# Patient Record
Sex: MALE | Race: WHITE | Employment: OTHER | URBAN - METROPOLITAN AREA
[De-identification: names, ages, dates, MRNs, and addresses within clinical notes are randomized per-mention and may not be internally consistent; named-entity substitution may affect disease eponyms.]

---

## 2020-11-10 ENCOUNTER — HOSPITAL ENCOUNTER (EMERGENCY)
Age: 50
Discharge: HOME | End: 2020-11-10
Attending: EMERGENCY MEDICINE
Payer: MEDICARE

## 2020-11-10 VITALS
HEART RATE: 85 BPM | SYSTOLIC BLOOD PRESSURE: 135 MMHG | RESPIRATION RATE: 16 BRPM | TEMPERATURE: 97.7 F | BODY MASS INDEX: 22.73 KG/M2 | OXYGEN SATURATION: 92 % | WEIGHT: 150 LBS | HEIGHT: 68 IN | DIASTOLIC BLOOD PRESSURE: 78 MMHG

## 2020-11-10 LAB
ABSOLUTE EOS #: 0.27 K/UL (ref 0–0.44)
ABSOLUTE IMMATURE GRANULOCYTE: 0.04 K/UL (ref 0–0.3)
ABSOLUTE LYMPH #: 1.67 K/UL (ref 1.1–3.7)
ABSOLUTE MONO #: 0.43 K/UL (ref 0.1–1.2)
ACETAMINOPHEN LEVEL: <10 UG/ML (ref 10–30)
ALBUMIN SERPL-MCNC: 3.8 G/DL (ref 3.5–5.2)
ALBUMIN/GLOBULIN RATIO: NORMAL (ref 1–2.5)
ALP BLD-CCNC: 58 U/L (ref 40–129)
ALT SERPL-CCNC: 9 U/L (ref 5–41)
ANION GAP SERPL CALCULATED.3IONS-SCNC: 14 MMOL/L (ref 9–17)
AST SERPL-CCNC: 16 U/L
BASOPHILS # BLD: 1 % (ref 0–2)
BASOPHILS ABSOLUTE: 0.04 K/UL (ref 0–0.2)
BILIRUB SERPL-MCNC: 1.02 MG/DL (ref 0.3–1.2)
BILIRUBIN DIRECT: 0.21 MG/DL
BILIRUBIN, INDIRECT: 0.81 MG/DL (ref 0–1)
BUN BLDV-MCNC: 22 MG/DL (ref 6–20)
BUN/CREAT BLD: 17 (ref 9–20)
CALCIUM SERPL-MCNC: 9 MG/DL (ref 8.6–10.4)
CHLORIDE BLD-SCNC: 100 MMOL/L (ref 98–107)
CO2: 24 MMOL/L (ref 20–31)
CREAT SERPL-MCNC: 1.31 MG/DL (ref 0.7–1.2)
DIFFERENTIAL TYPE: ABNORMAL
EKG ATRIAL RATE: 76 BPM
EKG P AXIS: 75 DEGREES
EKG P-R INTERVAL: 156 MS
EKG Q-T INTERVAL: 404 MS
EKG QRS DURATION: 92 MS
EKG QTC CALCULATION (BAZETT): 454 MS
EKG R AXIS: 77 DEGREES
EKG T AXIS: 61 DEGREES
EKG VENTRICULAR RATE: 76 BPM
EOSINOPHILS RELATIVE PERCENT: 3 % (ref 1–4)
ETHANOL PERCENT: <0.01 %
ETHANOL: <10 MG/DL
GFR AFRICAN AMERICAN: >60 ML/MIN
GFR NON-AFRICAN AMERICAN: 58 ML/MIN
GFR SERPL CREATININE-BSD FRML MDRD: ABNORMAL ML/MIN/{1.73_M2}
GFR SERPL CREATININE-BSD FRML MDRD: ABNORMAL ML/MIN/{1.73_M2}
GLOBULIN: NORMAL G/DL (ref 1.5–3.8)
GLUCOSE BLD-MCNC: 210 MG/DL (ref 70–99)
HCT VFR BLD CALC: 37.6 % (ref 40.7–50.3)
HEMOGLOBIN: 11.9 G/DL (ref 13–17)
IMMATURE GRANULOCYTES: 1 %
LYMPHOCYTES # BLD: 19 % (ref 24–43)
MCH RBC QN AUTO: 27.7 PG (ref 25.2–33.5)
MCHC RBC AUTO-ENTMCNC: 31.6 G/DL (ref 28.4–34.8)
MCV RBC AUTO: 87.4 FL (ref 82.6–102.9)
MONOCYTES # BLD: 5 % (ref 3–12)
MYOGLOBIN: 43 NG/ML (ref 28–72)
NRBC AUTOMATED: 0 PER 100 WBC
PDW BLD-RTO: 13.6 % (ref 11.8–14.4)
PLATELET # BLD: 208 K/UL (ref 138–453)
PLATELET ESTIMATE: ABNORMAL
PMV BLD AUTO: 11.5 FL (ref 8.1–13.5)
POTASSIUM SERPL-SCNC: 3.3 MMOL/L (ref 3.7–5.3)
RBC # BLD: 4.3 M/UL (ref 4.21–5.77)
RBC # BLD: ABNORMAL 10*6/UL
SALICYLATE LEVEL: <1 MG/DL (ref 3–10)
SEG NEUTROPHILS: 71 % (ref 36–65)
SEGMENTED NEUTROPHILS ABSOLUTE COUNT: 6.34 K/UL (ref 1.5–8.1)
SODIUM BLD-SCNC: 138 MMOL/L (ref 135–144)
TOTAL PROTEIN: 6.6 G/DL (ref 6.4–8.3)
TOXIC TRICYCLIC SC,BLOOD: NEGATIVE
TROPONIN INTERP: NORMAL
TROPONIN T: NORMAL NG/ML
TROPONIN, HIGH SENSITIVITY: 7 NG/L (ref 0–22)
WBC # BLD: 8.8 K/UL (ref 3.5–11.3)
WBC # BLD: ABNORMAL 10*3/UL

## 2020-11-10 PROCEDURE — 84484 ASSAY OF TROPONIN QUANT: CPT

## 2020-11-10 PROCEDURE — 80307 DRUG TEST PRSMV CHEM ANLYZR: CPT

## 2020-11-10 PROCEDURE — 80048 BASIC METABOLIC PNL TOTAL CA: CPT

## 2020-11-10 PROCEDURE — 85025 COMPLETE CBC W/AUTO DIFF WBC: CPT

## 2020-11-10 PROCEDURE — 99282 EMERGENCY DEPT VISIT SF MDM: CPT

## 2020-11-10 PROCEDURE — 80076 HEPATIC FUNCTION PANEL: CPT

## 2020-11-10 PROCEDURE — 93005 ELECTROCARDIOGRAM TRACING: CPT | Performed by: NURSE PRACTITIONER

## 2020-11-10 PROCEDURE — G0480 DRUG TEST DEF 1-7 CLASSES: HCPCS

## 2020-11-10 PROCEDURE — 83874 ASSAY OF MYOGLOBIN: CPT

## 2020-11-10 ASSESSMENT — ENCOUNTER SYMPTOMS
ABDOMINAL PAIN: 0
PHOTOPHOBIA: 0
COLOR CHANGE: 0
DIARRHEA: 0
VOMITING: 1
BACK PAIN: 0
EYE PAIN: 0
COUGH: 0
SHORTNESS OF BREATH: 0
NAUSEA: 1

## 2020-11-10 NOTE — ED NOTES
Pt repeatedly sts \" I dont remember doing anything\" asking where kids are am I going to lose them. pt advised kids are with police.      Shagufta Lopez RN  11/10/20 9095

## 2020-11-10 NOTE — ED NOTES
Brother here in emergency dept and advised pt taken by police . The brother  currently has kids with him.      Tammy Granaod RN  11/10/20 7377 Inflammation Suggestive Of Cancer Camouflage Histology Text: There was a dense lymphocytic infiltrate which prevented adequate histologic evaluation of adjacent structures.

## 2020-11-10 NOTE — ED PROVIDER NOTES
Team [address] ED  eMERGENCY dEPARTMENT eNCOUnter      Pt Name: Kaylee Mcadams  MRN: [id number]  [place] 1970  Date of evaluation: 11/10/2020  Provider: LUIS Mcdonnell CNP    CHIEF COMPLAINT       Chief Complaint   Patient presents with    Drug Overdose         HISTORY OF PRESENT ILLNESS  (Location/Symptom, Timing/Onset, Context/Setting, Quality, Duration, Modifying Factors, Severity.)   Kaylee Mcadams is a 53 y.o. male who presents to the emergency department via EMS for a possible overdose. He was found in his bathroom by his children unresponsive. He does not recall using any drugs. EMS reported they found drug paraphernalia in the bathroom. He was given 3 mg of narcan IV and became responsive. He had some emesis after. His children told EMS that he went to use the restroom and they hear a loud thud so they went to check on him. His children are with [employer] at this time. Denies headache, dizziness, chest pain, SOB. Denies pain at this time. Nursing Notes were reviewed. ALLERGIES     Azithromycin; Codeine; Ibuprofen; Morphine; and Tramadol    CURRENT MEDICATIONS       There are no discharge medications for this patient. PAST MEDICAL HISTORY         Diagnosis Date    Diabetes mellitus ([facility]     Seizures ([facility] [address]        SURGICAL HISTORY     History reviewed. No pertinent surgical history. FAMILY HISTORY     History reviewed. No pertinent family history. No family status information on file. SOCIAL HISTORY      reports that he has been smoking cigarettes. He does not have any smokeless tobacco history on file. He reports previous alcohol use. He reports current drug use. Drug: Marijuana. REVIEW OF SYSTEMS    (2-9 systems for level 4, 10 or more for level 5)     Review of Systems   Constitutional: Negative for chills, diaphoresis, fatigue and fever. Eyes: Negative for photophobia, pain and visual disturbance.    Respiratory: Negative for cough and shortness of breath. Cardiovascular: Negative for chest pain. Gastrointestinal: Positive for nausea and vomiting. Negative for abdominal pain and diarrhea. Musculoskeletal: Negative for arthralgias, back pain, myalgias and neck pain. Skin: Negative for color change, rash and wound. Neurological: Positive for weakness. Negative for dizziness, facial asymmetry, light-headedness, numbness and headaches. Psychiatric/Behavioral: Positive for confusion. Except as noted above the remainder of the review of systems was reviewed and negative. PHYSICAL EXAM    (up to 7 for level 4, 8 or more for level 5)     ED Triage Vitals   BP Temp Temp Source Pulse Resp SpO2 Height Weight   11/10/20 1103 -- 11/10/20 1103 11/10/20 1103 11/10/20 1103 11/10/20 1103 11/10/20 1102 11/10/20 1102   135/78  Oral 85 16 92 % 5' 8\" (1.727 m) 150 lb (68 kg)     Physical Exam  Vitals signs reviewed. Constitutional:       General: He is not in acute distress. Appearance: He is well-developed. He is not diaphoretic. HENT:      Head: No raccoon eyes or Pearl's sign. Nose:      Right Nostril: No epistaxis. Left Nostril: No epistaxis. Eyes:      General: No scleral icterus. Extraocular Movements: Extraocular movements intact. Conjunctiva/sclera: Conjunctivae normal.      Pupils: Pupils are equal, round, and reactive to light. Cardiovascular:      Rate and Rhythm: Normal rate and regular rhythm. Heart sounds: Normal heart sounds. Pulmonary:      Effort: Pulmonary effort is normal. No respiratory distress. Breath sounds: Normal breath sounds. No wheezing or rales. Abdominal:      Palpations: Abdomen is soft. Tenderness: There is no abdominal tenderness. There is no guarding. Musculoskeletal:      Cervical back: He exhibits no pain. Thoracic back: He exhibits no pain. Lumbar back: He exhibits no pain. Skin:     General: Skin is warm and dry. Findings: No rash.    Neurological: Mental Status: He is alert and oriented to person, place, and time. GCS: GCS eye subscore is 4. GCS verbal subscore is 5. GCS motor subscore is 6. Cranial Nerves: Cranial nerves are intact. Motor: Motor function is intact. Coordination: Coordination is intact. Psychiatric:         Behavior: Behavior normal.         DIAGNOSTIC RESULTS     EKG: All EKG's are interpreted by the Emergency Department Physician who either signs or Co-signs this chart in the absence of a cardiologist.  EKG was interpreted by Dr. Jose G Astorga after completion. LABS:  Labs Reviewed   BASIC METABOLIC PANEL - Abnormal; Notable for the following components:       Result Value    Glucose 210 (*)     BUN 22 (*)     CREATININE 1.31 (*)     Potassium 3.3 (*)     GFR Non- 58 (*)     All other components within normal limits   CBC WITH AUTO DIFFERENTIAL - Abnormal; Notable for the following components:    Hemoglobin 11.9 (*)     Hematocrit 37.6 (*)     Seg Neutrophils 71 (*)     Lymphocytes 19 (*)     Immature Granulocytes 1 (*)     All other components within normal limits   TOX SCR, BLD, ED - Abnormal; Notable for the following components:    Acetaminophen Level <18 (*)     Salicylate Lvl <1 (*)     All other components within normal limits   HEPATIC FUNCTION PANEL   TROP/MYOGLOBIN       All other labs were within normal range or not returned as of this dictation. EMERGENCY DEPARTMENT COURSE and DIFFERENTIAL DIAGNOSIS/MDM:   Vitals:    Vitals:    11/10/20 1102 11/10/20 1103 11/10/20 1107   BP:  135/78    Pulse:  85    Resp:  16    Temp:   97.7 °F (36.5 °C)   TempSrc:  Oral Oral   SpO2:  92%    Weight: 150 lb (68 kg)     Height: 5' 8\" (1.727 m)         CLINICAL DECISION MAKING:  The patient presented alert with a nontoxic appearance and was seen in conjunction with Dr. Jose G Astorga. The patient was alert and oriented here without issue. He was discharged to [facility]. FINAL IMPRESSION      1.  Accidental drug overdose, initial encounter                DISPOSITION/PLAN   DISPOSITION Decision To Discharge 11/10/2020 12:17:14 PM      PATIENT REFERRED TO:   No follow-up provider specified. DISCHARGE MEDICATIONS:     There are no discharge medications for this patient.           (Please note that portions of this note were completed with a voice recognition program.  Efforts were made to edit the dictations but occasionally words are mis-transcribed.)    LUIS Cedeño - LUIS Graff CNP  11/10/20 1161

## 2020-11-11 NOTE — ED PROVIDER NOTES
eMERGENCY dEPARTMENT eNCOUnter   Independent Attestation     Pt Name: Vanda Rodríguez  MRN: [id number]  [place] 1970  Date of evaluation: 11/11/20     Vanda Rodríguez is a 53 y.o. male with CC: Drug Overdose      Based on the medical record the care appears appropriate. I was personally available for consultation in the Emergency Department.     Damion Paris MD  Attending Emergency Physician                    Damion Paris MD  11/11/20 9627

## 2021-12-03 ENCOUNTER — HOSPITAL ENCOUNTER (EMERGENCY)
Age: 51
Discharge: LEFT BEFORE DISCHARGE | End: 2021-12-04
Attending: EMERGENCY MEDICINE
Payer: MEDICARE

## 2021-12-03 ENCOUNTER — APPOINTMENT (OUTPATIENT)
Dept: GENERAL RADIOLOGY | Age: 51
End: 2021-12-03
Payer: MEDICARE

## 2021-12-03 VITALS
RESPIRATION RATE: 17 BRPM | SYSTOLIC BLOOD PRESSURE: 118 MMHG | HEART RATE: 74 BPM | TEMPERATURE: 97.3 F | WEIGHT: 150 LBS | BODY MASS INDEX: 22.81 KG/M2 | OXYGEN SATURATION: 98 % | DIASTOLIC BLOOD PRESSURE: 70 MMHG

## 2021-12-03 DIAGNOSIS — E86.0 DEHYDRATION: Primary | ICD-10-CM

## 2021-12-03 DIAGNOSIS — R73.9 HYPERGLYCEMIA: ICD-10-CM

## 2021-12-03 LAB
ABSOLUTE EOS #: 0.1 K/UL (ref 0–0.4)
ABSOLUTE IMMATURE GRANULOCYTE: NORMAL K/UL (ref 0–0.3)
ABSOLUTE LYMPH #: 1.9 K/UL (ref 1–4.8)
ABSOLUTE MONO #: 0.4 K/UL (ref 0.1–1.3)
ALBUMIN SERPL-MCNC: 3.9 G/DL (ref 3.5–5.2)
ALBUMIN/GLOBULIN RATIO: ABNORMAL (ref 1–2.5)
ALP BLD-CCNC: 101 U/L (ref 40–129)
ALT SERPL-CCNC: 15 U/L (ref 5–41)
ANION GAP SERPL CALCULATED.3IONS-SCNC: 10 MMOL/L (ref 9–17)
AST SERPL-CCNC: 20 U/L
BASOPHILS # BLD: 1 % (ref 0–2)
BASOPHILS ABSOLUTE: 0 K/UL (ref 0–0.2)
BETA-HYDROXYBUTYRATE: 0.19 MMOL/L (ref 0.02–0.27)
BILIRUB SERPL-MCNC: 0.97 MG/DL (ref 0.3–1.2)
BUN BLDV-MCNC: 54 MG/DL (ref 6–20)
BUN/CREAT BLD: ABNORMAL (ref 9–20)
CALCIUM SERPL-MCNC: 9.3 MG/DL (ref 8.6–10.4)
CHLORIDE BLD-SCNC: 87 MMOL/L (ref 98–107)
CO2: 32 MMOL/L (ref 20–31)
CREAT SERPL-MCNC: 2.21 MG/DL (ref 0.7–1.2)
DIFFERENTIAL TYPE: NORMAL
EOSINOPHILS RELATIVE PERCENT: 2 % (ref 0–4)
GFR AFRICAN AMERICAN: 38 ML/MIN
GFR NON-AFRICAN AMERICAN: 32 ML/MIN
GFR SERPL CREATININE-BSD FRML MDRD: ABNORMAL ML/MIN/{1.73_M2}
GFR SERPL CREATININE-BSD FRML MDRD: ABNORMAL ML/MIN/{1.73_M2}
GLUCOSE BLD-MCNC: 397 MG/DL (ref 70–99)
HCT VFR BLD CALC: 41.6 % (ref 41–53)
HEMOGLOBIN: 14.1 G/DL (ref 13.5–17.5)
IMMATURE GRANULOCYTES: NORMAL %
LIPASE: 33 U/L (ref 13–60)
LYMPHOCYTES # BLD: 37 % (ref 24–44)
MAGNESIUM: 2.5 MG/DL (ref 1.6–2.6)
MCH RBC QN AUTO: 28.9 PG (ref 26–34)
MCHC RBC AUTO-ENTMCNC: 33.9 G/DL (ref 31–37)
MCV RBC AUTO: 85.3 FL (ref 80–100)
MONOCYTES # BLD: 7 % (ref 1–7)
NRBC AUTOMATED: NORMAL PER 100 WBC
PDW BLD-RTO: 13.9 % (ref 11.5–14.9)
PLATELET # BLD: 212 K/UL (ref 150–450)
PLATELET ESTIMATE: NORMAL
PMV BLD AUTO: 9.8 FL (ref 6–12)
POTASSIUM SERPL-SCNC: 3.5 MMOL/L (ref 3.7–5.3)
RBC # BLD: 4.88 M/UL (ref 4.5–5.9)
RBC # BLD: NORMAL 10*6/UL
SARS-COV-2, RAPID: NOT DETECTED
SEG NEUTROPHILS: 53 % (ref 36–66)
SEGMENTED NEUTROPHILS ABSOLUTE COUNT: 2.9 K/UL (ref 1.3–9.1)
SODIUM BLD-SCNC: 129 MMOL/L (ref 135–144)
SPECIMEN DESCRIPTION: NORMAL
TOTAL PROTEIN: 6.8 G/DL (ref 6.4–8.3)
TROPONIN INTERP: NORMAL
TROPONIN T: NORMAL NG/ML
TROPONIN, HIGH SENSITIVITY: 14 NG/L (ref 0–22)
TSH SERPL DL<=0.05 MIU/L-ACNC: 2.15 MIU/L (ref 0.3–5)
WBC # BLD: 5.3 K/UL (ref 3.5–11)
WBC # BLD: NORMAL 10*3/UL

## 2021-12-03 PROCEDURE — 83735 ASSAY OF MAGNESIUM: CPT

## 2021-12-03 PROCEDURE — 87635 SARS-COV-2 COVID-19 AMP PRB: CPT

## 2021-12-03 PROCEDURE — 84443 ASSAY THYROID STIM HORMONE: CPT

## 2021-12-03 PROCEDURE — 82805 BLOOD GASES W/O2 SATURATION: CPT

## 2021-12-03 PROCEDURE — 85025 COMPLETE CBC W/AUTO DIFF WBC: CPT

## 2021-12-03 PROCEDURE — 99284 EMERGENCY DEPT VISIT MOD MDM: CPT

## 2021-12-03 PROCEDURE — 82010 KETONE BODYS QUAN: CPT

## 2021-12-03 PROCEDURE — 80053 COMPREHEN METABOLIC PANEL: CPT

## 2021-12-03 PROCEDURE — 6360000002 HC RX W HCPCS: Performed by: NURSE PRACTITIONER

## 2021-12-03 PROCEDURE — 96375 TX/PRO/DX INJ NEW DRUG ADDON: CPT

## 2021-12-03 PROCEDURE — C9113 INJ PANTOPRAZOLE SODIUM, VIA: HCPCS | Performed by: NURSE PRACTITIONER

## 2021-12-03 PROCEDURE — 6360000002 HC RX W HCPCS: Performed by: EMERGENCY MEDICINE

## 2021-12-03 PROCEDURE — 93005 ELECTROCARDIOGRAM TRACING: CPT | Performed by: EMERGENCY MEDICINE

## 2021-12-03 PROCEDURE — 84484 ASSAY OF TROPONIN QUANT: CPT

## 2021-12-03 PROCEDURE — 71045 X-RAY EXAM CHEST 1 VIEW: CPT

## 2021-12-03 PROCEDURE — 82800 BLOOD PH: CPT

## 2021-12-03 PROCEDURE — 83690 ASSAY OF LIPASE: CPT

## 2021-12-03 PROCEDURE — 2580000003 HC RX 258: Performed by: EMERGENCY MEDICINE

## 2021-12-03 PROCEDURE — 6370000000 HC RX 637 (ALT 250 FOR IP): Performed by: EMERGENCY MEDICINE

## 2021-12-03 PROCEDURE — 96374 THER/PROPH/DIAG INJ IV PUSH: CPT

## 2021-12-03 PROCEDURE — 96361 HYDRATE IV INFUSION ADD-ON: CPT

## 2021-12-03 PROCEDURE — 36415 COLL VENOUS BLD VENIPUNCTURE: CPT

## 2021-12-03 RX ORDER — SODIUM CHLORIDE 9 MG/ML
INJECTION, SOLUTION INTRAVENOUS CONTINUOUS
Status: DISCONTINUED | OUTPATIENT
Start: 2021-12-03 | End: 2021-12-04 | Stop reason: HOSPADM

## 2021-12-03 RX ORDER — INSULIN GLARGINE 100 [IU]/ML
25 INJECTION, SOLUTION SUBCUTANEOUS 2 TIMES DAILY
Status: ON HOLD | COMMUNITY
End: 2022-02-15 | Stop reason: HOSPADM

## 2021-12-03 RX ORDER — 0.9 % SODIUM CHLORIDE 0.9 %
1000 INTRAVENOUS SOLUTION INTRAVENOUS ONCE
Status: COMPLETED | OUTPATIENT
Start: 2021-12-03 | End: 2021-12-03

## 2021-12-03 RX ORDER — LORAZEPAM 2 MG/ML
1 INJECTION INTRAMUSCULAR EVERY 6 HOURS PRN
Status: CANCELLED | OUTPATIENT
Start: 2021-12-03

## 2021-12-03 RX ORDER — CLONAZEPAM 2 MG/1
4 TABLET ORAL DAILY PRN
Status: ON HOLD | COMMUNITY

## 2021-12-03 RX ORDER — SODIUM CHLORIDE 0.9 % (FLUSH) 0.9 %
10 SYRINGE (ML) INJECTION DAILY
Status: DISCONTINUED | OUTPATIENT
Start: 2021-12-03 | End: 2021-12-04 | Stop reason: HOSPADM

## 2021-12-03 RX ORDER — INSULIN GLARGINE 100 [IU]/ML
25 INJECTION, SOLUTION SUBCUTANEOUS 2 TIMES DAILY
Status: CANCELLED | OUTPATIENT
Start: 2021-12-03

## 2021-12-03 RX ORDER — ONDANSETRON 2 MG/ML
4 INJECTION INTRAMUSCULAR; INTRAVENOUS EVERY 6 HOURS PRN
Status: CANCELLED | OUTPATIENT
Start: 2021-12-03

## 2021-12-03 RX ORDER — ONDANSETRON 4 MG/1
4 TABLET, FILM COATED ORAL 3 TIMES DAILY PRN
Qty: 15 TABLET | Refills: 0 | Status: SHIPPED | OUTPATIENT
Start: 2021-12-03 | End: 2022-02-14

## 2021-12-03 RX ORDER — ONDANSETRON 4 MG/1
4 TABLET, ORALLY DISINTEGRATING ORAL EVERY 8 HOURS PRN
Status: CANCELLED | OUTPATIENT
Start: 2021-12-03

## 2021-12-03 RX ORDER — SODIUM CHLORIDE 9 MG/ML
INJECTION, SOLUTION INTRAVENOUS CONTINUOUS
Status: CANCELLED | OUTPATIENT
Start: 2021-12-03

## 2021-12-03 RX ORDER — BLOOD-GLUCOSE METER
1 KIT MISCELLANEOUS DAILY
Qty: 1 KIT | Refills: 0 | Status: SHIPPED | OUTPATIENT
Start: 2021-12-03 | End: 2022-03-18 | Stop reason: ALTCHOICE

## 2021-12-03 RX ORDER — ONDANSETRON 2 MG/ML
8 INJECTION INTRAMUSCULAR; INTRAVENOUS ONCE
Status: COMPLETED | OUTPATIENT
Start: 2021-12-03 | End: 2021-12-03

## 2021-12-03 RX ORDER — ALBUTEROL SULFATE 90 UG/1
2 AEROSOL, METERED RESPIRATORY (INHALATION) EVERY 4 HOURS PRN
Status: ON HOLD | COMMUNITY

## 2021-12-03 RX ORDER — SODIUM CHLORIDE 0.9 % (FLUSH) 0.9 %
5-40 SYRINGE (ML) INJECTION PRN
Status: CANCELLED | OUTPATIENT
Start: 2021-12-03

## 2021-12-03 RX ORDER — SODIUM CHLORIDE 9 MG/ML
25 INJECTION, SOLUTION INTRAVENOUS PRN
Status: CANCELLED | OUTPATIENT
Start: 2021-12-03

## 2021-12-03 RX ORDER — TRAZODONE HYDROCHLORIDE 150 MG/1
300 TABLET ORAL NIGHTLY
Status: ON HOLD | COMMUNITY

## 2021-12-03 RX ORDER — POTASSIUM CHLORIDE 7.45 MG/ML
10 INJECTION INTRAVENOUS
Status: COMPLETED | OUTPATIENT
Start: 2021-12-03 | End: 2021-12-04

## 2021-12-03 RX ORDER — LORAZEPAM 2 MG/ML
1 INJECTION INTRAMUSCULAR ONCE
Status: DISCONTINUED | OUTPATIENT
Start: 2021-12-03 | End: 2021-12-03

## 2021-12-03 RX ORDER — INSULIN ASPART 100 [IU]/ML
5 INJECTION, SOLUTION INTRAVENOUS; SUBCUTANEOUS
COMMUNITY
End: 2022-03-18 | Stop reason: SDUPTHER

## 2021-12-03 RX ORDER — ACETAMINOPHEN 325 MG/1
650 TABLET ORAL EVERY 6 HOURS PRN
Status: CANCELLED | OUTPATIENT
Start: 2021-12-03

## 2021-12-03 RX ORDER — SODIUM CHLORIDE 0.9 % (FLUSH) 0.9 %
5-40 SYRINGE (ML) INJECTION EVERY 12 HOURS SCHEDULED
Status: CANCELLED | OUTPATIENT
Start: 2021-12-03

## 2021-12-03 RX ORDER — ALBUTEROL SULFATE 90 UG/1
2 AEROSOL, METERED RESPIRATORY (INHALATION) EVERY 4 HOURS PRN
Status: CANCELLED | OUTPATIENT
Start: 2021-12-03

## 2021-12-03 RX ORDER — ACETAMINOPHEN 650 MG/1
650 SUPPOSITORY RECTAL EVERY 6 HOURS PRN
Status: CANCELLED | OUTPATIENT
Start: 2021-12-03

## 2021-12-03 RX ORDER — PANTOPRAZOLE SODIUM 40 MG/10ML
40 INJECTION, POWDER, LYOPHILIZED, FOR SOLUTION INTRAVENOUS DAILY
Status: DISCONTINUED | OUTPATIENT
Start: 2021-12-03 | End: 2021-12-04 | Stop reason: HOSPADM

## 2021-12-03 RX ADMIN — PANTOPRAZOLE SODIUM 40 MG: 40 INJECTION, POWDER, FOR SOLUTION INTRAVENOUS at 23:47

## 2021-12-03 RX ADMIN — SODIUM CHLORIDE 1000 ML: 9 INJECTION, SOLUTION INTRAVENOUS at 20:37

## 2021-12-03 RX ADMIN — SODIUM CHLORIDE: 9 INJECTION, SOLUTION INTRAVENOUS at 21:28

## 2021-12-03 RX ADMIN — POTASSIUM CHLORIDE 10 MEQ: 7.46 INJECTION, SOLUTION INTRAVENOUS at 23:40

## 2021-12-03 RX ADMIN — POTASSIUM CHLORIDE 10 MEQ: 7.46 INJECTION, SOLUTION INTRAVENOUS at 21:17

## 2021-12-03 RX ADMIN — SODIUM CHLORIDE 1000 ML: 9 INJECTION, SOLUTION INTRAVENOUS at 21:17

## 2021-12-03 RX ADMIN — ONDANSETRON 8 MG: 2 INJECTION INTRAMUSCULAR; INTRAVENOUS at 20:37

## 2021-12-03 RX ADMIN — Medication 7 UNITS: at 21:17

## 2021-12-03 ASSESSMENT — PAIN DESCRIPTION - PAIN TYPE: TYPE: ACUTE PAIN

## 2021-12-03 ASSESSMENT — ENCOUNTER SYMPTOMS
COUGH: 0
CHEST TIGHTNESS: 0
SHORTNESS OF BREATH: 0
NAUSEA: 1
SORE THROAT: 0
CONSTIPATION: 0
BACK PAIN: 0
EYE PAIN: 0
VOMITING: 1
ABDOMINAL PAIN: 0
DIARRHEA: 0

## 2021-12-03 ASSESSMENT — PAIN DESCRIPTION - FREQUENCY: FREQUENCY: CONTINUOUS

## 2021-12-03 ASSESSMENT — PAIN DESCRIPTION - LOCATION: LOCATION: GENERALIZED

## 2021-12-03 ASSESSMENT — PAIN DESCRIPTION - DESCRIPTORS: DESCRIPTORS: DISCOMFORT

## 2021-12-03 ASSESSMENT — PAIN SCALES - GENERAL: PAINLEVEL_OUTOF10: 10

## 2021-12-03 NOTE — ED TRIAGE NOTES
Pt dropped to the ground walking into triage. Pt assisted to wheelchair by staff. Pt reports extreme weakness x 10 days.  BLOOD SUGAR 481 IN TRIAGE

## 2021-12-04 LAB
ALLEN TEST: ABNORMAL
CARBOXYHEMOGLOBIN: 6.6 % (ref 0–5)
EKG ATRIAL RATE: 77 BPM
EKG P AXIS: -60 DEGREES
EKG P-R INTERVAL: 150 MS
EKG Q-T INTERVAL: 420 MS
EKG QRS DURATION: 94 MS
EKG QTC CALCULATION (BAZETT): 475 MS
EKG R AXIS: -165 DEGREES
EKG T AXIS: 74 DEGREES
EKG VENTRICULAR RATE: 77 BPM
FIO2: ABNORMAL
HCO3 VENOUS: 26.1 MMOL/L (ref 24–30)
METHEMOGLOBIN: 0.7 % (ref 0–1.9)
MODE: ABNORMAL
NEGATIVE BASE EXCESS, VEN: ABNORMAL MMOL/L (ref 0–2)
NOTIFICATION TIME: ABNORMAL
NOTIFICATION: ABNORMAL
O2 DEVICE/FLOW/%: ABNORMAL
O2 SAT, VEN: 90.2 % (ref 60–85)
OXYHEMOGLOBIN: ABNORMAL % (ref 95–98)
PATIENT TEMP: 37
PCO2, VEN, TEMP ADJ: ABNORMAL MMHG (ref 39–55)
PCO2, VEN: 40.5 (ref 39–55)
PEEP/CPAP: ABNORMAL
PH VENOUS: 7.42 (ref 7.32–7.42)
PH, VEN, TEMP ADJ: ABNORMAL (ref 7.32–7.42)
PO2, VEN, TEMP ADJ: ABNORMAL MMHG (ref 30–50)
PO2, VEN: 81.4 (ref 30–50)
POSITIVE BASE EXCESS, VEN: 1.5 MMOL/L (ref 0–2)
PSV: ABNORMAL
PT. POSITION: ABNORMAL
RESPIRATORY RATE: ABNORMAL
SAMPLE SITE: ABNORMAL
SET RATE: ABNORMAL
TEXT FOR RESPIRATORY: ABNORMAL
TOTAL HB: ABNORMAL G/DL (ref 12–16)
TOTAL RATE: ABNORMAL
VT: ABNORMAL

## 2021-12-04 PROCEDURE — 93010 ELECTROCARDIOGRAM REPORT: CPT | Performed by: INTERNAL MEDICINE

## 2021-12-04 ASSESSMENT — PAIN SCALES - GENERAL: PAINLEVEL_OUTOF10: 4

## 2021-12-04 ASSESSMENT — PAIN DESCRIPTION - DESCRIPTORS: DESCRIPTORS: BURNING

## 2021-12-04 ASSESSMENT — PAIN DESCRIPTION - LOCATION: LOCATION: THROAT

## 2021-12-04 NOTE — ED NOTES
Pt states he is leaving ama. Berta Np aware. Pt agrees to stay until potassium is done. Pt given pretzels and apple juice.  Tolerating well     Vivian Restrepo RN  12/03/21 5886

## 2021-12-04 NOTE — ED PROVIDER NOTES
alcohol use. He reports current drug use. Drug: Marijuana Candiss Littler). PHYSICAL EXAM     INITIAL VITALS: /80   Pulse 89   Temp 97.3 °F (36.3 °C) (Oral)   Resp 17   SpO2 98%    Physical Exam  Vitals and nursing note reviewed. Constitutional:       Appearance: He is well-developed. Comments: He appears dehydrated and cachectic, chronically ill, older than stated age. HENT:      Head: Normocephalic and atraumatic. Right Ear: External ear normal.      Left Ear: External ear normal.   Eyes:      Conjunctiva/sclera: Conjunctivae normal.      Pupils: Pupils are equal, round, and reactive to light. Neck:      Vascular: No JVD. Trachea: No tracheal deviation. Cardiovascular:      Rate and Rhythm: Normal rate and regular rhythm. Heart sounds: Normal heart sounds. Pulmonary:      Effort: Pulmonary effort is normal. No respiratory distress. Breath sounds: Normal breath sounds. No stridor. Abdominal:      Comments: Abdomen is soft with no peritoneal signs. Musculoskeletal:         General: No tenderness or deformity. Normal range of motion. Cervical back: Normal range of motion and neck supple. Skin:     General: Skin is warm and dry. Neurological:      Mental Status: He is oriented to person, place, and time. Cranial Nerves: No cranial nerve deficit. Coordination: Coordination normal.         MEDICAL DECISION MAKING:   Assessment:  Jama Gowers is a 49 y.o. male who presents with generalized weakness. ED Course/MDM:   Patient arrived hemodynamically stable and in no acute distress. He collapsed to the floor in the waiting room and was noted to have  BG > 400. Labs reviewed. Patient significantly hyperglycemic and dehydrated, K slightly low. Not acidotic on VBG and ketones not elevated, does not appear consistent with DKA, not starting insulin gtt. CXR with no acute findings and covid negative.  Given 2L NS IVF bolus, IV insulin and potassium supplementation. 11:36 PM  Patient refusing admission. He has two minor children staying home alone and does not have any family members that can be home with them. The patient is alert and oriented x4 and I believe he has medical decision making capacity. I discussed his elevated creatinine and glucose which requires IVF and insulin. I specifically warned him that untreated this condition could progress to kidney failure or sudden cardiac arrthymia leading to death and he was able to repeat these risks of leaving. He is adamant that he wishes to be discharged. Given script for glucometer and referral to [facility] clinic to establish with primary care. Advised to return to the ED if he changes his mind about admission. Procedures    DIAGNOSTIC RESULTS   EKG: All EKG's are interpreted by the Emergency Department Physician who either signs or Co-signs this chart inthe absence of a cardiologist.      RADIOLOGY:All plain film, CT, MRI, and formal ultrasound images (except ED bedside ultrasound) are read by the radiologist, see reports below, unless otherwise noted in MDM or here. XR CHEST PORTABLE   Final Result   No acute process. LABS: All lab results were reviewed by myself, and all abnormals are listed below.   Labs Reviewed   COMPREHENSIVE METABOLIC PANEL - Abnormal; Notable for the following components:       Result Value    Glucose 397 (*)     BUN 54 (*)     CREATININE 2.21 (*)     Sodium 129 (*)     Potassium 3.5 (*)     Chloride 87 (*)     CO2 32 (*)     GFR Non- 32 (*)     GFR  38 (*)     All other components within normal limits   COVID-19, RAPID   CBC WITH AUTO DIFFERENTIAL   TSH WITH REFLEX   TROPONIN   LIPASE   MAGNESIUM   BETA-HYDROXYBUTYRATE   URINALYSIS   BLOOD GAS, VENOUS     EMERGENCY DEPARTMENT COURSE:   Vitals:    Vitals:    12/03/21 1842 12/03/21 2129   BP: 119/82 112/80   Pulse: 89 89   Resp: 18 17   Temp: 97.3 °F (36.3 °C)    TempSrc: Oral SpO2: 100% 98%       The patient was given the following medications while in the emergency department:  Orders Placed This Encounter   Medications    0.9 % sodium chloride bolus    ondansetron (ZOFRAN) injection 8 mg    0.9 % sodium chloride bolus    potassium chloride 10 mEq/100 mL IVPB (Peripheral Line)    insulin regular (HUMULIN R;NOVOLIN R) injection 7 Units    0.9 % sodium chloride infusion    insulin regular (HUMULIN R;NOVOLIN R) 100 UNIT/ML injection     Radha Farmer: cabinet override     CONSULTS:  IP CONSULT TO INTERNAL MEDICINE    FINAL IMPRESSION      1. Dehydration    2. Hyperglycemia          DISPOSITION/PLAN   DISPOSITION Decision To Admit 12/03/2021 09:39:53 PM      PATIENT REFERRED TO:  No follow-up provider specified.   DISCHARGE MEDICATIONS:  New Prescriptions    No medications on file     Madhav Longo MD  AttendingEmergency Physician                       Bria Catalan MD  12/03/21 8986       Bria Catalan MD  12/03/21 3856

## 2022-02-14 ENCOUNTER — APPOINTMENT (OUTPATIENT)
Dept: GENERAL RADIOLOGY | Age: 52
DRG: 420 | End: 2022-02-14
Payer: MEDICARE

## 2022-02-14 ENCOUNTER — HOSPITAL ENCOUNTER (INPATIENT)
Age: 52
LOS: 1 days | Discharge: LEFT BEFORE DISCHARGE | DRG: 420 | End: 2022-02-15
Attending: EMERGENCY MEDICINE | Admitting: INTERNAL MEDICINE
Payer: MEDICARE

## 2022-02-14 ENCOUNTER — APPOINTMENT (OUTPATIENT)
Dept: CT IMAGING | Age: 52
DRG: 420 | End: 2022-02-14
Payer: MEDICARE

## 2022-02-14 DIAGNOSIS — A09 INFECTIOUS COLITIS: Primary | ICD-10-CM

## 2022-02-14 PROBLEM — E10.65 HYPERGLYCEMIA DUE TO TYPE 1 DIABETES MELLITUS (HCC): Status: ACTIVE | Noted: 2022-02-14

## 2022-02-14 PROBLEM — K52.9 COLITIS: Status: ACTIVE | Noted: 2022-02-14

## 2022-02-14 LAB
-: NORMAL
ABSOLUTE EOS #: 0.03 K/UL (ref 0–0.44)
ABSOLUTE IMMATURE GRANULOCYTE: 0.1 K/UL (ref 0–0.3)
ABSOLUTE LYMPH #: 1 K/UL (ref 1.1–3.7)
ABSOLUTE MONO #: 1.08 K/UL (ref 0.1–1.2)
ALBUMIN SERPL-MCNC: 4 G/DL (ref 3.5–5.2)
ALBUMIN/GLOBULIN RATIO: 1.4 (ref 1–2.5)
ALLEN TEST: ABNORMAL
ALLEN TEST: ABNORMAL
ALP BLD-CCNC: 110 U/L (ref 40–129)
ALT SERPL-CCNC: 11 U/L (ref 5–41)
AMORPHOUS: NORMAL
ANION GAP SERPL CALCULATED.3IONS-SCNC: 17 MMOL/L (ref 9–17)
ANION GAP SERPL CALCULATED.3IONS-SCNC: 24 MMOL/L (ref 9–17)
ANION GAP: 11 MMOL/L (ref 7–16)
AST SERPL-CCNC: 17 U/L
BACTERIA: NORMAL
BASOPHILS # BLD: 0 % (ref 0–2)
BASOPHILS ABSOLUTE: 0.06 K/UL (ref 0–0.2)
BETA-HYDROXYBUTYRATE: 5.21 MMOL/L (ref 0.02–0.27)
BILIRUB SERPL-MCNC: 0.78 MG/DL (ref 0.3–1.2)
BILIRUBIN URINE: NEGATIVE
BUN BLDV-MCNC: 28 MG/DL (ref 6–20)
BUN BLDV-MCNC: 33 MG/DL (ref 6–20)
BUN/CREAT BLD: ABNORMAL (ref 9–20)
BUN/CREAT BLD: ABNORMAL (ref 9–20)
CALCIUM SERPL-MCNC: 8.8 MG/DL (ref 8.6–10.4)
CALCIUM SERPL-MCNC: 9.4 MG/DL (ref 8.6–10.4)
CARBOXYHEMOGLOBIN: 3 % (ref 0–5)
CASTS UA: NORMAL /LPF (ref 0–8)
CHLORIDE BLD-SCNC: 91 MMOL/L (ref 98–107)
CHLORIDE BLD-SCNC: 91 MMOL/L (ref 98–107)
CHP ED QC CHECK: NORMAL
CO2: 20 MMOL/L (ref 20–31)
CO2: 23 MMOL/L (ref 20–31)
COLOR: YELLOW
COMMENT UA: ABNORMAL
CREAT SERPL-MCNC: 1.58 MG/DL (ref 0.7–1.2)
CREAT SERPL-MCNC: 1.76 MG/DL (ref 0.7–1.2)
CRYSTALS, UA: NORMAL /HPF
D-DIMER QUANTITATIVE: 1.32 MG/L FEU
DIFFERENTIAL TYPE: ABNORMAL
EOSINOPHILS RELATIVE PERCENT: 0 % (ref 1–4)
EPITHELIAL CELLS UA: NORMAL /HPF (ref 0–5)
FIO2: ABNORMAL
FIO2: ABNORMAL
GFR AFRICAN AMERICAN: 50 ML/MIN
GFR AFRICAN AMERICAN: 57 ML/MIN
GFR NON-AFRICAN AMERICAN: 41 ML/MIN
GFR NON-AFRICAN AMERICAN: 47 ML/MIN
GFR NON-AFRICAN AMERICAN: NORMAL ML/MIN
GFR SERPL CREATININE-BSD FRML MDRD: ABNORMAL ML/MIN/{1.73_M2}
GFR SERPL CREATININE-BSD FRML MDRD: NORMAL ML/MIN
GFR SERPL CREATININE-BSD FRML MDRD: NORMAL ML/MIN/{1.73_M2}
GLUCOSE BLD-MCNC: 330 MG/DL (ref 75–110)
GLUCOSE BLD-MCNC: 392 MG/DL (ref 70–99)
GLUCOSE BLD-MCNC: 437 MG/DL (ref 75–110)
GLUCOSE BLD-MCNC: 444 MG/DL
GLUCOSE BLD-MCNC: 444 MG/DL (ref 75–110)
GLUCOSE BLD-MCNC: 487 MG/DL (ref 75–110)
GLUCOSE BLD-MCNC: 538 MG/DL (ref 70–99)
GLUCOSE BLD-MCNC: 549 MG/DL (ref 74–100)
GLUCOSE URINE: ABNORMAL
HCO3 VENOUS: 20.5 MMOL/L (ref 22–29)
HCO3 VENOUS: 21.7 MMOL/L (ref 24–30)
HCT VFR BLD CALC: 40.8 % (ref 40.7–50.3)
HEMOGLOBIN: 13.2 G/DL (ref 13–17)
IMMATURE GRANULOCYTES: 1 %
KETONES, URINE: ABNORMAL
LACTIC ACID, SEPSIS WHOLE BLOOD: 1 MMOL/L (ref 0.5–1.9)
LACTIC ACID, SEPSIS: NORMAL MMOL/L (ref 0.5–1.9)
LEUKOCYTE ESTERASE, URINE: NEGATIVE
LIPASE: 70 U/L (ref 13–60)
LYMPHOCYTES # BLD: 5 % (ref 24–43)
MAGNESIUM: 1.9 MG/DL (ref 1.6–2.6)
MCH RBC QN AUTO: 28 PG (ref 25.2–33.5)
MCHC RBC AUTO-ENTMCNC: 32.4 G/DL (ref 28.4–34.8)
MCV RBC AUTO: 86.4 FL (ref 82.6–102.9)
METHEMOGLOBIN: ABNORMAL % (ref 0–1.5)
MODE: ABNORMAL
MODE: ABNORMAL
MONOCYTES # BLD: 6 % (ref 3–12)
MUCUS: NORMAL
NEGATIVE BASE EXCESS, VEN: 1 (ref 0–2)
NEGATIVE BASE EXCESS, VEN: 3.6 MMOL/L (ref 0–2)
NITRITE, URINE: NEGATIVE
NOTIFICATION TIME: ABNORMAL
NOTIFICATION: ABNORMAL
NRBC AUTOMATED: 0 PER 100 WBC
O2 DEVICE/FLOW/%: ABNORMAL
O2 DEVICE/FLOW/%: ABNORMAL
O2 SAT, VEN: 93 % (ref 60–85)
O2 SAT, VEN: 98.5 % (ref 60–85)
OTHER OBSERVATIONS UA: NORMAL
OXYHEMOGLOBIN: ABNORMAL % (ref 95–98)
PATIENT TEMP: 37
PATIENT TEMP: ABNORMAL
PCO2, VEN, TEMP ADJ: ABNORMAL MMHG (ref 39–55)
PCO2, VEN: 24.7 MM HG (ref 41–51)
PCO2, VEN: 42.8 (ref 39–55)
PDW BLD-RTO: 12.7 % (ref 11.8–14.4)
PEEP/CPAP: ABNORMAL
PH UA: 5 (ref 5–8)
PH VENOUS: 7.33 (ref 7.32–7.42)
PH VENOUS: 7.53 (ref 7.32–7.43)
PH, VEN, TEMP ADJ: ABNORMAL (ref 7.32–7.42)
PHOSPHORUS: 3.4 MG/DL (ref 2.5–4.5)
PLATELET # BLD: ABNORMAL K/UL (ref 138–453)
PLATELET ESTIMATE: ABNORMAL
PLATELET, FLUORESCENCE: NORMAL K/UL (ref 138–453)
PLATELET, IMMATURE FRACTION: NORMAL % (ref 1.1–10.3)
PMV BLD AUTO: ABNORMAL FL (ref 8.1–13.5)
PO2, VEN, TEMP ADJ: ABNORMAL MMHG (ref 30–50)
PO2, VEN: 115 (ref 30–50)
PO2, VEN: 58.7 MM HG (ref 30–50)
POC BUN: 32 MG/DL (ref 8–26)
POC CHLORIDE: 101 MMOL/L (ref 98–107)
POC CREATININE: NORMAL MG/DL (ref 0.51–1.19)
POC HEMATOCRIT: 42 % (ref 41–53)
POC HEMOGLOBIN: 14.2 G/DL (ref 13.5–17.5)
POC IONIZED CALCIUM: 0.85 MMOL/L (ref 1.15–1.33)
POC LACTIC ACID: 2.32 MMOL/L (ref 0.56–1.39)
POC PCO2 TEMP: ABNORMAL MM HG
POC PH TEMP: ABNORMAL
POC PO2 TEMP: ABNORMAL MM HG
POC POTASSIUM: 4.1 MMOL/L (ref 3.5–4.5)
POC SODIUM: 132 MMOL/L (ref 138–146)
POC TCO2: 21 MMOL/L (ref 22–30)
POSITIVE BASE EXCESS, VEN: ABNORMAL (ref 0–3)
POSITIVE BASE EXCESS, VEN: ABNORMAL MMOL/L (ref 0–2)
POTASSIUM SERPL-SCNC: 3.8 MMOL/L (ref 3.7–5.3)
POTASSIUM SERPL-SCNC: 4.4 MMOL/L (ref 3.7–5.3)
PROTEIN UA: ABNORMAL
PSV: ABNORMAL
PT. POSITION: ABNORMAL
RBC # BLD: 4.72 M/UL (ref 4.21–5.77)
RBC # BLD: ABNORMAL 10*6/UL
RBC UA: NORMAL /HPF (ref 0–4)
REASON FOR REJECTION: NORMAL
REASON FOR REJECTION: NORMAL
RENAL EPITHELIAL, UA: NORMAL /HPF
RESPIRATORY RATE: ABNORMAL
SAMPLE SITE: ABNORMAL
SAMPLE SITE: ABNORMAL
SARS-COV-2, RAPID: NOT DETECTED
SEG NEUTROPHILS: 88 % (ref 36–65)
SEGMENTED NEUTROPHILS ABSOLUTE COUNT: 17.32 K/UL (ref 1.5–8.1)
SET RATE: ABNORMAL
SODIUM BLD-SCNC: 131 MMOL/L (ref 135–144)
SODIUM BLD-SCNC: 135 MMOL/L (ref 135–144)
SPECIFIC GRAVITY UA: 1.02 (ref 1–1.03)
SPECIMEN DESCRIPTION: NORMAL
TEXT FOR RESPIRATORY: ABNORMAL
TOTAL CO2, VENOUS: ABNORMAL MMOL/L (ref 23–30)
TOTAL HB: ABNORMAL G/DL (ref 12–16)
TOTAL PROTEIN: 6.9 G/DL (ref 6.4–8.3)
TOTAL RATE: ABNORMAL
TRICHOMONAS: NORMAL
TROPONIN INTERP: NORMAL
TROPONIN INTERP: NORMAL
TROPONIN T: NORMAL NG/ML
TROPONIN T: NORMAL NG/ML
TROPONIN, HIGH SENSITIVITY: 13 NG/L (ref 0–22)
TROPONIN, HIGH SENSITIVITY: 13 NG/L (ref 0–22)
TURBIDITY: CLEAR
URINE HGB: NEGATIVE
UROBILINOGEN, URINE: NORMAL
VT: ABNORMAL
WBC # BLD: 19.6 K/UL (ref 3.5–11.3)
WBC # BLD: ABNORMAL 10*3/UL
WBC UA: NORMAL /HPF (ref 0–5)
YEAST: NORMAL
ZZ NTE CLEAN UP: ORDERED TEST: NORMAL
ZZ NTE CLEAN UP: ORDERED TEST: NORMAL
ZZ NTE WITH NAME CLEAN UP: SPECIMEN SOURCE: NORMAL
ZZ NTE WITH NAME CLEAN UP: SPECIMEN SOURCE: NORMAL

## 2022-02-14 PROCEDURE — 87040 BLOOD CULTURE FOR BACTERIA: CPT

## 2022-02-14 PROCEDURE — 82805 BLOOD GASES W/O2 SATURATION: CPT

## 2022-02-14 PROCEDURE — 82330 ASSAY OF CALCIUM: CPT

## 2022-02-14 PROCEDURE — 6360000004 HC RX CONTRAST MEDICATION: Performed by: STUDENT IN AN ORGANIZED HEALTH CARE EDUCATION/TRAINING PROGRAM

## 2022-02-14 PROCEDURE — 85025 COMPLETE CBC W/AUTO DIFF WBC: CPT

## 2022-02-14 PROCEDURE — 2580000003 HC RX 258

## 2022-02-14 PROCEDURE — 71260 CT THORAX DX C+: CPT

## 2022-02-14 PROCEDURE — 6360000002 HC RX W HCPCS: Performed by: STUDENT IN AN ORGANIZED HEALTH CARE EDUCATION/TRAINING PROGRAM

## 2022-02-14 PROCEDURE — A4216 STERILE WATER/SALINE, 10 ML: HCPCS | Performed by: STUDENT IN AN ORGANIZED HEALTH CARE EDUCATION/TRAINING PROGRAM

## 2022-02-14 PROCEDURE — 83036 HEMOGLOBIN GLYCOSYLATED A1C: CPT

## 2022-02-14 PROCEDURE — 83735 ASSAY OF MAGNESIUM: CPT

## 2022-02-14 PROCEDURE — 81001 URINALYSIS AUTO W/SCOPE: CPT

## 2022-02-14 PROCEDURE — 36415 COLL VENOUS BLD VENIPUNCTURE: CPT

## 2022-02-14 PROCEDURE — 2580000003 HC RX 258: Performed by: STUDENT IN AN ORGANIZED HEALTH CARE EDUCATION/TRAINING PROGRAM

## 2022-02-14 PROCEDURE — 82010 KETONE BODYS QUAN: CPT

## 2022-02-14 PROCEDURE — 82565 ASSAY OF CREATININE: CPT

## 2022-02-14 PROCEDURE — 93005 ELECTROCARDIOGRAM TRACING: CPT

## 2022-02-14 PROCEDURE — 80053 COMPREHEN METABOLIC PANEL: CPT

## 2022-02-14 PROCEDURE — 80048 BASIC METABOLIC PNL TOTAL CA: CPT

## 2022-02-14 PROCEDURE — 82803 BLOOD GASES ANY COMBINATION: CPT

## 2022-02-14 PROCEDURE — 85014 HEMATOCRIT: CPT

## 2022-02-14 PROCEDURE — 84100 ASSAY OF PHOSPHORUS: CPT

## 2022-02-14 PROCEDURE — 96375 TX/PRO/DX INJ NEW DRUG ADDON: CPT

## 2022-02-14 PROCEDURE — 80051 ELECTROLYTE PANEL: CPT

## 2022-02-14 PROCEDURE — 83690 ASSAY OF LIPASE: CPT

## 2022-02-14 PROCEDURE — 6370000000 HC RX 637 (ALT 250 FOR IP): Performed by: STUDENT IN AN ORGANIZED HEALTH CARE EDUCATION/TRAINING PROGRAM

## 2022-02-14 PROCEDURE — 82306 VITAMIN D 25 HYDROXY: CPT

## 2022-02-14 PROCEDURE — 99223 1ST HOSP IP/OBS HIGH 75: CPT | Performed by: INTERNAL MEDICINE

## 2022-02-14 PROCEDURE — 6370000000 HC RX 637 (ALT 250 FOR IP)

## 2022-02-14 PROCEDURE — 83970 ASSAY OF PARATHORMONE: CPT

## 2022-02-14 PROCEDURE — 85055 RETICULATED PLATELET ASSAY: CPT

## 2022-02-14 PROCEDURE — 99285 EMERGENCY DEPT VISIT HI MDM: CPT

## 2022-02-14 PROCEDURE — 96365 THER/PROPH/DIAG IV INF INIT: CPT

## 2022-02-14 PROCEDURE — 82947 ASSAY GLUCOSE BLOOD QUANT: CPT

## 2022-02-14 PROCEDURE — 85379 FIBRIN DEGRADATION QUANT: CPT

## 2022-02-14 PROCEDURE — 83605 ASSAY OF LACTIC ACID: CPT

## 2022-02-14 PROCEDURE — 87635 SARS-COV-2 COVID-19 AMP PRB: CPT

## 2022-02-14 PROCEDURE — 74177 CT ABD & PELVIS W/CONTRAST: CPT

## 2022-02-14 PROCEDURE — C9113 INJ PANTOPRAZOLE SODIUM, VIA: HCPCS | Performed by: STUDENT IN AN ORGANIZED HEALTH CARE EDUCATION/TRAINING PROGRAM

## 2022-02-14 PROCEDURE — 84520 ASSAY OF UREA NITROGEN: CPT

## 2022-02-14 PROCEDURE — 71045 X-RAY EXAM CHEST 1 VIEW: CPT

## 2022-02-14 PROCEDURE — 2060000000 HC ICU INTERMEDIATE R&B

## 2022-02-14 PROCEDURE — 84484 ASSAY OF TROPONIN QUANT: CPT

## 2022-02-14 RX ORDER — DEXTROSE MONOHYDRATE 50 MG/ML
100 INJECTION, SOLUTION INTRAVENOUS PRN
Status: DISCONTINUED | OUTPATIENT
Start: 2022-02-14 | End: 2022-02-15 | Stop reason: HOSPADM

## 2022-02-14 RX ORDER — ACETAMINOPHEN 325 MG/1
650 TABLET ORAL EVERY 4 HOURS PRN
Status: DISCONTINUED | OUTPATIENT
Start: 2022-02-14 | End: 2022-02-15 | Stop reason: HOSPADM

## 2022-02-14 RX ORDER — 0.9 % SODIUM CHLORIDE 0.9 %
1000 INTRAVENOUS SOLUTION INTRAVENOUS ONCE
Status: COMPLETED | OUTPATIENT
Start: 2022-02-14 | End: 2022-02-14

## 2022-02-14 RX ORDER — ONDANSETRON 4 MG/1
4 TABLET, ORALLY DISINTEGRATING ORAL EVERY 8 HOURS PRN
Status: DISCONTINUED | OUTPATIENT
Start: 2022-02-14 | End: 2022-02-15 | Stop reason: HOSPADM

## 2022-02-14 RX ORDER — MAGNESIUM SULFATE 1 G/100ML
1000 INJECTION INTRAVENOUS PRN
Status: DISCONTINUED | OUTPATIENT
Start: 2022-02-14 | End: 2022-02-15 | Stop reason: HOSPADM

## 2022-02-14 RX ORDER — POTASSIUM CHLORIDE 7.45 MG/ML
10 INJECTION INTRAVENOUS PRN
Status: DISCONTINUED | OUTPATIENT
Start: 2022-02-14 | End: 2022-02-15 | Stop reason: HOSPADM

## 2022-02-14 RX ORDER — DEXTROSE, SODIUM CHLORIDE, AND POTASSIUM CHLORIDE 5; .45; .15 G/100ML; G/100ML; G/100ML
INJECTION INTRAVENOUS CONTINUOUS PRN
Status: DISCONTINUED | OUTPATIENT
Start: 2022-02-14 | End: 2022-02-15 | Stop reason: HOSPADM

## 2022-02-14 RX ORDER — SODIUM CHLORIDE 9 MG/ML
INJECTION, SOLUTION INTRAVENOUS CONTINUOUS
Status: DISCONTINUED | OUTPATIENT
Start: 2022-02-14 | End: 2022-02-15

## 2022-02-14 RX ORDER — DEXTROSE MONOHYDRATE 25 G/50ML
12.5 INJECTION, SOLUTION INTRAVENOUS PRN
Status: DISCONTINUED | OUTPATIENT
Start: 2022-02-14 | End: 2022-02-14 | Stop reason: SDUPTHER

## 2022-02-14 RX ORDER — SODIUM CHLORIDE 450 MG/100ML
INJECTION, SOLUTION INTRAVENOUS CONTINUOUS
Status: DISCONTINUED | OUTPATIENT
Start: 2022-02-14 | End: 2022-02-15

## 2022-02-14 RX ORDER — SODIUM CHLORIDE 450 MG/100ML
INJECTION, SOLUTION INTRAVENOUS
Status: COMPLETED
Start: 2022-02-14 | End: 2022-02-14

## 2022-02-14 RX ORDER — ONDANSETRON 2 MG/ML
4 INJECTION INTRAMUSCULAR; INTRAVENOUS EVERY 6 HOURS PRN
Status: DISCONTINUED | OUTPATIENT
Start: 2022-02-14 | End: 2022-02-15 | Stop reason: HOSPADM

## 2022-02-14 RX ORDER — NICOTINE POLACRILEX 4 MG
15 LOZENGE BUCCAL PRN
Status: DISCONTINUED | OUTPATIENT
Start: 2022-02-14 | End: 2022-02-15 | Stop reason: HOSPADM

## 2022-02-14 RX ORDER — PANTOPRAZOLE SODIUM 40 MG/10ML
40 INJECTION, POWDER, LYOPHILIZED, FOR SOLUTION INTRAVENOUS 2 TIMES DAILY
Status: DISCONTINUED | OUTPATIENT
Start: 2022-02-14 | End: 2022-02-15 | Stop reason: HOSPADM

## 2022-02-14 RX ORDER — SODIUM CHLORIDE 9 MG/ML
10 INJECTION INTRAVENOUS 2 TIMES DAILY
Status: DISCONTINUED | OUTPATIENT
Start: 2022-02-14 | End: 2022-02-15 | Stop reason: HOSPADM

## 2022-02-14 RX ORDER — ALBUTEROL SULFATE 90 UG/1
2 AEROSOL, METERED RESPIRATORY (INHALATION) EVERY 4 HOURS PRN
Status: DISCONTINUED | OUTPATIENT
Start: 2022-02-14 | End: 2022-02-15

## 2022-02-14 RX ORDER — SODIUM CHLORIDE 9 MG/ML
INJECTION, SOLUTION INTRAVENOUS CONTINUOUS
Status: CANCELLED | OUTPATIENT
Start: 2022-02-14

## 2022-02-14 RX ORDER — DEXTROSE MONOHYDRATE 25 G/50ML
12.5 INJECTION, SOLUTION INTRAVENOUS PRN
Status: DISCONTINUED | OUTPATIENT
Start: 2022-02-14 | End: 2022-02-15 | Stop reason: HOSPADM

## 2022-02-14 RX ORDER — ONDANSETRON 2 MG/ML
4 INJECTION INTRAMUSCULAR; INTRAVENOUS ONCE
Status: COMPLETED | OUTPATIENT
Start: 2022-02-14 | End: 2022-02-14

## 2022-02-14 RX ADMIN — INSULIN LISPRO 12 UNITS: 100 INJECTION, SOLUTION INTRAVENOUS; SUBCUTANEOUS at 18:55

## 2022-02-14 RX ADMIN — IOPAMIDOL 75 ML: 755 INJECTION, SOLUTION INTRAVENOUS at 16:53

## 2022-02-14 RX ADMIN — PANTOPRAZOLE SODIUM 40 MG: 40 INJECTION, POWDER, LYOPHILIZED, FOR SOLUTION INTRAVENOUS at 22:45

## 2022-02-14 RX ADMIN — SODIUM CHLORIDE 10 ML: 9 INJECTION, SOLUTION INTRAMUSCULAR; INTRAVENOUS; SUBCUTANEOUS at 22:53

## 2022-02-14 RX ADMIN — SODIUM CHLORIDE 1000 ML: 9 INJECTION, SOLUTION INTRAVENOUS at 14:25

## 2022-02-14 RX ADMIN — ONDANSETRON 4 MG: 2 INJECTION INTRAMUSCULAR; INTRAVENOUS at 14:27

## 2022-02-14 RX ADMIN — SODIUM CHLORIDE: 4.5 INJECTION, SOLUTION INTRAVENOUS at 21:54

## 2022-02-14 RX ADMIN — PIPERACILLIN AND TAZOBACTAM 3375 MG: 3; .375 INJECTION, POWDER, FOR SOLUTION INTRAVENOUS at 18:25

## 2022-02-14 RX ADMIN — SODIUM CHLORIDE 6.1 UNITS/HR: 9 INJECTION, SOLUTION INTRAVENOUS at 22:50

## 2022-02-14 RX ADMIN — INSULIN HUMAN 6 UNITS: 100 INJECTION, SOLUTION PARENTERAL at 22:52

## 2022-02-14 RX ADMIN — SODIUM CHLORIDE 1000 ML: 9 INJECTION, SOLUTION INTRAVENOUS at 16:20

## 2022-02-14 ASSESSMENT — ENCOUNTER SYMPTOMS
COUGH: 0
SHORTNESS OF BREATH: 1
NAUSEA: 1
VOMITING: 1
WHEEZING: 0
ABDOMINAL PAIN: 1
BACK PAIN: 0

## 2022-02-14 ASSESSMENT — PAIN SCALES - GENERAL: PAINLEVEL_OUTOF10: 9

## 2022-02-14 NOTE — ED NOTES
Bed: 28  Expected date:   Expected time:   Means of arrival:   Comments:  MARIETTA 9404 Linda Solitario RN  02/14/22 7737

## 2022-02-14 NOTE — ED TRIAGE NOTES
Pt presents to ED from home via [facility] for hyperglycemia, nausea, fatigue ongoing for several days. Pt has been out of insulin for 3 days. Pt rates pain as 9/10, generalized throughout body. Pt has hx of DKA, per LS 11 pt blood sugar read \"HIGH\" on their glucometer. Pt is A&Ox4, placed on full monitor, EKG done, labs drawn from IV established PTA. Pt given warm blankets. Pt 487 blood sugar. Pt is nauseous, vomiting at bedside. Pt 85% on RA, placed on 3 L O2 via NC, improves to 93%.

## 2022-02-14 NOTE — ED NOTES
Pt requesting something to drink, pt updated on poc and need to get CT scans first.      Elida Huerta RN  02/14/22 1614

## 2022-02-14 NOTE — ED PROVIDER NOTES
[facility]     Emergency Department     Faculty Attestation    I performed a history and physical examination of the patient and discussed management with the resident. I reviewed the resident´s note and agree with the documented findings and plan of care. Any areas of disagreement are noted on the chart. I was personally present for the key portions of any procedures. I have documented in the chart those procedures where I was not present during the key portions. I have reviewed the emergency nurses triage note. I agree with the chief complaint, past medical history, past surgical history, allergies, medications, social and family history as documented unless otherwise noted below. For Physician Assistant/ Nurse Practitioner cases/documentation I have personally evaluated this patient and have completed at least one if not all key elements of the E/M (history, physical exam, and MDM). Additional findings are as noted. Clinically dehydrated, hypoxic on room air, equal breath sounds, heart regular rate and rhythm, diffuse epigastric pain, no lower extremity pain or swelling on examination. Probable DKA. EKG Interpretation    Interpreted by emergency department physician    Rhythm: normal sinus   Rate: normal/97  Axis: Left -60 degrees  Ectopy: none  Conduction: Left anterior hemiblock  ST Segments: no acute change  T Waves: no acute change  Q Waves: none  Pulmonary disease pattern    Clinical Impression: Abnormal EKG    Johann Tavares MD    CRITICAL CARE: There was a high probability of clinically significant/life threatening deterioration in this patient's condition which required my urgent intervention. Total critical care time was 30 minutes. This excludes any time for separately reportable procedures.      02/14/22 [address] [place] [address] Brandee Cortes MD  02/14/22 5986

## 2022-02-14 NOTE — ED NOTES
The following labs labeled with pt sticker and tubed to lab:     [] Blue     [] Lavender   [] on ice  [] Green/yellow  [x] Green/black [] on ice  [] Yellow  [] Red  [] Pink      [] COVID-19 swab    [] Rapid  [] PCR  [] Flu swab  [] Peds Viral Panel     [] Urine Sample  [] Pelvic Cultures  [x] Blood Cultures  x2           Xiomy Ross RN  02/14/22 7219

## 2022-02-14 NOTE — ED NOTES
SW called out to the ED Lobby as patient's sons, ages 15 and 13, are here wishing to visit their dad. The boys say their mom is waiting in the car in the parking lot for them and they just want to visit for a few minutes. ORTIZ explained the limited visitation policy currently due to Covid. Also, minors are not allowed visitation in the ER at this time. The boys voice understanding and requested SW take their dad his computer and some bottled water, which ORTIZ did. Since patient is being admitted, ORTIZ suggested they try calling tomorrow to see what the visiting policy will be on the unit. Burton Fontana.  Thuy MejiaSaluda, [place]  02/14/22 3030

## 2022-02-15 ENCOUNTER — ANESTHESIA (OUTPATIENT)
Dept: ENDOSCOPY | Age: 52
DRG: 420 | End: 2022-02-15
Payer: MEDICARE

## 2022-02-15 ENCOUNTER — ANESTHESIA EVENT (OUTPATIENT)
Dept: ENDOSCOPY | Age: 52
DRG: 420 | End: 2022-02-15
Payer: MEDICARE

## 2022-02-15 VITALS — SYSTOLIC BLOOD PRESSURE: 139 MMHG | DIASTOLIC BLOOD PRESSURE: 87 MMHG | OXYGEN SATURATION: 100 %

## 2022-02-15 VITALS
WEIGHT: 135 LBS | TEMPERATURE: 98.8 F | RESPIRATION RATE: 26 BRPM | BODY MASS INDEX: 21.19 KG/M2 | SYSTOLIC BLOOD PRESSURE: 146 MMHG | DIASTOLIC BLOOD PRESSURE: 88 MMHG | HEIGHT: 67 IN | HEART RATE: 64 BPM | OXYGEN SATURATION: 98 %

## 2022-02-15 PROBLEM — E55.9 VITAMIN D DEFICIENCY: Status: ACTIVE | Noted: 2022-02-15

## 2022-02-15 PROBLEM — R74.8 SERUM LIPASE ELEVATION: Status: ACTIVE | Noted: 2022-02-15

## 2022-02-15 PROBLEM — M48.00 SPINAL STENOSIS: Status: ACTIVE | Noted: 2022-02-15

## 2022-02-15 PROBLEM — K92.1 MELENA: Status: ACTIVE | Noted: 2022-02-15

## 2022-02-15 PROBLEM — M79.7 FIBROMYALGIA: Status: ACTIVE | Noted: 2022-02-15

## 2022-02-15 PROBLEM — R94.39 ABNORMAL STRESS TEST: Status: ACTIVE | Noted: 2022-02-15

## 2022-02-15 LAB
-: NORMAL
ABSOLUTE EOS #: 0.06 K/UL (ref 0–0.44)
ABSOLUTE IMMATURE GRANULOCYTE: 0.09 K/UL (ref 0–0.3)
ABSOLUTE LYMPH #: 2.69 K/UL (ref 1.1–3.7)
ABSOLUTE MONO #: 1.22 K/UL (ref 0.1–1.2)
AMORPHOUS: NORMAL
ANION GAP SERPL CALCULATED.3IONS-SCNC: 10 MMOL/L (ref 9–17)
ANION GAP SERPL CALCULATED.3IONS-SCNC: 9 MMOL/L (ref 9–17)
ANION GAP SERPL CALCULATED.3IONS-SCNC: 9 MMOL/L (ref 9–17)
BACTERIA: NORMAL
BASOPHILS # BLD: 0 % (ref 0–2)
BASOPHILS ABSOLUTE: 0.05 K/UL (ref 0–0.2)
BILIRUBIN URINE: NEGATIVE
BUN BLDV-MCNC: 15 MG/DL (ref 6–20)
BUN BLDV-MCNC: 23 MG/DL (ref 6–20)
BUN BLDV-MCNC: 24 MG/DL (ref 6–20)
BUN/CREAT BLD: ABNORMAL (ref 9–20)
CALCIUM SERPL-MCNC: 8.2 MG/DL (ref 8.6–10.4)
CALCIUM SERPL-MCNC: 8.4 MG/DL (ref 8.6–10.4)
CALCIUM SERPL-MCNC: 8.9 MG/DL (ref 8.6–10.4)
CASTS UA: NORMAL /LPF (ref 0–8)
CHLORIDE BLD-SCNC: 101 MMOL/L (ref 98–107)
CHLORIDE BLD-SCNC: 102 MMOL/L (ref 98–107)
CHLORIDE BLD-SCNC: 96 MMOL/L (ref 98–107)
CHP ED QC CHECK: NORMAL
CHP ED QC CHECK: NORMAL
CO2: 25 MMOL/L (ref 20–31)
CO2: 27 MMOL/L (ref 20–31)
CO2: 28 MMOL/L (ref 20–31)
COLOR: YELLOW
COMMENT UA: ABNORMAL
CREAT SERPL-MCNC: 1.33 MG/DL (ref 0.7–1.2)
CREAT SERPL-MCNC: 1.54 MG/DL (ref 0.7–1.2)
CREAT SERPL-MCNC: 1.57 MG/DL (ref 0.7–1.2)
CRYSTALS, UA: NORMAL /HPF
DIFFERENTIAL TYPE: ABNORMAL
EOSINOPHILS RELATIVE PERCENT: 0 % (ref 1–4)
EPITHELIAL CELLS UA: NORMAL /HPF (ref 0–5)
ESTIMATED AVERAGE GLUCOSE: 258 MG/DL
ETHANOL PERCENT: <0.01 %
ETHANOL: <10 MG/DL
FOLATE: 13.5 NG/ML
GFR AFRICAN AMERICAN: 57 ML/MIN
GFR AFRICAN AMERICAN: 58 ML/MIN
GFR AFRICAN AMERICAN: >60 ML/MIN
GFR NON-AFRICAN AMERICAN: 47 ML/MIN
GFR NON-AFRICAN AMERICAN: 48 ML/MIN
GFR NON-AFRICAN AMERICAN: 57 ML/MIN
GFR SERPL CREATININE-BSD FRML MDRD: ABNORMAL ML/MIN/{1.73_M2}
GLUCOSE BLD-MCNC: 130 MG/DL (ref 70–99)
GLUCOSE BLD-MCNC: 135 MG/DL (ref 75–110)
GLUCOSE BLD-MCNC: 137 MG/DL (ref 75–110)
GLUCOSE BLD-MCNC: 144 MG/DL (ref 75–110)
GLUCOSE BLD-MCNC: 149 MG/DL (ref 75–110)
GLUCOSE BLD-MCNC: 151 MG/DL (ref 70–99)
GLUCOSE BLD-MCNC: 191 MG/DL (ref 75–110)
GLUCOSE BLD-MCNC: 198 MG/DL (ref 75–110)
GLUCOSE BLD-MCNC: 201 MG/DL
GLUCOSE BLD-MCNC: 201 MG/DL (ref 70–99)
GLUCOSE BLD-MCNC: 201 MG/DL (ref 75–110)
GLUCOSE BLD-MCNC: 201 MG/DL (ref 75–110)
GLUCOSE BLD-MCNC: 206 MG/DL
GLUCOSE BLD-MCNC: 206 MG/DL (ref 75–110)
GLUCOSE BLD-MCNC: 213 MG/DL (ref 75–110)
GLUCOSE BLD-MCNC: 248 MG/DL (ref 75–110)
GLUCOSE BLD-MCNC: 45 MG/DL (ref 75–110)
GLUCOSE BLD-MCNC: 68 MG/DL (ref 75–110)
GLUCOSE URINE: ABNORMAL
HBA1C MFR BLD: 10.6 % (ref 4–6)
HCT VFR BLD CALC: 31.5 % (ref 40.7–50.3)
HEMOGLOBIN: 10.4 G/DL (ref 13–17)
IMMATURE GRANULOCYTES: 1 %
IRON SATURATION: 30 % (ref 20–55)
IRON: 67 UG/DL (ref 59–158)
KETONES, URINE: ABNORMAL
LEUKOCYTE ESTERASE, URINE: NEGATIVE
LYMPHOCYTES # BLD: 15 % (ref 24–43)
MAGNESIUM: 1.7 MG/DL (ref 1.6–2.6)
MAGNESIUM: 1.8 MG/DL (ref 1.6–2.6)
MAGNESIUM: 1.8 MG/DL (ref 1.6–2.6)
MCH RBC QN AUTO: 28.3 PG (ref 25.2–33.5)
MCHC RBC AUTO-ENTMCNC: 33 G/DL (ref 28.4–34.8)
MCV RBC AUTO: 85.8 FL (ref 82.6–102.9)
MONOCYTES # BLD: 7 % (ref 3–12)
MUCUS: NORMAL
NITRITE, URINE: NEGATIVE
NRBC AUTOMATED: 0 PER 100 WBC
OTHER OBSERVATIONS UA: NORMAL
PDW BLD-RTO: 12.9 % (ref 11.8–14.4)
PH UA: 5 (ref 5–8)
PHOSPHORUS: 2.4 MG/DL (ref 2.5–4.5)
PHOSPHORUS: 2.7 MG/DL (ref 2.5–4.5)
PHOSPHORUS: 3.2 MG/DL (ref 2.5–4.5)
PLATELET # BLD: 219 K/UL (ref 138–453)
PLATELET ESTIMATE: ABNORMAL
PMV BLD AUTO: 12 FL (ref 8.1–13.5)
POTASSIUM SERPL-SCNC: 3.8 MMOL/L (ref 3.7–5.3)
POTASSIUM SERPL-SCNC: 3.9 MMOL/L (ref 3.7–5.3)
POTASSIUM SERPL-SCNC: 4.1 MMOL/L (ref 3.7–5.3)
PROTEIN UA: ABNORMAL
PTH INTACT: 18.86 PG/ML (ref 15–65)
RBC # BLD: 3.67 M/UL (ref 4.21–5.77)
RBC # BLD: ABNORMAL 10*6/UL
RBC UA: NORMAL /HPF (ref 0–4)
RENAL EPITHELIAL, UA: NORMAL /HPF
SEG NEUTROPHILS: 77 % (ref 36–65)
SEGMENTED NEUTROPHILS ABSOLUTE COUNT: 13.96 K/UL (ref 1.5–8.1)
SODIUM BLD-SCNC: 133 MMOL/L (ref 135–144)
SODIUM BLD-SCNC: 135 MMOL/L (ref 135–144)
SODIUM BLD-SCNC: 139 MMOL/L (ref 135–144)
SPECIFIC GRAVITY UA: 1.04 (ref 1–1.03)
TOTAL IRON BINDING CAPACITY: 227 UG/DL (ref 250–450)
TRICHOMONAS: NORMAL
TURBIDITY: CLEAR
UNSATURATED IRON BINDING CAPACITY: 160 UG/DL (ref 112–347)
URINE HGB: NEGATIVE
UROBILINOGEN, URINE: NORMAL
VITAMIN B-12: 949 PG/ML (ref 232–1245)
VITAMIN D 25-HYDROXY: 8.2 NG/ML (ref 30–100)
WBC # BLD: 18.1 K/UL (ref 3.5–11.3)
WBC # BLD: ABNORMAL 10*3/UL
WBC UA: NORMAL /HPF (ref 0–5)
YEAST: NORMAL

## 2022-02-15 PROCEDURE — 2580000003 HC RX 258: Performed by: STUDENT IN AN ORGANIZED HEALTH CARE EDUCATION/TRAINING PROGRAM

## 2022-02-15 PROCEDURE — 82746 ASSAY OF FOLIC ACID SERUM: CPT

## 2022-02-15 PROCEDURE — 80048 BASIC METABOLIC PNL TOTAL CA: CPT

## 2022-02-15 PROCEDURE — 6360000002 HC RX W HCPCS: Performed by: NURSE ANESTHETIST, CERTIFIED REGISTERED

## 2022-02-15 PROCEDURE — 82607 VITAMIN B-12: CPT

## 2022-02-15 PROCEDURE — 83540 ASSAY OF IRON: CPT

## 2022-02-15 PROCEDURE — 2580000003 HC RX 258: Performed by: INTERNAL MEDICINE

## 2022-02-15 PROCEDURE — 85025 COMPLETE CBC W/AUTO DIFF WBC: CPT

## 2022-02-15 PROCEDURE — 2500000003 HC RX 250 WO HCPCS

## 2022-02-15 PROCEDURE — 3700000001 HC ADD 15 MINUTES (ANESTHESIA): Performed by: INTERNAL MEDICINE

## 2022-02-15 PROCEDURE — 97162 PT EVAL MOD COMPLEX 30 MIN: CPT

## 2022-02-15 PROCEDURE — 97530 THERAPEUTIC ACTIVITIES: CPT

## 2022-02-15 PROCEDURE — 3609012400 HC EGD TRANSORAL BIOPSY SINGLE/MULTIPLE: Performed by: INTERNAL MEDICINE

## 2022-02-15 PROCEDURE — 87206 SMEAR FLUORESCENT/ACID STAI: CPT

## 2022-02-15 PROCEDURE — 88305 TISSUE EXAM BY PATHOLOGIST: CPT

## 2022-02-15 PROCEDURE — 87176 TISSUE HOMOGENIZATION CULTR: CPT

## 2022-02-15 PROCEDURE — 82947 ASSAY GLUCOSE BLOOD QUANT: CPT

## 2022-02-15 PROCEDURE — 99222 1ST HOSP IP/OBS MODERATE 55: CPT | Performed by: INTERNAL MEDICINE

## 2022-02-15 PROCEDURE — 87102 FUNGUS ISOLATION CULTURE: CPT

## 2022-02-15 PROCEDURE — 3700000000 HC ANESTHESIA ATTENDED CARE: Performed by: INTERNAL MEDICINE

## 2022-02-15 PROCEDURE — 6370000000 HC RX 637 (ALT 250 FOR IP): Performed by: INTERNAL MEDICINE

## 2022-02-15 PROCEDURE — 6370000000 HC RX 637 (ALT 250 FOR IP): Performed by: STUDENT IN AN ORGANIZED HEALTH CARE EDUCATION/TRAINING PROGRAM

## 2022-02-15 PROCEDURE — 83735 ASSAY OF MAGNESIUM: CPT

## 2022-02-15 PROCEDURE — 97166 OT EVAL MOD COMPLEX 45 MIN: CPT

## 2022-02-15 PROCEDURE — 2709999900 HC NON-CHARGEABLE SUPPLY: Performed by: INTERNAL MEDICINE

## 2022-02-15 PROCEDURE — 99239 HOSP IP/OBS DSCHRG MGMT >30: CPT | Performed by: INTERNAL MEDICINE

## 2022-02-15 PROCEDURE — 88312 SPECIAL STAINS GROUP 1: CPT

## 2022-02-15 PROCEDURE — 2580000003 HC RX 258

## 2022-02-15 PROCEDURE — 6360000002 HC RX W HCPCS: Performed by: STUDENT IN AN ORGANIZED HEALTH CARE EDUCATION/TRAINING PROGRAM

## 2022-02-15 PROCEDURE — 86341 ISLET CELL ANTIBODY: CPT

## 2022-02-15 PROCEDURE — 2500000003 HC RX 250 WO HCPCS: Performed by: NURSE ANESTHETIST, CERTIFIED REGISTERED

## 2022-02-15 PROCEDURE — 84100 ASSAY OF PHOSPHORUS: CPT

## 2022-02-15 PROCEDURE — 97535 SELF CARE MNGMENT TRAINING: CPT

## 2022-02-15 PROCEDURE — 7100000011 HC PHASE II RECOVERY - ADDTL 15 MIN: Performed by: INTERNAL MEDICINE

## 2022-02-15 PROCEDURE — 0DB38ZX EXCISION OF LOWER ESOPHAGUS, VIA NATURAL OR ARTIFICIAL OPENING ENDOSCOPIC, DIAGNOSTIC: ICD-10-PCS | Performed by: INTERNAL MEDICINE

## 2022-02-15 PROCEDURE — 83550 IRON BINDING TEST: CPT

## 2022-02-15 PROCEDURE — C9113 INJ PANTOPRAZOLE SODIUM, VIA: HCPCS | Performed by: STUDENT IN AN ORGANIZED HEALTH CARE EDUCATION/TRAINING PROGRAM

## 2022-02-15 PROCEDURE — G0480 DRUG TEST DEF 1-7 CLASSES: HCPCS

## 2022-02-15 PROCEDURE — 6360000002 HC RX W HCPCS: Performed by: INTERNAL MEDICINE

## 2022-02-15 PROCEDURE — 43239 EGD BIOPSY SINGLE/MULTIPLE: CPT | Performed by: INTERNAL MEDICINE

## 2022-02-15 PROCEDURE — 7100000010 HC PHASE II RECOVERY - FIRST 15 MIN: Performed by: INTERNAL MEDICINE

## 2022-02-15 PROCEDURE — 0DB28ZX EXCISION OF MIDDLE ESOPHAGUS, VIA NATURAL OR ARTIFICIAL OPENING ENDOSCOPIC, DIAGNOSTIC: ICD-10-PCS | Performed by: INTERNAL MEDICINE

## 2022-02-15 PROCEDURE — 2580000003 HC RX 258: Performed by: NURSE ANESTHETIST, CERTIFIED REGISTERED

## 2022-02-15 RX ORDER — GLUCOSAMINE HCL/CHONDROITIN SU 500-400 MG
CAPSULE ORAL
Qty: 100 STRIP | Refills: 0 | Status: SHIPPED | OUTPATIENT
Start: 2022-02-15 | End: 2022-03-18 | Stop reason: SDUPTHER

## 2022-02-15 RX ORDER — AMLODIPINE BESYLATE 10 MG/1
10 TABLET ORAL DAILY
Status: DISCONTINUED | OUTPATIENT
Start: 2022-02-15 | End: 2022-02-15 | Stop reason: HOSPADM

## 2022-02-15 RX ORDER — METRONIDAZOLE 500 MG/1
500 TABLET ORAL 2 TIMES DAILY
Qty: 20 TABLET | Refills: 0 | Status: SHIPPED | OUTPATIENT
Start: 2022-02-15 | End: 2022-02-25

## 2022-02-15 RX ORDER — CIPROFLOXACIN 500 MG/1
500 TABLET, FILM COATED ORAL 2 TIMES DAILY
Qty: 20 TABLET | Refills: 0 | Status: SHIPPED | OUTPATIENT
Start: 2022-02-15 | End: 2022-02-25

## 2022-02-15 RX ORDER — IPRATROPIUM BROMIDE AND ALBUTEROL SULFATE 2.5; .5 MG/3ML; MG/3ML
1 SOLUTION RESPIRATORY (INHALATION) EVERY 4 HOURS PRN
Status: DISCONTINUED | OUTPATIENT
Start: 2022-02-15 | End: 2022-02-15 | Stop reason: HOSPADM

## 2022-02-15 RX ORDER — ERGOCALCIFEROL 1.25 MG/1
50000 CAPSULE ORAL WEEKLY
Status: DISCONTINUED | OUTPATIENT
Start: 2022-02-15 | End: 2022-02-15 | Stop reason: HOSPADM

## 2022-02-15 RX ORDER — FLUCONAZOLE 100 MG/1
100 TABLET ORAL DAILY
Qty: 13 TABLET | Refills: 0 | Status: SHIPPED | OUTPATIENT
Start: 2022-02-16 | End: 2022-03-01

## 2022-02-15 RX ORDER — FLUCONAZOLE 200 MG/1
200 TABLET ORAL ONCE
Status: COMPLETED | OUTPATIENT
Start: 2022-02-15 | End: 2022-02-15

## 2022-02-15 RX ORDER — PROPOFOL 10 MG/ML
INJECTION, EMULSION INTRAVENOUS PRN
Status: DISCONTINUED | OUTPATIENT
Start: 2022-02-15 | End: 2022-02-15 | Stop reason: SDUPTHER

## 2022-02-15 RX ORDER — BUDESONIDE AND FORMOTEROL FUMARATE DIHYDRATE 80; 4.5 UG/1; UG/1
2 AEROSOL RESPIRATORY (INHALATION) 2 TIMES DAILY
Status: DISCONTINUED | OUTPATIENT
Start: 2022-02-15 | End: 2022-02-15 | Stop reason: HOSPADM

## 2022-02-15 RX ORDER — ATORVASTATIN CALCIUM 40 MG/1
40 TABLET, FILM COATED ORAL NIGHTLY
Qty: 30 TABLET | Refills: 3 | Status: SHIPPED | OUTPATIENT
Start: 2022-02-15 | End: 2022-04-06

## 2022-02-15 RX ORDER — LIDOCAINE HYDROCHLORIDE 10 MG/ML
INJECTION, SOLUTION INFILTRATION; PERINEURAL PRN
Status: DISCONTINUED | OUTPATIENT
Start: 2022-02-15 | End: 2022-02-15 | Stop reason: SDUPTHER

## 2022-02-15 RX ORDER — ERGOCALCIFEROL 1.25 MG/1
50000 CAPSULE ORAL WEEKLY
Qty: 5 CAPSULE | Refills: 2 | Status: SHIPPED | OUTPATIENT
Start: 2022-02-22 | End: 2022-02-15

## 2022-02-15 RX ORDER — ATORVASTATIN CALCIUM 80 MG/1
40 TABLET, FILM COATED ORAL NIGHTLY
Status: DISCONTINUED | OUTPATIENT
Start: 2022-02-15 | End: 2022-02-15 | Stop reason: HOSPADM

## 2022-02-15 RX ORDER — GLUCOSAMINE HCL/CHONDROITIN SU 500-400 MG
CAPSULE ORAL
Qty: 100 EACH | Refills: 0 | Status: ON HOLD | OUTPATIENT
Start: 2022-02-15

## 2022-02-15 RX ORDER — ERGOCALCIFEROL 1.25 MG/1
50000 CAPSULE ORAL WEEKLY
Qty: 12 CAPSULE | Refills: 0 | Status: ON HOLD | OUTPATIENT
Start: 2022-02-22

## 2022-02-15 RX ORDER — ASPIRIN 81 MG/1
81 TABLET, CHEWABLE ORAL DAILY
Status: DISCONTINUED | OUTPATIENT
Start: 2022-02-15 | End: 2022-02-15 | Stop reason: HOSPADM

## 2022-02-15 RX ORDER — CLONAZEPAM 1 MG/1
2 TABLET ORAL 2 TIMES DAILY PRN
Status: DISCONTINUED | OUTPATIENT
Start: 2022-02-15 | End: 2022-02-15 | Stop reason: HOSPADM

## 2022-02-15 RX ORDER — INSULIN GLARGINE 100 [IU]/ML
15 INJECTION, SOLUTION SUBCUTANEOUS NIGHTLY
Qty: 5 PEN | Refills: 3 | Status: SHIPPED | OUTPATIENT
Start: 2022-02-15 | End: 2022-03-18 | Stop reason: SDUPTHER

## 2022-02-15 RX ORDER — SODIUM CHLORIDE 9 MG/ML
INJECTION, SOLUTION INTRAVENOUS CONTINUOUS PRN
Status: DISCONTINUED | OUTPATIENT
Start: 2022-02-15 | End: 2022-02-15 | Stop reason: SDUPTHER

## 2022-02-15 RX ORDER — PEN NEEDLE, DIABETIC 29 GAUGE
1 NEEDLE, DISPOSABLE MISCELLANEOUS DAILY
Qty: 100 EACH | Refills: 3 | Status: ON HOLD | OUTPATIENT
Start: 2022-02-15

## 2022-02-15 RX ORDER — LANCETS 30 GAUGE
1 EACH MISCELLANEOUS 2 TIMES DAILY
Qty: 300 EACH | Refills: 1 | Status: SHIPPED | OUTPATIENT
Start: 2022-02-15 | End: 2022-03-18 | Stop reason: SDUPTHER

## 2022-02-15 RX ORDER — AMLODIPINE BESYLATE 10 MG/1
10 TABLET ORAL DAILY
Qty: 30 TABLET | Refills: 3 | Status: SHIPPED | OUTPATIENT
Start: 2022-02-16 | End: 2022-04-06

## 2022-02-15 RX ORDER — INSULIN GLARGINE 100 [IU]/ML
15 INJECTION, SOLUTION SUBCUTANEOUS DAILY
Status: DISCONTINUED | OUTPATIENT
Start: 2022-02-15 | End: 2022-02-15 | Stop reason: HOSPADM

## 2022-02-15 RX ORDER — FLUCONAZOLE 100 MG/1
100 TABLET ORAL DAILY
Status: DISCONTINUED | OUTPATIENT
Start: 2022-02-16 | End: 2022-02-15 | Stop reason: HOSPADM

## 2022-02-15 RX ADMIN — PIPERACILLIN AND TAZOBACTAM 3375 MG: 3; .375 INJECTION, POWDER, FOR SOLUTION INTRAVENOUS at 09:56

## 2022-02-15 RX ADMIN — POTASSIUM CHLORIDE, DEXTROSE MONOHYDRATE AND SODIUM CHLORIDE: 150; 5; 450 INJECTION, SOLUTION INTRAVENOUS at 08:30

## 2022-02-15 RX ADMIN — POTASSIUM CHLORIDE, DEXTROSE MONOHYDRATE AND SODIUM CHLORIDE: 150; 5; 450 INJECTION, SOLUTION INTRAVENOUS at 00:44

## 2022-02-15 RX ADMIN — CLONAZEPAM 2 MG: 1 TABLET ORAL at 19:06

## 2022-02-15 RX ADMIN — ATORVASTATIN CALCIUM 40 MG: 80 TABLET, FILM COATED ORAL at 01:48

## 2022-02-15 RX ADMIN — INSULIN GLARGINE 15 UNITS: 100 INJECTION, SOLUTION SUBCUTANEOUS at 12:56

## 2022-02-15 RX ADMIN — LIDOCAINE HYDROCHLORIDE 100 MG: 10 INJECTION, SOLUTION INFILTRATION; PERINEURAL at 16:01

## 2022-02-15 RX ADMIN — PROPOFOL 200 MG: 10 INJECTION, EMULSION INTRAVENOUS at 16:01

## 2022-02-15 RX ADMIN — INSULIN LISPRO 2 UNITS: 100 INJECTION, SOLUTION INTRAVENOUS; SUBCUTANEOUS at 17:00

## 2022-02-15 RX ADMIN — INSULIN LISPRO 2 UNITS: 100 INJECTION, SOLUTION INTRAVENOUS; SUBCUTANEOUS at 12:55

## 2022-02-15 RX ADMIN — CLONAZEPAM 2 MG: 1 TABLET ORAL at 08:13

## 2022-02-15 RX ADMIN — PIPERACILLIN AND TAZOBACTAM 3375 MG: 3; .375 INJECTION, POWDER, FOR SOLUTION INTRAVENOUS at 01:47

## 2022-02-15 RX ADMIN — ERGOCALCIFEROL 50000 UNITS: 1.25 CAPSULE ORAL at 18:03

## 2022-02-15 RX ADMIN — PIPERACILLIN AND TAZOBACTAM 3375 MG: 3; .375 INJECTION, POWDER, FOR SOLUTION INTRAVENOUS at 18:03

## 2022-02-15 RX ADMIN — ONDANSETRON 4 MG: 2 INJECTION INTRAMUSCULAR; INTRAVENOUS at 08:13

## 2022-02-15 RX ADMIN — SODIUM CHLORIDE: 9 INJECTION, SOLUTION INTRAVENOUS at 15:25

## 2022-02-15 RX ADMIN — FLUCONAZOLE 200 MG: 200 TABLET ORAL at 18:04

## 2022-02-15 RX ADMIN — INSULIN GLARGINE 15 UNITS: 100 INJECTION, SOLUTION SUBCUTANEOUS at 20:30

## 2022-02-15 RX ADMIN — SODIUM CHLORIDE: 4.5 INJECTION, SOLUTION INTRAVENOUS at 08:04

## 2022-02-15 RX ADMIN — PANTOPRAZOLE SODIUM 40 MG: 40 INJECTION, POWDER, LYOPHILIZED, FOR SOLUTION INTRAVENOUS at 10:40

## 2022-02-15 ASSESSMENT — PAIN DESCRIPTION - LOCATION
LOCATION: HEAD;CHEST;LEG
LOCATION: CHEST;HEAD;LEG
LOCATION: HEAD;CHEST;LEG
LOCATION: HEAD

## 2022-02-15 ASSESSMENT — PAIN SCALES - GENERAL
PAINLEVEL_OUTOF10: 0
PAINLEVEL_OUTOF10: 9
PAINLEVEL_OUTOF10: 9
PAINLEVEL_OUTOF10: 6
PAINLEVEL_OUTOF10: 9
PAINLEVEL_OUTOF10: 0

## 2022-02-15 ASSESSMENT — PAIN DESCRIPTION - ORIENTATION
ORIENTATION: RIGHT

## 2022-02-15 ASSESSMENT — PAIN DESCRIPTION - DESCRIPTORS
DESCRIPTORS: ACHING;DISCOMFORT
DESCRIPTORS: CRUSHING

## 2022-02-15 ASSESSMENT — PAIN DESCRIPTION - ONSET: ONSET: ON-GOING

## 2022-02-15 ASSESSMENT — PAIN DESCRIPTION - PROGRESSION
CLINICAL_PROGRESSION: NOT CHANGED

## 2022-02-15 ASSESSMENT — PAIN DESCRIPTION - FREQUENCY
FREQUENCY: CONTINUOUS
FREQUENCY: CONTINUOUS

## 2022-02-15 ASSESSMENT — PAIN DESCRIPTION - PAIN TYPE
TYPE: ACUTE PAIN
TYPE: SUPERFICIAL SOMATIC PAIN
TYPE: ACUTE PAIN
TYPE: ACUTE PAIN

## 2022-02-15 ASSESSMENT — PAIN - FUNCTIONAL ASSESSMENT: PAIN_FUNCTIONAL_ASSESSMENT: 0-10

## 2022-02-15 ASSESSMENT — LIFESTYLE VARIABLES: SMOKING_STATUS: 1

## 2022-02-15 NOTE — PROGRESS NOTES
Occupational Therapy   Occupational Therapy Initial Assessment  Date: 2/15/2022   Patient Name: Argelia Howell  MRN: [id number]     : 1970    Date of Service: 2/15/2022  Chief Complaint   Patient presents with    Fatigue    Nausea       Discharge Recommendations:    No occupational therapy recommended at discharge      Assessment   Performance deficits / Impairments: Decreased functional mobility ; Decreased ADL status; Decreased high-level IADLs;Decreased endurance;Decreased balance;Decreased safe awareness  Treatment Diagnosis: hyperglycemia  Prognosis: Good  Decision Making: Medium Complexity  Patient Education: pt ed on POC, purpose of eval, importance of movement, benefits of therapy, safety during functional transfers/functional mobility. good return  REQUIRES OT FOLLOW UP: Yes  Activity Tolerance  Activity Tolerance: Patient Tolerated treatment well  Safety Devices  Safety Devices in place: Yes  Type of devices: Call light within reach; Left in bed;Gait belt  Restraints  Initially in place: No           Patient Diagnosis(es): The encounter diagnosis was Infectious colitis. has a past medical history of Diabetes mellitus ([facility] [address] Panic attack, and Seizures ([facility] [address] has a past surgical history that includes Appendectomy and Hand surgery. Treatment Diagnosis: hyperglycemia      Restrictions  Restrictions/Precautions  Restrictions/Precautions: Fall Risk  Required Braces or Orthoses?: No  Position Activity Restriction  Other position/activity restrictions: up as tolerated    Subjective   General  Patient assessed for rehabilitation services?: Yes  Family / Caregiver Present: No  Diagnosis: hyperglycemia  General Comment  Comments: RN ok'd for therapy this morning.  pt agreeabel to participate in session and cooperative throughout  Patient Currently in Pain: Yes  Pain Assessment  Pain Assessment: 0-10  Pain Level: 9  Pain Type: Acute pain  Pain Location: Chest;Head;Leg  Pain Orientation: Right  Pain Descriptors: Aching;Discomfort  Pain Frequency: Continuous  Non-Pharmaceutical Pain Intervention(s): Ambulation/Increased Activity; Distraction; Therapeutic presence  Response to Pain Intervention: Patient Satisfied    Social/Functional History  Social/Functional History  Lives With: Family (5 and 15year old children)  Type of Home: Homeless (house had mold so has been staying at shelter)  Home Layout: Two level,Bed/Bath upstairs  Home Access: Stairs to enter with rails  Entrance Stairs - Number of Steps: 4 outside  Entrance Stairs - Rails: Both  Home Equipment:  (pt reported was using cane but doesn't have it any more)  ADL Assistance: Independent  Homemaking Assistance: Independent  Homemaking Responsibilities: Yes (shelter provides meals or eats out)  Meal Prep Responsibility: Primary  Laundry Responsibility: Primary  Cleaning Responsibility: Primary  Ambulation Assistance: Independent  Transfer Assistance: Independent  Active : Yes  Mode of Transportation: Car  Occupation: On disability       Objective   Vision: Impaired (pt reported needs glasses but doesn't have any)  Hearing: Within functional limits    Orientation  Overall Orientation Status: Within Functional Limits     Balance  Sitting Balance: Modified independent  (~6 minutes on eOB)  Standing Balance: Contact guard assistance  Standing Balance  Time: ~4 minutes  Activity: pt completed functional mobility in hallway to simulate household distances  Comment: pt with 3 LOB requiring min A to correct  Functional Mobility  Functional - Mobility Device: No device  Activity: Other  Assist Level: Contact guard assistance  ADL  Feeding: Modified independent   Grooming: Modified independent   UE Bathing: Supervision  LE Bathing: Contact guard assistance  UE Dressing: Supervision  LE Dressing: Contact guard assistance  Toileting: Contact guard assistance  Additional Comments: OT facilitated pt in donning B socks while sitting on EOB  Tone RUEDWARD NOWAK Tone: Normotonic  Tone LUE  LUE Tone: Normotonic  Coordination  Movements Are Fluid And Coordinated: Yes     Bed mobility  Supine to Sit: Stand by assistance  Sit to Supine: Stand by assistance  Scooting: Stand by assistance  Transfers  Sit to stand: Contact guard assistance  Stand to sit: Contact guard assistance     Cognition  Overall Cognitive Status: Exceptions  Arousal/Alertness: Appropriate responses to stimuli  Following Commands:  Follows multistep commands with repitition  Safety Judgement: Decreased awareness of need for assistance  Problem Solving: Assistance required to correct errors made  Initiation: Does not require cues  Sequencing: Does not require cues        Sensation  Overall Sensation Status: Impaired (pt reported N/T in B LE due to neuropathy)        LUE AROM (degrees)  LUE AROM : WFL  Left Hand AROM (degrees)  Left Hand AROM: WFL  RUE AROM (degrees)  RUE AROM : WFL  Right Hand AROM (degrees)  Right Hand AROM: WFL  LUE Strength  Gross LUE Strength: WFL  L Hand General: 4+/5  RUE Strength  Gross RUE Strength: WFL  R Hand General: 4+/5                   Plan   Plan  Times per week: 2-3x/wk    AM-PAC Score        AM-[facility] Inpatient Daily Activity Raw Score: 20 (02/15/22 1503)  AM-PAC Inpatient ADL T-Scale Score : 42.03 (02/15/22 1503)  ADL Inpatient CMS 0-100% Score: 38.32 (02/15/22 1503)  ADL Inpatient CMS G-Code Modifier : Krishna Henson (02/15/22 1503)    Goals  Short term goals  Time Frame for Short term goals: pt will, by discharge  Short term goal 1: participate in ~10 minute dynamic standing activity with SBA and no LOB  Short term goal 2: complete LB ADLs and toileting tasks with SBA And set up  Short term goal 3: complete UB ADLs with mod I  Short term goal 4: dem SBA during functional transfers/functional mobility with no LOB  Short term goal 5: dem good safety awareness during functional transfers/functional mobility       Therapy Time   Individual Concurrent Group Co-treatment   Time In 1008         Time Out 1026         Minutes 18         Timed Code Treatment Minutes: Whitney 1765, OTR/L

## 2022-02-15 NOTE — ED NOTES
The following labs labeled with pt sticker and tubed to lab:     [] Blue     [] Lavender   [] on ice  [x] Green/yellow  [] Green/black [] on ice  [] Yellow  [] Red  [] Pink      [] COVID-19 swab    [] Rapid  [] PCR  [] Flu swab  [] Peds Viral Panel     [] Urine Sample  [] Pelvic Cultures  [] Blood Cultures            Amanda Goode RN  02/15/22 6016

## 2022-02-15 NOTE — ANESTHESIA PRE PROCEDURE
Department of Anesthesiology  Preprocedure Note       Name:  Nadia Valdes   Age:  49 y.o.  :  1970                                          MRN:  [id number]         Date:  2/15/2022      Surgeon: Koki Hwang):  Robb Guadalupe MD    Procedure: Procedure(s):  EGD ESOPHAGOGASTRODUODENOSCOPY    Medications prior to admission:   Prior to Admission medications    Medication Sig Start Date End Date Taking? Authorizing Provider   traZODone (DESYREL) 150 MG tablet Take by mouth nightly    Historical Provider, MD   clonazePAM (KLONOPIN) 2 MG tablet Take 2 mg by mouth 2 times daily as needed for Anxiety.     Historical Provider, MD   insulin glargine (LANTUS) 100 UNIT/ML injection vial Inject 25 Units into the skin 2 times daily    Historical Provider, MD   insulin aspart (NOVOLOG FLEXPEN) 100 UNIT/ML injection pen Inject 5 Units into the skin 3 times daily (before meals)    Historical Provider, MD   albuterol sulfate  (90 Base) MCG/ACT inhaler Inhale 2 puffs into the lungs every 4 hours as needed for Wheezing    Historical Provider, MD   glucose monitoring (FREESTYLE FREEDOM) kit 1 kit by Does not apply route daily 12/3/21   Quan Real MD       Current medications:    Current Facility-Administered Medications   Medication Dose Route Frequency Provider Last Rate Last Admin    amLODIPine (NORVASC) tablet 10 mg  10 mg Oral Daily Becky Medina MD        ipratropium-albuterol (DUONEB) nebulizer solution 1 ampule  1 ampule Inhalation Q4H PRN Migel Blackman MD        budesonide-formoterol (SYMBICORT) 80-4.5 MCG/ACT inhaler 2 puff  2 puff Inhalation BID Migel Blackman MD [facility] by provider] aspirin chewable tablet 81 mg  81 mg Oral Daily Migel Blackman MD        atorvastatin (LIPITOR) tablet 40 mg  40 mg Oral Nightly Migel Blackman MD   40 mg at 02/15/22 0148    piperacillin-tazobactam (ZOSYN) 3,375 mg in dextrose 5 % 50 mL IVPB extended infusion (mini-bag)  3,375 mg IntraVENous Heather White MD   Stopped at 02/15/22 1403    insulin glargine (LANTUS) injection vial 15 Units  15 Units SubCUTAneous Daily Kiara Ferrell MD   15 Units at 02/15/22 1256    insulin lispro (HUMALOG) injection vial 0-12 Units  0-12 Units SubCUTAneous TID WC Kiara Ferrell MD   2 Units at 02/15/22 1255    insulin lispro (HUMALOG) injection vial 0-6 Units  0-6 Units SubCUTAneous Nightly Kiara Ferrell MD        clonazePAM Refugio Anneon) tablet 2 mg  2 mg Oral BID PRN Kiara Ferrell MD   2 mg at 02/15/22 0813    vitamin D (ERGOCALCIFEROL) capsule 50,000 Units  50,000 Units Oral Weekly Pepito Millard MD        glucose (GLUTOSE) 40 % oral gel 15 g  15 g Oral PRN Kiara Ferrell MD        dextrose 50 % IV solution  12.5 g IntraVENous PRN Kiara Ferrell MD        glucagon (rDNA) injection 1 mg  1 mg IntraMUSCular PRN Kiara Ferrell MD        dextrose 5 % solution  100 mL/hr IntraVENous PRN Kiara Ferrell MD        acetaminophen (TYLENOL) tablet 650 mg  650 mg Oral Q4H PRN Yuval Shelling, DO        ondansetron (ZOFRAN-ODT) disintegrating tablet 4 mg  4 mg Oral Q8H PRN Yuval Shelling, DO        Or    ondansetron [facility]) injection 4 mg  4 mg IntraVENous Q6H PRN Yuval Shelling, DO   4 mg at 02/15/22 0813    potassium chloride 10 mEq/100 mL IVPB (Peripheral Line)  10 mEq IntraVENous PRN Becky Medina MD        magnesium sulfate 1000 mg in dextrose 5% 100 mL IVPB  1,000 mg IntraVENous PRN Atiya Dela Cruz MD        sodium phosphate 10 mmol in dextrose 5 % 250 mL IVPB  10 mmol IntraVENous PRN Becky Medina MD        Or    sodium phosphate 15 mmol in dextrose 5 % 250 mL IVPB  15 mmol IntraVENous PRN Becky Medina MD        Or    sodium phosphate 20 mmol in dextrose 5 % 500 mL IVPB  20 mmol IntraVENous PRN Becky Medina MD        dextrose 5 % and 0.45 % NaCl with KCl 20 mEq infusion   IntraVENous Continuous PRN Becky Medina  mL/hr at 02/15/22 0830 New Bag at 02/15/22 0830    0.45 % sodium chloride infusion   IntraVENous Continuous Clive Quintero  mL/hr at 02/15/22 0804 New Bag at 02/15/22 0804    enoxaparin (LOVENOX) injection 40 mg  40 mg SubCUTAneous Daily Becky Medina MD        [Held by provider] insulin regular (HUMULIN R;NOVOLIN R) 100 Units in sodium chloride 0.9 % 100 mL infusion  0.1 Units/kg/hr IntraVENous Continuous Becky Medina MD 2.3 mL/hr at 02/15/22 0625 2.25 Units/hr at 02/15/22 0625    pantoprazole (PROTONIX) injection 40 mg  40 mg IntraVENous BID Leah Salamanca MD   40 mg at 02/15/22 1040    And    sodium chloride (PF) 0.9 % injection 10 mL  10 mL IntraVENous BID Leah Salamanca MD   10 mL at 02/14/22 2253       Allergies:     Allergies   Allergen Reactions    Azithromycin Swelling    Codeine Itching    Ibuprofen Other (See Comments)    Morphine Other (See Comments)    Tramadol Itching       Problem List:    Patient Active Problem List   Diagnosis Code    Colitis K52.9    Uncontrolled type 1 diabetes mellitus with hyperglycemia (HCC) E10.65    Melena K92.1    Serum lipase elevation R74.8    Fibromyalgia M79.7    Spinal stenosis M48.00    Abnormal stress test R94.39    Vitamin D deficiency E55.9       Past Medical History:        Diagnosis Date    Diabetes mellitus ([facility] [address]     Panic attack     Seizures ([facility] [address]        Past Surgical History:        Procedure Laterality Date    APPENDECTOMY      HAND SURGERY         Social History:    Social History     Tobacco Use    Smoking status: Current Every Day Smoker     Types: Cigarettes    Smokeless tobacco: Never Used   Substance Use Topics    Alcohol use: Not Currently                                Ready to quit: Not Answered  Counseling given: Not Answered      Vital Signs (Current):   Vitals:    02/15/22 0618 02/15/22 0700 02/15/22 0800 02/15/22 1508   BP:  137/83  (!) 156/90   Pulse: 73 68 70 63   Resp: 17 20  20   Temp:  37.3 °C (99.1 °F)  37 °C (98.6 °F)   TempSrc:  Oral  Infrared   SpO2: 96%   97%   Weight:       Height: BP Readings from Last 3 Encounters:   02/15/22 (!) 156/90   12/03/21 118/70   11/10/20 135/78       NPO Status: Time of last liquid consumption: 0900                        Time of last solid consumption: 1800                        Date of last liquid consumption: 02/15/22                        Date of last solid food consumption: 02/13/22    BMI:   Wt Readings from Last 3 Encounters:   02/14/22 135 lb (61.2 kg)   12/03/21 150 lb (68 kg)   11/10/20 150 lb (68 kg)     Body mass index is 21.14 kg/m². CBC:   Lab Results   Component Value Date    WBC 18.1 02/15/2022    RBC 3.67 02/15/2022    HGB 10.4 02/15/2022    HCT 31.5 02/15/2022    MCV 85.8 02/15/2022    RDW 12.9 02/15/2022     02/15/2022       CMP:   Lab Results   Component Value Date     02/15/2022    K 3.8 02/15/2022     02/15/2022    CO2 28 02/15/2022    BUN 23 02/15/2022    CREATININE 1.54 02/15/2022    GFRAA 58 02/15/2022    LABGLOM 48 02/15/2022    GLUCOSE 130 02/15/2022    PROT 6.9 02/14/2022    CALCIUM 8.4 02/15/2022    BILITOT 0.78 02/14/2022    ALKPHOS 110 02/14/2022    AST 17 02/14/2022    ALT 11 02/14/2022       POC Tests:   Recent Labs     02/14/22  1420 02/14/22  1825 02/15/22  1144   POCGLU 549*   < > 198*   POCNA 132*  --   --    POCK 4.1  --   --    POCCL 101  --   --    POCBUN 32*  --   --    POCHEMO 14.2  --   --    POCHCT 42  --   --     < > = values in this interval not displayed.        Coags: No results found for: PROTIME, INR, APTT    HCG (If Applicable): No results found for: PREGTESTUR, PREGSERUM, HCG, HCGQUANT     ABGs: No results found for: PHART, PO2ART, FAK4LEU, CSA8FVJ, BEART, J1CJWRXW     Type & Screen (If Applicable):  No results found for: LABABO, LABRH    Drug/Infectious Status (If Applicable):  No results found for: HIV, HEPCAB    COVID-19 Screening (If Applicable):   Lab Results   Component Value Date    COVID19 Not Detected 02/14/2022           Anesthesia

## 2022-02-15 NOTE — PROGRESS NOTES
Physical Therapy    Facility/Department: [facility] 4B STEPDOWN  Initial Assessment    NAME: Sekou Manning  : 1970  MRN: [id number]  Chief Complaint   Patient presents with    Fatigue    Nausea     Date of Service: 2/15/2022    Discharge Recommendations: No further therapy required at discharge. PT Equipment Recommendations  Equipment Needed: Yes  Mobility Devices: Canes  Cane: Straight Cane    Assessment   Body structures, Functions, Activity limitations: Decreased functional mobility ; Decreased balance;Decreased endurance;Decreased strength; Increased pain  Assessment: The pt ambulated 250ft with CGA-Min A for 3 LOB. Recommend continued therapy to address deficits and progress toward prior level of independence. Prognosis: Good  Decision Making: Medium Complexity  PT Education: Goals;PT Role;Plan of Care; Functional Mobility Training  REQUIRES PT FOLLOW UP: Yes  Activity Tolerance  Activity Tolerance: Patient limited by endurance       Patient Diagnosis(es): The encounter diagnosis was Infectious colitis. has a past medical history of Diabetes mellitus ([facility] [address] Panic attack, and Seizures ([facility] [address] has a past surgical history that includes Appendectomy and Hand surgery. Restrictions  Restrictions/Precautions  Restrictions/Precautions: Up as Tolerated,Fall Risk  Required Braces or Orthoses?: No  Vision/Hearing  Vision: Impaired (pt reported needs glasses but doesn't have any)  Hearing: Within functional limits     Subjective  General  Patient assessed for rehabilitation services?: Yes  Response To Previous Treatment: Not applicable  Family / Caregiver Present: No  Follows Commands: Within Functional Limits  Subjective  Subjective: RN and pt agreeable to PT. Pt supine in bed upon arrival, pleasant and cooperative throughout.   Pain Screening  Patient Currently in Pain: Yes  Pain Assessment  Pain Assessment: 0-10  Pain Level: 9  Pain Type: Acute pain  Pain Location: Head;Chest;Leg  Pain Orientation: Right  Pain Descriptors: Aching;Discomfort  Pain Frequency: Continuous  Non-Pharmaceutical Pain Intervention(s): Ambulation/Increased Activity;Repositioned  Response to Pain Intervention: Patient Satisfied  Vital Signs  Patient Currently in Pain: Yes       Orientation  Orientation  Overall Orientation Status: Within Functional Limits  Social/Functional History  Social/Functional History  Lives With: Family (5 and 15year old children)  Type of Home: Homeless (house had mold so has been staying at shelter)  Home Layout: Two level,Bed/Bath upstairs  Home Access: Stairs to enter with rails  Entrance Stairs - Number of Steps: 4 outside  Entrance Stairs - Rails: Both  Home Equipment:  (pt reported was using cane but doesn't have it any more)  ADL Assistance: Independent  Homemaking Assistance: Independent  Homemaking Responsibilities: Yes (shelter provides meals or eats out)  Meal Prep Responsibility: Primary  Laundry Responsibility: Primary  Cleaning Responsibility: Primary  Ambulation Assistance: Independent  Transfer Assistance: Independent  Active : Yes  Mode of Transportation: Car  Occupation: On disability  Cognition   Cognition  Overall Cognitive Status: Exceptions  Arousal/Alertness: Appropriate responses to stimuli  Following Commands:  Follows multistep commands with repitition  Safety Judgement: Decreased awareness of need for assistance  Problem Solving: Assistance required to correct errors made  Initiation: Does not require cues  Sequencing: Does not require cues    Objective          Joint Mobility  Spine: WFL  ROM RLE: WFL  ROM LLE: WFL  ROM RUE: WFL  ROM LUE: WFL  Strength RLE  Strength RLE: WFL  Strength LLE  Strength LLE: WFL  Strength RUE  Strength RUE: WFL  Strength LUE  Strength LUE: WFL  Tone RLE  RLE Tone: Normotonic  Tone LLE  LLE Tone: Normotonic  Motor Control  Gross Motor?: WFL  Sensation  Overall Sensation Status: Impaired (pt reported N/T in B LE due to neuropathy)  Bed mobility  Supine to Sit: Stand by assistance  Sit to Supine: Stand by assistance  Scooting: Contact guard assistance  Comment: HOB flat, use of HR  Transfers  Sit to Stand: Contact guard assistance  Stand to sit: Contact guard assistance  Stand Pivot Transfers: Contact guard assistance  Ambulation  Ambulation?: Yes  Ambulation 1  Surface: level tile  Device: No Device  Assistance: Contact guard assistance;Minimal assistance (Min A for 3 LOB)  Quality of Gait: decreased L DF, LLE IR, decreased L toe off  Gait Deviations: Slow Gina;Decreased step length;Decreased step height;Decreased arm swing;Decreased head and trunk rotation;Staggers  Distance: 250ft  Stairs/Curb  Stairs?: No     Balance  Posture: Good  Sitting - Static: Good  Sitting - Dynamic: Good;-  Standing - Static: Fair  Standing - Dynamic: Fair;-  Comments: standing balance assessed without AD        Plan   Plan  Times per week: 3-5x/wk  Current Treatment Recommendations: Strengthening,ROM,Balance Training,Functional Mobility Training,Transfer Training,Gait Training,Stair training,Endurance Training,Home Exercise Program,Safety Education & Training,Patient/Caregiver Education & Training  Safety Devices  Type of devices: Nurse notified,Call light within reach,Gait belt,Left in bed,Bed alarm in place  Restraints  Initially in place: No      AM-PAC Score  AM-PAC Inpatient Mobility Raw Score : 20 (02/15/22 1406)  AM-PAC Inpatient T-Scale Score : 47.67 (02/15/22 1406)  Mobility Inpatient CMS 0-100% Score: 35.83 (02/15/22 1406)  Mobility Inpatient CMS G-Code Modifier : Virginia Martinez (02/15/22 1406)          Goals  Short term goals  Time Frame for Short term goals: 6 visits  Short term goal 1: Perform bed mobility and functional transfers independently  Short term goal 2: Ambulate 600ft independently  Short term goal 3: Ascend/descend 10 steps with HR and SBA  Short term goal 4: Demo Good- dynamic standing balance to decrease risk of falls       Therapy Time   Individual Concurrent Group Co-treatment   Time In 1009         Time Out 1026         Minutes 17         Timed Code Treatment Minutes: 8 Minutes       Shayy Plascencia PT

## 2022-02-15 NOTE — ED NOTES
Pt resting comfortably in no acute distress. Respirations even and unlabored. Call light remains within reach. No needs at this time.        Awilda Nicole RN  02/15/22 0256

## 2022-02-15 NOTE — PROGRESS NOTES
[facility]  Internal Medicine Teaching Residency Program  Inpatient Daily Progress Note  ______________________________________________________________________________    Patient: Danielito Mckeon  YOB: 1970   WXJ:[id number]    Acct: [de-identified]     Room: [facility]042-02  Admit date: 2/14/2022  Today's date: 02/15/22  Number of days in the hospital: 1    SUBJECTIVE   CC: Fatigue and Nausea    Pt examined at bedside. Chart & results reviewed. -Patient complains of headache and knee pain. - VSS, pt is saturating well on 2 L of oxygen on nasal cannula. - labs reviewed -creatinine 1.54(which is his baseline), BUN 23, glucose 137, amylase BC 18.1, hemoglobin 10.4.  - no acute events overnight.   - Patient denies vision problems, nausea, vomiting, chest pain, cough, abdominal pain, changes in bowel or urinary habits, and swelling.       ROS:  General ROS: Completed and except as mentioned above were negative   HEENT ROS: Completed and except as mentioned above were negative   Allergy and Immunology ROS:  Completed and except as mentioned above were negative  Hematological and Lymphatic ROS:  Completed and except as mentioned above were negative  Respiratory ROS:  Completed and except as mentioned above were negative  Cardiovascular ROS:  Completed and except as mentioned above were negative  Gastrointestinal ROS: Completed and except as mentioned above were negative  Genito-Urinary ROS:  Completed and except as mentioned above were negative  Musculoskeletal ROS:  Completed and except as mentioned above were negative  Neurological ROS:  Completed and except as mentioned above were negative  Skin & Dermatological ROS:  Completed and except as mentioned above were negative  Psychological ROS:  Completed and except as mentioned above were negative  BRIEF HISTORY   The patient is a pleasant 49 y.o. male with Past medical history of type 1 diabetes mellitus presents with a chief complaint of nausea and vomiting for the past 24 hours. Patient states that he had vomited  numerous times since morning. Patient reports that he ran out of Lantus for the past 2 days has been feeling progressively tired. He also has been having increased urine output and has been feeling excessively thirsty during this time.      Patient also stated that he passed 1 episode of black tarry stools yesterday.     He denies fevers, chills, chest pain, changes in bowel or bladder habits.     past medical history is also significant for COPD/ asthma.      Patient had an outpatient abnormal stress test and had a cardiac cath on 2021 at [address] [place] [address] which showed normal LVEDP, angiographically normal coronary arteries with mild luminal irregularities. His echocardiogram showed an EF of 55 to 60% and mild mitral and tricuspid regurgitation.     Evaluation in the ER, patient is afebrile and hemodynamically stable with /87 saturating on 2 L of oxygen on nasal cannula.     Significant labs include creatinine 1.76, BUN 33, anion gap of 24, glucose of 538, beta hydroxybutyrate of 5.21, WBC at 19.6, lipase -70, lactic acid -2.32     pH was 7.52, PCO2 24.7, PO2 58.7, HCO3 20.5, oxygen saturation of 93.       OBJECTIVE     Vital Signs:  /83   Pulse 68   Temp 99.1 °F (37.3 °C) (Oral)   Resp 20   Ht 5' 7\" (1.702 m)   Wt 135 lb (61.2 kg)   SpO2 96%   BMI 21.14 kg/m²     Temp (24hrs), Av.6 °F (37 °C), Min:98 °F (36.7 °C), Max:99.1 °F (37.3 °C)    In: 1000   Out: -     Physical Exam:  Constitutional: This is a well developed, well nourished, 18.5-24.9 - Normal 49y.o. year old male who is alert, oriented, cooperative and in no apparent distress. Head:normocephalic and atraumatic. EENT:  PERRLA. No conjunctival injections. Septum was midline, mucosa was without erythema, exudates or cobblestoning. No thrush was noted. Neck: Supple without thyromegaly. No elevated JVP.  Trachea was midline. Respiratory: Chest was symmetrical without dullness to percussion. Breath sounds bilaterally were clear to auscultation. There were no wheezes, rhonchi or rales. There is no intercostal retraction or use of accessory muscles. Cardiovascular: Regular without murmur, clicks, gallops or rubs. Abdomen: Slightly rounded and soft. No rebound, rigidity or guarding was appreciated. Lymphatic: No lymphadenopathy. Musculoskeletal: Normal curvature of the spine. No gross muscle weakness. Extremities:  No lower extremity edema, ulcerations, tenderness, varicosities or erythema. Muscle size, tone and strength are normal.  No involuntary movements are noted. Skin:  Warm and dry. Good color, turgor and pigmentation. No lesions or scars.   No cyanosis or clubbing  Neurological/Psychiatric: The patient's general behavior, level of consciousness, thought content and emotional status is normal.        Medications:  Scheduled Medications:    amLODIPine  10 mg Oral Daily    budesonide-formoterol  2 puff Inhalation BID    [Held by provider] aspirin  81 mg Oral Daily    atorvastatin  40 mg Oral Nightly    piperacillin-tazobactam  3,375 mg IntraVENous Q8H    [Held by provider] insulin glargine  15 Units SubCUTAneous Daily    insulin lispro  0-12 Units SubCUTAneous TID WC    insulin lispro  0-6 Units SubCUTAneous Nightly    [Held by provider] enoxaparin  40 mg SubCUTAneous Daily    pantoprazole  40 mg IntraVENous BID    And    sodium chloride (PF)  10 mL IntraVENous BID     Continuous Infusions:    dextrose      dextrose 5% and 0.45% NaCl with KCl 20 mEq 150 mL/hr at 02/15/22 0830    sodium chloride 250 mL/hr at 02/15/22 0804    [Held by provider] insulin 2.25 Units/hr (02/15/22 0625)     PRN Medicationsipratropium-albuterol, 1 ampule, Q4H PRN  clonazePAM, 2 mg, BID PRN  glucose, 15 g, PRN  dextrose, 12.5 g, PRN  glucagon (rDNA), 1 mg, PRN  dextrose, 100 mL/hr, PRN  acetaminophen, 650 mg, Q4H PRN  ondansetron, 4 mg, Q8H PRN   Or  ondansetron, 4 mg, Q6H PRN  potassium chloride, 10 mEq, PRN  magnesium sulfate, 1,000 mg, PRN  sodium phosphate IVPB, 10 mmol, PRN   Or  sodium phosphate IVPB, 15 mmol, PRN   Or  sodium phosphate IVPB, 20 mmol, PRN  dextrose 5% and 0.45% NaCl with KCl 20 mEq, , Continuous PRN        Diagnostic Labs:  CBC:   Recent Labs     02/14/22  1436 02/15/22  0613   WBC 19.6* 18.1*   RBC 4.72 3.67*   HGB 13.2 10.4*   HCT 40.8 31.5*   MCV 86.4 85.8   RDW 12.7 12.9   PLT See Reflexed IPF Result 219     BMP:   Recent Labs     02/14/22  2213 02/15/22  0255 02/15/22  0613   * 133* 139   K 3.8 3.9 3.8   CL 91* 96* 102   CO2 23 27 28   PHOS 3.4 3.2 2.7   BUN 28* 24* 23*   CREATININE 1.58* 1.57* 1.54*     BNP: No results for input(s): BNP in the last 72 hours. PT/INR: No results for input(s): PROTIME, INR in the last 72 hours. APTT: No results for input(s): APTT in the last 72 hours. CARDIAC ENZYMES: No results for input(s): CKMB, CKMBINDEX, TROPONINI in the last 72 hours. Invalid input(s): CKTOTAL;3  FASTING LIPID PANEL:No results found for: CHOL, HDL, TRIG  LIVER PROFILE:   Recent Labs     02/14/22  1436   AST 17   ALT 11   BILITOT 0.78   ALKPHOS 110      MICROBIOLOGY:   Lab Results   Component Value Date/Time    CULTURE NO GROWTH 12 HOURS 02/14/2022 04:19 PM       Imaging:    CT ABDOMEN PELVIS W IV CONTRAST Additional Contrast? None    Result Date: 2/14/2022  No pulmonary embolism. Secretions in the dependent airways without associated pneumonia. Long segment wall thickening of the esophagus with a tiny hiatal hernia. Correlate for esophagitis. Mild segmental colonic inflammatory changes. Correlate for infectious or inflammatory colitis. XR CHEST PORTABLE    Result Date: 2/14/2022  No acute cardiopulmonary disease. CT CHEST PULMONARY EMBOLISM W CONTRAST    Result Date: 2/14/2022  No pulmonary embolism. Secretions in the dependent airways without associated pneumonia.  Long segment wall thickening of the esophagus with a tiny hiatal hernia. Correlate for esophagitis. Mild segmental colonic inflammatory changes. Correlate for infectious or inflammatory colitis. ASSESSMENT & PLAN     IMPRESSION  Active Problems:  1. Hyperglycemia due to Type 1 Diabetes Mellitus: Resolved. Patient was on DKA protocol. Hypoglycemia protocol initiated. Patient currently on medium dose sliding scale. 2. H/o of melena: GI consulted. Hb stable at 13.2. Patient on Protonix IV twice daily. 3. COPD/asthma: Patient on albuterol inhaler 2 puffs every 4 hours as needed. Patient on Symbicort inhaler twice daily. 4. Hypertension: Patient started on amlodipine 10 mg daily. 5. Colitis: Patient placed on Zosyn 3,375 mg IV every 8 hours. 6. Hyperlipidemia-patient on Lipitor 40 mg daily. DVT ppx: Lovenox held, on EPC cuffs. GI ppx: protonix     PT/OT/SW: Consulted  Discharge Planning: Cuate Sosa MD  PGY-1, Internal Medicine Resident  [facility] Scot Encinas         2/15/2022, 11:00 AM   Attending Physician Statement  I have discussed the care of [facility] and I have examined the patient myselft and taken ros and hpi , including pertinent history and exam findings,  with the resident. I have reviewed the key elements of all parts of the encounter with the resident. I agree with the assessment, plan and orders as documented by the resident.       Electronically signed by Jerry Campo MD

## 2022-02-15 NOTE — H&P
[address]     Department of Internal Medicine - Staff Internal Medicine Teaching Service          ADMISSION NOTE/HISTORY AND PHYSICAL EXAMINATION   Date: 2/14/2022  Patient Name: Augustus Gann  Date of admission: 2/14/2022  2:04 PM  YOB: 1970  PCP: Isamar Bain MD  History Obtained From:  patient    CHIEF COMPLAINT     Chief complaint: Nausea    HISTORY OF PRESENTING ILLNESS     The patient is a pleasant 49 y.o. male with Past medical history of type 1 diabetes mellitus presents with a chief complaint of nausea and vomiting for the past 24 hours. Patient states that he had vomited  numerous times since morning. Patient reports that he ran out of Lantus for the past 2 days has been feeling progressively tired. He also has been having increased urine output and has been feeling excessively thirsty during this time. Patient also stated that he passed 1 episode of black tarry stools yesterday. He denies fevers, chills, chest pain, changes in bowel or bladder habits. past medical history is also significant for COPD/ asthma. Patient had an outpatient abnormal stress test and had a cardiac cath on 5/21/2021 at [address] [place] [address] which showed normal LVEDP, angiographically normal coronary arteries with mild luminal irregularities. His echocardiogram showed an EF of 55 to 60% and mild mitral and tricuspid regurgitation. Evaluation in the ER, patient is afebrile and hemodynamically stable with /87 saturating on 2 L of oxygen on nasal cannula. Significant labs include creatinine 1.76, BUN 33, anion gap of 24, glucose of 538, beta hydroxybutyrate of 5.21, WBC at 19.6, lipase -70, lactic acid -2.32    pH was 7.52, PCO2 24.7, PO2 58.7, HCO3 20.5, oxygen saturation of 93. CT chest:    No pulmonary embolism.       Secretions in the dependent airways without associated pneumonia.       Long segment wall thickening of the esophagus with a tiny hiatal hernia. Correlate for esophagitis.       Mild segmental colonic inflammatory changes.  Correlate for infectious or   inflammatory colitis. Review of Systems:  General ROS: Completed and except as mentioned above were negative   HEENT ROS: Completed and except as mentioned above were negative   Allergy and Immunology ROS:  Completed and except as mentioned above were negative  Hematological and Lymphatic ROS:  Completed and except as mentioned above were negative  Respiratory ROS:  Completed and except as mentioned above were negative  Cardiovascular ROS:  Completed and except as mentioned above were negative  Gastrointestinal ROS: Completed and except as mentioned above were negative  Genito-Urinary ROS:  Completed and except as mentioned above were negative  Musculoskeletal ROS:  Completed and except as mentioned above were negative  Neurological ROS:  Completed and except as mentioned above were negative  Skin & Dermatological ROS:  Completed and except as mentioned above were negative  Psychological ROS:  Completed and except as mentioned above were negative    PAST MEDICAL HISTORY     Past Medical History:   Diagnosis Date    Diabetes mellitus ([facility] [address]     Panic attack     Seizures ([facility] [address]        PAST SURGICAL HISTORY     Past Surgical History:   Procedure Laterality Date    APPENDECTOMY      HAND SURGERY         ALLERGIES     Azithromycin, Codeine, Ibuprofen, Morphine, and Tramadol    MEDICATIONS PRIOR TO ADMISSION     Prior to Admission medications    Medication Sig Start Date End Date Taking? Authorizing Provider   traZODone (DESYREL) 150 MG tablet Take by mouth nightly    Historical Provider, MD   clonazePAM (KLONOPIN) 2 MG tablet Take 2 mg by mouth 2 times daily as needed for Anxiety.     Historical Provider, MD   insulin glargine (LANTUS) 100 UNIT/ML injection vial Inject 25 Units into the skin 2 times daily    Historical Provider, MD   insulin aspart (NOVOLOG FLEXPEN) 100 UNIT/ML injection pen Inject 5 Units into the skin 3 times daily (before meals)    Historical Provider, MD   albuterol sulfate  (90 Base) MCG/ACT inhaler Inhale 2 puffs into the lungs every 4 hours as needed for Wheezing    Historical Provider, MD   glucose monitoring (FREESTYLE FREEDOM) kit 1 kit by Does not apply route daily 12/3/21   Joshua Ibanez MD       SOCIAL HISTORY     Tobacco: Patient states that he smokes 2 packs of cigarettes per day. Patient has been smoking since he was 9years old. Alcohol: Patient states that he does not drink alcohol. Illicits: Patient states that he occasionally uses weed  Occupation: not on file. Cassy Maid FAMILY HISTORY     No family history on file. PHYSICAL EXAM     Vitals: BP (!) 145/80   Pulse 104   Temp 98 °F (36.7 °C)   Resp 21   Ht 5' 7\" (1.702 m)   Wt 135 lb (61.2 kg)   SpO2 92%   BMI 21.14 kg/m²   Tmax: Temp (24hrs), Av °F (36.7 °C), Min:98 °F (36.7 °C), Max:98 °F (36.7 °C)    Last Body weight:   Wt Readings from Last 3 Encounters:   22 135 lb (61.2 kg)   21 150 lb (68 kg)   11/10/20 150 lb (68 kg)     Body Mass Index : Body mass index is 21.14 kg/m². PHYSICAL EXAMINATION:  Constitutional: This is a well developed, well nourished, 18.5-24.9 - Normal 49y.o. year old male who is alert, oriented, cooperative and in no apparent distress. Head:normocephalic and atraumatic. EENT:  PERRLA. No conjunctival injections. Septum was midline, mucosa was without erythema, exudates or cobblestoning. No thrush was noted. Neck: Supple without thyromegaly. No elevated JVP. Trachea was midline. Respiratory: Chest was symmetrical without dullness to percussion. Breath sounds bilaterally were clear to auscultation. There were no wheezes, rhonchi or rales. There is no intercostal retraction or use of accessory muscles. No egophony noted. Cardiovascular: Regular without murmur, Tachycardia present. Abdomen: Slightly rounded and soft without organomegaly.  Patient had generalised abdominal tenderness. .    Lymphatic: No lymphadenopathy. Musculoskeletal: Normal curvature of the spine. No gross muscle weakness. Extremities:  No lower extremity edema, ulcerations, tenderness, varicosities or erythema. Muscle size, tone and strength are normal.  No involuntary movements are noted. Skin:  Warm and dry. Good color, turgor and pigmentation. No lesions or scars.   No cyanosis or clubbing  Neurological/Psychiatric: The patient's general behavior, level of consciousness, thought content and emotional status is normal.          INVESTIGATIONS     Laboratory Testing:     Recent Results (from the past 24 hour(s))   POC Glucose Fingerstick    Collection Time: 02/14/22  2:06 PM   Result Value Ref Range    POC Glucose 487 (HH) 75 - 110 mg/dL   Venous Blood Gas, POC    Collection Time: 02/14/22  2:20 PM   Result Value Ref Range    pH, Alfredo 7.527 (H) 7.320 - 7.430    pCO2, Alfredo 24.7 (L) 41.0 - 51.0 mm Hg    pO2, Alfredo 58.7 (H) 30.0 - 50.0 mm Hg    HCO3, Venous 20.5 (L) 22.0 - 29.0 mmol/L    Total CO2, Venous NOT REPORTED 23.0 - 30.0 mmol/L    Negative Base Excess, Alfredo 1 0.0 - 2.0    Positive Base Excess, Alfredo NOT REPORTED 0.0 - 3.0    O2 Sat, Alfredo 93 (H) 60.0 - 85.0 %    O2 Device/Flow/% NOT REPORTED     Cl Test NOT REPORTED     Sample Site NOT REPORTED     Mode NOT REPORTED     FIO2 NOT REPORTED     Pt Temp NOT REPORTED     POC pH Temp NOT REPORTED     POC pCO2 Temp NOT REPORTED mm Hg    POC pO2 Temp NOT REPORTED mm Hg   ELECTROLYTES PLUS    Collection Time: 02/14/22  2:20 PM   Result Value Ref Range    POC Sodium 132 (L) 138 - 146 mmol/L    POC Potassium 4.1 3.5 - 4.5 mmol/L    POC Chloride 101 98 - 107 mmol/L    POC TCO2 21 (L) 22 - 30 mmol/L    Anion Gap 11 7 - 16 mmol/L   Hemoglobin and hematocrit, blood    Collection Time: 02/14/22  2:20 PM   Result Value Ref Range    POC Hemoglobin 14.2 13.5 - 17.5 g/dL    POC Hematocrit 42 41 - 53 %   Creatinine W/GFR Point of Care    Collection CREATININE 1.76 (H) 0.70 - 1.20 mg/dL    Bun/Cre Ratio NOT REPORTED 9 - 20    Calcium 9.4 8.6 - 10.4 mg/dL    Sodium 135 135 - 144 mmol/L    Potassium 4.4 3.7 - 5.3 mmol/L    Chloride 91 (L) 98 - 107 mmol/L    CO2 20 20 - 31 mmol/L    Anion Gap 24 (H) 9 - 17 mmol/L    Alkaline Phosphatase 110 40 - 129 U/L    ALT 11 5 - 41 U/L    AST 17 <40 U/L    Total Bilirubin 0.78 0.3 - 1.2 mg/dL    Total Protein 6.9 6.4 - 8.3 g/dL    Albumin 4.0 3.5 - 5.2 g/dL    Albumin/Globulin Ratio 1.4 1.0 - 2.5    GFR Non- 41 (L) >60 mL/min    GFR African American 50 (L) >60 mL/min    GFR Comment          GFR Staging NOT REPORTED    BETA-HYDROXYBUTYRATE    Collection Time: 02/14/22  2:36 PM   Result Value Ref Range    Beta-Hydroxybutyrate 5.21 (H) 0.02 - 0.27 mmol/L   Troponin    Collection Time: 02/14/22  2:36 PM   Result Value Ref Range    Troponin, High Sensitivity 13 0 - 22 ng/L    Troponin T NOT REPORTED <0.03 ng/mL    Troponin Interp NOT REPORTED    Lipase    Collection Time: 02/14/22  2:36 PM   Result Value Ref Range    Lipase 70 (H) 13 - 60 U/L   D-DIMER, QUANTITATIVE    Collection Time: 02/14/22  2:36 PM   Result Value Ref Range    D-Dimer, Quant 1.32 mg/L FEU   SPECIMEN REJECTION    Collection Time: 02/14/22  2:36 PM   Result Value Ref Range    Specimen Source . BLOOD     Ordered Test VBG     Reason for Rejection       Unable to perform testing: Specimen quantity not sufficient.    - NOT REPORTED    Immature Platelet Fraction    Collection Time: 02/14/22  2:36 PM   Result Value Ref Range    Platelet, Immature Fraction NOT REPORTED 1.1 - 10.3 %    Platelet, Fluorescence Platelet clumps present, count appears decreased.  138 - 453 k/uL   Urinalysis Reflex to Culture    Collection Time: 02/14/22  2:38 PM    Specimen: Urine, clean catch   Result Value Ref Range    Color, UA Yellow Yellow    Turbidity UA Clear Clear    Glucose, Ur 3+ (A) NEGATIVE    Bilirubin Urine NEGATIVE NEGATIVE    Ketones, Urine LARGE (A) NEGATIVE Specific Gravity, UA 1.025 1.005 - 1.030    Urine Hgb NEGATIVE NEGATIVE    pH, UA 5.0 5.0 - 8.0    Protein, UA 2+ (A) NEGATIVE    Urobilinogen, Urine Normal Normal    Nitrite, Urine NEGATIVE NEGATIVE    Leukocyte Esterase, Urine NEGATIVE NEGATIVE    Urinalysis Comments NOT REPORTED    COVID-19, Rapid    Collection Time: 02/14/22  2:38 PM    Specimen: Nasopharyngeal Swab   Result Value Ref Range    Specimen Description . NASOPHARYNGEAL SWAB     SARS-CoV-2, Rapid Not Detected Not Detected   Microscopic Urinalysis    Collection Time: 02/14/22  2:38 PM   Result Value Ref Range    -          WBC, UA None 0 - 5 /HPF    RBC, UA None 0 - 4 /HPF    Casts UA  0 - 8 /LPF     0 TO 2 HYALINE Reference range defined for non-centrifuged specimen. Crystals, UA NOT REPORTED None /HPF    Epithelial Cells UA None 0 - 5 /HPF    Renal Epithelial, UA NOT REPORTED 0 /HPF    Bacteria, UA NOT REPORTED None    Mucus, UA NOT REPORTED None    Trichomonas, UA NOT REPORTED None    Amorphous, UA NOT REPORTED None    Other Observations UA NOT REPORTED NOT REQ. Yeast, UA NOT REPORTED None   Troponin    Collection Time: 02/14/22  3:22 PM   Result Value Ref Range    Troponin, High Sensitivity 13 0 - 22 ng/L    Troponin T NOT REPORTED <0.03 ng/mL    Troponin Interp NOT REPORTED    SPECIMEN REJECTION    Collection Time: 02/14/22  4:06 PM   Result Value Ref Range    Specimen Source . BLOOD     Ordered Test LACDS     Reason for Rejection Unable to perform testing: Specimen clotted. - NOT REPORTED    Culture, Blood 1    Collection Time: 02/14/22  4:17 PM    Specimen: Blood   Result Value Ref Range    Specimen Description . BLOOD     Special Requests  LAC 13 ML      Culture NO GROWTH <24 HRS    Culture, Blood 1    Collection Time: 02/14/22  4:19 PM    Specimen: Blood   Result Value Ref Range    Specimen Description . BLOOD     Special Requests R AC 8 ML      Culture NO GROWTH <24 HRS    Lactate, Sepsis    Collection Time: 02/14/22  4:30 PM   Result Value Ref Range    Lactic Acid, Sepsis NOT REPORTED 0.5 - 1.9 mmol/L    Lactic Acid, Sepsis, Whole Blood 1.0 0.5 - 1.9 mmol/L   Blood Gas, Venous    Collection Time: 02/14/22  4:30 PM   Result Value Ref Range    pH, Alfredo 7.325 7.320 - 7.420    pCO2, Alfredo 42.8 39 - 55    pO2, Alfredo 115.0 (H) 30 - 50    HCO3, Venous 21.7 (L) 24 - 30 mmol/L    Positive Base Excess, Alfredo NOT REPORTED 0.0 - 2.0 mmol/L    Negative Base Excess, Alfredo 3.6 (H) 0.0 - 2.0 mmol/L    O2 Sat, Alfredo 98.5 (H) 60.0 - 85.0 %    Total Hb NOT REPORTED 12.0 - 16.0 g/dl    Oxyhemoglobin NOT REPORTED 95.0 - 98.0 %    Carboxyhemoglobin 3.0 0 - 5 %    Methemoglobin NOT REPORTED 0.0 - 1.5 %    Pt Temp 37.0     pH, Alfredo, Temp Adj NOT REPORTED 7.320 - 7.420    pCO2, Alfredo, Temp Adj NOT REPORTED 39 - 55 mmHg    pO2, Alfredo, Temp Adj NOT REPORTED 30 - 50 mmHg    O2 Device/Flow/% NOT REPORTED     Respiratory Rate NOT REPORTED     Cl Test NOT REPORTED     Sample Site NOT REPORTED     Pt. Position NOT REPORTED     Mode NOT REPORTED     Set Rate NOT REPORTED     Total Rate NOT REPORTED     VT NOT REPORTED     FIO2 INFORMATION NOT PROVIDED     Peep/Cpap NOT REPORTED     PSV NOT REPORTED     Text for Respiratory NOT REPORTED     NOTIFICATION NOT REPORTED     NOTIFICATION TIME NOT REPORTED    POC Glucose Fingerstick    Collection Time: 02/14/22  6:25 PM   Result Value Ref Range    POC Glucose 437 (HH) 75 - 110 mg/dL   POC Glucose Fingerstick    Collection Time: 02/14/22  8:21 PM   Result Value Ref Range    POC Glucose 444 (HH) 75 - 110 mg/dL   POCT Glucose    Collection Time: 02/14/22  8:23 PM   Result Value Ref Range    Glucose 444 mg/dL    QC OK? ok        Imaging:   CT ABDOMEN PELVIS W IV CONTRAST Additional Contrast? None    Result Date: 2/14/2022  No pulmonary embolism. Secretions in the dependent airways without associated pneumonia. Long segment wall thickening of the esophagus with a tiny hiatal hernia. Correlate for esophagitis.  Mild segmental colonic inflammatory changes. Correlate for infectious or inflammatory colitis. XR CHEST PORTABLE    Result Date: 2/14/2022  No acute cardiopulmonary disease. CT CHEST PULMONARY EMBOLISM W CONTRAST    Result Date: 2/14/2022  No pulmonary embolism. Secretions in the dependent airways without associated pneumonia. Long segment wall thickening of the esophagus with a tiny hiatal hernia. Correlate for esophagitis. Mild segmental colonic inflammatory changes. Correlate for infectious or inflammatory colitis. ASSESSMENT & PLAN     ASSESSMENT / PLAN:     IMPRESSION  Active Problems:  1. Hyperglycemia due to Type 1 Diabetes Mellitus: Patient started on DKA protocol. Hypoglycemia protocol initiated. 2. H/o of melena: GI consulted. Hb stable at 13.2  3. COPD/asthma: Patient on albuterol inhaler 2 puffs every 4 hours as needed. 4. Hypertension: Patient started on amlodipine 10 mg daily. 5. Colitis: Patient placed on Zosyn 3,375 mg IV every 8 hours. DVT ppx: Lovenox held, on EPC cuffs. GI ppx: protonix    PT/OT/SW: Consulted  Discharge Planning: Kerry Ortega MD  Internal Medicine Resident, PGY-1  [address], [place]  2/14/2022, 10:08 PM  Attending Physician Statement  I have discussed the care of [facility] and I have examined the patient myselft and taken ros and hpi , including pertinent history and exam findings,  with the resident. I have reviewed the key elements of all parts of the encounter with the resident. I agree with the assessment, plan and orders as documented by the resident.       Electronically signed by Skye Sanches MD

## 2022-02-15 NOTE — ED NOTES
Internal medicine residents at bedside     Jennye Curling, [facility]  02/14/22 9715

## 2022-02-15 NOTE — ED NOTES
The following labs labeled with pt sticker and tubed to lab:     [] Blue     [x] Lavender   [] on ice  [x] Green/yellow  [] Green/black [] on ice  [] Yellow  [] Red  [] Pink      [] COVID-19 swab    [] Rapid  [] PCR  [] Flu swab  [] Peds Viral Panel     [] Urine Sample  [] Pelvic Cultures  [] Blood Cultures            Elida Huerta RN  02/14/22 3603

## 2022-02-15 NOTE — ED NOTES
The following labs labeled with pt sticker and tubed to lab:     [] Blue     [] Lavender   [] on ice  [] Green/yellow  [] Green/black [] on ice  [] Yellow  [] Red  [] Pink      [] COVID-19 swab    [] Rapid  [] PCR  [] Flu swab  [] Peds Viral Panel     [x] Urine Sample  [] Pelvic Cultures  [] Blood Cultures            Meseret Parish RN  02/15/22 2334

## 2022-02-15 NOTE — ED PROVIDER NOTES
on file    Lack of Transportation (Non-Medical): Not on file   Physical Activity:     Days of Exercise per Week: Not on file    Minutes of Exercise per Session: Not on file   Stress:     Feeling of Stress : Not on file   Social Connections:     Frequency of Communication with Friends and Family: Not on file    Frequency of Social Gatherings with Friends and Family: Not on file    Attends Gnosticist Services: Not on file    Active Member of [address] [place] [address] or Organizations: Not on file    Attends Club or Organization Meetings: Not on file    Marital Status: Not on file   Intimate Partner Violence:     Fear of Current or Ex-Partner: Not on file    Emotionally Abused: Not on file    Physically Abused: Not on file    Sexually Abused: Not on file   Housing Stability:     Unable to Pay for Housing in the Last Year: Not on file    Number of [place] in the Last Year: Not on file    Unstable Housing in the Last Year: Not on file       No family history on file. Allergies:  Azithromycin, Codeine, Ibuprofen, Morphine, and Tramadol    Home Medications:  Prior to Admission medications    Medication Sig Start Date End Date Taking? Authorizing Provider   traZODone (DESYREL) 150 MG tablet Take by mouth nightly    Historical Provider, MD   clonazePAM (KLONOPIN) 2 MG tablet Take 2 mg by mouth 2 times daily as needed for Anxiety.     Historical Provider, MD   insulin glargine (LANTUS) 100 UNIT/ML injection vial Inject 25 Units into the skin 2 times daily    Historical Provider, MD   insulin aspart (NOVOLOG FLEXPEN) 100 UNIT/ML injection pen Inject 5 Units into the skin 3 times daily (before meals)    Historical Provider, MD   albuterol sulfate  (90 Base) MCG/ACT inhaler Inhale 2 puffs into the lungs every 4 hours as needed for Wheezing    Historical Provider, MD   glucose monitoring (FREESTYLE FREEDOM) kit 1 kit by Does not apply route daily 12/3/21   Janey Jacobs MD       REVIEW OF SYSTEMS    (2-9 systems for level 4, 10 or more for level 5)      Review of Systems   Constitutional: Positive for fatigue. Negative for chills and fever. Eyes: Negative for visual disturbance. Respiratory: Positive for shortness of breath. Negative for cough and wheezing. Cardiovascular: Negative for chest pain. Gastrointestinal: Positive for abdominal pain, nausea and vomiting. Genitourinary: Negative for dysuria and flank pain. Musculoskeletal: Negative for back pain. Skin: Negative for rash. Neurological: Negative for weakness, light-headedness, numbness and headaches. Psychiatric/Behavioral: Negative for confusion. PHYSICAL EXAM   (up to 7 for level 4, 8 or more for level 5)      INITIAL VITALS:   BP (!) 145/80   Pulse 104   Temp 98 °F (36.7 °C)   Resp 21   Ht 5' 7\" (1.702 m)   Wt 135 lb (61.2 kg)   SpO2 92%   BMI 21.14 kg/m²     Physical Exam  Constitutional:       General: He is not in acute distress. Appearance: He is not toxic-appearing. HENT:      Head: Normocephalic and atraumatic. Eyes:      Extraocular Movements: Extraocular movements intact. Pupils: Pupils are equal, round, and reactive to light. Cardiovascular:      Rate and Rhythm: Tachycardia present. Heart sounds: No murmur heard. No friction rub. No gallop. Pulmonary:      Breath sounds: No wheezing, rhonchi or rales. Abdominal:      Tenderness: There is abdominal tenderness (generalized). There is no guarding. Musculoskeletal:      Right lower leg: No edema. Left lower leg: No edema. Skin:     Findings: No bruising, lesion or rash. Neurological:      Mental Status: He is alert. Motor: No weakness.          DIFFERENTIAL  DIAGNOSIS     PLAN (LABS / IMAGING / EKG):  Orders Placed This Encounter   Procedures    COVID-19, Rapid    Culture, Blood 1    Culture, Blood 1    XR CHEST PORTABLE    CT CHEST PULMONARY EMBOLISM W CONTRAST    CT ABDOMEN PELVIS W IV CONTRAST Additional Contrast? None    CBC Auto Differential    Comprehensive Metabolic Panel w/ Reflex to MG    BETA-HYDROXYBUTYRATE    Urinalysis Reflex to Culture    Troponin    Lipase    D-DIMER, QUANTITATIVE    SPECIMEN REJECTION    PREVIOUS SPECIMEN    ELECTROLYTES PLUS    Hemoglobin and hematocrit, blood    CALCIUM, IONIC (POC)    Microscopic Urinalysis    Immature Platelet Fraction    SPECIMEN REJECTION    Lactate, Sepsis    PREVIOUS SPECIMEN    Blood Gas, Venous    HYPOGLYCEMIA TREATMENT: blood glucose less than 50 mg/dL and patient  ALERT and TOLERATING PO    HYPOGLYCEMIA TREATMENT: blood glucose less than 70 mg/dL and patient ALERT and TOLERATING PO    HYPOGLYCEMIA TREATMENT: blood glucose less than 70 mg/dL and patient NOT ALERT or NPO    Inpatient consult to Internal Medicine    POC Glucose Fingerstick    Venous Blood Gas, POC    Creatinine W/GFR Point of Care    POCT urea (BUN)    Lactic Acid, POC    POCT Glucose    POCT Glucose    POC Glucose Fingerstick    POCT Glucose    EKG 12 Lead    PATIENT STATUS (FROM ED OR OR/PROCEDURAL) Inpatient       MEDICATIONS ORDERED:  Orders Placed This Encounter   Medications    0.9 % sodium chloride bolus    ondansetron (ZOFRAN) injection 4 mg    0.9 % sodium chloride bolus    iopamidol (ISOVUE-370) 76 % injection 75 mL    piperacillin-tazobactam (ZOSYN) 3,375 mg in dextrose 5 % 50 mL IVPB (mini-bag)     Order Specific Question:   Antimicrobial Indications     Answer:   Intra-Abdominal Infection    insulin lispro (HUMALOG) injection vial 0-12 Units    insulin lispro (HUMALOG) injection vial 0-6 Units    glucose (GLUTOSE) 40 % oral gel 15 g    dextrose 50 % IV solution    glucagon (rDNA) injection 1 mg    dextrose 5 % solution       DDX: DKA, pancreatitis, gastritis, gastroenteritis, colitis, diverticulitis, pneumonia, pulmonary embolus    DIAGNOSTIC RESULTS / EMERGENCY DEPARTMENT COURSE / MDM   LAB RESULTS:  Results for orders placed or performed during the hospital encounter of 02/14/22   COVID-19, Rapid    Specimen: Nasopharyngeal Swab   Result Value Ref Range    Specimen Description . NASOPHARYNGEAL SWAB     SARS-CoV-2, Rapid Not Detected Not Detected   Culture, Blood 1    Specimen: Blood   Result Value Ref Range    Specimen Description . BLOOD     Special Requests R AC 8 ML      Culture NO GROWTH <24 HRS    Culture, Blood 1    Specimen: Blood   Result Value Ref Range    Specimen Description . BLOOD     Special Requests  LAC 13 ML      Culture NO GROWTH <24 HRS    CBC Auto Differential   Result Value Ref Range    WBC 19.6 (H) 3.5 - 11.3 k/uL    RBC 4.72 4.21 - 5.77 m/uL    Hemoglobin 13.2 13.0 - 17.0 g/dL    Hematocrit 40.8 40.7 - 50.3 %    MCV 86.4 82.6 - 102.9 fL    MCH 28.0 25.2 - 33.5 pg    MCHC 32.4 28.4 - 34.8 g/dL    RDW 12.7 11.8 - 14.4 %    Platelets See Reflexed IPF Result 138 - 453 k/uL    MPV NOT REPORTED 8.1 - 13.5 fL    NRBC Automated 0.0 0.0 per 100 WBC    Differential Type NOT REPORTED     WBC Morphology NOT REPORTED     RBC Morphology NOT REPORTED     Platelet Estimate NOT REPORTED     Seg Neutrophils 88 (H) 36 - 65 %    Lymphocytes 5 (L) 24 - 43 %    Monocytes 6 3 - 12 %    Eosinophils % 0 (L) 1 - 4 %    Basophils 0 0 - 2 %    Immature Granulocytes 1 (H) 0 %    Segs Absolute 17.32 (H) 1.50 - 8.10 k/uL    Absolute Lymph # 1.00 (L) 1.10 - 3.70 k/uL    Absolute Mono # 1.08 0.10 - 1.20 k/uL    Absolute Eos # 0.03 0.00 - 0.44 k/uL    Basophils Absolute 0.06 0.00 - 0.20 k/uL    Absolute Immature Granulocyte 0.10 0.00 - 0.30 k/uL   Comprehensive Metabolic Panel w/ Reflex to MG   Result Value Ref Range    Glucose 538 (HH) 70 - 99 mg/dL    BUN 33 (H) 6 - 20 mg/dL    CREATININE 1.76 (H) 0.70 - 1.20 mg/dL    Bun/Cre Ratio NOT REPORTED 9 - 20    Calcium 9.4 8.6 - 10.4 mg/dL    Sodium 135 135 - 144 mmol/L    Potassium 4.4 3.7 - 5.3 mmol/L    Chloride 91 (L) 98 - 107 mmol/L    CO2 20 20 - 31 mmol/L    Anion Gap 24 (H) 9 - 17 mmol/L    Alkaline Phosphatase 110 40 - 129 U/L ALT 11 5 - 41 U/L    AST 17 <40 U/L    Total Bilirubin 0.78 0.3 - 1.2 mg/dL    Total Protein 6.9 6.4 - 8.3 g/dL    Albumin 4.0 3.5 - 5.2 g/dL    Albumin/Globulin Ratio 1.4 1.0 - 2.5    GFR Non- 41 (L) >60 mL/min    GFR African American 50 (L) >60 mL/min    GFR Comment          GFR Staging NOT REPORTED    BETA-HYDROXYBUTYRATE   Result Value Ref Range    Beta-Hydroxybutyrate 5.21 (H) 0.02 - 0.27 mmol/L   Urinalysis Reflex to Culture    Specimen: Urine, clean catch   Result Value Ref Range    Color, UA Yellow Yellow    Turbidity UA Clear Clear    Glucose, Ur 3+ (A) NEGATIVE    Bilirubin Urine NEGATIVE NEGATIVE    Ketones, Urine LARGE (A) NEGATIVE    Specific Gravity, UA 1.025 1.005 - 1.030    Urine Hgb NEGATIVE NEGATIVE    pH, UA 5.0 5.0 - 8.0    Protein, UA 2+ (A) NEGATIVE    Urobilinogen, Urine Normal Normal    Nitrite, Urine NEGATIVE NEGATIVE    Leukocyte Esterase, Urine NEGATIVE NEGATIVE    Urinalysis Comments NOT REPORTED    Troponin   Result Value Ref Range    Troponin, High Sensitivity 13 0 - 22 ng/L    Troponin T NOT REPORTED <0.03 ng/mL    Troponin Interp NOT REPORTED    Troponin   Result Value Ref Range    Troponin, High Sensitivity 13 0 - 22 ng/L    Troponin T NOT REPORTED <0.03 ng/mL    Troponin Interp NOT REPORTED    Lipase   Result Value Ref Range    Lipase 70 (H) 13 - 60 U/L   D-DIMER, QUANTITATIVE   Result Value Ref Range    D-Dimer, Quant 1.32 mg/L FEU   SPECIMEN REJECTION   Result Value Ref Range    Specimen Source . BLOOD     Ordered Test VBG     Reason for Rejection       Unable to perform testing: Specimen quantity not sufficient.    - NOT REPORTED    ELECTROLYTES PLUS   Result Value Ref Range    POC Sodium 132 (L) 138 - 146 mmol/L    POC Potassium 4.1 3.5 - 4.5 mmol/L    POC Chloride 101 98 - 107 mmol/L    POC TCO2 21 (L) 22 - 30 mmol/L    Anion Gap 11 7 - 16 mmol/L   Hemoglobin and hematocrit, blood   Result Value Ref Range    POC Hemoglobin 14.2 13.5 - 17.5 g/dL    POC Hematocrit 42 41 - 53 %   CALCIUM, IONIC (POC)   Result Value Ref Range    POC Ionized Calcium 0.85 (L) 1.15 - 1.33 mmol/L   Microscopic Urinalysis   Result Value Ref Range    -          WBC, UA None 0 - 5 /HPF    RBC, UA None 0 - 4 /HPF    Casts UA  0 - 8 /LPF     0 TO 2 HYALINE Reference range defined for non-centrifuged specimen. Crystals, UA NOT REPORTED None /HPF    Epithelial Cells UA None 0 - 5 /HPF    Renal Epithelial, UA NOT REPORTED 0 /HPF    Bacteria, UA NOT REPORTED None    Mucus, UA NOT REPORTED None    Trichomonas, UA NOT REPORTED None    Amorphous, UA NOT REPORTED None    Other Observations UA NOT REPORTED NOT REQ. Yeast, UA NOT REPORTED None   Immature Platelet Fraction   Result Value Ref Range    Platelet, Immature Fraction NOT REPORTED 1.1 - 10.3 %    Platelet, Fluorescence Platelet clumps present, count appears decreased. 138 - 453 k/uL   SPECIMEN REJECTION   Result Value Ref Range    Specimen Source . BLOOD     Ordered Test LACDS     Reason for Rejection Unable to perform testing: Specimen clotted.      - NOT REPORTED    Lactate, Sepsis   Result Value Ref Range    Lactic Acid, Sepsis NOT REPORTED 0.5 - 1.9 mmol/L    Lactic Acid, Sepsis, Whole Blood 1.0 0.5 - 1.9 mmol/L   Blood Gas, Venous   Result Value Ref Range    pH, Alfredo 7.325 7.320 - 7.420    pCO2, Alfredo 42.8 39 - 55    pO2, Alfredo 115.0 (H) 30 - 50    HCO3, Venous 21.7 (L) 24 - 30 mmol/L    Positive Base Excess, Alfredo NOT REPORTED 0.0 - 2.0 mmol/L    Negative Base Excess, Alfredo 3.6 (H) 0.0 - 2.0 mmol/L    O2 Sat, Alfredo 98.5 (H) 60.0 - 85.0 %    Total Hb NOT REPORTED 12.0 - 16.0 g/dl    Oxyhemoglobin NOT REPORTED 95.0 - 98.0 %    Carboxyhemoglobin 3.0 0 - 5 %    Methemoglobin NOT REPORTED 0.0 - 1.5 %    Pt Temp 37.0     pH, Alfredo, Temp Adj NOT REPORTED 7.320 - 7.420    pCO2, Alfredo, Temp Adj NOT REPORTED 39 - 55 mmHg    pO2, Alfredo, Temp Adj NOT REPORTED 30 - 50 mmHg    O2 Device/Flow/% NOT REPORTED     Respiratory Rate NOT REPORTED     Cl Test NOT REPORTED     Sample Site NOT REPORTED     Pt. Position NOT REPORTED     Mode NOT REPORTED     Set Rate NOT REPORTED     Total Rate NOT REPORTED     VT NOT REPORTED     FIO2 INFORMATION NOT PROVIDED     Peep/Cpap NOT REPORTED     PSV NOT REPORTED     Text for Respiratory NOT REPORTED     NOTIFICATION NOT REPORTED     NOTIFICATION TIME NOT REPORTED    POC Glucose Fingerstick   Result Value Ref Range    POC Glucose 487 (HH) 75 - 110 mg/dL   Venous Blood Gas, POC   Result Value Ref Range    pH, Alfredo 7.527 (H) 7.320 - 7.430    pCO2, Alfredo 24.7 (L) 41.0 - 51.0 mm Hg    pO2, Alfredo 58.7 (H) 30.0 - 50.0 mm Hg    HCO3, Venous 20.5 (L) 22.0 - 29.0 mmol/L    Total CO2, Venous NOT REPORTED 23.0 - 30.0 mmol/L    Negative Base Excess, Alfredo 1 0.0 - 2.0    Positive Base Excess, Alfredo NOT REPORTED 0.0 - 3.0    O2 Sat, Alfredo 93 (H) 60.0 - 85.0 %    O2 Device/Flow/% NOT REPORTED     Cl Test NOT REPORTED     Sample Site NOT REPORTED     Mode NOT REPORTED     FIO2 NOT REPORTED     Pt Temp NOT REPORTED     POC pH Temp NOT REPORTED     POC pCO2 Temp NOT REPORTED mm Hg    POC pO2 Temp NOT REPORTED mm Hg   Creatinine W/GFR Point of Care   Result Value Ref Range    POC Creatinine Result not available. 0.51 - 1.19 mg/dL    GFR Comment Can not be calculated >60 mL/min    GFR Non- Can not be calculated >60 mL/min    GFR Comment         POCT urea (BUN)   Result Value Ref Range    POC BUN 32 (H) 8 - 26 mg/dL   Lactic Acid, POC   Result Value Ref Range    POC Lactic Acid 2.32 (H) 0.56 - 1.39 mmol/L   POCT Glucose   Result Value Ref Range    POC Glucose 549 (HH) 74 - 100 mg/dL   POC Glucose Fingerstick   Result Value Ref Range    POC Glucose 437 (HH) 75 - 110 mg/dL       IMPRESSION/ ED Course: Ill-appearing 61-year-old male presenting with nausea, vomiting, fatigue. Patient with active vomiting on arrival to emergency department. Mildly tachycardic and on arrival.  Placed on 2 L nasal cannula. likely DKA.     Labs with glucose greater than 500, elevated ketones however not acidotic on VBG. Labs otherwise largely unremarkable with exception of leukocytosis on CBC. Treated with multiple fluid boluses in emergency department. Did have active vomiting as well and was treated with Zofran. Given subcu insulin in emergency department. CT chest without any evidence of pulmonary embolism but there is some secretions in the airways in dependent position but no obvious associated pneumonia and noted to have inflammatory process in the abdomen consistent with esophagitis and possible colitis. Given IV Zosyn. Patient admitted to internal medicine service    RADIOLOGY:  CT ABDOMEN PELVIS W IV CONTRAST Additional Contrast? None    Result Date: 2/14/2022  EXAMINATION: CT OF THE ABDOMEN AND PELVIS WITH CONTRAST; CTA OF THE CHEST 2/14/2022 1:40 pm TECHNIQUE: CT of the abdomen and pelvis was performed with the administration of intravenous contrast. Multiplanar reformatted images are provided for review. Dose modulation, iterative reconstruction, and/or weight based adjustment of the mA/kV was utilized to reduce the radiation dose to as low as reasonably achievable.; CTA of the chest was performed after the administration of intravenous contrast.  Multiplanar reformatted images are provided for review. MIP images are provided for review. Dose modulation, iterative reconstruction, and/or weight based adjustment of the mA/kV was utilized to reduce the radiation dose to as low as reasonably achievable.  COMPARISON: Chest radiographs from today's date and 12/03/2021 HISTORY: ORDERING SYSTEM PROVIDED HISTORY: nausea, vomiting, severe abdominal pain TECHNOLOGIST PROVIDED HISTORY: nausea, vomiting, severe abdominal pain Decision Support Exception - unselect if not a suspected or confirmed emergency medical condition->Emergency Medical Condition (MA); ORDERING SYSTEM PROVIDED HISTORY: Hypoxia, elevated D-dimer TECHNOLOGIST PROVIDED HISTORY: Hypoxia, elevated D-dimer Decision Support Exception - unselect if not a suspected or confirmed emergency medical condition->Emergency Medical Condition (MA) FINDINGS: Chest: Pulmonary Arteries: Pulmonary arteries are adequately opacified for evaluation. No evidence of intraluminal filling defect to suggest pulmonary embolism. Main pulmonary artery is normal in caliber. Mediastinum: No mediastinal lymphadenopathy with calcified nodes from prior granulomatous infection. The heart and pericardium demonstrate no acute abnormality. There is no acute abnormality of the thoracic aorta. Long segment wall thickening of the mid to distal esophagus with a tiny hiatal hernia. Lungs/pleura: The lungs are without acute process aside from some trace dependent changes. Calcified granulomata in the right middle lobe. No pleural effusion or pneumothorax. Small volume secretions in the trachea and left mainstem bronchus as well as in the proximal segmental and subsegmental bronchi of the right lower lobe. Mild bronchial wall thickening is greatest in the lower lobes. Soft Tissues/Bones: Mild chronic appearing superior endplate height loss at T7 and T8 with sizable superior endplate Schmorl's nodes. No acute bony abnormality. Abdomen/Pelvis: Organs: Liver, gallbladder, spleen, pancreas, adrenal glands, and kidneys are without acute abnormality. Calcified granulomata within the spleen. Excreting contrast in the collecting system limits assessment for urolithiasis. GI/Bowel: Segments of colonic wall thickening with mucosal hyperenhancement and surrounding fat stranding. Appendix is not definitively identified. No bowel obstruction. Pelvis: Urinary bladder is grossly unremarkable. Prostate is borderline prominent. Peritoneum/Retroperitoneum: No free fluid or free air. No adenopathy. Mild aortoiliac atherosclerosis without aneurysm. Bones/Soft Tissues: No acute abnormality.   The bones appear somewhat demineralized, atypical for this patient demographic. Degenerative disc disease greatest at L3 through L5 with at least moderate degenerative spinal canal stenosis at L3-L4. No pulmonary embolism. Secretions in the dependent airways without associated pneumonia. Long segment wall thickening of the esophagus with a tiny hiatal hernia. Correlate for esophagitis. Mild segmental colonic inflammatory changes. Correlate for infectious or inflammatory colitis. XR CHEST PORTABLE    Result Date: 2/14/2022  EXAMINATION: ONE XRAY VIEW OF THE CHEST 2/14/2022 2:42 pm COMPARISON: AP chest from 12/03/2021 HISTORY: ORDERING SYSTEM PROVIDED HISTORY: Hypoxia, nausea and vomiting TECHNOLOGIST PROVIDED HISTORY: Hypoxia, nausea and vomiting History of diabetes and seizure disorder. FINDINGS: Overlying ECG monitor leads and gown snaps. Cardiomediastinal shadow WNL and unchanged. Prominent lung volumes with mild cephalization of blood flow and unchanged calcified granuloma right lower lung. Localized pulmonary opacity suspicious for infiltrate. Costophrenic angles clear. No pneumothorax. Bones stable. No acute cardiopulmonary disease. CT CHEST PULMONARY EMBOLISM W CONTRAST    Result Date: 2/14/2022  EXAMINATION: CT OF THE ABDOMEN AND PELVIS WITH CONTRAST; CTA OF THE CHEST 2/14/2022 1:40 pm TECHNIQUE: CT of the abdomen and pelvis was performed with the administration of intravenous contrast. Multiplanar reformatted images are provided for review. Dose modulation, iterative reconstruction, and/or weight based adjustment of the mA/kV was utilized to reduce the radiation dose to as low as reasonably achievable.; CTA of the chest was performed after the administration of intravenous contrast.  Multiplanar reformatted images are provided for review. MIP images are provided for review. Dose modulation, iterative reconstruction, and/or weight based adjustment of the mA/kV was utilized to reduce the radiation dose to as low as reasonably achievable.  COMPARISON: Chest radiographs from today's date and 12/03/2021 HISTORY: ORDERING SYSTEM PROVIDED HISTORY: nausea, vomiting, severe abdominal pain TECHNOLOGIST PROVIDED HISTORY: nausea, vomiting, severe abdominal pain Decision Support Exception - unselect if not a suspected or confirmed emergency medical condition->Emergency Medical Condition (MA); ORDERING SYSTEM PROVIDED HISTORY: Hypoxia, elevated D-dimer TECHNOLOGIST PROVIDED HISTORY: Hypoxia, elevated D-dimer Decision Support Exception - unselect if not a suspected or confirmed emergency medical condition->Emergency Medical Condition (MA) FINDINGS: Chest: Pulmonary Arteries: Pulmonary arteries are adequately opacified for evaluation. No evidence of intraluminal filling defect to suggest pulmonary embolism. Main pulmonary artery is normal in caliber. Mediastinum: No mediastinal lymphadenopathy with calcified nodes from prior granulomatous infection. The heart and pericardium demonstrate no acute abnormality. There is no acute abnormality of the thoracic aorta. Long segment wall thickening of the mid to distal esophagus with a tiny hiatal hernia. Lungs/pleura: The lungs are without acute process aside from some trace dependent changes. Calcified granulomata in the right middle lobe. No pleural effusion or pneumothorax. Small volume secretions in the trachea and left mainstem bronchus as well as in the proximal segmental and subsegmental bronchi of the right lower lobe. Mild bronchial wall thickening is greatest in the lower lobes. Soft Tissues/Bones: Mild chronic appearing superior endplate height loss at T7 and T8 with sizable superior endplate Schmorl's nodes. No acute bony abnormality. Abdomen/Pelvis: Organs: Liver, gallbladder, spleen, pancreas, adrenal glands, and kidneys are without acute abnormality. Calcified granulomata within the spleen. Excreting contrast in the collecting system limits assessment for urolithiasis.  GI/Bowel: Segments of colonic wall thickening with mucosal hyperenhancement and surrounding fat stranding. Appendix is not definitively identified. No bowel obstruction. Pelvis: Urinary bladder is grossly unremarkable. Prostate is borderline prominent. Peritoneum/Retroperitoneum: No free fluid or free air. No adenopathy. Mild aortoiliac atherosclerosis without aneurysm. Bones/Soft Tissues: No acute abnormality. The bones appear somewhat demineralized, atypical for this patient demographic. Degenerative disc disease greatest at L3 through L5 with at least moderate degenerative spinal canal stenosis at L3-L4. No pulmonary embolism. Secretions in the dependent airways without associated pneumonia. Long segment wall thickening of the esophagus with a tiny hiatal hernia. Correlate for esophagitis. Mild segmental colonic inflammatory changes. Correlate for infectious or inflammatory colitis. CONSULTS:  IP CONSULT TO INTERNAL MEDICINE    CRITICAL CARE:  Please see attending note    FINAL IMPRESSION      1. Infectious colitis          DISPOSITION / PLAN     DISPOSITION Admitted 02/14/2022 07:24:28 PM      PATIENT REFERRED TO:  No follow-up provider specified.     DISCHARGE MEDICATIONS:  New Prescriptions    No medications on file       Suyapa Mclean DO  Emergency Medicine Resident    (Please note that portions of thisnote were completed with a voice recognition program.  Efforts were made to edit the dictations but occasionally words are mis-transcribed.)        Suyapa Mclean DO  Resident  02/14/22 2009

## 2022-02-15 NOTE — CONSULTS
anal tone. No blackish stool/hematochezia appreciated. Pertinent labs are sodium 139, potassium 3.8, BUN 23, creatinine 1.52, BS 68, ALT 11, AST 17, bilirubin 0.78, lipase 70, WBC 18.1, Hb 10.4, iron studies shows decreased TIBC,    CT abdomen with IV contrast: Shows  No pulmonary embolism. Secretions in the dependent airways without associated pneumonia. Long segment wall thickening of the esophagus with a tiny hiatal hernia. Correlate for esophagitis. Mild segmental colonic inflammatory changes. Correlate for infectious or inflammatory colitis. Previous GI history:   History of iron deficiency anemia, upper and lower GI endoscopy unremarkable, per patient      Past Medical/Social/Family History:  Past Medical History:   Diagnosis Date    Diabetes mellitus ([facility]     Panic attack     Seizures ([facility] [address] [facility]      Past Surgical History:   Procedure Laterality Date    APPENDECTOMY      HAND SURGERY       No family history on file. Previous records/history/ and notes were reviewed    Allergies: Allergies   Allergen Reactions    Azithromycin Swelling    Codeine Itching    Ibuprofen Other (See Comments)    Morphine Other (See Comments)    Tramadol Itching       Home medications:  Prior to Admission medications    Medication Sig Start Date End Date Taking? Authorizing Provider   traZODone (DESYREL) 150 MG tablet Take by mouth nightly    Historical Provider, MD   clonazePAM (KLONOPIN) 2 MG tablet Take 2 mg by mouth 2 times daily as needed for Anxiety.     Historical Provider, MD   insulin glargine (LANTUS) 100 UNIT/ML injection vial Inject 25 Units into the skin 2 times daily    Historical Provider, MD   insulin aspart (NOVOLOG FLEXPEN) 100 UNIT/ML injection pen Inject 5 Units into the skin 3 times daily (before meals)    Historical Provider, MD   albuterol sulfate  (90 Base) MCG/ACT inhaler Inhale 2 puffs into the lungs every 4 hours as needed for Wheezing    Historical Provider, MD   glucose monitoring (FREESTYLE FREEDOM) kit 1 kit by Does not apply route daily 12/3/21   Prem Murray MD     .  Current Medications:  Scheduled Meds:   amLODIPine  10 mg Oral Daily    budesonide-formoterol  2 puff Inhalation BID    [Held by provider] aspirin  81 mg Oral Daily    atorvastatin  40 mg Oral Nightly    piperacillin-tazobactam  3,375 mg IntraVENous Q8H    insulin glargine  15 Units SubCUTAneous Daily    insulin lispro  0-12 Units SubCUTAneous TID WC    insulin lispro  0-6 Units SubCUTAneous Nightly    [Held by provider] enoxaparin  40 mg SubCUTAneous Daily    pantoprazole  40 mg IntraVENous BID    And    sodium chloride (PF)  10 mL IntraVENous BID     . Continuous Infusions:   dextrose      dextrose 5% and 0.45% NaCl with KCl 20 mEq 150 mL/hr at 02/15/22 0830    sodium chloride 250 mL/hr at 02/15/22 0804    insulin 2.25 Units/hr (02/15/22 0625)     . PRN Meds:ipratropium-albuterol, clonazePAM, glucose, dextrose, glucagon (rDNA), dextrose, acetaminophen, ondansetron **OR** ondansetron, potassium chloride, magnesium sulfate, sodium phosphate IVPB **OR** sodium phosphate IVPB **OR** sodium phosphate IVPB, dextrose 5% and 0.45% NaCl with KCl 20 mEq    REVIEW OF SYSTEMS:     Constitutional: No fever, no chills, no lethargy, no weakness, no weight loss  HEENT:  No headache, otalgia, itchy eyes, nasal discharge or sore throat. Cardiac:  No chest pain, dyspnea, orthopnea or PND. Chest:   No cough, phlegm or wheezing. Abdomen:  No abdominal pain, nausea, vomiting, constipation, diarrhea, hematochezia/melena  Neuro:  No focal weakness, abnormal movements or seizure like activity. Skin:   No rashes, no itching. :   No hematuria, no pyuria, no dysuria, no flank pain. Extremities:  No swelling or joint pains. ROS was otherwise negative except as mentioned in the [address]      PHYSICAL EXAM:    /83   Pulse 68   Temp 99.1 °F (37.3 °C) (Oral)   Resp 20   Ht 5' 7\" (1.702 m)   Wt 135 lb (61.2 kg)   SpO2 96%   BMI 21.14 kg/m²   . TMAX[24]    General: Well developed, Well nourished, No apparent distress  Head:  Normocephalic, Atraumatic  EENT: EOMI, Sclera not icteric, Oropharynx moist  Neck:  Supple, Trachea midline  Lungs:CTA Bilaterally  Heart: RRR, No murmur, No rub, No gallop, PMI nondisplaced. Abdomen:Soft, positive for epigastric tenderness, Not distended, BS WNL,  No masses. No hepatomegalia   Ext:No clubbing. No cyanosis. No edema. Skin: No rashes. No jaundice. No stigmata of liver disease. Neuro:  A&O x 3, No focal neurological deficits    Imaging:       Hemotological labs: Anemia studies:  Recent Labs     02/15/22  0613   LABIRON 30   TIBC 227*   [id number]   FOLATE 13.5       CBC:  Recent Labs     02/14/22  1436 02/15/22  0613   WBC 19.6* 18.1*   HGB 13.2 10.4*   MCV 86.4 85.8   RDW 12.7 12.9   PLT See Reflexed IPF Result 219       PT/INR:  No results for input(s): PROTIME, INR in the last 72 hours. BMP:  Recent Labs     02/14/22  2213 02/15/22  0146 02/15/22  0249 02/15/22  0255 02/15/22  0613   *  --   --  133* 139   K 3.8  --   --  3.9 3.8   CL 91*  --   --  96* 102   CO2 23  --   --  27 28   BUN 28*  --   --  24* 23*   CREATININE 1.58*  --   --  1.57* 1.54*   GLUCOSE 392*   < > 201 201* 130*   CALCIUM 8.8  --   --  8.2* 8.4*    < > = values in this interval not displayed. Liver work up:  Hepatitis Functional Panel:  Recent Labs     02/14/22  1436   ALKPHOS 110   ALT 11   AST 17   PROT 6.9   BILITOT 0.78   LABALBU 4.0       Amylase/Lipase/Ammonia:  Recent Labs     02/14/22  1436   LIPASE 70*       Acute Hepatitis Panel:  No results found for: HEPBSAG, HEPCAB, HEPBIGM, HEPAIGM              GI Impression  Hematemesis  Melena  Iron deficiency anemia      Recommendations and plan:  Concern for upper GI bleed. Hematemesis/melena. Iron studies shows low TIBC. We will do upper GI endoscopy  Grade 2 internal hemorrhoids.   Outpatient follow-up with general surgery  Rest of management per primary          Lieutenant Casey MD.  Internal Medicine Resident PGY [address]   2/15/2022, 8:40 AM

## 2022-02-15 NOTE — OP NOTE
Operative Note      Patient: Fernando Law  YOB: 1970  MRN: [id number]    Date of Procedure: 2/15/2022    Pre-Op Diagnosis: MELENA    Post-Op Diagnosis: Same   1. Esophageal candidiasis  2. LA grade D ulcerative esophagitis in the distal third  3. Normal stomach and duodenum       Procedure(s):  EGD ESOPHAGOGASTRODUODENOSCOPY    Surgeon(s):  Debbie Cruz MD    Assistant:   First Assistant: Olesya Owens RN    Anesthesia: Monitor Anesthesia Care    Estimated Blood Loss (mL): Minimal    Complications: None    Specimens:   ID Type Source Tests Collected by Time Destination   A : Distal Esophagus Bx Tissue Esophagus SURGICAL PATHOLOGY Debbie Cruz MD 2/15/2022 1615    B : Distal Esophagus Bx Look for Candida Tissue Esophagus SURGICAL PATHOLOGY, CULTURE, FUNGUS, CULTURE, TISSUE Debbie Cruz MD 2/15/2022 1627        Implants:  * No implants in log *      Drains: * No LDAs found *    Findings:  1. Esophageal candidiasis of the mid and distal esophagus. Biopsied  2. LA grade D ulcerative esophagitis in the distal third. Biopsied  3. Normal stomach mucosa on forward and retroflexed views. 4. Normal examined duodenum. Recommendations  1. Await pathology results. 2. Change to oral pantoprazole 40 mg twice daily for 8 weeks. 3. Follow an antireflux lifestyle. 4. Start Diflucan 200 mg first dose today then 100 mg daily for total of 14 days. 5. Start soft diet and advance as tolerated. 6. Patient will need follow-up in GI clinic. Repeat EGD in 2 months to check for healing of ulcers. 7. Okay for discharge from GI standpoint. 8. GI will sign off. Please call for any questions or concerns. Detailed Description of Procedure:   Informed consent was obtained from the patient after explanation of the procedure including indications, description of the procedure,  benefits and possible risks and complications of the procedure, and alternatives. Questions were answered.   The patient's history was reviewed and a directed physical examination was performed prior to the procedure. Patient was monitored throughout the procedure with pulse oximetry and periodic assessment of vital signs. Patient was sedated as noted above. With the patient in the left lateral decubitus position, the Olympus videoendoscope was placed in the patient's mouth and under direct visualization passed into the esophagus. Visualization of the esophagus, stomach, and duodenum was performed during both introduction and withdrawal of the endoscope and retroflexed view of the proximal stomach was obtained. The scope was passed to the 2nd portion of the duodenum. The patient tolerated the procedure well and was taken to the recovery area in good condition. The patient  was taken to the recovery area in good condition.     Electronically signed by Freya Duran MD on 2/15/2022 at 4:31 PM

## 2022-02-15 NOTE — ED NOTES
Pt resting comfortably in no acute distress. Respirations even and unlabored. Call light remains within reach. No needs at this time.        Dina Lance RN  02/15/22 8507

## 2022-02-15 NOTE — CARE COORDINATION
Case Management Initial Discharge Plan  [place],             Met with:patient to discuss discharge plans. Information verified: address, contacts, phone number, , insurance Yes  Insurance Provider: [employer]    Emergency Contact/Next of Kin name & number:   n/a  Who are involved in patient's support system? na    PCP: Bonny Pink MD  Date of last visit: unknown      Discharge Planning    Living Arrangements:    resides at [facility]      Patient able to perform ADL's:Independent    Current Services (outpatient & in home) na  DME equipment: crutches  DME provider: na    Is patient receiving oral anticoagulation therapy? No      Potential Assistance Needed:       Patient agreeable to home care: No  [place] of choice provided:  n/a    Prior SNF/Rehab Placement and Facility: na  Agreeable to SNF/Rehab: No  [place] of choice provided: no     Evaluation: no    Expected Discharge date:       Patient expects to be discharged to: If home: is the family and/or caregiver wiling & able to provide support at home? no  Who will be providing this support? na    Follow Up Appointment: Best Day/ Time:      Transportation provider: medical cab  Transportation arrangements needed for discharge: Yes    Readmission Risk              Risk of Unplanned Readmission:  18             Does patient have a readmission risk score greater than 14?: Yes  If yes, follow-up appointment must be made within 7 days of discharge. Goals of Care:       Educated patient on transitional options, provided freedom of choice and are agreeable with plan      Discharge Plan: plans to d/c back to [facility], requesting cane, medical cab for transport                    1240 resident requesting EGD report from [address] [place] [address]. Writer spoke with medical records, pt has not had an EGD performed at [address] [place] [address].

## 2022-02-16 DIAGNOSIS — B37.81 CANDIDA ESOPHAGITIS (HCC): Primary | ICD-10-CM

## 2022-02-16 LAB
DIRECT EXAM: NORMAL
Lab: NORMAL
SPECIMEN DESCRIPTION: NORMAL

## 2022-02-16 NOTE — PLAN OF CARE
Received perfect serve from RN about patient's decision to leave AMA. I went bedside and spoke to the patient by which time I was informed that the patient had already signed [place] paperwork. I discussed with patient and he tells me that his children are alone at home with his partner who has a drug abuse issue. He reports getting phone calls from his children asking about where he is. He reports he cannot under any circumstances leave his children alone in that environment tonight. He states that he has no choice and is leaving in the next 20 minutes, \"no matter what\". He reports that he intends to follow-up with medical recommendations once his children's situation is stable. As per current recommendations in the charts from primary team and consult services, I reconciled patient's med list and ordered 13 more days of fluconazole, 10 days of ciprofloxacin and metronidazole, as well as Lantus and lispro. Patient was also scribed 10 mg of amlodipine daily and weekly vitamin D. These medications were sent to patient's pharmacy which he states is [facility] on [address].  He was asked to return to the ER if his symptoms worsened and to follow up with his PCP at the earliest.    Maureen Vale MD  PGY-3, Internal Medicine Resident  [facility], [place]         2/15/2022, 8:37 PM

## 2022-02-16 NOTE — ANESTHESIA POSTPROCEDURE EVALUATION
POST- ANESTHESIA EVALUATION       Pt Name: Tacos Tracy  MRN: [id number]  [place]: 1970  Date of evaluation: 2/16/2022  Time:  6:14 AM      BP (!) 146/88   Pulse 64   Temp 98.8 °F (37.1 °C) (Infrared)   Resp 26   Ht 5' 7\" (1.702 m)   Wt 135 lb (61.2 kg)   SpO2 98%   BMI 21.14 kg/m²      Consciousness Level  Awake  Cardiopulmonary Status  Stable  Pain Adequately Treated YES  Nausea / Vomiting  NO  Adequate Hydration  YES  Anesthesia Related Complications NONE      Electronically signed by Cary Curling, MD on 2/16/2022 at [address] AM       Department of Anesthesiology  Postprocedure Note    Patient: Tacos Tracy  MRN: [id number]  YOB: 1970  Date of evaluation: 2/16/2022  Time:  6:13 AM     Procedure Summary     Date: 02/15/22 Room / Location: [facility] 04 / [facility]    Anesthesia Start: 3342 Anesthesia Stop: 1619    Procedure: EGD BIOPSY (N/A ) Diagnosis: (MELENA)    Surgeons: Keith Avendano MD Responsible Provider: Cary Curling, MD    Anesthesia Type: MAC, general ASA Status: 3          Anesthesia Type: MAC, general    Sal Phase I: Sal Score: 10    Sal Phase II: Sal Score: 9    Last vitals: Reviewed and per EMR flowsheets.        Anesthesia Post Evaluation

## 2022-02-17 LAB — SURGICAL PATHOLOGY REPORT: NORMAL

## 2022-02-18 LAB
EKG ATRIAL RATE: 97 BPM
EKG P AXIS: 82 DEGREES
EKG P-R INTERVAL: 146 MS
EKG Q-T INTERVAL: 362 MS
EKG QRS DURATION: 90 MS
EKG QTC CALCULATION (BAZETT): 459 MS
EKG R AXIS: -60 DEGREES
EKG T AXIS: 61 DEGREES
EKG VENTRICULAR RATE: 97 BPM

## 2022-02-18 NOTE — DISCHARGE SUMMARY
[address]     Department of Internal Medicine - Staff Internal Medicine Teaching Service    INPATIENT DISCHARGE SUMMARY      Patient Identification:  Nadia Valdes is a 49 y.o. male. :  1970  MRN: [id number]     Acct: [de-identified]   PCP: Bonny Pink MD  Admit Date:  2022  Discharge date and time: 2/15/2022  9:18 PM   Attending Provider: No att. providers found                                     [address] Problem Lists:  Active Problems:    Colitis    Uncontrolled type 1 diabetes mellitus with hyperglycemia (HCC)    Melena    Serum lipase elevation    Fibromyalgia    Spinal stenosis    Abnormal stress test    Vitamin D deficiency    Acute blood loss anemia    Hematemesis with nausea    Ulcer of esophagus without bleeding    Candida esophagitis (HCC)  Resolved Problems:    * No resolved hospital problems. *      HOSPITAL STAY     Brief Inpatient course:   Nadia Valdes is a 49 y.o. male who presented with chief complaint of nausea and vomiting for the past 24 hours on 2022. Patient reports that he ran out of Lantus for the past 2 days has been feeling progressively tired. He also has been having increased urine output and has been feeling excessively thirsty during this time. Patient also stated that he passed 1 episode of black tarry stools on 2022. Evaluation in the ER, patient is afebrile and hemodynamically stable with /87 saturating on 2 L of oxygen on nasal cannula.     Significant labs include creatinine 1.76, BUN 33, anion gap of 24, glucose of 538, beta hydroxybutyrate of 5.21, WBC at 19.6, lipase -70, lactic acid -2.32     pH was 7.52, PCO2 24.7, PO2 58.7, HCO3 20.5, oxygen saturation of 93.     CT chest:     No pulmonary embolism.       Secretions in the dependent airways without associated pneumonia.       Long segment wall thickening of the esophagus with a tiny hiatal hernia.    Correlate for esophagitis.     Mild segmental colonic inflammatory changes.  Correlate for infectious or   inflammatory colitis.        Hyperglycemia due to Type 1 Diabetes Mellitus- Patient was started on DKA protocol. Hypoglycemia protocol initiated. Glucose was 137 on 2/15/2022. Patient was then started on medium dose sliding scale. H/o of melena: GI was consulted. Hb stable at 13.2. Patient was on IV Protonix twice daily. COPD/asthma: Patient was on albuterol inhaler 2 puffs every 4 hours as needed. Hypertension: Patient was started on amlodipine 10 mg daily. Colitis: Patient was placed on Zosyn 3,375 mg IV every 8 hours. EGD on 2/15/2002 showed:  1. Esophageal candidiasis of the mid and distal esophagus. Biopsied  2. LA grade D ulcerative esophagitis in the distal third. Biopsied  3. Normal stomach mucosa on forward and retroflexed views. 4. Normal examined duodenum.     Patient left AMA due to situations at home. Patient was prescribed pantoprazole, Diflucan, ciprofloxacin and metronidazole as well as Lantus and lispro.     Procedures/ Significant Interventions:    EGD    Consults:     Consults:     Final Specialist Recommendations/Findings:   IP CONSULT TO INTERNAL MEDICINE  IP CONSULT TO CASE MANAGEMENT  IP CONSULT TO GI      Any Hospital Acquired Infections: none    Discharge Functional Status:  stable    DISCHARGE PLAN     Disposition: home    Patient Instructions:   Discharge Medication List as of 2/15/2022  9:20 PM      START taking these medications    Details   fluconazole (DIFLUCAN) 100 MG tablet Take 1 tablet by mouth daily for 13 doses, Disp-13 tablet, R-0Normal      ciprofloxacin (CIPRO) 500 MG tablet Take 1 tablet by mouth 2 times daily for 10 days, Disp-20 tablet, R-0Normal      metroNIDAZOLE (FLAGYL) 500 MG tablet Take 1 tablet by mouth 2 times daily for 10 days, Disp-20 tablet, R-0Normal      Lancets MISC 2 TIMES DAILY Starting Tue 2/15/2022, Disp-300 each, R-1, Normal      blood glucose monitor strips Test 3 times a day & as needed for symptoms of irregular blood glucose. Dispense sufficient amount for indicated testing frequency plus additional to accommodate PRN testing needs. , Disp-100 strip, R-0, Normal      Alcohol Swabs 70 % PADS Disp-100 each, R-0, NormalUse as needed with blood glucose checks      insulin glargine (LANTUS SOLOSTAR) 100 UNIT/ML injection pen Inject 15 Units into the skin nightly, Disp-5 pen, R-3Normal      Insulin Pen Needle ([employer] PEN NEEDLES 29G) 29G X 12MM MISC DAILY Starting Tue 2/15/2022, Disp-100 each, R-3, Normal      amLODIPine (NORVASC) 10 MG tablet Take 1 tablet by mouth daily, Disp-30 tablet, R-3Normal      atorvastatin (LIPITOR) 40 MG tablet Take 1 tablet by mouth nightly, Disp-30 tablet, R-3Normal         CONTINUE these medications which have CHANGED    Details   vitamin D (ERGOCALCIFEROL) 1.25 MG (05900 UT) CAPS capsule Take 1 capsule by mouth once a week, Disp-12 capsule, R-0Normal         CONTINUE these medications which have NOT CHANGED    Details   traZODone (DESYREL) 150 MG tablet Take by mouth nightlyHistorical Med      clonazePAM (KLONOPIN) 2 MG tablet Take 2 mg by mouth 2 times daily as needed for Anxiety. Historical Med      insulin aspart (NOVOLOG FLEXPEN) 100 UNIT/ML injection pen Inject 5 Units into the skin 3 times daily (before meals)Historical Med      albuterol sulfate  (90 Base) MCG/ACT inhaler Inhale 2 puffs into the lungs every 4 hours as needed for WheezingHistorical Med      glucose monitoring (FREESTYLE FREEDOM) kit DAILY Starting Fri 12/3/2021, Disp-1 kit, R-0, Print         STOP taking these medications       insulin glargine (LANTUS) 100 UNIT/ML injection vial Comments:   Reason for Stopping:         ondansetron (ZOFRAN) 4 MG tablet Comments:   Reason for Stopping:               Activity: activity as tolerated    Diet: diabetic diet    Follow-up:    No follow-up provider specified.     Patient Instructions:   Please be compliant with your medications. Please follow-up with GI clinic in 2 months to check for healing of ulcers. Please take Diflucan 200 mg first dose on 215 2020 to 100 mg daily for a total of 14 days. Please take pantoprazole 40 mg twice daily for 8 weeks. Follow-up labs: none  Follow-up imaging: none    Note that over 30 minutes was spent in preparing discharge papers, discussing discharge with patient, medication review, etc.      Mirza Baltazar MD  Internal Medicine Resident, PGY-1  [facility]; [place], [place]  2/18/2022, 11:26 AM   Attending Physician Statement  I have discussed the care of [facility] and I have examined the patient myselft and taken ros and hpi , including pertinent history and exam findings,  with the resident. I have reviewed the key elements of all parts of the encounter with the resident. I agree with the assessment, plan and orders as documented by the resident.       Electronically signed by Elizabeth Vázquez MD

## 2022-02-19 LAB
CULTURE: NORMAL
CULTURE: NORMAL
GLUTAMIC ACID DECARB AB: >250 IU/ML (ref 0–5)
Lab: NORMAL
Lab: NORMAL
SPECIMEN DESCRIPTION: NORMAL
SPECIMEN DESCRIPTION: NORMAL

## 2022-03-18 ENCOUNTER — HOSPITAL ENCOUNTER (EMERGENCY)
Age: 52
Discharge: HOME | End: 2022-03-18
Attending: EMERGENCY MEDICINE
Payer: MEDICARE

## 2022-03-18 VITALS
OXYGEN SATURATION: 95 % | RESPIRATION RATE: 18 BRPM | SYSTOLIC BLOOD PRESSURE: 130 MMHG | HEART RATE: 92 BPM | DIASTOLIC BLOOD PRESSURE: 88 MMHG | TEMPERATURE: 97.8 F

## 2022-03-18 DIAGNOSIS — N17.9 AKI (ACUTE KIDNEY INJURY) (HCC): ICD-10-CM

## 2022-03-18 DIAGNOSIS — Z76.0 ENCOUNTER FOR MEDICATION REFILL: ICD-10-CM

## 2022-03-18 DIAGNOSIS — E86.0 DEHYDRATION: ICD-10-CM

## 2022-03-18 DIAGNOSIS — R73.9 HYPERGLYCEMIA: Primary | ICD-10-CM

## 2022-03-18 LAB
ANION GAP SERPL CALCULATED.3IONS-SCNC: 19 MMOL/L (ref 9–17)
BUN BLDV-MCNC: 36 MG/DL (ref 6–20)
CALCIUM SERPL-MCNC: 9.4 MG/DL (ref 8.6–10.4)
CHLORIDE BLD-SCNC: 90 MMOL/L (ref 98–107)
CHP ED QC CHECK: NORMAL
CO2: 24 MMOL/L (ref 20–31)
CREAT SERPL-MCNC: 1.8 MG/DL (ref 0.7–1.2)
GFR AFRICAN AMERICAN: 49 ML/MIN
GFR NON-AFRICAN AMERICAN: 40 ML/MIN
GFR SERPL CREATININE-BSD FRML MDRD: ABNORMAL ML/MIN/{1.73_M2}
GLUCOSE BLD-MCNC: 383 MG/DL
GLUCOSE BLD-MCNC: 383 MG/DL (ref 75–110)
GLUCOSE BLD-MCNC: 422 MG/DL
GLUCOSE BLD-MCNC: 422 MG/DL
GLUCOSE BLD-MCNC: 422 MG/DL (ref 75–110)
GLUCOSE BLD-MCNC: 431 MG/DL (ref 70–99)
GLUCOSE BLD-MCNC: 443 MG/DL (ref 75–110)
MAGNESIUM: 2.1 MG/DL (ref 1.6–2.6)
POTASSIUM SERPL-SCNC: 4.3 MMOL/L (ref 3.7–5.3)
SODIUM BLD-SCNC: 133 MMOL/L (ref 135–144)

## 2022-03-18 PROCEDURE — 96360 HYDRATION IV INFUSION INIT: CPT

## 2022-03-18 PROCEDURE — 80048 BASIC METABOLIC PNL TOTAL CA: CPT

## 2022-03-18 PROCEDURE — 82947 ASSAY GLUCOSE BLOOD QUANT: CPT

## 2022-03-18 PROCEDURE — 2580000003 HC RX 258: Performed by: STUDENT IN AN ORGANIZED HEALTH CARE EDUCATION/TRAINING PROGRAM

## 2022-03-18 PROCEDURE — 6370000000 HC RX 637 (ALT 250 FOR IP): Performed by: STUDENT IN AN ORGANIZED HEALTH CARE EDUCATION/TRAINING PROGRAM

## 2022-03-18 PROCEDURE — 96372 THER/PROPH/DIAG INJ SC/IM: CPT

## 2022-03-18 PROCEDURE — 83735 ASSAY OF MAGNESIUM: CPT

## 2022-03-18 PROCEDURE — 99284 EMERGENCY DEPT VISIT MOD MDM: CPT

## 2022-03-18 RX ORDER — GLUCOSAMINE HCL/CHONDROITIN SU 500-400 MG
CAPSULE ORAL
Qty: 100 STRIP | Refills: 0 | Status: SHIPPED | OUTPATIENT
Start: 2022-03-18 | End: 2022-09-06

## 2022-03-18 RX ORDER — 0.9 % SODIUM CHLORIDE 0.9 %
1000 INTRAVENOUS SOLUTION INTRAVENOUS ONCE
Status: COMPLETED | OUTPATIENT
Start: 2022-03-18 | End: 2022-03-18

## 2022-03-18 RX ORDER — BLOOD-GLUCOSE METER
1 EACH MISCELLANEOUS DAILY
Qty: 1 KIT | Refills: 0 | Status: ON HOLD | OUTPATIENT
Start: 2022-03-18

## 2022-03-18 RX ORDER — LANCETS 30 GAUGE
1 EACH MISCELLANEOUS 2 TIMES DAILY
Qty: 300 EACH | Refills: 1 | Status: ON HOLD | OUTPATIENT
Start: 2022-03-18

## 2022-03-18 RX ORDER — INSULIN ASPART 100 [IU]/ML
5 INJECTION, SOLUTION INTRAVENOUS; SUBCUTANEOUS
Qty: 5 PEN | Refills: 0 | Status: SHIPPED | OUTPATIENT
Start: 2022-03-18 | End: 2022-04-06 | Stop reason: SDUPTHER

## 2022-03-18 RX ORDER — INSULIN GLARGINE 100 [IU]/ML
15 INJECTION, SOLUTION SUBCUTANEOUS NIGHTLY
Qty: 5 PEN | Refills: 3 | Status: SHIPPED | OUTPATIENT
Start: 2022-03-18 | End: 2022-04-06 | Stop reason: SDUPTHER

## 2022-03-18 RX ADMIN — INSULIN HUMAN 5 UNITS: 100 INJECTION, SOLUTION PARENTERAL at 17:51

## 2022-03-18 RX ADMIN — SODIUM CHLORIDE 1000 ML: 9 INJECTION, SOLUTION INTRAVENOUS at 17:47

## 2022-03-18 ASSESSMENT — ENCOUNTER SYMPTOMS
SORE THROAT: 0
ABDOMINAL PAIN: 0
BACK PAIN: 0
VOMITING: 0
RECTAL PAIN: 0
COUGH: 0
SHORTNESS OF BREATH: 0

## 2022-03-18 NOTE — ED PROVIDER NOTES
[facility]  ED  Emergency Department Encounter  EmergencyMedicine Resident     Pt Name:Elroy Ford  MRN: [id number]  [place] 1970  Date of evaluation: 3/18/22  PCP:  Aldair Gary MD    [address] [place] [address]       Chief Complaint   Patient presents with    Blood Sugar Problem     pt stated he has been out of lantus x3 days and has only been using novolog concerned about bs       HISTORY OF PRESENT ILLNESS  (Location/Symptom, Timing/Onset, Context/Setting, Quality, Duration, Modifying Factors, Severity.)      Rosa Moraes is a 49 y.o. male who presents with need for insulin medications. Patient is type I diabetic. He is on NovoLog 5 units 3 times daily, Lantus 15 units nightly. He states that he has been out of the Lantus. He has been trying to find a doctor that will accept his insurance, he did find one that will see him on Wednesday. He has not been taking his blood sugars as the glucose monitor he was prescribed is not covered by his insurance and he is out of testing strips. States he has had DKA in the past.  He denies any associated symptoms. PAST MEDICAL / SURGICAL / SOCIAL / FAMILY HISTORY      has a past medical history of Diabetes mellitus ([address] Panic attack, and Seizures ([facility] [address] has a past surgical history that includes Appendectomy; Hand surgery; and Upper gastrointestinal endoscopy (N/A, 2/15/2022).       Social History     Socioeconomic History    Marital status: Unknown     Spouse name: Not on file    Number of children: Not on file    Years of education: Not on file    Highest education level: Not on file   Occupational History    Not on file   Tobacco Use    Smoking status: Current Every Day Smoker     Types: Cigarettes    Smokeless tobacco: Never Used   Substance and Sexual Activity    Alcohol use: Not Currently    Drug use: Yes     Types: Marijuana Gelene Chasten)     Comment: overdose 11/10/20    Sexual activity: Not on file   Other Topics Concern    Not on file Social History Narrative    Not on file     Social Determinants of Health     Financial Resource Strain:     Difficulty of Paying Living Expenses: Not on file   Food Insecurity:     Worried About Running Out of Food in the Last Year: Not on file    William of Food in the Last Year: Not on file   Transportation Needs:     Lack of Transportation (Medical): Not on file    Lack of Transportation (Non-Medical): Not on file   Physical Activity:     Days of Exercise per Week: Not on file    Minutes of Exercise per Session: Not on file   Stress:     Feeling of Stress : Not on file   Social Connections:     Frequency of Communication with Friends and Family: Not on file    Frequency of Social Gatherings with Friends and Family: Not on file    Attends Islam Services: Not on file    Active Member of [address] [place] [address] [employer] or Organizations: Not on file    Attends Club or Organization Meetings: Not on file    Marital Status: Not on file   Intimate Partner Violence:     Fear of Current or Ex-Partner: Not on file    Emotionally Abused: Not on file    Physically Abused: Not on file    Sexually Abused: Not on file   Housing Stability:     Unable to Pay for Housing in the Last Year: Not on file    Number of [place] in the Last Year: Not on file    Unstable Housing in the Last Year: Not on file       History reviewed. No pertinent family history. Allergies:  Azithromycin, Codeine, Ibuprofen, Morphine, and Tramadol    Home Medications:  Prior to Admission medications    Medication Sig Start Date End Date Taking? Authorizing Provider   blood glucose monitor strips Test 3 times a day & as needed for symptoms of irregular blood glucose. Dispense sufficient amount for indicated testing frequency plus additional to accommodate PRN testing needs.  3/18/22  Yes Antonio Apple, DO   insulin aspart (NOVOLOG FLEXPEN) 100 UNIT/ML injection pen Inject 5 Units into the skin 3 times daily (before meals) 3/18/22  Yes Psychiatric/Behavioral: Negative for confusion. PHYSICAL EXAM   (up to 7 for level 4, 8 or more for level 5)      INITIAL VITALS:   /88   Pulse 92   Temp 97.8 °F (36.6 °C) (Oral)   Resp 18   SpO2 95%      Vitals:    03/18/22 1621 03/18/22 1622   BP: 130/88    Pulse: 92    Resp: 18    Temp:  97.8 °F (36.6 °C)   TempSrc:  Oral   SpO2: 95%         Physical Exam  Vitals reviewed. Constitutional:       General: He is not in acute distress. Appearance: He is well-developed. He is not ill-appearing. HENT:      Head: Normocephalic and atraumatic. Mouth/Throat:      Mouth: Mucous membranes are dry. Eyes:      Extraocular Movements: Extraocular movements intact. Pupils: Pupils are equal, round, and reactive to light. Cardiovascular:      Rate and Rhythm: Normal rate and regular rhythm. Pulmonary:      Effort: Pulmonary effort is normal.      Breath sounds: Normal breath sounds. Abdominal:      General: There is no distension. Palpations: Abdomen is soft. Tenderness: There is no abdominal tenderness. Musculoskeletal:         General: Normal range of motion. Cervical back: Normal range of motion and neck supple. Skin:     General: Skin is warm and dry. Neurological:      General: No focal deficit present. Mental Status: He is alert and oriented to person, place, and time. DIFFERENTIAL  DIAGNOSIS     PLAN (LABS / IMAGING / EKG):  Orders Placed This Encounter   Procedures    Basic Metabolic Panel    Magnesium    POCT glucose    POC Glucose Fingerstick    POCT glucose    POCT glucose    POC Glucose Fingerstick       MEDICATIONS ORDERED:  Orders Placed This Encounter   Medications    0.9 % sodium chloride bolus    insulin regular (HUMULIN R;NOVOLIN R) injection 5 Units    blood glucose monitor strips     Sig: Test 3 times a day & as needed for symptoms of irregular blood glucose.  Dispense sufficient amount for indicated testing frequency plus additional to accommodate PRN testing needs. Dispense:  100 strip     Refill:  0     Brand per patient preference. May round up to next available package size.     insulin aspart (NOVOLOG FLEXPEN) 100 UNIT/ML injection pen     Sig: Inject 5 Units into the skin 3 times daily (before meals)     Dispense:  5 pen     Refill:  0    insulin glargine (LANTUS SOLOSTAR) 100 UNIT/ML injection pen     Sig: Inject 15 Units into the skin nightly     Dispense:  5 pen     Refill:  3    Blood Glucose Monitoring Suppl (ONE TOUCH ULTRA 2) w/Device KIT     Si kit by Does not apply route daily     Dispense:  1 kit     Refill:  0    Lancets MISC     Si each by Does not apply route 2 times daily     Dispense:  300 each     Refill:  1       DIAGNOSTIC RESULTS / EMERGENCY DEPARTMENT COURSE / MDM   LAB RESULTS:  Results for orders placed or performed during the hospital encounter of    Basic Metabolic Panel   Result Value Ref Range    Glucose 431 (HH) 70 - 99 mg/dL    BUN 36 (H) 6 - 20 mg/dL    CREATININE 1.80 (H) 0.70 - 1.20 mg/dL    Calcium 9.4 8.6 - 10.4 mg/dL    Sodium 133 (L) 135 - 144 mmol/L    Potassium 4.3 3.7 - 5.3 mmol/L    Chloride 90 (L) 98 - 107 mmol/L    CO2 24 20 - 31 mmol/L    Anion Gap 19 (H) 9 - 17 mmol/L    GFR Non-African American 40 (L) >60 mL/min    GFR  49 (L) >60 mL/min    GFR Comment         Magnesium   Result Value Ref Range    Magnesium 2.1 1.6 - 2.6 mg/dL   POCT glucose   Result Value Ref Range    Glucose 422 mg/dL    QC OK? ok    POC Glucose Fingerstick   Result Value Ref Range    POC Glucose 422 (HH) 75 - 110 mg/dL   POCT glucose   Result Value Ref Range    Glucose 422 mg/dL    QC OK? ok    POCT glucose   Result Value Ref Range    Glucose 383 mg/dL    QC OK? ok    POC Glucose Fingerstick   Result Value Ref Range    POC Glucose 383 (H) 75 - 110 mg/dL         RADIOLOGY:  No orders to display        EMERGENCY DEPARTMENT COURSE & MDM:    Overall well-appearing 49year-old

## 2022-03-18 NOTE — ED NOTES
Patient stated he is out of diabetic medication and isn't scheduled to see new pcp until next Wednesday. Patient requested medication to get through to Wednesday. Dr. Pamela Dodge agreeable to write scripts. ANA informed patient that [facility] pharmacy closes at [facility] and will re-open tomorrow at 8am. Patient stated he will come in to  prescriptions tomorrow morning. Boxed lunches provided to patient's 2 children.      ANA Sotelo  03/18/22 1863

## 2022-03-18 NOTE — ED PROVIDER NOTES
[facility]  Emergency Department  Faculty Attestation     I performed a history and physical examination of the patient and discussed management with the resident. I reviewed the residents note and agree with the documented findings and plan of care. Any areas of disagreement are noted on the chart. I was personally present for the key portions of any procedures. I have documented in the chart those procedures where I was not present during the key portions. I have reviewed the emergency nurses triage note. I agree with the chief complaint, past medical history, past surgical history, allergies, medications, social and family history as documented unless otherwise noted below. For Physician Assistant/ Nurse Practitioner cases/documentation I have personally evaluated this patient and have completed at least one if not all key elements of the E/M (history, physical exam, and MDM). Additional findings are as noted. Primary Care Physician:  Vernelle Councilman, MD    Screenings:  [unfilled]    CHIEF COMPLAINT       Chief Complaint   Patient presents with    Blood Sugar Problem     pt stated he has been out of lantus x3 days and has only been using novolog concerned about bs       RECENT VITALS:   Temp: 97.8 °F (36.6 °C),  Pulse: 92, Resp: 18, BP: 130/88    LABS:  Labs Reviewed   POCT GLUCOSE - Normal       Radiology  No orders to display         Attending Physician Additional  Notes    Patient is out of his insulin and supplies. He has increased thirst, increased urination, had nausea and vomiting once this morning. No difficulty breathing. No fevers. No chest pain shortness of breath abdominal pain. He is type 1 diabetes but is currently homeless. On exam he appears fit, nontoxic afebrile vital signs normal.  No respiratory distress. Mouth is dry. Abdomen is benign. Impression is hyperglycemia, dehydration, rule out DKA, homelessness.   Plan is IV access, labs, IV fluids, refill of his prescriptions including insulin, glucose testing, refer to PCP. Anticipate need for social work. Willie Reynolds.  Blake Price MD, [facility]  Attending Emergency  Physician               Alexandro Munroe MD  03/18/22 1733

## 2022-03-18 NOTE — ED PROVIDER NOTES
This patient was signed out to me by Jasne Funez at the completion of his shift. He apparently ran out of his insulin several days ago and is not scheduled to follow-up with his primary care provider to receive an additional prescription for several days.      Jigar Williamson MD  03/19/22 7544

## 2022-03-18 NOTE — ED TRIAGE NOTES
Pt. Ambulated to room 27 with family at his side. Pt. Rosetta Divers in with complaint of having a high blood sugar. Pt. Was diagnosed with type 1 diabetes and is in between doctors right now. Pt. Is out of medication (Lantus) and is in need. He ran out three days ago and has only been taking novalog since. Pt respirations are even and unlabored, pt is oriented X 4, speaking in complete sentences, bed is in the lowest position, call light is within reach. Will continue to monitor.

## 2022-03-21 LAB
CULTURE: NORMAL
SPECIMEN DESCRIPTION: NORMAL

## 2022-04-06 ENCOUNTER — OFFICE VISIT (OUTPATIENT)
Dept: PRIMARY CARE CLINIC | Age: 52
End: 2022-04-06
Payer: MEDICARE

## 2022-04-06 VITALS
WEIGHT: 128.6 LBS | BODY MASS INDEX: 20.18 KG/M2 | DIASTOLIC BLOOD PRESSURE: 80 MMHG | HEART RATE: 77 BPM | HEIGHT: 67 IN | SYSTOLIC BLOOD PRESSURE: 126 MMHG | OXYGEN SATURATION: 98 %

## 2022-04-06 DIAGNOSIS — Z72.0 TOBACCO ABUSE: ICD-10-CM

## 2022-04-06 DIAGNOSIS — Z12.11 SCREENING FOR MALIGNANT NEOPLASM OF COLON: ICD-10-CM

## 2022-04-06 DIAGNOSIS — E10.65 UNCONTROLLED TYPE 1 DIABETES MELLITUS WITH HYPERGLYCEMIA (HCC): ICD-10-CM

## 2022-04-06 DIAGNOSIS — Z12.5 SCREENING FOR PROSTATE CANCER: ICD-10-CM

## 2022-04-06 DIAGNOSIS — Z87.891 PERSONAL HISTORY OF TOBACCO USE: ICD-10-CM

## 2022-04-06 DIAGNOSIS — G63 NEUROPATHY ASSOCIATED WITH ENDOCRINE DISORDER (HCC): ICD-10-CM

## 2022-04-06 DIAGNOSIS — B37.81 CANDIDA ESOPHAGITIS (HCC): ICD-10-CM

## 2022-04-06 DIAGNOSIS — Z11.59 ENCOUNTER FOR HEPATITIS C SCREENING TEST FOR LOW RISK PATIENT: ICD-10-CM

## 2022-04-06 DIAGNOSIS — K92.1 MELENA: ICD-10-CM

## 2022-04-06 DIAGNOSIS — K52.9 COLITIS: ICD-10-CM

## 2022-04-06 DIAGNOSIS — E10.65 UNCONTROLLED TYPE 1 DIABETES MELLITUS WITH HYPERGLYCEMIA (HCC): Primary | ICD-10-CM

## 2022-04-06 DIAGNOSIS — F32.4 MAJOR DEPRESSIVE DISORDER IN PARTIAL REMISSION, UNSPECIFIED WHETHER RECURRENT (HCC): ICD-10-CM

## 2022-04-06 DIAGNOSIS — E34.9 NEUROPATHY ASSOCIATED WITH ENDOCRINE DISORDER (HCC): ICD-10-CM

## 2022-04-06 DIAGNOSIS — E78.5 HYPERLIPIDEMIA, UNSPECIFIED HYPERLIPIDEMIA TYPE: Primary | ICD-10-CM

## 2022-04-06 PROBLEM — M17.9 OSTEOARTHRITIS OF KNEE: Status: ACTIVE | Noted: 2022-04-06

## 2022-04-06 PROBLEM — R07.89 OTHER CHEST PAIN: Status: ACTIVE | Noted: 2021-05-17

## 2022-04-06 PROBLEM — R06.02 SHORTNESS OF BREATH: Status: ACTIVE | Noted: 2021-05-17

## 2022-04-06 LAB
ABSOLUTE EOS #: 0.1 K/UL (ref 0–0.44)
ABSOLUTE IMMATURE GRANULOCYTE: <0.03 K/UL (ref 0–0.3)
ABSOLUTE LYMPH #: 1.91 K/UL (ref 1.1–3.7)
ABSOLUTE MONO #: 0.45 K/UL (ref 0.1–1.2)
ALBUMIN SERPL-MCNC: 4.3 G/DL (ref 3.5–5.2)
ALBUMIN/GLOBULIN RATIO: 1.5 (ref 1–2.5)
ALP BLD-CCNC: 101 U/L (ref 40–129)
ALT SERPL-CCNC: 16 U/L (ref 5–41)
ANION GAP SERPL CALCULATED.3IONS-SCNC: 15 MMOL/L (ref 9–17)
AST SERPL-CCNC: 13 U/L
BASOPHILS # BLD: 1 % (ref 0–2)
BASOPHILS ABSOLUTE: 0.05 K/UL (ref 0–0.2)
BILIRUB SERPL-MCNC: 0.57 MG/DL (ref 0.3–1.2)
BUN BLDV-MCNC: 27 MG/DL (ref 6–20)
BUN/CREAT BLD: ABNORMAL (ref 9–20)
CALCIUM SERPL-MCNC: 9.3 MG/DL (ref 8.6–10.4)
CHLORIDE BLD-SCNC: 96 MMOL/L (ref 98–107)
CHOLESTEROL, FASTING: 285 MG/DL
CHOLESTEROL/HDL RATIO: 4.5
CO2: 24 MMOL/L (ref 20–31)
CREAT SERPL-MCNC: 1.55 MG/DL (ref 0.7–1.2)
DIFFERENTIAL TYPE: ABNORMAL
EOSINOPHILS RELATIVE PERCENT: 2 % (ref 1–4)
GFR AFRICAN AMERICAN: 58 ML/MIN
GFR NON-AFRICAN AMERICAN: 48 ML/MIN
GFR SERPL CREATININE-BSD FRML MDRD: ABNORMAL ML/MIN/{1.73_M2}
GFR SERPL CREATININE-BSD FRML MDRD: ABNORMAL ML/MIN/{1.73_M2}
GLUCOSE BLD-MCNC: 479 MG/DL (ref 70–99)
HBA1C MFR BLD: 11 %
HCT VFR BLD CALC: 36.6 % (ref 40.7–50.3)
HDLC SERPL-MCNC: 63 MG/DL
HEMOGLOBIN: 11.9 G/DL (ref 13–17)
HEPATITIS C ANTIBODY: REACTIVE
IMMATURE GRANULOCYTES: 0 %
LDL CHOLESTEROL: 185 MG/DL (ref 0–130)
LYMPHOCYTES # BLD: 32 % (ref 24–43)
MCH RBC QN AUTO: 28.3 PG (ref 25.2–33.5)
MCHC RBC AUTO-ENTMCNC: 32.5 G/DL (ref 28.4–34.8)
MCV RBC AUTO: 86.9 FL (ref 82.6–102.9)
MONOCYTES # BLD: 8 % (ref 3–12)
NRBC AUTOMATED: 0 PER 100 WBC
PDW BLD-RTO: 13 % (ref 11.8–14.4)
PLATELET # BLD: 236 K/UL (ref 138–453)
PLATELET ESTIMATE: ABNORMAL
PMV BLD AUTO: 12 FL (ref 8.1–13.5)
POTASSIUM SERPL-SCNC: 4.6 MMOL/L (ref 3.7–5.3)
PROSTATE SPECIFIC ANTIGEN: 0.31 UG/L
RBC # BLD: 4.21 M/UL (ref 4.21–5.77)
RBC # BLD: ABNORMAL 10*6/UL
SEG NEUTROPHILS: 57 % (ref 36–65)
SEGMENTED NEUTROPHILS ABSOLUTE COUNT: 3.52 K/UL (ref 1.5–8.1)
SODIUM BLD-SCNC: 135 MMOL/L (ref 135–144)
TOTAL PROTEIN: 7.2 G/DL (ref 6.4–8.3)
TRIGLYCERIDE, FASTING: 186 MG/DL
TSH SERPL DL<=0.05 MIU/L-ACNC: 2.22 UIU/ML (ref 0.3–5)
VLDLC SERPL CALC-MCNC: ABNORMAL MG/DL (ref 1–30)
WBC # BLD: 6 K/UL (ref 3.5–11.3)
WBC # BLD: ABNORMAL 10*3/UL

## 2022-04-06 PROCEDURE — 2022F DILAT RTA XM EVC RTNOPTHY: CPT | Performed by: PHYSICIAN ASSISTANT

## 2022-04-06 PROCEDURE — 3017F COLORECTAL CA SCREEN DOC REV: CPT | Performed by: PHYSICIAN ASSISTANT

## 2022-04-06 PROCEDURE — 83036 HEMOGLOBIN GLYCOSYLATED A1C: CPT | Performed by: PHYSICIAN ASSISTANT

## 2022-04-06 PROCEDURE — 99214 OFFICE O/P EST MOD 30 MIN: CPT | Performed by: PHYSICIAN ASSISTANT

## 2022-04-06 PROCEDURE — 82044 UR ALBUMIN SEMIQUANTITATIVE: CPT | Performed by: PHYSICIAN ASSISTANT

## 2022-04-06 PROCEDURE — G0296 VISIT TO DETERM LDCT ELIG: HCPCS | Performed by: PHYSICIAN ASSISTANT

## 2022-04-06 PROCEDURE — 3046F HEMOGLOBIN A1C LEVEL >9.0%: CPT | Performed by: PHYSICIAN ASSISTANT

## 2022-04-06 PROCEDURE — G8420 CALC BMI NORM PARAMETERS: HCPCS | Performed by: PHYSICIAN ASSISTANT

## 2022-04-06 PROCEDURE — 4004F PT TOBACCO SCREEN RCVD TLK: CPT | Performed by: PHYSICIAN ASSISTANT

## 2022-04-06 PROCEDURE — G8427 DOCREV CUR MEDS BY ELIG CLIN: HCPCS | Performed by: PHYSICIAN ASSISTANT

## 2022-04-06 RX ORDER — INSULIN ASPART 100 [IU]/ML
5 INJECTION, SOLUTION INTRAVENOUS; SUBCUTANEOUS
Qty: 5 PEN | Refills: 3 | Status: ON HOLD | OUTPATIENT
Start: 2022-04-06

## 2022-04-06 RX ORDER — VARENICLINE TARTRATE 0.5 MG/1
0.5 TABLET, FILM COATED ORAL 2 TIMES DAILY
Qty: 60 TABLET | Refills: 3 | Status: ON HOLD | OUTPATIENT
Start: 2022-04-06

## 2022-04-06 RX ORDER — INSULIN GLARGINE 100 [IU]/ML
25 INJECTION, SOLUTION SUBCUTANEOUS 2 TIMES DAILY
Qty: 5 PEN | Refills: 3 | Status: ON HOLD | OUTPATIENT
Start: 2022-04-06

## 2022-04-06 ASSESSMENT — PATIENT HEALTH QUESTIONNAIRE - PHQ9
10. IF YOU CHECKED OFF ANY PROBLEMS, HOW DIFFICULT HAVE THESE PROBLEMS MADE IT FOR YOU TO DO YOUR WORK, TAKE CARE OF THINGS AT HOME, OR GET ALONG WITH OTHER PEOPLE: 0
SUM OF ALL RESPONSES TO PHQ QUESTIONS 1-9: 0
SUM OF ALL RESPONSES TO PHQ9 QUESTIONS 1 & 2: 0
8. MOVING OR SPEAKING SO SLOWLY THAT OTHER PEOPLE COULD HAVE NOTICED. OR THE OPPOSITE, BEING SO FIGETY OR RESTLESS THAT YOU HAVE BEEN MOVING AROUND A LOT MORE THAN USUAL: 0
2. FEELING DOWN, DEPRESSED OR HOPELESS: 0
1. LITTLE INTEREST OR PLEASURE IN DOING THINGS: 0
7. TROUBLE CONCENTRATING ON THINGS, SUCH AS READING THE NEWSPAPER OR WATCHING TELEVISION: 0
4. FEELING TIRED OR HAVING LITTLE ENERGY: 0
5. POOR APPETITE OR OVEREATING: 0
3. TROUBLE FALLING OR STAYING ASLEEP: 0
9. THOUGHTS THAT YOU WOULD BE BETTER OFF DEAD, OR OF HURTING YOURSELF: 0
SUM OF ALL RESPONSES TO PHQ QUESTIONS 1-9: 0
6. FEELING BAD ABOUT YOURSELF - OR THAT YOU ARE A FAILURE OR HAVE LET YOURSELF OR YOUR FAMILY DOWN: 0
SUM OF ALL RESPONSES TO PHQ QUESTIONS 1-9: 0
SUM OF ALL RESPONSES TO PHQ QUESTIONS 1-9: 0

## 2022-04-06 ASSESSMENT — ENCOUNTER SYMPTOMS
ABDOMINAL DISTENTION: 0
EYE DISCHARGE: 0
PHOTOPHOBIA: 0
DIARRHEA: 0
CHEST TIGHTNESS: 0
CONSTIPATION: 0
COUGH: 0
ABDOMINAL PAIN: 0
VOMITING: 0
SORE THROAT: 0
SINUS PRESSURE: 0
RHINORRHEA: 0
SHORTNESS OF BREATH: 0

## 2022-04-06 ASSESSMENT — COLUMBIA-SUICIDE SEVERITY RATING SCALE - C-SSRS
2. HAVE YOU ACTUALLY HAD ANY THOUGHTS OF KILLING YOURSELF?: NO
6. HAVE YOU EVER DONE ANYTHING, STARTED TO DO ANYTHING, OR PREPARED TO DO ANYTHING TO END YOUR LIFE?: NO
1. WITHIN THE PAST MONTH, HAVE YOU WISHED YOU WERE DEAD OR WISHED YOU COULD GO TO SLEEP AND NOT WAKE UP?: NO

## 2022-04-06 NOTE — PROGRESS NOTES
Low Dose CT (LDCT) Lung Screening criteria met:     Age 50-77(Medicare) or 50-80 ([facility])   Pack year smoking >20   Still smoking or less than 15 year since quit   No sign or symptoms of lung cancer   > 11 months since last LDCT     Risks and benefits of lung cancer screening with LDCT scans discussed:    Significance of positive screen - False-positive LDCT results often occur. 95% of all positive results do not lead to a diagnosis of cancer. Usually further imaging can resolve most false-positive results; however, some patients may require invasive procedures. Over diagnosis risk - 10% to 12% of screen-detected lung cancer cases are over diagnosed--that is, the cancer would not have been detected in the patient's lifetime without the screening. Need for follow up screens annually to continue lung cancer screening effectiveness     Risks associated with radiation from annual LDCT- Radiation exposure is about the same as for a mammogram, which is about 1/3 of the annual background radiation exposure from everyday life. Starting screening at age 54 is not likely to increase cancer risk from radiation exposure. Patients with comorbidities resulting in life expectancy of < 10 years, or that would preclude treatment of an abnormality identified on CT, should not be screened due to lack of benefit.     To obtain maximal benefit from this screening, smoking cessation and long-term abstinence from smoking is critical

## 2022-04-06 NOTE — PROGRESS NOTES
[facility] PRIMARY CARE  [address] [address]  Dept: [phone]    Wesley Pereira is a 49 y.o. male New patient, who presents today for his medical conditions/complaints as noted below. Chief Complaint   Patient presents with    New Patient    Diabetes       HPI:     HPI: The patient is on SSI and has not had a primary care doctor. He is homeless and needs help with transportation. He is not following with a specialist. Corinne Jds get in with GI provider. Has been in DKA for 4 times. Has been out of insulin. He is taking 25 lantus at night and 25 units in morning. Has enough test srtips and lancets. Novolog he is taking before each meal. Checking sugars before each meal.     Using trazodone and klonopin up to twice a day not using that very often. [facility]. Reviewed prior notes None  Reviewed previous Labs    No results found for: LDLCHOLESTEROL, LDLCALC    (goal LDL is <100)   AST (U/L)   Date Value   02/14/2022 17     ALT (U/L)   Date Value   02/14/2022 11     BUN (mg/dL)   Date Value   03/18/2022 36 (H)     Hemoglobin A1C (%)   Date Value   04/06/2022 11.0     TSH (mIU/L)   Date Value   12/03/2021 2.15     BP Readings from Last 3 Encounters:   04/06/22 126/80   03/18/22 130/88   02/15/22 (!) 146/88          (goal 120/80)    Past Medical History:   Diagnosis Date    [facility]     Panic attack     Seizures ([facility]       Past Surgical History:   Procedure Laterality Date    APPENDECTOMY      HAND SURGERY      UPPER GASTROINTESTINAL ENDOSCOPY N/A 2/15/2022    EGD BIOPSY performed by Loretta Foster MD at [facility]       No family history on file.     Social History     Tobacco Use    Smoking status: Current Every Day Smoker     Packs/day: 1.50     Years: 41.00     Pack years: 61.50     Types: Cigarettes    Smokeless tobacco: Never Used   Substance Use Topics    Alcohol use: Not Currently Current Outpatient Medications   Medication Sig Dispense Refill    insulin glargine (LANTUS SOLOSTAR) 100 UNIT/ML injection pen Inject 25 Units into the skin 2 times daily 5 pen 3    insulin aspart (NOVOLOG FLEXPEN) 100 UNIT/ML injection pen Inject 5 Units into the skin 3 times daily (before meals) 5 pen 3    varenicline (APO-VARENICLINE) 0.5 MG tablet Take 1 tablet by mouth 2 times daily Take on tablet daily for 3 days then one tablet twice daily for the next 3 days then 2 tablets twice a day from then on. 60 tablet 3    blood glucose monitor strips Test 3 times a day & as needed for symptoms of irregular blood glucose. Dispense sufficient amount for indicated testing frequency plus additional to accommodate PRN testing needs. 100 strip 0    Blood Glucose Monitoring Suppl (ONE TOUCH ULTRA 2) w/Device KIT 1 kit by Does not apply route daily 1 kit 0    Lancets MISC 1 each by Does not apply route 2 times daily 300 each 1    Alcohol Swabs 70 % PADS Use as needed with blood glucose checks 100 each 0    Insulin Pen Needle ([employer] PEN NEEDLES 29G) 29G X 12MM MISC 1 each by Does not apply route daily 100 each 3    vitamin D (ERGOCALCIFEROL) 1.25 MG (71281 UT) CAPS capsule Take 1 capsule by mouth once a week 12 capsule 0    traZODone (DESYREL) 150 MG tablet Take by mouth nightly      clonazePAM (KLONOPIN) 2 MG tablet Take 2 mg by mouth 2 times daily as needed for Anxiety.  albuterol sulfate  (90 Base) MCG/ACT inhaler Inhale 2 puffs into the lungs every 4 hours as needed for Wheezing       No current facility-administered medications for this visit.      Allergies   Allergen Reactions    Azithromycin Swelling    Codeine Itching    Ibuprofen Other (See Comments)    Morphine Other (See Comments)    Tramadol Itching       Health Maintenance   Topic Date Due    Hepatitis C screen  Never done    COVID-19 Vaccine (1) Never done    Pneumococcal 0-64 years Vaccine (1 of 2 - PPSV23) Never done   Vitaliy Lipid screen  Never done    Depression Screen  Never done    HIV screen  Never done    Diabetic microalbuminuria test  Never done    Diabetic retinal exam  Never done    DTaP/Tdap/Td vaccine (1 - Tdap) Never done    Colorectal Cancer Screen  Never done    Shingles Vaccine (1 of 2) Never done    A1C test (Diabetic or Prediabetic)  07/06/2022    Flu vaccine (Season Ended) 09/01/2022    Diabetic foot exam  04/06/2023    Hepatitis A vaccine  Aged Out    Hepatitis B vaccine  Aged Out    Hib vaccine  Aged Out    Meningococcal (ACWY) vaccine  Aged Out       Subjective:      Review of Systems   Constitutional: Negative for chills, fever and unexpected weight change. HENT: Negative for congestion, hearing loss, rhinorrhea, sinus pressure and sore throat. Eyes: Negative for photophobia, discharge and visual disturbance. Respiratory: Negative for cough, chest tightness and shortness of breath. Cardiovascular: Negative for chest pain, palpitations and leg swelling. Gastrointestinal: Negative for abdominal distention, abdominal pain, constipation, diarrhea and vomiting. Endocrine: Negative for polydipsia and polyuria. Genitourinary: Negative for decreased urine volume, difficulty urinating, frequency and urgency. Musculoskeletal: Negative for arthralgias, gait problem and myalgias. Skin: Negative for rash. Allergic/Immunologic: Negative for food allergies. Neurological: Negative for dizziness, weakness, numbness and headaches. Hematological: Negative for adenopathy. Psychiatric/Behavioral: Negative for dysphoric mood and sleep disturbance. The patient is not nervous/anxious. Objective:     /80   Pulse 77   Ht 5' 7\" (1.702 m)   Wt 128 lb 9.6 oz (58.3 kg)   SpO2 98%   BMI 20.14 kg/m²   Physical Exam  Constitutional:       General: He is not in acute distress. Appearance: Normal appearance. He is not ill-appearing. HENT:      Head: Normocephalic and atraumatic. Right Ear: Tympanic membrane, ear canal and external ear normal.      Left Ear: Tympanic membrane, ear canal and external ear normal.      Nose: Nose normal.      Mouth/Throat:      Mouth: Mucous membranes are moist.   Eyes:      Extraocular Movements: Extraocular movements intact. Conjunctiva/sclera: Conjunctivae normal.      Pupils: Pupils are equal, round, and reactive to light. Neck:      Vascular: No carotid bruit. Cardiovascular:      Rate and Rhythm: Normal rate and regular rhythm. Pulses: Normal pulses. Heart sounds: Normal heart sounds. Pulmonary:      Effort: Pulmonary effort is normal. No respiratory distress. Breath sounds: Normal breath sounds. Abdominal:      General: Bowel sounds are normal. There is no distension. Tenderness: There is no abdominal tenderness. Musculoskeletal:         General: Normal range of motion. Cervical back: Normal range of motion and neck supple. Lymphadenopathy:      Cervical: No cervical adenopathy. Skin:     General: Skin is warm and dry. Neurological:      General: No focal deficit present. Mental Status: He is alert and oriented to person, place, and time. Psychiatric:         Mood and Affect: Mood normal.         Behavior: Behavior normal.         Thought Content: Thought content normal.         Assessment and Plan:          1. Uncontrolled type 1 diabetes mellitus with hyperglycemia (HCC)  -     POCT glycosylated hemoglobin (Hb A1C)  -     POCT microalbumin  -     Lipid, Fasting; Future  -     Comprehensive Metabolic Panel; Future  -     CBC with Auto Differential; Future  -     TSH With Reflex Ft4; Future  -      DIABETES FOOT EXAM  -     [facility] Referral for Social Determinants of [address]  -     insulin glargine (LANTUS SOLOSTAR) 100 UNIT/ML injection pen;  Inject 25 Units into the skin 2 times daily, Disp-5 pen, R-3Normal  -     insulin aspart (NOVOLOG FLEXPEN) 100 UNIT/ML

## 2022-04-07 ENCOUNTER — TELEPHONE (OUTPATIENT)
Dept: FAMILY MEDICINE CLINIC | Age: 52
End: 2022-04-07

## 2022-04-07 RX ORDER — ATORVASTATIN CALCIUM 20 MG/1
20 TABLET, FILM COATED ORAL DAILY
Qty: 90 TABLET | Refills: 1 | Status: ON HOLD | OUTPATIENT
Start: 2022-04-07

## 2022-04-07 NOTE — TELEPHONE ENCOUNTER
Dennys Whitehead was identified through [facility] screening questions as being in need of community resources. Patient agreeable to receiving resources and support from a [facility]. Discussed the following with the patient:    Type of support needed:  Requesting assistance to find furniture and money to afford gas for child transportation to/from school. Relevant background information:   Moved into a house in July, had the heat turned on but had to have someone come out to look at the heater - supervisor couldn't fix the heater, but also found there was mold in the apartment. Was advised to move out because of the mold. States, I did get out. We moved out of there. Had saved a large sum of money to put down on a land contract which ended up being a scam and lost the money. Looked into obtaining a , but advised that the money needed to retain the  might not be received back. Is currently in the shelter now and has been working with a . There were issues with an intended apartment - rent was raised and cant afford. He is planning to sign a lease on Tuesday with a different apartment. ( home placement with [facility])  [facility]. He is on [address] Will be able to change the appt insurance over to new place once he has a permanent address. He is able to get his insulin and supplies - MD wrote script for 3 month supply and uses bus to . He was on the fresh start program before  - isnt interested at this time. He is aware and already uses the [employer] transportation program offered. He recently had issue with a  stopping to talk on the phone while transporting patient to MD visit, which made him late. The  did not stay for the return, so patient had to report to insurance and able to get a one time waive for a return home.    He is in need of assistance for furniture for new place and money for gas to be able to  his kids from school. POC: TBD. Maybe refer to Care Coordination for med management long term. Patient was notified that they will be contacted by a [employer] Stall[facility].     Mariajose Caldwell

## 2022-04-07 NOTE — RESULT ENCOUNTER NOTE
Call and advise patient that the results showed positive hep C for which more blood work is needed and patient need to make an appointment with GI to go over treatment options. Elevated lipids for which patient should start a statin. I will send this in to his pharmacy. Very elevated glucose make sure patient is checking sugars and taking insulin as prescribed. Patient can come in to discuss results if he would like.

## 2022-04-12 ENCOUNTER — TELEPHONE (OUTPATIENT)
Dept: FAMILY MEDICINE CLINIC | Age: 52
End: 2022-04-12

## 2022-04-12 NOTE — TELEPHONE ENCOUNTER
Cherelle Portillo was contacted by Basil Abdalla, a [place] [employer], regarding a Social Determinants of Health referral.     Left message.  Will try to connect 4/13/22    Basil Abdalla

## 2022-04-12 NOTE — TELEPHONE ENCOUNTER
Rosanna Pelletier was contacted by Lenny Contreras, [employer], regarding a Social Determinants of Health referral.     Not available at this time.  Will try back later    Lenny Contreras

## 2022-04-13 ENCOUNTER — TELEPHONE (OUTPATIENT)
Dept: FAMILY MEDICINE CLINIC | Age: 52
End: 2022-04-13

## 2022-04-13 NOTE — TELEPHONE ENCOUNTER
Jyotsna Ang was contacted by Micha Lagunas, a [place] [employer], regarding a Social Determinants of Health referral.     STEFANIE Lagunas

## 2022-04-15 ENCOUNTER — TELEPHONE (OUTPATIENT)
Dept: FAMILY MEDICINE CLINIC | Age: 52
End: 2022-04-15

## 2022-04-15 ENCOUNTER — TELEPHONE (OUTPATIENT)
Dept: GASTROENTEROLOGY | Age: 52
End: 2022-04-15

## 2022-04-15 NOTE — TELEPHONE ENCOUNTER
Jennifer Rapp was contacted by whitney Garza, regarding a Social Determinants of Health referral.     A voicemail message was left with the writer's contact information. Will mail out list of resources to address listed    Final contact attempt.

## 2022-04-15 NOTE — TELEPHONE ENCOUNTER
Patient was referred for the following: - Candida esophagitis ([facility] [address]- Melena- Colitis   - Screening for malignant neoplasm of colon / had EGD done by Dr Siena Vinson on 2/15/22 / Gabby Asencio adv

## 2022-04-19 RX ORDER — BLOOD-GLUCOSE METER
KIT MISCELLANEOUS
OUTPATIENT
Start: 2022-04-19

## 2022-04-19 RX ORDER — PEN NEEDLE, DIABETIC 32GX 5/32"
NEEDLE, DISPOSABLE MISCELLANEOUS
OUTPATIENT
Start: 2022-04-19

## 2022-04-29 LAB — NONINV COLON CA DNA+OCC BLD SCRN STL QL: NEGATIVE

## 2022-05-17 DIAGNOSIS — Z87.891 PERSONAL HISTORY OF TOBACCO USE: ICD-10-CM

## 2022-05-20 ENCOUNTER — TELEPHONE (OUTPATIENT)
Dept: PRIMARY CARE CLINIC | Age: 52
End: 2022-05-20

## 2022-05-20 NOTE — TELEPHONE ENCOUNTER
Pt seeing Osmin Quesada (pulm ) and having a pet scan on June 2nd. Pt is to hold his insulin am of and dose not think he will be able to keep his , or less. Asking, if an oral med can be sent in for him? Uses [facility] [place] g[facility]. Dr. Viral Ospina calling, requesting this.

## 2022-05-20 NOTE — TELEPHONE ENCOUNTER
Okay for patient to just take morning dose of insulin after test. Patient is type one diabetic, so he cannot take oral medication to help with this.

## 2022-06-13 ENCOUNTER — TELEPHONE (OUTPATIENT)
Dept: PRIMARY CARE CLINIC | Age: 52
End: 2022-06-13

## 2022-06-25 ENCOUNTER — HOSPITAL ENCOUNTER (EMERGENCY)
Age: 52
Discharge: HOME | End: 2022-06-25
Attending: EMERGENCY MEDICINE
Payer: MEDICARE

## 2022-06-25 ENCOUNTER — APPOINTMENT (OUTPATIENT)
Dept: GENERAL RADIOLOGY | Age: 52
End: 2022-06-25
Payer: MEDICARE

## 2022-06-25 VITALS
WEIGHT: 150 LBS | DIASTOLIC BLOOD PRESSURE: 109 MMHG | HEART RATE: 96 BPM | SYSTOLIC BLOOD PRESSURE: 129 MMHG | BODY MASS INDEX: 23.54 KG/M2 | RESPIRATION RATE: 18 BRPM | TEMPERATURE: 98.1 F | HEIGHT: 67 IN | OXYGEN SATURATION: 96 %

## 2022-06-25 DIAGNOSIS — R11.2 NAUSEA AND VOMITING, INTRACTABILITY OF VOMITING NOT SPECIFIED, UNSPECIFIED VOMITING TYPE: Primary | ICD-10-CM

## 2022-06-25 LAB
ABSOLUTE EOS #: <0.03 K/UL (ref 0–0.44)
ABSOLUTE IMMATURE GRANULOCYTE: <0.03 K/UL (ref 0–0.3)
ABSOLUTE LYMPH #: 2.42 K/UL (ref 1.1–3.7)
ABSOLUTE MONO #: 0.83 K/UL (ref 0.1–1.2)
ALBUMIN SERPL-MCNC: 3.6 G/DL (ref 3.5–5.2)
ALBUMIN/GLOBULIN RATIO: 1.2 (ref 1–2.5)
ALP BLD-CCNC: 78 U/L (ref 40–129)
ALT SERPL-CCNC: 7 U/L (ref 5–41)
ANION GAP SERPL CALCULATED.3IONS-SCNC: 17 MMOL/L (ref 9–17)
AST SERPL-CCNC: 11 U/L
BASOPHILS # BLD: 0 % (ref 0–2)
BASOPHILS ABSOLUTE: <0.03 K/UL (ref 0–0.2)
BETA-HYDROXYBUTYRATE: 3.15 MMOL/L (ref 0.02–0.27)
BILIRUB SERPL-MCNC: 0.63 MG/DL (ref 0.3–1.2)
BILIRUBIN DIRECT: 0.12 MG/DL
BILIRUBIN, INDIRECT: 0.51 MG/DL (ref 0–1)
BUN BLDV-MCNC: 35 MG/DL (ref 6–20)
CALCIUM SERPL-MCNC: 8.8 MG/DL (ref 8.6–10.4)
CARBOXYHEMOGLOBIN: 3.5 % (ref 0–5)
CHLORIDE BLD-SCNC: 94 MMOL/L (ref 98–107)
CO2: 23 MMOL/L (ref 20–31)
CREAT SERPL-MCNC: 1.72 MG/DL (ref 0.7–1.2)
EOSINOPHILS RELATIVE PERCENT: 0 % (ref 1–4)
FIO2: ABNORMAL
GFR AFRICAN AMERICAN: 51 ML/MIN
GFR NON-AFRICAN AMERICAN: 42 ML/MIN
GFR SERPL CREATININE-BSD FRML MDRD: ABNORMAL ML/MIN/{1.73_M2}
GLUCOSE BLD-MCNC: 267 MG/DL (ref 70–99)
GLUCOSE BLD-MCNC: 314 MG/DL (ref 75–110)
HCO3 VENOUS: 23.7 MMOL/L (ref 24–30)
HCT VFR BLD CALC: 45.4 % (ref 40.7–50.3)
HEMOGLOBIN: 15 G/DL (ref 13–17)
IMMATURE GRANULOCYTES: 0 %
LIPASE: 12 U/L (ref 13–60)
LYMPHOCYTES # BLD: 24 % (ref 24–43)
MAGNESIUM: 1.7 MG/DL (ref 1.6–2.6)
MCH RBC QN AUTO: 27.5 PG (ref 25.2–33.5)
MCHC RBC AUTO-ENTMCNC: 33 G/DL (ref 28.4–34.8)
MCV RBC AUTO: 83.3 FL (ref 82.6–102.9)
MONOCYTES # BLD: 8 % (ref 3–12)
NRBC AUTOMATED: 0 PER 100 WBC
O2 SAT, VEN: 97.7 % (ref 60–85)
PATIENT TEMP: 37
PCO2, VEN: 33 (ref 39–55)
PDW BLD-RTO: 13.9 % (ref 11.8–14.4)
PH VENOUS: 7.47 (ref 7.32–7.42)
PLATELET # BLD: 350 K/UL (ref 138–453)
PMV BLD AUTO: 12.5 FL (ref 8.1–13.5)
PO2, VEN: 94.1 (ref 30–50)
POSITIVE BASE EXCESS, VEN: 1.3 MMOL/L (ref 0–2)
POTASSIUM SERPL-SCNC: 3.9 MMOL/L (ref 3.7–5.3)
RBC # BLD: 5.45 M/UL (ref 4.21–5.77)
REASON FOR REJECTION: NORMAL
SEG NEUTROPHILS: 68 % (ref 36–65)
SEGMENTED NEUTROPHILS ABSOLUTE COUNT: 6.62 K/UL (ref 1.5–8.1)
SODIUM BLD-SCNC: 134 MMOL/L (ref 135–144)
TOTAL PROTEIN: 6.7 G/DL (ref 6.4–8.3)
WBC # BLD: 9.9 K/UL (ref 3.5–11.3)
ZZ NTE CLEAN UP: ORDERED TEST: NORMAL
ZZ NTE WITH NAME CLEAN UP: SPECIMEN SOURCE: NORMAL

## 2022-06-25 PROCEDURE — 99285 EMERGENCY DEPT VISIT HI MDM: CPT

## 2022-06-25 PROCEDURE — 83690 ASSAY OF LIPASE: CPT

## 2022-06-25 PROCEDURE — 96375 TX/PRO/DX INJ NEW DRUG ADDON: CPT

## 2022-06-25 PROCEDURE — 82010 KETONE BODYS QUAN: CPT

## 2022-06-25 PROCEDURE — 82805 BLOOD GASES W/O2 SATURATION: CPT

## 2022-06-25 PROCEDURE — 96374 THER/PROPH/DIAG INJ IV PUSH: CPT

## 2022-06-25 PROCEDURE — 83735 ASSAY OF MAGNESIUM: CPT

## 2022-06-25 PROCEDURE — 85025 COMPLETE CBC W/AUTO DIFF WBC: CPT

## 2022-06-25 PROCEDURE — 6360000002 HC RX W HCPCS: Performed by: HEALTH CARE PROVIDER

## 2022-06-25 PROCEDURE — 2580000003 HC RX 258: Performed by: HEALTH CARE PROVIDER

## 2022-06-25 PROCEDURE — 80048 BASIC METABOLIC PNL TOTAL CA: CPT

## 2022-06-25 PROCEDURE — 93005 ELECTROCARDIOGRAM TRACING: CPT

## 2022-06-25 PROCEDURE — 80076 HEPATIC FUNCTION PANEL: CPT

## 2022-06-25 PROCEDURE — 96361 HYDRATE IV INFUSION ADD-ON: CPT

## 2022-06-25 PROCEDURE — 71046 X-RAY EXAM CHEST 2 VIEWS: CPT

## 2022-06-25 PROCEDURE — 82947 ASSAY GLUCOSE BLOOD QUANT: CPT

## 2022-06-25 PROCEDURE — 36415 COLL VENOUS BLD VENIPUNCTURE: CPT

## 2022-06-25 RX ORDER — ONDANSETRON 2 MG/ML
4 INJECTION INTRAMUSCULAR; INTRAVENOUS ONCE
Status: COMPLETED | OUTPATIENT
Start: 2022-06-25 | End: 2022-06-25

## 2022-06-25 RX ORDER — 0.9 % SODIUM CHLORIDE 0.9 %
1000 INTRAVENOUS SOLUTION INTRAVENOUS ONCE
Status: COMPLETED | OUTPATIENT
Start: 2022-06-25 | End: 2022-06-25

## 2022-06-25 RX ORDER — ONDANSETRON 4 MG/1
4 TABLET, ORALLY DISINTEGRATING ORAL EVERY 8 HOURS PRN
Qty: 12 TABLET | Refills: 0 | Status: ON HOLD | OUTPATIENT
Start: 2022-06-25

## 2022-06-25 RX ORDER — LORAZEPAM 2 MG/ML
0.5 INJECTION INTRAMUSCULAR ONCE
Status: COMPLETED | OUTPATIENT
Start: 2022-06-25 | End: 2022-06-25

## 2022-06-25 RX ADMIN — ONDANSETRON 4 MG: 2 INJECTION INTRAMUSCULAR; INTRAVENOUS at 17:16

## 2022-06-25 RX ADMIN — SODIUM CHLORIDE 1000 ML: 9 INJECTION, SOLUTION INTRAVENOUS at 17:15

## 2022-06-25 RX ADMIN — LORAZEPAM 0.5 MG: 2 INJECTION INTRAMUSCULAR; INTRAVENOUS at 17:15

## 2022-06-25 ASSESSMENT — ENCOUNTER SYMPTOMS
ABDOMINAL PAIN: 0
VOMITING: 1
SORE THROAT: 0
SHORTNESS OF BREATH: 0
DIARRHEA: 0
CONSTIPATION: 0
NAUSEA: 1

## 2022-06-25 ASSESSMENT — PAIN - FUNCTIONAL ASSESSMENT: PAIN_FUNCTIONAL_ASSESSMENT: 0-10

## 2022-06-25 ASSESSMENT — PAIN DESCRIPTION - LOCATION: LOCATION: CHEST

## 2022-06-25 ASSESSMENT — PAIN SCALES - GENERAL: PAINLEVEL_OUTOF10: 10

## 2022-06-25 NOTE — ED NOTES
Pt very tearful, emotional. Pt states his looming cancer diagnosis is causing a lot of stress and anxiety as he has 2 young boys at home who solely depend on him. Pt is worried his children will end up in foster care. Writer spoke to pt at length and pt seems to more at ease.       Quan Mccain RN  06/25/22 6925

## 2022-06-25 NOTE — ED NOTES
Report given to MALLORY Hill.  All questions answered at this time     Ellen Hook, [facility]  06/25/22 2164

## 2022-06-25 NOTE — ED NOTES
Pt. Discharge instructions reviewed. Pt has no further questions at this time.       Daniele Bob RN  06/25/22 1945

## 2022-06-25 NOTE — ED PROVIDER NOTES
[facility] [facility]  Emergency Department  Emergency Medicine Resident Sign-out     Care of Stephanie Camarillo was assumed from Dr. Kassie Hernandez and is being seen for Emesis  . The patient's initial evaluation and plan have been discussed with the prior provider who initially evaluated the patient.      EMERGENCY DEPARTMENT COURSE / MEDICAL DECISION MAKING:       MEDICATIONS GIVEN:  Orders Placed This Encounter   Medications    ondansetron (ZOFRAN) injection 4 mg    LORazepam (ATIVAN) injection 0.5 mg    0.9 % sodium chloride bolus    ondansetron (ZOFRAN ODT) 4 MG disintegrating tablet     Sig: Take 1 tablet by mouth every 8 hours as needed for Nausea or Vomiting     Dispense:  12 tablet     Refill:  0       LABS / RADIOLOGY:     Labs Reviewed   CBC WITH AUTO DIFFERENTIAL - Abnormal; Notable for the following components:       Result Value    Seg Neutrophils 68 (*)     Eosinophils % 0 (*)     All other components within normal limits   BLOOD GAS, VENOUS - Abnormal; Notable for the following components:    pH, Alfredo 7.471 (*)     pCO2, Alfredo 33.0 (*)     pO2, Alfredo 94.1 (*)     HCO3, Venous 23.7 (*)     O2 Sat, Alfredo 97.7 (*)     All other components within normal limits   BETA-HYDROXYBUTYRATE - Abnormal; Notable for the following components:    Beta-Hydroxybutyrate 3.15 (*)     All other components within normal limits   BASIC METABOLIC PANEL - Abnormal; Notable for the following components:    Glucose 267 (*)     BUN 35 (*)     CREATININE 1.72 (*)     Sodium 134 (*)     Chloride 94 (*)     GFR Non- 42 (*)     GFR  51 (*)     All other components within normal limits   LIPASE - Abnormal; Notable for the following components:    Lipase 12 (*)     All other components within normal limits   POC GLUCOSE FINGERSTICK - Abnormal; Notable for the following components:    POC Glucose 314 (*)     All other components within normal limits   SPECIMEN REJECTION   HEPATIC FUNCTION PANEL   MAGNESIUM PREVIOUS SPECIMEN       No results found. RECENT VITALS:     Temp: 98.1 °F (36.7 °C),  Heart Rate: 96, Resp: 18, BP: (!) 129/109, SpO2: 96 %      This patient is a 49 y.o. Male with past medical history of diabetes, anxiety and being worked up for lung cancer who presents with  nausea and vomiting for three days. Recent sick contact with children. Has not been taking insulin due to decreased PO intake. Has hx of DKA does not feel like he is in DKA now. Has not checked BS in few days. Exam remarkable for mild diffuse abdominal pain and left shoulder pain. Vitals unremarkable. DKA work up, labs pending but not acidotic. Chest xray pending. Given IVF, Ativan and Zofran with improvement. Patient's labs are remarkable for slightly elevated kidney function from the patient's baseline, elevated beta hydroxybutyrate but no acidosis or gap. No evidence for DKA but does show dehydration. Patient got 1 L of IV fluids. Recommended that he get a second liter of IV fluids, however patient declines. He states he is feeling significantly better and would like to be discharged home to care for his children. Discussed risks and benefits of doing so and patient is comfortable with the risk, as he states he can tolerate oral fluids. He will be discharged home with prescriptions for Zofran, strict return precautions and primary care follow-up. ED Course as of 06/25/22 2058   Sat Jun 25, 2022   1748 WBC: 9.9 [JS]   1748 POC Glucose(!): 314 [JS]   1748 pH, Alfredo(!): 7.471 [JS]   9212 Patient with some improvement in anxiety at this time. [JS]   3751 Patient care transferred to Dr. Reji Dai. [JS]      ED Course User Index  [JS] Nanci Began, DO       OUTSTANDING TASKS / RECOMMENDATIONS:    1. Labs  2. Chest xray  3.  Clinical improvement     FINAL IMPRESSION:     1. Nausea and vomiting, intractability of vomiting not specified, unspecified vomiting type        DISPOSITION:         DISPOSITION:  [x]  Discharge   []  Transfer -    []  Admission -     []  Against Medical Advice   []  Eloped   FOLLOW-UP: Sonia Vergara, [address]. [phone]    Schedule an appointment as soon as possible for a visit       [facility] ED  [address] [place] [address]  [phone]    If symptoms worsen     DISCHARGE MEDICATIONS: Discharge Medication List as of 6/25/2022  7:31 PM      START taking these medications    Details   ondansetron (ZOFRAN ODT) 4 MG disintegrating tablet Take 1 tablet by mouth every 8 hours as needed for Nausea or Vomiting, Disp-12 tablet, R-0Print                Miguelina Mondragon DO  Emergency Medicine Resident  [facility]       Miguelina Mondragon [place]  Resident  06/25/22 9859

## 2022-06-25 NOTE — ED NOTES
Social work updated on patient's need for cab ride home.  Social work states the patient is eligible and they will speak to him at time of discharge     [facility]  06/25/22 5520

## 2022-06-25 NOTE — ED NOTES
SW consulted to assist with transportation of pt back to residence on file. [employer] utilized.  Trip # [id number]     ANA Craig  06/25/22 1945

## 2022-06-25 NOTE — ED PROVIDER NOTES
[facility] ED  Emergency Department Encounter  Emergency Medicine Resident     Pt Name: Saúl Smith  MRN: [id number]  [place] 1970  Date of evaluation: 6/25/22  PCP:  Jinny Quintanilla Dr       Chief Complaint   Patient presents with    Emesis       HISTORY Agnes  (Location/Symptom, Timing/Onset, Context/Setting, Quality, Duration, Modifying Any Dawson.)      Saúl Smith is a 49 y.o. male with history of diabetes, anxiety who presents with concerns for nausea and vomiting x3 days. Patient reports a 3-day history of persistent nausea and vomiting. Has had poor p.o. intake. States has history of diabetes, has not been taking his insulin or checking his sugars last couple of days as he was afraid to bottom out sugars. Patient reports he is currently being worked up for concerns for lung cancer. Does state that his 2 sons initially had gotten sick a couple days prior to him initially getting sick. Patient states he has had a history of anxiety, on trazodone and as needed clonidine, has not been able to tolerate any oral medications and has not been taking his anxiety medications of the last couple days. Reports some upper abdominal pain and some left-sided chest wall pain, as well as some lightheadedness with ambulation. Denies any other changes in vision, worsening cough or shortness of breath, diarrhea constipation, upper or lower extremity weakness, numbness, paresthesias. PAST MEDICAL / SURGICAL / SOCIAL / FAMILY HISTORY      has a past medical history of Diabetes mellitus ([facility] [address] Panic attack, and Seizures ([facility] [address] has a past surgical history that includes Appendectomy; Hand surgery; and Upper gastrointestinal endoscopy (N/A, 2/15/2022).     Social History     Socioeconomic History    Marital status: Unknown     Spouse name: Not on file    Number of children: Not on file    Years of education: Not on file    Highest education Sig Start Date End Date Taking? Authorizing Provider   atorvastatin (LIPITOR) 20 MG tablet Take 1 tablet by mouth daily 4/7/22   BINA Tam   insulin glargine (LANTUS SOLOSTAR) 100 UNIT/ML injection pen Inject 25 Units into the skin 2 times daily 4/6/22   BINA Tam   insulin aspart (NOVOLOG FLEXPEN) 100 UNIT/ML injection pen Inject 5 Units into the skin 3 times daily (before meals) 4/6/22   BINA Tam   varenicline (APO-VARENICLINE) 0.5 MG tablet Take 1 tablet by mouth 2 times daily Take on tablet daily for 3 days then one tablet twice daily for the next 3 days then 2 tablets twice a day from then on. 4/6/22   BINA Tam   blood glucose monitor strips Test 3 times a day & as needed for symptoms of irregular blood glucose. Dispense sufficient amount for indicated testing frequency plus additional to accommodate PRN testing needs. 3/18/22   Kobi Baker, DO   Blood Glucose Monitoring Suppl (ONE TOUCH ULTRA 2) w/Device KIT 1 kit by Does not apply route daily 3/18/22   Kobi Baker, DO   Lancets MISC 1 each by Does not apply route 2 times daily 3/18/22   Kobi Baker, DO   Alcohol Swabs 70 % PADS Use as needed with blood glucose checks 2/15/22   Asher Chaparro MD   Insulin Pen Needle ([employer] PEN NEEDLES 29G) 29G X 12MM MISC 1 each by Does not apply route daily 2/15/22   Asher Cheek MD   vitamin D (ERGOCALCIFEROL) 1.25 MG (26106 UT) CAPS capsule Take 1 capsule by mouth once a week 2/22/22   Asher Chaparro MD   traZODone (DESYREL) 150 MG tablet Take 300 mg by mouth nightly     Historical Provider, MD   clonazePAM (KLONOPIN) 2 MG tablet Take 4 mg by mouth daily as needed for Anxiety.      Historical Provider, MD   albuterol sulfate  (90 Base) MCG/ACT inhaler Inhale 2 puffs into the lungs every 4 hours as needed for Wheezing    Historical Provider, MD       REVIEW OF SYSTEMS    (2-9 systems for level 4, 10 or more for level 5)      Review of Systems   Constitutional: Negative for chills and fever. HENT: Negative for ear pain, hearing loss and sore throat. Eyes: Negative for visual disturbance. Respiratory: Negative for shortness of breath. Cardiovascular: Negative for chest pain. Gastrointestinal: Positive for nausea and vomiting. Negative for abdominal pain, constipation and diarrhea. Genitourinary: Negative for difficulty urinating and dysuria. Musculoskeletal: Negative for arthralgias and myalgias. Neurological: Negative for numbness. Psychiatric/Behavioral: Negative for agitation and confusion. The patient is nervous/anxious. PHYSICAL EXAM   (up to 7 for level 4, 8 or more for level 5)     INITIAL VITALS:    height is 5' 7\" (1.702 m) and weight is 150 lb (68 kg). His oral temperature is 98.1 °F (36.7 °C). His blood pressure is 129/109 (abnormal) and his pulse is 96. His respiration is 19 and oxygen saturation is 96%. Physical Exam  Vitals and nursing note reviewed. Constitutional:       General: He is not in acute distress. Appearance: He is well-developed. He is not diaphoretic. Comments: Chronically ill appearing  Borderline cachectic   HENT:      Head: Normocephalic and atraumatic. Right Ear: External ear normal.      Left Ear: External ear normal.      Nose: Nose normal.      Mouth/Throat:      Mouth: Mucous membranes are dry. Pharynx: No oropharyngeal exudate or posterior oropharyngeal erythema. Eyes:      Conjunctiva/sclera: Conjunctivae normal.      Pupils: Pupils are equal, round, and reactive to light. Neck:      Trachea: No tracheal deviation. Cardiovascular:      Rate and Rhythm: Normal rate and regular rhythm. Heart sounds: Normal heart sounds. No murmur heard. No friction rub. No gallop. Pulmonary:      Effort: Pulmonary effort is normal. No respiratory distress. Breath sounds: Normal breath sounds.  No wheezing, rhonchi or rales.   Abdominal:      General: Bowel sounds are normal.      Palpations: Abdomen is soft. Tenderness: There is abdominal tenderness. Comments: Mild epigastric tenderness to palpation   Musculoskeletal:         General: No tenderness, deformity or signs of injury. Normal range of motion. Cervical back: Neck supple. Comments: Left-sided chest wall tenderness   Skin:     General: Skin is warm and dry. Capillary Refill: Capillary refill takes less than 2 seconds. Neurological:      Mental Status: He is alert and oriented to person, place, and time. Sensory: No sensory deficit. Motor: No weakness or abnormal muscle tone. DIFFERENTIAL  DIAGNOSIS     PLAN (LABS / IMAGING / EKG):  Orders Placed This Encounter   Procedures    XR CHEST (2 VW)    CBC with Auto Differential    BLOOD GAS, VENOUS    SPECIMEN REJECTION    PREVIOUS SPECIMEN    Beta-Hydroxybutyrate    Basic Metabolic Panel    Lipase    Hepatic Function Panel    Magnesium    POC Glucose Fingerstick    EKG 12 Lead    Saline lock IV    Insert peripheral IV    Saline lock IV    Insert peripheral IV       MEDICATIONS ORDERED:  Orders Placed This Encounter   Medications    ondansetron (ZOFRAN) injection 4 mg    LORazepam (ATIVAN) injection 0.5 mg    0.9 % sodium chloride bolus       DDX: Viral gastritis/gastroenteritis, concern for DKA    Initial MDM/Plan: 49 y.o. male who presents with concern for persistent nausea vomiting over the last couple days. At time of initial examination patient was in no acute distress albeit he was anxious and tearful. Patient was normotensive, heart rate 96, saturating 96% on room air. Concerns for likely viral gastritis/gastroenteritis, concerns for potential DKA given uncontrolled sugars for couple days. We will plan on POC glucose. We will plan on some IV hydration, CBC, electrolytes, LFTs, lipase given epigastric tenderness.   Will obtain chest x-ray as well given some increased shortness of breath but he later noted. Patient with some left-sided tenderness to the chest, no concern for cardiac chest pain at this time. We will give some Zofran for the nausea as well as 0.5 mg of Ativan for anxiety at this time. We will continue to reassess. DIAGNOSTIC RESULTS / EMERGENCY DEPARTMENT COURSE / MDM     LABS:  Labs Reviewed   CBC WITH AUTO DIFFERENTIAL - Abnormal; Notable for the following components:       Result Value    Seg Neutrophils 68 (*)     Eosinophils % 0 (*)     All other components within normal limits   BLOOD GAS, VENOUS - Abnormal; Notable for the following components:    pH, Alfredo 7.471 (*)     pCO2, Alfredo 33.0 (*)     pO2, Alfredo 94.1 (*)     HCO3, Venous 23.7 (*)     O2 Sat, Alfredo 97.7 (*)     All other components within normal limits   POC GLUCOSE FINGERSTICK - Abnormal; Notable for the following components:    POC Glucose 314 (*)     All other components within normal limits   SPECIMEN REJECTION   PREVIOUS SPECIMEN   BETA-HYDROXYBUTYRATE   BASIC METABOLIC PANEL   LIPASE   HEPATIC FUNCTION PANEL   MAGNESIUM         RADIOLOGY:  No results found. EKG  EKG Interpretation    Interpreted by me    Rhythm: normal sinus   Rate: normal  Axis: normal  Ectopy: none  Conduction: normal  ST Segments: no acute change  T Waves: no acute change  Q Waves: none    Clinical Impression: no acute changes    All EKG's are interpreted by the Emergency Department Physician who either signs or Co-signs this chart in the absence of a cardiologist.    EMERGENCY DEPARTMENT COURSE:  ED Course as of 06/25/22 1822   Sat Jun 25, 2022   1748 WBC: 9.9 [JS]   1748 POC Glucose(!): 314 [JS]   1748 pH, Alfredo(!): 7.471 [JS]   3172 Patient with some improvement in anxiety at this time. [JS]   1206 Patient care transferred to Dr. Josue Dacosta.  [JS]      ED Course User Index  [JS] Lynsey Cano DO     PROCEDURES:  None    CONSULTS:  None    CRITICAL CARE:  Please see attending note    FINAL IMPRESSION      1. Nausea and vomiting, intractability of vomiting not specified, unspecified vomiting type        DISPOSITION / PLAN     DISPOSITION  Pending. PATIENTREFERRED TO:  No follow-up provider specified.     DISCHARGE MEDICATIONS:  New Prescriptions    No medications on file       Neda Arizmendi DO  EmergencyMedicine Resident    (Please note that portions of this note were completed with a voice recognition program.  Efforts were made to edit the dictations but occasionally words are mis-transcribed.)     Nanci Jon DO  Resident  06/25/22 6049

## 2022-06-25 NOTE — ED NOTES
Patient presents to ED for evaluation of nausea and vomiting. Patient reports nausea, vomiting, and PO intolerance for the past 2-3 days. Patient unable to keep any of his medications down including the klonopin and trazodone so his anxiety has worsened significantly. Patient also reports having some 10/10 left sided chest pain leading to now having a headache and BLE pain. Patient denies fever, chills, cough, shortness of breath, numbness, tingling, weakness.      Judy Vela RN  06/25/22 8283

## 2022-06-29 LAB
EKG ATRIAL RATE: 100 BPM
EKG P AXIS: 83 DEGREES
EKG P-R INTERVAL: 130 MS
EKG Q-T INTERVAL: 362 MS
EKG QRS DURATION: 86 MS
EKG QTC CALCULATION (BAZETT): 466 MS
EKG R AXIS: -87 DEGREES
EKG T AXIS: 72 DEGREES
EKG VENTRICULAR RATE: 100 BPM

## 2022-08-09 ENCOUNTER — TELEPHONE (OUTPATIENT)
Dept: PRIMARY CARE CLINIC | Age: 52
End: 2022-08-09

## 2022-08-23 RX ORDER — AMLODIPINE BESYLATE 10 MG/1
TABLET ORAL
Qty: 30 TABLET | Refills: 3 | Status: ON HOLD | OUTPATIENT
Start: 2022-08-23

## 2022-08-23 RX ORDER — ATORVASTATIN CALCIUM 40 MG/1
TABLET, FILM COATED ORAL
Qty: 30 TABLET | Refills: 3 | Status: ON HOLD | OUTPATIENT
Start: 2022-08-23

## 2022-08-29 ENCOUNTER — TELEPHONE (OUTPATIENT)
Dept: PRIMARY CARE CLINIC | Age: 52
End: 2022-08-29

## 2022-08-29 NOTE — TELEPHONE ENCOUNTER
----- Message from Tutor Universe Senters sent at 8/26/2022  4:26 PM EDT -----  Subject: Results Request    QUESTIONS  Results: PET Scan; Ordered by: Christina Morrissey   Date Performed: 2022-08-25  ---------------------------------------------------------------------------  --------------  Leo Factor INFO    [phone]; OK to leave message on voicemail  ---------------------------------------------------------------------------  --------------

## 2022-09-05 ENCOUNTER — APPOINTMENT (OUTPATIENT)
Dept: GENERAL RADIOLOGY | Age: 52
End: 2022-09-05
Payer: MEDICARE

## 2022-09-05 ENCOUNTER — HOSPITAL ENCOUNTER (EMERGENCY)
Age: 52
Discharge: LEFT BEFORE DISCHARGE | End: 2022-09-05
Attending: EMERGENCY MEDICINE
Payer: MEDICARE

## 2022-09-05 VITALS
TEMPERATURE: 97.3 F | OXYGEN SATURATION: 96 % | WEIGHT: 120 LBS | BODY MASS INDEX: 18.79 KG/M2 | RESPIRATION RATE: 18 BRPM | SYSTOLIC BLOOD PRESSURE: 138 MMHG | HEART RATE: 88 BPM | DIASTOLIC BLOOD PRESSURE: 85 MMHG

## 2022-09-05 DIAGNOSIS — R21 RASH: Primary | ICD-10-CM

## 2022-09-05 LAB
ABSOLUTE EOS #: 0.25 K/UL (ref 0–0.44)
ABSOLUTE IMMATURE GRANULOCYTE: 0.03 K/UL (ref 0–0.3)
ABSOLUTE LYMPH #: 1.8 K/UL (ref 1.1–3.7)
ABSOLUTE MONO #: 0.49 K/UL (ref 0.1–1.2)
ALBUMIN SERPL-MCNC: 4 G/DL (ref 3.5–5.2)
ALBUMIN/GLOBULIN RATIO: 1.3 (ref 1–2.5)
ALP BLD-CCNC: 91 U/L (ref 40–129)
ALT SERPL-CCNC: 9 U/L (ref 5–41)
ANION GAP SERPL CALCULATED.3IONS-SCNC: 10 MMOL/L (ref 9–17)
AST SERPL-CCNC: 16 U/L
BASOPHILS # BLD: 1 % (ref 0–2)
BASOPHILS ABSOLUTE: 0.04 K/UL (ref 0–0.2)
BILIRUB SERPL-MCNC: 0.5 MG/DL (ref 0.3–1.2)
BUN BLDV-MCNC: 16 MG/DL (ref 6–20)
CALCIUM SERPL-MCNC: 9.4 MG/DL (ref 8.6–10.4)
CHLORIDE BLD-SCNC: 104 MMOL/L (ref 98–107)
CO2: 26 MMOL/L (ref 20–31)
CREAT SERPL-MCNC: 1.4 MG/DL (ref 0.7–1.2)
D-DIMER QUANTITATIVE: 0.47 MG/L FEU
EOSINOPHILS RELATIVE PERCENT: 3 % (ref 1–4)
GFR AFRICAN AMERICAN: >60 ML/MIN
GFR NON-AFRICAN AMERICAN: 54 ML/MIN
GFR SERPL CREATININE-BSD FRML MDRD: ABNORMAL ML/MIN/{1.73_M2}
GLUCOSE BLD-MCNC: 53 MG/DL (ref 75–110)
GLUCOSE BLD-MCNC: 62 MG/DL (ref 70–99)
HCT VFR BLD CALC: 35 % (ref 40.7–50.3)
HEMOGLOBIN: 11.6 G/DL (ref 13–17)
IMMATURE GRANULOCYTES: 0 %
LYMPHOCYTES # BLD: 23 % (ref 24–43)
MAGNESIUM: 1.7 MG/DL (ref 1.6–2.6)
MCH RBC QN AUTO: 27.9 PG (ref 25.2–33.5)
MCHC RBC AUTO-ENTMCNC: 33.1 G/DL (ref 28.4–34.8)
MCV RBC AUTO: 84.1 FL (ref 82.6–102.9)
MONOCYTES # BLD: 6 % (ref 3–12)
NRBC AUTOMATED: 0 PER 100 WBC
PDW BLD-RTO: 14.1 % (ref 11.8–14.4)
PLATELET # BLD: 226 K/UL (ref 138–453)
PMV BLD AUTO: 11.8 FL (ref 8.1–13.5)
POTASSIUM SERPL-SCNC: 3.6 MMOL/L (ref 3.7–5.3)
RBC # BLD: 4.16 M/UL (ref 4.21–5.77)
SEG NEUTROPHILS: 67 % (ref 36–65)
SEGMENTED NEUTROPHILS ABSOLUTE COUNT: 5.11 K/UL (ref 1.5–8.1)
SODIUM BLD-SCNC: 140 MMOL/L (ref 135–144)
TOTAL PROTEIN: 7 G/DL (ref 6.4–8.3)
TROPONIN, HIGH SENSITIVITY: 11 NG/L (ref 0–22)
WBC # BLD: 7.7 K/UL (ref 3.5–11.3)

## 2022-09-05 PROCEDURE — 6370000000 HC RX 637 (ALT 250 FOR IP): Performed by: STUDENT IN AN ORGANIZED HEALTH CARE EDUCATION/TRAINING PROGRAM

## 2022-09-05 PROCEDURE — 85379 FIBRIN DEGRADATION QUANT: CPT

## 2022-09-05 PROCEDURE — 80053 COMPREHEN METABOLIC PANEL: CPT

## 2022-09-05 PROCEDURE — 71045 X-RAY EXAM CHEST 1 VIEW: CPT

## 2022-09-05 PROCEDURE — 85025 COMPLETE CBC W/AUTO DIFF WBC: CPT

## 2022-09-05 PROCEDURE — 84484 ASSAY OF TROPONIN QUANT: CPT

## 2022-09-05 PROCEDURE — 82947 ASSAY GLUCOSE BLOOD QUANT: CPT

## 2022-09-05 PROCEDURE — 83735 ASSAY OF MAGNESIUM: CPT

## 2022-09-05 PROCEDURE — 99285 EMERGENCY DEPT VISIT HI MDM: CPT

## 2022-09-05 RX ORDER — FLUCONAZOLE 50 MG/1
150 TABLET ORAL ONCE
Status: COMPLETED | OUTPATIENT
Start: 2022-09-05 | End: 2022-09-05

## 2022-09-05 RX ADMIN — FLUCONAZOLE 150 MG: 50 TABLET ORAL at 19:25

## 2022-09-05 ASSESSMENT — ENCOUNTER SYMPTOMS
SHORTNESS OF BREATH: 1
DIARRHEA: 0
NAUSEA: 1
ABDOMINAL PAIN: 0
CHEST TIGHTNESS: 1
VOMITING: 0

## 2022-09-05 ASSESSMENT — PAIN DESCRIPTION - LOCATION: LOCATION: GENERALIZED

## 2022-09-05 ASSESSMENT — PAIN - FUNCTIONAL ASSESSMENT: PAIN_FUNCTIONAL_ASSESSMENT: 0-10

## 2022-09-05 ASSESSMENT — LIFESTYLE VARIABLES
HOW MANY STANDARD DRINKS CONTAINING ALCOHOL DO YOU HAVE ON A TYPICAL DAY: PATIENT DOES NOT DRINK
HOW OFTEN DO YOU HAVE A DRINK CONTAINING ALCOHOL: NEVER

## 2022-09-05 ASSESSMENT — PAIN SCALES - GENERAL: PAINLEVEL_OUTOF10: 9

## 2022-09-05 NOTE — ED TRIAGE NOTES
Pt here for a \"\"diabetic rash\". Pt states the rash is noted bilateral groin and red , warm and itchy. Pt's blood glucose was 53 in triage. Pt has a number of complaints, including :brittle bones\" . . PT was given apple juice for low BG.

## 2022-09-05 NOTE — ED PROVIDER NOTES
[facility] [address] [facility]     Emergency Department     Faculty Attestation        I performed a history and physical examination of the patient and discussed management with the resident. I reviewed the residents note and agree with the documented findings and plan of care. Any areas of disagreement are noted on the chart. I was personally present for the key portions of any procedures. I have documented in the chart those procedures where I was not present during the key portions. I have reviewed the emergency nurses triage note. I agree with the chief complaint, past medical history, past surgical history, allergies, medications, social and family history as documented unless otherwise noted below. For mid-level providers such as nurse practitioners as well as physicians assistants:    I have personally seen and evaluated the patient. I find the patient's history and physical exam are consistent with NP/PA documentation. I agree with the care provided, treatment rendered, disposition, & follow-up plan. Additional findings are as noted. Vital Signs: /85   Pulse 88   Temp 97.3 °F (36.3 °C) (Oral)   Resp 18   Wt 120 lb (54.4 kg)   SpO2 96%   BMI 18.79 kg/m²   PCP:  BINA Alvarado    Pertinent Comments: With numerous and varied complaints mainly complaining of rash in the inguinal and groin region. He is an insulin-dependent diabetic and played basketball when he walked home in sweats shorts and underwear and thinks he got from infection from it. Denies fevers or chills. He is currently being worked up for what sounds like lung cancer and is not on  Chemo and radiation is planning some chest pain shortness of breath especially going up stairs. He is afebrile nontoxic here. Rash in inguinal areas consistent with fungal etiology. He has no crepitance or signs suggest significant bacterial infection or Warren's gangrene.   Will obtain cardiac work-up EKG chest x-ray D-dimer, reassessment. 7:51 PM EDT patient abruptly left without notifying resident or myself.       Critical Care  None          Elliot Valles MD    Attending Emergency Medicine Physician            Leonel Velez MD  09/05/22 [facility] Pedro Rankin MD  09/05/22 [facility] Pedro Rankin MD  09/05/22 8145

## 2022-09-05 NOTE — ED NOTES
The following labs were labeled with appropriate pt sticker and tubed to lab:     [x] Blue     [x] Lavender   [] on ice  [x] Green/yellow  [] Green/black [] on ice  [] Landy Bucy  [] on ice  [] Yellow  [] Red  [] Pink  [] ABG  [] VBG    [] COVID-19 swab    [] Rapid  [] PCR  [] Flu swab  [] Peds Viral Panel     [] Urine Sample  [] Pelvic Cultures  [] Blood Cultures  [] X 2  [] STREP Cultures         Vincent Ruvalcaba RN  09/05/22 1457

## 2022-09-05 NOTE — ED PROVIDER NOTES
[facility] ED  Emergency Department Encounter  Emergency Medicine Resident     Pt Name:Elroy Ceron  MRN: [id number]  [place] 1970  Date of evaluation: 9/5/22  PCP:  BINA Salinas      CHIEF COMPLAINT       Chief Complaint   Patient presents with    Rash     RASH that is noted between his legs/raw and red- pt is diabetic and not controlled       HISTORY OF PRESENT ILLNESS  (Location/Symptom, Timing/Onset, Context/Setting, Quality, Duration, Modifying Factors, Severity.)      John Burnett is a 49 y.o. male with PMH of type 1 diabetes and obstructive lung disease who presents to the emergency department with bilateral inguinal rash and needing diabetes medication refill. Patient states that he went to play basketball with his children and was unable to shower several hours after the game. Unfortunately, over the next several days he developed a progressively worsening erythematous rash at his inguinal region. He mentions having extremely elevated blood glucose levels at home and being unable to obtain his home Lantus as the pharmacy he uses is out of this medication. He tells me that there was a previous lung nodule that was found on CT imaging. On further chart review 8/26/2022 PET/CT skull to thigh showed \"interval resolution of the previously noted pulmonary nodule opacities suggesting prior infectious or inflammatory etiology. \" He tells me that he is experiencing night sweats and weight loss over the past 6 months. Additionally, he mentions that he easily becomes short of breath and develops chest pains any time he walks his apartment stair and especially notices this chest pain when carrying the groceries. PAST MEDICAL / SURGICAL / SOCIAL / FAMILY HISTORY      has a past medical history of Diabetes mellitus ([address] Obstructive lung disease ([address] Panic attack, and Seizures ([facility] [address] has a past surgical history that includes Appendectomy;  Hand surgery; and Upper gastrointestinal endoscopy (N/A, 2/15/2022). Social History     Socioeconomic History    Marital status: Unknown     Spouse name: Not on file    Number of children: Not on file    Years of education: Not on file    Highest education level: Not on file   Occupational History    Not on file   Tobacco Use    Smoking status: Every Day     Packs/day: 1.50     Years: 41.00     Pack years: 61.50     Types: Cigarettes    Smokeless tobacco: Never   Substance and Sexual Activity    Alcohol use: Not Currently    Drug use: Yes     Types: Marijuana Danny Blow)     Comment: overdose 11/10/20    Sexual activity: Not on file   Other Topics Concern    Not on file   Social History Narrative    Not on file     Social Determinants of Health     Financial Resource Strain: Not on file   Food Insecurity: Not on file   Transportation Needs: Not on file   Physical Activity: Not on file   Stress: Not on file   Social Connections: Not on file   Intimate Partner Violence: Not on file   Housing Stability: Not on file       History reviewed. No pertinent family history. Allergies:  Azithromycin, Codeine, Ibuprofen, Morphine, and Tramadol    Home Medications:  Prior to Admission medications    Medication Sig Start Date End Date Taking?  Authorizing Provider   amLODIPine (NORVASC) 10 MG tablet take 1 tablet by mouth once daily 8/23/22   BINA Benavides   atorvastatin (LIPITOR) 40 MG tablet take 1 tablet by mouth every evening 8/23/22   BINA Benavides   ondansetron (ZOFRAN ODT) 4 MG disintegrating tablet Take 1 tablet by mouth every 8 hours as needed for Nausea or Vomiting 6/25/22   Roman Nj DO   atorvastatin (LIPITOR) 20 MG tablet Take 1 tablet by mouth daily 4/7/22   BINA Benavides   insulin glargine (LANTUS SOLOSTAR) 100 UNIT/ML injection pen Inject 25 Units into the skin 2 times daily 4/6/22   BINA Benavides   insulin aspart (NOVOLOG FLEXPEN) 100 UNIT/ML injection pen Inject 5 Units into the skin 3 times daily (before meals) 4/6/22   BINA Leary   varenicline (APO-VARENICLINE) 0.5 MG tablet Take 1 tablet by mouth 2 times daily Take on tablet daily for 3 days then one tablet twice daily for the next 3 days then 2 tablets twice a day from then on. 4/6/22   BINA Leary   blood glucose monitor strips Test 3 times a day & as needed for symptoms of irregular blood glucose. Dispense sufficient amount for indicated testing frequency plus additional to accommodate PRN testing needs. 3/18/22   Johny Mooney, DO   Blood Glucose Monitoring Suppl (ONE TOUCH ULTRA 2) w/Device KIT 1 kit by Does not apply route daily 3/18/22   Johny Mooney, DO   Lancets MISC 1 each by Does not apply route 2 times daily 3/18/22   Johny Mooney, DO   Alcohol Swabs 70 % PADS Use as needed with blood glucose checks 2/15/22   Asher Chaparro MD   Insulin Pen Needle ([employer] PEN NEEDLES 29G) 29G X 12MM MISC 1 each by Does not apply route daily 2/15/22   Asher Hanna MD   vitamin D (ERGOCALCIFEROL) 1.25 MG (61557 UT) CAPS capsule Take 1 capsule by mouth once a week 2/22/22   Asher Chaparro MD   traZODone (DESYREL) 150 MG tablet Take 300 mg by mouth nightly     Historical Provider, MD   clonazePAM (KLONOPIN) 2 MG tablet Take 4 mg by mouth daily as needed for Anxiety. Historical Provider, MD   albuterol sulfate  (90 Base) MCG/ACT inhaler Inhale 2 puffs into the lungs every 4 hours as needed for Wheezing    Historical Provider, MD       REVIEW OF SYSTEMS    (2-9 systems for level 4, 10 or more for level 5)      Review of Systems   Constitutional:  Positive for appetite change, chills, diaphoresis and fatigue. Negative for fever. HENT:  Negative for congestion. Respiratory:  Positive for chest tightness and shortness of breath. Cardiovascular:  Positive for chest pain. Gastrointestinal:  Positive for nausea.  Negative for abdominal pain, diarrhea and Oximetry    POC Glucose Fingerstick    EKG 12 Lead    Saline lock IV       MEDICATIONS ORDERED:  Orders Placed This Encounter   Medications    fluconazole (DIFLUCAN) tablet 150 mg       DDX: Candida skin infection, type 1 diabetes with uncontrolled blood glucose levels, ACS/NSTEMI, PE    DIAGNOSTIC RESULTS / EMERGENCY DEPARTMENT COURSE / MDM   LAB RESULTS:  Results for orders placed or performed during the hospital encounter of 09/05/22   Comprehensive Metabolic Panel   Result Value Ref Range    Glucose 62 (L) 70 - 99 mg/dL    BUN 16 6 - 20 mg/dL    Creatinine 1.40 (H) 0.70 - 1.20 mg/dL    Calcium 9.4 8.6 - 10.4 mg/dL    Sodium 140 135 - 144 mmol/L    Potassium 3.6 (L) 3.7 - 5.3 mmol/L    Chloride 104 98 - 107 mmol/L    CO2 26 20 - 31 mmol/L    Anion Gap 10 9 - 17 mmol/L    Alkaline Phosphatase 91 40 - 129 U/L    ALT 9 5 - 41 U/L    AST 16 <40 U/L    Total Bilirubin 0.5 0.3 - 1.2 mg/dL    Total Protein 7.0 6.4 - 8.3 g/dL    Albumin 4.0 3.5 - 5.2 g/dL    Albumin/Globulin Ratio 1.3 1.0 - 2.5    GFR Non- 54 (L) >60 mL/min    GFR African American >60 >60 mL/min    GFR Comment         Troponin   Result Value Ref Range    Troponin, High Sensitivity 11 0 - 22 ng/L   Magnesium   Result Value Ref Range    Magnesium 1.7 1.6 - 2.6 mg/dL   CBC with Auto Differential   Result Value Ref Range    WBC 7.7 3.5 - 11.3 k/uL    RBC 4.16 (L) 4.21 - 5.77 m/uL    Hemoglobin 11.6 (L) 13.0 - 17.0 g/dL    Hematocrit 35.0 (L) 40.7 - 50.3 %    MCV 84.1 82.6 - 102.9 fL    MCH 27.9 25.2 - 33.5 pg    MCHC 33.1 28.4 - 34.8 g/dL    RDW 14.1 11.8 - 14.4 %    Platelets 348 896 - 375 k/uL    MPV 11.8 8.1 - 13.5 fL    NRBC Automated 0.0 0.0 per 100 WBC    Seg Neutrophils 67 (H) 36 - 65 %    Lymphocytes 23 (L) 24 - 43 %    Monocytes 6 3 - 12 %    Eosinophils % 3 1 - 4 %    Basophils 1 0 - 2 %    Immature Granulocytes 0 0 %    Segs Absolute 5.11 1.50 - 8.10 k/uL    Absolute Lymph # 1.80 1.10 - 3.70 k/uL    Absolute Mono # 0.49 0.10 - 1.20 k/uL    Absolute Eos # 0.25 0.00 - 0.44 k/uL    Basophils Absolute 0.04 0.00 - 0.20 k/uL    Absolute Immature Granulocyte 0.03 0.00 - 0.30 k/uL   D-Dimer, Quantitative   Result Value Ref Range    D-Dimer, Quant 0.47 mg/L FEU   POC Glucose Fingerstick   Result Value Ref Range    POC Glucose 53 (L) 75 - 110 mg/dL       IMPRESSION: CBC with differential showing normocytic anemia. Hypoglycemic at 53. RADIOLOGY:  XR CHEST PORTABLE   Final Result   No acute process. EKG  N/a    All EKG's are interpreted by the Emergency Department Physician who either signs or Co-signs this chart in the absence of a cardiologist.    EMERGENCY DEPARTMENT COURSE:    ED Course as of 09/05/22 2005   Mon Sep 05, 2022   [facility] with differential showing normocytic anemia [GC]   Kwame Underwood 52-year-old male with PMH including type 1 diabetes and obstructive lung disease who presents to the emergency department with bilateral inguinal rash and needing diabetic medications refills. After further questioning patient states that he has been developing chest pain with shortness of breath on exertion. Furthermore patient hence at having \"metastatic\" cancer. After chart review, he was found to have a pulmonary nodule on CT imaging which subsequently decreased/disappeared on follow-up PET scan imaging on 8/2022. We will check CMP, CBC with differential, magnesium, troponin, D-dimer level along with CXR and EKG. [GC]   [birth year]24 Type 1 diabetes with uncontrolled blood glucose levels hypoglycemic at 53 [GC]   1958 CMP with mildly improved blood glucose level, elevated creatinine at 1.4, and mild hypokalemia 3.6. Magnesium within normal limits, troponin in normal limits at 11, and D-dimer within normal limits [GC]   1958 Patient eloped from the emergency department before I had the chance to discuss his lab results. Per nursing staff, he had a family matter to handle.   I was unable to provide him Lantus or any other medications at discharge [GC] ED Course User Index  [GC] Richie Edmonds DO       No notes of EC Admission Criteria type on file. PROCEDURES:  N/a    CONSULTS:  None    CRITICAL CARE:  N/a      FINAL IMPRESSION      1. Rash          DISPOSITION / PLAN     DISPOSITION Eloped - Left Before Treatment Complete 09/05/2022 07:51:09 PM      PATIENT REFERRED TO:  No follow-up provider specified. because patient eloped.     DISCHARGE MEDICATIONS:  New Prescriptions    No medications on file       Richie Edmonds DO  Emergency Medicine Resident    (Please note that portions of thisnote were completed with a voice recognition program.  Efforts were made to edit the dictations but occasionally words are mis-transcribed.)        Richie Edmonds DO  Resident  09/05/22 2007

## 2022-09-06 RX ORDER — BLOOD SUGAR DIAGNOSTIC
STRIP MISCELLANEOUS
Qty: 100 STRIP | Refills: 0 | OUTPATIENT
Start: 2022-09-06

## 2022-09-06 RX ORDER — BLOOD SUGAR DIAGNOSTIC
STRIP MISCELLANEOUS
Qty: 100 STRIP | Refills: 0 | Status: ON HOLD | OUTPATIENT
Start: 2022-09-06 | End: 2022-10-01 | Stop reason: HOSPADM

## 2022-09-12 LAB
EKG ATRIAL RATE: 75 BPM
EKG P AXIS: 80 DEGREES
EKG P-R INTERVAL: 138 MS
EKG Q-T INTERVAL: 404 MS
EKG QRS DURATION: 90 MS
EKG QTC CALCULATION (BAZETT): 451 MS
EKG R AXIS: 72 DEGREES
EKG T AXIS: 57 DEGREES
EKG VENTRICULAR RATE: 75 BPM

## 2022-09-29 ENCOUNTER — APPOINTMENT (OUTPATIENT)
Dept: CT IMAGING | Age: 52
DRG: 308 | End: 2022-09-29
Payer: MEDICARE

## 2022-09-29 ENCOUNTER — APPOINTMENT (OUTPATIENT)
Dept: GENERAL RADIOLOGY | Age: 52
DRG: 308 | End: 2022-09-29
Payer: MEDICARE

## 2022-09-29 ENCOUNTER — HOSPITAL ENCOUNTER (INPATIENT)
Age: 52
LOS: 2 days | Discharge: HOME | DRG: 308 | End: 2022-10-01
Attending: EMERGENCY MEDICINE | Admitting: SURGERY
Payer: MEDICARE

## 2022-09-29 DIAGNOSIS — S72.142A CLOSED FRACTURE OF FEMUR, INTERTROCHANTERIC, LEFT, INITIAL ENCOUNTER (HCC): Primary | ICD-10-CM

## 2022-09-29 PROBLEM — S72.143A CLOSED COMMINUTED INTERTROCHANTERIC FRACTURE OF FEMUR, INITIAL ENCOUNTER (HCC): Status: ACTIVE | Noted: 2022-09-29

## 2022-09-29 LAB
ABSOLUTE EOS #: 0.14 K/UL (ref 0–0.44)
ABSOLUTE IMMATURE GRANULOCYTE: <0.03 K/UL (ref 0–0.3)
ABSOLUTE LYMPH #: 1.37 K/UL (ref 1.1–3.7)
ABSOLUTE MONO #: 0.54 K/UL (ref 0.1–1.2)
ANION GAP SERPL CALCULATED.3IONS-SCNC: 14 MMOL/L (ref 9–17)
ANION GAP: 18 MMOL/L (ref 7–16)
BASOPHILS # BLD: 1 % (ref 0–2)
BASOPHILS ABSOLUTE: 0.05 K/UL (ref 0–0.2)
BETA-HYDROXYBUTYRATE: 1.79 MMOL/L (ref 0.02–0.27)
BILIRUBIN URINE: NEGATIVE
BUN BLDV-MCNC: 25 MG/DL (ref 6–20)
CALCIUM SERPL-MCNC: 9.3 MG/DL (ref 8.6–10.4)
CHLORIDE BLD-SCNC: 97 MMOL/L (ref 98–107)
CO2: 22 MMOL/L (ref 20–31)
COLOR: YELLOW
CREAT SERPL-MCNC: 1.39 MG/DL (ref 0.7–1.2)
EOSINOPHILS RELATIVE PERCENT: 2 % (ref 1–4)
EPITHELIAL CELLS UA: ABNORMAL /HPF (ref 0–5)
GFR AFRICAN AMERICAN: >60 ML/MIN
GFR NON-AFRICAN AMERICAN: 54 ML/MIN
GFR NON-AFRICAN AMERICAN: >60 ML/MIN
GFR SERPL CREATININE-BSD FRML MDRD: >60 ML/MIN
GFR SERPL CREATININE-BSD FRML MDRD: ABNORMAL ML/MIN/{1.73_M2}
GFR SERPL CREATININE-BSD FRML MDRD: NORMAL ML/MIN/{1.73_M2}
GLUCOSE BLD-MCNC: 218 MG/DL (ref 74–100)
GLUCOSE BLD-MCNC: 382 MG/DL (ref 75–110)
GLUCOSE BLD-MCNC: 566 MG/DL (ref 70–99)
GLUCOSE URINE: ABNORMAL
HCO3, MIXED: 10.1 MMOL/L (ref 23–29)
HCT VFR BLD CALC: 34.8 % (ref 40.7–50.3)
HEMOGLOBIN: 11.5 G/DL (ref 13–17)
IMMATURE GRANULOCYTES: 0 %
KETONES, URINE: ABNORMAL
LEUKOCYTE ESTERASE, URINE: NEGATIVE
LYMPHOCYTES # BLD: 20 % (ref 24–43)
MCH RBC QN AUTO: 28.3 PG (ref 25.2–33.5)
MCHC RBC AUTO-ENTMCNC: 33 G/DL (ref 28.4–34.8)
MCV RBC AUTO: 85.5 FL (ref 82.6–102.9)
MONOCYTES # BLD: 8 % (ref 3–12)
NEGATIVE BASE EXCESS, MIXED: 15 (ref 0–2)
NITRITE, URINE: NEGATIVE
NRBC AUTOMATED: 0 PER 100 WBC
O2 SAT, MIXED: 80 % (ref 60–80)
PCO2 MIXED: 19.2 MM HG (ref 42–52)
PDW BLD-RTO: 14.3 % (ref 11.8–14.4)
PH UA: 5 (ref 5–8)
PH, MIXED: 7.33 (ref 7.31–7.41)
PLATELET # BLD: 209 K/UL (ref 138–453)
PMV BLD AUTO: 12.6 FL (ref 8.1–13.5)
PO2 MIXED: 46.1 MM HG (ref 35–45)
POC BUN: 11 MG/DL (ref 8–26)
POC CHLORIDE: 120 MMOL/L (ref 98–107)
POC CREATININE: 0.83 MG/DL (ref 0.51–1.19)
POC HEMATOCRIT: 14 % (ref 41–53)
POC HEMOGLOBIN: 4.6 G/DL (ref 13.5–17.5)
POC IONIZED CALCIUM: 0.62 MMOL/L (ref 1.15–1.33)
POC LACTIC ACID: 0.75 MMOL/L (ref 0.56–1.39)
POC POTASSIUM: 2.2 MMOL/L (ref 3.5–4.5)
POC SODIUM: 147 MMOL/L (ref 138–146)
POC TCO2: 10 MMOL/L (ref 22–30)
POTASSIUM SERPL-SCNC: 5.3 MMOL/L (ref 3.7–5.3)
PROTEIN UA: ABNORMAL
RBC # BLD: 4.07 M/UL (ref 4.21–5.77)
RBC UA: ABNORMAL /HPF (ref 0–4)
SEG NEUTROPHILS: 69 % (ref 36–65)
SEGMENTED NEUTROPHILS ABSOLUTE COUNT: 4.75 K/UL (ref 1.5–8.1)
SODIUM BLD-SCNC: 133 MMOL/L (ref 135–144)
SPECIFIC GRAVITY UA: 1.04 (ref 1–1.03)
TURBIDITY: CLEAR
URINE HGB: NEGATIVE
UROBILINOGEN, URINE: NORMAL
VITAMIN D 25-HYDROXY: 22.9 NG/ML
WBC # BLD: 6.9 K/UL (ref 3.5–11.3)
WBC UA: ABNORMAL /HPF (ref 0–5)

## 2022-09-29 PROCEDURE — 1200000000 HC SEMI PRIVATE

## 2022-09-29 PROCEDURE — 3209999900 CT LUMBAR SPINE TRAUMA RECONSTRUCTION

## 2022-09-29 PROCEDURE — 96376 TX/PRO/DX INJ SAME DRUG ADON: CPT

## 2022-09-29 PROCEDURE — 82947 ASSAY GLUCOSE BLOOD QUANT: CPT

## 2022-09-29 PROCEDURE — 96374 THER/PROPH/DIAG INJ IV PUSH: CPT

## 2022-09-29 PROCEDURE — 70450 CT HEAD/BRAIN W/O DYE: CPT

## 2022-09-29 PROCEDURE — 73502 X-RAY EXAM HIP UNI 2-3 VIEWS: CPT

## 2022-09-29 PROCEDURE — 73552 X-RAY EXAM OF FEMUR 2/>: CPT

## 2022-09-29 PROCEDURE — 3209999900 CT THORACIC SPINE TRAUMA RECONSTRUCTION

## 2022-09-29 PROCEDURE — 6370000000 HC RX 637 (ALT 250 FOR IP): Performed by: STUDENT IN AN ORGANIZED HEALTH CARE EDUCATION/TRAINING PROGRAM

## 2022-09-29 PROCEDURE — 73130 X-RAY EXAM OF HAND: CPT

## 2022-09-29 PROCEDURE — 84520 ASSAY OF UREA NITROGEN: CPT

## 2022-09-29 PROCEDURE — 82330 ASSAY OF CALCIUM: CPT

## 2022-09-29 PROCEDURE — 71045 X-RAY EXAM CHEST 1 VIEW: CPT

## 2022-09-29 PROCEDURE — 6370000000 HC RX 637 (ALT 250 FOR IP)

## 2022-09-29 PROCEDURE — 82010 KETONE BODYS QUAN: CPT

## 2022-09-29 PROCEDURE — 72125 CT NECK SPINE W/O DYE: CPT

## 2022-09-29 PROCEDURE — 6360000004 HC RX CONTRAST MEDICATION

## 2022-09-29 PROCEDURE — 85025 COMPLETE CBC W/AUTO DIFF WBC: CPT

## 2022-09-29 PROCEDURE — 73562 X-RAY EXAM OF KNEE 3: CPT

## 2022-09-29 PROCEDURE — 73110 X-RAY EXAM OF WRIST: CPT

## 2022-09-29 PROCEDURE — 6360000002 HC RX W HCPCS: Performed by: EMERGENCY MEDICINE

## 2022-09-29 PROCEDURE — 2580000003 HC RX 258

## 2022-09-29 PROCEDURE — 6360000002 HC RX W HCPCS

## 2022-09-29 PROCEDURE — 82803 BLOOD GASES ANY COMBINATION: CPT

## 2022-09-29 PROCEDURE — 80051 ELECTROLYTE PANEL: CPT

## 2022-09-29 PROCEDURE — 80048 BASIC METABOLIC PNL TOTAL CA: CPT

## 2022-09-29 PROCEDURE — 99285 EMERGENCY DEPT VISIT HI MDM: CPT

## 2022-09-29 PROCEDURE — 6360000002 HC RX W HCPCS: Performed by: STUDENT IN AN ORGANIZED HEALTH CARE EDUCATION/TRAINING PROGRAM

## 2022-09-29 PROCEDURE — 85014 HEMATOCRIT: CPT

## 2022-09-29 PROCEDURE — 71260 CT THORAX DX C+: CPT

## 2022-09-29 PROCEDURE — 82306 VITAMIN D 25 HYDROXY: CPT

## 2022-09-29 PROCEDURE — 81001 URINALYSIS AUTO W/SCOPE: CPT

## 2022-09-29 PROCEDURE — 83605 ASSAY OF LACTIC ACID: CPT

## 2022-09-29 PROCEDURE — 96372 THER/PROPH/DIAG INJ SC/IM: CPT

## 2022-09-29 PROCEDURE — 82565 ASSAY OF CREATININE: CPT

## 2022-09-29 PROCEDURE — 36415 COLL VENOUS BLD VENIPUNCTURE: CPT

## 2022-09-29 RX ORDER — FENTANYL CITRATE 50 UG/ML
50 INJECTION, SOLUTION INTRAMUSCULAR; INTRAVENOUS ONCE
Status: COMPLETED | OUTPATIENT
Start: 2022-09-29 | End: 2022-09-29

## 2022-09-29 RX ORDER — SODIUM CHLORIDE 0.9 % (FLUSH) 0.9 %
5-40 SYRINGE (ML) INJECTION PRN
Status: DISCONTINUED | OUTPATIENT
Start: 2022-09-29 | End: 2022-10-01 | Stop reason: HOSPADM

## 2022-09-29 RX ORDER — SODIUM CHLORIDE 9 MG/ML
INJECTION, SOLUTION INTRAVENOUS PRN
Status: DISCONTINUED | OUTPATIENT
Start: 2022-09-29 | End: 2022-10-01 | Stop reason: HOSPADM

## 2022-09-29 RX ORDER — PAROXETINE HYDROCHLORIDE 20 MG/1
20 TABLET, FILM COATED ORAL NIGHTLY
Status: DISCONTINUED | OUTPATIENT
Start: 2022-09-29 | End: 2022-10-01 | Stop reason: HOSPADM

## 2022-09-29 RX ORDER — DEXTROSE MONOHYDRATE 100 MG/ML
INJECTION, SOLUTION INTRAVENOUS CONTINUOUS PRN
Status: DISCONTINUED | OUTPATIENT
Start: 2022-09-29 | End: 2022-10-01 | Stop reason: HOSPADM

## 2022-09-29 RX ORDER — ORPHENADRINE CITRATE 30 MG/ML
60 INJECTION INTRAMUSCULAR; INTRAVENOUS ONCE
Status: COMPLETED | OUTPATIENT
Start: 2022-09-29 | End: 2022-09-29

## 2022-09-29 RX ORDER — METHOCARBAMOL 500 MG/1
750 TABLET, FILM COATED ORAL EVERY 6 HOURS
Status: DISCONTINUED | OUTPATIENT
Start: 2022-09-29 | End: 2022-10-01 | Stop reason: HOSPADM

## 2022-09-29 RX ORDER — OLANZAPINE 5 MG/1
2.5 TABLET ORAL DAILY
Status: DISCONTINUED | OUTPATIENT
Start: 2022-09-30 | End: 2022-10-01 | Stop reason: HOSPADM

## 2022-09-29 RX ORDER — GABAPENTIN 300 MG/1
300 CAPSULE ORAL EVERY 8 HOURS
Status: DISCONTINUED | OUTPATIENT
Start: 2022-09-29 | End: 2022-10-01 | Stop reason: HOSPADM

## 2022-09-29 RX ORDER — SODIUM CHLORIDE, SODIUM LACTATE, POTASSIUM CHLORIDE, CALCIUM CHLORIDE 600; 310; 30; 20 MG/100ML; MG/100ML; MG/100ML; MG/100ML
INJECTION, SOLUTION INTRAVENOUS CONTINUOUS
Status: DISCONTINUED | OUTPATIENT
Start: 2022-09-29 | End: 2022-10-01 | Stop reason: HOSPADM

## 2022-09-29 RX ORDER — OMEPRAZOLE 20 MG/1
20 TABLET, DELAYED RELEASE ORAL
Status: DISCONTINUED | OUTPATIENT
Start: 2022-09-30 | End: 2022-09-29

## 2022-09-29 RX ORDER — PANTOPRAZOLE SODIUM 40 MG/1
40 TABLET, DELAYED RELEASE ORAL
Status: DISCONTINUED | OUTPATIENT
Start: 2022-09-30 | End: 2022-10-01 | Stop reason: HOSPADM

## 2022-09-29 RX ORDER — TRAZODONE HYDROCHLORIDE 100 MG/1
300 TABLET ORAL NIGHTLY
Status: DISCONTINUED | OUTPATIENT
Start: 2022-09-29 | End: 2022-10-01 | Stop reason: HOSPADM

## 2022-09-29 RX ORDER — ATORVASTATIN CALCIUM 20 MG/1
20 TABLET, FILM COATED ORAL EVERY MORNING
Status: DISCONTINUED | OUTPATIENT
Start: 2022-09-30 | End: 2022-10-01 | Stop reason: HOSPADM

## 2022-09-29 RX ORDER — ONDANSETRON 4 MG/1
4 TABLET, ORALLY DISINTEGRATING ORAL EVERY 8 HOURS PRN
Status: DISCONTINUED | OUTPATIENT
Start: 2022-09-29 | End: 2022-10-01 | Stop reason: HOSPADM

## 2022-09-29 RX ORDER — ONDANSETRON 2 MG/ML
4 INJECTION INTRAMUSCULAR; INTRAVENOUS EVERY 6 HOURS PRN
Status: DISCONTINUED | OUTPATIENT
Start: 2022-09-29 | End: 2022-10-01 | Stop reason: HOSPADM

## 2022-09-29 RX ORDER — POLYETHYLENE GLYCOL 3350 17 G/17G
17 POWDER, FOR SOLUTION ORAL DAILY
Status: DISCONTINUED | OUTPATIENT
Start: 2022-09-29 | End: 2022-10-01 | Stop reason: HOSPADM

## 2022-09-29 RX ORDER — ACETAMINOPHEN 500 MG
1000 TABLET ORAL ONCE
Status: COMPLETED | OUTPATIENT
Start: 2022-09-29 | End: 2022-09-29

## 2022-09-29 RX ORDER — ERGOCALCIFEROL 1.25 MG/1
50000 CAPSULE ORAL WEEKLY
Qty: 8 CAPSULE | Refills: 1 | Status: ON HOLD | OUTPATIENT
Start: 2022-09-29

## 2022-09-29 RX ORDER — ACETAMINOPHEN 500 MG
1000 TABLET ORAL EVERY 8 HOURS SCHEDULED
Status: DISCONTINUED | OUTPATIENT
Start: 2022-09-29 | End: 2022-10-01 | Stop reason: HOSPADM

## 2022-09-29 RX ORDER — SENNA PLUS 8.6 MG/1
1 TABLET ORAL DAILY PRN
Status: DISCONTINUED | OUTPATIENT
Start: 2022-09-29 | End: 2022-10-01 | Stop reason: HOSPADM

## 2022-09-29 RX ORDER — INSULIN LISPRO 100 [IU]/ML
0-4 INJECTION, SOLUTION INTRAVENOUS; SUBCUTANEOUS NIGHTLY
Status: DISCONTINUED | OUTPATIENT
Start: 2022-09-29 | End: 2022-09-29

## 2022-09-29 RX ORDER — INSULIN LISPRO 100 [IU]/ML
0-16 INJECTION, SOLUTION INTRAVENOUS; SUBCUTANEOUS
Status: DISCONTINUED | OUTPATIENT
Start: 2022-09-29 | End: 2022-09-29

## 2022-09-29 RX ORDER — ATORVASTATIN CALCIUM 40 MG/1
40 TABLET, FILM COATED ORAL NIGHTLY
Status: DISCONTINUED | OUTPATIENT
Start: 2022-09-29 | End: 2022-10-01 | Stop reason: HOSPADM

## 2022-09-29 RX ORDER — OXYCODONE HYDROCHLORIDE 5 MG/1
5 TABLET ORAL EVERY 6 HOURS PRN
Status: DISCONTINUED | OUTPATIENT
Start: 2022-09-29 | End: 2022-10-01 | Stop reason: HOSPADM

## 2022-09-29 RX ORDER — ATORVASTATIN CALCIUM 20 MG/1
20 TABLET, FILM COATED ORAL DAILY
Status: DISCONTINUED | OUTPATIENT
Start: 2022-09-30 | End: 2022-09-29

## 2022-09-29 RX ORDER — AMLODIPINE BESYLATE 10 MG/1
10 TABLET ORAL DAILY
Status: DISCONTINUED | OUTPATIENT
Start: 2022-09-30 | End: 2022-10-01 | Stop reason: HOSPADM

## 2022-09-29 RX ORDER — SODIUM CHLORIDE 0.9 % (FLUSH) 0.9 %
5-40 SYRINGE (ML) INJECTION EVERY 12 HOURS SCHEDULED
Status: DISCONTINUED | OUTPATIENT
Start: 2022-09-29 | End: 2022-10-01 | Stop reason: HOSPADM

## 2022-09-29 RX ADMIN — ACETAMINOPHEN 1000 MG: 500 TABLET ORAL at 16:37

## 2022-09-29 RX ADMIN — OXYCODONE 5 MG: 5 TABLET ORAL at 21:25

## 2022-09-29 RX ADMIN — METHOCARBAMOL TABLETS 750 MG: 750 TABLET, COATED ORAL at 19:31

## 2022-09-29 RX ADMIN — IOPAMIDOL 75 ML: 755 INJECTION, SOLUTION INTRAVENOUS at 20:35

## 2022-09-29 RX ADMIN — SODIUM CHLORIDE, POTASSIUM CHLORIDE, SODIUM LACTATE AND CALCIUM CHLORIDE: 600; 310; 30; 20 INJECTION, SOLUTION INTRAVENOUS at 21:17

## 2022-09-29 RX ADMIN — FENTANYL CITRATE 50 MCG: 50 INJECTION, SOLUTION INTRAMUSCULAR; INTRAVENOUS at 16:35

## 2022-09-29 RX ADMIN — FENTANYL CITRATE 50 MCG: 50 INJECTION, SOLUTION INTRAMUSCULAR; INTRAVENOUS at 18:09

## 2022-09-29 RX ADMIN — ORPHENADRINE CITRATE 60 MG: 30 INJECTION INTRAMUSCULAR; INTRAVENOUS at 16:36

## 2022-09-29 RX ADMIN — ACETAMINOPHEN 1000 MG: 500 TABLET ORAL at 22:33

## 2022-09-29 RX ADMIN — GABAPENTIN 300 MG: 300 CAPSULE ORAL at 19:31

## 2022-09-29 RX ADMIN — INSULIN LISPRO 4 UNITS: 100 INJECTION, SOLUTION INTRAVENOUS; SUBCUTANEOUS at 22:42

## 2022-09-29 RX ADMIN — FENTANYL CITRATE 50 MCG: 50 INJECTION, SOLUTION INTRAMUSCULAR; INTRAVENOUS at 16:56

## 2022-09-29 ASSESSMENT — ENCOUNTER SYMPTOMS
DIARRHEA: 0
VOMITING: 0
SINUS PRESSURE: 0
NAUSEA: 0
EYES NEGATIVE: 1
PHOTOPHOBIA: 0
COUGH: 0
ABDOMINAL PAIN: 0
SHORTNESS OF BREATH: 0
CONSTIPATION: 0
ALLERGIC/IMMUNOLOGIC NEGATIVE: 1
SINUS PAIN: 0
GASTROINTESTINAL NEGATIVE: 1
CHEST TIGHTNESS: 0
RESPIRATORY NEGATIVE: 1
ABDOMINAL DISTENTION: 0

## 2022-09-29 ASSESSMENT — PAIN SCALES - GENERAL
PAINLEVEL_OUTOF10: 8
PAINLEVEL_OUTOF10: 9
PAINLEVEL_OUTOF10: 10

## 2022-09-29 ASSESSMENT — PAIN - FUNCTIONAL ASSESSMENT: PAIN_FUNCTIONAL_ASSESSMENT: 0-10

## 2022-09-29 NOTE — ED NOTES
c-cOLLAR NOT AVAILABLE er CHARGE INFORMED and sent to central supply for c-collar.       Julia Farrell RN  09/29/22 [id number]
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
## 2022-09-30 ENCOUNTER — ANESTHESIA (OUTPATIENT)
Dept: OPERATING ROOM | Age: 52
DRG: 308 | End: 2022-09-30
Payer: MEDICARE

## 2022-09-30 ENCOUNTER — APPOINTMENT (OUTPATIENT)
Dept: GENERAL RADIOLOGY | Age: 52
DRG: 308 | End: 2022-09-30
Payer: MEDICARE

## 2022-09-30 ENCOUNTER — ANESTHESIA EVENT (OUTPATIENT)
Dept: OPERATING ROOM | Age: 52
DRG: 308 | End: 2022-09-30
Payer: MEDICARE

## 2022-09-30 LAB
ANION GAP SERPL CALCULATED.3IONS-SCNC: 16 MMOL/L (ref 9–17)
BUN BLDV-MCNC: 22 MG/DL (ref 6–20)
CALCIUM SERPL-MCNC: 8.7 MG/DL (ref 8.6–10.4)
CARBOXYHEMOGLOBIN: 2.5 % (ref 0–5)
CHLORIDE BLD-SCNC: 100 MMOL/L (ref 98–107)
CO2: 20 MMOL/L (ref 20–31)
CREAT SERPL-MCNC: 1.31 MG/DL (ref 0.7–1.2)
FIO2: ABNORMAL
GFR AFRICAN AMERICAN: >60 ML/MIN
GFR NON-AFRICAN AMERICAN: 58 ML/MIN
GFR SERPL CREATININE-BSD FRML MDRD: ABNORMAL ML/MIN/{1.73_M2}
GLUCOSE BLD-MCNC: 119 MG/DL (ref 75–110)
GLUCOSE BLD-MCNC: 146 MG/DL (ref 75–110)
GLUCOSE BLD-MCNC: 147 MG/DL (ref 75–110)
GLUCOSE BLD-MCNC: 148 MG/DL (ref 75–110)
GLUCOSE BLD-MCNC: 156 MG/DL (ref 75–110)
GLUCOSE BLD-MCNC: 204 MG/DL (ref 75–110)
GLUCOSE BLD-MCNC: 219 MG/DL (ref 75–110)
GLUCOSE BLD-MCNC: 263 MG/DL (ref 70–99)
GLUCOSE BLD-MCNC: 265 MG/DL (ref 75–110)
GLUCOSE BLD-MCNC: 269 MG/DL (ref 75–110)
GLUCOSE BLD-MCNC: 281 MG/DL (ref 75–110)
GLUCOSE BLD-MCNC: 301 MG/DL (ref 75–110)
GLUCOSE BLD-MCNC: 320 MG/DL (ref 75–110)
GLUCOSE BLD-MCNC: 62 MG/DL (ref 75–110)
GLUCOSE BLD-MCNC: 75 MG/DL (ref 75–110)
GLUCOSE BLD-MCNC: 78 MG/DL (ref 75–110)
GLUCOSE BLD-MCNC: 98 MG/DL (ref 75–110)
HCO3 VENOUS: 25.4 MMOL/L (ref 24–30)
HCT VFR BLD CALC: 29.6 % (ref 40.7–50.3)
HEMOGLOBIN: 9.9 G/DL (ref 13–17)
MAGNESIUM: 1.7 MG/DL (ref 1.6–2.6)
O2 SAT, VEN: 89.5 % (ref 60–85)
PATIENT TEMP: 37
PCO2, VEN: 44.2 MM HG (ref 39–55)
PH VENOUS: 7.38 (ref 7.32–7.42)
PHOSPHORUS: 3.2 MG/DL (ref 2.5–4.5)
PO2, VEN: 55.4 MM HG (ref 30–50)
POSITIVE BASE EXCESS, VEN: 0.6 MMOL/L (ref 0–2)
POTASSIUM SERPL-SCNC: 4 MMOL/L (ref 3.7–5.3)
SODIUM BLD-SCNC: 136 MMOL/L (ref 135–144)

## 2022-09-30 PROCEDURE — 2709999900 HC NON-CHARGEABLE SUPPLY: Performed by: ORTHOPAEDIC SURGERY

## 2022-09-30 PROCEDURE — 7100000001 HC PACU RECOVERY - ADDTL 15 MIN: Performed by: ORTHOPAEDIC SURGERY

## 2022-09-30 PROCEDURE — 84100 ASSAY OF PHOSPHORUS: CPT

## 2022-09-30 PROCEDURE — 7100000000 HC PACU RECOVERY - FIRST 15 MIN: Performed by: ORTHOPAEDIC SURGERY

## 2022-09-30 PROCEDURE — 85018 HEMOGLOBIN: CPT

## 2022-09-30 PROCEDURE — 6370000000 HC RX 637 (ALT 250 FOR IP): Performed by: STUDENT IN AN ORGANIZED HEALTH CARE EDUCATION/TRAINING PROGRAM

## 2022-09-30 PROCEDURE — 73552 X-RAY EXAM OF FEMUR 2/>: CPT

## 2022-09-30 PROCEDURE — 2580000003 HC RX 258: Performed by: NURSE ANESTHETIST, CERTIFIED REGISTERED

## 2022-09-30 PROCEDURE — 82947 ASSAY GLUCOSE BLOOD QUANT: CPT

## 2022-09-30 PROCEDURE — 27245 TREAT THIGH FRACTURE: CPT | Performed by: ORTHOPAEDIC SURGERY

## 2022-09-30 PROCEDURE — 3700000001 HC ADD 15 MINUTES (ANESTHESIA): Performed by: ORTHOPAEDIC SURGERY

## 2022-09-30 PROCEDURE — 2720000010 HC SURG SUPPLY STERILE: Performed by: ORTHOPAEDIC SURGERY

## 2022-09-30 PROCEDURE — 3600000014 HC SURGERY LEVEL 4 ADDTL 15MIN: Performed by: ORTHOPAEDIC SURGERY

## 2022-09-30 PROCEDURE — C1713 ANCHOR/SCREW BN/BN,TIS/BN: HCPCS | Performed by: ORTHOPAEDIC SURGERY

## 2022-09-30 PROCEDURE — 6360000002 HC RX W HCPCS: Performed by: NURSE ANESTHETIST, CERTIFIED REGISTERED

## 2022-09-30 PROCEDURE — 80048 BASIC METABOLIC PNL TOTAL CA: CPT

## 2022-09-30 PROCEDURE — 3700000000 HC ANESTHESIA ATTENDED CARE: Performed by: ORTHOPAEDIC SURGERY

## 2022-09-30 PROCEDURE — 73502 X-RAY EXAM HIP UNI 2-3 VIEWS: CPT

## 2022-09-30 PROCEDURE — 0QH706Z INSERTION OF INTRAMEDULLARY INTERNAL FIXATION DEVICE INTO LEFT UPPER FEMUR, OPEN APPROACH: ICD-10-PCS | Performed by: ORTHOPAEDIC SURGERY

## 2022-09-30 PROCEDURE — 6370000000 HC RX 637 (ALT 250 FOR IP)

## 2022-09-30 PROCEDURE — 6360000002 HC RX W HCPCS: Performed by: ANESTHESIOLOGY

## 2022-09-30 PROCEDURE — 3600000004 HC SURGERY LEVEL 4 BASE: Performed by: ORTHOPAEDIC SURGERY

## 2022-09-30 PROCEDURE — 99253 IP/OBS CNSLTJ NEW/EST LOW 45: CPT | Performed by: ORTHOPAEDIC SURGERY

## 2022-09-30 PROCEDURE — 2580000003 HC RX 258

## 2022-09-30 PROCEDURE — 85014 HEMATOCRIT: CPT

## 2022-09-30 PROCEDURE — 2580000003 HC RX 258: Performed by: STUDENT IN AN ORGANIZED HEALTH CARE EDUCATION/TRAINING PROGRAM

## 2022-09-30 PROCEDURE — 2700000000 HC OXYGEN THERAPY PER DAY

## 2022-09-30 PROCEDURE — 2060000000 HC ICU INTERMEDIATE R&B

## 2022-09-30 PROCEDURE — 3209999900 FLUORO FOR SURGICAL PROCEDURES

## 2022-09-30 PROCEDURE — 94761 N-INVAS EAR/PLS OXIMETRY MLT: CPT

## 2022-09-30 PROCEDURE — 83735 ASSAY OF MAGNESIUM: CPT

## 2022-09-30 PROCEDURE — 83036 HEMOGLOBIN GLYCOSYLATED A1C: CPT

## 2022-09-30 PROCEDURE — 36415 COLL VENOUS BLD VENIPUNCTURE: CPT

## 2022-09-30 PROCEDURE — 6360000002 HC RX W HCPCS

## 2022-09-30 PROCEDURE — 2580000003 HC RX 258: Performed by: ORTHOPAEDIC SURGERY

## 2022-09-30 PROCEDURE — 2500000003 HC RX 250 WO HCPCS: Performed by: NURSE ANESTHETIST, CERTIFIED REGISTERED

## 2022-09-30 PROCEDURE — 82805 BLOOD GASES W/O2 SATURATION: CPT

## 2022-09-30 PROCEDURE — 6360000002 HC RX W HCPCS: Performed by: STUDENT IN AN ORGANIZED HEALTH CARE EDUCATION/TRAINING PROGRAM

## 2022-09-30 DEVICE — TRIGEN INTERTAN 1.5 11.5MM X 42CM                                    125DEGREE LEFT
Type: IMPLANTABLE DEVICE | Site: FEMUR | Status: FUNCTIONAL
Brand: TRIGEN

## 2022-09-30 DEVICE — TRIGEN LOW PROFILE SCREW 5.0MM X 55MM
Type: IMPLANTABLE DEVICE | Site: FEMUR | Status: FUNCTIONAL
Brand: TRIGEN

## 2022-09-30 DEVICE — INTERTAN LAG/COMPRESSION SCREW KIT                                    90MM / 85MM
Type: IMPLANTABLE DEVICE | Site: FEMUR | Status: FUNCTIONAL
Brand: TRIGEN

## 2022-09-30 RX ORDER — ROCURONIUM BROMIDE 10 MG/ML
INJECTION, SOLUTION INTRAVENOUS PRN
Status: DISCONTINUED | OUTPATIENT
Start: 2022-09-30 | End: 2022-09-30 | Stop reason: SDUPTHER

## 2022-09-30 RX ORDER — DEXTROSE MONOHYDRATE 25 G/50ML
INJECTION, SOLUTION INTRAVENOUS PRN
Status: DISCONTINUED | OUTPATIENT
Start: 2022-09-30 | End: 2022-09-30 | Stop reason: SDUPTHER

## 2022-09-30 RX ORDER — SODIUM CHLORIDE 9 MG/ML
INJECTION, SOLUTION INTRAVENOUS PRN
Status: DISCONTINUED | OUTPATIENT
Start: 2022-09-30 | End: 2022-10-01 | Stop reason: HOSPADM

## 2022-09-30 RX ORDER — SODIUM CHLORIDE, SODIUM LACTATE, POTASSIUM CHLORIDE, CALCIUM CHLORIDE 600; 310; 30; 20 MG/100ML; MG/100ML; MG/100ML; MG/100ML
INJECTION, SOLUTION INTRAVENOUS CONTINUOUS PRN
Status: DISCONTINUED | OUTPATIENT
Start: 2022-09-30 | End: 2022-09-30 | Stop reason: SDUPTHER

## 2022-09-30 RX ORDER — DEXAMETHASONE SODIUM PHOSPHATE 10 MG/ML
INJECTION INTRAMUSCULAR; INTRAVENOUS PRN
Status: DISCONTINUED | OUTPATIENT
Start: 2022-09-30 | End: 2022-09-30 | Stop reason: SDUPTHER

## 2022-09-30 RX ORDER — LIDOCAINE HYDROCHLORIDE 10 MG/ML
INJECTION, SOLUTION EPIDURAL; INFILTRATION; INTRACAUDAL; PERINEURAL PRN
Status: DISCONTINUED | OUTPATIENT
Start: 2022-09-30 | End: 2022-09-30 | Stop reason: SDUPTHER

## 2022-09-30 RX ORDER — OXYCODONE HYDROCHLORIDE 5 MG/1
5 TABLET ORAL ONCE
Status: COMPLETED | OUTPATIENT
Start: 2022-09-30 | End: 2022-09-30

## 2022-09-30 RX ORDER — LIDOCAINE HYDROCHLORIDE 10 MG/ML
1 INJECTION, SOLUTION EPIDURAL; INFILTRATION; INTRACAUDAL; PERINEURAL
Status: ACTIVE | OUTPATIENT
Start: 2022-09-30 | End: 2022-10-01

## 2022-09-30 RX ORDER — FENTANYL CITRATE 50 UG/ML
50 INJECTION, SOLUTION INTRAMUSCULAR; INTRAVENOUS EVERY 5 MIN PRN
Status: DISCONTINUED | OUTPATIENT
Start: 2022-09-30 | End: 2022-09-30 | Stop reason: HOSPADM

## 2022-09-30 RX ORDER — GLYCOPYRROLATE 0.2 MG/ML
INJECTION INTRAMUSCULAR; INTRAVENOUS PRN
Status: DISCONTINUED | OUTPATIENT
Start: 2022-09-30 | End: 2022-09-30 | Stop reason: SDUPTHER

## 2022-09-30 RX ORDER — SODIUM CHLORIDE 0.9 % (FLUSH) 0.9 %
5-40 SYRINGE (ML) INJECTION EVERY 12 HOURS SCHEDULED
Status: DISCONTINUED | OUTPATIENT
Start: 2022-09-30 | End: 2022-10-01 | Stop reason: HOSPADM

## 2022-09-30 RX ORDER — NEOSTIGMINE METHYLSULFATE 5 MG/5 ML
SYRINGE (ML) INTRAVENOUS PRN
Status: DISCONTINUED | OUTPATIENT
Start: 2022-09-30 | End: 2022-09-30 | Stop reason: SDUPTHER

## 2022-09-30 RX ORDER — ONDANSETRON 2 MG/ML
4 INJECTION INTRAMUSCULAR; INTRAVENOUS
Status: DISCONTINUED | OUTPATIENT
Start: 2022-09-30 | End: 2022-09-30 | Stop reason: HOSPADM

## 2022-09-30 RX ORDER — FENTANYL CITRATE 50 UG/ML
25 INJECTION, SOLUTION INTRAMUSCULAR; INTRAVENOUS EVERY 5 MIN PRN
Status: DISCONTINUED | OUTPATIENT
Start: 2022-09-30 | End: 2022-09-30 | Stop reason: HOSPADM

## 2022-09-30 RX ORDER — MAGNESIUM HYDROXIDE 1200 MG/15ML
LIQUID ORAL PRN
Status: DISCONTINUED | OUTPATIENT
Start: 2022-09-30 | End: 2022-09-30 | Stop reason: HOSPADM

## 2022-09-30 RX ORDER — SODIUM CHLORIDE 9 MG/ML
INJECTION, SOLUTION INTRAVENOUS PRN
Status: DISCONTINUED | OUTPATIENT
Start: 2022-09-30 | End: 2022-09-30 | Stop reason: HOSPADM

## 2022-09-30 RX ORDER — MAGNESIUM HYDROXIDE 1200 MG/15ML
LIQUID ORAL CONTINUOUS PRN
Status: DISCONTINUED | OUTPATIENT
Start: 2022-09-30 | End: 2022-09-30 | Stop reason: HOSPADM

## 2022-09-30 RX ORDER — ONDANSETRON 2 MG/ML
INJECTION INTRAMUSCULAR; INTRAVENOUS PRN
Status: DISCONTINUED | OUTPATIENT
Start: 2022-09-30 | End: 2022-09-30 | Stop reason: SDUPTHER

## 2022-09-30 RX ORDER — SODIUM CHLORIDE 0.9 % (FLUSH) 0.9 %
5-40 SYRINGE (ML) INJECTION EVERY 12 HOURS SCHEDULED
Status: DISCONTINUED | OUTPATIENT
Start: 2022-09-30 | End: 2022-09-30 | Stop reason: HOSPADM

## 2022-09-30 RX ORDER — PROPOFOL 10 MG/ML
INJECTION, EMULSION INTRAVENOUS PRN
Status: DISCONTINUED | OUTPATIENT
Start: 2022-09-30 | End: 2022-09-30 | Stop reason: SDUPTHER

## 2022-09-30 RX ORDER — SODIUM CHLORIDE 0.9 % (FLUSH) 0.9 %
5-40 SYRINGE (ML) INJECTION PRN
Status: DISCONTINUED | OUTPATIENT
Start: 2022-09-30 | End: 2022-10-01 | Stop reason: HOSPADM

## 2022-09-30 RX ORDER — MIDAZOLAM HYDROCHLORIDE 2 MG/2ML
1 INJECTION, SOLUTION INTRAMUSCULAR; INTRAVENOUS EVERY 10 MIN PRN
Status: DISCONTINUED | OUTPATIENT
Start: 2022-09-30 | End: 2022-10-01 | Stop reason: HOSPADM

## 2022-09-30 RX ORDER — SODIUM CHLORIDE 0.9 % (FLUSH) 0.9 %
5-40 SYRINGE (ML) INJECTION PRN
Status: DISCONTINUED | OUTPATIENT
Start: 2022-09-30 | End: 2022-09-30 | Stop reason: HOSPADM

## 2022-09-30 RX ORDER — EPHEDRINE SULFATE/0.9% NACL/PF 50 MG/5 ML
SYRINGE (ML) INTRAVENOUS PRN
Status: DISCONTINUED | OUTPATIENT
Start: 2022-09-30 | End: 2022-09-30 | Stop reason: SDUPTHER

## 2022-09-30 RX ORDER — FENTANYL CITRATE 50 UG/ML
INJECTION, SOLUTION INTRAMUSCULAR; INTRAVENOUS PRN
Status: DISCONTINUED | OUTPATIENT
Start: 2022-09-30 | End: 2022-09-30 | Stop reason: SDUPTHER

## 2022-09-30 RX ORDER — SODIUM CHLORIDE, SODIUM LACTATE, POTASSIUM CHLORIDE, CALCIUM CHLORIDE 600; 310; 30; 20 MG/100ML; MG/100ML; MG/100ML; MG/100ML
INJECTION, SOLUTION INTRAVENOUS CONTINUOUS
Status: DISCONTINUED | OUTPATIENT
Start: 2022-09-30 | End: 2022-09-30

## 2022-09-30 RX ADMIN — ACETAMINOPHEN 1000 MG: 500 TABLET ORAL at 15:34

## 2022-09-30 RX ADMIN — SODIUM CHLORIDE, POTASSIUM CHLORIDE, SODIUM LACTATE AND CALCIUM CHLORIDE: 600; 310; 30; 20 INJECTION, SOLUTION INTRAVENOUS at 19:55

## 2022-09-30 RX ADMIN — METHOCARBAMOL TABLETS 750 MG: 750 TABLET, COATED ORAL at 18:30

## 2022-09-30 RX ADMIN — FENTANYL CITRATE 50 MCG: 50 INJECTION, SOLUTION INTRAMUSCULAR; INTRAVENOUS at 10:20

## 2022-09-30 RX ADMIN — ACETAMINOPHEN 1000 MG: 500 TABLET ORAL at 12:17

## 2022-09-30 RX ADMIN — SODIUM CHLORIDE 9.36 UNITS/HR: 9 INJECTION, SOLUTION INTRAVENOUS at 15:48

## 2022-09-30 RX ADMIN — DESMOPRESSIN ACETATE 40 MG: 0.2 TABLET ORAL at 21:29

## 2022-09-30 RX ADMIN — PAROXETINE HYDROCHLORIDE HEMIHYDRATE 20 MG: 20 TABLET, FILM COATED ORAL at 21:29

## 2022-09-30 RX ADMIN — ACETAMINOPHEN 1000 MG: 500 TABLET ORAL at 22:59

## 2022-09-30 RX ADMIN — FENTANYL CITRATE 100 MCG: 50 INJECTION, SOLUTION INTRAMUSCULAR; INTRAVENOUS at 07:41

## 2022-09-30 RX ADMIN — Medication 3 MG: at 09:31

## 2022-09-30 RX ADMIN — ROCURONIUM BROMIDE 50 MG: 10 INJECTION INTRAVENOUS at 07:41

## 2022-09-30 RX ADMIN — Medication 2000 MG: at 07:42

## 2022-09-30 RX ADMIN — METHOCARBAMOL TABLETS 750 MG: 750 TABLET, COATED ORAL at 15:34

## 2022-09-30 RX ADMIN — TRAZODONE HYDROCHLORIDE 300 MG: 100 TABLET ORAL at 00:02

## 2022-09-30 RX ADMIN — Medication 20 MG: at 07:54

## 2022-09-30 RX ADMIN — SODIUM CHLORIDE, POTASSIUM CHLORIDE, SODIUM LACTATE AND CALCIUM CHLORIDE: 600; 310; 30; 20 INJECTION, SOLUTION INTRAVENOUS at 07:30

## 2022-09-30 RX ADMIN — Medication 2000 MG: at 15:34

## 2022-09-30 RX ADMIN — TRAZODONE HYDROCHLORIDE 300 MG: 100 TABLET ORAL at 21:29

## 2022-09-30 RX ADMIN — OXYCODONE HYDROCHLORIDE 5 MG: 5 TABLET ORAL at 15:34

## 2022-09-30 RX ADMIN — METHOCARBAMOL TABLETS 750 MG: 750 TABLET, COATED ORAL at 02:20

## 2022-09-30 RX ADMIN — GABAPENTIN 300 MG: 300 CAPSULE ORAL at 19:00

## 2022-09-30 RX ADMIN — GABAPENTIN 300 MG: 300 CAPSULE ORAL at 04:09

## 2022-09-30 RX ADMIN — ONDANSETRON 4 MG: 2 INJECTION INTRAMUSCULAR; INTRAVENOUS at 09:42

## 2022-09-30 RX ADMIN — OXYCODONE 5 MG: 5 TABLET ORAL at 12:17

## 2022-09-30 RX ADMIN — LIDOCAINE HYDROCHLORIDE 50 MG: 10 INJECTION, SOLUTION EPIDURAL; INFILTRATION; INTRACAUDAL; PERINEURAL at 07:41

## 2022-09-30 RX ADMIN — DESMOPRESSIN ACETATE 40 MG: 0.2 TABLET ORAL at 02:21

## 2022-09-30 RX ADMIN — OXYCODONE 5 MG: 5 TABLET ORAL at 03:49

## 2022-09-30 RX ADMIN — PAROXETINE HYDROCHLORIDE HEMIHYDRATE 20 MG: 20 TABLET, FILM COATED ORAL at 00:02

## 2022-09-30 RX ADMIN — GLYCOPYRROLATE 0.6 MG: 0.2 INJECTION INTRAMUSCULAR; INTRAVENOUS at 09:31

## 2022-09-30 RX ADMIN — SODIUM CHLORIDE 6.27 UNITS/HR: 9 INJECTION, SOLUTION INTRAVENOUS at 04:44

## 2022-09-30 RX ADMIN — PROPOFOL 150 MG: 10 INJECTION, EMULSION INTRAVENOUS at 07:41

## 2022-09-30 RX ADMIN — OXYCODONE 5 MG: 5 TABLET ORAL at 22:59

## 2022-09-30 RX ADMIN — FENTANYL CITRATE 50 MCG: 50 INJECTION, SOLUTION INTRAMUSCULAR; INTRAVENOUS at 08:50

## 2022-09-30 RX ADMIN — DEXAMETHASONE SODIUM PHOSPHATE 10 MG: 10 INJECTION INTRAMUSCULAR; INTRAVENOUS at 09:42

## 2022-09-30 RX ADMIN — DEXTROSE MONOHYDRATE 12.5 G: 25 INJECTION, SOLUTION INTRAVENOUS at 08:46

## 2022-09-30 ASSESSMENT — PAIN DESCRIPTION - DESCRIPTORS
DESCRIPTORS: SHARP
DESCRIPTORS: ACHING;SHARP
DESCRIPTORS: ACHING;DISCOMFORT

## 2022-09-30 ASSESSMENT — PAIN DESCRIPTION - PAIN TYPE
TYPE: SURGICAL PAIN
TYPE: ACUTE PAIN;SURGICAL PAIN

## 2022-09-30 ASSESSMENT — ENCOUNTER SYMPTOMS
ABDOMINAL PAIN: 0
VOMITING: 0
DIARRHEA: 0
COUGH: 0
RHINORRHEA: 0
BACK PAIN: 0
SHORTNESS OF BREATH: 1
SHORTNESS OF BREATH: 0
CONSTIPATION: 0
NAUSEA: 0

## 2022-09-30 ASSESSMENT — PAIN - FUNCTIONAL ASSESSMENT
PAIN_FUNCTIONAL_ASSESSMENT: PREVENTS OR INTERFERES SOME ACTIVE ACTIVITIES AND ADLS
PAIN_FUNCTIONAL_ASSESSMENT: 0-10

## 2022-09-30 ASSESSMENT — PAIN SCALES - GENERAL
PAINLEVEL_OUTOF10: 7
PAINLEVEL_OUTOF10: 4
PAINLEVEL_OUTOF10: 10
PAINLEVEL_OUTOF10: 10
PAINLEVEL_OUTOF10: 7
PAINLEVEL_OUTOF10: 8

## 2022-09-30 ASSESSMENT — PAIN DESCRIPTION - ORIENTATION
ORIENTATION: LEFT

## 2022-09-30 ASSESSMENT — PAIN DESCRIPTION - LOCATION
LOCATION: HIP
LOCATION: LEG
LOCATION: HIP
LOCATION: BACK;LEG
LOCATION: LEG
LOCATION: BACK;LEG

## 2022-09-30 ASSESSMENT — PAIN DESCRIPTION - ONSET
ONSET: GRADUAL
ONSET: GRADUAL

## 2022-09-30 ASSESSMENT — PAIN DESCRIPTION - FREQUENCY
FREQUENCY: INTERMITTENT
FREQUENCY: CONTINUOUS

## 2022-09-30 ASSESSMENT — PATIENT HEALTH QUESTIONNAIRE - PHQ9: SUM OF ALL RESPONSES TO PHQ QUESTIONS 1-9: 3

## 2022-09-30 NOTE — DISCHARGE INSTRUCTIONS
Discharge Instructions for Trauma       What to do after you leave the hospital:    Please continue to use your Incentive Spirometer as directed. You can practice 10 deep breaths/hour while awake. Using the [address] will promote the health of your lungs by taking slow, deep breaths in. It is also important in preventing pneumonia or a pneumothorax from developing. General questions or concerns may be called to the trauma nurse line at [phone] and please leave a message. Trauma is a life-threatening condition. Your doctor will want to closely monitor you. Be sure to go to all of your appointments. Orthopedic Instructions:  -Weight bearing status: Weightbearing as tolerated to the left leg.  -Keep dressing clean and dry.  -Starting 3 days after surgery, Ok for daily dressing changes until wound is dry. Then may leave open to air. If wound is no longer leaking, Ok to shower but no soaks or baths.  -Physical Therapy for strengthening and gait training. Occupational therapy for activities of daily living.  -Ice (20 minutes on and off 1 hour) as needed for swelling/pain.  -Drink plenty of fluids.  -Call the office or come to Emergency Room if signs of infection appear (hot, swollen, red, draining pus, fever). -Take medications as prescribed.  -Wean off narcotics (Percocet/Norco) as soon as possible. Do not take Tylenol if still taking narcotics. -No alcoholic beverages or driving/operating while taking narcotics.  -Follow up with Dr. Alma Degroot in his office 10-14 days after surgery. We have scheduled you for an appointment at 1:40PM on 10/12/22. Call [phone] if you have questions or need to reschedule your appointment.
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
## 2022-09-30 NOTE — BRIEF OP NOTE
Brief Postoperative Note      Patient: Anil Nunes  YOB: 1970  MRN: [id number]    Date of Procedure: 9/30/2022    Pre-Op Diagnosis: Left intertrochanteric femur fracture    Post-Op Diagnosis: Left intertrochanteric femur fracture       Procedure(s): Left cephamolmedullary femur nail insertion    Surgeon(s): Kaur Paul DO    Assistant: Resident: Jatinder Woods DO; Rosario Cespedes DO    Anesthesia: General    Estimated Blood Loss (mL): 50 mL    Complications: None    Specimens: * No specimens in log *    Implants:  Implant Name Type Inv.  Item Serial No.  Lot No. LRB No. Used Action   NAIL IM [id number] [id number] 125DEG L KNEE LIME TI [employer] - [id number]  NAIL IM L420MM [id number] 125DEG L KNEE LIME TI [facility] [employer]- [id number] Left 1 Implanted   KIT SCR LAG L90MM [id number] COMPR L85MM DIA7MM INTEGR INTLOK - [id number]  KIT SCR LAG L90MM [id number] COMPR L85MM DIA7MM INTEGR [facility] [employer]- [id number] Left 1 Implanted   SCREW BNE L55MM DIA5MM ZEFERINO TIB TI INT HEX LO PROF FOR IM - [id number]  SCREW BNE L55MM DIA5MM ZEFERINO TIB TI INT HEX LO PROF FOR IM  [employer] [id number] Left 1 Implanted         Drains: * No LDAs found *    Findings: Left intertrochanteric femur fracture    Electronically signed by Rosario Cespedes DO on 9/30/2022 at 9:41 AM

## 2022-09-30 NOTE — PROGRESS NOTES
Anesthesia [place] phone unavailable. Pt  on insulin drip. Not able to reach [facility] to ask for any orders. Relayed BS to nurse circulators taking pt to OR.
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
## 2022-09-30 NOTE — CARE COORDINATION
09/30/22 1517   Service Assessment   Patient Orientation Alert and Oriented   Cognition Alert   History Provided By Patient   Primary Carol Altman is:   (has a brother named Rodolfo Pizarro, doesnt know his number)   PCP Verified by CM Yes   Last Visit to PCP Within last 6 months   Prior Functional Level Independent in ADLs/IADLs   Current Functional Level Independent in ADLs/IADLs   Can patient return to prior living arrangement Unknown at present   Ability to make needs known: Fair   Family able to assist with home care needs: Yes   Would you like for me to discuss the discharge plan with any other family members/significant others, and if so, who? No   Financial Resources Medicaid   Social/Functional History   Lives With Son   Type of [address] Two level;Bed/Bath upstairs   [address] to enter without rails   Bathroom Shower/Tub Tub/Shower unit   Bathroom Toilet Standard   Bathroom Accessibility Accessible   Receives Help From Vanessa Avendaño.   Homemaking Responsibilities Yes   Ambulation Assistance Independent   Transfer Assistance Independent   Occupation On disability   Discharge Planning   [facility] of [address] [address] [facility] To [facility] None   Potential Assistance Needed Transportation;Home Care   DME Ordered? No   Type of Home Care Services None   Patient expects to be discharged to: Apartment   One/Two Story Residence Two story   Services At/After Discharge   Transition of Care Consult (CM Consult) Home Health;Discharge Planning;Transportation Assistance   [employer] Information Provided?  No   Confirm Follow Up Transport Family   Condition of Participation: Discharge Planning   The Plan for Transition of Care is related to the following treatment goals: pain control , safety   The Patient and/or Patient Representative was provided with a Choice of Provider? Patient   The Patient and/Or Patient Representative agree with the Discharge Plan? Yes   Freedom of Choice list was provided with basic dialogue that supports the patient's individualized plan of care/goals, treatment preferences, and shares the quality data associated with the providers? Yes   Demographics verified and reviewed,Patient plans to return home, has no needs at this time will need transportation , discussed home care and list provided, states his 2 boys can help him. PT eval needed.
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
## 2022-09-30 NOTE — ANESTHESIA PRE PROCEDURE
Department of Anesthesiology  Preprocedure Note       Name:  David Boothe   Age:  47 y.o.  :  1970                                          MRN:  [id number]         Date:  2022      Surgeon: Mini Munguia):  Liana Lange DO    Procedure: Procedure(s):  **ADD ON 0730 APPROVED BLOCK TIME**FEMUR INTERTAN NAIL INSERTION, *FLAT FRANKLIN, SKELETAL TRACTION*    Medications prior to admission:   Prior to Admission medications    Medication Sig Start Date End Date Taking? Authorizing Provider   vitamin D (ERGOCALCIFEROL) 1.25 MG (58423 UT) CAPS capsule Take 1 capsule by mouth once a week 22  Yes Isha Vaz,    blood glucose test strips (ONETOUCH VERIO) strip TEST 3 TIMES A DAY AS NEEDED FOR SYMPTOMS OF IRREGULAR BLOOD GLUCOSE.  DISPENSE SUFFICIENT AMOUNT FOR INDICATED TESTING FREQUENCY PLUS ADDITIONAL TO ACCOMMODATE TESTING NEEDS AS NEEDED 22   Daniel Dai, PA   amLODIPine (NORVASC) 10 MG tablet take 1 tablet by mouth once daily  Patient not taking: Reported on 2022   Daniel Dai, PA   atorvastatin (LIPITOR) 40 MG tablet take 1 tablet by mouth every evening  Patient not taking: Reported on 2022   Daniel Dai PA   ondansetron (ZOFRAN ODT) 4 MG disintegrating tablet Take 1 tablet by mouth every 8 hours as needed for Nausea or Vomiting  Patient not taking: Reported on 2022   Bianca Lawrence DO   atorvastatin (LIPITOR) 20 MG tablet Take 1 tablet by mouth daily  Patient not taking: Reported on 2022   Daniel Meals, PA   insulin glargine (LANTUS SOLOSTAR) 100 UNIT/ML injection pen Inject 25 Units into the skin 2 times daily 22   Daniel Meals, PA   insulin aspart (NOVOLOG FLEXPEN) 100 UNIT/ML injection pen Inject 5 Units into the skin 3 times daily (before meals) 22   Daniel Meals, PA   varenicline (APO-VARENICLINE) 0.5 MG tablet Take 1 tablet by mouth 2 times daily Take on tablet daily for 3 days then one tablet twice daily for the next 3 days then 2 tablets twice a day from then on. 4/6/22   BINA Rey   Blood Glucose Monitoring Suppl (ONE TOUCH ULTRA 2) w/Device KIT 1 kit by Does not apply route daily 3/18/22   Torin Wade, DO   Lancets MISC 1 each by Does not apply route 2 times daily 3/18/22   Torin Wade, DO   Alcohol Swabs 70 % PADS Use as needed with blood glucose checks 2/15/22   Asher Chaparro MD   Insulin Pen Needle ([employer] PEN NEEDLES 29G) 29G X 12MM MISC 1 each by Does not apply route daily 2/15/22   Asher Greenberg MD   vitamin D (ERGOCALCIFEROL) 1.25 MG (08573 UT) CAPS capsule Take 1 capsule by mouth once a week 2/22/22   Asher Greenberg MD   traZODone (DESYREL) 150 MG tablet Take 300 mg by mouth nightly     Historical Provider, MD   clonazePAM (KLONOPIN) 2 MG tablet Take 4 mg by mouth daily as needed for Anxiety.      Historical Provider, MD   albuterol sulfate  (90 Base) MCG/ACT inhaler Inhale 2 puffs into the lungs every 4 hours as needed for Wheezing    Historical Provider, MD       Current medications:    Current Facility-Administered Medications   Medication Dose Route Frequency Provider Last Rate Last Admin    ceFAZolin (ANCEF) 2000 mg in sterile water 20 mL IV syringe  2,000 mg IntraVENous On Call to Gino Brandt MD        sodium chloride flush 0.9 % injection 5-40 mL  5-40 mL IntraVENous 2 times per day Stephie Allison MD        sodium chloride flush 0.9 % injection 5-40 mL  5-40 mL IntraVENous PRN Stephie Allison MD        0.9 % sodium chloride infusion   IntraVENous PRN Stephie Allison MD        acetaminophen (TYLENOL) tablet 1,000 mg  1,000 mg Oral 3 times per day Stephie Allison MD   1,000 mg at 09/29/22 4233    oxyCODONE (ROXICODONE) immediate release tablet 5 mg  5 mg Oral Q6H PRN Stephie Allison MD   5 mg at 09/30/22 0349    methocarbamol (ROBAXIN) tablet 750 mg  750 mg Oral Q6H Stephie Allison MD   750 mg at 09/30/22 0220    gabapentin (NEURONTIN) capsule 300 mg  300 mg Oral Q8H Alma Rosa Carrasco MD   300 mg at 09/30/22 0409    ondansetron (ZOFRAN-ODT) disintegrating tablet 4 mg  4 mg Oral Q8H PRN Alma Rosa Carrasco MD        Or    ondansetron [facility]) injection 4 mg  4 mg IntraVENous Q6H PRN Alma Rosa Carrasco MD        polyethylene glycol [facility]) packet 17 g  17 g Oral Daily Alma Rosa Carrasco MD        [facility]) tablet 8.6 mg  1 tablet Oral Daily PRN Alma Rosa Carrasco MD        lactated ringers infusion   IntraVENous Continuous Alma Rosa Carrasco MD 90 mL/hr at 09/29/22 2117 New Bag at 09/29/22 2117    glucose chewable tablet 16 g  4 tablet Oral PRN Alma Rosa Carrasco MD        dextrose bolus 10% 125 mL  125 mL IntraVENous PRN Alma Rosa Carrasco MD        Or    dextrose bolus 10% 250 mL  250 mL IntraVENous PRN Alma Rosa Carrasco MD        glucagon (rDNA) injection 1 mg  1 mg SubCUTAneous PRN Alma Rosa Carrasco MD        dextrose 10 % infusion   IntraVENous Continuous PRN Alma Rosa Carrasco MD        OLANZapine [facility]) tablet 2.5 mg  2.5 mg Oral Daily Alma Rosa Carrasco MD        PARoxetine (PAXIL) tablet 20 mg  20 mg Oral Nightly Alma Rosa Carrasco MD   20 mg at 09/30/22 0002    traZODone (DESYREL) tablet 300 mg  300 mg Oral Nightly Alma Rosa Carrasco MD   300 mg at 09/30/22 0002    amLODIPine (NORVASC) tablet 10 mg  10 mg Oral Daily Alma Rosa Carrasco MD        atorvastatin (LIPITOR) tablet 40 mg  40 mg Oral Nightly Alma Rosa Carrasco MD   40 mg at 09/30/22 0221    atorvastatin (LIPITOR) tablet 20 mg  20 mg Oral QAM Alma Rosa Carrasco MD        pantoprazole (PROTONIX) tablet 40 mg  40 mg Oral QAM AC Alma Rosa Carrasco MD        insulin regular (HUMULIN R;NOVOLIN R) 100 Units in sodium chloride 0.9 % 100 mL infusion  1-50 Units/hr IntraVENous Continuous Alma Rosa Carrasco MD 6.4 mL/hr at 09/30/22 0601 6.36 Units/hr at 09/30/22 0601    glucose chewable tablet 16 g  4 tablet Oral PRN Lonna Boas Prosper Siddiqui MD        dextrose bolus 10% 125 mL  125 mL IntraVENous PRN Camilo Lorenzo MD        Or    dextrose bolus 10% 250 mL  250 mL IntraVENous PRN Camilo Lorenzo MD        glucagon (rDNA) injection 1 mg  1 mg SubCUTAneous PRN Camilo Lorenzo MD        dextrose 10 % infusion   IntraVENous Continuous PRN Camilo Lorenzo MD           Allergies:     Allergies   Allergen Reactions    Azithromycin Swelling    Codeine Itching    Ibuprofen Other (See Comments)    Morphine Other (See Comments)    Tramadol Itching       Problem List:    Patient Active Problem List   Diagnosis Code    Colitis K52.9    Uncontrolled type 1 diabetes mellitus with hyperglycemia ([facility]) E10.65    Melena K92.1    Serum lipase elevation R74.8    Fibromyalgia M79.7    Spinal stenosis M48.00    Abnormal stress test R94.39    Vitamin D deficiency E55.9    Acute blood loss anemia D62    Hematemesis with nausea K92.0    Ulcer of esophagus without bleeding K22.10    Candida esophagitis ([facility]) B37.81    Neuropathy associated with endocrine disorder ([facility]) E34.9, G63    Osteoarthritis of knee M17.10    Other chest pain R07.89    Shortness of breath R06.02    Major depressive disorder in partial remission ([facility]) F32.4    Lung nodule < 6cm on CT R91.1    Closed comminuted intertrochanteric fracture of femur, initial encounter ([facility] A37.858Q       Past Medical History:        Diagnosis Date    Diabetes mellitus ([facility]     Obstructive lung disease ([facility]     8/2022    Panic attack     Seizures ([facility]        Past Surgical History:        Procedure Laterality Date    APPENDECTOMY      HAND SURGERY      UPPER GASTROINTESTINAL ENDOSCOPY N/A 2/15/2022    EGD BIOPSY performed by Kasie Briggs MD at [facility]       Social History:    Social History     Tobacco Use    Smoking status: Every Day     Packs/day: 1.50     Years: 41.00     Pack years: 61.50     Types: Cigarettes    Smokeless tobacco: Never   Substance Use Topics  Alcohol use: Not Currently                                Ready to quit: Not Answered  Counseling given: Not Answered      Vital Signs (Current):   Vitals:    09/29/22 2130 09/29/22 2155 09/30/22 0349 09/30/22 0419   BP:   (!) 151/88    Pulse:   70    Resp:  20 16 16   Temp:   97.9 °F (36.6 °C)    TempSrc:   Oral    SpO2:   92%    Weight: 125 lb (56.7 kg)      Height: 5' 7\" (1.702 m)                                                 BP Readings from Last 3 Encounters:   09/30/22 (!) 151/88   09/05/22 138/85   06/25/22 (!) 129/109       NPO Status: Time of last liquid consumption: 2200                        Time of last solid consumption: 2100                                                      BMI:   Wt Readings from Last 3 Encounters:   09/29/22 125 lb (56.7 kg)   09/05/22 120 lb (54.4 kg)   06/25/22 150 lb (68 kg)     Body mass index is 19.58 kg/m².     CBC:   Lab Results   Component Value Date/Time    WBC 6.9 09/29/2022 05:10 PM    RBC 4.07 09/29/2022 05:10 PM    HGB 11.5 09/29/2022 05:10 PM    HCT 34.8 09/29/2022 05:10 PM    MCV 85.5 09/29/2022 05:10 PM    RDW 14.3 09/29/2022 05:10 PM     09/29/2022 05:10 PM       CMP:   Lab Results   Component Value Date/Time     09/30/2022 02:29 AM    K 4.0 09/30/2022 02:29 AM     09/30/2022 02:29 AM    CO2 20 09/30/2022 02:29 AM    BUN 22 09/30/2022 02:29 AM    CREATININE 1.31 09/30/2022 02:29 AM    GFRAA >60 09/30/2022 02:29 AM    LABGLOM 58 09/30/2022 02:29 AM    GLUCOSE 263 09/30/2022 02:29 AM    PROT 7.0 09/05/2022 06:46 PM    CALCIUM 8.7 09/30/2022 02:29 AM    BILITOT 0.5 09/05/2022 06:46 PM    ALKPHOS 91 09/05/2022 06:46 PM    AST 16 09/05/2022 06:46 PM    ALT 9 09/05/2022 06:46 PM       POC Tests:   Recent Labs     09/29/22 2002 09/29/22 2232 09/30/22  0556   POCGLU 218*   < > 219*   POCNA 147*  --   --    POCK 2.2*  --   --    POCCL 120*  --   --    POCBUN 11  --   --    POCHEMO 4.6*  --   --    POCHCT 14*  --   --     < > = values in this interval not displayed. Coags: No results found for: PROTIME, INR, APTT    HCG (If Applicable): No results found for: PREGTESTUR, PREGSERUM, HCG, HCGQUANT     ABGs: No results found for: PHART, PO2ART, HSO8YVZ, OHL5ARM, BEART, X7JFNEXK     Type & Screen (If Applicable):  No results found for: LABABO, LABRH    Drug/Infectious Status (If Applicable):  Lab Results   Component Value Date/Time    HEPCAB REACTIVE 04/06/2022 01:49 PM       COVID-19 Screening (If Applicable):   Lab Results   Component Value Date/Time    COVID19 Not Detected 02/14/2022 02:38 PM           Anesthesia Evaluation  Patient summary reviewed no history of anesthetic complications:   Airway: Mallampati: II  TM distance: >3 FB   Neck ROM: full  Mouth opening: > = 3 FB   Dental:          Pulmonary:normal exam    (+) shortness of breath: no interval change,                             Cardiovascular:Negative CV ROS            Rhythm: regular  Rate: normal                    Neuro/Psych:   (+) neuromuscular disease:, psychiatric history:depression/anxiety             GI/Hepatic/Renal:   (+) PUD,           Endo/Other:    (+) DiabetesType II DM, no interval change, , .                 Abdominal:             Vascular: negative vascular ROS. Other Findings:           Anesthesia Plan      general     ASA 2       Induction: intravenous. Anesthetic plan and risks discussed with patient. Use of blood products discussed with patient whom consented to blood products. Plan discussed with CRNA.                     Easton Alvarado MD   9/30/2022
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
## 2022-09-30 NOTE — OP NOTE
Operative Note      Patient: Santosh Pino  YOB: 1970  MRN: [id number]    Pre-Op Diagnosis: Left intertrochanteric femur fracture    Post-Op Diagnosis: Same       Procedure(s):  Left femur cephalomedullary nail insertion    Surgeon(s):  Marjan Abdul DO    Assistant:  Resident: Crys Dc DO; Keanu Benson DO    Anesthesia: General    Estimated Blood Loss (mL): 50    Complications: None    Specimens:   * No specimens in log *    Implants:  S&N 11.5 mm x 42 cm 125 deg Intertan CMN with 90/85 mm lag/compression screws       Drains: * No LDAs found *    Indications: This is a 47 y.o. male who sustained a left intertrochanteric femur fracture. We discussed the nature of the injury as well as the indications for surgical intervention as well as the associated risks, benefits, and alternatives of the procedure. The patient stated clear understanding, was willing to proceed, provided written informed consent, and had his operative site marked. The patient received appropriate preoperative clearance/risk stratification from the primary and/or consulting services. Procedure: The patient was transported to the operating room and general anesthesia was administered by the anesthesia providers without complication. The patient was then transferred to a radiolucent flattop table. All bony prominences were well padded and the patient was adequately secured to the bed. The ipsilateral upper extremity was secured over the chest and well padded. The left lower extremity was isolated, scrubbed with Hibiclens followed by alcohol and then prepped and draped in the usual sterile fashion. A timeout was performed that included all involved parties that confirmed the correct patient, operative site, and procedure. After all parties agreed, we continued. To correct the current malalignment, we elected to apply sterile skeletal traction. A 2.0 mm K-wire was placed in the anterior aspect of the distal femur. A Hansa bow was attached along with sterile rope and weights. The alignment was further adjusted by percutaneous insertion of shoulder hook until we achieved an acceptable reduction and placed a percutaneous 2.4 mm K-wire into the lateral hip from femoral shaft, across fracture, into the head/neck. We then percutaneously inserted the starting guidewire onto the medial aspect of the greater trochanter. This was adjusted until the appropriate start point and trajectory was achieved. It was then advanced into the proximal femur. An incision was made over the guidewire to allow insertion of the opening reamer and soft tissue sleeve. It was advanced over the wire to approximately the level of the lesser trochanter. Everything was removed and exchanged for the ball-tipped guidewire. It was advanced through the intramedullary canal and nail length was measured. We sequentially reamed up to 13 mm. The nail was then inserted over the guidewire to the desired depth and rotation. Lateral incision was made on the thigh to allow for insertion of the trocar for the lag and compression screws. The guidewire was placed to the desired location in the middle of the femoral head and neck and advanced into the subchondral bone. We then followed the appropriate steps to measure drill and insert the lag and compression screw construct in such a fashion that allowed for 5 mm of compression. We then inserted the distal interlocking screw through a percutaneous incision and perfect Ambler technique. All components of the skeletal traction were removed as well as the insertion handle of the nail. Final AP and lateral radiographs of the entire femur demonstrated acceptable reduction and safe and appropriate implant placement in all views.   The wounds were copiously irrigated with normal saline solution the soft tissues were then closed in a standard layered fashion absorbable sutures and the incision sites closed with staples. The field was cleaned and dried and sterile bandages were applied. The patient was successfully extubated by the anesthesia providers with no known complications and transferred to the recovery room. Postoperative plan: We will obtain digital x-rays of the operative femur in the recovery room. The patient will be placed on 23 hours of prophylactic antibiotics and may resume DVT prophylaxis on postoperative day 1 if felt to be appropriate by the primary service. He will be permitted to begin weightbearing as tolerated immediately. Will be evaluated by Physical and Occupational Therapy. Following discharge from the hospital the patient will be evaluated in the orthopedic trauma clinic approximately 10 to 14 days from the date of today's procedure for a wound check and anticipated staple removal if healing appropriately at that time.       Electronically signed by Maria Esther Stanton DO on 9/30/2022 at 10:04 AM

## 2022-09-30 NOTE — PROGRESS NOTES
C- Spine Evaluation for Spine Clearance:    Pt is a 47 y.o. male who was admitted on 9/30 s/p acute comminuted left trochanteric fracture. Pt w/ complaints of left hip pain. C-Spine precautions of C-collar with spinal neutrality maintained since arrival with current exam directed at further evaluation of spine for clearance purposes. Pt chart and current images reviewed. CT C-Spine negative for acute fracture, subluxation, or traumatic injury. Patient does not have a distracting injury, is not acutely intoxicated and is alert, oriented and fully able to participate in exam.      Pt denies c-spine pain while resting in c-collar. C-collar removed w/ c-spine neutrality maintained. Pt denies midline pain with palpation of spinous processes and axial loading. Pt demonstrated full flexion, extension, and SB ROM without complaints of pain. TLS precautions of supine position maintained since arrival.  Pt denies midline pain with palpation of spinous processes. CT dorsal lumbar negative for acute fracture, subluxation, or traumatic injury. C-spine is considered cleared w/out need for further imaging, evaluation, or continuation of c-collar. TLS considered clear w/out need for further imagine, evaluation, or continuation of supine bedrest precautions.     Electronically signed by Deepthi Haider DO on 9/30/2022 at 4:20 AM

## 2022-09-30 NOTE — CARE COORDINATION
Brief Trauma Ortho Coordinator Note    Patient in OR with Dr Joey Barreto. Follow up appt scheduled with Eduar Quiros 10/12 at 1:40 pm. Discharge instructions in chart per resident. Patient may WBAT and PT/OT to eval and treat. Will follow patient.     Electronically signed by Vicci Ormond, RN on 9/30/2022 at 12:15 PM

## 2022-09-30 NOTE — ANESTHESIA POSTPROCEDURE EVALUATION
Department of Anesthesiology  Postprocedure Note    Patient: Melvin Aviles  MRN: [id number]  YOB: 1970  Date of evaluation: 9/30/2022      Procedure Summary     Date: 09/30/22 Room / Location: [address]    Anesthesia Start: 0730 Anesthesia Stop: 4234    Procedure: LEFT FEMUR CEPHALOMEDULLARY NAIL (Left) Diagnosis:       Closed fracture of left femur, unspecified fracture morphology, unspecified portion of femur[facility]      (Closed fracture of left femur, unspecified fracture morphology, unspecified portion of femur, initial encounter ([facility] [P96.76DO])    Surgeons: Bonny Linder DO Responsible Provider: Zofia Saha MD    Anesthesia Type: General ASA Status: 2          Anesthesia Type: General    Sal Phase I: Sal Score: 8    Sal Phase II:        Anesthesia Post Evaluation    Patient location during evaluation: PACU  Patient participation: complete - patient participated  Level of consciousness: awake and alert  Pain score: 3  Airway patency: patent  Nausea & Vomiting: no vomiting and no nausea  Complications: no  Cardiovascular status: hemodynamically stable  Respiratory status: acceptable  Hydration status: stable
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
## 2022-10-01 VITALS
SYSTOLIC BLOOD PRESSURE: 125 MMHG | HEIGHT: 67 IN | OXYGEN SATURATION: 89 % | BODY MASS INDEX: 19.62 KG/M2 | DIASTOLIC BLOOD PRESSURE: 72 MMHG | TEMPERATURE: 98.3 F | RESPIRATION RATE: 12 BRPM | WEIGHT: 125 LBS | HEART RATE: 77 BPM

## 2022-10-01 LAB
ABSOLUTE EOS #: 0 K/UL (ref 0–0.44)
ABSOLUTE IMMATURE GRANULOCYTE: 0 K/UL (ref 0–0.3)
ABSOLUTE LYMPH #: 2.05 K/UL (ref 1.1–3.7)
ABSOLUTE MONO #: 0.95 K/UL (ref 0.1–1.2)
ANION GAP SERPL CALCULATED.3IONS-SCNC: 11 MMOL/L (ref 9–17)
BASOPHILS # BLD: 0 % (ref 0–2)
BASOPHILS ABSOLUTE: 0 K/UL (ref 0–0.2)
BUN BLDV-MCNC: 22 MG/DL (ref 6–20)
CALCIUM SERPL-MCNC: 8.2 MG/DL (ref 8.6–10.4)
CHLORIDE BLD-SCNC: 104 MMOL/L (ref 98–107)
CO2: 23 MMOL/L (ref 20–31)
CREAT SERPL-MCNC: 1.41 MG/DL (ref 0.7–1.2)
EOSINOPHILS RELATIVE PERCENT: 0 % (ref 1–4)
GFR AFRICAN AMERICAN: >60 ML/MIN
GFR NON-AFRICAN AMERICAN: 53 ML/MIN
GFR SERPL CREATININE-BSD FRML MDRD: ABNORMAL ML/MIN/{1.73_M2}
GLUCOSE BLD-MCNC: 119 MG/DL (ref 75–110)
GLUCOSE BLD-MCNC: 129 MG/DL (ref 70–99)
GLUCOSE BLD-MCNC: 135 MG/DL (ref 75–110)
GLUCOSE BLD-MCNC: 139 MG/DL (ref 75–110)
GLUCOSE BLD-MCNC: 149 MG/DL (ref 75–110)
GLUCOSE BLD-MCNC: 174 MG/DL (ref 75–110)
GLUCOSE BLD-MCNC: 209 MG/DL (ref 75–110)
GLUCOSE BLD-MCNC: 246 MG/DL (ref 75–110)
GLUCOSE BLD-MCNC: 81 MG/DL (ref 75–110)
GLUCOSE BLD-MCNC: 93 MG/DL (ref 75–110)
HCT VFR BLD CALC: 22.8 % (ref 40.7–50.3)
HEMOGLOBIN: 7.7 G/DL (ref 13–17)
IMMATURE GRANULOCYTES: 0 %
LYMPHOCYTES # BLD: 13 % (ref 24–43)
MAGNESIUM: 1.6 MG/DL (ref 1.6–2.6)
MCH RBC QN AUTO: 28.2 PG (ref 25.2–33.5)
MCHC RBC AUTO-ENTMCNC: 33.8 G/DL (ref 28.4–34.8)
MCV RBC AUTO: 83.5 FL (ref 82.6–102.9)
MONOCYTES # BLD: 6 % (ref 3–12)
MORPHOLOGY: ABNORMAL
NRBC AUTOMATED: 0 PER 100 WBC
PDW BLD-RTO: 14.6 % (ref 11.8–14.4)
PLATELET # BLD: 160 K/UL (ref 138–453)
PMV BLD AUTO: 12.1 FL (ref 8.1–13.5)
POTASSIUM SERPL-SCNC: 4.1 MMOL/L (ref 3.7–5.3)
RBC # BLD: 2.73 M/UL (ref 4.21–5.77)
RBC # BLD: ABNORMAL 10*6/UL
SEG NEUTROPHILS: 81 % (ref 36–65)
SEGMENTED NEUTROPHILS ABSOLUTE COUNT: 12.8 K/UL (ref 1.5–8.1)
SODIUM BLD-SCNC: 138 MMOL/L (ref 135–144)
WBC # BLD: 15.8 K/UL (ref 3.5–11.3)

## 2022-10-01 PROCEDURE — 97530 THERAPEUTIC ACTIVITIES: CPT

## 2022-10-01 PROCEDURE — 80048 BASIC METABOLIC PNL TOTAL CA: CPT

## 2022-10-01 PROCEDURE — 97535 SELF CARE MNGMENT TRAINING: CPT

## 2022-10-01 PROCEDURE — 97162 PT EVAL MOD COMPLEX 30 MIN: CPT

## 2022-10-01 PROCEDURE — 36415 COLL VENOUS BLD VENIPUNCTURE: CPT

## 2022-10-01 PROCEDURE — 2580000003 HC RX 258: Performed by: ANESTHESIOLOGY

## 2022-10-01 PROCEDURE — 2580000003 HC RX 258: Performed by: STUDENT IN AN ORGANIZED HEALTH CARE EDUCATION/TRAINING PROGRAM

## 2022-10-01 PROCEDURE — 6370000000 HC RX 637 (ALT 250 FOR IP): Performed by: STUDENT IN AN ORGANIZED HEALTH CARE EDUCATION/TRAINING PROGRAM

## 2022-10-01 PROCEDURE — 85025 COMPLETE CBC W/AUTO DIFF WBC: CPT

## 2022-10-01 PROCEDURE — 83735 ASSAY OF MAGNESIUM: CPT

## 2022-10-01 PROCEDURE — 82947 ASSAY GLUCOSE BLOOD QUANT: CPT

## 2022-10-01 PROCEDURE — 97166 OT EVAL MOD COMPLEX 45 MIN: CPT

## 2022-10-01 PROCEDURE — 6360000002 HC RX W HCPCS: Performed by: STUDENT IN AN ORGANIZED HEALTH CARE EDUCATION/TRAINING PROGRAM

## 2022-10-01 RX ORDER — INSULIN LISPRO 100 [IU]/ML
0-16 INJECTION, SOLUTION INTRAVENOUS; SUBCUTANEOUS
Status: DISCONTINUED | OUTPATIENT
Start: 2022-10-01 | End: 2022-10-01 | Stop reason: HOSPADM

## 2022-10-01 RX ORDER — GABAPENTIN 300 MG/1
300 CAPSULE ORAL EVERY 8 HOURS
Qty: 15 CAPSULE | Refills: 0 | Status: ON HOLD | OUTPATIENT
Start: 2022-10-01 | End: 2022-10-06

## 2022-10-01 RX ORDER — INSULIN GLARGINE 100 [IU]/ML
15 INJECTION, SOLUTION SUBCUTANEOUS NIGHTLY
Status: DISCONTINUED | OUTPATIENT
Start: 2022-10-01 | End: 2022-10-01 | Stop reason: HOSPADM

## 2022-10-01 RX ORDER — METHOCARBAMOL 750 MG/1
750 TABLET, FILM COATED ORAL EVERY 6 HOURS
Qty: 40 TABLET | Refills: 0 | Status: SHIPPED | OUTPATIENT
Start: 2022-10-01 | End: 2022-10-11

## 2022-10-01 RX ORDER — OXYCODONE HYDROCHLORIDE 5 MG/1
5 TABLET ORAL EVERY 8 HOURS PRN
Qty: 15 TABLET | Refills: 0 | Status: SHIPPED | OUTPATIENT
Start: 2022-10-01 | End: 2022-10-12 | Stop reason: SDUPTHER

## 2022-10-01 RX ORDER — GABAPENTIN 300 MG/1
300 CAPSULE ORAL EVERY 8 HOURS
Qty: 15 CAPSULE | Refills: 0 | Status: SHIPPED | OUTPATIENT
Start: 2022-10-01 | End: 2022-10-01 | Stop reason: SDUPTHER

## 2022-10-01 RX ORDER — METHOCARBAMOL 750 MG/1
750 TABLET, FILM COATED ORAL EVERY 6 HOURS
Qty: 40 TABLET | Refills: 0 | Status: SHIPPED | OUTPATIENT
Start: 2022-10-01 | End: 2022-10-01 | Stop reason: SDUPTHER

## 2022-10-01 RX ORDER — INSULIN LISPRO 100 [IU]/ML
0-4 INJECTION, SOLUTION INTRAVENOUS; SUBCUTANEOUS NIGHTLY
Status: DISCONTINUED | OUTPATIENT
Start: 2022-10-01 | End: 2022-10-01 | Stop reason: HOSPADM

## 2022-10-01 RX ADMIN — SENNOSIDES 8.6 MG: 8.6 TABLET, COATED ORAL at 08:22

## 2022-10-01 RX ADMIN — OXYCODONE 5 MG: 5 TABLET ORAL at 05:30

## 2022-10-01 RX ADMIN — GABAPENTIN 300 MG: 300 CAPSULE ORAL at 14:43

## 2022-10-01 RX ADMIN — METHOCARBAMOL TABLETS 750 MG: 750 TABLET, COATED ORAL at 06:17

## 2022-10-01 RX ADMIN — Medication 2000 MG: at 00:23

## 2022-10-01 RX ADMIN — POLYETHYLENE GLYCOL 3350 17 G: 17 POWDER, FOR SOLUTION ORAL at 08:21

## 2022-10-01 RX ADMIN — ATORVASTATIN CALCIUM 20 MG: 20 TABLET, FILM COATED ORAL at 08:22

## 2022-10-01 RX ADMIN — OXYCODONE 5 MG: 5 TABLET ORAL at 14:43

## 2022-10-01 RX ADMIN — INSULIN LISPRO 4 UNITS: 100 INJECTION, SOLUTION INTRAVENOUS; SUBCUTANEOUS at 12:17

## 2022-10-01 RX ADMIN — PANTOPRAZOLE SODIUM 40 MG: 40 TABLET, DELAYED RELEASE ORAL at 05:33

## 2022-10-01 RX ADMIN — METHOCARBAMOL TABLETS 750 MG: 750 TABLET, COATED ORAL at 14:43

## 2022-10-01 RX ADMIN — OLANZAPINE 2.5 MG: 5 TABLET, FILM COATED ORAL at 08:24

## 2022-10-01 RX ADMIN — METHOCARBAMOL TABLETS 750 MG: 750 TABLET, COATED ORAL at 00:22

## 2022-10-01 RX ADMIN — GABAPENTIN 300 MG: 300 CAPSULE ORAL at 03:30

## 2022-10-01 RX ADMIN — ACETAMINOPHEN 1000 MG: 500 TABLET ORAL at 05:30

## 2022-10-01 RX ADMIN — SODIUM CHLORIDE, POTASSIUM CHLORIDE, SODIUM LACTATE AND CALCIUM CHLORIDE: 600; 310; 30; 20 INJECTION, SOLUTION INTRAVENOUS at 05:31

## 2022-10-01 RX ADMIN — SODIUM CHLORIDE, PRESERVATIVE FREE 10 ML: 5 INJECTION INTRAVENOUS at 08:23

## 2022-10-01 RX ADMIN — AMLODIPINE BESYLATE 10 MG: 10 TABLET ORAL at 08:22

## 2022-10-01 ASSESSMENT — PAIN DESCRIPTION - FREQUENCY
FREQUENCY: INTERMITTENT
FREQUENCY: INTERMITTENT

## 2022-10-01 ASSESSMENT — PAIN DESCRIPTION - LOCATION
LOCATION: LEG
LOCATION: LEG

## 2022-10-01 ASSESSMENT — PAIN DESCRIPTION - ONSET
ONSET: GRADUAL
ONSET: GRADUAL

## 2022-10-01 ASSESSMENT — PAIN DESCRIPTION - ORIENTATION
ORIENTATION: LEFT
ORIENTATION: LEFT

## 2022-10-01 ASSESSMENT — PAIN SCALES - GENERAL
PAINLEVEL_OUTOF10: 7
PAINLEVEL_OUTOF10: 3

## 2022-10-01 ASSESSMENT — PAIN DESCRIPTION - DESCRIPTORS
DESCRIPTORS: SHARP
DESCRIPTORS: SHARP

## 2022-10-01 ASSESSMENT — PAIN DESCRIPTION - PAIN TYPE
TYPE: ACUTE PAIN;SURGICAL PAIN
TYPE: ACUTE PAIN;SURGICAL PAIN
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
## 2022-10-01 NOTE — PROGRESS NOTES
Kailash Michel was evaluated today and a DME order was entered for a wheeled walker because he requires this to successfully complete daily living tasks of ambulating. A wheeled walker is necessary due to the patient's unsteady gait, upper body weakness, and inability to  an ambulation device; and he can ambulate only by pushing a walker instead of a lesser assistive device such as a cane, crutch, or standard walker. The need for this equipment was discussed with the patient and he understands and is in agreement. Attending Note      I have reviewed the above TECSS note(s) and I either performed the key elements of the medical history and physical exam or was present with the resident when the key elements of the medical history and physical exam were performed. I have discussed the findings, established the care plan and recommendations with Resident, MYLENE RN, bedside nurse.     Kaylee Farr MD  10/1/2022  7:55 PM

## 2022-10-01 NOTE — CARE COORDINATION
CM spoke with patient at bedside to discuss transitional planning. Patient refuses [place] [address] services at this time, stating that his two sons will be able to assist him if needed. Patient has order for folding walker. DME order faxed to [address]. CM called and spoke with Raiza Darby with [address] who states she will have the on-call representative call and update CM with when they are able to deliver walker. 1345- CM received call from [place] with [address] [facility] stating they will be unable to deliver a walker to patient until Monday. 1415- CM spoke with Kenneth Boyle from [facility] who states they accept patient's insurance and are able to deliver walker to patient's room today. Order, face to face document and payor information faxed to [facility] at [phone].

## 2022-10-01 NOTE — PROGRESS NOTES
PROGRESS NOTE      PATIENT NAME: Vinayak [facility] RECORD NO. [id number]  DATE: 10/1/2022  SURGEON: Laith Fitzgerald MD  PRIMARY CARE PHYSICIAN: BINA Gomez    HD: # 2    ASSESSMENT    Patient Active Problem List   Diagnosis    Colitis    Uncontrolled type 1 diabetes mellitus with hyperglycemia (HCC)    Melena    Serum lipase elevation    Fibromyalgia    Spinal stenosis    Abnormal stress test    Vitamin D deficiency    Acute blood loss anemia    Hematemesis with nausea    Ulcer of esophagus without bleeding    Candida esophagitis (HCC)    Neuropathy associated with endocrine disorder (HCC)    Osteoarthritis of knee    Other chest pain    Shortness of breath    Major depressive disorder in partial remission (HCC)    Lung nodule < 6cm on CT    Closed comminuted intertrochanteric fracture of femur, initial encounter [facility])       MEDICAL DECISION MAKING AND PLAN    LEFT IT femur fracture  POD#1 CMN  WBAT  Post-op ancef  DVT Prophylaxis - lovenox BID  DC planning, PT/OT      SUBJECTIVE    Josue Reach seen and examined. Denies complaints this Clarissa Espitia to be discharged home. OBJECTIVE  VITALS: Temp: Temp: 97.7 °F (36.5 °C)Temp  Av.7 °F (36.5 °C)  Min: 97 °F (36.1 °C)  Max: 98.9 °F (97.5 °C) BP Systolic (65KXV), GDE:194 , Min:108 , TQO:898   Diastolic (44KAN), DGI:15, Min:57, Max:89   Pulse Pulse  Av.2  Min: 65  Max: 110 Resp Resp  Avg: 15.4  Min: 13  Max: 21 Pulse ox SpO2  Av %  Min: 92 %  Max: 99 %    GENERAL: alert, no distress  NEURO: GCS 15  HEENT: NCAT  LUNGS: unlabored  HEART: normal rate and regular rhythm  ABDOMEN: soft, non-tender, non-distended  EXTREMITY: no cyanosis, clubbing or edema. Incision CDI. Dressings w/out strikethrough    I/O last 3 completed shifts: In: 3036.5 [I.V.:3036.5]  Out: 1150 [Urine:1150]    Drain/tube output:   In: 3036.5 [I.V.:3036.5]  Out: 650 [Urine:650]    LAB:  CBC:   Recent Labs     22  1710 22  1241 10/01/22  0348   WBC 6.9  --  15.8*   HGB 11.5* 9.9* 7.7*   HCT 34.8* 29.6* 22.8*   MCV 85.5  --  83.5     --  160     BMP:   Recent Labs     09/29/22  1710 09/29/22 2002 09/30/22 0229 10/01/22  0348   *  --  136 138   K 5.3  --  4.0 4.1   CL 97*  --  100 104   CO2 22  --  20 23   BUN 25*  --  22* 22*   CREATININE 1.39* 0.83 1.31* 1.41*   GLUCOSE 566*  --  263* 129*           Attending Note    Discharge planning  I have reviewed the above [facility] note(s) and I either performed the key elements of the medical history and physical exam or was present with the resident when the key elements of the medical history and physical exam were performed. I have discussed the findings, established the care plan and recommendations with Resident, TECSS RN, bedside nurse.     Luther Giraldo MD  10/1/2022  7:55 PM

## 2022-10-02 NOTE — PROGRESS NOTES
Patient was discharged with paper prescriptions for everything except vitamin D. Profiling vitamin D for now.  HR

## 2022-10-03 LAB — GLUCOSE BLD-MCNC: 118 MG/DL (ref 75–110)
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
## 2022-10-04 LAB
ESTIMATED AVERAGE GLUCOSE: 240 MG/DL
HBA1C MFR BLD: 10 % (ref 4–6)

## 2022-10-04 RX ORDER — GABAPENTIN 300 MG/1
CAPSULE ORAL
Qty: 15 CAPSULE | Refills: 0 | OUTPATIENT
Start: 2022-10-04

## 2022-10-12 DIAGNOSIS — S72.142A CLOSED FRACTURE OF FEMUR, INTERTROCHANTERIC, LEFT, INITIAL ENCOUNTER (HCC): ICD-10-CM

## 2022-10-12 RX ORDER — OXYCODONE HYDROCHLORIDE 5 MG/1
5 TABLET ORAL EVERY 6 HOURS PRN
Qty: 20 TABLET | Refills: 0 | Status: SHIPPED | OUTPATIENT
Start: 2022-10-12 | End: 2022-10-17

## 2022-10-12 NOTE — TELEPHONE ENCOUNTER
Patient called to confirm script was sent to pharmacy. Informed patient that medication has been sent.

## 2022-10-12 NOTE — TELEPHONE ENCOUNTER
DOS-9/30  Procedure(s):  Left femur cephalomedullary nail insertion  Patient missed today's post op visit  due transportation issues. He says he's in extreme pain and really want to assure there isn't any infection. I stressed the importance on him following up on his rescheduled appointment.  Per [facility] verbal order , she would sent over enough pain medication until he makes his next appointment on 10/18

## 2022-10-17 ENCOUNTER — TELEPHONE (OUTPATIENT)
Dept: ORTHOPEDIC SURGERY | Age: 52
End: 2022-10-17

## 2022-10-17 NOTE — TELEPHONE ENCOUNTER
DOS-9/30  Procedure(s):  Left femur cephalomedullary nail insertion  Per Rashid Jurado verbal order , she would sent over enough pain medication until he makes his next appointment on 10/18. I contacted the patient to stress the Importance on making post op visit. He says the 10/18 appointment was  to early for him to catch the bus. The patient says he has no more ride through his insurance when I mentioned him calling his insurance for medical transportation . I tired to see if I can find a later appointment for him on 10/18 but due to him changing his appointment  there are no  availabilities .  Please advise patient is asking for med refill

## 2022-10-25 ENCOUNTER — OFFICE VISIT (OUTPATIENT)
Dept: ORTHOPEDIC SURGERY | Age: 52
End: 2022-10-25

## 2022-10-25 VITALS — HEIGHT: 67 IN | BODY MASS INDEX: 19.62 KG/M2 | WEIGHT: 125 LBS

## 2022-10-25 DIAGNOSIS — S72.142D CLOSED DISPLACED INTERTROCHANTERIC FRACTURE OF LEFT FEMUR WITH ROUTINE HEALING, SUBSEQUENT ENCOUNTER: Primary | ICD-10-CM

## 2022-10-25 PROCEDURE — 99024 POSTOP FOLLOW-UP VISIT: CPT | Performed by: ORTHOPAEDIC SURGERY

## 2022-10-25 RX ORDER — OXYCODONE HYDROCHLORIDE 5 MG/1
5 TABLET ORAL EVERY 6 HOURS PRN
Qty: 30 TABLET | Refills: 0 | Status: SHIPPED | OUTPATIENT
Start: 2022-10-25 | End: 2022-11-18 | Stop reason: SDUPTHER

## 2022-10-25 RX ORDER — ACETAMINOPHEN 500 MG
1000 TABLET ORAL EVERY 6 HOURS PRN
Qty: 112 TABLET | Refills: 0 | Status: SHIPPED | OUTPATIENT
Start: 2022-10-25

## 2022-10-25 RX ORDER — CYCLOBENZAPRINE HCL 10 MG
10 TABLET ORAL 3 TIMES DAILY PRN
Qty: 50 TABLET | Refills: 0 | Status: SHIPPED | OUTPATIENT
Start: 2022-10-25 | End: 2022-11-18 | Stop reason: SDUPTHER
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
## 2022-11-04 ENCOUNTER — APPOINTMENT (OUTPATIENT)
Dept: CT IMAGING | Age: 52
DRG: 190 | End: 2022-11-04
Payer: MEDICARE

## 2022-11-04 ENCOUNTER — APPOINTMENT (OUTPATIENT)
Dept: GENERAL RADIOLOGY | Age: 52
DRG: 190 | End: 2022-11-04
Payer: MEDICARE

## 2022-11-04 ENCOUNTER — APPOINTMENT (OUTPATIENT)
Dept: ULTRASOUND IMAGING | Age: 52
DRG: 190 | End: 2022-11-04
Payer: MEDICARE

## 2022-11-04 ENCOUNTER — HOSPITAL ENCOUNTER (INPATIENT)
Age: 52
LOS: 2 days | Discharge: HOME | DRG: 190 | End: 2022-11-06
Attending: EMERGENCY MEDICINE | Admitting: INTERNAL MEDICINE
Payer: MEDICARE

## 2022-11-04 DIAGNOSIS — I21.4 NSTEMI (NON-ST ELEVATED MYOCARDIAL INFARCTION) (HCC): Primary | ICD-10-CM

## 2022-11-04 DIAGNOSIS — E10.65 UNCONTROLLED TYPE 1 DIABETES MELLITUS WITH HYPERGLYCEMIA (HCC): ICD-10-CM

## 2022-11-04 DIAGNOSIS — K22.10 ULCER OF ESOPHAGUS WITHOUT BLEEDING: ICD-10-CM

## 2022-11-04 PROBLEM — F12.90 MARIJUANA USE: Status: ACTIVE | Noted: 2022-11-04

## 2022-11-04 PROBLEM — R11.2 NAUSEA & VOMITING: Status: ACTIVE | Noted: 2022-11-04

## 2022-11-04 PROBLEM — R74.01 TRANSAMINITIS: Status: ACTIVE | Noted: 2022-02-15

## 2022-11-04 PROBLEM — R63.4 WEIGHT LOSS: Status: ACTIVE | Noted: 2022-11-04

## 2022-11-04 PROBLEM — Z72.0 TOBACCO ABUSE: Status: ACTIVE | Noted: 2022-11-04

## 2022-11-04 PROBLEM — K59.00 CONSTIPATION: Status: ACTIVE | Noted: 2022-11-04

## 2022-11-04 LAB
ABSOLUTE EOS #: 0.04 K/UL (ref 0–0.44)
ABSOLUTE IMMATURE GRANULOCYTE: 0.04 K/UL (ref 0–0.3)
ABSOLUTE LYMPH #: 1.25 K/UL (ref 1.1–3.7)
ABSOLUTE MONO #: 0.5 K/UL (ref 0.1–1.2)
ALBUMIN SERPL-MCNC: 3.7 G/DL (ref 3.5–5.2)
ALBUMIN SERPL-MCNC: 4 G/DL (ref 3.5–5.2)
ALBUMIN/GLOBULIN RATIO: 1.3 (ref 1–2.5)
ALBUMIN/GLOBULIN RATIO: 1.4 (ref 1–2.5)
ALP BLD-CCNC: 113 U/L (ref 40–129)
ALP BLD-CCNC: 118 U/L (ref 40–129)
ALT SERPL-CCNC: 56 U/L (ref 5–41)
ALT SERPL-CCNC: 58 U/L (ref 5–41)
ANION GAP SERPL CALCULATED.3IONS-SCNC: 15 MMOL/L (ref 9–17)
AST SERPL-CCNC: 116 U/L
AST SERPL-CCNC: 124 U/L
BASOPHILS # BLD: 0 % (ref 0–2)
BASOPHILS ABSOLUTE: <0.03 K/UL (ref 0–0.2)
BETA-HYDROXYBUTYRATE: 1.91 MMOL/L (ref 0.02–0.27)
BILIRUB SERPL-MCNC: 0.8 MG/DL (ref 0.3–1.2)
BILIRUB SERPL-MCNC: 0.8 MG/DL (ref 0.3–1.2)
BILIRUBIN DIRECT: 0.2 MG/DL
BILIRUBIN, INDIRECT: 0.6 MG/DL (ref 0–1)
BUN BLDV-MCNC: 27 MG/DL (ref 6–20)
CALCIUM SERPL-MCNC: 9.7 MG/DL (ref 8.6–10.4)
CHLORIDE BLD-SCNC: 89 MMOL/L (ref 98–107)
CO2: 29 MMOL/L (ref 20–31)
CREAT SERPL-MCNC: 1.67 MG/DL (ref 0.7–1.2)
D-DIMER QUANTITATIVE: 1.58 MG/L FEU
EKG ATRIAL RATE: 96 BPM
EKG ATRIAL RATE: 98 BPM
EKG P AXIS: 81 DEGREES
EKG P AXIS: 84 DEGREES
EKG P-R INTERVAL: 140 MS
EKG P-R INTERVAL: 140 MS
EKG Q-T INTERVAL: 370 MS
EKG Q-T INTERVAL: 392 MS
EKG QRS DURATION: 90 MS
EKG QRS DURATION: 92 MS
EKG QTC CALCULATION (BAZETT): 472 MS
EKG QTC CALCULATION (BAZETT): 495 MS
EKG R AXIS: -35 DEGREES
EKG R AXIS: 2 DEGREES
EKG T AXIS: 71 DEGREES
EKG T AXIS: 72 DEGREES
EKG VENTRICULAR RATE: 96 BPM
EKG VENTRICULAR RATE: 98 BPM
EOSINOPHILS RELATIVE PERCENT: 0 % (ref 1–4)
GFR SERPL CREATININE-BSD FRML MDRD: 49 ML/MIN/1.73M2
GLUCOSE BLD-MCNC: 130 MG/DL (ref 75–110)
GLUCOSE BLD-MCNC: 131 MG/DL (ref 70–99)
GLUCOSE BLD-MCNC: 309 MG/DL (ref 75–110)
GLUCOSE BLD-MCNC: 56 MG/DL (ref 75–110)
GLUCOSE BLD-MCNC: 97 MG/DL (ref 75–110)
GLUCOSE BLD-MCNC: 99 MG/DL (ref 75–110)
HCT VFR BLD CALC: 40.1 % (ref 40.7–50.3)
HEMOGLOBIN: 13 G/DL (ref 13–17)
IMMATURE GRANULOCYTES: 0 %
LACTIC ACID, WHOLE BLOOD: 2 MMOL/L (ref 0.7–2.1)
LIPASE: 8 U/L (ref 13–60)
LYMPHOCYTES # BLD: 13 % (ref 24–43)
MAGNESIUM: 2 MG/DL (ref 1.6–2.6)
MCH RBC QN AUTO: 27.7 PG (ref 25.2–33.5)
MCHC RBC AUTO-ENTMCNC: 32.4 G/DL (ref 28.4–34.8)
MCV RBC AUTO: 85.3 FL (ref 82.6–102.9)
MONOCYTES # BLD: 5 % (ref 3–12)
MYOGLOBIN: 41 NG/ML (ref 28–72)
NRBC AUTOMATED: 0 PER 100 WBC
PARTIAL THROMBOPLASTIN TIME: 21.1 SEC (ref 20.5–30.5)
PARTIAL THROMBOPLASTIN TIME: 24.8 SEC (ref 20.5–30.5)
PARTIAL THROMBOPLASTIN TIME: 42.1 SEC (ref 20.5–30.5)
PDW BLD-RTO: 13.3 % (ref 11.8–14.4)
PHOSPHORUS: 1 MG/DL (ref 2.5–4.5)
PLATELET # BLD: 202 K/UL (ref 138–453)
PMV BLD AUTO: 11.9 FL (ref 8.1–13.5)
POTASSIUM SERPL-SCNC: 3.4 MMOL/L (ref 3.7–5.3)
PRO-BNP: 828 PG/ML
RBC # BLD: 4.7 M/UL (ref 4.21–5.77)
SEG NEUTROPHILS: 82 % (ref 36–65)
SEGMENTED NEUTROPHILS ABSOLUTE COUNT: 7.59 K/UL (ref 1.5–8.1)
SODIUM BLD-SCNC: 133 MMOL/L (ref 135–144)
TOTAL PROTEIN: 6.4 G/DL (ref 6.4–8.3)
TOTAL PROTEIN: 7.2 G/DL (ref 6.4–8.3)
TROPONIN, HIGH SENSITIVITY: 108 NG/L (ref 0–22)
TROPONIN, HIGH SENSITIVITY: 109 NG/L (ref 0–22)
TROPONIN, HIGH SENSITIVITY: 140 NG/L (ref 0–22)
TROPONIN, HIGH SENSITIVITY: 150 NG/L (ref 0–22)
WBC # BLD: 9.4 K/UL (ref 3.5–11.3)

## 2022-11-04 PROCEDURE — 36415 COLL VENOUS BLD VENIPUNCTURE: CPT

## 2022-11-04 PROCEDURE — 73502 X-RAY EXAM HIP UNI 2-3 VIEWS: CPT

## 2022-11-04 PROCEDURE — 74177 CT ABD & PELVIS W/CONTRAST: CPT

## 2022-11-04 PROCEDURE — 80076 HEPATIC FUNCTION PANEL: CPT

## 2022-11-04 PROCEDURE — 99285 EMERGENCY DEPT VISIT HI MDM: CPT

## 2022-11-04 PROCEDURE — 6370000000 HC RX 637 (ALT 250 FOR IP): Performed by: NURSE PRACTITIONER

## 2022-11-04 PROCEDURE — 83874 ASSAY OF MYOGLOBIN: CPT

## 2022-11-04 PROCEDURE — 2580000003 HC RX 258: Performed by: NURSE PRACTITIONER

## 2022-11-04 PROCEDURE — 6360000002 HC RX W HCPCS: Performed by: INTERNAL MEDICINE

## 2022-11-04 PROCEDURE — 83690 ASSAY OF LIPASE: CPT

## 2022-11-04 PROCEDURE — 82010 KETONE BODYS QUAN: CPT

## 2022-11-04 PROCEDURE — 6360000002 HC RX W HCPCS: Performed by: NURSE PRACTITIONER

## 2022-11-04 PROCEDURE — A4216 STERILE WATER/SALINE, 10 ML: HCPCS | Performed by: STUDENT IN AN ORGANIZED HEALTH CARE EDUCATION/TRAINING PROGRAM

## 2022-11-04 PROCEDURE — 84100 ASSAY OF PHOSPHORUS: CPT

## 2022-11-04 PROCEDURE — 2060000000 HC ICU INTERMEDIATE R&B

## 2022-11-04 PROCEDURE — 6360000004 HC RX CONTRAST MEDICATION: Performed by: STUDENT IN AN ORGANIZED HEALTH CARE EDUCATION/TRAINING PROGRAM

## 2022-11-04 PROCEDURE — 2580000003 HC RX 258: Performed by: STUDENT IN AN ORGANIZED HEALTH CARE EDUCATION/TRAINING PROGRAM

## 2022-11-04 PROCEDURE — 93010 ELECTROCARDIOGRAM REPORT: CPT | Performed by: INTERNAL MEDICINE

## 2022-11-04 PROCEDURE — 83735 ASSAY OF MAGNESIUM: CPT

## 2022-11-04 PROCEDURE — C9113 INJ PANTOPRAZOLE SODIUM, VIA: HCPCS | Performed by: INTERNAL MEDICINE

## 2022-11-04 PROCEDURE — 83880 ASSAY OF NATRIURETIC PEPTIDE: CPT

## 2022-11-04 PROCEDURE — 93005 ELECTROCARDIOGRAM TRACING: CPT | Performed by: STUDENT IN AN ORGANIZED HEALTH CARE EDUCATION/TRAINING PROGRAM

## 2022-11-04 PROCEDURE — 82306 VITAMIN D 25 HYDROXY: CPT

## 2022-11-04 PROCEDURE — 2580000003 HC RX 258: Performed by: INTERNAL MEDICINE

## 2022-11-04 PROCEDURE — 80053 COMPREHEN METABOLIC PANEL: CPT

## 2022-11-04 PROCEDURE — 96374 THER/PROPH/DIAG INJ IV PUSH: CPT

## 2022-11-04 PROCEDURE — 71046 X-RAY EXAM CHEST 2 VIEWS: CPT

## 2022-11-04 PROCEDURE — 82947 ASSAY GLUCOSE BLOOD QUANT: CPT

## 2022-11-04 PROCEDURE — 6370000000 HC RX 637 (ALT 250 FOR IP): Performed by: STUDENT IN AN ORGANIZED HEALTH CARE EDUCATION/TRAINING PROGRAM

## 2022-11-04 PROCEDURE — 2500000003 HC RX 250 WO HCPCS: Performed by: STUDENT IN AN ORGANIZED HEALTH CARE EDUCATION/TRAINING PROGRAM

## 2022-11-04 PROCEDURE — 85025 COMPLETE CBC W/AUTO DIFF WBC: CPT

## 2022-11-04 PROCEDURE — 85379 FIBRIN DEGRADATION QUANT: CPT

## 2022-11-04 PROCEDURE — 2T015 HOSPITALIST 2ND TOUCH: CPT | Performed by: STUDENT IN AN ORGANIZED HEALTH CARE EDUCATION/TRAINING PROGRAM

## 2022-11-04 PROCEDURE — 83605 ASSAY OF LACTIC ACID: CPT

## 2022-11-04 PROCEDURE — 84484 ASSAY OF TROPONIN QUANT: CPT

## 2022-11-04 PROCEDURE — 6360000002 HC RX W HCPCS: Performed by: STUDENT IN AN ORGANIZED HEALTH CARE EDUCATION/TRAINING PROGRAM

## 2022-11-04 PROCEDURE — 85730 THROMBOPLASTIN TIME PARTIAL: CPT

## 2022-11-04 PROCEDURE — 73552 X-RAY EXAM OF FEMUR 2/>: CPT

## 2022-11-04 PROCEDURE — 71260 CT THORAX DX C+: CPT | Performed by: STUDENT IN AN ORGANIZED HEALTH CARE EDUCATION/TRAINING PROGRAM

## 2022-11-04 PROCEDURE — 99222 1ST HOSP IP/OBS MODERATE 55: CPT | Performed by: INTERNAL MEDICINE

## 2022-11-04 PROCEDURE — 76705 ECHO EXAM OF ABDOMEN: CPT

## 2022-11-04 PROCEDURE — 99223 1ST HOSP IP/OBS HIGH 75: CPT | Performed by: INTERNAL MEDICINE

## 2022-11-04 PROCEDURE — 6370000000 HC RX 637 (ALT 250 FOR IP): Performed by: INTERNAL MEDICINE

## 2022-11-04 PROCEDURE — 96375 TX/PRO/DX INJ NEW DRUG ADDON: CPT

## 2022-11-04 RX ORDER — CLONAZEPAM 1 MG/1
0.5 TABLET ORAL DAILY PRN
Status: DISCONTINUED | OUTPATIENT
Start: 2022-11-04 | End: 2022-11-06 | Stop reason: HOSPADM

## 2022-11-04 RX ORDER — HEPARIN SODIUM AND DEXTROSE 10000; 5 [USP'U]/100ML; G/100ML
5-30 INJECTION INTRAVENOUS CONTINUOUS
Status: DISCONTINUED | OUTPATIENT
Start: 2022-11-04 | End: 2022-11-06

## 2022-11-04 RX ORDER — POTASSIUM CHLORIDE 20 MEQ/1
40 TABLET, EXTENDED RELEASE ORAL PRN
Status: DISCONTINUED | OUTPATIENT
Start: 2022-11-04 | End: 2022-11-06 | Stop reason: HOSPADM

## 2022-11-04 RX ORDER — DEXTROSE MONOHYDRATE 100 MG/ML
INJECTION, SOLUTION INTRAVENOUS CONTINUOUS PRN
Status: DISCONTINUED | OUTPATIENT
Start: 2022-11-04 | End: 2022-11-06 | Stop reason: HOSPADM

## 2022-11-04 RX ORDER — METOCLOPRAMIDE HYDROCHLORIDE 5 MG/ML
5 INJECTION INTRAMUSCULAR; INTRAVENOUS EVERY 6 HOURS
Status: DISCONTINUED | OUTPATIENT
Start: 2022-11-04 | End: 2022-11-05

## 2022-11-04 RX ORDER — SODIUM CHLORIDE 9 MG/ML
INJECTION, SOLUTION INTRAVENOUS PRN
Status: DISCONTINUED | OUTPATIENT
Start: 2022-11-04 | End: 2022-11-06 | Stop reason: HOSPADM

## 2022-11-04 RX ORDER — HEPARIN SODIUM 1000 [USP'U]/ML
60 INJECTION, SOLUTION INTRAVENOUS; SUBCUTANEOUS PRN
Status: DISCONTINUED | OUTPATIENT
Start: 2022-11-04 | End: 2022-11-06

## 2022-11-04 RX ORDER — ONDANSETRON 4 MG/1
4 TABLET, ORALLY DISINTEGRATING ORAL EVERY 8 HOURS PRN
Status: DISCONTINUED | OUTPATIENT
Start: 2022-11-04 | End: 2022-11-06 | Stop reason: HOSPADM

## 2022-11-04 RX ORDER — INSULIN LISPRO 100 [IU]/ML
0-4 INJECTION, SOLUTION INTRAVENOUS; SUBCUTANEOUS NIGHTLY
Status: DISCONTINUED | OUTPATIENT
Start: 2022-11-04 | End: 2022-11-05

## 2022-11-04 RX ORDER — 0.9 % SODIUM CHLORIDE 0.9 %
1000 INTRAVENOUS SOLUTION INTRAVENOUS ONCE
Status: COMPLETED | OUTPATIENT
Start: 2022-11-04 | End: 2022-11-04

## 2022-11-04 RX ORDER — MAGNESIUM SULFATE 1 G/100ML
1000 INJECTION INTRAVENOUS PRN
Status: DISCONTINUED | OUTPATIENT
Start: 2022-11-04 | End: 2022-11-06 | Stop reason: HOSPADM

## 2022-11-04 RX ORDER — ASPIRIN 81 MG/1
324 TABLET, CHEWABLE ORAL ONCE
Status: COMPLETED | OUTPATIENT
Start: 2022-11-04 | End: 2022-11-04

## 2022-11-04 RX ORDER — HEPARIN SODIUM 1000 [USP'U]/ML
30 INJECTION, SOLUTION INTRAVENOUS; SUBCUTANEOUS PRN
Status: DISCONTINUED | OUTPATIENT
Start: 2022-11-04 | End: 2022-11-06

## 2022-11-04 RX ORDER — ASPIRIN 81 MG/1
81 TABLET, CHEWABLE ORAL DAILY
Status: DISCONTINUED | OUTPATIENT
Start: 2022-11-05 | End: 2022-11-06 | Stop reason: HOSPADM

## 2022-11-04 RX ORDER — ONDANSETRON 2 MG/ML
4 INJECTION INTRAMUSCULAR; INTRAVENOUS EVERY 6 HOURS PRN
Status: DISCONTINUED | OUTPATIENT
Start: 2022-11-04 | End: 2022-11-06 | Stop reason: HOSPADM

## 2022-11-04 RX ORDER — DIPHENHYDRAMINE HYDROCHLORIDE 50 MG/ML
25 INJECTION INTRAMUSCULAR; INTRAVENOUS ONCE
Status: COMPLETED | OUTPATIENT
Start: 2022-11-04 | End: 2022-11-04

## 2022-11-04 RX ORDER — POTASSIUM CHLORIDE 7.45 MG/ML
10 INJECTION INTRAVENOUS PRN
Status: DISCONTINUED | OUTPATIENT
Start: 2022-11-04 | End: 2022-11-06 | Stop reason: HOSPADM

## 2022-11-04 RX ORDER — NITROGLYCERIN 0.4 MG/1
0.4 TABLET SUBLINGUAL EVERY 5 MIN PRN
Status: DISCONTINUED | OUTPATIENT
Start: 2022-11-04 | End: 2022-11-06 | Stop reason: HOSPADM

## 2022-11-04 RX ORDER — OXYCODONE HYDROCHLORIDE AND ACETAMINOPHEN 5; 325 MG/1; MG/1
1 TABLET ORAL ONCE
Status: COMPLETED | OUTPATIENT
Start: 2022-11-04 | End: 2022-11-04

## 2022-11-04 RX ORDER — INSULIN LISPRO 100 [IU]/ML
0-8 INJECTION, SOLUTION INTRAVENOUS; SUBCUTANEOUS
Status: DISCONTINUED | OUTPATIENT
Start: 2022-11-04 | End: 2022-11-05

## 2022-11-04 RX ORDER — INSULIN GLARGINE 100 [IU]/ML
15 INJECTION, SOLUTION SUBCUTANEOUS NIGHTLY
Status: ON HOLD | COMMUNITY
End: 2022-11-10 | Stop reason: HOSPADM

## 2022-11-04 RX ORDER — INSULIN GLARGINE 100 [IU]/ML
10 INJECTION, SOLUTION SUBCUTANEOUS NIGHTLY
Status: DISCONTINUED | OUTPATIENT
Start: 2022-11-04 | End: 2022-11-05

## 2022-11-04 RX ORDER — ACETAMINOPHEN 650 MG/1
650 SUPPOSITORY RECTAL EVERY 6 HOURS PRN
Status: DISCONTINUED | OUTPATIENT
Start: 2022-11-04 | End: 2022-11-06 | Stop reason: HOSPADM

## 2022-11-04 RX ORDER — ACETAMINOPHEN 325 MG/1
650 TABLET ORAL EVERY 6 HOURS PRN
Status: DISCONTINUED | OUTPATIENT
Start: 2022-11-04 | End: 2022-11-06 | Stop reason: HOSPADM

## 2022-11-04 RX ORDER — HEPARIN SODIUM 1000 [USP'U]/ML
60 INJECTION, SOLUTION INTRAVENOUS; SUBCUTANEOUS ONCE
Status: COMPLETED | OUTPATIENT
Start: 2022-11-04 | End: 2022-11-04

## 2022-11-04 RX ORDER — ATORVASTATIN CALCIUM 80 MG/1
80 TABLET, FILM COATED ORAL NIGHTLY
Status: DISCONTINUED | OUTPATIENT
Start: 2022-11-04 | End: 2022-11-06 | Stop reason: HOSPADM

## 2022-11-04 RX ORDER — METOCLOPRAMIDE HYDROCHLORIDE 5 MG/ML
10 INJECTION INTRAMUSCULAR; INTRAVENOUS ONCE
Status: COMPLETED | OUTPATIENT
Start: 2022-11-04 | End: 2022-11-04

## 2022-11-04 RX ORDER — POLYETHYLENE GLYCOL 3350 17 G/17G
17 POWDER, FOR SOLUTION ORAL 2 TIMES DAILY
Status: DISCONTINUED | OUTPATIENT
Start: 2022-11-04 | End: 2022-11-06 | Stop reason: HOSPADM

## 2022-11-04 RX ORDER — SODIUM CHLORIDE 0.9 % (FLUSH) 0.9 %
10 SYRINGE (ML) INJECTION PRN
Status: DISCONTINUED | OUTPATIENT
Start: 2022-11-04 | End: 2022-11-06 | Stop reason: HOSPADM

## 2022-11-04 RX ORDER — SODIUM CHLORIDE 0.9 % (FLUSH) 0.9 %
5-40 SYRINGE (ML) INJECTION EVERY 12 HOURS SCHEDULED
Status: DISCONTINUED | OUTPATIENT
Start: 2022-11-04 | End: 2022-11-06 | Stop reason: HOSPADM

## 2022-11-04 RX ORDER — CALCIUM CARBONATE 200(500)MG
1000 TABLET,CHEWABLE ORAL 3 TIMES DAILY PRN
Status: DISCONTINUED | OUTPATIENT
Start: 2022-11-04 | End: 2022-11-06 | Stop reason: HOSPADM

## 2022-11-04 RX ADMIN — DIPHENHYDRAMINE HYDROCHLORIDE 25 MG: 50 INJECTION, SOLUTION INTRAMUSCULAR; INTRAVENOUS at 01:29

## 2022-11-04 RX ADMIN — SODIUM CHLORIDE, PRESERVATIVE FREE 10 ML: 5 INJECTION INTRAVENOUS at 07:56

## 2022-11-04 RX ADMIN — METOCLOPRAMIDE 10 MG: 5 INJECTION, SOLUTION INTRAMUSCULAR; INTRAVENOUS at 01:30

## 2022-11-04 RX ADMIN — HEPARIN SODIUM 16 UNITS/KG/HR: 10000 INJECTION, SOLUTION INTRAVENOUS at 12:48

## 2022-11-04 RX ADMIN — NITROGLYCERIN 0.4 MG: 0.4 TABLET SUBLINGUAL at 20:48

## 2022-11-04 RX ADMIN — METOCLOPRAMIDE 5 MG: 5 INJECTION, SOLUTION INTRAMUSCULAR; INTRAVENOUS at 05:35

## 2022-11-04 RX ADMIN — HEPARIN SODIUM 3670 UNITS: 1000 INJECTION INTRAVENOUS; SUBCUTANEOUS at 12:48

## 2022-11-04 RX ADMIN — POTASSIUM CHLORIDE 40 MEQ: 1500 TABLET, EXTENDED RELEASE ORAL at 07:57

## 2022-11-04 RX ADMIN — CLONAZEPAM 0.5 MG: 1 TABLET ORAL at 13:57

## 2022-11-04 RX ADMIN — INSULIN LISPRO 6 UNITS: 100 INJECTION, SOLUTION INTRAVENOUS; SUBCUTANEOUS at 17:48

## 2022-11-04 RX ADMIN — SODIUM CHLORIDE, PRESERVATIVE FREE 40 MG: 5 INJECTION INTRAVENOUS at 07:56

## 2022-11-04 RX ADMIN — POTASSIUM & SODIUM PHOSPHATES POWDER PACK 280-160-250 MG 250 MG: 280-160-250 PACK at 09:22

## 2022-11-04 RX ADMIN — ASPIRIN 81 MG 324 MG: 81 TABLET ORAL at 02:19

## 2022-11-04 RX ADMIN — Medication 16 G: at 21:45

## 2022-11-04 RX ADMIN — METOPROLOL TARTRATE 25 MG: 25 TABLET ORAL at 21:40

## 2022-11-04 RX ADMIN — METOPROLOL TARTRATE 25 MG: 25 TABLET ORAL at 07:57

## 2022-11-04 RX ADMIN — METOCLOPRAMIDE 5 MG: 5 INJECTION, SOLUTION INTRAMUSCULAR; INTRAVENOUS at 11:42

## 2022-11-04 RX ADMIN — HEPARIN SODIUM 3670 UNITS: 1000 INJECTION INTRAVENOUS; SUBCUTANEOUS at 04:04

## 2022-11-04 RX ADMIN — POLYETHYLENE GLYCOL 3350 17 G: 17 POWDER, FOR SOLUTION ORAL at 13:56

## 2022-11-04 RX ADMIN — METOCLOPRAMIDE 5 MG: 5 INJECTION, SOLUTION INTRAMUSCULAR; INTRAVENOUS at 16:39

## 2022-11-04 RX ADMIN — ATORVASTATIN CALCIUM 80 MG: 80 TABLET, FILM COATED ORAL at 21:40

## 2022-11-04 RX ADMIN — NITROGLYCERIN 0.4 MG: 0.4 TABLET SUBLINGUAL at 20:56

## 2022-11-04 RX ADMIN — HEPARIN SODIUM 1840 UNITS: 1000 INJECTION INTRAVENOUS; SUBCUTANEOUS at 21:01

## 2022-11-04 RX ADMIN — METOCLOPRAMIDE 5 MG: 5 INJECTION, SOLUTION INTRAMUSCULAR; INTRAVENOUS at 23:33

## 2022-11-04 RX ADMIN — HEPARIN SODIUM 12 UNITS/KG/HR: 10000 INJECTION, SOLUTION INTRAVENOUS at 04:08

## 2022-11-04 RX ADMIN — IOPAMIDOL 75 ML: 755 INJECTION, SOLUTION INTRAVENOUS at 02:44

## 2022-11-04 RX ADMIN — ANTACID TABLETS 1000 MG: 500 TABLET, CHEWABLE ORAL at 20:55

## 2022-11-04 RX ADMIN — SODIUM CHLORIDE 1000 ML: 9 INJECTION, SOLUTION INTRAVENOUS at 01:28

## 2022-11-04 RX ADMIN — FAMOTIDINE 20 MG: 10 INJECTION, SOLUTION INTRAVENOUS at 01:49

## 2022-11-04 RX ADMIN — OXYCODONE HYDROCHLORIDE AND ACETAMINOPHEN 1 TABLET: 5; 325 TABLET ORAL at 06:42

## 2022-11-04 SDOH — SOCIAL STABILITY: SOCIAL NETWORK: HOW OFTEN DO YOU ATTENT MEETINGS OF THE CLUB OR ORGANIZATION YOU BELONG TO?: NEVER

## 2022-11-04 SDOH — ECONOMIC STABILITY: INCOME INSECURITY: HOW HARD IS IT FOR YOU TO PAY FOR THE VERY BASICS LIKE FOOD, HOUSING, MEDICAL CARE, AND HEATING?: NOT VERY HARD

## 2022-11-04 SDOH — HEALTH STABILITY: MENTAL HEALTH
STRESS IS WHEN SOMEONE FEELS TENSE, NERVOUS, ANXIOUS, OR CAN'T SLEEP AT NIGHT BECAUSE THEIR MIND IS TROUBLED. HOW STRESSED ARE YOU?: TO SOME EXTENT

## 2022-11-04 SDOH — HEALTH STABILITY: MENTAL HEALTH: HOW MANY STANDARD DRINKS CONTAINING ALCOHOL DO YOU HAVE ON A TYPICAL DAY?: PATIENT DOES NOT DRINK

## 2022-11-04 SDOH — HEALTH STABILITY: MENTAL HEALTH: HOW OFTEN DO YOU HAVE A DRINK CONTAINING ALCOHOL?: NEVER

## 2022-11-04 SDOH — SOCIAL STABILITY: SOCIAL NETWORK: ARE YOU MARRIED, WIDOWED, DIVORCED, SEPARATED, NEVER MARRIED, OR LIVING WITH A PARTNER?: NEVER MARRIED

## 2022-11-04 SDOH — SOCIAL STABILITY: SOCIAL INSECURITY
WITHIN THE LAST YEAR, HAVE YOU BEEN KICKED, HIT, SLAPPED, OR OTHERWISE PHYSICALLY HURT BY YOUR PARTNER OR EX-PARTNER?: NO

## 2022-11-04 SDOH — ECONOMIC STABILITY: HOUSING INSECURITY: IN THE LAST 12 MONTHS, HOW MANY PLACES HAVE YOU LIVED?: 4

## 2022-11-04 SDOH — ECONOMIC STABILITY: FOOD INSECURITY: WITHIN THE PAST 12 MONTHS, YOU WORRIED THAT YOUR FOOD WOULD RUN OUT BEFORE YOU GOT MONEY TO BUY MORE.: SOMETIMES TRUE

## 2022-11-04 SDOH — HEALTH STABILITY: PHYSICAL HEALTH: ON AVERAGE, HOW MANY DAYS PER WEEK DO YOU ENGAGE IN MODERATE TO STRENUOUS EXERCISE (LIKE A BRISK WALK)?: 0 DAYS

## 2022-11-04 SDOH — ECONOMIC STABILITY: TRANSPORTATION INSECURITY
IN THE PAST 12 MONTHS, HAS THE LACK OF TRANSPORTATION KEPT YOU FROM MEDICAL APPOINTMENTS OR FROM GETTING MEDICATIONS?: YES

## 2022-11-04 SDOH — ECONOMIC STABILITY: TRANSPORTATION INSECURITY
IN THE PAST 12 MONTHS, HAS LACK OF TRANSPORTATION KEPT YOU FROM MEETINGS, WORK, OR FROM GETTING THINGS NEEDED FOR DAILY LIVING?: YES

## 2022-11-04 SDOH — SOCIAL STABILITY: SOCIAL NETWORK
DO YOU BELONG TO ANY CLUBS OR ORGANIZATIONS SUCH AS CHURCH GROUPS UNIONS, FRATERNAL OR ATHLETIC GROUPS, OR SCHOOL GROUPS?: NO

## 2022-11-04 SDOH — SOCIAL STABILITY: SOCIAL NETWORK: IN A TYPICAL WEEK, HOW MANY TIMES DO YOU TALK ON THE PHONE WITH FAMILY, FRIENDS, OR NEIGHBORS?: NEVER

## 2022-11-04 SDOH — SOCIAL STABILITY: SOCIAL INSECURITY
WITHIN THE LAST YEAR, HAVE TO BEEN RAPED OR FORCED TO HAVE ANY KIND OF SEXUAL ACTIVITY BY YOUR PARTNER OR EX-PARTNER?: NO

## 2022-11-04 SDOH — ECONOMIC STABILITY: FOOD INSECURITY: WITHIN THE PAST 12 MONTHS, THE FOOD YOU BOUGHT JUST DIDN'T LAST AND YOU DIDN'T HAVE MONEY TO GET MORE.: SOMETIMES TRUE

## 2022-11-04 SDOH — SOCIAL STABILITY: SOCIAL INSECURITY: WITHIN THE LAST YEAR, HAVE YOU BEEN AFRAID OF YOUR PARTNER OR EX-PARTNER?: NO

## 2022-11-04 SDOH — SOCIAL STABILITY: SOCIAL NETWORK: HOW OFTEN DO YOU GET TOGETHER WITH FRIENDS OR RELATIVES?: NEVER

## 2022-11-04 SDOH — SOCIAL STABILITY: SOCIAL INSECURITY: WITHIN THE LAST YEAR, HAVE YOU BEEN HUMILIATED OR EMOTIONALLY ABUSED IN OTHER WAYS BY YOUR PARTNER OR EX-PARTNER?: NO

## 2022-11-04 SDOH — HEALTH STABILITY: PHYSICAL HEALTH: ON AVERAGE, HOW MANY MINUTES DO YOU ENGAGE IN EXERCISE AT THIS LEVEL?: 0 MIN

## 2022-11-04 SDOH — ECONOMIC STABILITY: INCOME INSECURITY: IN THE LAST 12 MONTHS, WAS THERE A TIME WHEN YOU WERE NOT ABLE TO PAY THE MORTGAGE OR RENT ON TIME?: NO

## 2022-11-04 SDOH — SOCIAL STABILITY: SOCIAL NETWORK: HOW OFTEN DO YOU ATTEND CHURCH OR RELIGIOUS SERVICES?: NEVER

## 2022-11-04 SDOH — ECONOMIC STABILITY: HOUSING INSECURITY
IN THE LAST 12 MONTHS, WAS THERE A TIME WHEN YOU DID NOT HAVE A STEADY PLACE TO SLEEP OR SLEPT IN A SHELTER (INCLUDING NOW)?: YES

## 2022-11-04 ASSESSMENT — PAIN - FUNCTIONAL ASSESSMENT
PAIN_FUNCTIONAL_ASSESSMENT: PREVENTS OR INTERFERES SOME ACTIVE ACTIVITIES AND ADLS
PAIN_FUNCTIONAL_ASSESSMENT: 0-10
PAIN_FUNCTIONAL_ASSESSMENT: PREVENTS OR INTERFERES SOME ACTIVE ACTIVITIES AND ADLS

## 2022-11-04 ASSESSMENT — PAIN DESCRIPTION - ORIENTATION
ORIENTATION: LEFT

## 2022-11-04 ASSESSMENT — PAIN DESCRIPTION - LOCATION
LOCATION: HIP;LEG
LOCATION: LEG;HIP
LOCATION: LEG;HIP
LOCATION: LEG
LOCATION: CHEST
LOCATION: CHEST
LOCATION: LEG;HIP
LOCATION: LEG;HIP
LOCATION: CHEST;THROAT

## 2022-11-04 ASSESSMENT — ENCOUNTER SYMPTOMS
ABDOMINAL PAIN: 1
VOMITING: 1
NAUSEA: 1
CHEST TIGHTNESS: 0
COLOR CHANGE: 0
COUGH: 0
CONSTIPATION: 0
TROUBLE SWALLOWING: 0
DIARRHEA: 0
SHORTNESS OF BREATH: 1
BACK PAIN: 0

## 2022-11-04 ASSESSMENT — PAIN SCALES - GENERAL
PAINLEVEL_OUTOF10: 10
PAINLEVEL_OUTOF10: 0
PAINLEVEL_OUTOF10: 8
PAINLEVEL_OUTOF10: 3
PAINLEVEL_OUTOF10: 8
PAINLEVEL_OUTOF10: 0
PAINLEVEL_OUTOF10: 10
PAINLEVEL_OUTOF10: 6
PAINLEVEL_OUTOF10: 6

## 2022-11-04 ASSESSMENT — PAIN DESCRIPTION - PAIN TYPE
TYPE: ACUTE PAIN
TYPE: SURGICAL PAIN
TYPE: SURGICAL PAIN
TYPE: ACUTE PAIN
TYPE: ACUTE PAIN

## 2022-11-04 ASSESSMENT — PAIN DESCRIPTION - DESCRIPTORS
DESCRIPTORS: ACHING;SHARP
DESCRIPTORS: ACHING;THROBBING;SHOOTING
DESCRIPTORS: BURNING;DISCOMFORT
DESCRIPTORS: DISCOMFORT;BURNING;TIGHTNESS
DESCRIPTORS: ACHING;SHARP;SHOOTING
DESCRIPTORS: BURNING;HEAVINESS

## 2022-11-04 ASSESSMENT — PAIN DESCRIPTION - ONSET
ONSET: ON-GOING
ONSET: PROGRESSIVE
ONSET: ON-GOING

## 2022-11-04 ASSESSMENT — PAIN DESCRIPTION - FREQUENCY
FREQUENCY: CONTINUOUS

## 2022-11-04 NOTE — ED PROVIDER NOTES
Faculty Sign-Out Attestation  Handoff taken on the following patient from prior Attending Physician: Arvin Haque    I was available and discussed any additional care issues that arose and coordinated the management plans with the resident(s) caring for the patient during my duty period. Any areas of disagreement with residents documentation of care or procedures are noted on the chart. I was personally present for the key portions of any/all procedures during my duty period. I have documented in the chart those procedures where I was not present during the key portions.     Cp / abdominal pain, not dka,   D dimer  Getting asa, ct chest r/o pe pending,   Will need admit     Any Sims DO  Attending Physician       Any Sims DO  11/04/22 0219    Ct no pe, ct abdomen stable     Any Sims DO  11/04/22 7815

## 2022-11-04 NOTE — H&P
[facility]  Office: [address], DO, Jacinta Vigil, DO, Claudia Sánchez, DO, [facility], DO, Lisandra Peacock MD, Jeanine Salguero MD, Yunior Rodriguez MD, Adi Stanley MD,  Stephy Gallardo MD, Remigio Velarde MD, Prerna Gaffney, DO, Kiera Roche MD,  Aury Cullen MD, Shaunna Baptiste MD, Mushtaq Matson, DO, Shayan Guardado MD, Melvin Trinh MD, Luis Shipley, DO, J Carlos Benitez MD, Corbin Geronimo MD, Balaji Hairston MD, Leeann Byrd MD, Mallika Headley DO, Karlee Crystal MD, Cl Esquivel MD, Vitaliy Sims, [facility],  Lisseth Hester, CNP, Katie Donald, CNP, Bonifacio Britton, CNP,  Burnett Osgood, [facility], Yakelin Baker, CNP, Lazaro Safe, CNP, Soraya Mckinnon, CNP, Stalin Carr, CNP, Nabila Lombardi, [facility], Candelario Martin PA-C, Awilda Dumont, CNS, Ibis Loza, [facility], Kirstie Bartlett, CNP, Latonya López, CNP, Makenna Drake, [facility]    HISTORY AND PHYSICAL EXAMINATION            Date:   11/4/2022  Patient name:  Kailash Michel  Date of admission:  11/4/2022 12:57 AM  MRN:   [id number]  Account:  [de-identified]  YOB: 1970  PCP:    BINA Guzman  Room:   2020/2020-01  Code Status:    Full Code    Chief Complaint:     Chief Complaint   Patient presents with    Leg Pain    Nausea   \" Throwing up the last 7 or 8 days and I called EMS\"    History Obtained From:     patient    History of Present Illness:     Kailash Michel is a 47 y.o.  male with longstanding type 1 diabetes, diabetic neuropathy nephropathy, CKD 3, copd (fev1 4.9K)VDIPQJ fall complicated by a right femoral fracture status post IM nail 09/30 who presents with Leg Pain and Nausea   and is admitted to the hospital for the management of NSTEMI (non-ST elevated myocardial infarction) ([facility] [address]     Patient describes recent hip fracture 1 to 2 weeks ago with recent hospitalization with progressive debility and weakness with ongoing right hip pain, difficulty with ambulation, activities. Patient's admits to poor p.o. intake over the past few weeks with 50 pound weight loss in the past few months. Patient admits to limited resources with inability to go grocery shopping, limited access to food. States \"my kids have to eat first \". Admits to increasing stressors with financial burden of his children , difficulty with worsening weakness     However, patient states he developed acute nausea and vomiting for the past 7 to 8 days with epigastric abdominal pain. Subsequently prior to arrival patient developed acute midsternal chest pain, radiating up and down chest, pressure-like quality, + associated shortness of breath acute weakness with diaphoresis. Brought in by squad. Past Medical History:     Past Medical History:   Diagnosis Date    Diabetes mellitus ([facility] [address]     Obstructive lung disease ([facility] [address]     8/2022    Panic attack     Seizures [facility])         Past Surgical History:     Past Surgical History:   Procedure Laterality Date    APPENDECTOMY      FEMUR FRACTURE SURGERY Left 09/30/2022    Cephalomedullary Nail    HAND SURGERY      TIBIA FRACTURE SURGERY Left 9/30/2022    LEFT FEMUR CEPHALOMEDULLARY NAIL performed by Margarita Sanders DO at . [address] N/A 02/15/2022    EGD BIOPSY performed by Peggy Correa MD at [facility]        Medications Prior to Admission:     Prior to Admission medications    Medication Sig Start Date End Date Taking?  Authorizing Provider   insulin glargine (LANTUS) 100 UNIT/ML injection vial Inject 5 Units into the skin nightly   Yes Historical Provider, MD   cyclobenzaprine (FLEXERIL) 10 MG tablet Take 1 tablet by mouth 3 times daily as needed for Muscle spasms 10/25/22   Margarita Sanders DO   acetaminophen (TYLENOL) 500 MG tablet Take 2 tablets by mouth every 6 hours as needed for Pain 10/25/22   Margarita Sanders DO   gabapentin (NEURONTIN) 300 MG capsule Take 1 capsule by mouth in the morning and 1 capsule at noon and 1 capsule in the evening. Do all this for 5 days.  10/1/22 10/6/22  Princess Kennedy MD   vitamin D (ERGOCALCIFEROL) 1.25 MG (56922 UT) CAPS capsule Take 1 capsule by mouth once a week 9/29/22   Isha Vaz DO   amLODIPine (NORVASC) 10 MG tablet take 1 tablet by mouth once daily  Patient not taking: No sig reported 8/23/22   BINA Purcell   atorvastatin (LIPITOR) 40 MG tablet take 1 tablet by mouth every evening  Patient not taking: No sig reported 8/23/22   BINA Purcell   ondansetron (ZOFRAN ODT) 4 MG disintegrating tablet Take 1 tablet by mouth every 8 hours as needed for Nausea or Vomiting 6/25/22   Yara rGay DO   atorvastatin (LIPITOR) 20 MG tablet Take 1 tablet by mouth daily  Patient not taking: No sig reported 4/7/22   BINA Purcell   insulin glargine (LANTUS SOLOSTAR) 100 UNIT/ML injection pen Inject 25 Units into the skin 2 times daily  Patient taking differently: Inject 25 Units into the skin daily 4/6/22   BINA Purcell   insulin aspart (NOVOLOG FLEXPEN) 100 UNIT/ML injection pen Inject 5 Units into the skin 3 times daily (before meals) 4/6/22   BINA Purcell   varenicline (APO-VARENICLINE) 0.5 MG tablet Take 1 tablet by mouth 2 times daily Take on tablet daily for 3 days then one tablet twice daily for the next 3 days then 2 tablets twice a day from then on. 4/6/22   BINA Purcell   Blood Glucose Monitoring Suppl (ONE TOUCH ULTRA 2) w/Device KIT 1 kit by Does not apply route daily 3/18/22   Kymberly Asencio DO   Lancets MISC 1 each by Does not apply route 2 times daily 3/18/22   Kymberly Asencio DO   Alcohol Swabs 70 % PADS Use as needed with blood glucose checks 2/15/22   Asher Chaparro MD   Insulin Pen Needle ([employer] PEN NEEDLES 29G) 29G X 12MM MISC 1 each by Does not apply route daily 2/15/22   Asher Drake MD   vitamin D (ERGOCALCIFEROL) 1.25 MG (16324 UT) CAPS capsule Take 1 capsule by mouth once a week 2/22/22   Asher Walsh MD   traZODone (DESYREL) 150 MG tablet Take 300 mg by mouth nightly     Historical Provider, MD   clonazePAM (KLONOPIN) 2 MG tablet Take 4 mg by mouth daily as needed for Anxiety. Historical Provider, MD   albuterol sulfate  (90 Base) MCG/ACT inhaler Inhale 2 puffs into the lungs every 4 hours as needed for Wheezing    Historical Provider, MD        Allergies:     Azithromycin, Codeine, Ibuprofen, Morphine, Tramadol, Acetaminophen-codeine, and Hydrocodone-acetaminophen    Social History:     Tobacco:    reports that he has been smoking cigarettes. He has a 61.50 pack-year smoking history. He has never used smokeless tobacco.  Alcohol:      reports that he does not currently use alcohol. Drug Use:  reports current drug use. Drug: Marijuana [place]). Continues to smoke marijuana, does smoke cigarettes one half to a pack per day, denies excessive binge drinking or illegal drugs. Lives with his 2 children, 72-year-old and 15year-old    Family History:   Father did have heart attack at a young age    Review of Systems:     Positive and Negative as described in HPI. Review of Systems  Patient does have multiple somatic complaints including chronic cough with phlegm production, chronic shortness of breath with difficulty with dyspnea on exertion, increasing weakness with debility, back pain with myalgias and arthralgias, ongoing severe leg pain with inability to ambulate with worsening weakness. Intermittent leg cramping in bilateral limbs. Diabetes since he was okay with blood sugars ranging between 30s to 260s, does have neuropathy. Recently ran out of his long-acting insulin, however he did get that refilled but has been having erratic blood sugars. Denies history of blood clot, denies bleeding issues, no melena or bright red blood per rectum denies hematic emesis.   Denies urinary symptoms, no dysuria hematuria frequency urgency. Denies any flulike symptoms, no fevers or chills, no sinus congestion ear fullness sore throat, denies headaches. Denies noncompliance with medications. Does have history of prior fall in September, denies any more recent falls but does struggle with ambulation. Denies recent travel. Review of systems otherwise unremarkable 12 system reviewed otherwise negative    Physical Exam:   BP (!) 148/102   Pulse 86   Temp 98.5 °F (36.9 °C) (Oral)   Resp 16   Ht 5' 7\" (1.702 m)   Wt 135 lb (61.2 kg)   SpO2 99%   BMI 21.14 kg/m²   Temp (24hrs), Av.5 °F (36.9 °C), Min:98.5 °F (36.9 °C), Max:98.5 °F (36.9 °C)    No results for input(s): POCGLU in the last 72 hours.     Intake/Output Summary (Last 24 hours) at 2022 0504  Last data filed at 2022 0349  Gross per 24 hour   Intake 1000 ml   Output --   Net 1000 ml       Physical Exam  General cachectic appearing male that appears older than stated age sitting up in bed tearful but appropriate, follows commands  Eye exam pupils equally round reactive sclera nonicteric normal horizontal gaze  ENT oropharynx with dry mucous members face symmetric neck supple  Cardiovascular regular rate and rhythm normal S1-S2, no murmurs rubs or gallops, no JVD  Lungs slightly diminished at bases with adequate air exchange, occasional bilateral expiratory wheezing, nonlabored no tachypnea no accessory muscle use  GI soft scaphoid nontender nondistended bowel sounds present  Musculoskeletal pain with range of motion of thoracolumbar spine and range of motion of right hip, incision well-healed with adequate range of motion of upper and lower limbs, no synovitis or joint effusions  Neuro nonfocal normal speech and cognition strength symmetric upper and lower limbs sensation grossly intact  Derm adequate foot hygiene no rashes lesions or skin infections   Vascular intact dorsalis pedis and posterior tibialis without edema  Investigations:      Laboratory Testing:  Recent Results (from the past 24 hour(s))   CBC with Auto Differential    Collection Time: 11/04/22  1:19 AM   Result Value Ref Range    WBC 9.4 3.5 - 11.3 k/uL    RBC 4.70 4.21 - 5.77 m/uL    Hemoglobin 13.0 13.0 - 17.0 g/dL    Hematocrit 40.1 (L) 40.7 - 50.3 %    MCV 85.3 82.6 - 102.9 fL    MCH 27.7 25.2 - 33.5 pg    MCHC 32.4 28.4 - 34.8 g/dL    RDW 13.3 11.8 - 14.4 %    Platelets 089 571 - 070 k/uL    MPV 11.9 8.1 - 13.5 fL    NRBC Automated 0.0 0.0 per 100 WBC    Seg Neutrophils 82 (H) 36 - 65 %    Lymphocytes 13 (L) 24 - 43 %    Monocytes 5 3 - 12 %    Eosinophils % 0 (L) 1 - 4 %    Basophils 0 0 - 2 %    Immature Granulocytes 0 0 %    Segs Absolute 7.59 1.50 - 8.10 k/uL    Absolute Lymph # 1.25 1.10 - 3.70 k/uL    Absolute Mono # 0.50 0.10 - 1.20 k/uL    Absolute Eos # 0.04 0.00 - 0.44 k/uL    Basophils Absolute <0.03 0.00 - 0.20 k/uL    Absolute Immature Granulocyte 0.04 0.00 - 0.30 k/uL   CMP    Collection Time: 11/04/22  1:19 AM   Result Value Ref Range    Glucose 131 (H) 70 - 99 mg/dL    BUN 27 (H) 6 - 20 mg/dL    Creatinine 1.67 (H) 0.70 - 1.20 mg/dL    Est, Glom Filt Rate 49 (L) >60 mL/min/1.73m2    Calcium 9.7 8.6 - 10.4 mg/dL    Sodium 133 (L) 135 - 144 mmol/L    Potassium 3.4 (L) 3.7 - 5.3 mmol/L    Chloride 89 (L) 98 - 107 mmol/L    CO2 29 20 - 31 mmol/L    Anion Gap 15 9 - 17 mmol/L    Alkaline Phosphatase 118 40 - 129 U/L    ALT 56 (H) 5 - 41 U/L     (H) <40 U/L    Total Bilirubin 0.8 0.3 - 1.2 mg/dL    Total Protein 7.2 6.4 - 8.3 g/dL    Albumin 4.0 3.5 - 5.2 g/dL    Albumin/Globulin Ratio 1.3 1.0 - 2.5   Lipase    Collection Time: 11/04/22  1:19 AM   Result Value Ref Range    Lipase 8 (L) 13 - 60 U/L   Lactic Acid    Collection Time: 11/04/22  1:19 AM   Result Value Ref Range    Lactic Acid, Whole Blood 2.0 0.7 - 2.1 mmol/L   Troponin    Collection Time: 11/04/22  1:19 AM   Result Value Ref Range    Troponin, High Sensitivity 150 (HH) 0 - 22 ng/L   Brain Natriuretic Peptide    Collection Time: 11/04/22  1:19 AM   Result Value Ref Range    Pro- (H) <300 pg/mL   D-Dimer, Quantitative    Collection Time: 11/04/22  1:19 AM   Result Value Ref Range    D-Dimer, Quant 1.58 mg/L FEU   Beta-Hydroxybutyrate    Collection Time: 11/04/22  1:19 AM   Result Value Ref Range    Beta-Hydroxybutyrate 1.91 (H) 0.02 - 0.27 mmol/L   EKG 12 Lead    Collection Time: 11/04/22  1:49 AM   Result Value Ref Range    Ventricular Rate 96 BPM    Atrial Rate 96 BPM    P-R Interval 140 ms    QRS Duration 90 ms    Q-T Interval 392 ms    QTc Calculation (Bazett) 495 ms    P Axis 84 degrees    R Axis -35 degrees    T Axis 71 degrees   EKG 12 Lead    Collection Time: 11/04/22  2:13 AM   Result Value Ref Range    Ventricular Rate 98 BPM    Atrial Rate 98 BPM    P-R Interval 140 ms    QRS Duration 92 ms    Q-T Interval 370 ms    QTc Calculation (Bazett) 472 ms    P Axis 81 degrees    R Axis 2 degrees    T Axis 72 degrees   Troponin    Collection Time: 11/04/22  2:14 AM   Result Value Ref Range    Troponin, High Sensitivity 140 (HH) 0 - 22 ng/L   APTT    Collection Time: 11/04/22  4:09 AM   Result Value Ref Range    PTT 21.1 20.5 - 30.5 sec       Imaging/Diagnostics:  XR CHEST (2 VW)    Result Date: 11/4/2022  Lucent lungs with chronic pattern of hyperinflation. No acute pulmonary finding. XR HIP LEFT (2-3 VIEWS)    Result Date: 11/4/2022  Prior surgical change of ORIF with no acute abnormality of the left hip or femur. XR FEMUR LEFT (MIN 2 VIEWS)    Result Date: 11/4/2022  Prior surgical change of ORIF with no acute abnormality of the left hip or femur. CT ABDOMEN PELVIS W IV CONTRAST Additional Contrast? None    Result Date: 11/4/2022  1. No evidence of pulmonary embolism or acute pulmonary abnormality. 2.  No acute inflammatory process identified in the abdomen or pelvis. 3.  Punctate left nephrolithiasis. 4.  Findings compatible with hepatic steatosis. 5.  Additional chronic and benign findings, as above.      CT CHEST PULMONARY EMBOLISM W CONTRAST    Result Date: 11/4/2022  1. No evidence of pulmonary embolism or acute pulmonary abnormality. 2.  No acute inflammatory process identified in the abdomen or pelvis. 3.  Punctate left nephrolithiasis. 4.  Findings compatible with hepatic steatosis. 5.  Additional chronic and benign findings, as above. EKG with nonspecific ST-T changes, independently reviewed by myself    Portable chest x-ray independently reviewed by myself, no acute airspace disease    Prior records from [facility] cardiology, pulmonary reviewed independently by myself through care everywhere    Egd 02/15/22     Post-Op Diagnosis: Same   Esophageal candidiasis  LA grade D ulcerative esophagitis in the distal third  Normal stomach and duodenum      Records from [facility] cardiology reviewed independently by myself from care everywhere  cardiac catheterization on 05/21/2021 which showed normal LVEDP, angiographically normal coronary arteries with mild luminal irregularities. His echocardiogram showed an EF of 55-60% with mild mitral and tricuspid regurgitation. Assessment :      Hospital Problems             Last Modified POA    * (Principal) NSTEMI (non-ST elevated myocardial infarction) ([address] 11/4/2022 Yes    Nausea & vomiting 11/4/2022 Yes    Constipation 11/4/2022 Yes    Weight loss 11/4/2022 Yes    Tobacco abuse 11/4/2022 Yes    Marijuana use 11/4/2022 Yes    Uncontrolled type 1 diabetes mellitus with hyperglycemia ([address] 11/4/2022 Yes    Transaminitis 11/4/2022 Yes    Fibromyalgia 11/4/2022 Yes    Spinal stenosis 11/4/2022 Yes    Neuropathy associated with endocrine disorder ([address] 11/4/2022 Yes    Major depressive disorder in partial remission ([address] 11/4/2022 Yes       Plan:     Patient status inpatient in the Progressive Unit/Step down    Non-STEMI with possible demand ischemia.   Status post cardiac cath in 05/2021 at [facility] with minimal nonobstructive coronary disease after presenting with symptomatic unstable angina, abnormal stress test.  Initial cardiac markers elevated of unclear significance with CKD. Will clarify with cardiology further ischemic work-up. Continue cardiac regimen  Intractable nausea vomiting with epigastric abdominal pain. Known history of esophagitis. Continue PPI. Patient does describe constipation, will continue bowel regimen. Add Reglan with possible component of gastroparesis. Recommended avoidance of marijuana, discussed with patient with concern for hyperemesis cannabis syndrome. Tobacco and marijuana abuse. Counseled on cessation  Osteoarthritis/fibromyalgia status post recent right femoral fracture status post IM nail 09/30 with persistent leg pain, weakness with debility/chronic lumbago. Check vitamin D levels, previously critically low in February. consult therapy to evaluate discharge needs  Transaminitis of unclear significance. Poss related to viral illness. Recheck in a.m. Yuval Villasenormaulik Denies excessive binge drinking . consider further GI work-up if appropriate. Type 1 diabetes with hyperglycemia/diabetic neuropathy nephropathy. Her A1c in February>10. Recheck with concern for non-STEMI  Acute hyponatremia, hypokalemia. Check other electrolytes. Replace appropriately. Check thyroid studies. Suspect hyponatremia likely volume depletion related with poor p.o. intake, low solute diet  High anion gap metabolic acidosis likely multifactorial with dehydration, CKD, diabetes    Anticipate likely discharge in 2 to 3 days contingent on clinical progress. Consider GI consult if persistent symptoms. Replace electrolytes.       Consultations:   IP CONSULT TO HOSPITALIST  IP CONSULT TO CARDIOLOGY  IP CONSULT TO CARDIOLOGY     Patient is admitted as inpatient status because of co-morbidities listed above, severity of signs and symptoms as outlined, requirement for current medical therapies and most importantly because of direct risk to patient if care not provided in a hospital setting. Expected length of stay > 48 hours.     Adrianna Schroeder MD  11/4/2022  5:04 AM    Copy sent to BINA Babcock

## 2022-11-04 NOTE — ED NOTES
The following labs were labeled with appropriate pt sticker and tubed to lab:     [] Blue     [] Lavender   [] on ice  [x] Green/yellow  [] Green/black [] on ice  [] Serafin Herring  [] on ice  [] Yellow  [] Red  [] Type/ Screen  [] ABG  [] VBG    [] COVID-19 swab    [] Rapid  [] PCR  [] Flu swab  [] Peds Viral Panel     [] Urine Sample  [] Pelvic Cultures  [] Blood Cultures  [] X 2  [] STREP Cultures         Omid Haley RN  11/04/22 5258

## 2022-11-04 NOTE — PLAN OF CARE
Problem: Discharge Planning  Goal: Discharge to home or other facility with appropriate resources  Outcome: Progressing  Flowsheets  Taken 11/4/2022 0528  Discharge to home or other facility with appropriate resources:   Identify barriers to discharge with patient and caregiver   Identify discharge learning needs (meds, wound care, etc)   Refer to discharge planning if patient needs post-hospital services based on physician order or complex needs related to functional status, cognitive ability or social support system   Arrange for needed discharge resources and transportation as appropriate  Taken 11/4/2022 0500  Discharge to home or other facility with appropriate resources: Identify barriers to discharge with patient and caregiver     Problem: Pain  Goal: Verbalizes/displays adequate comfort level or baseline comfort level  Outcome: Progressing     Problem: Safety - Adult  Goal: Free from fall injury  Outcome: Progressing     Problem: ABCDS Injury Assessment  Goal: Absence of physical injury  Outcome: Progressing     Problem: Skin/Tissue Integrity  Goal: Absence of new skin breakdown  Description: 1. Monitor for areas of redness and/or skin breakdown  2. Assess vascular access sites hourly  3. Every 4-6 hours minimum:  Change oxygen saturation probe site  4. Every 4-6 hours:  If on nasal continuous positive airway pressure, respiratory therapy assess nares and determine need for appliance change or resting period.   Outcome: Progressing
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
## 2022-11-04 NOTE — ED NOTES
Pt resting on stretcher with eyes closed. RR even and unlabored. No distress noted. Call light within reach.         Marsha White RN  11/04/22 8647 No

## 2022-11-04 NOTE — PLAN OF CARE
Problem: Discharge Planning  Goal: Discharge to home or other facility with appropriate resources  11/4/2022 1038 by Carmelo Olguin RN  Outcome: Progressing  11/4/2022 0553 by Astrid Saxena RN  Outcome: Progressing  Flowsheets  Taken 11/4/2022 0528  Discharge to home or other facility with appropriate resources:   Identify barriers to discharge with patient and caregiver   Identify discharge learning needs (meds, wound care, etc)   Refer to discharge planning if patient needs post-hospital services based on physician order or complex needs related to functional status, cognitive ability or social support system   Arrange for needed discharge resources and transportation as appropriate  Taken 11/4/2022 0500  Discharge to home or other facility with appropriate resources: Identify barriers to discharge with patient and caregiver     Problem: Pain  Goal: Verbalizes/displays adequate comfort level or baseline comfort level  11/4/2022 1038 by Carmelo Olguin RN  Outcome: Progressing  11/4/2022 0553 by Astrid Saxena RN  Outcome: Progressing     Problem: Safety - Adult  Goal: Free from fall injury  11/4/2022 1038 by Carmelo Olguin RN  Outcome: Progressing  11/4/2022 0553 by Astrid Saxena RN  Outcome: Progressing     Problem: ABCDS Injury Assessment  Goal: Absence of physical injury  11/4/2022 1038 by Carmelo Olguin RN  Outcome: Progressing  11/4/2022 0553 by Astrid Saxena RN  Outcome: Progressing     Problem: Skin/Tissue Integrity  Goal: Absence of new skin breakdown  Description: 1. Monitor for areas of redness and/or skin breakdown  2. Assess vascular access sites hourly  3. Every 4-6 hours minimum:  Change oxygen saturation probe site  4. Every 4-6 hours:  If on nasal continuous positive airway pressure, respiratory therapy assess nares and determine need for appliance change or resting period.   11/4/2022 1038 by Carmelo Olguin RN  Outcome: Progressing  11/4/2022 0553 by Astrid Saxena RN  Outcome: Progressing

## 2022-11-04 NOTE — PROGRESS NOTES
Physical Therapy        Physical Therapy Cancel Note      DATE: 2022    NAME: Michelle Bean  MRN: [id number]   : 1970      Patient not seen this date for Physical Therapy due to:    Patient is not appropriate for PT evaluation/treatment at this time d/t awaiting cardiology consult and final POC      Electronically signed by Mele Aragon PT on 2022 at 9:51 AM

## 2022-11-04 NOTE — PROGRESS NOTES
[facility])  Occupational Therapy Not Seen Note    DATE: 2022    NAME: Kailash Michel  MRN: [id number]   : 1970      Patient not seen this date for Occupational Therapy due to:    Bedrest:    Next Scheduled Treatment: check back later as able or 2022    Electronically signed by MAHESH Singh on 2022 at 8:26 AM

## 2022-11-04 NOTE — ED PROVIDER NOTES
[facility]- STEPDOWN  Emergency Department Encounter  EmergencyMedicine Resident     Pt Name:Elroy Ceron  MRN: [id number]  [place] 1970  Date of evaluation: 11/4/22  PCP:  BINA Guerra    CHIEF COMPLAINT       Chief Complaint   Patient presents with    Leg Pain    Nausea       HISTORY OF PRESENT ILLNESS  (Location/Symptom, Timing/Onset, Context/Setting, Quality, Duration, Modifying Factors, Severity.)      Mary Pruitt is a 47 y.o. male who presents with several complaints. Patient having chronic left hip and leg pain since his surgery in September for a left femur fracture. Patient also having chest pain shortness of breath. Patient also having nausea vomiting and abdominal pain for 1 week. Has not had a bowel movement today is passing gas. PAST MEDICAL / SURGICAL / SOCIAL / FAMILY HISTORY      has a past medical history of Diabetes mellitus ([facility] [address] Obstructive lung disease ([facility] [address] and Panic attack. has a past surgical history that includes Appendectomy; Hand surgery; Upper gastrointestinal endoscopy (N/A, 02/15/2022); Femur fracture surgery (Left, 09/30/2022); Tibia fracture surgery (Left, 09/30/2022); and joint replacement.       Social History     Socioeconomic History    Marital status: Single     Spouse name: Not on file    Number of children: 2    Years of education: Not on file    Highest education level: Not on file   Occupational History    Not on file   Tobacco Use    Smoking status: Every Day     Packs/day: 0.25     Years: 41.00     Pack years: 10.25     Types: Cigarettes    Smokeless tobacco: Never   Substance and Sexual Activity    Alcohol use: Not Currently    Drug use: Yes     Types: Marijuana Laveda Cooper)     Comment: overdose 11/10/20    Sexual activity: Not Currently   Other Topics Concern    Not on file   Social History Narrative    Not on file     Social Determinants of Health     Financial Resource Strain: Low Risk     Difficulty of Paying Living Expenses: Not very hard Food Insecurity: Food Insecurity Present    Worried About Running Out of Food in the Last Year: Sometimes true    Ran Out of Food in the Last Year: Sometimes true   Transportation Needs: Unmet Transportation Needs    Lack of Transportation (Medical): Yes    Lack of Transportation (Non-Medical): Yes   Physical Activity: Inactive    Days of Exercise per Week: 0 days    Minutes of Exercise per Session: 0 min   Stress: Stress Concern Present    Feeling of Stress : To some extent   Social Connections: Socially Isolated    Frequency of Communication with Friends and Family: Never    Frequency of Social Gatherings with Friends and Family: Never    Attends Quaker Services: Never    Active Member of Clubs or Organizations: No    Attends Club or Organization Meetings: Never    Marital Status: Never    Intimate Partner Violence: Not At Risk    Fear of Current or Ex-Partner: No    Emotionally Abused: No    Physically Abused: No    Sexually Abused: No   Housing Stability: High Risk    Unable to Pay for Housing in the Last Year: No    Number of [place] in the Last Year: 4    Unstable Housing in the Last Year: Yes       History reviewed. No pertinent family history. Allergies:  Azithromycin, Codeine, Ibuprofen, Morphine, Tramadol, Acetaminophen-codeine, and Hydrocodone-acetaminophen    Home Medications:  Prior to Admission medications    Medication Sig Start Date End Date Taking? Authorizing Provider   insulin glargine (LANTUS) 100 UNIT/ML injection vial Inject 5 Units into the skin nightly   Yes Historical Provider, MD   cyclobenzaprine (FLEXERIL) 10 MG tablet Take 1 tablet by mouth 3 times daily as needed for Muscle spasms 10/25/22   Paola Kelleyt, DO   acetaminophen (TYLENOL) 500 MG tablet Take 2 tablets by mouth every 6 hours as needed for Pain 10/25/22   Paola Kelleyt, DO   gabapentin (NEURONTIN) 300 MG capsule Take 1 capsule by mouth in the morning and 1 capsule at noon and 1 capsule in the evening. Do all this for 5 days.  10/1/22 10/6/22  Jon Whitney MD   vitamin D (ERGOCALCIFEROL) 1.25 MG (62132 UT) CAPS capsule Take 1 capsule by mouth once a week 9/29/22   Isha Vaz DO   amLODIPine (NORVASC) 10 MG tablet take 1 tablet by mouth once daily  Patient not taking: No sig reported 8/23/22   BINA Rm   atorvastatin (LIPITOR) 40 MG tablet take 1 tablet by mouth every evening  Patient not taking: No sig reported 8/23/22   BINA Rm   ondansetron (ZOFRAN ODT) 4 MG disintegrating tablet Take 1 tablet by mouth every 8 hours as needed for Nausea or Vomiting 6/25/22   Edmond Major DO   atorvastatin (LIPITOR) 20 MG tablet Take 1 tablet by mouth daily  Patient not taking: No sig reported 4/7/22   BINA Rm   insulin glargine (LANTUS SOLOSTAR) 100 UNIT/ML injection pen Inject 25 Units into the skin 2 times daily  Patient taking differently: Inject 25 Units into the skin daily 4/6/22   BINA Rm   insulin aspart (NOVOLOG FLEXPEN) 100 UNIT/ML injection pen Inject 5 Units into the skin 3 times daily (before meals) 4/6/22   BINA Rm   varenicline (APO-VARENICLINE) 0.5 MG tablet Take 1 tablet by mouth 2 times daily Take on tablet daily for 3 days then one tablet twice daily for the next 3 days then 2 tablets twice a day from then on. 4/6/22   BINA Rm   Blood Glucose Monitoring Suppl (ONE TOUCH ULTRA 2) w/Device KIT 1 kit by Does not apply route daily 3/18/22   Al Melo,    Lancets MISC 1 each by Does not apply route 2 times daily 3/18/22   Al Melo,    Alcohol Swabs 70 % PADS Use as needed with blood glucose checks 2/15/22   Asher Chaparro MD   Insulin Pen Needle ([employer] PEN NEEDLES 29G) 29G X 12MM MISC 1 each by Does not apply route daily 2/15/22   Asher Vines MD   vitamin D (ERGOCALCIFEROL) 1.25 MG (12820 UT) CAPS capsule Take 1 capsule by mouth once a week 2/22/22   Asher Juarez MD Shankar   traZODone (DESYREL) 150 MG tablet Take 300 mg by mouth nightly     Historical Provider, MD   clonazePAM (KLONOPIN) 2 MG tablet Take 4 mg by mouth daily as needed for Anxiety. Historical Provider, MD   albuterol sulfate  (90 Base) MCG/ACT inhaler Inhale 2 puffs into the lungs every 4 hours as needed for Wheezing    Historical Provider, MD       REVIEW OF SYSTEMS    (2-9 systems for level 4, 10 or more for level 5)      Review of Systems   Constitutional:  Negative for appetite change, fatigue and fever. HENT:  Negative for congestion and trouble swallowing. Respiratory:  Positive for shortness of breath. Negative for cough and chest tightness. Cardiovascular:  Positive for chest pain. Negative for leg swelling. Gastrointestinal:  Positive for abdominal pain, nausea and vomiting. Negative for constipation and diarrhea. Genitourinary:  Negative for dysuria. Musculoskeletal:  Negative for back pain and neck pain. Skin:  Negative for color change and rash. Neurological:  Negative for dizziness, weakness and headaches. PHYSICAL EXAM   (up to 7 for level 4, 8 or more for level 5)      INITIAL VITALS:   BP (!) 148/102   Pulse 86   Temp 98.5 °F (36.9 °C) (Oral)   Resp 16   Ht 5' 7\" (1.702 m)   Wt 135 lb (61.2 kg)   SpO2 99%   BMI 21.14 kg/m²     Physical Exam  Constitutional:       General: He is not in acute distress. HENT:      Head: Normocephalic and atraumatic. Nose: No congestion. Eyes:      General: No scleral icterus. Pupils: Pupils are equal, round, and reactive to light. Cardiovascular:      Rate and Rhythm: Tachycardia present. Heart sounds: No murmur heard. No friction rub. No gallop. Pulmonary:      Breath sounds: No wheezing, rhonchi or rales. Abdominal:      General: Abdomen is flat. There is no distension. Palpations: Abdomen is soft. Tenderness: There is no guarding or rebound.       Comments: Mild generalized tenderness   Musculoskeletal:         General: No swelling. Cervical back: Normal range of motion. Comments: Left hip tenderness. No erythema fluctuance or induration along incision site   Skin:     Findings: No rash. Neurological:      General: No focal deficit present. DIFFERENTIAL  DIAGNOSIS     PLAN (LABS / IMAGING / EKG):  Orders Placed This Encounter   Procedures    XR FEMUR LEFT (MIN 2 VIEWS)    XR HIP LEFT (2-3 VIEWS)    XR CHEST (2 VW)    CT CHEST PULMONARY EMBOLISM W CONTRAST    CT ABDOMEN PELVIS W IV CONTRAST Additional Contrast? None    US ABDOMEN LIMITED Specify organ? LIVER, GALLBLADDER, PANCREAS    CBC with Auto Differential    CMP    Lipase    Lactic Acid    Troponin    Brain Natriuretic Peptide    D-Dimer, Quantitative    Beta-Hydroxybutyrate    Troponin    APTT    Comprehensive Metabolic Panel w/ Reflex to MG    Magnesium    Brain natriuretic peptide    Lipid Panel    CBC    Magnesium    Phosphorus    Vitamin D 25 Hydroxy    Hepatic Function Panel    ADULT DIET; Clear Liquid; No Caffeine    Vital signs per unit routine    Notify physician    Notify Cardiologist    Bedrest    Daily weights    Intake and output    Telemetry monitoring - 72 hour duration    Turn or assist with turn approximately every 2 hours if patient is unable to turn self.  Remind patient to turn if necessary    Maintain HOB at the lowest elevation consistent with medical plan of care    Assess skin per unit guidelines    Maintain heels off of bed at all times    Pad/offload medical devices    Use lift equipment for lifting patient    Full Code    Inpatient consult to Hospitalist    Inpatient consult to Cardiology    Consult to Cardiology    Inpatient consult to Social Work    OT eval and treat    PT eval and treat    Initiate Oxygen Therapy Protocol    Pulse oximetry, continuous    POC Glucose Fingerstick    EKG 12 Lead    EKG 12 Lead    EKG 12 lead    ADMIT TO INPATIENT       MEDICATIONS ORDERED:  Orders Placed This Encounter   Medications    0.9 % sodium chloride bolus    famotidine (PEPCID) 20 mg in sodium chloride (PF) 0.9 % 10 mL injection    metoclopramide (REGLAN) injection 10 mg    diphenhydrAMINE (BENADRYL) injection 25 mg    aspirin chewable tablet 324 mg    iopamidol (ISOVUE-370) 76 % injection 75 mL    heparin (porcine) injection 3,670 Units    heparin (porcine) injection 3,670 Units    heparin (porcine) injection 1,840 Units    heparin 25,000 units in dextrose 5 % 250 mL infusion (rate based)    sodium chloride flush 0.9 % injection 5-40 mL    sodium chloride flush 0.9 % injection 10 mL    0.9 % sodium chloride infusion    OR Linked Order Group     potassium chloride (KLOR-CON M) extended release tablet 40 mEq     potassium bicarb-citric acid (EFFER-K) effervescent tablet 40 mEq     potassium chloride 10 mEq/100 mL IVPB (Peripheral Line)    magnesium sulfate 1000 mg in dextrose 5% 100 mL IVPB    OR Linked Order Group     ondansetron (ZOFRAN-ODT) disintegrating tablet 4 mg     ondansetron (ZOFRAN) injection 4 mg    OR Linked Order Group     acetaminophen (TYLENOL) tablet 650 mg     acetaminophen (TYLENOL) suppository 650 mg    magnesium hydroxide (MILK OF MAGNESIA) 400 MG/5ML suspension 30 mL    metoprolol tartrate (LOPRESSOR) tablet 25 mg    atorvastatin (LIPITOR) tablet 80 mg    nitroGLYCERIN (NITROSTAT) SL tablet 0.4 mg    aspirin chewable tablet 81 mg    pantoprazole (PROTONIX) 40 mg in sodium chloride (PF) 0.9 % 10 mL injection    polyethylene glycol (GLYCOLAX) packet 17 g    metoclopramide (REGLAN) injection 5 mg       DIAGNOSTIC RESULTS / EMERGENCY DEPARTMENT COURSE / MDM   LAB RESULTS:  Results for orders placed or performed during the hospital encounter of 11/04/22   CBC with Auto Differential   Result Value Ref Range    WBC 9.4 3.5 - 11.3 k/uL    RBC 4.70 4.21 - 5.77 m/uL    Hemoglobin 13.0 13.0 - 17.0 g/dL    Hematocrit 40.1 (L) 40.7 - 50.3 %    MCV 85.3 82.6 - 102.9 fL    MCH 27.7 25.2 - 33.5 pg MCHC 32.4 28.4 - 34.8 g/dL    RDW 13.3 11.8 - 14.4 %    Platelets 894 933 - 331 k/uL    MPV 11.9 8.1 - 13.5 fL    NRBC Automated 0.0 0.0 per 100 WBC    Seg Neutrophils 82 (H) 36 - 65 %    Lymphocytes 13 (L) 24 - 43 %    Monocytes 5 3 - 12 %    Eosinophils % 0 (L) 1 - 4 %    Basophils 0 0 - 2 %    Immature Granulocytes 0 0 %    Segs Absolute 7.59 1.50 - 8.10 k/uL    Absolute Lymph # 1.25 1.10 - 3.70 k/uL    Absolute Mono # 0.50 0.10 - 1.20 k/uL    Absolute Eos # 0.04 0.00 - 0.44 k/uL    Basophils Absolute <0.03 0.00 - 0.20 k/uL    Absolute Immature Granulocyte 0.04 0.00 - 0.30 k/uL   CMP   Result Value Ref Range    Glucose 131 (H) 70 - 99 mg/dL    BUN 27 (H) 6 - 20 mg/dL    Creatinine 1.67 (H) 0.70 - 1.20 mg/dL    Est, Glom Filt Rate 49 (L) >60 mL/min/1.73m2    Calcium 9.7 8.6 - 10.4 mg/dL    Sodium 133 (L) 135 - 144 mmol/L    Potassium 3.4 (L) 3.7 - 5.3 mmol/L    Chloride 89 (L) 98 - 107 mmol/L    CO2 29 20 - 31 mmol/L    Anion Gap 15 9 - 17 mmol/L    Alkaline Phosphatase 118 40 - 129 U/L    ALT 56 (H) 5 - 41 U/L     (H) <40 U/L    Total Bilirubin 0.8 0.3 - 1.2 mg/dL    Total Protein 7.2 6.4 - 8.3 g/dL    Albumin 4.0 3.5 - 5.2 g/dL    Albumin/Globulin Ratio 1.3 1.0 - 2.5   Lipase   Result Value Ref Range    Lipase 8 (L) 13 - 60 U/L   Lactic Acid   Result Value Ref Range    Lactic Acid, Whole Blood 2.0 0.7 - 2.1 mmol/L   Troponin   Result Value Ref Range    Troponin, High Sensitivity 150 (HH) 0 - 22 ng/L   Brain Natriuretic Peptide   Result Value Ref Range    Pro- (H) <300 pg/mL   D-Dimer, Quantitative   Result Value Ref Range    D-Dimer, Quant 1.58 mg/L FEU   Beta-Hydroxybutyrate   Result Value Ref Range    Beta-Hydroxybutyrate 1.91 (H) 0.02 - 0.27 mmol/L   Troponin   Result Value Ref Range    Troponin, High Sensitivity 140 (HH) 0 - 22 ng/L   APTT   Result Value Ref Range    PTT 21.1 20.5 - 30.5 sec   Magnesium   Result Value Ref Range    Magnesium 2.0 1.6 - 2.6 mg/dL   Phosphorus   Result Value Ref Range    Phosphorus 1.0 (L) 2.5 - 4.5 mg/dL   POC Glucose Fingerstick   Result Value Ref Range    POC Glucose 97 75 - 110 mg/dL   EKG 12 Lead   Result Value Ref Range    Ventricular Rate 96 BPM    Atrial Rate 96 BPM    P-R Interval 140 ms    QRS Duration 90 ms    Q-T Interval 392 ms    QTc Calculation (Bazett) 495 ms    P Axis 84 degrees    R Axis -35 degrees    T Axis 71 degrees   EKG 12 Lead   Result Value Ref Range    Ventricular Rate 98 BPM    Atrial Rate 98 BPM    P-R Interval 140 ms    QRS Duration 92 ms    Q-T Interval 370 ms    QTc Calculation (Bazett) 472 ms    P Axis 81 degrees    R Axis 2 degrees    T Axis 72 degrees       RADIOLOGY:  XR CHEST (2 VW)    Result Date: 11/4/2022  Lucent lungs with chronic pattern of hyperinflation. No acute pulmonary finding. XR HIP LEFT (2-3 VIEWS)    Result Date: 11/4/2022  Prior surgical change of ORIF with no acute abnormality of the left hip or femur. XR FEMUR LEFT (MIN 2 VIEWS)    Result Date: 11/4/2022  Prior surgical change of ORIF with no acute abnormality of the left hip or femur. CT ABDOMEN PELVIS W IV CONTRAST Additional Contrast? None    Result Date: 11/4/2022  1. No evidence of pulmonary embolism or acute pulmonary abnormality. 2.  No acute inflammatory process identified in the abdomen or pelvis. 3.  Punctate left nephrolithiasis. 4.  Findings compatible with hepatic steatosis. 5.  Additional chronic and benign findings, as above. CT CHEST PULMONARY EMBOLISM W CONTRAST    Result Date: 11/4/2022  1. No evidence of pulmonary embolism or acute pulmonary abnormality. 2.  No acute inflammatory process identified in the abdomen or pelvis. 3.  Punctate left nephrolithiasis. 4.  Findings compatible with hepatic steatosis. 5.  Additional chronic and benign findings, as above.         EKG Interpretation    Interpreted by myself    Sinus tachycardia, pulmonary disease pattern, no ST elevation or depression, no T wave inversion    All EKG's are interpreted by the Emergency Department Physician who either signs or Co-signs this chart in the absence of a cardiologist.    EMERGENCY DEPARTMENT COURSE:  ED Course as of 11/04/22 0536   Fri Nov 04, 2022   0100 S/p LEFT FEMUR CEPHALOMEDULLARY NAIL 9/30. Patient by bedside. Multiple complaints. Tachycardic chest pain abdominal pain nausea vomiting. No overt signs of infection at incision site [ZE]   0207 Patient's troponin grossly elevated at 150. Will give aspirin we will get repeat EKG [ZE]   0218 Evaded D-dimer we will get CT chest to evaluate pulmonary embolism. We will get CT abdomen pelvis as well [ZE]   0251 Second troponin downtrending [ZE]   0305 Chest x-ray femur and hip x-rays unremarkable. Awaiting chest abdomen pelvis [ZE]   0347 No acute process of chest abdomen pelvis. Will start heparin given elevated troponins [ZE]      ED Course User Index  [ZE] Cirilo Sarah DO       PROCEDURES:  Procedures     CONSULTS:  IP CONSULT TO HOSPITALIST  IP CONSULT TO CARDIOLOGY  IP CONSULT TO CARDIOLOGY  IP CONSULT TO SOCIAL WORK    CRITICAL CARE:  none    MDM  Here for several complaints. Patient noted to have elevated troponins meeting NSTEMI criteria started on heparin drip given aspirin. Patient admitted for cardiology work-up    FINAL IMPRESSION      1. NSTEMI (non-ST elevated myocardial infarction) ([facility] [address]          DISPOSITION / PLAN     DISPOSITION Admitted 11/04/2022 03:58:48 AM      PATIENT REFERRED TO:  No follow-up provider specified.     DISCHARGE MEDICATIONS:  Current Discharge Medication List          Tania Jackson DO  Emergency Medicine Resident    (Please note that portions of thisnote were completed with a voice recognition program.  Efforts were made to edit the dictations but occasionally words are mis-transcribed.)        Cirilo Sarah DO  Resident  11/04/22 [id number]

## 2022-11-04 NOTE — ED PROVIDER NOTES
[facility] ED     Emergency Department     Faculty Attestation        I performed a history and physical examination of the patient and discussed management with the resident. I reviewed the residents note and agree with the documented findings and plan of care. Any areas of disagreement are noted on the chart. I was personally present for the key portions of any procedures. I have documented in the chart those procedures where I was not present during the key portions. I have reviewed the emergency nurses triage note. I agree with the chief complaint, past medical history, past surgical history, allergies, medications, social and family history as documented unless otherwise noted below. For mid-level providers such as nurse practitioners as well as physicians assistants:    I have personally seen and evaluated the patient. I find the patient's history and physical exam are consistent with NP/PA documentation. I agree with the care provided, treatment rendered, disposition, & follow-up plan. Additional findings are as noted. Vital Signs: BP (!) 138/103   Pulse (!) 126   Temp 98.5 °F (36.9 °C) (Oral)   Resp 14   Ht 5' 7\" (1.702 m)   Wt 135 lb (61.2 kg)   SpO2 100%   BMI 21.14 kg/m²   PCP:  BINA Guerra    Pertinent Comments:       Patient with multiple complaints recently had angelito placed in left femur and he is insulin-dependent diabetic states his sugars have been under under not under control he has had chest pain some cough and abdominal pain and left femur pain. Is awake alert and oriented. He is +2 popliteal DP and PT pulses left lower extremity is entire left lower EXTR is pink warm well perfused. Some pain over the incision sites.   Will check x-rays, laboratory studies, EKG chest x-ray D-dimer, fluids, reassessment    Critical Care  None          Nicola Castellano MD    Attending Emergency Medicine Physician Christian Helton MD  11/04/22 1975

## 2022-11-04 NOTE — ED TRIAGE NOTES
Pt was brought in by M 13, for left leg pain, from home   Vitals reported 102/72, , Resp 18, 96% RA, EKG non diagnostic   Pt is 2 weeks post op, has home care Rn that just started this week   Pt had staples, removed earlier this week     Pt also states he not eating well, and having nausea for past 5 days.  Last BM was 1 week ago, pt is diabetic, Bs at home running 250-260 last few weeks

## 2022-11-04 NOTE — ED NOTES
Report given To Beny Mcintyre with verbal understanding of the patient needs and concerns      Angel Rajan RN  11/04/22 5380

## 2022-11-04 NOTE — PROGRESS NOTES
Comprehensive Nutrition Assessment    Type and Reason for Visit:  Positive Nutrition Screen    Nutrition Recommendations/Plan:   Continue NPO. Recommend starting ONS when diet is resumed  Monitor weight, labs and intake. Malnutrition Assessment:  Malnutrition Status: Moderate malnutrition (11/04/22 1225)    Context:  Social/Environmental Circumstances     Findings of the 6 clinical characteristics of malnutrition:  Energy Intake:  50% or less estimated energy requirements for 1 month or longer  Weight Loss:  7.5% over 3 months     Body Fat Loss:  Mild body fat loss Orbital   Muscle Mass Loss:  Mild muscle mass loss Clavicles (pectoralis & deltoids)  Fluid Accumulation:  Mild     Strength:  Not Performed    Nutrition Assessment:    Patient admitted for NSTEMI. He reports that a few months ago he weighed around 150 lb. He reports this weight loss is from limited resources. Patient reports nausea x5 days, constipation x1 week prior to admission. Social work and CM aware of patient's limited resources. Per EMR, patient w/ 20 lb weight loss x3 months. Nutrition Related Findings:    Labs/meds reviewed Wound Type: None       Current Nutrition Intake & Therapies:    Average Meal Intake: NPO  Average Supplements Intake: NPO  Diet NPO    Anthropometric Measures:  Height: 5' 7\" (170.2 cm)  Ideal Body Weight (IBW): 148 lbs (67 kg)       Current Body Weight: 115 lb (52.2 kg), 77.7 % IBW.  Weight Source: Stated  Current BMI (kg/m2): 18                          BMI Categories: Underweight (BMI less than 18.5)    Estimated Daily Nutrient Needs:  Energy Requirements Based On: Kcal/kg  Weight Used for Energy Requirements: Current  Energy (kcal/day): 3877-7048 kcal  Weight Used for Protein Requirements: Current  Protein (g/day): 65 gm protein  Method Used for Fluid Requirements: 1 ml/kcal  Fluid (ml/day): 1600 mL or per MD    Nutrition Diagnosis:   Inadequate oral intake related to lack or limited access to food as evidenced by poor intake prior to admission, weight loss    Nutrition Interventions:   Food and/or Nutrient Delivery: Start Oral Diet, Start Oral Nutrition Supplement  Nutrition Education/Counseling: No recommendation at this time  Coordination of Nutrition Care: Continue to monitor while inpatient  Plan of Care discussed with: Patient    Goals:     Goals: Meet at least 75% of estimated needs, prior to discharge       Nutrition Monitoring and Evaluation:   Behavioral-Environmental Outcomes: None Identified  Food/Nutrient Intake Outcomes: Diet Advancement/Tolerance  Physical Signs/Symptoms Outcomes: Biochemical Data, GI Status, Nausea or Vomiting, Skin, Weight    Discharge Planning:     Too soon to determine     Aleshachelsi Healy, [address]  Contact: [phone]

## 2022-11-04 NOTE — ED NOTES
The following labs were labeled with appropriate pt sticker and tubed to lab:     [x] Blue     [x] Lavender   [] on ice  [x] Green/yellow  [x] Green/black [] on ice  [] Willma Economy  [] on ice  [] Yellow  [] Red  [] Type/ Screen  [] ABG  [] VBG    [] COVID-19 swab    [] Rapid  [] PCR  [] Flu swab  [] Peds Viral Panel     [] Urine Sample  [] Pelvic Cultures  [] Blood Cultures  [] X 2  [] STREP Cultures         Angel Rajan RN  11/04/22 0125

## 2022-11-04 NOTE — CARE COORDINATION
11/04/22 0857   Service Assessment   Patient Orientation Alert and Oriented   Cognition Alert   History Provided By Patient   Primary [address]   PCP Verified by CM Yes   Last Visit to PCP Within last 3 months   Prior Functional Level Independent in ADLs/IADLs   Current Functional Level Independent in ADLs/IADLs   Can patient return to prior living arrangement Yes   Ability to make needs known: Good   Family able to assist with home care needs: Yes   Would you like for me to discuss the discharge plan with any other family members/significant others, and if so, who? Yes   Financial Resources Medicaid   Social/Functional History   Lives With Son   Type of [address] One level   [facility] to [facility] with rails   Entrance Stairs - Number of Steps 1 flight - 25 steps   Entrance Stairs - Rails Both   ADL Assistance Needs assistance  (sons assist)   [address] Responsibilities Yes   Ambulation Assistance Independent   Transfer Assistance Independent   Active  No   Patient's  Info bus   Occupation Unemployed   Discharge Planning   Type of [address] Medications Yes   One/Two Story Residence Two story   Lift Chair Available No   History of falls? [address] Provided? No   Mode of Transport at Discharge Other (see comment)   Confirm Follow Up Transport Cab   Condition of Participation: Discharge Planning   The Plan for Transition of Care is related to the following treatment goals: cardiac evaluation   The Patient and/or Patient Representative was provided with a Choice of Provider? Patient   The Patient and/Or Patient Representative agree with the Discharge Plan? Yes     Home at discharge, has [facility]. Has 2 teenage sons (15 and 12) that assist. Will need ride home.
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
## 2022-11-04 NOTE — CONSULTS
[facility]   Consult Note                 Date:   11/4/2022  Patient name: Bari Freeman  Date of admission:  11/4/2022 12:57 AM  MRN:   [id number]  YOB: 1970    Reason for Consult:  cardiac work up    CHIEF COMPLAINT:  chest pain    History Obtained From:  Patient     HISTORY OF PRESENT ILLNESS:    The patient is a 47 y.o. male presenting with chest pain and sob prior to admission, associated with nausea, vomiting and abdominal pain and constipation for 1 week. In the Ed, patient's troponin noted to be elevated to 150 and was given ASA, follow up troponin trending down to 140. CT chest abdomen pelvis were ordered for elevated d dimer as well and were negative for acute process. Past Medical History:   has a past medical history of Diabetes mellitus ([facility] [address] Obstructive lung disease ([facility] [address] and Panic attack. Past Surgical History:   has a past surgical history that includes Appendectomy; Hand surgery; Upper gastrointestinal endoscopy (N/A, 02/15/2022); Femur fracture surgery (Left, 09/30/2022); Tibia fracture surgery (Left, 09/30/2022); and joint replacement. Home Medications:    Prior to Admission medications    Medication Sig Start Date End Date Taking? Authorizing Provider   insulin glargine (LANTUS) 100 UNIT/ML injection vial Inject 5 Units into the skin nightly   Yes Historical Provider, MD   cyclobenzaprine (FLEXERIL) 10 MG tablet Take 1 tablet by mouth 3 times daily as needed for Muscle spasms 10/25/22   Glynn Boy, DO   acetaminophen (TYLENOL) 500 MG tablet Take 2 tablets by mouth every 6 hours as needed for Pain 10/25/22   Glynn Boy, DO   gabapentin (NEURONTIN) 300 MG capsule Take 1 capsule by mouth in the morning and 1 capsule at noon and 1 capsule in the evening. Do all this for 5 days.  10/1/22 10/6/22  Jon Whitney MD   vitamin D (ERGOCALCIFEROL) 1.25 MG (58157 UT) CAPS capsule Take 1 capsule by mouth once a week 9/29/22   Denane Hayward,  amLODIPine (NORVASC) 10 MG tablet take 1 tablet by mouth once daily  Patient not taking: No sig reported 8/23/22   BINA Rm   atorvastatin (LIPITOR) 40 MG tablet take 1 tablet by mouth every evening  Patient not taking: No sig reported 8/23/22   BINA Rm   ondansetron (ZOFRAN ODT) 4 MG disintegrating tablet Take 1 tablet by mouth every 8 hours as needed for Nausea or Vomiting 6/25/22   Edmond Major DO   atorvastatin (LIPITOR) 20 MG tablet Take 1 tablet by mouth daily  Patient not taking: No sig reported 4/7/22   BINA Rm   insulin glargine (LANTUS SOLOSTAR) 100 UNIT/ML injection pen Inject 25 Units into the skin 2 times daily  Patient taking differently: Inject 25 Units into the skin daily 4/6/22   BINA Rm   insulin aspart (NOVOLOG FLEXPEN) 100 UNIT/ML injection pen Inject 5 Units into the skin 3 times daily (before meals) 4/6/22   BINA Rm   varenicline (APO-VARENICLINE) 0.5 MG tablet Take 1 tablet by mouth 2 times daily Take on tablet daily for 3 days then one tablet twice daily for the next 3 days then 2 tablets twice a day from then on. 4/6/22   BINA Rm   Blood Glucose Monitoring Suppl (ONE TOUCH ULTRA 2) w/Device KIT 1 kit by Does not apply route daily 3/18/22   Al Melo DO   Lancets MISC 1 each by Does not apply route 2 times daily 3/18/22   Al Melo DO   Alcohol Swabs 70 % PADS Use as needed with blood glucose checks 2/15/22   Asher Chaparro MD   Insulin Pen Needle ([employer] PEN NEEDLES 29G) 29G X 12MM MISC 1 each by Does not apply route daily 2/15/22   Asher Vines MD   vitamin D (ERGOCALCIFEROL) 1.25 MG (82580 UT) CAPS capsule Take 1 capsule by mouth once a week 2/22/22   Asher Chaparro MD   traZODone (DESYREL) 150 MG tablet Take 300 mg by mouth nightly     Historical Provider, MD   clonazePAM (KLONOPIN) 2 MG tablet Take 4 mg by mouth daily as needed for Anxiety. Historical Provider, MD   albuterol sulfate  (90 Base) MCG/ACT inhaler Inhale 2 puffs into the lungs every 4 hours as needed for Wheezing    Historical Provider, MD       Allergies:  Azithromycin, Acetaminophen-codeine, Codeine, Hydrocodone-acetaminophen, Ibuprofen, Morphine, and Tramadol    Social History:   reports that he has been smoking cigarettes. He has a 10.25 pack-year smoking history. He has never used smokeless tobacco. He reports that he does not currently use alcohol. He reports current drug use. Drug: Marijuana [employer]). Family History: family history is not on file. No for premature CAD. No for h/o sudden cardiac death    REVIEW OF SYSTEMS:    Constitutional: there has been no unanticipated weight loss. There's been No change in activity level. Eyes: No visual changes or diplopia. No scleral icterus. ENT: No Headaches, hearing loss or vertigo. Cardiovascular: Yes chest pain, No dyspnea on exertion, No palpitations or No loss of consciousness. Respiratory: No cough or wheezing, no sputum production. No hematemesis. No pleuritic chest pain   Gastrointestinal: No abdominal pain, blood in stools. Genitourinary: No dysuria, trouble voiding, or hematuria. Musculoskeletal:  No gait disturbance, No weakness or joint complaints. Integumentary: No rash or pruritis. Neurological: No headache, diplopia, change in muscle strength, numbness or tingling. Psychiatric: No anxiety, or depression. Endocrine: No temperature intolerance. No excessive thirst, fluid intake, or urination. Hematologic/Lymphatic: No abnormal bruising or bleeding, blood clots or swollen lymph nodes. Allergic/Immunologic: No nasal congestion or hives.       PHYSICAL EXAM:    Physical Examination:    BP (!) 157/110   Pulse 84   Temp 98.1 °F (36.7 °C) (Oral)   Resp 20   Ht 5' 7\" (1.702 m)   Wt 115 lb 11.9 oz (52.5 kg)   SpO2 99%   BMI 18.13 kg/m²    Constitutional and General Appearance: alert, cooperative, in no apparent resp distress HEENT: PERRL, no cervical lymphadenopathy  Respiratory:  Good respiratory effort. No for increased work of breathing. On auscultation: clear to auscultation bilaterally, no basilar rales, no wheezing   Cardiovascular:  regular S1 and S2. No murmur audible   No Jugular venous distention, no hepatojugular reflex  Peripheral pulses are symmetrical and full   Abdomen:  No masses or tenderness  Bowel sounds present  Extremities:   No Cyanosis or Clubbing   Lower extremity edema: No   Skin: Warm and dry  Neurological:  Not done       DATA:    Diagnostics:      EKG Sinus rhythm, rate 100 normal axis biatrial enlargement        Labs:     CBC:   Recent Labs     11/04/22 0119   WBC 9.4   HGB 13.0   HCT 40.1*        BMP:   Recent Labs     11/04/22 0119   *   K 3.4*   CO2 29   BUN 27*   CREATININE 1.67*   LABGLOM 49*   GLUCOSE 131*     BNP: No results for input(s): BNP in the last 72 hours. PT/INR: No results for input(s): PROTIME, INR in the last 72 hours. APTT:  Recent Labs     11/04/22 0409   APTT 21.1     CARDIAC ENZYMES:No results for input(s): CKTOTAL, CKMB, CKMBINDEX, TROPONINI in the last 72 hours. FASTING LIPID PANEL:  Lab Results   Component Value Date/Time    HDL 63 04/06/2022 01:49 PM     LIVER PROFILE:  Recent Labs     11/04/22 0119 11/04/22  0555   * 124*   ALT 56* 58*   LABALBU 4.0 3.7         IMPRESSION:    Chest pain rule out NSTEMI vs type II NSTEMI  Elevated troponin  Hypertension      RECOMMENDATIONS:  C/w ASA, lipitor, BB  C/w heparin infusion  Trend troponin  Follow up echocardiogram to evaluate EF  Monitor on telemetry  Further recommendations after rounds      Discussed with patient and nursing.     Michelle Gutierrez MD MD  Fellow, [address]   Pager - [phone]      Attending Physician Statement  I have discussed the care of the patient, including pertinent history and exam findings, with the resident. I have seen and examined the patient and the key elements of all parts of the encounter have been performed by me. I agree with the assessment, plan and orders as documented by the resident. Smoker with a history of recent orthopedic surgery 3 weeks ago  Came with chest pain heartburns.   [address]  At this time no more chest pain  Patient had a CT angiogram done with IV contrast  Creatinine is 1.67  Patient is already eating  Plan to do the cath on Monday once cleared by nephrologist  Greta Tobar MD

## 2022-11-04 NOTE — PLAN OF CARE
[facility]  Office: [address], DO, Raghavendra Quesada, DO, Sonja Dailey, DO, Lara Alonso Blood, DO, Angel Davies MD, Carline Shafer MD, Juan Ayala MD, Zahira Mayer MD,  Blair Walker MD, Lyndon Butcher MD, Barbara Layer, DO, Asad Waldron MD,  Sherri Mcnally MD, Alondra Turner MD, Willie Mendoza, DO, Olivia Heart MD, Jessy Welch MD, Bettina Flores, DO, Sweta Barrett MD, Brian Case MD, Radha Leung MD, Joe Sanchez MD, Car Wilhelm, DO, Chente Georges MD, Andrey Montiel MD, Joyce Michaels, CNP,  Willian Gómez, CNP, Craig Sever, CNP, Gorge Larsen, CNP,  Alma Wallace, DNP, Hannah Hairston, CNP, Hector Chavez, CNP, Sourav Sotelo, CNP, Clem Garsia, CNP, Isidro Steiner, CNP, Zully Brito PA-C, Ryan Dc, CNS, Gregg Barajas, DNP, Sierra Rowe, CNP, Radha Newell, CNP, Simona Valdovinos, CNP         Beata Barker 20    Second Visit Note  For more detailed information please refer to the progress note of the day      11/4/2022    2:28 PM    Name:   Ree Healy  MRN:     [id number]     [place]:      [de-identified]   Room:   2020/2020-01  IP Day:  0  Admit Date:  11/4/2022 12:57 AM    PCP:   BINA Willson  Code Status:  Full Code      Pt vitals were reviewed   New labs were reviewed   Patient was seen    Updated plan :     Ultrasound still pending  Has a history of CKD unfortunately received 2 doses of contrast for CT PE and CT abdomen pelvis with IV. Monitor creatinine continue IV hydration  Patient is hungry asking for food we will try full liquid and advance if patient is able to tolerate. Appreciate dietary consultation  Possible plan for cardiac catheterization once cleared by nephrology.   Appreciate social work's assistance as well as case management  PT/OT      Asad Waldron MD  11/4/2022  2:28 PM

## 2022-11-04 NOTE — ED NOTES
Pt resting in bed, NAD noted rr even and non labored. Bed locked in lowest position, environment free of clutter. Call light within reach, will continue to monitor.           Bakari Maza RN  11/04/22 1053

## 2022-11-05 ENCOUNTER — APPOINTMENT (OUTPATIENT)
Dept: ULTRASOUND IMAGING | Age: 52
DRG: 190 | End: 2022-11-05
Payer: MEDICARE

## 2022-11-05 PROBLEM — N17.9 ACUTE KIDNEY INJURY (HCC): Status: ACTIVE | Noted: 2022-11-05

## 2022-11-05 PROBLEM — N18.31 STAGE 3A CHRONIC KIDNEY DISEASE (HCC): Status: ACTIVE | Noted: 2022-11-05

## 2022-11-05 LAB
ALBUMIN SERPL-MCNC: 3.6 G/DL (ref 3.5–5.2)
ALBUMIN/GLOBULIN RATIO: 1.7 (ref 1–2.5)
ALP BLD-CCNC: 106 U/L (ref 40–129)
ALT SERPL-CCNC: 81 U/L (ref 5–41)
ANION GAP SERPL CALCULATED.3IONS-SCNC: 14 MMOL/L (ref 9–17)
AST SERPL-CCNC: 136 U/L
BILIRUB SERPL-MCNC: 0.8 MG/DL (ref 0.3–1.2)
BUN BLDV-MCNC: 31 MG/DL (ref 6–20)
CALCIUM SERPL-MCNC: 8.5 MG/DL (ref 8.6–10.4)
CHLORIDE BLD-SCNC: 93 MMOL/L (ref 98–107)
CHOLESTEROL/HDL RATIO: 3.2
CHOLESTEROL: 164 MG/DL
CO2: 25 MMOL/L (ref 20–31)
COMPLEMENT C3: 95 MG/DL (ref 90–180)
COMPLEMENT C4: 26 MG/DL (ref 10–40)
CREAT SERPL-MCNC: 1.42 MG/DL (ref 0.7–1.2)
EKG ATRIAL RATE: 84 BPM
EKG P-R INTERVAL: 202 MS
EKG Q-T INTERVAL: 382 MS
EKG QRS DURATION: 78 MS
EKG QTC CALCULATION (BAZETT): 451 MS
EKG R AXIS: 49 DEGREES
EKG T AXIS: 61 DEGREES
EKG VENTRICULAR RATE: 84 BPM
FREE KAPPA/LAMBDA RATIO: 1.58 (ref 0.26–1.65)
GFR SERPL CREATININE-BSD FRML MDRD: 60 ML/MIN/1.73M2
GLUCOSE BLD-MCNC: 289 MG/DL (ref 70–99)
GLUCOSE BLD-MCNC: 295 MG/DL (ref 75–110)
GLUCOSE BLD-MCNC: 318 MG/DL (ref 75–110)
GLUCOSE BLD-MCNC: 333 MG/DL (ref 75–110)
GLUCOSE BLD-MCNC: 340 MG/DL (ref 75–110)
HCT VFR BLD CALC: 33.2 % (ref 40.7–50.3)
HDLC SERPL-MCNC: 51 MG/DL
HEMOGLOBIN: 10.7 G/DL (ref 13–17)
KAPPA FREE LIGHT CHAINS QNT: 3.08 MG/DL (ref 0.37–1.94)
LAMBDA FREE LIGHT CHAINS QNT: 1.95 MG/DL (ref 0.57–2.63)
LDL CHOLESTEROL: 95 MG/DL (ref 0–130)
MCH RBC QN AUTO: 27.7 PG (ref 25.2–33.5)
MCHC RBC AUTO-ENTMCNC: 32.2 G/DL (ref 28.4–34.8)
MCV RBC AUTO: 86 FL (ref 82.6–102.9)
NRBC AUTOMATED: 0 PER 100 WBC
PARTIAL THROMBOPLASTIN TIME: 110.3 SEC (ref 20.5–30.5)
PARTIAL THROMBOPLASTIN TIME: 42.1 SEC (ref 20.5–30.5)
PARTIAL THROMBOPLASTIN TIME: 53.3 SEC (ref 20.5–30.5)
PDW BLD-RTO: 13.6 % (ref 11.8–14.4)
PHOSPHORUS: 2.2 MG/DL (ref 2.5–4.5)
PLATELET # BLD: 175 K/UL (ref 138–453)
PMV BLD AUTO: 12.9 FL (ref 8.1–13.5)
POTASSIUM SERPL-SCNC: 4 MMOL/L (ref 3.7–5.3)
PRO-BNP: 433 PG/ML
RBC # BLD: 3.86 M/UL (ref 4.21–5.77)
SODIUM BLD-SCNC: 132 MMOL/L (ref 135–144)
TOTAL PROTEIN: 5.7 G/DL (ref 6.4–8.3)
TRIGL SERPL-MCNC: 92 MG/DL
TROPONIN, HIGH SENSITIVITY: 104 NG/L (ref 0–22)
TROPONIN, HIGH SENSITIVITY: 134 NG/L (ref 0–22)
VITAMIN D 25-HYDROXY: 26.9 NG/ML
WBC # BLD: 5.6 K/UL (ref 3.5–11.3)

## 2022-11-05 PROCEDURE — 86160 COMPLEMENT ANTIGEN: CPT

## 2022-11-05 PROCEDURE — 83880 ASSAY OF NATRIURETIC PEPTIDE: CPT

## 2022-11-05 PROCEDURE — 84484 ASSAY OF TROPONIN QUANT: CPT

## 2022-11-05 PROCEDURE — 2060000000 HC ICU INTERMEDIATE R&B

## 2022-11-05 PROCEDURE — 6370000000 HC RX 637 (ALT 250 FOR IP): Performed by: STUDENT IN AN ORGANIZED HEALTH CARE EDUCATION/TRAINING PROGRAM

## 2022-11-05 PROCEDURE — 76775 US EXAM ABDO BACK WALL LIM: CPT

## 2022-11-05 PROCEDURE — 36415 COLL VENOUS BLD VENIPUNCTURE: CPT

## 2022-11-05 PROCEDURE — 6370000000 HC RX 637 (ALT 250 FOR IP): Performed by: NURSE PRACTITIONER

## 2022-11-05 PROCEDURE — 2580000003 HC RX 258: Performed by: NURSE PRACTITIONER

## 2022-11-05 PROCEDURE — 80061 LIPID PANEL: CPT

## 2022-11-05 PROCEDURE — 6360000002 HC RX W HCPCS: Performed by: NURSE PRACTITIONER

## 2022-11-05 PROCEDURE — 6360000002 HC RX W HCPCS: Performed by: INTERNAL MEDICINE

## 2022-11-05 PROCEDURE — 82947 ASSAY GLUCOSE BLOOD QUANT: CPT

## 2022-11-05 PROCEDURE — 99232 SBSQ HOSP IP/OBS MODERATE 35: CPT | Performed by: STUDENT IN AN ORGANIZED HEALTH CARE EDUCATION/TRAINING PROGRAM

## 2022-11-05 PROCEDURE — 80053 COMPREHEN METABOLIC PANEL: CPT

## 2022-11-05 PROCEDURE — 2500000003 HC RX 250 WO HCPCS: Performed by: NURSE PRACTITIONER

## 2022-11-05 PROCEDURE — 6360000002 HC RX W HCPCS: Performed by: STUDENT IN AN ORGANIZED HEALTH CARE EDUCATION/TRAINING PROGRAM

## 2022-11-05 PROCEDURE — 85027 COMPLETE CBC AUTOMATED: CPT

## 2022-11-05 PROCEDURE — 99232 SBSQ HOSP IP/OBS MODERATE 35: CPT | Performed by: INTERNAL MEDICINE

## 2022-11-05 PROCEDURE — 85730 THROMBOPLASTIN TIME PARTIAL: CPT

## 2022-11-05 PROCEDURE — 84100 ASSAY OF PHOSPHORUS: CPT

## 2022-11-05 PROCEDURE — 93010 ELECTROCARDIOGRAM REPORT: CPT | Performed by: INTERNAL MEDICINE

## 2022-11-05 PROCEDURE — 83521 IG LIGHT CHAINS FREE EACH: CPT

## 2022-11-05 RX ORDER — TRAZODONE HYDROCHLORIDE 100 MG/1
300 TABLET ORAL ONCE
Status: COMPLETED | OUTPATIENT
Start: 2022-11-05 | End: 2022-11-05

## 2022-11-05 RX ORDER — TRAZODONE HYDROCHLORIDE 100 MG/1
300 TABLET ORAL NIGHTLY
Status: DISCONTINUED | OUTPATIENT
Start: 2022-11-05 | End: 2022-11-06 | Stop reason: HOSPADM

## 2022-11-05 RX ORDER — CALCIUM ACETATE 667 MG/1
1 CAPSULE ORAL
Status: DISCONTINUED | OUTPATIENT
Start: 2022-11-05 | End: 2022-11-06 | Stop reason: HOSPADM

## 2022-11-05 RX ORDER — INSULIN LISPRO 100 [IU]/ML
2 INJECTION, SOLUTION INTRAVENOUS; SUBCUTANEOUS
Status: DISCONTINUED | OUTPATIENT
Start: 2022-11-05 | End: 2022-11-06 | Stop reason: HOSPADM

## 2022-11-05 RX ORDER — PANTOPRAZOLE SODIUM 40 MG/1
40 TABLET, DELAYED RELEASE ORAL
Status: DISCONTINUED | OUTPATIENT
Start: 2022-11-05 | End: 2022-11-06 | Stop reason: HOSPADM

## 2022-11-05 RX ORDER — INSULIN GLARGINE 100 [IU]/ML
7 INJECTION, SOLUTION SUBCUTANEOUS NIGHTLY
Status: DISCONTINUED | OUTPATIENT
Start: 2022-11-05 | End: 2022-11-06 | Stop reason: HOSPADM

## 2022-11-05 RX ORDER — METOCLOPRAMIDE HYDROCHLORIDE 5 MG/ML
5 INJECTION INTRAMUSCULAR; INTRAVENOUS EVERY 8 HOURS
Status: DISCONTINUED | OUTPATIENT
Start: 2022-11-05 | End: 2022-11-06

## 2022-11-05 RX ADMIN — ANTACID TABLETS 1000 MG: 500 TABLET, CHEWABLE ORAL at 18:35

## 2022-11-05 RX ADMIN — LIDOCAINE HYDROCHLORIDE: 20 SOLUTION ORAL; TOPICAL at 15:47

## 2022-11-05 RX ADMIN — HEPARIN SODIUM 17.97 UNITS/KG/HR: 10000 INJECTION, SOLUTION INTRAVENOUS at 06:32

## 2022-11-05 RX ADMIN — INSULIN GLARGINE 7 UNITS: 100 INJECTION, SOLUTION SUBCUTANEOUS at 21:16

## 2022-11-05 RX ADMIN — SODIUM CHLORIDE, PRESERVATIVE FREE 10 ML: 5 INJECTION INTRAVENOUS at 08:59

## 2022-11-05 RX ADMIN — TRAZODONE HYDROCHLORIDE 300 MG: 100 TABLET ORAL at 03:37

## 2022-11-05 RX ADMIN — CALCIUM ACETATE 667 MG: 667 CAPSULE ORAL at 17:17

## 2022-11-05 RX ADMIN — METOPROLOL TARTRATE 25 MG: 25 TABLET ORAL at 21:04

## 2022-11-05 RX ADMIN — METOCLOPRAMIDE 5 MG: 5 INJECTION, SOLUTION INTRAMUSCULAR; INTRAVENOUS at 06:32

## 2022-11-05 RX ADMIN — ASPIRIN 81 MG: 81 TABLET, CHEWABLE ORAL at 09:17

## 2022-11-05 RX ADMIN — SODIUM CHLORIDE, PRESERVATIVE FREE 10 ML: 5 INJECTION INTRAVENOUS at 16:34

## 2022-11-05 RX ADMIN — INSULIN LISPRO 2 UNITS: 100 INJECTION, SOLUTION INTRAVENOUS; SUBCUTANEOUS at 12:48

## 2022-11-05 RX ADMIN — SODIUM CHLORIDE, PRESERVATIVE FREE 10 ML: 5 INJECTION INTRAVENOUS at 11:46

## 2022-11-05 RX ADMIN — PANTOPRAZOLE SODIUM 40 MG: 40 TABLET, DELAYED RELEASE ORAL at 15:50

## 2022-11-05 RX ADMIN — INSULIN LISPRO 2 UNITS: 100 INJECTION, SOLUTION INTRAVENOUS; SUBCUTANEOUS at 09:11

## 2022-11-05 RX ADMIN — SODIUM CHLORIDE, PRESERVATIVE FREE 10 ML: 5 INJECTION INTRAVENOUS at 21:14

## 2022-11-05 RX ADMIN — ATORVASTATIN CALCIUM 80 MG: 80 TABLET, FILM COATED ORAL at 21:04

## 2022-11-05 RX ADMIN — ACETAMINOPHEN 650 MG: 325 TABLET ORAL at 01:47

## 2022-11-05 RX ADMIN — METOPROLOL TARTRATE 25 MG: 25 TABLET ORAL at 09:17

## 2022-11-05 RX ADMIN — METOCLOPRAMIDE 5 MG: 5 INJECTION, SOLUTION INTRAMUSCULAR; INTRAVENOUS at 21:04

## 2022-11-05 RX ADMIN — TRAZODONE HYDROCHLORIDE 300 MG: 100 TABLET ORAL at 21:04

## 2022-11-05 RX ADMIN — PANTOPRAZOLE SODIUM 40 MG: 40 TABLET, DELAYED RELEASE ORAL at 10:48

## 2022-11-05 RX ADMIN — METOCLOPRAMIDE 5 MG: 5 INJECTION, SOLUTION INTRAMUSCULAR; INTRAVENOUS at 11:45

## 2022-11-05 RX ADMIN — HEPARIN SODIUM 1840 UNITS: 1000 INJECTION INTRAVENOUS; SUBCUTANEOUS at 13:01

## 2022-11-05 RX ADMIN — INSULIN LISPRO 2 UNITS: 100 INJECTION, SOLUTION INTRAVENOUS; SUBCUTANEOUS at 17:16

## 2022-11-05 RX ADMIN — CALCIUM ACETATE 667 MG: 667 CAPSULE ORAL at 12:48

## 2022-11-05 ASSESSMENT — PAIN - FUNCTIONAL ASSESSMENT: PAIN_FUNCTIONAL_ASSESSMENT: ACTIVITIES ARE NOT PREVENTED

## 2022-11-05 ASSESSMENT — PAIN DESCRIPTION - LOCATION: LOCATION: HIP

## 2022-11-05 ASSESSMENT — ENCOUNTER SYMPTOMS
ABDOMINAL DISTENTION: 0
COLOR CHANGE: 0
CHOKING: 0
RHINORRHEA: 0
ABDOMINAL PAIN: 0
EYE PAIN: 0
APNEA: 0
EYE ITCHING: 0

## 2022-11-05 ASSESSMENT — PAIN SCALES - GENERAL: PAINLEVEL_OUTOF10: 8

## 2022-11-05 ASSESSMENT — PAIN DESCRIPTION - DESCRIPTORS: DESCRIPTORS: SHOOTING

## 2022-11-05 ASSESSMENT — PAIN DESCRIPTION - ORIENTATION: ORIENTATION: LEFT

## 2022-11-05 NOTE — PROGRESS NOTES
Pt. Requesting his home dose of Trazadone to be given however it had not been restarted on admission. Perfectserved with MARIVEL Rosa NP. Orders received, see epic.

## 2022-11-05 NOTE — PROGRESS NOTES
[facility]  Office: [address], DO, Mp Stain, DO, Tomasz Santos, DO, Hakeem Juan Blood, DO, Latrice Euceda MD, Mariann Hope MD, Srinivasan Peterson MD, Carlos Santana MD,  Jennifer Patel MD, Alexandra Keller MD, Dank Starr, DO, Swati Magaña MD,  Jo Garcia MD, Mamta Starr MD, Ilsa Cancino, DO, Tamera Quinones MD, Allan Rodrigez MD, Carrie Mckinney, DO, Jordan Garcia MD, Skyla Payne MD, Easton Fierro MD, Ngoc Tolbert MD, Kira Sharma, DO, Renata Irvin MD, Renea Zepeda MD, Alyssa Bowie, CNP, Minnie Joya, CNP, Angeline Trivedi, CNP,  Elkin Sim, DNP, Alma Gutierrez, CNP, Jean Marie Lindo, CNP, Donta Donahue, CNP, Hayder Sepulveda, CNP, Silver Bruner, CNP, VIKTORIYA CampaC, Dustin Amin, CNS, Marek Coombs, DNP, Annabel Chase, CNP, Boris Lai, CNP, Yung Yu, CNP         Beata Pisano [address]    Progress Note    11/5/2022    1:43 PM    Name:   Arianna Reza  MRN:     [id number]     [place]:      [de-identified]   Room:   2020/2020-01   Day:  1  Admit Date:  11/4/2022 12:57 AM    PCP:   BINA Catherine  Code Status:  Full Code    Subjective:     C/C:   Chief Complaint   Patient presents with    Leg Pain    Nausea     Interval History Status: not changed. Patient seen examined at bedside. Tolerating oral diet, having some burning sensation along his esophagus not really improving with Protonix. Brief History:       20-year-old male past medical history of diabetes type 1, diabetic neuropathy and nephropathy, CKD stage III, COPD recent fall with right femur fracture presented with leg pain and nausea found to have NSTEMI. Patient being followed by cardiology, nephrology. Review of Systems:     Review of Systems   Constitutional:  Negative for activity change, fatigue and fever. HENT:  Negative for congestion and rhinorrhea.     Eyes:  Negative for pain and itching. Respiratory:  Negative for apnea and choking. Cardiovascular:  Positive for leg swelling. Negative for chest pain and palpitations. Gastrointestinal:  Negative for abdominal distention and abdominal pain. Genitourinary:  Negative for dysuria and enuresis. Musculoskeletal:  Positive for arthralgias and myalgias. Skin:  Negative for color change and pallor. Neurological:  Positive for weakness. Negative for dizziness and speech difficulty. Psychiatric/Behavioral:  Negative for agitation and decreased concentration. Medications: Allergies:     Allergies   Allergen Reactions    Azithromycin Swelling    Acetaminophen-Codeine Other (See Comments) and Nausea And Vomiting    Codeine Itching    Hydrocodone-Acetaminophen Rash    Ibuprofen Other (See Comments)     Stomach cramps    Morphine Other (See Comments)     Gives headaches    Tramadol Itching     vomitting       Current Meds:   Scheduled Meds:    traZODone  300 mg Oral Nightly    insulin lispro  2 Units SubCUTAneous TID WC    pantoprazole  40 mg Oral BID AC    calcium acetate  1 capsule Oral TID WC    insulin glargine  7 Units SubCUTAneous Nightly    GI cocktail   Oral Once    metoclopramide  5 mg IntraVENous q8h    sodium chloride flush  5-40 mL IntraVENous 2 times per day    metoprolol tartrate  25 mg Oral BID    atorvastatin  80 mg Oral Nightly    aspirin  81 mg Oral Daily    polyethylene glycol  17 g Oral BID     Continuous Infusions:    heparin (PORCINE) Infusion 21.242 Units/kg/hr (11/05/22 1309)    sodium chloride      dextrose      dextrose       PRN Meds: heparin (porcine), heparin (porcine), sodium chloride flush, sodium chloride, potassium chloride **OR** potassium alternative oral replacement **OR** potassium chloride, magnesium sulfate, ondansetron **OR** ondansetron, acetaminophen **OR** acetaminophen, magnesium hydroxide, nitroGLYCERIN, clonazePAM, glucose, dextrose bolus **OR** dextrose bolus, glucagon (rDNA), dextrose, calcium carbonate, glucose, dextrose bolus **OR** dextrose bolus, glucagon (rDNA), dextrose    Data:     Past Medical History:   has a past medical history of Diabetes mellitus ([facility] [address] Obstructive lung disease ([facility] [address] and Panic attack. Social History:   reports that he has been smoking cigarettes. He has a 10.25 pack-year smoking history. He has never used smokeless tobacco. He reports that he does not currently use alcohol. He reports current drug use. Drug: Marijuana [employer]). Family History: History reviewed. No pertinent family history. Vitals:  /87   Pulse 68   Temp 97.7 °F (36.5 °C) (Oral)   Resp 17   Ht 5' 7\" (1.702 m)   Wt 115 lb 11.9 oz (52.5 kg)   SpO2 97%   BMI 18.13 kg/m²   Temp (24hrs), Av.1 °F (36.7 °C), Min:97.7 °F (36.5 °C), Max:98.5 °F (36.9 °C)    Recent Labs     22  1606 22  0704 22  1106   POCGLU 309* 56* 318* 295*       I/O (24Hr):     Intake/Output Summary (Last 24 hours) at 2022 1343  Last data filed at 2022 1314  Gross per 24 hour   Intake 1140 ml   Output 800 ml   Net 340 ml       Labs:  Hematology:  Recent Labs     22  01122  0242   WBC 9.4 5.6   RBC 4.70 3.86*   HGB 13.0 10.7*   HCT 40.1* 33.2*   MCV 85.3 86.0   MCH 27.7 27.7   MCHC 32.4 32.2   RDW 13.3 13.6    175   MPV 11.9 12.9   DDIMER 1.58  --      Chemistry:  Recent Labs     22  0119 22  0214 22  1114 22  1830 22  2123 22  0242   *  --   --   --   --  132*   K 3.4*  --   --   --   --  4.0   CL 89*  --   --   --   --  93*   CO2 29  --   --   --   --  25   GLUCOSE 131*  --   --   --   --  289*   BUN 27*  --   --   --   --  31*   CREATININE 1.67*  --   --   --   --  1.42*   MG  --  2.0  --   --   --   --    ANIONGAP 15  --   --   --   --  14   LABGLOM 49*  --   --   --   --  60*   CALCIUM 9.7  --   --   --   --  8.5*   PHOS  --  1.0*  --   --   --  2.2*   PROBNP 828*  --   --   --   --  433*   TROPHS 150* 140*   < > 109* 108* 104*   MYOGLOBIN  --   --   --   --  41  --    LACTACIDWB 2.0  --   --   --   --   --     < > = values in this interval not displayed. Recent Labs     11/04/22  0119 11/04/22  0458 11/04/22  0555 11/04/22  0741 11/04/22  1153 11/04/22  1606 11/04/22 2020 11/05/22  0242 11/05/22  0704 11/05/22  1106   PROT 7.2  --  6.4  --   --   --   --  5.7*  --   --    LABALBU 4.0  --  3.7  --   --   --   --  3.6  --   --    *  --  124*  --   --   --   --  136*  --   --    ALT 56*  --  58*  --   --   --   --  81*  --   --    ALKPHOS 118  --  113  --   --   --   --  106  --   --    BILITOT 0.8  --  0.8  --   --   --   --  0.8  --   --    BILIDIR  --   --  0.2  --   --   --   --   --   --   --    LIPASE 8*  --   --   --   --   --   --   --   --   --    CHOL  --   --   --   --   --   --   --  164  --   --    HDL  --   --   --   --   --   --   --  51  --   --    LDLCHOLESTEROL  --   --   --   --   --   --   --  95  --   --    CHOLHDLRATIO  --   --   --   --   --   --   --  3.2  --   --    TRIG  --   --   --   --   --   --   --  92  --   --    POCGLU  --    < >  --  99 130* 309* 56*  --  318* 295*    < > = values in this interval not displayed. ABG:  Lab Results   Component Value Date/Time    FIO2 UNKNOWN 09/30/2022 12:35 AM     Lab Results   Component Value Date/Time    SPECIAL NOT REPORTED 02/15/2022 05:12 PM     Lab Results   Component Value Date/Time    CULTURE NO GROWTH 34 DAYS 02/15/2022 05:11 PM       Radiology:  XR CHEST (2 VW)    Result Date: 11/4/2022  Lucent lungs with chronic pattern of hyperinflation. No acute pulmonary finding. XR HIP LEFT (2-3 VIEWS)    Result Date: 11/4/2022  Prior surgical change of ORIF with no acute abnormality of the left hip or femur. XR FEMUR LEFT (MIN 2 VIEWS)    Result Date: 11/4/2022  Prior surgical change of ORIF with no acute abnormality of the left hip or femur.      CT ABDOMEN PELVIS W IV CONTRAST Additional Contrast? None    Result Date: 11/4/2022  1. No evidence of pulmonary embolism or acute pulmonary abnormality. 2.  No acute inflammatory process identified in the abdomen or pelvis. 3.  Punctate left nephrolithiasis. 4.  Findings compatible with hepatic steatosis. 5.  Additional chronic and benign findings, as above. US RENAL LIMITED    Result Date: 11/5/2022  1. Increased echogenicity of the bilateral renal cortex which can be seen with medical renal disease. 2. Suspected left-sided renal calculi. No hydronephrosis. CT CHEST PULMONARY EMBOLISM W CONTRAST    Result Date: 11/4/2022  1. No evidence of pulmonary embolism or acute pulmonary abnormality. 2.  No acute inflammatory process identified in the abdomen or pelvis. 3.  Punctate left nephrolithiasis. 4.  Findings compatible with hepatic steatosis. 5.  Additional chronic and benign findings, as above. US ABDOMEN LIMITED Specify organ? LIVER, GALLBLADDER, PANCREAS    Result Date: 11/4/2022  Hepatomegaly otherwise unremarkable exam RECOMMENDATIONS: Unavailable       Physical Examination:        Physical Exam  Constitutional:       Comments: Chronically ill appearing   HENT:      Head: Normocephalic. Right Ear: External ear normal.      Left Ear: External ear normal.      Nose: Nose normal. No congestion. Mouth/Throat:      Mouth: Mucous membranes are moist.   Eyes:      Pupils: Pupils are equal, round, and reactive to light. Cardiovascular:      Rate and Rhythm: Normal rate and regular rhythm. Pulses: Normal pulses. Heart sounds: No murmur heard. Pulmonary:      Breath sounds: No wheezing or rales. Abdominal:      General: There is no distension. Palpations: Abdomen is soft. Musculoskeletal:      Cervical back: Neck supple. Right lower leg: No edema. Left lower leg: No edema. Skin:     General: Skin is warm and dry. Capillary Refill: Capillary refill takes less than 2 seconds. Coloration: Skin is pale.    Neurological: General: No focal deficit present. Mental Status: He is alert and oriented to person, place, and time. Motor: Weakness present. Psychiatric:         Mood and Affect: Mood normal.         Behavior: Behavior normal.       Assessment:        Hospital Problems             Last Modified POA    * (Principal) NSTEMI (non-ST elevated myocardial infarction) ([facility] [address] 11/4/2022 Yes    Nausea & vomiting 11/4/2022 Yes    Constipation 11/4/2022 Yes    Weight loss 11/4/2022 Yes    Tobacco abuse 11/4/2022 Yes    Marijuana use 11/4/2022 Yes    Uncontrolled type 1 diabetes mellitus with hyperglycemia ([facility] [address] 11/4/2022 Yes    Transaminitis 11/4/2022 Yes    Fibromyalgia 11/4/2022 Yes    Spinal stenosis 11/4/2022 Yes    Neuropathy associated with endocrine disorder ([facility] [address] 11/4/2022 Yes    [facility] 11/4/2022 Yes       Plan:        NSTEMI. Appreciate cardiology and nephrology recommendations. Possible cardiac cath on Monday pending nephrology clearance. Heparin drip  Diabetes type 1 with neuropathy and nephropathy. Advance diet as tolerated, Lantus, insulin sliding scale, monitor for hypoglycemia  BMI of 18 with unexpected weight loss. Appreciate dietary recommendations  Hypertension.   Aspirin, Lipitor, Lopressor  GERD Protonix twice daily  Try 1 dose GI cocktail  Continue home medication of trazodone nightly  Titrate down Reglan  PT/OT    Alyse Marshall MD  11/5/2022  1:43 PM

## 2022-11-05 NOTE — PROGRESS NOTES
Pt. Called out with c/o sharp burning sensation in his mid chest and throat after shortly after drinking orange juice d/t a recent low blood sugar level. Vital signs obtained, perfectserve to primary team MARIVEL Dsouza NP and received orders for Tums. Pt. Started to complain of worsening sensation in his mid chest and throat area so at that time a 12 Lead EKG was obtained showing NSR, 2 liters oxygen per NC applied and Nitro SL tab provided at 2048 and 2056 with relief to a \"0/10\" after 2 doses of SL Nitro. Primary team MARIVEL Dsouza NP notified of patient condition. Hypoglycemia orders were also given due to pt. FSBS reading of 56. Pt given 4 tabs of chewable glucose  for the low blood sugar along with some Tums. See MAR for times of medications given. Orders also received for stat one time Trop/myoblobin level to be drawn. FSBS level was rechecked after glucose tablets. See Labs for results.
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
## 2022-11-06 VITALS
OXYGEN SATURATION: 99 % | SYSTOLIC BLOOD PRESSURE: 146 MMHG | TEMPERATURE: 98.2 F | HEIGHT: 67 IN | BODY MASS INDEX: 17.99 KG/M2 | DIASTOLIC BLOOD PRESSURE: 95 MMHG | HEART RATE: 77 BPM | RESPIRATION RATE: 19 BRPM | WEIGHT: 114.59 LBS

## 2022-11-06 PROBLEM — R13.10 DYSPHAGIA: Status: ACTIVE | Noted: 2022-11-06

## 2022-11-06 PROBLEM — R13.10 ODYNOPHAGIA: Status: ACTIVE | Noted: 2022-11-06

## 2022-11-06 LAB
ANION GAP SERPL CALCULATED.3IONS-SCNC: 12 MMOL/L (ref 9–17)
BUN BLDV-MCNC: 24 MG/DL (ref 6–20)
CALCIUM SERPL-MCNC: 8.4 MG/DL (ref 8.6–10.4)
CHLORIDE BLD-SCNC: 94 MMOL/L (ref 98–107)
CO2: 27 MMOL/L (ref 20–31)
CREAT SERPL-MCNC: 1.26 MG/DL (ref 0.7–1.2)
GFR SERPL CREATININE-BSD FRML MDRD: >60 ML/MIN/1.73M2
GLUCOSE BLD-MCNC: 146 MG/DL (ref 75–110)
GLUCOSE BLD-MCNC: 261 MG/DL (ref 75–110)
GLUCOSE BLD-MCNC: 318 MG/DL (ref 75–110)
GLUCOSE BLD-MCNC: 341 MG/DL (ref 70–99)
PARTIAL THROMBOPLASTIN TIME: 21.9 SEC (ref 20.5–30.5)
PARTIAL THROMBOPLASTIN TIME: 44.4 SEC (ref 20.5–30.5)
PHOSPHORUS: 2.2 MG/DL (ref 2.5–4.5)
POTASSIUM SERPL-SCNC: 3.8 MMOL/L (ref 3.7–5.3)
SODIUM BLD-SCNC: 133 MMOL/L (ref 135–144)

## 2022-11-06 PROCEDURE — 80048 BASIC METABOLIC PNL TOTAL CA: CPT

## 2022-11-06 PROCEDURE — 85730 THROMBOPLASTIN TIME PARTIAL: CPT

## 2022-11-06 PROCEDURE — 6370000000 HC RX 637 (ALT 250 FOR IP): Performed by: STUDENT IN AN ORGANIZED HEALTH CARE EDUCATION/TRAINING PROGRAM

## 2022-11-06 PROCEDURE — 99232 SBSQ HOSP IP/OBS MODERATE 35: CPT | Performed by: INTERNAL MEDICINE

## 2022-11-06 PROCEDURE — 6360000002 HC RX W HCPCS: Performed by: STUDENT IN AN ORGANIZED HEALTH CARE EDUCATION/TRAINING PROGRAM

## 2022-11-06 PROCEDURE — 2580000003 HC RX 258: Performed by: NURSE PRACTITIONER

## 2022-11-06 PROCEDURE — 99232 SBSQ HOSP IP/OBS MODERATE 35: CPT | Performed by: STUDENT IN AN ORGANIZED HEALTH CARE EDUCATION/TRAINING PROGRAM

## 2022-11-06 PROCEDURE — 6360000002 HC RX W HCPCS: Performed by: NURSE PRACTITIONER

## 2022-11-06 PROCEDURE — 94761 N-INVAS EAR/PLS OXIMETRY MLT: CPT

## 2022-11-06 PROCEDURE — 6370000000 HC RX 637 (ALT 250 FOR IP): Performed by: NURSE PRACTITIONER

## 2022-11-06 PROCEDURE — 82947 ASSAY GLUCOSE BLOOD QUANT: CPT

## 2022-11-06 PROCEDURE — 36415 COLL VENOUS BLD VENIPUNCTURE: CPT

## 2022-11-06 PROCEDURE — 2500000003 HC RX 250 WO HCPCS: Performed by: NURSE PRACTITIONER

## 2022-11-06 PROCEDURE — 84100 ASSAY OF PHOSPHORUS: CPT

## 2022-11-06 RX ORDER — FLUCONAZOLE 40 MG/ML
200 POWDER, FOR SUSPENSION ORAL DAILY
Status: DISCONTINUED | OUTPATIENT
Start: 2022-11-06 | End: 2022-11-06 | Stop reason: HOSPADM

## 2022-11-06 RX ORDER — ATORVASTATIN CALCIUM 80 MG/1
80 TABLET, FILM COATED ORAL NIGHTLY
Qty: 30 TABLET | Refills: 3 | Status: SHIPPED | OUTPATIENT
Start: 2022-11-06

## 2022-11-06 RX ORDER — ASPIRIN 81 MG/1
81 TABLET, CHEWABLE ORAL DAILY
Qty: 30 TABLET | Refills: 3 | Status: SHIPPED | OUTPATIENT
Start: 2022-11-07

## 2022-11-06 RX ORDER — FLUCONAZOLE 100 MG/1
200 TABLET ORAL DAILY
Qty: 28 TABLET | Refills: 0 | Status: ON HOLD | OUTPATIENT
Start: 2022-11-06 | End: 2022-11-10 | Stop reason: HOSPADM

## 2022-11-06 RX ORDER — METOCLOPRAMIDE HYDROCHLORIDE 5 MG/ML
5 INJECTION INTRAMUSCULAR; INTRAVENOUS EVERY 12 HOURS
Status: DISCONTINUED | OUTPATIENT
Start: 2022-11-06 | End: 2022-11-06 | Stop reason: HOSPADM

## 2022-11-06 RX ORDER — PANTOPRAZOLE SODIUM 40 MG/1
40 TABLET, DELAYED RELEASE ORAL
Qty: 25 TABLET | Refills: 0 | Status: ON HOLD | OUTPATIENT
Start: 2022-11-06 | End: 2022-11-10 | Stop reason: SDUPTHER

## 2022-11-06 RX ORDER — CALCIUM CARBONATE 200(500)MG
1000 TABLET,CHEWABLE ORAL 3 TIMES DAILY PRN
Qty: 21 TABLET | Refills: 0 | Status: SHIPPED | OUTPATIENT
Start: 2022-11-06 | End: 2022-11-13

## 2022-11-06 RX ORDER — FLUCONAZOLE 40 MG/ML
200 POWDER, FOR SUSPENSION ORAL DAILY
Status: DISCONTINUED | OUTPATIENT
Start: 2022-11-06 | End: 2022-11-06 | Stop reason: SDUPTHER

## 2022-11-06 RX ADMIN — INSULIN LISPRO 2 UNITS: 100 INJECTION, SOLUTION INTRAVENOUS; SUBCUTANEOUS at 09:02

## 2022-11-06 RX ADMIN — SODIUM CHLORIDE, PRESERVATIVE FREE 10 ML: 5 INJECTION INTRAVENOUS at 09:07

## 2022-11-06 RX ADMIN — HEPARIN SODIUM 1840 UNITS: 1000 INJECTION INTRAVENOUS; SUBCUTANEOUS at 06:17

## 2022-11-06 RX ADMIN — CALCIUM ACETATE 667 MG: 667 CAPSULE ORAL at 16:09

## 2022-11-06 RX ADMIN — INSULIN LISPRO 2 UNITS: 100 INJECTION, SOLUTION INTRAVENOUS; SUBCUTANEOUS at 11:38

## 2022-11-06 RX ADMIN — FLUCONAZOLE 200 MG: 40 POWDER, FOR SUSPENSION ORAL at 11:31

## 2022-11-06 RX ADMIN — ANTACID TABLETS 1000 MG: 500 TABLET, CHEWABLE ORAL at 09:07

## 2022-11-06 RX ADMIN — INSULIN LISPRO 2 UNITS: 100 INJECTION, SOLUTION INTRAVENOUS; SUBCUTANEOUS at 16:13

## 2022-11-06 RX ADMIN — ASPIRIN 81 MG: 81 TABLET, CHEWABLE ORAL at 09:02

## 2022-11-06 RX ADMIN — CALCIUM ACETATE 667 MG: 667 CAPSULE ORAL at 09:02

## 2022-11-06 RX ADMIN — PANTOPRAZOLE SODIUM 40 MG: 40 TABLET, DELAYED RELEASE ORAL at 16:09

## 2022-11-06 RX ADMIN — CALCIUM ACETATE 667 MG: 667 CAPSULE ORAL at 11:31

## 2022-11-06 RX ADMIN — METOPROLOL TARTRATE 25 MG: 25 TABLET ORAL at 09:02

## 2022-11-06 RX ADMIN — HEPARIN SODIUM 17.24 UNITS/KG/HR: 10000 INJECTION, SOLUTION INTRAVENOUS at 05:09

## 2022-11-06 RX ADMIN — PANTOPRAZOLE SODIUM 40 MG: 40 TABLET, DELAYED RELEASE ORAL at 09:03

## 2022-11-06 RX ADMIN — METOCLOPRAMIDE 5 MG: 5 INJECTION, SOLUTION INTRAMUSCULAR; INTRAVENOUS at 04:30

## 2022-11-06 ASSESSMENT — ENCOUNTER SYMPTOMS
APNEA: 0
ABDOMINAL PAIN: 0
ABDOMINAL DISTENTION: 0
EYE ITCHING: 0
RHINORRHEA: 0
COLOR CHANGE: 0
EYE PAIN: 0
CHOKING: 0

## 2022-11-06 ASSESSMENT — PAIN DESCRIPTION - LOCATION
LOCATION: HIP;LEG
LOCATION: HIP;LEG

## 2022-11-06 ASSESSMENT — PAIN SCALES - GENERAL
PAINLEVEL_OUTOF10: 4
PAINLEVEL_OUTOF10: 4

## 2022-11-06 NOTE — PLAN OF CARE
Sophia Hoffman requires a shower chair due to being  physically incapable of utilizing regular  shower facilities. Current body weight is Weight: 114 lb 9.5 oz (52 kg).

## 2022-11-06 NOTE — FLOWSHEET NOTE
Patient discharged to home with all belongings. Discharge instructions given, patient verbalized understanding. Patient left unit via wheelchair, transported home by cab.

## 2022-11-06 NOTE — DISCHARGE INSTRUCTIONS
Candidiasis: Care Instructions  Your Care Instructions  Candidiasis (say \"zsl-dbt-FR-uh-shannan\") is a yeast infection. Yeast normally lives in your body. But it can cause problems if your body's defenses don't work as they should. Some medicines can increase your chance of getting a yeast infection. These include antibiotics, steroids, and cancer drugs. And some diseases like AIDS and diabetes can make you more likely to get yeast infections. There are different types of yeast infections. Junella Bellis is a yeast infection in the mouth. It usually occurs in people with weak immune systems. It causes white patches inside the mouth and throat. Yeast infections of the skin usually occur in skin folds where the skin stays moist. They cause red, oozing patches on your skin. Babies can get these infections under the diaper. People who often wear gloves can get them on their hands. Many women get vaginal yeast infections. They are most common when women take antibiotics. These infections can cause the vagina to itch and burn. They also cause white discharge that looks like cottage cheese. In rare cases, yeast infects the blood. This can cause serious disease. This kind of infection is treated with medicine given through a needle into a vein (IV). After you start treatment, a yeast infection usually goes away quickly. But if your immune system is weak, the infection may come back. Tell your doctor if you get yeast infections often. Follow-up care is a key part of your treatment and safety. Be sure to make and go to all appointments, and call your doctor if you are having problems. It's also a good idea to know your test results and keep a list of the medicines you take. How can you care for yourself at home? Take your medicines exactly as prescribed. Call your doctor if you think you are having a problem with your medicine. Use antibiotics only as directed by your doctor. Eat yogurt with live cultures.  It has bacteria called lactobacillus. It may help prevent some types of yeast infections. Keep your skin clean and dry. Put powder on moist places. If you are using a cream or suppository to treat a vaginal yeast infection, don't use condoms or a diaphragm. Use a different type of birth control. Eat a healthy diet and get regular exercise. This will help keep your immune system strong. When should you call for help? Watch closely for changes in your health, and be sure to contact your doctor if:    You do not get better as expected. Where can you learn more? Go to [email or web address]. org and sign in to your [employer] account. Enter R715 in the Flocktory box to learn more about \"Candidiasis: Care Instructions. \"     If you do not have an account, please click on the \"Sign Up Now\" link. Current as of: February 9, 2022               Content Version: 13.4  © 4245-6968 Healthwise, [employer]. Care instructions adapted under license by [facility] ([facility]). If you have questions about a medical condition or this instruction, always ask your healthcare professional. [place] [address] any warranty or liability for your use of this information.

## 2022-11-06 NOTE — DISCHARGE INSTR - COC
Continuity of Care Form    Patient Name: Michelle Bean   :  1970  MRN:  [id number]    Admit date:  2022  Discharge date:  22    Code Status Order: Full Code   Advance Directives:     Admitting Physician:  No admitting provider for patient encounter.   PCP: Luis F Sims    Discharging Nurse: Bula Spencer, [address] Unit/Room#:   Discharging Unit Phone Number: [phone]    Emergency Contact:   Extended Emergency Contact Information  Primary Emergency Contact: [facility], [facility]. Phone: [phone]  Relation: Brother/Sister  Secondary Emergency Contact: Emelia Gupta  Mobile Phone: [phone]  Relation: Child    Past Surgical History:  Past Surgical History:   Procedure Laterality Date    APPENDECTOMY      FEMUR FRACTURE SURGERY Left 2022    Cephalomedullary Nail    HAND SURGERY      JOINT REPLACEMENT      TIBIA FRACTURE SURGERY Left 2022    LEFT FEMUR CEPHALOMEDULLARY NAIL performed by Olesya Atkins DO at [address] [place] [address] N/A 02/15/2022    EGD BIOPSY performed by Miracle Brown MD at [facility]       Immunization History:   Immunization History   Administered Date(s) Administered    Tdap (Boostrix, Adacel) 2016       Active Problems:  Patient Active Problem List   Diagnosis Code    Colitis K52.9    Uncontrolled type 1 diabetes mellitus with hyperglycemia ([facility] [address] [facility] E10.65    Melena K92.1    Transaminitis R74.01    Fibromyalgia M79.7    Spinal stenosis M48.00    Abnormal stress test R94.39    Vitamin D deficiency E55.9    Acute blood loss anemia D62    Hematemesis with nausea K92.0    Ulcer of esophagus without bleeding K22.10    Candida esophagitis (HCC) B37.81    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 [address] S72.143A    NSTEMI (non-ST elevated myocardial infarction) ([facility] [address] I21.4    Nausea & vomiting R11.2    Constipation K59.00    Weight loss R63.4    Tobacco abuse Z72.0    Marijuana use F12.90    Acute kidney injury (HCC) N17.9    Stage 3a chronic kidney disease (HCC) N18.31       Isolation/Infection:   Isolation            No Isolation          Patient Infection Status       Infection Onset Added Last Indicated Last Indicated By Review Planned Expiration Resolved Resolved By    None active    Resolved    COVID-19 (Rule Out) 02/14/22 02/14/22 02/14/22 COVID-19, Rapid (Ordered)   02/14/22 Rule-Out Test Resulted    COVID-19 (Rule Out) 12/03/21 12/03/21 12/03/21 COVID-19, Rapid (Ordered)   12/03/21 Rule-Out Test Resulted            Nurse Assessment:  Last Vital Signs: /72   Pulse 71   Temp 97.9 °F (36.6 °C) (Oral)   Resp 16   Ht 5' 7\" (1.702 m)   Wt 114 lb 9.5 oz (52 kg)   SpO2 100%   BMI 17.95 kg/m²     Last documented pain score (0-10 scale): Pain Level: 4  Last Weight:   Wt Readings from Last 1 Encounters:   11/06/22 114 lb 9.5 oz (52 kg)     Mental Status:  oriented, alert, coherent, logical, thought processes intact, and able to concentrate and follow conversation    IV Access:  - None    Nursing Mobility/ADLs:  Walking   Independent  Transfer  Independent  Bathing  Independent  Dressing  Independent  [address]  Med Delivery   whole    Wound Care Documentation and Therapy:  Incision 09/30/22 Hip Left; Lower (Active)   Number of days: 37        Elimination:  Continence:    Bowel: Yes  Bladder: Yes  Urinary Catheter: None   Colostomy/Ileostomy/Ileal Conduit: No       Date of Last BM: 11/05/22    Intake/Output Summary (Last 24 hours) at 11/6/2022 1501  Last data filed at 11/6/2022 0907  Gross per 24 hour   Intake 250 ml   Output 1200 ml   Net -950 ml     I/O last 3 completed shifts: In: 500 [P.O.:470; I.V.:30]  Out: 2300 [Urine:2300]    Safety Concerns: At Risk for Falls    Impairments/Disabilities:      None    Nutrition Therapy:  Current Nutrition Therapy:   - Oral Diet:  Carb Control 4 carbs/meal (1800kcals/day)    Routes of Feeding: Oral  Liquids: No Restrictions  Daily Fluid Restriction: no  Last Modified Barium Swallow with Video (Video Swallowing Test): not done    Treatments at the Time of Hospital Discharge:   Respiratory Treatments: ***  Oxygen Therapy:  is not on home oxygen therapy. Ventilator:    - No ventilator support    Rehab Therapies: Physical Therapy and Occupational Therapy  Weight Bearing Status/Restrictions: No weight bearing restrictions  Other Medical Equipment (for information only, NOT a DME order):  walker  Other Treatments: skilled nursing    Patient's personal belongings (please select all that are sent with patient):  None    RN SIGNATURE:  Electronically signed by RICKY Ewing RN on 11/6/22 at 3:35 PM EST    CASE MANAGEMENT/SOCIAL WORK SECTION    Inpatient Status Date: ***    Readmission Risk Assessment Score:  Readmission Risk              Risk of Unplanned Readmission:  39           Discharging to Facility/ Agency   Name: [address]  Phone: [phone]    / signature: Electronically signed by Jarred Douglas RN on 11/6/22 at 4:13 PM EST    PHYSICIAN SECTION    Prognosis: Guarded    Condition at Discharge: Stable    Rehab Potential (if transferring to Rehab): Fair    Recommended Labs or Other Treatments After Discharge: PT, OT, nursing evaluation and treatment, follow up PCP, follow up cardiology, GI and PCP, mintor glucose    Physician Certification: I certify the above information and transfer of Dylon Obrien  is necessary for the continuing treatment of the diagnosis listed and that he requires [address] for less 30 days.      Update Admission H&P: No change in H&P    PHYSICIAN SIGNATURE:  Electronically signed by Alyse Marshall MD on 11/6/22 at 3:03 PM EST

## 2022-11-06 NOTE — PROGRESS NOTES
[facility]   Progress Note                   Date:   11/6/2022  Patient name: Stefanie Baeza  Date of admission:  11/4/2022 12:57 AM  MRN:   [id number]  YOB: 1970  PCP: BINA Parker    Reason for Admission:      Subjective: There were no acute events overnight, remained hemodynamically stable, denies chest pain, dyspnea, orthopnea or palpitations. Medications:   Scheduled Meds:   traZODone  300 mg Oral Nightly    insulin lispro  2 Units SubCUTAneous TID WC    pantoprazole  40 mg Oral BID AC    calcium acetate  1 capsule Oral TID WC    insulin glargine  7 Units SubCUTAneous Nightly    metoclopramide  5 mg IntraVENous q8h    sodium chloride flush  5-40 mL IntraVENous 2 times per day    metoprolol tartrate  25 mg Oral BID    atorvastatin  80 mg Oral Nightly    aspirin  81 mg Oral Daily    polyethylene glycol  17 g Oral BID       Continuous Infusions:   heparin (PORCINE) Infusion 19.24 Units/kg/hr (11/06/22 0627)    sodium chloride      dextrose      dextrose         CBC:   Recent Labs     11/04/22 0119 11/05/22 0242   WBC 9.4 5.6   HGB 13.0 10.7*    175     BMP:    Recent Labs     11/04/22 0119 11/05/22 0242   * 132*   K 3.4* 4.0   CL 89* 93*   CO2 29 25   BUN 27* 31*   CREATININE 1.67* 1.42*   GLUCOSE 131* 289*     Hepatic:   Recent Labs     11/04/22 0119 11/04/22 0555 11/05/22 0242   * 124* 136*   ALT 56* 58* 81*   BILITOT 0.8 0.8 0.8   ALKPHOS 118 113 106     Troponin: No results for input(s): TROPONINI in the last 72 hours. BNP: No results for input(s): BNP in the last 72 hours. Lipids:   Recent Labs     11/05/22 0242   CHOL 164   HDL 51     INR: No results for input(s): INR in the last 72 hours.     Objective:   Vitals: BP (!) 148/78   Pulse 69   Temp 97.7 °F (36.5 °C) (Oral)   Resp 18   Ht 5' 7\" (1.702 m)   Wt 114 lb 9.5 oz (52 kg)   SpO2 96%   BMI 17.95 kg/m²     General appearance: awake, alert, in no apparent respiratory distress HEENT: Head: Normocephalic, no lesions, without obvious abnormality  Neck: no JVD  Lungs: clear to auscultation bilaterally, no basilar rales, no wheezing   Heart: regular rate and rhythm, S1, S2 normal, no murmur, click, rub or gallop  Abdomen: soft, non-tender; bowel sounds normal  Extremities: No LE edema  Neurologic: Mental status: Alert, oriented. Motor and sensory not done. Cath May 2021   Findings:   1. Normal LVEDP   2. Angiographically normal coronaries with mild luminal irregularities     Recommendations:   TR band per protocol   Ongoing risk factor modification and optimization lifestyle   Coronary Findings Diagnostic Dominance: Right   Left Main: The vessel was visualized by angiography and is angiographically normal.   Left Anterior Descending: The vessel was visualized by angiography and is angiographically normal.   Left Circumflex: The vessel was visualized by angiography and is angiographically normal.   Right Coronary Artery: The vessel was visualized by angiography and is angiographically normal        IMPRESSION:    Chest pain   Elevated troponin likely NSTEMI type 2   Chronic kidney disease. Hypertension        RECOMMENDATIONS:  C/w ASA, lipitor, BB  C/w heparin infusion  Trend troponin  Follow up echocardiogram to evaluate EF  No plan for LHC      Discussed with patient and nursing. Tito Larsen MD MD  Fellow, [facility]   Pager - [phone]    Attending note. Patient was seen and examined agree with above. No chest pain or shortness of breath. Presented with nausea vomiting and acute kidney injury. Elevated troponin most likely type II. He had coronary angiography done last year and [facility] and showed normal coronary arteries. We will proceed with echocardiogram and if it is within normal limits no need for repeat coronary angiography.

## 2022-11-06 NOTE — PROGRESS NOTES
[facility]  Office: [address], DO, Mp Stain, DO, Tomasz Santos, DO, Hakeem Juan Blood, DO, Latrice Euceda MD, Mariann Hope MD, Srinivasan Peterson MD, Carlos Santana MD,  Jennifer Patel MD, Alexandra Keller MD, Dank Starr, DO, Swati Magaña MD,  Jo Garcia MD, Mamta Starr MD, Ilsa Cancino, DO, Tamera Quinones MD, Allan Rodrigez MD, Carrie Mckinney, DO, Jordan Garcia MD, Skyla Payne MD, Easton Fierro MD, Ngoc Tolbert MD, Kira Sharma, DO, Renata Irvin MD, Renea Zepeda MD, Sydnee Vincent, CNP,  Salvatore Jovel, CNP, Minnie Joya, CNP, Angeline Trivedi, CNP,  Elkin Sim, DNP, Alma Gutierrez, CNP, Jean Marie Lindo, CNP, Donta Donahue, CNP, Hayder Sepulveda, CNP, Silver Bruner, CNP, Rosemary Mora PA-C, Dustin Amin, CNS, Marek Coombs, DNP, Annabel Chase, CNP, Boris Lai, CNP, Yung Yu, CNP         Be[address] Pis[address]    Progress Note    2022    1:31 PM    Name:   Arianna Reza  MRN:     [id number]     [place]:      [de-identified]   Room:      Day:  2  Admit Date:  2022 12:57 AM    PCP:   BINA Catherine  Code Status:  Full Code    Subjective:     C/C:   Chief Complaint   Patient presents with    Leg Pain    Nausea     Interval History Status: not changed. Patient seen examined at bedside. Doing much better today. Patient had discussion with cardiologist about possible cardiac cath. After discussion with patient cardiology decided to have done as outpatient. Patient also requesting to have echo performed as outpatient. Brief History:       67-year-old male past medical history of diabetes type 1, diabetic neuropathy and nephropathy, CKD stage III, COPD recent fall with right femur fracture presented with leg pain and nausea found to have NSTEMI. Patient being followed by cardiology, nephrology.   Creatinine has improved, after discussion with patient, cardiology decision was made to have patient follow-up as outpatient for monitoring with echo and possible cardiac catheterization as outpatient due to history of recent cardiac catheterization. Review of Systems:     Review of Systems   Constitutional:  Negative for activity change, fatigue and fever. HENT:  Negative for congestion and rhinorrhea. Eyes:  Negative for pain and itching. Respiratory:  Negative for apnea and choking. Cardiovascular:  Negative for chest pain, palpitations and leg swelling. Gastrointestinal:  Negative for abdominal distention and abdominal pain. Genitourinary:  Negative for dysuria and enuresis. Musculoskeletal:  Positive for myalgias. Negative for arthralgias. Skin:  Negative for color change and pallor. Neurological:  Negative for dizziness, speech difficulty and weakness. Psychiatric/Behavioral:  Negative for agitation and decreased concentration. Medications: Allergies:     Allergies   Allergen Reactions    Azithromycin Swelling    Acetaminophen-Codeine Other (See Comments) and Nausea And Vomiting    Codeine Itching    Hydrocodone-Acetaminophen Rash    Ibuprofen Other (See Comments)     Stomach cramps    Morphine Other (See Comments)     Gives headaches    Tramadol Itching     vomitting       Current Meds:   Scheduled Meds:    metoclopramide  5 mg IntraVENous Q12H    fluconazole  200 mg Oral Daily    traZODone  300 mg Oral Nightly    insulin lispro  2 Units SubCUTAneous TID WC    pantoprazole  40 mg Oral BID AC    calcium acetate  1 capsule Oral TID WC    insulin glargine  7 Units SubCUTAneous Nightly    sodium chloride flush  5-40 mL IntraVENous 2 times per day    metoprolol tartrate  25 mg Oral BID    atorvastatin  80 mg Oral Nightly    aspirin  81 mg Oral Daily    polyethylene glycol  17 g Oral BID     Continuous Infusions:    sodium chloride      dextrose      dextrose       PRN Meds: sodium chloride flush, sodium chloride, potassium chloride **OR** potassium alternative oral replacement **OR** potassium chloride, magnesium sulfate, ondansetron **OR** ondansetron, acetaminophen **OR** acetaminophen, magnesium hydroxide, nitroGLYCERIN, clonazePAM, glucose, dextrose bolus **OR** dextrose bolus, glucagon (rDNA), dextrose, calcium carbonate, glucose, dextrose bolus **OR** dextrose bolus, glucagon (rDNA), dextrose    Data:     Past Medical History:   has a past medical history of Diabetes mellitus ([facility] [address] Obstructive lung disease ([facility] [address] and Panic attack. Social History:   reports that he has been smoking cigarettes. He has a 10.25 pack-year smoking history. He has never used smokeless tobacco. He reports that he does not currently use alcohol. He reports current drug use. Drug: Marijuana Jose Shoemaker). Family History: History reviewed. No pertinent family history. Vitals:  /72   Pulse 71   Temp 97.9 °F (36.6 °C) (Oral)   Resp 16   Ht 5' 7\" (1.702 m)   Wt 114 lb 9.5 oz (52 kg)   SpO2 100%   BMI 17.95 kg/m²   Temp (24hrs), Av.9 °F (36.6 °C), Min:97.7 °F (36.5 °C), Max:98.2 °F (36.8 °C)    Recent Labs     22  1620 22  2016 22  0709 22  1134   POCGLU 333* 340* 146* 261*         I/O (24Hr):     Intake/Output Summary (Last 24 hours) at 2022 1331  Last data filed at 2022 0907  Gross per 24 hour   Intake 250 ml   Output 1200 ml   Net -950 ml         Labs:  Hematology:  Recent Labs     22  0119 22  0242   WBC 9.4 5.6   RBC 4.70 3.86*   HGB 13.0 10.7*   HCT 40.1* 33.2*   MCV 85.3 86.0   MCH 27.7 27.7   MCHC 32.4 32.2   RDW 13.3 13.6    175   MPV 11.9 12.9   DDIMER 1.58  --        Chemistry:  Recent Labs     22  0119 22  0214 22  1114 22  1830 22  2123 22  0242 22  0450 22  0800   *  --   --   --   --  132*  --  133*   K 3.4*  --   --   --   --  4.0  --  3.8   CL 89*  --   --   --   --  93*  --  94*   CO2 29  --   --   --   --  25  --  27 GLUCOSE 131*  --   --   --   --  289*  --  341*   BUN 27*  --   --   --   --  31*  --  24*   CREATININE 1.67*  --   --   --   --  1.42*  --  1.26*   MG  --  2.0  --   --   --   --   --   --    ANIONGAP 15  --   --   --   --  14  --  12   LABGLOM 49*  --   --   --   --  60*  --  >60   CALCIUM 9.7  --   --   --   --  8.5*  --  8.4*   PHOS  --  1.0*  --   --   --  2.2* 2.2*  --    PROBNP 828*  --   --   --   --  433*  --   --    TROPHS 150* 140*   < > 109* 108* 104*  --   --    MYOGLOBIN  --   --   --   --  41  --   --   --    LACTACIDWB 2.0  --   --   --   --   --   --   --     < > = values in this interval not displayed. Recent Labs     11/04/22  0119 11/04/22  0458 11/04/22  0555 11/04/22  0741 11/05/22  0242 11/05/22  0704 11/05/22  1106 11/05/22  1620 11/05/22 2016 11/06/22  0709 11/06/22  1134   PROT 7.2  --  6.4  --  5.7*  --   --   --   --   --   --    LABALBU 4.0  --  3.7  --  3.6  --   --   --   --   --   --    *  --  124*  --  136*  --   --   --   --   --   --    ALT 56*  --  58*  --  81*  --   --   --   --   --   --    ALKPHOS 118  --  113  --  106  --   --   --   --   --   --    BILITOT 0.8  --  0.8  --  0.8  --   --   --   --   --   --    BILIDIR  --   --  0.2  --   --   --   --   --   --   --   --    LIPASE 8*  --   --   --   --   --   --   --   --   --   --    CHOL  --   --   --   --  164  --   --   --   --   --   --    HDL  --   --   --   --  51  --   --   --   --   --   --    LDLCHOLESTEROL  --   --   --   --  95  --   --   --   --   --   --    CHOLHDLRATIO  --   --   --   --  3.2  --   --   --   --   --   --    TRIG  --   --   --   --  92  --   --   --   --   --   --    POCGLU  --    < >  --    < >  --  318* 295* 333* 340* 146* 261*    < > = values in this interval not displayed.        ABG:  Lab Results   Component Value Date/Time    FIO2 UNKNOWN 09/30/2022 12:35 AM     Lab Results   Component Value Date/Time    SPECIAL NOT REPORTED 02/15/2022 05:12 PM     Lab Results   Component Value Date/Time    CULTURE NO GROWTH 34 DAYS 02/15/2022 05:11 PM       Radiology:  XR CHEST (2 VW)    Result Date: 11/4/2022  Lucent lungs with chronic pattern of hyperinflation. No acute pulmonary finding. XR HIP LEFT (2-3 VIEWS)    Result Date: 11/4/2022  Prior surgical change of ORIF with no acute abnormality of the left hip or femur. XR FEMUR LEFT (MIN 2 VIEWS)    Result Date: 11/4/2022  Prior surgical change of ORIF with no acute abnormality of the left hip or femur. CT ABDOMEN PELVIS W IV CONTRAST Additional Contrast? None    Result Date: 11/4/2022  1. No evidence of pulmonary embolism or acute pulmonary abnormality. 2.  No acute inflammatory process identified in the abdomen or pelvis. 3.  Punctate left nephrolithiasis. 4.  Findings compatible with hepatic steatosis. 5.  Additional chronic and benign findings, as above. US RENAL LIMITED    Result Date: 11/5/2022  1. Increased echogenicity of the bilateral renal cortex which can be seen with medical renal disease. 2. Suspected left-sided renal calculi. No hydronephrosis. CT CHEST PULMONARY EMBOLISM W CONTRAST    Result Date: 11/4/2022  1. No evidence of pulmonary embolism or acute pulmonary abnormality. 2.  No acute inflammatory process identified in the abdomen or pelvis. 3.  Punctate left nephrolithiasis. 4.  Findings compatible with hepatic steatosis. 5.  Additional chronic and benign findings, as above. US ABDOMEN LIMITED Specify organ? LIVER, GALLBLADDER, PANCREAS    Result Date: 11/4/2022  Hepatomegaly otherwise unremarkable exam RECOMMENDATIONS: Unavailable       Physical Examination:        Physical Exam  Constitutional:       Comments: Chronically ill appearing   HENT:      Head: Normocephalic. Right Ear: External ear normal.      Left Ear: External ear normal.      Nose: Nose normal. No congestion.       Mouth/Throat:      Mouth: Mucous membranes are moist.   Eyes:      Pupils: Pupils are equal, round, and reactive to light. Cardiovascular:      Rate and Rhythm: Normal rate and regular rhythm. Pulses: Normal pulses. Heart sounds: No murmur heard. Pulmonary:      Breath sounds: No wheezing or rales. Abdominal:      General: There is no distension. Palpations: Abdomen is soft. Musculoskeletal:      Cervical back: Neck supple. Right lower leg: No edema. Left lower leg: No edema. Skin:     General: Skin is warm and dry. Capillary Refill: Capillary refill takes less than 2 seconds. Coloration: Skin is pale. Neurological:      General: No focal deficit present. Mental Status: He is alert and oriented to person, place, and time. Motor: Weakness present. Psychiatric:         Mood and Affect: Mood normal.         Behavior: Behavior normal.       Assessment:        Hospital Problems             Last Modified POA    * (Principal) NSTEMI (non-ST elevated myocardial infarction) ([address] 11/4/2022 Yes    Nausea & vomiting 11/4/2022 Yes    Constipation 11/4/2022 Yes    Weight loss 11/4/2022 Yes    Tobacco abuse 11/4/2022 Yes    Marijuana use 11/4/2022 Yes    Acute kidney injury ([address] 11/5/2022 Yes    Stage 3a chronic kidney disease ([address] 11/5/2022 Yes    Uncontrolled type 1 diabetes mellitus with hyperglycemia ([address] 11/4/2022 Yes    Transaminitis 11/4/2022 Yes    Fibromyalgia 11/4/2022 Yes    Spinal stenosis 11/4/2022 Yes    Neuropathy associated with endocrine disorder ([address] 11/4/2022 Yes    Major depressive disorder in partial remission ([address] 11/4/2022 Yes     Plan:        NSTEMI. Appreciate cardiology and nephrology recommendations. Possible cardiac cath on Monday pending nephrology clearance. Heparin drip  Diabetes type 1 with neuropathy and nephropathy. Advance diet as tolerated, Lantus, insulin sliding scale, monitor for hypoglycemia  BMI of 18 with unexpected weight loss. Appreciate dietary recommendations  Hypertension.   Aspirin, Lipitor, Lopressor  GERD Protonix twice daily  Outpatient follow up with GI  Continue home medication of trazodone nightly  PT/OT  Discussed the patient. Cardiac catheterization as outpatient. Heparin drip stopped. Patient to follow-up with cardiology and GI as outpatient.     Jacobo Mantilla MD  11/6/2022  1:31 PM

## 2022-11-06 NOTE — PROGRESS NOTES
Occupational [address]  Occupational Therapy Not Seen Note    DATE: 2022    NAME: Baljinder Peacock  MRN: [id number]   : 1970      Patient not seen this date for Occupational Therapy due to:    Surgery/Procedure:  Possible cardiac cath on Monday pending nephrology clearance, pt currently has BR order in chart. Next Scheduled Treatment: Attempt on  as appropriate.     Electronically signed by Farzad Forrest OT on 2022 at 2:27 PM

## 2022-11-06 NOTE — PROGRESS NOTES
Renal Progress Note    Patient :  Michelle Bean; 47 y.o. MRN# [id number]  Location:    Attending:  David Manjarrez MD  Admit Date:  2022   Hospital Day: 2    Subjective:     Patient seen and examined. Patient was sitting comfortably he was alert and oriented x3. Patient states that now he is able to eat if he would make very small pieces of his meal.  Patient is complaining of significant odynophagia and dysphagia  No acute overnight events. No more vomiting, leg pain controlled, voiding well. PO intake good. Off heparin gtt, cardiology no longer planning heart cath, planning Echo and if it is WNL no need for repeat coronary angiography. Lytes good, Cr improved. Good u/o 1700 past 24 hours. Still c/o dysphagia/odynophagia. Input/Output:       I/O last 3 completed shifts: In: 500 [P.O.:470; I.V.:30]  Out: 2300 [Urine:2300]    Vital Signs:   Temperature:  Temp: 97.7 °F (36.5 °C)  TMax:   Temp (24hrs), Av.9 °F (36.6 °C), Min:97.7 °F (36.5 °C), Max:98.2 °F (36.8 °C)    Respirations:  Resp: 14  Pulse:   Heart Rate: 74  BP:    BP: (!) 144/93  BP Range: Systolic (20OVD), TTT:312 , Min:112 , YWS:973       Diastolic (04KSN), LQT:65, Min:78, Max:98      Physical Examination:     General:  AAO x 3, speaking in full sentences, no accessory muscle use. HEENT: Atraumatic, normocephalic, no throat congestion, moist mucosa. Eyes:   Pupils equal, round and reactive to light, EOMI. Neck:   Supple  Chest:   Bilateral vesicular breath sounds, no rales or wheezes. Cardiac:  S1 S2 RR, no murmurs, gallops or rubs. Abdomen: Soft, non-tender, no masses or organomegaly, BS audible. :   No suprapubic or flank tenderness, no rowan  SKIN:  No rashes, good skin turgor. Extremities:  No edema.     Labs:       Recent Labs     22  0119 22  0242   WBC 9.4 5.6   RBC 4.70 3.86*   HGB 13.0 10.7*   HCT 40.1* 33.2*   MCV 85.3 86.0   MCH 27.7 27.7   MCHC 32.4 32.2   RDW 13.3 13.6    175 MPV 11.9 12.9      BMP:   Recent Labs     11/04/22  0119 11/05/22  0242 11/06/22  0800   * 132* 133*   K 3.4* 4.0 3.8   CL 89* 93* 94*   CO2 29 25 27   BUN 27* 31* 24*   CREATININE 1.67* 1.42* 1.26*   GLUCOSE 131* 289* 341*   CALCIUM 9.7 8.5* 8.4*      Phosphorus:     Recent Labs     11/04/22  0214 11/05/22  0242 11/06/22  0450   PHOS 1.0* 2.2* 2.2*     Magnesium:    Recent Labs     11/04/22 0214   MG 2.0     Albumin:    Recent Labs     11/04/22 0119 11/04/22  0555 11/05/22 0242   LABALBU 4.0 3.7 3.6   SPEP:  Lab Results   Component Value Date/Time    PROT 5.7 11/05/2022 02:42 AM     UPEP:   No results found for: LABPE  C3:     Lab Results   Component Value Date/Time    C3 95 11/05/2022 02:42 AM     C4:     Lab Results   Component Value Date/Time    C4 26 11/05/2022 02:42 AM   Hep C AB:          Lab Results   Component Value Date/Time    HEPCAB REACTIVE 04/06/2022 01:49 PM       Urinalysis/Chemistries:      Lab Results   Component Value Date/Time    NITRU NEGATIVE 09/29/2022 10:15 PM    COLORU Yellow 09/29/2022 10:15 PM    PHUR 5.0 09/29/2022 10:15 PM    WBCUA None 09/29/2022 10:15 PM    RBCUA None 09/29/2022 10:15 PM    MUCUS NOT REPORTED 02/14/2022 02:38 PM    TRICHOMONAS NOT REPORTED 02/14/2022 02:38 PM    YEAST NOT REPORTED 02/14/2022 02:38 PM    BACTERIA NOT REPORTED 02/14/2022 02:38 PM    SPECGRAV 1.041 09/29/2022 10:15 PM    LEUKOCYTESUR NEGATIVE 09/29/2022 10:15 PM    UROBILINOGEN Normal 09/29/2022 10:15 PM    [address] NEGATIVE 09/29/2022 10:15 PM    GLUCOSEU 3+ 09/29/2022 10:15 PM    KETUA SMALL 09/29/2022 10:15 PM    AMORPHOUS NOT REPORTED 02/14/2022 02:38 PM     Radiology:     Reviewed. Assessment:     1. Acute Kidney Injury, likely prerenal, related to nausea, vomiting, patient received additional contrast for CT chest to evaluate for pulmonary embolism. Renal function improving creatinine is down to 1.2 mg/dL   2.  Chronic kidney disease 3 related to diabetes, US shows medical renal

## 2022-11-07 ENCOUNTER — HOSPITAL ENCOUNTER (INPATIENT)
Age: 52
LOS: 3 days | Discharge: HOME HEALTH | DRG: 420 | End: 2022-11-10
Attending: EMERGENCY MEDICINE | Admitting: INTERNAL MEDICINE
Payer: MEDICARE

## 2022-11-07 ENCOUNTER — APPOINTMENT (OUTPATIENT)
Dept: GENERAL RADIOLOGY | Age: 52
DRG: 420 | End: 2022-11-07
Payer: MEDICARE

## 2022-11-07 DIAGNOSIS — R79.89 ELEVATED LFTS: ICD-10-CM

## 2022-11-07 DIAGNOSIS — E13.10 DIABETIC KETOACIDOSIS WITHOUT COMA ASSOCIATED WITH OTHER SPECIFIED DIABETES MELLITUS (HCC): Primary | ICD-10-CM

## 2022-11-07 DIAGNOSIS — R13.10 DYSPHAGIA, UNSPECIFIED TYPE: ICD-10-CM

## 2022-11-07 PROBLEM — E10.10 DIABETIC KETOACIDOSIS WITHOUT COMA ASSOCIATED WITH TYPE 1 DIABETES MELLITUS (HCC): Status: ACTIVE | Noted: 2022-11-07

## 2022-11-07 LAB
ABSOLUTE EOS #: 0 K/UL (ref 0–0.4)
ABSOLUTE LYMPH #: 1.6 K/UL (ref 1–4.8)
ABSOLUTE MONO #: 0.23 K/UL (ref 0.1–1.3)
ALBUMIN SERPL-MCNC: 3.6 G/DL (ref 3.5–5.2)
ALP BLD-CCNC: 145 U/L (ref 40–129)
ALT SERPL-CCNC: 54 U/L (ref 5–41)
ANION GAP SERPL CALCULATED.3IONS-SCNC: ABNORMAL MMOL/L (ref 9–17)
AST SERPL-CCNC: 31 U/L
BACTERIA: NORMAL
BASOPHILS # BLD: 0 % (ref 0–2)
BASOPHILS ABSOLUTE: 0 K/UL (ref 0–0.2)
BETA-HYDROXYBUTYRATE: >16 MMOL/L (ref 0.02–0.27)
BILIRUB SERPL-MCNC: 0.4 MG/DL (ref 0.3–1.2)
BILIRUBIN URINE: NEGATIVE
BUN BLDV-MCNC: 50 MG/DL (ref 6–20)
CALCIUM SERPL-MCNC: 8.9 MG/DL (ref 8.6–10.4)
CARBOXYHEMOGLOBIN: 0.8 % (ref 0–5)
CASTS UA: NORMAL /LPF
CHLORIDE BLD-SCNC: 76 MMOL/L (ref 98–107)
CO2: <6 MMOL/L (ref 20–31)
COLOR: YELLOW
CREAT SERPL-MCNC: 2.42 MG/DL (ref 0.7–1.2)
D-DIMER QUANTITATIVE: 1.36 MG/L FEU (ref 0–0.59)
EOSINOPHILS RELATIVE PERCENT: 0 % (ref 0–4)
EPITHELIAL CELLS UA: NORMAL /HPF
GFR SERPL CREATININE-BSD FRML MDRD: 32 ML/MIN/1.73M2
GLUCOSE BLD-MCNC: 247 MG/DL (ref 75–110)
GLUCOSE BLD-MCNC: 722 MG/DL (ref 70–99)
GLUCOSE BLD-MCNC: 830 MG/DL (ref 70–99)
GLUCOSE BLD-MCNC: >600 MG/DL (ref 75–110)
GLUCOSE URINE: ABNORMAL
HCT VFR BLD CALC: 33.8 % (ref 41–53)
HEMOGLOBIN: 9.5 G/DL (ref 13.5–17.5)
KETONES, URINE: ABNORMAL
LACTIC ACID: 3.7 MMOL/L (ref 0.5–2.2)
LEUKOCYTE ESTERASE, URINE: NEGATIVE
LYMPHOCYTES # BLD: 14 % (ref 24–44)
MCH RBC QN AUTO: 27.4 PG (ref 26–34)
MCHC RBC AUTO-ENTMCNC: 28.1 G/DL (ref 31–37)
MCV RBC AUTO: 97.8 FL (ref 80–100)
METHEMOGLOBIN: 1.4 % (ref 0–1.9)
MONOCYTES # BLD: 2 % (ref 1–7)
MORPHOLOGY: ABNORMAL
MORPHOLOGY: ABNORMAL
NITRITE, URINE: NEGATIVE
O2 DEVICE/FLOW/%: ABNORMAL
O2 SAT, VEN: 87.9 % (ref 60–85)
PATIENT TEMP: 37
PDW BLD-RTO: 15.8 % (ref 11.5–14.9)
PH UA: 5 (ref 5–8)
PH VENOUS: 6.96 (ref 7.32–7.42)
PLATELET # BLD: 355 K/UL (ref 150–450)
PMV BLD AUTO: 9.8 FL (ref 6–12)
PO2, VEN: 76.4 MM HG (ref 30–50)
POTASSIUM SERPL-SCNC: 7.1 MMOL/L (ref 3.7–5.3)
PROTEIN UA: ABNORMAL
PT. POSITION: ABNORMAL
RBC # BLD: 3.45 M/UL (ref 4.5–5.9)
RBC UA: NORMAL /HPF
RESPIRATORY RATE: 33
SEG NEUTROPHILS: 84 % (ref 36–66)
SEGMENTED NEUTROPHILS ABSOLUTE COUNT: 9.57 K/UL (ref 1.3–9.1)
SODIUM BLD-SCNC: 124 MMOL/L (ref 135–144)
SPECIFIC GRAVITY UA: 1.02 (ref 1–1.03)
TEXT FOR RESPIRATORY: ABNORMAL
TOTAL PROTEIN: 6.2 G/DL (ref 6.4–8.3)
TROPONIN, HIGH SENSITIVITY: 40 NG/L (ref 0–22)
TROPONIN, HIGH SENSITIVITY: 52 NG/L (ref 0–22)
TSH SERPL DL<=0.05 MIU/L-ACNC: 1.41 UIU/ML (ref 0.3–5)
TURBIDITY: CLEAR
URINE HGB: ABNORMAL
UROBILINOGEN, URINE: NORMAL
WBC # BLD: 11.4 K/UL (ref 3.5–11)
WBC UA: NORMAL /HPF

## 2022-11-07 PROCEDURE — 80053 COMPREHEN METABOLIC PANEL: CPT

## 2022-11-07 PROCEDURE — 2580000003 HC RX 258: Performed by: EMERGENCY MEDICINE

## 2022-11-07 PROCEDURE — 96375 TX/PRO/DX INJ NEW DRUG ADDON: CPT

## 2022-11-07 PROCEDURE — 6360000002 HC RX W HCPCS: Performed by: STUDENT IN AN ORGANIZED HEALTH CARE EDUCATION/TRAINING PROGRAM

## 2022-11-07 PROCEDURE — 2000000000 HC ICU R&B

## 2022-11-07 PROCEDURE — 83605 ASSAY OF LACTIC ACID: CPT

## 2022-11-07 PROCEDURE — 84443 ASSAY THYROID STIM HORMONE: CPT

## 2022-11-07 PROCEDURE — 82010 KETONE BODYS QUAN: CPT

## 2022-11-07 PROCEDURE — 82805 BLOOD GASES W/O2 SATURATION: CPT

## 2022-11-07 PROCEDURE — 99285 EMERGENCY DEPT VISIT HI MDM: CPT

## 2022-11-07 PROCEDURE — 6370000000 HC RX 637 (ALT 250 FOR IP): Performed by: NURSE PRACTITIONER

## 2022-11-07 PROCEDURE — 36556 INSERT NON-TUNNEL CV CATH: CPT

## 2022-11-07 PROCEDURE — 71045 X-RAY EXAM CHEST 1 VIEW: CPT

## 2022-11-07 PROCEDURE — 6360000002 HC RX W HCPCS: Performed by: NURSE PRACTITIONER

## 2022-11-07 PROCEDURE — 84484 ASSAY OF TROPONIN QUANT: CPT

## 2022-11-07 PROCEDURE — 85025 COMPLETE CBC W/AUTO DIFF WBC: CPT

## 2022-11-07 PROCEDURE — 36415 COLL VENOUS BLD VENIPUNCTURE: CPT

## 2022-11-07 PROCEDURE — 82947 ASSAY GLUCOSE BLOOD QUANT: CPT

## 2022-11-07 PROCEDURE — 51702 INSERT TEMP BLADDER CATH: CPT

## 2022-11-07 PROCEDURE — 96365 THER/PROPH/DIAG IV INF INIT: CPT

## 2022-11-07 PROCEDURE — 81001 URINALYSIS AUTO W/SCOPE: CPT

## 2022-11-07 PROCEDURE — 2580000003 HC RX 258: Performed by: STUDENT IN AN ORGANIZED HEALTH CARE EDUCATION/TRAINING PROGRAM

## 2022-11-07 PROCEDURE — 82800 BLOOD PH: CPT

## 2022-11-07 PROCEDURE — 2500000003 HC RX 250 WO HCPCS: Performed by: STUDENT IN AN ORGANIZED HEALTH CARE EDUCATION/TRAINING PROGRAM

## 2022-11-07 PROCEDURE — 6370000000 HC RX 637 (ALT 250 FOR IP): Performed by: EMERGENCY MEDICINE

## 2022-11-07 PROCEDURE — 93005 ELECTROCARDIOGRAM TRACING: CPT | Performed by: STUDENT IN AN ORGANIZED HEALTH CARE EDUCATION/TRAINING PROGRAM

## 2022-11-07 PROCEDURE — 96361 HYDRATE IV INFUSION ADD-ON: CPT

## 2022-11-07 PROCEDURE — 85379 FIBRIN DEGRADATION QUANT: CPT

## 2022-11-07 PROCEDURE — 83930 ASSAY OF BLOOD OSMOLALITY: CPT

## 2022-11-07 RX ORDER — 0.9 % SODIUM CHLORIDE 0.9 %
1000 INTRAVENOUS SOLUTION INTRAVENOUS ONCE
Status: COMPLETED | OUTPATIENT
Start: 2022-11-07 | End: 2022-11-07

## 2022-11-07 RX ORDER — FLUCONAZOLE 100 MG/1
200 TABLET ORAL DAILY
Status: DISCONTINUED | OUTPATIENT
Start: 2022-11-08 | End: 2022-11-09

## 2022-11-07 RX ORDER — INSULIN GLARGINE 100 [IU]/ML
25 INJECTION, SOLUTION SUBCUTANEOUS DAILY
Status: DISCONTINUED | OUTPATIENT
Start: 2022-11-08 | End: 2022-11-10

## 2022-11-07 RX ORDER — 0.9 % SODIUM CHLORIDE 0.9 %
15 INTRAVENOUS SOLUTION INTRAVENOUS ONCE
Status: CANCELLED | OUTPATIENT
Start: 2022-11-07 | End: 2022-11-07

## 2022-11-07 RX ORDER — TRAZODONE HYDROCHLORIDE 100 MG/1
300 TABLET ORAL NIGHTLY PRN
Status: DISCONTINUED | OUTPATIENT
Start: 2022-11-08 | End: 2022-11-10 | Stop reason: HOSPADM

## 2022-11-07 RX ORDER — PANTOPRAZOLE SODIUM 40 MG/1
40 TABLET, DELAYED RELEASE ORAL
Status: DISCONTINUED | OUTPATIENT
Start: 2022-11-08 | End: 2022-11-09

## 2022-11-07 RX ORDER — INSULIN GLARGINE 100 [IU]/ML
15 INJECTION, SOLUTION SUBCUTANEOUS NIGHTLY
Status: DISCONTINUED | OUTPATIENT
Start: 2022-11-07 | End: 2022-11-10

## 2022-11-07 RX ORDER — CALCIUM GLUCONATE 94 MG/ML
1000 INJECTION, SOLUTION INTRAVENOUS ONCE
Status: COMPLETED | OUTPATIENT
Start: 2022-11-07 | End: 2022-11-07

## 2022-11-07 RX ORDER — SODIUM CHLORIDE 9 MG/ML
INJECTION, SOLUTION INTRAVENOUS CONTINUOUS
Status: CANCELLED | OUTPATIENT
Start: 2022-11-07

## 2022-11-07 RX ORDER — HEPARIN SODIUM 5000 [USP'U]/ML
5000 INJECTION, SOLUTION INTRAVENOUS; SUBCUTANEOUS 2 TIMES DAILY
Status: DISCONTINUED | OUTPATIENT
Start: 2022-11-07 | End: 2022-11-10 | Stop reason: HOSPADM

## 2022-11-07 RX ORDER — CLONAZEPAM 1 MG/1
4 TABLET ORAL DAILY PRN
Status: DISCONTINUED | OUTPATIENT
Start: 2022-11-07 | End: 2022-11-10 | Stop reason: HOSPADM

## 2022-11-07 RX ORDER — CYCLOBENZAPRINE HCL 10 MG
10 TABLET ORAL 3 TIMES DAILY PRN
Status: DISCONTINUED | OUTPATIENT
Start: 2022-11-07 | End: 2022-11-10 | Stop reason: HOSPADM

## 2022-11-07 RX ORDER — ALBUTEROL SULFATE 90 UG/1
2 AEROSOL, METERED RESPIRATORY (INHALATION) EVERY 4 HOURS PRN
Status: DISCONTINUED | OUTPATIENT
Start: 2022-11-07 | End: 2022-11-10 | Stop reason: HOSPADM

## 2022-11-07 RX ORDER — SODIUM CHLORIDE 9 MG/ML
INJECTION, SOLUTION INTRAVENOUS CONTINUOUS
Status: DISCONTINUED | OUTPATIENT
Start: 2022-11-07 | End: 2022-11-08

## 2022-11-07 RX ORDER — ATORVASTATIN CALCIUM 80 MG/1
80 TABLET, FILM COATED ORAL NIGHTLY
Status: DISCONTINUED | OUTPATIENT
Start: 2022-11-07 | End: 2022-11-10 | Stop reason: HOSPADM

## 2022-11-07 RX ORDER — DEXTROSE MONOHYDRATE 100 MG/ML
INJECTION, SOLUTION INTRAVENOUS CONTINUOUS PRN
Status: DISCONTINUED | OUTPATIENT
Start: 2022-11-07 | End: 2022-11-10 | Stop reason: HOSPADM

## 2022-11-07 RX ORDER — MAGNESIUM SULFATE 1 G/100ML
1000 INJECTION INTRAVENOUS PRN
Status: DISCONTINUED | OUTPATIENT
Start: 2022-11-07 | End: 2022-11-08

## 2022-11-07 RX ORDER — LIDOCAINE HYDROCHLORIDE 20 MG/ML
JELLY TOPICAL
Status: DISPENSED
Start: 2022-11-07 | End: 2022-11-08

## 2022-11-07 RX ORDER — DEXTROSE AND SODIUM CHLORIDE 5; .45 G/100ML; G/100ML
INJECTION, SOLUTION INTRAVENOUS CONTINUOUS PRN
Status: DISCONTINUED | OUTPATIENT
Start: 2022-11-07 | End: 2022-11-08

## 2022-11-07 RX ORDER — ASPIRIN 81 MG/1
81 TABLET, CHEWABLE ORAL DAILY
Status: DISCONTINUED | OUTPATIENT
Start: 2022-11-08 | End: 2022-11-10 | Stop reason: HOSPADM

## 2022-11-07 RX ORDER — LIDOCAINE HYDROCHLORIDE 20 MG/ML
JELLY TOPICAL PRN
Status: DISCONTINUED | OUTPATIENT
Start: 2022-11-07 | End: 2022-11-10 | Stop reason: HOSPADM

## 2022-11-07 RX ORDER — POLYETHYLENE GLYCOL 3350 17 G/17G
17 POWDER, FOR SOLUTION ORAL DAILY PRN
Status: DISCONTINUED | OUTPATIENT
Start: 2022-11-07 | End: 2022-11-10 | Stop reason: HOSPADM

## 2022-11-07 RX ADMIN — SODIUM CHLORIDE 1000 ML: 9 INJECTION, SOLUTION INTRAVENOUS at 20:19

## 2022-11-07 RX ADMIN — SODIUM CHLORIDE 0.1 UNITS/KG/HR: 9 INJECTION, SOLUTION INTRAVENOUS at 21:02

## 2022-11-07 RX ADMIN — SODIUM CHLORIDE 1000 ML: 9 INJECTION, SOLUTION INTRAVENOUS at 21:25

## 2022-11-07 RX ADMIN — ATORVASTATIN CALCIUM 80 MG: 80 TABLET, FILM COATED ORAL at 23:40

## 2022-11-07 RX ADMIN — SODIUM CHLORIDE 1000 ML: 9 INJECTION, SOLUTION INTRAVENOUS at 19:22

## 2022-11-07 RX ADMIN — SODIUM BICARBONATE: 84 INJECTION, SOLUTION INTRAVENOUS at 20:05

## 2022-11-07 RX ADMIN — CALCIUM GLUCONATE 1000 MG: 98 INJECTION, SOLUTION INTRAVENOUS at 19:43

## 2022-11-07 RX ADMIN — SODIUM CHLORIDE: 9 INJECTION, SOLUTION INTRAVENOUS at 22:25

## 2022-11-07 RX ADMIN — HEPARIN SODIUM 5000 UNITS: 5000 INJECTION INTRAVENOUS; SUBCUTANEOUS at 23:40

## 2022-11-07 RX ADMIN — INSULIN HUMAN 10 UNITS: 100 INJECTION, SOLUTION PARENTERAL at 20:53

## 2022-11-07 ASSESSMENT — PAIN DESCRIPTION - LOCATION: LOCATION: GENERALIZED

## 2022-11-07 ASSESSMENT — PAIN SCALES - GENERAL: PAINLEVEL_OUTOF10: 10

## 2022-11-07 ASSESSMENT — LIFESTYLE VARIABLES: HOW OFTEN DO YOU HAVE A DRINK CONTAINING ALCOHOL: NEVER

## 2022-11-07 ASSESSMENT — PAIN DESCRIPTION - DESCRIPTORS: DESCRIPTORS: ACHING

## 2022-11-07 ASSESSMENT — PAIN - FUNCTIONAL ASSESSMENT: PAIN_FUNCTIONAL_ASSESSMENT: NONE - DENIES PAIN

## 2022-11-07 NOTE — ED TRIAGE NOTES
Mode of arrival (squad #, walk in, police, etc) : EMS        Chief complaint(s): Shortness of Breath        Arrival Note (brief scenario, treatment PTA, etc). : Pt c/o of shortness of breath. Pt states he has not been eating for a few days and not taking his insulin. C= \"Have you ever felt that you should Cut down on your drinking? \"  No  A= \"Have people Annoyed you by criticizing your drinking? \"  No  G= \"Have you ever felt bad or Guilty about your drinking? \"  No  E= \"Have you ever had a drink as an Eye-opener first thing in the morning to steady your nerves or to help a hangover? \"  No      Deferred []      Reason for deferring: N/A    *If yes to two or more: probable alcohol abuse. *

## 2022-11-07 NOTE — DISCHARGE SUMMARY
[facility]  Office: [address], DO, Eva , DO, Amanda Sender, DO, Grady December Blood, DO, Liane Lawton MD, Jesse Arana MD, Adam Blue MD, Desiree Hughes MD,  Kvng White MD, Tab Conrad MD, Kimberly Perez, DO, Melene Boast, MD,  Aniyah Pearson MD, Christine Benitez MD, Angela Arreola, DO, Hayden Chilel MD, Herlinda Astorga MD, Laurence Coughlin, DO, Vincent Thomas MD, Pete Bruce MD, Dave Bell MD, Theresa Nelson MD, Fernand Dance, DO, Jalil Montes MD, Jeremías Queen MD, Lv Lucio, Karen Taylor, CNP, Elijah Garsia, CNP, Nidia Brandt, CNP,  Diallo Carson, DNP, Navi Vargas, CNP, Anabelle Devries, CNP, Justice Sims, CNP, Pedro Carlson, CNP, Garrick Frias, CNP, Antelmo Cullen PA-C, Patricia Woodard, CNS, Saurabh Rodriguez, DNP, Panfilo Rubio, CNP, Shanna Watters, CNP, Bret Maharaj, CNP         [address]    Discharge Summary     Patient ID: Dennys Yanez  :  1970   MRN: [id number]     ACCOUNT:  [de-identified]   Patient's PCP: BINA Bautista  Admit Date: 2022   Discharge Date: 2022     Length of Stay: 2  Code Status:  Prior  Admitting Physician: No admitting provider for patient encounter. Discharge Physician: Melene Boast, MD     Active Discharge Diagnoses:     Hospital Problem Lists:  Principal Problem:    NSTEMI (non-ST elevated myocardial infarction) [facility])  Active Problems:    Nausea & vomiting    Constipation    Weight loss    Tobacco abuse    Marijuana use    Acute kidney injury ([facility]    Stage 3a chronic kidney disease (HCC)    Odynophagia    Dysphagia    Uncontrolled type 1 diabetes mellitus with hyperglycemia (HCC)    Transaminitis    Fibromyalgia    Spinal stenosis    Neuropathy associated with endocrine disorder (HCC)    Major depressive disorder in partial remission ([facility]  Resolved Problems:    * No resolved hospital problems. *      Admission Condition:  stable     Discharged Condition: stable    Hospital Stay:     Hospital Course:  Stefanie Baeza is a 47 y.o. male who was admitted for the management of   NSTEMI (non-ST elevated myocardial infarction) ([facility] , presented to ER with Leg Pain and Nausea      26-year-old male past medical history of diabetes type 1, diabetic neuropathy and nephropathy, CKD stage III, COPD recent fall with right femur fracture presented with leg pain and nausea found to have NSTEMI. Patient being followed by cardiology, nephrology. Creatinine has improved, after discussion with patient, cardiology decision was made to have patient follow-up as outpatient for monitoring with echo and possible cardiac catheterization as outpatient due to history of recent cardiac catheterization. Patient to follow-up with cardiology, PCP as well as GI as outpatient. Discussed with GI personally plan for Diflucan for 14 days and follow-up with possible scheduling for EGD afterwards.     Significant therapeutic interventions: see above    Significant Diagnostic Studies:   Labs / Micro:  CBC:   Lab Results   Component Value Date/Time    WBC 5.6 11/05/2022 02:42 AM    RBC 3.86 11/05/2022 02:42 AM    HGB 10.7 11/05/2022 02:42 AM    HCT 33.2 11/05/2022 02:42 AM    MCV 86.0 11/05/2022 02:42 AM    MCH 27.7 11/05/2022 02:42 AM    MCHC 32.2 11/05/2022 02:42 AM    RDW 13.6 11/05/2022 02:42 AM     11/05/2022 02:42 AM     BMP:    Lab Results   Component Value Date/Time    GLUCOSE 341 11/06/2022 08:00 AM     11/06/2022 08:00 AM    K 3.8 11/06/2022 08:00 AM    CL 94 11/06/2022 08:00 AM    CO2 27 11/06/2022 08:00 AM    ANIONGAP 12 11/06/2022 08:00 AM    BUN 24 11/06/2022 08:00 AM    CREATININE 1.26 11/06/2022 08:00 AM    BUNCRER NOT REPORTED 04/06/2022 01:49 PM    CALCIUM 8.4 11/06/2022 08:00 AM    LABGLOM >60 11/06/2022 08:00 AM    GFRAA >60 10/01/2022 03:48 AM    GFR      10/01/2022 03:48 AM        Radiology:  XR CHEST (2 VW)    Result Date: 11/4/2022  Lucent lungs with chronic pattern of hyperinflation. No acute pulmonary finding. XR HIP LEFT (2-3 VIEWS)    Result Date: 11/4/2022  Prior surgical change of ORIF with no acute abnormality of the left hip or femur. XR FEMUR LEFT (MIN 2 VIEWS)    Result Date: 11/4/2022  Prior surgical change of ORIF with no acute abnormality of the left hip or femur. CT ABDOMEN PELVIS W IV CONTRAST Additional Contrast? None    Result Date: 11/4/2022  1. No evidence of pulmonary embolism or acute pulmonary abnormality. 2.  No acute inflammatory process identified in the abdomen or pelvis. 3.  Punctate left nephrolithiasis. 4.  Findings compatible with hepatic steatosis. 5.  Additional chronic and benign findings, as above. US RENAL LIMITED    Result Date: 11/5/2022  1. Increased echogenicity of the bilateral renal cortex which can be seen with medical renal disease. 2. Suspected left-sided renal calculi. No hydronephrosis. CT CHEST PULMONARY EMBOLISM W CONTRAST    Result Date: 11/4/2022  1. No evidence of pulmonary embolism or acute pulmonary abnormality. 2.  No acute inflammatory process identified in the abdomen or pelvis. 3.  Punctate left nephrolithiasis. 4.  Findings compatible with hepatic steatosis. 5.  Additional chronic and benign findings, as above. US ABDOMEN LIMITED Specify organ? LIVER, GALLBLADDER, PANCREAS    Result Date: 11/4/2022  Hepatomegaly otherwise unremarkable exam RECOMMENDATIONS: Unavailable       Consultations:    Consults:     Final Specialist Recommendations/Findings:   IP CONSULT TO HOSPITALIST  IP CONSULT TO CARDIOLOGY  IP CONSULT TO CARDIOLOGY  IP CONSULT TO SOCIAL WORK  IP CONSULT TO NEPHROLOGY  IP CONSULT TO HOME CARE NEEDS  IP CONSULT TO HOME CARE NEEDS      The patient was seen and examined on day of discharge and this discharge summary is in conjunction with any daily progress note from day of discharge.     Discharge plan:     Disposition: Home    Physician Follow Up:     [address]  [facility] [address] [address]  [address], [address] [facility]  [phone]    Follow up      Kelly Cao, [address] [facility]  [place] [address]  [address] [facility]  [address] [facility] [address]  [phone]  Follow up      Luna Gallagher, [address] [facility], [address]  [phone]    Follow up      Morenita Danielle [address] [facility].  [address]  [phone]    Follow up         Requiring Further Evaluation/Follow Up POST HOSPITALIZATION/Incidental Findings: follow up PCP follow up cardiology, follow up GI    Diet: regular diet    Activity: As tolerated    Instructions to Patient: Follow-up PCP, follow-up cardiology, follow-up with PCP    Discharge Medications:      Medication List        START taking these medications      aspirin 81 MG chewable tablet  Take 1 tablet by mouth daily     calcium carbonate 500 MG chewable tablet  Commonly known as: TUMS  Take 2 tablets by mouth 3 times daily as needed for Heartburn     fluconazole 100 MG tablet  Commonly known as: DIFLUCAN  Take 2 tablets by mouth daily for 14 days     metoprolol tartrate 25 MG tablet  Commonly known as: LOPRESSOR  Take 1 tablet by mouth 2 times daily     pantoprazole 40 MG tablet  Commonly known as: PROTONIX  Take 1 tablet by mouth 2 times daily (before meals) for 25 doses            CHANGE how you take these medications      atorvastatin 80 MG tablet  Commonly known as: LIPITOR  Take 1 tablet by mouth nightly  What changed:   medication strength  how much to take  when to take this  Another medication with the same name was removed. Continue taking this medication, and follow the directions you see here.             CONTINUE taking these medications      acetaminophen 500 MG tablet  Commonly known as: TYLENOL  Take 2 tablets by mouth every 6 hours as needed for Pain     albuterol sulfate  (90 Base) MCG/ACT inhaler  Commonly known as: PROVENTIL;VENTOLIN;PROAIR     Alcohol Swabs 70 % Pads  Use as needed with blood glucose checks     clonazePAM 2 MG tablet  Commonly known as: KLONOPIN     cyclobenzaprine 10 MG tablet  Commonly known as: FLEXERIL  Take 1 tablet by mouth 3 times daily as needed for Muscle spasms     gabapentin 300 MG capsule  Commonly known as: NEURONTIN  Take 1 capsule by mouth in the morning and 1 capsule at noon and 1 capsule in the evening. Do all this for 5 days. * insulin glargine 100 UNIT/ML injection vial  Commonly known as: LANTUS     [employer] Pen Needles 29G 29G X 12MM Misc  Generic drug: Insulin Pen Needle  1 each by Does not apply route daily     Lancets Misc  1 each by Does not apply route 2 times daily     NovoLOG FlexPen 100 UNIT/ML injection pen  Generic drug: insulin aspart  Inject 5 Units into the skin 3 times daily (before meals)     ondansetron 4 MG disintegrating tablet  Commonly known as: Zofran ODT  Take 1 tablet by mouth every 8 hours as needed for Nausea or Vomiting     ONE TOUCH ULTRA 2 w/Device Kit  1 kit by Does not apply route daily     traZODone 150 MG tablet  Commonly known as: DESYREL     varenicline 0.5 MG tablet  Commonly known as: APO-Varenicline  Take 1 tablet by mouth 2 times daily Take on tablet daily for 3 days then one tablet twice daily for the next 3 days then 2 tablets twice a day from then on.     * vitamin D 1.25 MG (50721 UT) Caps capsule  Commonly known as: ERGOCALCIFEROL  Take 1 capsule by mouth once a week     * vitamin D 1.25 MG (93491 UT) Caps capsule  Commonly known as: ERGOCALCIFEROL  Take 1 capsule by mouth once a week           * This list has 3 medication(s) that are the same as other medications prescribed for you. Read the directions carefully, and ask your doctor or other care provider to review them with you.                 STOP taking these medications      amLODIPine 10 MG tablet  Commonly known as: NORVASC            ASK your doctor about these medications      * Lantus SoloStar 100 UNIT/ML injection pen  Generic drug: insulin glargine  Inject 25 Units into the skin 2 times daily           * This list has 1 medication(s) that are the same as other medications prescribed for you. Read the directions carefully, and ask your doctor or other care provider to review them with you. Where to Get Your Medications        These medications were sent to [address], [address], [address] [facility]      Phone: [phone]   aspirin 81 MG chewable tablet  atorvastatin 80 MG tablet  calcium carbonate 500 MG chewable tablet  fluconazole 100 MG tablet  metoprolol tartrate 25 MG tablet  pantoprazole 40 MG tablet         Discharge Procedure Orders   Basic Metabolic Panel   Standing Status: Future Standing Exp. Date: 11/06/23     Skip Solitario MD, Gastroenterology, [place]   Referral Priority: Routine Referral Type: Eval and Treat   Referral Reason: Specialty Services Required   Referred to Provider: Angie Gutierrez Requested Specialty: Gastroenterology   Number of Visits Requested: 1     ECHO 2D WO Color Doppler Complete   Standing Status: Future Standing Exp. Date: 11/06/23     Order Specific Question Answer Comments   Reason for exam: evaluate EF        Time Spent on discharge is  33 mins in patient examination, evaluation, counseling as well as medication reconciliation, prescriptions for required medications, discharge plan and follow up. Electronically signed by   Asad Waldron MD  11/7/2022  7:20 AM      Thank you BINA Patricio for the opportunity to be involved in this patient's care.

## 2022-11-08 ENCOUNTER — APPOINTMENT (OUTPATIENT)
Dept: VASCULAR LAB | Age: 52
DRG: 420 | End: 2022-11-08
Payer: MEDICARE

## 2022-11-08 ENCOUNTER — APPOINTMENT (OUTPATIENT)
Dept: NON INVASIVE DIAGNOSTICS | Age: 52
DRG: 420 | End: 2022-11-08
Payer: MEDICARE

## 2022-11-08 PROBLEM — E43 SEVERE MALNUTRITION (HCC): Status: ACTIVE | Noted: 2022-11-08

## 2022-11-08 LAB
ABSOLUTE BANDS #: 0.71 K/UL (ref 0–1)
ABSOLUTE EOS #: 0 K/UL (ref 0–0.4)
ABSOLUTE LYMPH #: 1.69 K/UL (ref 1–4.8)
ABSOLUTE MONO #: 0.89 K/UL (ref 0.1–1.3)
ANION GAP SERPL CALCULATED.3IONS-SCNC: 14 MMOL/L (ref 9–17)
ANION GAP SERPL CALCULATED.3IONS-SCNC: 25 MMOL/L (ref 9–17)
ANION GAP SERPL CALCULATED.3IONS-SCNC: 8 MMOL/L (ref 9–17)
BANDS: 8 % (ref 0–10)
BASOPHILS # BLD: 0 % (ref 0–2)
BASOPHILS ABSOLUTE: 0 K/UL (ref 0–0.2)
BETA-HYDROXYBUTYRATE: 11.97 MMOL/L (ref 0.02–0.27)
BUN BLDV-MCNC: 31 MG/DL (ref 6–20)
BUN BLDV-MCNC: 35 MG/DL (ref 6–20)
BUN BLDV-MCNC: 40 MG/DL (ref 6–20)
CALCIUM SERPL-MCNC: 7.3 MG/DL (ref 8.6–10.4)
CALCIUM SERPL-MCNC: 7.4 MG/DL (ref 8.6–10.4)
CALCIUM SERPL-MCNC: 7.4 MG/DL (ref 8.6–10.4)
CARBOXYHEMOGLOBIN: 1.7 % (ref 0–5)
CARBOXYHEMOGLOBIN: 1.8 % (ref 0–5)
CARBOXYHEMOGLOBIN: 2.8 % (ref 0–5)
CHLORIDE BLD-SCNC: 104 MMOL/L (ref 98–107)
CHLORIDE BLD-SCNC: 90 MMOL/L (ref 98–107)
CHLORIDE BLD-SCNC: 98 MMOL/L (ref 98–107)
CO2: 16 MMOL/L (ref 20–31)
CO2: 22 MMOL/L (ref 20–31)
CO2: 27 MMOL/L (ref 20–31)
CREAT SERPL-MCNC: 1.68 MG/DL (ref 0.7–1.2)
CREAT SERPL-MCNC: 1.72 MG/DL (ref 0.7–1.2)
CREAT SERPL-MCNC: 1.89 MG/DL (ref 0.7–1.2)
EKG ATRIAL RATE: 101 BPM
EKG Q-T INTERVAL: 256 MS
EKG QRS DURATION: 110 MS
EKG QTC CALCULATION (BAZETT): 332 MS
EKG R AXIS: -176 DEGREES
EKG T AXIS: 124 DEGREES
EKG VENTRICULAR RATE: 101 BPM
EOSINOPHILS RELATIVE PERCENT: 0 % (ref 0–4)
GFR SERPL CREATININE-BSD FRML MDRD: 42 ML/MIN/1.73M2
GFR SERPL CREATININE-BSD FRML MDRD: 48 ML/MIN/1.73M2
GFR SERPL CREATININE-BSD FRML MDRD: 49 ML/MIN/1.73M2
GLUCOSE BLD-MCNC: 111 MG/DL (ref 75–110)
GLUCOSE BLD-MCNC: 118 MG/DL (ref 75–110)
GLUCOSE BLD-MCNC: 224 MG/DL (ref 75–110)
GLUCOSE BLD-MCNC: 277 MG/DL (ref 70–99)
GLUCOSE BLD-MCNC: 287 MG/DL (ref 75–110)
GLUCOSE BLD-MCNC: 373 MG/DL (ref 75–110)
GLUCOSE BLD-MCNC: 417 MG/DL (ref 75–110)
GLUCOSE BLD-MCNC: 453 MG/DL (ref 75–110)
GLUCOSE BLD-MCNC: 500 MG/DL (ref 75–110)
GLUCOSE BLD-MCNC: 511 MG/DL (ref 75–110)
GLUCOSE BLD-MCNC: 529 MG/DL (ref 75–110)
GLUCOSE BLD-MCNC: 553 MG/DL (ref 75–110)
GLUCOSE BLD-MCNC: 558 MG/DL (ref 70–99)
GLUCOSE BLD-MCNC: 597 MG/DL (ref 75–110)
GLUCOSE BLD-MCNC: 609 MG/DL (ref 70–99)
GLUCOSE BLD-MCNC: 649 MG/DL (ref 70–99)
GLUCOSE BLD-MCNC: 84 MG/DL (ref 75–110)
GLUCOSE BLD-MCNC: 98 MG/DL (ref 75–110)
HCO3 VENOUS: 16.1 MMOL/L (ref 24–30)
HCO3 VENOUS: 22.9 MMOL/L (ref 24–30)
HCO3 VENOUS: 29.1 MMOL/L (ref 24–30)
HCT VFR BLD CALC: 25.2 % (ref 41–53)
HEMOGLOBIN: 8 G/DL (ref 13.5–17.5)
LACTIC ACID: 1.1 MMOL/L (ref 0.5–2.2)
LV EF: 55 %
LVEF MODALITY: NORMAL
LYMPHOCYTES # BLD: 19 % (ref 24–44)
MAGNESIUM: 1.6 MG/DL (ref 1.6–2.6)
MAGNESIUM: 2 MG/DL (ref 1.6–2.6)
MAGNESIUM: 2.1 MG/DL (ref 1.6–2.6)
MCH RBC QN AUTO: 27.4 PG (ref 26–34)
MCHC RBC AUTO-ENTMCNC: 31.7 G/DL (ref 31–37)
MCV RBC AUTO: 86.5 FL (ref 80–100)
METAMYELOCYTES ABSOLUTE COUNT: 0.09 K/UL
METAMYELOCYTES: 1 %
METHEMOGLOBIN: 1.1 % (ref 0–1.9)
METHEMOGLOBIN: 1.2 % (ref 0–1.9)
METHEMOGLOBIN: 1.2 % (ref 0–1.9)
MONOCYTES # BLD: 10 % (ref 1–7)
MORPHOLOGY: ABNORMAL
MYELOCYTES ABSOLUTE COUNT: 0.09 K/UL
MYELOCYTES: 1 %
NEGATIVE BASE EXCESS, VEN: 2 MMOL/L (ref 0–2)
NEGATIVE BASE EXCESS, VEN: 9.8 MMOL/L (ref 0–2)
O2 SAT, VEN: 56.5 % (ref 60–85)
O2 SAT, VEN: 84.9 % (ref 60–85)
O2 SAT, VEN: 95.9 % (ref 60–85)
PATIENT TEMP: 37
PCO2, VEN: 30.5 MM HG (ref 39–55)
PCO2, VEN: 37.5 MM HG (ref 39–55)
PCO2, VEN: 41.6 MM HG (ref 39–55)
PDW BLD-RTO: 14.5 % (ref 11.5–14.9)
PH VENOUS: 7.33 (ref 7.32–7.42)
PH VENOUS: 7.39 (ref 7.32–7.42)
PH VENOUS: 7.45 (ref 7.32–7.42)
PHOSPHORUS: 1.8 MG/DL (ref 2.5–4.5)
PHOSPHORUS: 2 MG/DL (ref 2.5–4.5)
PHOSPHORUS: 2.1 MG/DL (ref 2.5–4.5)
PLATELET # BLD: 283 K/UL (ref 150–450)
PMV BLD AUTO: 8.8 FL (ref 6–12)
PO2, VEN: 27.2 MM HG (ref 30–50)
PO2, VEN: 49.5 MM HG (ref 30–50)
PO2, VEN: 96.1 MM HG (ref 30–50)
POSITIVE BASE EXCESS, VEN: 5.2 MMOL/L (ref 0–2)
POTASSIUM SERPL-SCNC: 3.6 MMOL/L (ref 3.7–5.3)
POTASSIUM SERPL-SCNC: 3.7 MMOL/L (ref 3.7–5.3)
POTASSIUM SERPL-SCNC: 3.9 MMOL/L (ref 3.7–5.3)
RBC # BLD: 2.92 M/UL (ref 4.5–5.9)
SEG NEUTROPHILS: 61 % (ref 36–66)
SEGMENTED NEUTROPHILS ABSOLUTE COUNT: 5.43 K/UL (ref 1.3–9.1)
SERUM OSMOLALITY: 344 MOSM/KG (ref 275–295)
SODIUM BLD-SCNC: 131 MMOL/L (ref 135–144)
SODIUM BLD-SCNC: 134 MMOL/L (ref 135–144)
SODIUM BLD-SCNC: 139 MMOL/L (ref 135–144)
TEXT FOR RESPIRATORY: ABNORMAL
TEXT FOR RESPIRATORY: ABNORMAL
WBC # BLD: 8.9 K/UL (ref 3.5–11)

## 2022-11-08 PROCEDURE — 80048 BASIC METABOLIC PNL TOTAL CA: CPT

## 2022-11-08 PROCEDURE — 2500000003 HC RX 250 WO HCPCS: Performed by: STUDENT IN AN ORGANIZED HEALTH CARE EDUCATION/TRAINING PROGRAM

## 2022-11-08 PROCEDURE — 2060000000 HC ICU INTERMEDIATE R&B

## 2022-11-08 PROCEDURE — 82010 KETONE BODYS QUAN: CPT

## 2022-11-08 PROCEDURE — 2500000003 HC RX 250 WO HCPCS: Performed by: NURSE PRACTITIONER

## 2022-11-08 PROCEDURE — 6370000000 HC RX 637 (ALT 250 FOR IP): Performed by: STUDENT IN AN ORGANIZED HEALTH CARE EDUCATION/TRAINING PROGRAM

## 2022-11-08 PROCEDURE — 83605 ASSAY OF LACTIC ACID: CPT

## 2022-11-08 PROCEDURE — 2580000003 HC RX 258: Performed by: EMERGENCY MEDICINE

## 2022-11-08 PROCEDURE — 2580000003 HC RX 258: Performed by: STUDENT IN AN ORGANIZED HEALTH CARE EDUCATION/TRAINING PROGRAM

## 2022-11-08 PROCEDURE — 82805 BLOOD GASES W/O2 SATURATION: CPT

## 2022-11-08 PROCEDURE — 6370000000 HC RX 637 (ALT 250 FOR IP): Performed by: NURSE PRACTITIONER

## 2022-11-08 PROCEDURE — 83735 ASSAY OF MAGNESIUM: CPT

## 2022-11-08 PROCEDURE — 99291 CRITICAL CARE FIRST HOUR: CPT | Performed by: INTERNAL MEDICINE

## 2022-11-08 PROCEDURE — 84100 ASSAY OF PHOSPHORUS: CPT

## 2022-11-08 PROCEDURE — 36415 COLL VENOUS BLD VENIPUNCTURE: CPT

## 2022-11-08 PROCEDURE — 82947 ASSAY GLUCOSE BLOOD QUANT: CPT

## 2022-11-08 PROCEDURE — 94761 N-INVAS EAR/PLS OXIMETRY MLT: CPT

## 2022-11-08 PROCEDURE — 93970 EXTREMITY STUDY: CPT

## 2022-11-08 PROCEDURE — 2580000003 HC RX 258: Performed by: NURSE PRACTITIONER

## 2022-11-08 PROCEDURE — 85025 COMPLETE CBC W/AUTO DIFF WBC: CPT

## 2022-11-08 PROCEDURE — 93010 ELECTROCARDIOGRAM REPORT: CPT | Performed by: INTERNAL MEDICINE

## 2022-11-08 PROCEDURE — 82800 BLOOD PH: CPT

## 2022-11-08 PROCEDURE — 93306 TTE W/DOPPLER COMPLETE: CPT

## 2022-11-08 PROCEDURE — 6360000002 HC RX W HCPCS: Performed by: NURSE PRACTITIONER

## 2022-11-08 RX ORDER — DEXTROSE AND SODIUM CHLORIDE 5; .45 G/100ML; G/100ML
INJECTION, SOLUTION INTRAVENOUS CONTINUOUS
Status: DISCONTINUED | OUTPATIENT
Start: 2022-11-08 | End: 2022-11-09

## 2022-11-08 RX ORDER — CALCIUM GLUCONATE 10 MG/ML
1000 INJECTION, SOLUTION INTRAVENOUS ONCE
Status: DISCONTINUED | OUTPATIENT
Start: 2022-11-08 | End: 2022-11-10 | Stop reason: HOSPADM

## 2022-11-08 RX ORDER — INSULIN LISPRO 100 [IU]/ML
8 INJECTION, SOLUTION INTRAVENOUS; SUBCUTANEOUS
Status: DISCONTINUED | OUTPATIENT
Start: 2022-11-08 | End: 2022-11-08

## 2022-11-08 RX ORDER — INSULIN GLARGINE 100 [IU]/ML
30 INJECTION, SOLUTION SUBCUTANEOUS ONCE
Status: COMPLETED | OUTPATIENT
Start: 2022-11-08 | End: 2022-11-08

## 2022-11-08 RX ORDER — CALCIUM GLUCONATE 94 MG/ML
1000 INJECTION, SOLUTION INTRAVENOUS ONCE
Status: DISCONTINUED | OUTPATIENT
Start: 2022-11-08 | End: 2022-11-08

## 2022-11-08 RX ORDER — INSULIN LISPRO 100 [IU]/ML
0-4 INJECTION, SOLUTION INTRAVENOUS; SUBCUTANEOUS NIGHTLY
Status: DISCONTINUED | OUTPATIENT
Start: 2022-11-08 | End: 2022-11-10 | Stop reason: HOSPADM

## 2022-11-08 RX ORDER — INSULIN LISPRO 100 [IU]/ML
0-16 INJECTION, SOLUTION INTRAVENOUS; SUBCUTANEOUS
Status: DISCONTINUED | OUTPATIENT
Start: 2022-11-08 | End: 2022-11-10 | Stop reason: HOSPADM

## 2022-11-08 RX ADMIN — SODIUM CHLORIDE: 9 INJECTION, SOLUTION INTRAVENOUS at 06:23

## 2022-11-08 RX ADMIN — SODIUM PHOSPHATE, MONOBASIC, MONOHYDRATE AND SODIUM PHOSPHATE, DIBASIC, ANHYDROUS 15 MMOL: 142; 276 INJECTION, SOLUTION INTRAVENOUS at 04:06

## 2022-11-08 RX ADMIN — TRAZODONE HYDROCHLORIDE 300 MG: 100 TABLET ORAL at 23:07

## 2022-11-08 RX ADMIN — DEXTROSE AND SODIUM CHLORIDE: 5; 450 INJECTION, SOLUTION INTRAVENOUS at 16:58

## 2022-11-08 RX ADMIN — HEPARIN SODIUM 5000 UNITS: 5000 INJECTION INTRAVENOUS; SUBCUTANEOUS at 20:16

## 2022-11-08 RX ADMIN — ASPIRIN 81 MG 81 MG: 81 TABLET ORAL at 07:41

## 2022-11-08 RX ADMIN — MAGNESIUM SULFATE HEPTAHYDRATE 1000 MG: 1 INJECTION, SOLUTION INTRAVENOUS at 05:31

## 2022-11-08 RX ADMIN — INSULIN GLARGINE 30 UNITS: 100 INJECTION, SOLUTION SUBCUTANEOUS at 10:57

## 2022-11-08 RX ADMIN — SODIUM BICARBONATE: 84 INJECTION, SOLUTION INTRAVENOUS at 00:37

## 2022-11-08 RX ADMIN — HEPARIN SODIUM 5000 UNITS: 5000 INJECTION INTRAVENOUS; SUBCUTANEOUS at 07:41

## 2022-11-08 RX ADMIN — PANTOPRAZOLE SODIUM 40 MG: 40 TABLET, DELAYED RELEASE ORAL at 06:21

## 2022-11-08 RX ADMIN — PANTOPRAZOLE SODIUM 40 MG: 40 TABLET, DELAYED RELEASE ORAL at 16:06

## 2022-11-08 RX ADMIN — MAGNESIUM SULFATE HEPTAHYDRATE 1000 MG: 1 INJECTION, SOLUTION INTRAVENOUS at 04:08

## 2022-11-08 RX ADMIN — CLONAZEPAM 4 MG: 1 TABLET ORAL at 18:30

## 2022-11-08 RX ADMIN — SODIUM CHLORIDE: 9 INJECTION, SOLUTION INTRAVENOUS at 10:20

## 2022-11-08 RX ADMIN — DEXTROSE AND SODIUM CHLORIDE: 5; 450 INJECTION, SOLUTION INTRAVENOUS at 11:49

## 2022-11-08 RX ADMIN — DEXTROSE MONOHYDRATE 125 ML: 100 INJECTION, SOLUTION INTRAVENOUS at 16:06

## 2022-11-08 RX ADMIN — FLUCONAZOLE 200 MG: 100 TABLET ORAL at 07:41

## 2022-11-08 RX ADMIN — METOPROLOL TARTRATE 25 MG: 25 TABLET, FILM COATED ORAL at 07:41

## 2022-11-08 RX ADMIN — ATORVASTATIN CALCIUM 80 MG: 80 TABLET, FILM COATED ORAL at 20:15

## 2022-11-08 RX ADMIN — METOPROLOL TARTRATE 25 MG: 25 TABLET, FILM COATED ORAL at 20:16

## 2022-11-08 RX ADMIN — CYCLOBENZAPRINE 10 MG: 10 TABLET, FILM COATED ORAL at 14:27

## 2022-11-08 ASSESSMENT — PAIN SCALES - GENERAL
PAINLEVEL_OUTOF10: 4
PAINLEVEL_OUTOF10: 10
PAINLEVEL_OUTOF10: 0
PAINLEVEL_OUTOF10: 3
PAINLEVEL_OUTOF10: 0

## 2022-11-08 ASSESSMENT — ENCOUNTER SYMPTOMS
SHORTNESS OF BREATH: 0
ABDOMINAL PAIN: 0
COUGH: 0
RHINORRHEA: 0
VOMITING: 0
COLOR CHANGE: 0
DIARRHEA: 0
SHORTNESS OF BREATH: 1
CHEST TIGHTNESS: 1
BACK PAIN: 0
NAUSEA: 1
ABDOMINAL PAIN: 1

## 2022-11-08 NOTE — PROGRESS NOTES
Cardiology NP Shweta phillips notified of new patient consult for elevated troponin. Stated that she will see him after clinic.

## 2022-11-08 NOTE — ED PROVIDER NOTES
[facility] ICU  Emergency Department Encounter  Emergency Medicine Resident     Pt Name:Elroy Jacobo  MRN: [id number]  [place] 1970  Date of evaluation: 11/8/22  PCP:  BINA Astudillo      CHIEF COMPLAINT       Chief Complaint   Patient presents with    Shortness of Breath       HISTORY OF PRESENT ILLNESS  (Location/Symptom, Timing/Onset, Context/Setting, Quality, Duration, Modifying Factors, Severity.)      Lanette Goltz is a 47 y.o. male who presents with shortness of breath. Past medical history sniffing for diabetes as well as panic attack. Patient states that he feels like someone is holding her hand over his mouth and nose. Is been feeling short of breath the past few days. Has not been using his insulin at home. Was just recently discharged from [facility] after having an NSTEMI. Had significant work-up there. States that since has been discharged she is having progressively worsening shortness of breath. PAST MEDICAL / SURGICAL / SOCIAL / FAMILY HISTORY      has a past medical history of Diabetes mellitus ([facility] Obstructive lung disease ([facility] and Panic attack. has a past surgical history that includes Appendectomy; Hand surgery; Upper gastrointestinal endoscopy (N/A, 02/15/2022); Femur fracture surgery (Left, 09/30/2022); Tibia fracture surgery (Left, 09/30/2022); and joint replacement.       Social History     Socioeconomic History    Marital status: Single     Spouse name: Not on file    Number of children: 2    Years of education: Not on file    Highest education level: Not on file   Occupational History    Not on file   Tobacco Use    Smoking status: Every Day     Packs/day: 0.25     Years: 41.00     Pack years: 10.25     Types: Cigarettes    Smokeless tobacco: Never   Substance and Sexual Activity    Alcohol use: Not Currently    Drug use: Yes     Types: Marijuana Reji Cornell)     Comment: overdose 11/10/20    Sexual activity: Not Currently   Other Topics Concern    Not on file   Social History Narrative    Not on file     Social Determinants of Health     Financial Resource Strain: Low Risk     Difficulty of Paying Living Expenses: Not very hard   Food Insecurity: Food Insecurity Present    Worried About Running Out of Food in the Last Year: Sometimes true    Ran Out of Food in the Last Year: Sometimes true   Transportation Needs: Unmet Transportation Needs    Lack of Transportation (Medical): Yes    Lack of Transportation (Non-Medical): Yes   Physical Activity: Inactive    Days of Exercise per Week: 0 days    Minutes of Exercise per Session: 0 min   Stress: Stress Concern Present    Feeling of Stress : To some extent   Social Connections: Socially Isolated    Frequency of Communication with Friends and Family: Never    Frequency of Social Gatherings with Friends and Family: Never    Attends Christianity Services: Never    Active Member of Clubs or Organizations: No    Attends Club or Organization Meetings: Never    Marital Status: Never    Intimate Partner Violence: Not At Risk    Fear of Current or Ex-Partner: No    Emotionally Abused: No    Physically Abused: No    Sexually Abused: No   Housing Stability: High Risk    Unable to Pay for Housing in the Last Year: No    Number of [place] in the Last Year: 4    Unstable Housing in the Last Year: Yes       History reviewed. No pertinent family history. Allergies:  Azithromycin, Acetaminophen-codeine, Codeine, Hydrocodone-acetaminophen, Ibuprofen, Morphine, and Tramadol    Home Medications:  Prior to Admission medications    Medication Sig Start Date End Date Taking?  Authorizing Provider   aspirin 81 MG chewable tablet Take 1 tablet by mouth daily 11/7/22  Yes Wendy Sumner MD   atorvastatin (LIPITOR) 80 MG tablet Take 1 tablet by mouth nightly 11/6/22  Yes Wendy Sumner MD   metoprolol tartrate (LOPRESSOR) 25 MG tablet Take 1 tablet by mouth 2 times daily 11/6/22  Yes Wendy Sumner MD   pantoprazole (PROTONIX) 40 MG tablet Take 1 tablet by mouth 2 times daily (before meals) for 25 doses 11/6/22 11/19/22 Yes Christopher Cole MD   fluconazole (DIFLUCAN) 100 MG tablet Take 2 tablets by mouth daily for 14 days 11/6/22 11/20/22 Yes Christopher Cole MD   insulin glargine (LANTUS) 100 UNIT/ML injection vial Inject 15 Units into the skin nightly   Yes Historical Provider, MD   cyclobenzaprine (FLEXERIL) 10 MG tablet Take 1 tablet by mouth 3 times daily as needed for Muscle spasms 10/25/22  Yes Mulu Ulrich DO   acetaminophen (TYLENOL) 500 MG tablet Take 2 tablets by mouth every 6 hours as needed for Pain 10/25/22  Yes Mulu Ulrich DO   vitamin D (ERGOCALCIFEROL) 1.25 MG (32364 UT) CAPS capsule Take 1 capsule by mouth once a week 9/29/22  Yes Isha Malik, DO   insulin glargine (LANTUS SOLOSTAR) 100 UNIT/ML injection pen Inject 25 Units into the skin 2 times daily  Patient taking differently: Inject 25 Units into the skin daily 4/6/22  Yes BINA Flores   insulin aspart (NOVOLOG FLEXPEN) 100 UNIT/ML injection pen Inject 5 Units into the skin 3 times daily (before meals) 4/6/22  Yes BINA Flores   traZODone (DESYREL) 150 MG tablet Take 300 mg by mouth nightly    Yes Historical Provider, MD   clonazePAM (KLONOPIN) 2 MG tablet Take 4 mg by mouth daily as needed for Anxiety. Yes Historical Provider, MD   calcium carbonate (TUMS) 500 MG chewable tablet Take 2 tablets by mouth 3 times daily as needed for Heartburn 11/6/22 11/13/22  Christopher Cole MD   gabapentin (NEURONTIN) 300 MG capsule Take 1 capsule by mouth in the morning and 1 capsule at noon and 1 capsule in the evening. Do all this for 5 days.  10/1/22 10/6/22  Iesha Prasad MD   ondansetron (ZOFRAN ODT) 4 MG disintegrating tablet Take 1 tablet by mouth every 8 hours as needed for Nausea or Vomiting 6/25/22   Guanakito Mchguh, DO   Blood Glucose Monitoring Suppl (ONE TOUCH ULTRA 2) w/Device KIT 1 kit by Does not apply route daily 3/18/22   Dominic Halon, DO   Lancets MISC 1 each by Does not apply route 2 times daily 3/18/22   [place] Halon, DO   Alcohol Swabs 70 % PADS Use as needed with blood glucose checks 2/15/22   Asher Chaparro MD   Insulin Pen Needle ([employer] PEN NEEDLES 29G) 29G X 12MM MISC 1 each by Does not apply route daily 2/15/22   Asher Chaparro MD   albuterol sulfate  (90 Base) MCG/ACT inhaler Inhale 2 puffs into the lungs every 4 hours as needed for Wheezing    Historical Provider, MD       REVIEW OF SYSTEMS    (2-9 systems for level 4, 10 or more for level 5)      Review of Systems   Constitutional:  Negative for chills and fever. HENT:  Negative for congestion and rhinorrhea. Eyes:  Negative for visual disturbance. Respiratory:  Positive for shortness of breath. Cardiovascular:  Negative for chest pain. Gastrointestinal:  Positive for nausea. Negative for abdominal pain, diarrhea and vomiting. Genitourinary:  Negative for dysuria and hematuria. Musculoskeletal:  Negative for back pain and myalgias. Skin:  Negative for color change, pallor, rash and wound. Neurological:  Negative for weakness, numbness and headaches. PHYSICAL EXAM   (up to 7 for level 4, 8 or more for level 5)      INITIAL VITALS:   BP (!) 152/90   Pulse 95   Temp 97.5 °F (36.4 °C) (Axillary)   Resp 17   Ht 5' 7\" (1.702 m)   Wt 112 lb 7 oz (51 kg)   SpO2 99%   BMI 17.61 kg/m²     Physical Exam  Constitutional:       General: He is in acute distress. Appearance: He is not ill-appearing, toxic-appearing or diaphoretic. Interventions: He is not intubated. HENT:      Head: Normocephalic and atraumatic. Eyes:      Extraocular Movements: Extraocular movements intact. Cardiovascular:      Rate and Rhythm: Regular rhythm. Tachycardia present. No extrasystoles are present. Pulses: No decreased pulses. Heart sounds: No murmur heard. No friction rub.  No gallop. Pulmonary:      Effort: Tachypnea present. No bradypnea, accessory muscle usage or respiratory distress. He is not intubated. Breath sounds: No stridor. No decreased breath sounds, wheezing, rhonchi or rales. Chest:      Chest wall: No mass or deformity. Abdominal:      Palpations: There is no hepatomegaly, splenomegaly or mass. Tenderness: There is no abdominal tenderness. There is no guarding or rebound. Musculoskeletal:      Right lower leg: No tenderness. No edema. Left lower leg: No tenderness. No edema. Skin:     Capillary Refill: Capillary refill takes less than 2 seconds. Coloration: Skin is not cyanotic or pale. Findings: No ecchymosis, erythema or rash. Nails: There is no clubbing. Neurological:      Mental Status: He is oriented to person, place, and time.        DIFFERENTIAL  DIAGNOSIS     PLAN (LABS / IMAGING / EKG):  Orders Placed This Encounter   Procedures    XR CHEST PORTABLE    XR CHEST PORTABLE    CBC with Auto Differential    Comprehensive Metabolic Panel    Beta-Hydroxybutyrate    Osmolality    Blood Gas, Venous    Troponin    D-Dimer, Quantitative    TSH w/reflex to FT4    Lactic Acid    Urinalysis with Reflex to Culture    Microscopic Urinalysis    Basic Metabolic Panel    Magnesium    Phosphorus    Beta-Hydroxybutyrate    Lactic Acid    CBC with Auto Differential    Glucose, Random    BLOOD GAS, VENOUS    Diet NPO Exceptions are: Sips of Water with Meds, Ice Chips    HYPOGLYCEMIA TREATMENT: blood glucose LESS THAN 70 mg/dL and patient ALERT and TOLERATING PO    HYPOGLYCEMIA TREATMENT: blood glucose LESS THAN 70 mg/dL and patient NOT ALERT or NPO    Insert indwelling urinary catheter    Discontinue indwelling urinary catheter when documented indications no longer apply per hospital policy    ED Nursing communication    Telemetry monitoring - 72 hour duration    Vital signs per unit routine    Notify physician if blood glucose (BG) less than (see details below)    Notify physician if blood glucose (BG) less than 200 mg/dL AND two of the following three criteria are met on two consecutive BMPs    Daily weights    Intake and output    Nurse to change IV Fluid when blood glucose (BG) reaches 250 mg/dL    Place intermittent pneumatic compression device    Admission/Observation order previously placed    Telemetry monitoring - 72 hour duration    Up with assistance    Full Code    Inpatient consult to Pulmonology    Inpatient consult to Internal Medicine    Inpatient consult to Diabetes Educator/Management Team    Initiate Oxygen Therapy Protocol    POCT Glucose    POCT Glucose    POCT Glucose - every hour    POC Glucose Fingerstick    EKG 12 Lead    ADMIT TO INPATIENT       MEDICATIONS ORDERED:  Orders Placed This Encounter   Medications    0.9 % sodium chloride bolus    calcium gluconate 10 % injection 1,000 mg    sodium bicarbonate 150 mEq in dextrose 5 % 1,000 mL infusion    glucose chewable tablet 16 g    OR Linked Order Group     dextrose bolus 10% 125 mL     dextrose bolus 10% 250 mL    glucagon (rDNA) injection 1 mg    dextrose 10 % infusion    insulin regular (HUMULIN R;NOVOLIN R) 100 Units in sodium chloride 0.9 % 100 mL infusion    insulin regular (HUMULIN R;NOVOLIN R) injection 10 Units    0.9 % sodium chloride bolus    lidocaine (XYLOCAINE) 2 % uro-jet    lidocaine (XYLOCAINE) 2 % uro-jet     BRIGETTE HALL: cabinet override    0.9 % sodium chloride bolus    albuterol sulfate HFA (PROVENTIL;VENTOLIN;PROAIR) 108 (90 Base) MCG/ACT inhaler 2 puff     Order Specific Question:   Initiate RT Bronchodilator Protocol     Answer:   Yes - Inpatient Protocol    aspirin chewable tablet 81 mg    atorvastatin (LIPITOR) tablet 80 mg    clonazePAM (KLONOPIN) tablet 4 mg    cyclobenzaprine (FLEXERIL) tablet 10 mg    fluconazole (DIFLUCAN) tablet 200 mg     Order Specific Question:   Antimicrobial Indications     Answer:    Other     Order Specific Question:   Other Abx Indication     Answer:   esophagitis    insulin glargine (LANTUS) injection vial 25 Units    insulin glargine (LANTUS) injection vial 15 Units    metoprolol tartrate (LOPRESSOR) tablet 25 mg    pantoprazole (PROTONIX) tablet 40 mg    traZODone (DESYREL) tablet 300 mg    magnesium sulfate 1000 mg in dextrose 5% 100 mL IVPB    OR Linked Order Group     sodium phosphate 10 mmol in sodium chloride 0.9 % 250 mL IVPB     sodium phosphate 15 mmol in dextrose 5 % 250 mL IVPB     sodium phosphate 20 mmol in dextrose 5 % 500 mL IVPB    polyethylene glycol (GLYCOLAX) packet 17 g    heparin (porcine) injection 5,000 Units    dextrose 5 % and 0.45 % sodium chloride infusion    0.9 % sodium chloride infusion       DDX: DKA, electrolyte abnormality, arrhythmia, DVT, PE, pneumothorax, thoracic aortic dissection, ACS    DIAGNOSTIC RESULTS / EMERGENCY DEPARTMENT COURSE / MDM   LAB RESULTS:  Results for orders placed or performed during the hospital encounter of 11/07/22   CBC with Auto Differential   Result Value Ref Range    WBC 11.4 (H) 3.5 - 11.0 k/uL    RBC 3.45 (L) 4.5 - 5.9 m/uL    Hemoglobin 9.5 (L) 13.5 - 17.5 g/dL    Hematocrit 33.8 (L) 41 - 53 %    MCV 97.8 80 - 100 fL    MCH 27.4 26 - 34 pg    MCHC 28.1 (L) 31 - 37 g/dL    RDW 15.8 (H) 11.5 - 14.9 %    Platelets 167 896 - 212 k/uL    MPV 9.8 6.0 - 12.0 fL    Seg Neutrophils 84 (H) 36 - 66 %    Lymphocytes 14 (L) 24 - 44 %    Monocytes 2 1 - 7 %    Eosinophils % 0 0 - 4 %    Basophils 0 0 - 2 %    Segs Absolute 9.57 (H) 1.3 - 9.1 k/uL    Absolute Lymph # 1.60 1.0 - 4.8 k/uL    Absolute Mono # 0.23 0.1 - 1.3 k/uL    Absolute Eos # 0.00 0.0 - 0.4 k/uL    Basophils Absolute 0.00 0.0 - 0.2 k/uL    Morphology ANISOCYTOSIS PRESENT     Morphology Ramila    Comprehensive Metabolic Panel   Result Value Ref Range    Glucose 830 (HH) 70 - 99 mg/dL    BUN 50 (H) 6 - 20 mg/dL    Creatinine 2.42 (H) 0.70 - 1.20 mg/dL    Est, Glom Filt Rate 32 (L) >60 mL/min/1.73m2    Calcium 8.9 8.6 - 10.4 mg/dL    Sodium 124 (L) 135 - 144 mmol/L    Potassium 7.1 (HH) 3.7 - 5.3 mmol/L    Chloride 76 (L) 98 - 107 mmol/L    CO2 <6 (LL) 20 - 31 mmol/L    Anion Gap  9 - 17 mmol/L     Unable to calculate anion gap due to CO2 less than 6. Alkaline Phosphatase 145 (H) 40 - 129 U/L    ALT 54 (H) 5 - 41 U/L    AST 31 <40 U/L    Total Bilirubin 0.4 0.3 - 1.2 mg/dL    Total Protein 6.2 (L) 6.4 - 8.3 g/dL    Albumin 3.6 3.5 - 5.2 g/dL   Beta-Hydroxybutyrate   Result Value Ref Range    Beta-Hydroxybutyrate >16.00 (H) 0.02 - 0.27 mmol/L   Blood Gas, Venous   Result Value Ref Range    pH, Alfredo 6.957 (LL) 7.320 - 7.420    pO2, Alfredo 76.4 (H) 30.0 - 50.0 mm Hg    O2 Sat, Alfredo 87.9 (H) 60.0 - 85.0 %    Carboxyhemoglobin 0.8 0 - 5 %    Methemoglobin 1.4 0.0 - 1.9 %    Pt Temp 37     O2 Device/Flow/% ROOM AIR     Respiratory Rate 33     Pt. Position SEMI-FOWLERS     Text for Respiratory RESULTS GIVEN TO DR. SANCHEZ    Troponin   Result Value Ref Range    Troponin, High Sensitivity 52 (HH) 0 - 22 ng/L   Troponin   Result Value Ref Range    Troponin, High Sensitivity 40 (H) 0 - 22 ng/L   D-Dimer, Quantitative   Result Value Ref Range    D-Dimer, Quant 1.36 (H) 0.00 - 0.59 mg/L FEU   TSH w/reflex to FT4   Result Value Ref Range    TSH 1.41 0.30 - 5.00 uIU/mL   Lactic Acid   Result Value Ref Range    Lactic Acid 3.7 (H) 0.5 - 2.2 mmol/L   Urinalysis with Reflex to Culture    Specimen: Urine voided   Result Value Ref Range    Color, UA Yellow Yellow    Turbidity UA Clear Clear    Glucose, Ur LARGE (A) NEGATIVE    Bilirubin Urine NEGATIVE NEGATIVE    Ketones, Urine LARGE (A) NEGATIVE    Specific Gravity, UA 1.019 1.000 - 1.030    Urine Hgb TRACE (A) NEGATIVE    pH, UA 5.0 5.0 - 8.0    Protein, UA 2+ (A) NEGATIVE    Urobilinogen, Urine Normal Normal    Nitrite, Urine NEGATIVE NEGATIVE    Leukocyte Esterase, Urine NEGATIVE NEGATIVE   Microscopic Urinalysis   Result Value Ref Range    WBC, UA 0 TO 2 /HPF    RBC, UA 0 TO 2 /HPF    Casts UA 0 TO 2 /LPF    Epithelial Cells UA 0 TO 2 /HPF    Bacteria, UA None None   Glucose, Random   Result Value Ref Range    Glucose 722 (HH) 70 - 99 mg/dL   POC Glucose Fingerstick   Result Value Ref Range    POC Glucose >600 (HH) 75 - 110 mg/dL   EKG 12 Lead   Result Value Ref Range    Ventricular Rate 151 BPM    Atrial Rate 326 BPM    QRS Duration 110 ms    Q-T Interval 256 ms    QTc Calculation (Bazett) 405 ms    R Axis -176 degrees    T Axis 124 degrees       IMPRESSION: Labs are extremely concerning for DKA. Patient is extremely acidotic at 6.9 with undetectable bicarb. Patient is also hyperkalemic. ACS work-up was unremarkable at troponins that are improved from prior. D-dimer slightly elevated as well. RADIOLOGY:  XR CHEST PORTABLE   Final Result   New right IJ catheter in the SVC. No pneumothorax. XR CHEST PORTABLE   Final Result   COPD. Increased interstitial markings at the lung bases. EKG  Sinus tachycardia, rate 151, normal axis, significant amount of of artifact, no acute ST elevations noted but there is significant peaked T waves noted in leads V3, V4, V5 as well as V6, slightly widened QRS as well, extremely abnormal EKG    All EKG's are interpreted by the Emergency Department Physician who either signs or Co-signs this chart in the absence of a cardiologist.    EMERGENCY DEPARTMENT COURSE:  Patient is a 28-year-old male presenting with shortness of breath. History of diabetes. On arrival glucometer here reads as high. Patient's not been taking his insulin. No fevers at home. Patient has elevated lactic acid as well as D-dimer. Patient has recent CT PE study that was negative. Feel that his tachycardia and shortness of breath are due to his electrolyte abnormality/DKA. Low suspicion for acute pulmonary embolism this time I do not feel that a CT PE study is warranted given patient's new onset of LIBBY as well.   Given patient's electrolyte abnormalities as well as EKG findings calcium as well as bicarb given. Patient placed on a bicarb drip due to his severe pH less than 7. Troponin is improved for patient. Bicarb is undetectable. Given that patient's potassium is 7.1 DKA protocol initiated. Patient was extremely volume down and was difficult with IV access therefore central line was placed in the right IJ. Patient tolerated this procedure well. Given patient's significant laboratory abnormalities as well as high risk for decompensation patient placed in ICU for further management of his DKA. ED Course as of 11/08/22 0010   Mon Nov 07, 2022 1949 Lactic Acid(!): 3.7 [MS]   1949 D-Dimer, Quant(!): 1.36 [MS]   1949 VBG shows a pH of 6.9. Bicarb is undetectable. Will initiate bicarb drip. EKG also shows peaked T waves will give calcium. [MS]   2012 Starting insulin drip [WM]   2012 Also giving 10 units regular insulin for hyperkalemia with ekg changes in DKA, K 7.1  He already received calcium [WM]   2029 Glucose, Random(!!): 830 [MS]   2029 Potassium(!!): 7.1 [MS]   2029 CO2(!!): <6 [MS]   2051 DW Dr Jennifer Richmond pulmonology for icu [WM]   2109 Beta-Hydroxybutyrate(!): >16.00 [MS]      ED Course User Index  [MS] Leigha Montana DO  [WM] Sha Jimenes MD       No notes of [facility] Admission Criteria type on file. PROCEDURES:  PROCEDURE NOTE - CENTRAL VENOUS LINE PLACEMENT    PATIENT NAME: Vinayak [facility] RECORD NO. [id number]  DATE: 11/8/2022  ATTENDING PHYSICIAN: Dr Missy Bill DIAGNOSIS:  vascular access, poor peripheral access, and hypovolemia  POSTOPERATIVE DIAGNOSIS:  Same  PROCEDURE PERFORMED:  Right Internal Jugular Vein Central Line Insertion  PERFORMING PHYSICIAN: Anayeli Mullins DO  ANESTHESIA:  Local utilizing 1% lidocaine  ESTIMATED BLOOD LOSS:  Less than 25 ml  COMPLICATIONS:  None immediately appreciated. DISCUSSION:  Yola Peoples is a 47y.o.-year-old male who requires central IV access vascular access, poor peripheral access, and hypovolemia.  The history and physical examination were reviewed and confirmed. CONSENT: The patient provided verbal consent for this procedure. PROCEDURE:  A timeout was initiated by the bedside nurse and was confirmed by those present. The patient was placed in a supine position. The skin overlying the Right Internal Jugular Vein was prepped with chlorhexadine and draped in sterile fashion. The skin was infiltrated with local anesthetic. The vessel and surrounding anatomy was visualized using ultrasound. Through the anesthetized region, the introducer needle was inserted into the internal jugular vein returning dark red non pulsatile blood. A guidewire was placed through the center of the needle with no resistance. Ultrasound confirmed presence of wire in the vein. A small incision made in the skin with a #11 scalpel blade. The dilator was inserted into the skin and vein over guidewire using Seldinger technique. The dilator was then removed and the 7F 16cm catheter was placed in the vein over the guidewire using Seldinger technique. The guidewire was then removed and all ports aspirated and flushed appropriately. The catheter then secured using silk suture and a temporary sterile dressing was applied. No immediate complication was evident. All sponge, instrument and needle counts were correct at the completion of the procedure. Postprocedural chest x-ray showed good position of the catheter with no evidence of hemothorax or pneumothorax. The patient tolerated the procedure well with no immediate complication evident. Kelsey Abraham DO  12:17 AM, 11/8/22      CONSULTS:  IP CONSULT TO PULMONOLOGY  IP CONSULT TO INTERNAL MEDICINE  IP CONSULT TO DIABETES EDUCATOR    CRITICAL CARE:  45 min    FINAL IMPRESSION      1.  Diabetic ketoacidosis without coma associated with other specified diabetes mellitus [facility]          DISPOSITION / [address] Admitted 11/07/2022 09:33:55 PM      PATIENT REFERRED TO:  No follow-up provider specified.     DISCHARGE MEDICATIONS:  Current Discharge Medication List          Eboni Diallo DO  Emergency Medicine Resident    (Please note that portions of thisnote were completed with a voice recognition program.  Efforts were made to edit the dictations but occasionally words are mis-transcribed.)        Ahmet Hansen DO  Resident  11/08/22 8712

## 2022-11-08 NOTE — PROGRESS NOTES
Nallely NP notified of a greater then 100mg/dL blood glucose drop in a one hour period. Orders to decrease insulin gtt according to order protocol.

## 2022-11-08 NOTE — ED PROVIDER NOTES
EMERGENCY DEPARTMENT ENCOUNTER   ATTENDING ATTESTATION     Pt Name: Kb Perez  MRN: [id number]  [place] 1970  Date of evaluation: 11/7/22       Kb Perez is a 47 y.o. male who presents with Shortness of Breath      MDM:   Severe DKA  Central line placed by ED resident  Calcium and insulin bolus for hyperkalemia  Insulin drip ordered  Bicarb drip for severe acidosis and hyperacute t waves peaked    Admitting to icu      Due to the immediate potential for life-threatening deterioration due to severe DKA, I spent 35 minutes providing critical care. This time is excluding time spent performing procedures. Vitals:   Vitals:    11/07/22 1849 11/07/22 1923   BP: 133/74 139/75   Pulse: (!) 103 98   Resp: 26 (!) 32   Temp: 97.3 °F (36.3 °C)    TempSrc: Oral    SpO2: 100% 100%   Weight: 90 lb (40.8 kg)    Height: 5' 7\" (1.702 m)          I personally saw and examined the patient. I have reviewed and agree with the resident's findings, including all diagnostic interpretations and treatment plan as written. I was present for the key portions of any procedures performed and the inclusive time noted for any critical care statement. The care is provided during an unprecedented national emergency due to the novel coronavirus, COVID 19.   Lo Mejia MD  Attending Emergency Physician            Lo Mejia MD  11/07/22 3995

## 2022-11-08 NOTE — PLAN OF CARE
Problem: Discharge Planning  Goal: Discharge to home or other facility with appropriate resources  Outcome: Progressing     Problem: Pain  Goal: Verbalizes/displays adequate comfort level or baseline comfort level  Outcome: Progressing     Problem: Metabolic/Fluid and Electrolytes - Adult  Goal: Electrolytes maintained within normal limits  Outcome: Progressing  Goal: Hemodynamic stability and optimal renal function maintained  Outcome: Progressing  Goal: Glucose maintained within prescribed range  Outcome: Progressing     Problem: Skin/Tissue Integrity  Goal: Absence of new skin breakdown  Description: 1. Monitor for areas of redness and/or skin breakdown  2. Assess vascular access sites hourly  3. Every 4-6 hours minimum:  Change oxygen saturation probe site  4. Every 4-6 hours:  If on nasal continuous positive airway pressure, respiratory therapy assess nares and determine need for appliance change or resting period.   Outcome: Progressing     Problem: Safety - Adult  Goal: Free from fall injury  Outcome: Progressing     Problem: ABCDS Injury Assessment  Goal: Absence of physical injury  Outcome: Progressing

## 2022-11-08 NOTE — CARE COORDINATION
CASE MANAGEMENT NOTE:    Admission Date:  11/7/2022 Hector Collado is a 47 y.o.  male    Admitted for : Diabetic ketoacidosis without coma associated with type 1 diabetes mellitus ([facility] [address] [E10.10]  Diabetic ketoacidosis without coma associated with other specified diabetes mellitus ([facility] [address] [E13.10]    Met with:  Patient    PCP:  Roselia Jeter                                 Insurance:  [employer]      Is patient alert and oriented at time of discussion:  Yes    Current Residence/ Living Arrangements:  independently at home w/ 2 Kids            Current Services PTA:  Yes, [facility], [facility] notified, of Admission. Does patient go to outpatient dialysis: No  If yes, location and chair time: NA  Who is their nephrologist? NA    Is patient agreeable to VNS: Yes    Freedom of choice provided:  Yes    List of [address] provided: No    VNS chosen:  Yes, Current w/ [facility], [facility] notified, of admission, Referral sent    DME:  walker and shower chair, States, the ones he has are broken, supposed to be getting new ones, from his [facility] Visit, Glucometer/supplies    Home Oxygen: No    Nebulizer: No    CPAP/BIPAP: No    Supplier: N/A    Potential Assistance Needed: Yes, VNS    SNF needed: No    Freedom of choice and list provided: NA    Pharmacy:  [facility] on [address]       Is patient currently receiving oral anticoagulation therapy? No    Is the Patient an [facility] with Readmission Risk Score greater than 14%? No  If yes, pt needs a follow up appointment made within 7 days. Family Members/Caregivers that pt would like involved in their care:    Yes    If yes, list name here:  Curtis Hadley, Son, Raza Mann    Transportation Provider:  gurvinder Pride             Discharge Plan:  11/8/22 Recent DC from [facility]. V's, 11/6, Obs for NSTEMI, Lives in [place], w/ 2 kids. DME- Walker/SC, are broken, states, new ones, were ordered from [facility]. V's. Glucometer/supplies. Insulin GTT, Didn't take meds, for he couldn't eat. Oral Diflucan. PT/OT, HGB 8.0, Current w/ [facility], referral sent. Needs ride home.  Darrel will need signed/completed//KB                Electronically signed by: German Chen RN on 11/8/2022 at 11:51 AM

## 2022-11-08 NOTE — ED NOTES
Appears to be resting and talking to himself at times, arouses easily to verbal stimuli and slightly confused at times.      Arnold Voss RN  11/07/22 1168

## 2022-11-08 NOTE — H&P
[address]     HISTORY AND PHYSICAL EXAMINATION            Date:   11/8/2022  Patient name:  Santosh Pino  Date of admission:  11/7/2022  6:40 PM  MRN:   [id number]  Account:  [de-identified]  YOB: 1970  PCP:    BINA Keith  Room:   2004/2004-01  Code Status:    Full Code    Chief Complaint:     Chief Complaint   Patient presents with    Shortness of Breath     History Obtained From:     patient, electronic medical record    History of Present Illness: The patient is a 47 y.o. Non- / non  male who presents withShortness of Breath   and he is admitted to the hospital for the management of DKA. Patient is a 71-year-old male past medical history significant for type 1 diabetes complicated with diabetic neuropathy and nephropathy, CKD stage III, COPD, and recent hospitalization for femur fracture and different hospitalization for NSTEMI  presented to the ED on 11/7/2022 with chief complaint of shortness of breath and chest pain. Per patient, he has been experiencing shortness of breath since discharge from [facility] on the sixth. Yesterday he started having pressure-like nonradiating chest pain and shortness of breath that woke him up from sleep. He is also been feeling nauseous and fatigued/weak since his discharge. Per patient, he is not been taking his insulin since being discharged from [address] [place], [place] [address]Gary Hickss. In ED, vitals were significant for tachycardia with heart rate of 103 and tachypnea with respiratory rate of 26. Labs were significant for elevated blood glucose 830, potassium of 7.1, sodium of 124 -corrected sodium 136 -bicarb less than 6, and beta hydroxybutyrate above 16. VBG's were significant for pH of 6.9.   Creatinine was elevated at 2.42 -patient's baseline 1.7-and leukocytosis of 11.4, elevated lactate of 3.7. UA was negative for UTI, chest x-ray negative for any acute cardiopulmonary abnormalities. Initial troponin of 52 and repeat troponin of 40. EKG significant for sinus tachycardia with heart rate of 151 and peaked T waves noted in leads V3, V4, V5 as well as V6, slightly widened QRS. DKA protocol initiated in ED. Central line was placed in right IJ. Patient was started on insulin and bicarb drip    Patient will be admitted to ICU for further management of DKA. Past Medical History:     Past Medical History:   Diagnosis Date    Diabetes mellitus ([facility]     Obstructive lung disease ([facility]     8/2022    Panic attack         Past SurgicalHistory:     Past Surgical History:   Procedure Laterality Date    APPENDECTOMY      FEMUR FRACTURE SURGERY Left 09/30/2022    Cephalomedullary Nail    HAND SURGERY      JOINT REPLACEMENT      TIBIA FRACTURE SURGERY Left 09/30/2022    LEFT FEMUR CEPHALOMEDULLARY NAIL performed by Randy Anders DO at [facility] [address] N/A 02/15/2022    EGD BIOPSY performed by Deejay Maharaj MD at [facility]        Medications Prior to Admission:        Prior to Admission medications    Medication Sig Start Date End Date Taking?  Authorizing Provider   aspirin 81 MG chewable tablet Take 1 tablet by mouth daily 11/7/22  Yes Adele Ervin MD   atorvastatin (LIPITOR) 80 MG tablet Take 1 tablet by mouth nightly 11/6/22  Yes Adele Ervin MD   metoprolol tartrate (LOPRESSOR) 25 MG tablet Take 1 tablet by mouth 2 times daily 11/6/22  Yes Adele Ervin MD   pantoprazole (PROTONIX) 40 MG tablet Take 1 tablet by mouth 2 times daily (before meals) for 25 doses 11/6/22 11/19/22 Yes Adele Ervin MD   fluconazole (DIFLUCAN) 100 MG tablet Take 2 tablets by mouth daily for 14 days 11/6/22 11/20/22 Yes Adele Ervin MD   insulin glargine (LANTUS) 100 UNIT/ML injection vial Inject 15 Units into the skin nightly   Yes Historical Provider, MD   cyclobenzaprine (FLEXERIL) 10 MG tablet Take 1 tablet by mouth 3 times daily as needed for Muscle spasms 10/25/22  Yes Emily Whitman, DO   acetaminophen (TYLENOL) 500 MG tablet Take 2 tablets by mouth every 6 hours as needed for Pain 10/25/22  Yes Emily Whitman, DO   vitamin D (ERGOCALCIFEROL) 1.25 MG (07128 UT) CAPS capsule Take 1 capsule by mouth once a week 9/29/22  Yes Isha Giang,    insulin glargine (LANTUS SOLOSTAR) 100 UNIT/ML injection pen Inject 25 Units into the skin 2 times daily  Patient taking differently: Inject 25 Units into the skin daily 4/6/22  Yes BINA Hoskins   insulin aspart (NOVOLOG FLEXPEN) 100 UNIT/ML injection pen Inject 5 Units into the skin 3 times daily (before meals) 4/6/22  Yes BINA Hoskins   traZODone (DESYREL) 150 MG tablet Take 300 mg by mouth nightly    Yes Historical Provider, MD   clonazePAM (KLONOPIN) 2 MG tablet Take 4 mg by mouth daily as needed for Anxiety. Yes Historical Provider, MD   calcium carbonate (TUMS) 500 MG chewable tablet Take 2 tablets by mouth 3 times daily as needed for Heartburn 11/6/22 11/13/22  Particia MD Taisha   gabapentin (NEURONTIN) 300 MG capsule Take 1 capsule by mouth in the morning and 1 capsule at noon and 1 capsule in the evening. Do all this for 5 days.  10/1/22 10/6/22  Julian Buitrago MD   ondansetron (ZOFRAN ODT) 4 MG disintegrating tablet Take 1 tablet by mouth every 8 hours as needed for Nausea or Vomiting 6/25/22   Megan Vaz, DO   Blood Glucose Monitoring Suppl (ONE TOUCH ULTRA 2) w/Device KIT 1 kit by Does not apply route daily 3/18/22   Normie Oz, DO   Lancets MISC 1 each by Does not apply route 2 times daily 3/18/22   Normie Oz, DO   Alcohol Swabs 70 % PADS Use as needed with blood glucose checks 2/15/22   Asher Chaparro MD   Insulin Pen Needle ([employer] PEN NEEDLES 29G) 29G X 12MM MISC 1 each by Does not apply route daily 2/15/22   Steven Caldwell Angelia Reddy MD   albuterol sulfate  (90 Base) MCG/ACT inhaler Inhale 2 puffs into the lungs every 4 hours as needed for Wheezing    Historical Provider, MD        Allergies:     Azithromycin, Acetaminophen-codeine, Codeine, Hydrocodone-acetaminophen, Ibuprofen, Morphine, and Tramadol    Social History:     Tobacco:    reports that he has been smoking cigarettes. He has a 10.25 pack-year smoking history. He has never used smokeless tobacco.  Alcohol:      reports that he does not currently use alcohol. Drug Use:  reports current drug use. Drug: Marijuana Marina Maury). Family History:     History reviewed. No pertinent family history. Review of Systems:     Positive and Negative as described in HPI. Review of Systems   Constitutional:  Positive for fatigue. Negative for chills and fever. HENT: Negative. Eyes:  Negative for visual disturbance. Respiratory:  Positive for chest tightness. Negative for cough and shortness of breath. Cardiovascular:  Positive for chest pain. Negative for palpitations and leg swelling. Gastrointestinal:  Positive for abdominal pain and nausea. Negative for vomiting. Genitourinary:  Negative for dysuria, frequency and urgency. Musculoskeletal: Negative. Skin: Negative. Neurological:  Negative for dizziness, weakness, light-headedness, numbness and headaches. Psychiatric/Behavioral: Negative.        Physical Exam:   /63   Pulse 78   Temp 97.8 °F (36.6 °C) (Axillary)   Resp 15   Ht 5' 7\" (1.702 m)   Wt 112 lb 7 oz (51 kg)   SpO2 97%   BMI 17.61 kg/m²   Temp (24hrs), Av.6 °F (36.4 °C), Min:97.3 °F (36.3 °C), Max:97.8 °F (36.6 °C)    Recent Labs     22  1134 22  1605 22  2205 22  0720   POCGLU 261* 318* >600* 417*       Intake/Output Summary (Last 24 hours) at 2022 0741  Last data filed at 2022 0626  Gross per 24 hour   Intake 2323.26 ml   Output 3100 ml   Net -776.74 ml       Physical Exam  Constitutional:       Appearance: Normal appearance. He is ill-appearing. HENT:      Head: Normocephalic and atraumatic. Mouth/Throat:      Mouth: Mucous membranes are moist.   Eyes:      Extraocular Movements: Extraocular movements intact. Conjunctiva/sclera: Conjunctivae normal.   Cardiovascular:      Rate and Rhythm: Normal rate and regular rhythm. Heart sounds: No murmur heard. No gallop. Pulmonary:      Effort: Pulmonary effort is normal. No respiratory distress. Breath sounds: No wheezing or rales. Abdominal:      General: Bowel sounds are normal. There is no distension. Palpations: Abdomen is soft. Tenderness: There is no abdominal tenderness. Musculoskeletal:      Right lower leg: No edema. Left lower leg: No edema. Skin:     General: Skin is warm. Neurological:      Mental Status: He is alert and oriented to person, place, and time.    Psychiatric:         Behavior: Behavior normal.       Investigations:     Laboratory Testing:  Recent Results (from the past 24 hour(s))   Lactic Acid    Collection Time: 11/07/22  7:15 PM   Result Value Ref Range    Lactic Acid 3.7 (H) 0.5 - 2.2 mmol/L   CBC with Auto Differential    Collection Time: 11/07/22  7:19 PM   Result Value Ref Range    WBC 11.4 (H) 3.5 - 11.0 k/uL    RBC 3.45 (L) 4.5 - 5.9 m/uL    Hemoglobin 9.5 (L) 13.5 - 17.5 g/dL    Hematocrit 33.8 (L) 41 - 53 %    MCV 97.8 80 - 100 fL    MCH 27.4 26 - 34 pg    MCHC 28.1 (L) 31 - 37 g/dL    RDW 15.8 (H) 11.5 - 14.9 %    Platelets 001 912 - 101 k/uL    MPV 9.8 6.0 - 12.0 fL    Seg Neutrophils 84 (H) 36 - 66 %    Lymphocytes 14 (L) 24 - 44 %    Monocytes 2 1 - 7 %    Eosinophils % 0 0 - 4 %    Basophils 0 0 - 2 %    Segs Absolute 9.57 (H) 1.3 - 9.1 k/uL    Absolute Lymph # 1.60 1.0 - 4.8 k/uL    Absolute Mono # 0.23 0.1 - 1.3 k/uL    Absolute Eos # 0.00 0.0 - 0.4 k/uL    Basophils Absolute 0.00 0.0 - 0.2 k/uL    Morphology ANISOCYTOSIS PRESENT     Morphology Ramila    Comprehensive Metabolic Panel    Collection Time: 11/07/22  7:19 PM   Result Value Ref Range    Glucose 830 (HH) 70 - 99 mg/dL    BUN 50 (H) 6 - 20 mg/dL    Creatinine 2.42 (H) 0.70 - 1.20 mg/dL    Est, Glom Filt Rate 32 (L) >60 mL/min/1.73m2    Calcium 8.9 8.6 - 10.4 mg/dL    Sodium 124 (L) 135 - 144 mmol/L    Potassium 7.1 (HH) 3.7 - 5.3 mmol/L    Chloride 76 (L) 98 - 107 mmol/L    CO2 <6 (LL) 20 - 31 mmol/L    Anion Gap  9 - 17 mmol/L     Unable to calculate anion gap due to CO2 less than 6. Alkaline Phosphatase 145 (H) 40 - 129 U/L    ALT 54 (H) 5 - 41 U/L    AST 31 <40 U/L    Total Bilirubin 0.4 0.3 - 1.2 mg/dL    Total Protein 6.2 (L) 6.4 - 8.3 g/dL    Albumin 3.6 3.5 - 5.2 g/dL   Beta-Hydroxybutyrate    Collection Time: 11/07/22  7:19 PM   Result Value Ref Range    Beta-Hydroxybutyrate >16.00 (H) 0.02 - 0.27 mmol/L   Osmolality    Collection Time: 11/07/22  7:19 PM   Result Value Ref Range    Serum Osmolality 344 (HH) 275 - 295 mOsm/kg   Troponin    Collection Time: 11/07/22  7:19 PM   Result Value Ref Range    Troponin, High Sensitivity 52 (HH) 0 - 22 ng/L   D-Dimer, Quantitative    Collection Time: 11/07/22  7:19 PM   Result Value Ref Range    D-Dimer, Quant 1.36 (H) 0.00 - 0.59 mg/L FEU   TSH w/reflex to FT4    Collection Time: 11/07/22  7:19 PM   Result Value Ref Range    TSH 1.41 0.30 - 5.00 uIU/mL   EKG 12 Lead    Collection Time: 11/07/22  7:24 PM   Result Value Ref Range    Ventricular Rate 151 BPM    Atrial Rate 326 BPM    QRS Duration 110 ms    Q-T Interval 256 ms    QTc Calculation (Bazett) 405 ms    R Axis -176 degrees    T Axis 124 degrees   Blood Gas, Venous    Collection Time: 11/07/22  7:32 PM   Result Value Ref Range    pH, Alfredo 6.957 (LL) 7.320 - 7.420    pO2, Alfredo 76.4 (H) 30.0 - 50.0 mm Hg    O2 Sat, Alfredo 87.9 (H) 60.0 - 85.0 %    Carboxyhemoglobin 0.8 0 - 5 %    Methemoglobin 1.4 0.0 - 1.9 %    Pt Temp 37     O2 Device/Flow/% ROOM AIR     Respiratory Rate 33     Pt.  Position SEMI-FOWLERS     Text for Respiratory RESULTS GIVEN TO DR. SANCHEZ    Urinalysis with Reflex to Culture    Collection Time: 11/07/22  8:45 PM    Specimen: Urine voided   Result Value Ref Range    Color, UA Yellow Yellow    Turbidity UA Clear Clear    Glucose, Ur LARGE (A) NEGATIVE    Bilirubin Urine NEGATIVE NEGATIVE    Ketones, Urine LARGE (A) NEGATIVE    Specific Gravity, UA 1.019 1.000 - 1.030    Urine Hgb TRACE (A) NEGATIVE    pH, UA 5.0 5.0 - 8.0    Protein, UA 2+ (A) NEGATIVE    Urobilinogen, Urine Normal Normal    Nitrite, Urine NEGATIVE NEGATIVE    Leukocyte Esterase, Urine NEGATIVE NEGATIVE   Microscopic Urinalysis    Collection Time: 11/07/22  8:45 PM   Result Value Ref Range    WBC, UA 0 TO 2 /HPF    RBC, UA 0 TO 2 /HPF    Casts UA 0 TO 2 /LPF    Epithelial Cells UA 0 TO 2 /HPF    Bacteria, UA None None   Troponin    Collection Time: 11/07/22  9:18 PM   Result Value Ref Range    Troponin, High Sensitivity 40 (H) 0 - 22 ng/L   POC Glucose Fingerstick    Collection Time: 11/07/22 10:05 PM   Result Value Ref Range    POC Glucose >600 (HH) 75 - 110 mg/dL   Glucose, Random    Collection Time: 11/07/22 10:06 PM   Result Value Ref Range    Glucose 722 (HH) 70 - 99 mg/dL   Glucose, Random    Collection Time: 11/08/22  1:06 AM   Result Value Ref Range    Glucose 649 (HH) 70 - 99 mg/dL   Basic Metabolic Panel    Collection Time: 11/08/22  2:45 AM   Result Value Ref Range    Glucose 609 (HH) 70 - 99 mg/dL    BUN 40 (H) 6 - 20 mg/dL    Creatinine 1.89 (H) 0.70 - 1.20 mg/dL    Est, Glom Filt Rate 42 (L) >60 mL/min/1.73m2    Calcium 7.4 (L) 8.6 - 10.4 mg/dL    Sodium 131 (L) 135 - 144 mmol/L    Potassium 3.9 3.7 - 5.3 mmol/L    Chloride 90 (L) 98 - 107 mmol/L    CO2 16 (L) 20 - 31 mmol/L    Anion Gap 25 (H) 9 - 17 mmol/L   Magnesium    Collection Time: 11/08/22  2:45 AM   Result Value Ref Range    Magnesium 1.6 1.6 - 2.6 mg/dL   Phosphorus    Collection Time: 11/08/22  2:45 AM   Result Value Ref Range    Phosphorus 1.8 (L) 2.5 - 4.5 mg/dL   BLOOD GAS, VENOUS    Collection Time: 11/08/22  2:45 AM   Result Value Ref Range    pH, Alfredo 7.331 7.320 - 7.420    pCO2, Alfredo 30.5 (L) 39.0 - 55.0 mm Hg    pO2, Alfredo 49.5 30.0 - 50.0 mm Hg    HCO3, Venous 16.1 (L) 24.0 - 30.0 mmol/L    Negative Base Excess, Alfredo 9.8 (H) 0.0 - 2.0 mmol/L    O2 Sat, Alfredo 84.9 60.0 - 85.0 %    Carboxyhemoglobin 1.8 0 - 5 %    Methemoglobin 1.2 0.0 - 1.9 %    Pt Temp 37     Text for Respiratory RESULTS GIVEN TO RN    Beta-Hydroxybutyrate    Collection Time: 11/08/22  2:45 AM   Result Value Ref Range    Beta-Hydroxybutyrate 11.97 (H) 0.02 - 0.27 mmol/L   Basic Metabolic Panel    Collection Time: 11/08/22  6:01 AM   Result Value Ref Range    Glucose 558 (HH) 70 - 99 mg/dL    BUN 35 (H) 6 - 20 mg/dL    Creatinine 1.72 (H) 0.70 - 1.20 mg/dL    Est, Glom Filt Rate 48 (L) >60 mL/min/1.73m2    Calcium 7.3 (L) 8.6 - 10.4 mg/dL    Sodium 134 (L) 135 - 144 mmol/L    Potassium 3.7 3.7 - 5.3 mmol/L    Chloride 98 98 - 107 mmol/L    CO2 22 20 - 31 mmol/L    Anion Gap 14 9 - 17 mmol/L   Magnesium    Collection Time: 11/08/22  6:01 AM   Result Value Ref Range    Magnesium 2.1 1.6 - 2.6 mg/dL   Phosphorus    Collection Time: 11/08/22  6:01 AM   Result Value Ref Range    Phosphorus 2.1 (L) 2.5 - 4.5 mg/dL   Lactic Acid    Collection Time: 11/08/22  6:01 AM   Result Value Ref Range    Lactic Acid 1.1 0.5 - 2.2 mmol/L   CBC with Auto Differential    Collection Time: 11/08/22  6:01 AM   Result Value Ref Range    WBC 8.9 3.5 - 11.0 k/uL    RBC 2.92 (L) 4.5 - 5.9 m/uL    Hemoglobin 8.0 (L) 13.5 - 17.5 g/dL    Hematocrit 25.2 (L) 41 - 53 %    MCV 86.5 80 - 100 fL    MCH 27.4 26 - 34 pg    MCHC 31.7 31 - 37 g/dL    RDW 14.5 11.5 - 14.9 %    Platelets 702 796 - 391 k/uL    MPV 8.8 6.0 - 12.0 fL    Seg Neutrophils 61 36 - 66 %    Lymphocytes 19 (L) 24 - 44 %    Monocytes 10 (H) 1 - 7 %    Eosinophils % 0 0 - 4 %    Basophils 0 0 - 2 %    Bands 8 0 - 10 %    Metamyelocytes 1 (H) 0 % Myelocytes 1 (H) 0 %    Segs Absolute 5.43 1.3 - 9.1 k/uL    Absolute Lymph # 1.69 1.0 - 4.8 k/uL    Absolute Mono # 0.89 0.1 - 1.3 k/uL    Absolute Eos # 0.00 0.0 - 0.4 k/uL    Basophils Absolute 0.00 0.0 - 0.2 k/uL    Absolute Bands # 0.71 0.0 - 1.0 k/uL    Metamyelocytes Absolute 0.09 (H) 0 k/uL    Myelocytes Absolute 0.09 (H) 0 k/uL    Morphology ANISOCYTOSIS PRESENT     Morphology HYPOCHROMIA PRESENT     Morphology FEW TEARDROPS     Morphology PAPPENHEIMER BODIES PRESENT    BLOOD GAS, VENOUS    Collection Time: 11/08/22  6:01 AM   Result Value Ref Range    pH, Alfredo 7.394 7.320 - 7.420    pCO2, Alfredo 37.5 (L) 39.0 - 55.0 mm Hg    pO2, Alfredo 96.1 (H) 30.0 - 50.0 mm Hg    HCO3, Venous 22.9 (L) 24.0 - 30.0 mmol/L    Negative Base Excess, Alfredo 2.0 0.0 - 2.0 mmol/L    O2 Sat, Alfredo 95.9 (H) 60.0 - 85.0 %    Carboxyhemoglobin 1.7 0 - 5 %    Methemoglobin 1.1 0.0 - 1.9 %    Pt Temp 37.0    POC Glucose Fingerstick    Collection Time: 11/08/22  7:20 AM   Result Value Ref Range    POC Glucose 417 (HH) 75 - 110 mg/dL       Imaging/Diagnostics:  XR CHEST (2 VW)    Result Date: 11/4/2022  EXAMINATION: TWO XRAY VIEWS OF THE CHEST 11/4/2022 1:15 am COMPARISON: 09/29/2022 radiograph HISTORY: ORDERING SYSTEM PROVIDED HISTORY: eval dyspnea TECHNOLOGIST PROVIDED HISTORY: eval dyspnea Reason for Exam: Dyspnea FINDINGS: Heart, mediastinum and pulmonary vascularity are normal.  The lungs are lucent and hyperinflated. There is a stable calcified granuloma in the right lower lobe. No acute pulmonary finding. No significant skeletal finding. Lucent lungs with chronic pattern of hyperinflation. No acute pulmonary finding. XR HIP LEFT (2-3 VIEWS)    Result Date: 11/4/2022  EXAMINATION: TWO XRAY VIEWS OF THE LEFT HIP; 4 XRAY VIEWS OF THE LEFT FEMUR 11/4/2022 1:15 am COMPARISON: None.  HISTORY: ORDERING SYSTEM PROVIDED HISTORY: Eval left hip pain postsurgical TECHNOLOGIST PROVIDED HISTORY: Eval left hip pain postsurgical Reason for Exam: Increased leg pain, surgery x2 weeks ; ORDERING SYSTEM PROVIDED HISTORY: eval leg pain post op TECHNOLOGIST PROVIDED HISTORY: eval leg pain post op Reason for Exam: Increased leg pain, surgery x2 weeks FINDINGS: Prior surgical change of ORIF in the left femur. Previous intertrochanteric fracture shows anatomic alignment. There is no new fracture or dislocation at the hip or distally in the femur. The visualized knee is unremarkable. No significant soft tissue finding. Prior surgical change of ORIF with no acute abnormality of the left hip or femur. XR FEMUR LEFT (MIN 2 VIEWS)    Result Date: 11/4/2022  EXAMINATION: TWO XRAY VIEWS OF THE LEFT HIP; 4 XRAY VIEWS OF THE LEFT FEMUR 11/4/2022 1:15 am COMPARISON: None. HISTORY: ORDERING SYSTEM PROVIDED HISTORY: Eval left hip pain postsurgical TECHNOLOGIST PROVIDED HISTORY: Eval left hip pain postsurgical Reason for Exam: Increased leg pain, surgery x2 weeks ; ORDERING SYSTEM PROVIDED HISTORY: eval leg pain post op TECHNOLOGIST PROVIDED HISTORY: eval leg pain post op Reason for Exam: Increased leg pain, surgery x2 weeks FINDINGS: Prior surgical change of ORIF in the left femur. Previous intertrochanteric fracture shows anatomic alignment. There is no new fracture or dislocation at the hip or distally in the femur. The visualized knee is unremarkable. No significant soft tissue finding. Prior surgical change of ORIF with no acute abnormality of the left hip or femur. CT ABDOMEN PELVIS W IV CONTRAST Additional Contrast? None    Result Date: 11/4/2022  EXAMINATION: CTA OF THE CHEST; CT OF THE ABDOMEN AND PELVIS WITH CONTRAST 11/4/2022 2:44 am TECHNIQUE: CTA of the chest was performed after the administration of intravenous contrast.  Multiplanar reformatted images are provided for review. MIP images are provided for review.  Automated exposure control, iterative reconstruction, and/or weight based adjustment of the mA/kV was utilized to reduce the radiation dose to as low as reasonably achievable.; CT of the abdomen and pelvis was performed with the administration of intravenous contrast. Multiplanar reformatted images are provided for review. Automated exposure control, iterative reconstruction, and/or weight based adjustment of the mA/kV was utilized to reduce the radiation dose to as low as reasonably achievable. COMPARISON: 09/29/2022 HISTORY: ORDERING SYSTEM PROVIDED HISTORY: eval PE TECHNOLOGIST PROVIDED HISTORY: eval PE Decision Support Exception - unselect if not a suspected or confirmed emergency medical condition->Emergency Medical Condition (MA) Reason for Exam: r/o pe; ORDERING SYSTEM PROVIDED HISTORY: eval abd pain TECHNOLOGIST PROVIDED HISTORY: eval abd pain Decision Support Exception - unselect if not a suspected or confirmed emergency medical condition->Emergency Medical Condition (MA) Reason for Exam: abd pain FINDINGS: CHEST CT: Pulmonary Arteries: Pulmonary arteries are adequately opacified for evaluation. No evidence of intraluminal filling defect to suggest pulmonary embolism. Main pulmonary artery is normal in caliber. Mediastinum: No evidence of mediastinal lymphadenopathy. The heart and pericardium demonstrate no acute abnormality. There is no acute abnormality of the thoracic aorta. Lungs/pleura: The lungs are without acute process. Sequela of old granulomatous disease. No focal consolidation or pulmonary edema. No evidence of pleural effusion or pneumothorax. Soft Tissues/Bones: No acute bone or soft tissue abnormality. Probably benign sclerosis of the anterior left 7th and 8th ribs again noted. Degenerative superior endplate changes at T7 and T8 again demonstrated. ABDOMEN PELVIS: Organs: Findings compatible with hepatic steatosis. The gallbladder, pancreas, spleen, adrenals and kidneys reveal no acute findings. Punctate nonobstructing left renal stones. GI/Bowel: There is no bowel dilatation or wall thickening identified. Pelvis: No acute findings. Peritoneum/Retroperitoneum: No free air or free fluid. The aorta is normal in caliber. The visceral branches are patent. No lymphadenopathy. Bones/Soft Tissues: No abnormality identified. Partially visualized left femoral trochanteric fixation hardware. *Unless otherwise specified, incidental findings do not require dedicated imaging follow-up. 1.  No evidence of pulmonary embolism or acute pulmonary abnormality. 2.  No acute inflammatory process identified in the abdomen or pelvis. 3.  Punctate left nephrolithiasis. 4.  Findings compatible with hepatic steatosis. 5.  Additional chronic and benign findings, as above. US RENAL LIMITED    Result Date: 11/5/2022  EXAMINATION: ULTRASOUND OF THE KIDNEYS 11/5/2022 9:44 am COMPARISON: CT abdomen pelvis from 11/04/2022 HISTORY: ORDERING SYSTEM PROVIDED HISTORY: Ultrasound to r/o element of obstruction and to assess the kidney size/echotextur TECHNOLOGIST PROVIDED HISTORY: Ultrasound to r/o element of obstruction and to assess the kidney size/echotextur 61-year-old male; rule out obstructive uropathy FINDINGS: Right kidney measures 10.5 x 4.6 x 5.5 cm. Right renal cortical thickness measures 1.2 cm. Left kidney measures 9.5 x 5.1 x 5.3 cm. Left renal cortical thickness measures 1.4 cm. Increased echogenicity of the bilateral renal cortex. Suspected punctate left-sided renal calculi. No hydronephrosis. Color flow projects over the bilateral renal parenchyma. No perinephric fluid. 1. Increased echogenicity of the bilateral renal cortex which can be seen with medical renal disease. 2. Suspected left-sided renal calculi. No hydronephrosis.      XR CHEST PORTABLE    Result Date: 11/7/2022  EXAMINATION: ONE XRAY VIEW OF THE CHEST 11/7/2022 8:25 pm COMPARISON: Less than an hour ago HISTORY: ORDERING SYSTEM PROVIDED HISTORY: right IJ TECHNOLOGIST PROVIDED HISTORY: right IJ Reason for Exam: Right IJ placement FINDINGS: New right IJ catheter Exception - unselect if not a suspected or confirmed emergency medical condition->Emergency Medical Condition (MA) Reason for Exam: abd pain FINDINGS: CHEST CT: Pulmonary Arteries: Pulmonary arteries are adequately opacified for evaluation. No evidence of intraluminal filling defect to suggest pulmonary embolism. Main pulmonary artery is normal in caliber. Mediastinum: No evidence of mediastinal lymphadenopathy. The heart and pericardium demonstrate no acute abnormality. There is no acute abnormality of the thoracic aorta. Lungs/pleura: The lungs are without acute process. Sequela of old granulomatous disease. No focal consolidation or pulmonary edema. No evidence of pleural effusion or pneumothorax. Soft Tissues/Bones: No acute bone or soft tissue abnormality. Probably benign sclerosis of the anterior left 7th and 8th ribs again noted. Degenerative superior endplate changes at T7 and T8 again demonstrated. ABDOMEN PELVIS: Organs: Findings compatible with hepatic steatosis. The gallbladder, pancreas, spleen, adrenals and kidneys reveal no acute findings. Punctate nonobstructing left renal stones. GI/Bowel: There is no bowel dilatation or wall thickening identified. Pelvis: No acute findings. Peritoneum/Retroperitoneum: No free air or free fluid. The aorta is normal in caliber. The visceral branches are patent. No lymphadenopathy. Bones/Soft Tissues: No abnormality identified. Partially visualized left femoral trochanteric fixation hardware. *Unless otherwise specified, incidental findings do not require dedicated imaging follow-up. 1.  No evidence of pulmonary embolism or acute pulmonary abnormality. 2.  No acute inflammatory process identified in the abdomen or pelvis. 3.  Punctate left nephrolithiasis. 4.  Findings compatible with hepatic steatosis. 5.  Additional chronic and benign findings, as above. US ABDOMEN LIMITED Specify organ?  LIVER, GALLBLADDER, PANCREAS    Result Date: 11/4/2022  EXAMINATION: RIGHT UPPER QUADRANT ULTRASOUND 11/4/2022 10:29 am COMPARISON: None. HISTORY: ORDERING SYSTEM PROVIDED HISTORY: eval epigastric pain TECHNOLOGIST PROVIDED HISTORY: eval epigastric pain Specify organ?->LIVER Specify organ?->GALLBLADDER Specify organ?->PANCREAS FINDINGS: LIVER:  The liver measures 18 cm and demonstrates normal echogenicity without evidence of intrahepatic biliary ductal dilatation. BILIARY SYSTEM:  Gallbladder is unremarkable without evidence of pericholecystic fluid, wall thickening or stones. Negative sonographic Mccord's sign. Common bile duct is within normal limits measuring 4 mm. RIGHT KIDNEY: The right kidney is grossly unremarkable without evidence of hydronephrosis. PANCREAS:  Visualized portions of the pancreas are unremarkable. OTHER: No evidence of right upper quadrant ascites. Hepatomegaly otherwise unremarkable exam RECOMMENDATIONS: Unavailable       Assessment :      Primary Problem  Diabetic ketoacidosis without coma associated with type 1 diabetes mellitus ([facility] [address]    Active Hospital Problems    Diagnosis Date Noted    Diabetic ketoacidosis without coma associated with type 1 diabetes mellitus ([facility] [E10.10] 11/07/2022     Priority: Medium       Plan:     Patient status Admit as inpatient in the  Medical ICU     DKA 2/2 medication noncompliance, rule out infection  - HbA1c: 10 on 9/30/2022  -NPO effective immediately  -DKA protocol started  -BMP q4h, initial Mag and Phos levels  -Glucose checks every hour  -Urinalysis reviewed and urine ketones: LARGE  -Betahydroxybutarate levels >16 on admission -11.97 today  -IV Normal Saline at 250 mL/hr  -Insulin Regular started at 0.1 Units/kg/hr  -D5 and 0.45% NS at 150 mL/hr when blood glucose less than 250 mg/dL  -Will monitor bicarb and anion gap, bridge with home lantus dose and begin diet PO when bicarb >15 and gap closed x 2 measurements  -Discontinue Insulin drip 2 hours post PO intake.   -Hypoglycemia, phos and potassium replacement protocols in place  -Urinalysis and chest x-ray is unremarkable for any infection.  -Bicarb drip stopped this morning, 16-->22  -Telemetry monitoring  -VBG: pH normalized to 7.39, PCO2 37.5 bicarb 22.9  -Pulmonology consulted, will appreciate recommendations  -Inpatient consult to diabetes educator    2. LIBBY on CKD stage IIIa, in setting of metabolic acidosis  -Creatinine elevated 2.42 on admission -patient's baseline 1.7  -IV normal saline 250 mL/h  -We will monitor kidney functions  -Avoid nephrotoxic drugs  -Patient on insulin drip -metabolic acidosis improving    3. COPD not on home oxygen  -FEV1 82% predicted  -As needed Proventil    DVT prophylaxis: Heparin  Diet: N.p.o.  Disposition: Home    Consultations:   IP CONSULT TO PULMONOLOGY  IP CONSULT TO INTERNAL MEDICINE  IP CONSULT TO DIABETES EDUCATOR     Patient is admitted as inpatient status because of co-morbiditieslisted above, severity of signs and symptoms as outlined, requirement for current medical therapies and most importantly because of direct risk to patient if care not provided in a hospital setting. Walt Barahona MD  11/8/2022  7:41 AM    Copy sent to BINA Patricio     I have discussed the care of [facility] , including pertinent history and exam findings,    today with the resident. I have seen and examined the patient and the key elements of all parts of the encounter have been performed by me . I agree with the assessment, plan and orders as documented by the resident. Principal Problem:    Diabetic ketoacidosis without coma associated with type 1 diabetes mellitus ([facility] [address]  Resolved Problems:    * No resolved hospital problems. *        Overall  course ;                                   are improving over time.         Patient mated with chest pain, shortness of breath  Found to be in DKA  Noncompliance with medication  Severe metabolic acidosis, hyperkalemia treated with sodium bicarbonate drip  Which is discontinued now  Patient blood sugars in high 200s  Anion gap closed  Will change insulin drip to subcutaneous Lantus Premeal insulin and sliding scale  Low-carb diet  Will shift out of ICU  Ultrasound Doppler legs pending  Will do echocardiogram  Cardiology consulted  Patient critically sick, seen in ICU  CC time 35 minutes          Electronically signed by Patricia Mondragon MD

## 2022-11-08 NOTE — PROGRESS NOTES
[facility] Internal Medicine  Karissa Horton MD; Zeyad Osorio MD; Lamar Preston MD; MD Mihir Hill MD; Hi Raphael MD  [facility]   [address] [facility]                 Date:   11/8/2022  Patientname:  Sandoval Hackett  Date of admission:  11/7/2022  6:40 PM  MRN:   [id number]  Account:  [de-identified]  YOB: 1970  PCP:    BINA Cummings  Room:   2004/2004-01  Code Status:    Full Code      Chief Complaint:     Chief Complaint   Patient presents with    Shortness of Breath       History of Present Illness:     Sandoval Hackett is a 47 y.o. Non- / non  male who presents with Shortness of Breath and is admitted to the hospital for the management of Diabetic ketoacidosis without coma associated with type 1 diabetes mellitus ([facility] [address] 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 He was discharged from [facility] on 11/6/2022 after a 2-day admission for observed observation of NSTEMI. Apparently once discharged home he did not take any of his prescribed medications including his insulin. There appears to be some underlying mental health issues as well. He is found to be in DKA with blood glucose level of 830, Beta-H >16, anion gap unable to be detected,  K+ 7.1, , creatinine 2.42, lactic acid 3.7. VBG Ph 6.9, HCO3 undetectable. In ED a central line was placed. He was started on calcium and insulin bolus for hypokalemia. Insulin drip initiated for DKA. Bicarb drip initiated for severe acidosis. Troponin 54 with repeat of 40. Both levels significantly decreased from a recent admission with NSTEMI. Patient is able to answer orientation questions but seems to have some hallucinations and talks to himself throughout assessment.   Only consistent complaint he has is of chills and pain from recent femur fracture. Denies fever, chest pain, cough, abdominal pain, nausea, vomiting, diarrhea, and urinary symptoms. There are no aggravating or alleviating factors. Symptoms are reported as acute, constant and severe. Past Medical History:     Past Medical History:   Diagnosis Date    Diabetes mellitus ([facility] [address]     Obstructive lung disease ([facility] [address]     8/2022    Panic attack         Past Surgical History:     Past Surgical History:   Procedure Laterality Date    APPENDECTOMY      FEMUR FRACTURE SURGERY Left 09/30/2022    Cephalomedullary Nail    HAND SURGERY      JOINT REPLACEMENT      TIBIA FRACTURE SURGERY Left 09/30/2022    LEFT FEMUR CEPHALOMEDULLARY NAIL performed by Jeanette Vang DO at [address] [place] [address] N/A 02/15/2022    EGD BIOPSY performed by Dalila Baldwin MD at [facility]        Medications Prior to Admission:     Prior to Admission medications    Medication Sig Start Date End Date Taking?  Authorizing Provider   aspirin 81 MG chewable tablet Take 1 tablet by mouth daily 11/7/22  Yes Cristiano Proctor MD   atorvastatin (LIPITOR) 80 MG tablet Take 1 tablet by mouth nightly 11/6/22  Yes Cristiano Proctor MD   metoprolol tartrate (LOPRESSOR) 25 MG tablet Take 1 tablet by mouth 2 times daily 11/6/22  Yes Cristiano Proctor MD   pantoprazole (PROTONIX) 40 MG tablet Take 1 tablet by mouth 2 times daily (before meals) for 25 doses 11/6/22 11/19/22 Yes Cristiano Proctor MD   fluconazole (DIFLUCAN) 100 MG tablet Take 2 tablets by mouth daily for 14 days 11/6/22 11/20/22 Yes Cristiano Proctor MD   insulin glargine (LANTUS) 100 UNIT/ML injection vial Inject 15 Units into the skin nightly   Yes Historical Provider, MD   cyclobenzaprine (FLEXERIL) 10 MG tablet Take 1 tablet by mouth 3 times daily as needed for Muscle spasms 10/25/22  Yes Jeanette Vang DO   acetaminophen (TYLENOL) 500 MG tablet Take 2 tablets by mouth every 6 hours as needed for Pain 10/25/22  Yes Summer Walden,    vitamin D (ERGOCALCIFEROL) 1.25 MG (72023 UT) CAPS capsule Take 1 capsule by mouth once a week 9/29/22  Yes Isha Howell,    insulin glargine (LANTUS SOLOSTAR) 100 UNIT/ML injection pen Inject 25 Units into the skin 2 times daily  Patient taking differently: Inject 25 Units into the skin daily 4/6/22  Yes BINA Sim   insulin aspart (NOVOLOG FLEXPEN) 100 UNIT/ML injection pen Inject 5 Units into the skin 3 times daily (before meals) 4/6/22  Yes BINA Sim   traZODone (DESYREL) 150 MG tablet Take 300 mg by mouth nightly    Yes Historical Provider, MD   clonazePAM (KLONOPIN) 2 MG tablet Take 4 mg by mouth daily as needed for Anxiety. Yes Historical Provider, MD   calcium carbonate (TUMS) 500 MG chewable tablet Take 2 tablets by mouth 3 times daily as needed for Heartburn 11/6/22 11/13/22  Angelo Moreau MD   gabapentin (NEURONTIN) 300 MG capsule Take 1 capsule by mouth in the morning and 1 capsule at noon and 1 capsule in the evening. Do all this for 5 days.  10/1/22 10/6/22  Konstantin Ricardo MD   ondansetron (ZOFRAN ODT) 4 MG disintegrating tablet Take 1 tablet by mouth every 8 hours as needed for Nausea or Vomiting 6/25/22   Edvin Chavez, DO   Blood Glucose Monitoring Suppl (ONE TOUCH ULTRA 2) w/Device KIT 1 kit by Does not apply route daily 3/18/22   Jennifer Giordano, DO   Lancets MISC 1 each by Does not apply route 2 times daily 3/18/22   Jennifer Giordano, DO   Alcohol Swabs 70 % PADS Use as needed with blood glucose checks 2/15/22   Asher Chaparro MD   Insulin Pen Needle ([employer] PEN NEEDLES 29G) 29G X 12MM MISC 1 each by Does not apply route daily 2/15/22   Asher Chaparro MD   albuterol sulfate  (90 Base) MCG/ACT inhaler Inhale 2 puffs into the lungs every 4 hours as needed for Wheezing    Historical Provider, MD        Allergies:     Azithromycin, Acetaminophen-codeine, Codeine, Hydrocodone-acetaminophen, Ibuprofen, Morphine, and Tramadol    Social History:     Tobacco:    reports that he has been smoking cigarettes. He has a 10.25 pack-year smoking history. He has never used smokeless tobacco.  Alcohol:      reports that he does not currently use alcohol. Drug Use:  reports current drug use. Drug: Marijuana Ary Jessica). Family History:     History reviewed. No pertinent family history. Investigations:      Laboratory Testing:  Recent Results (from the past 24 hour(s))   Lactic Acid    Collection Time: 11/07/22  7:15 PM   Result Value Ref Range    Lactic Acid 3.7 (H) 0.5 - 2.2 mmol/L   CBC with Auto Differential    Collection Time: 11/07/22  7:19 PM   Result Value Ref Range    WBC 11.4 (H) 3.5 - 11.0 k/uL    RBC 3.45 (L) 4.5 - 5.9 m/uL    Hemoglobin 9.5 (L) 13.5 - 17.5 g/dL    Hematocrit 33.8 (L) 41 - 53 %    MCV 97.8 80 - 100 fL    MCH 27.4 26 - 34 pg    MCHC 28.1 (L) 31 - 37 g/dL    RDW 15.8 (H) 11.5 - 14.9 %    Platelets 401 974 - 264 k/uL    MPV 9.8 6.0 - 12.0 fL    Seg Neutrophils 84 (H) 36 - 66 %    Lymphocytes 14 (L) 24 - 44 %    Monocytes 2 1 - 7 %    Eosinophils % 0 0 - 4 %    Basophils 0 0 - 2 %    Segs Absolute 9.57 (H) 1.3 - 9.1 k/uL    Absolute Lymph # 1.60 1.0 - 4.8 k/uL    Absolute Mono # 0.23 0.1 - 1.3 k/uL    Absolute Eos # 0.00 0.0 - 0.4 k/uL    Basophils Absolute 0.00 0.0 - 0.2 k/uL    Morphology ANISOCYTOSIS PRESENT     Morphology Replaced by [facility]    Comprehensive Metabolic Panel    Collection Time: 11/07/22  7:19 PM   Result Value Ref Range    Glucose 830 (HH) 70 - 99 mg/dL    BUN 50 (H) 6 - 20 mg/dL    Creatinine 2.42 (H) 0.70 - 1.20 mg/dL    Est, Glom Filt Rate 32 (L) >60 mL/min/1.73m2    Calcium 8.9 8.6 - 10.4 mg/dL    Sodium 124 (L) 135 - 144 mmol/L    Potassium 7.1 (HH) 3.7 - 5.3 mmol/L    Chloride 76 (L) 98 - 107 mmol/L    CO2 <6 (LL) 20 - 31 mmol/L    Anion Gap  9 - 17 mmol/L     Unable to calculate anion gap due to CO2 less than 6.     Alkaline Phosphatase 145 (H) 40 - 129 U/L    ALT 54 (H) 5 - 41 U/L    AST 31 <40 U/L    Total Bilirubin 0.4 0.3 - 1.2 mg/dL    Total Protein 6.2 (L) 6.4 - 8.3 g/dL    Albumin 3.6 3.5 - 5.2 g/dL   Beta-Hydroxybutyrate    Collection Time: 11/07/22  7:19 PM   Result Value Ref Range    Beta-Hydroxybutyrate >16.00 (H) 0.02 - 0.27 mmol/L   Troponin    Collection Time: 11/07/22  7:19 PM   Result Value Ref Range    Troponin, High Sensitivity 52 (HH) 0 - 22 ng/L   D-Dimer, Quantitative    Collection Time: 11/07/22  7:19 PM   Result Value Ref Range    D-Dimer, Quant 1.36 (H) 0.00 - 0.59 mg/L FEU   TSH w/reflex to FT4    Collection Time: 11/07/22  7:19 PM   Result Value Ref Range    TSH 1.41 0.30 - 5.00 uIU/mL   EKG 12 Lead    Collection Time: 11/07/22  7:24 PM   Result Value Ref Range    Ventricular Rate 151 BPM    Atrial Rate 326 BPM    QRS Duration 110 ms    Q-T Interval 256 ms    QTc Calculation (Bazett) 405 ms    R Axis -176 degrees    T Axis 124 degrees   Blood Gas, Venous    Collection Time: 11/07/22  7:32 PM   Result Value Ref Range    pH, Alfredo 6.957 (LL) 7.320 - 7.420    pO2, Alfredo 76.4 (H) 30.0 - 50.0 mm Hg    O2 Sat, Alfredo 87.9 (H) 60.0 - 85.0 %    Carboxyhemoglobin 0.8 0 - 5 %    Methemoglobin 1.4 0.0 - 1.9 %    Pt Temp 37     O2 Device/Flow/% ROOM AIR     Respiratory Rate 33     Pt. Position SEMI-FOWLERS     Text for Respiratory RESULTS GIVEN TO DR. SANCHEZ    Urinalysis with Reflex to Culture    Collection Time: 11/07/22  8:45 PM    Specimen: Urine voided   Result Value Ref Range    Color, UA Yellow Yellow    Turbidity UA Clear Clear    Glucose, Ur LARGE (A) NEGATIVE    Bilirubin Urine NEGATIVE NEGATIVE    Ketones, Urine LARGE (A) NEGATIVE    Specific Gravity, UA 1.019 1.000 - 1.030    Urine Hgb TRACE (A) NEGATIVE    pH, UA 5.0 5.0 - 8.0    Protein, UA 2+ (A) NEGATIVE    Urobilinogen, Urine Normal Normal    Nitrite, Urine NEGATIVE NEGATIVE    Leukocyte Esterase, Urine NEGATIVE NEGATIVE   Microscopic Urinalysis    Collection Time: 11/07/22  8:45 PM   Result Value Ref Range WBC, UA 0 TO 2 /HPF    RBC, UA 0 TO 2 /HPF    Casts UA 0 TO 2 /LPF    Epithelial Cells UA 0 TO 2 /HPF    Bacteria, UA None None   Troponin    Collection Time: 11/07/22  9:18 PM   Result Value Ref Range    Troponin, High Sensitivity 40 (H) 0 - 22 ng/L   POC Glucose Fingerstick    Collection Time: 11/07/22 10:05 PM   Result Value Ref Range    POC Glucose >600 (HH) 75 - 110 mg/dL   Glucose, Random    Collection Time: 11/07/22 10:06 PM   Result Value Ref Range    Glucose 722 (HH) 70 - 99 mg/dL       Imaging/Diagnostics:  XR CHEST (2 VW)    Result Date: 11/4/2022  Lucent lungs with chronic pattern of hyperinflation. No acute pulmonary finding. XR HIP LEFT (2-3 VIEWS)    Result Date: 11/4/2022  Prior surgical change of ORIF with no acute abnormality of the left hip or femur. XR FEMUR LEFT (MIN 2 VIEWS)    Result Date: 11/4/2022  Prior surgical change of ORIF with no acute abnormality of the left hip or femur. CT ABDOMEN PELVIS W IV CONTRAST Additional Contrast? None    Result Date: 11/4/2022  1. No evidence of pulmonary embolism or acute pulmonary abnormality. 2.  No acute inflammatory process identified in the abdomen or pelvis. 3.  Punctate left nephrolithiasis. 4.  Findings compatible with hepatic steatosis. 5.  Additional chronic and benign findings, as above. US RENAL LIMITED    Result Date: 11/5/2022  1. Increased echogenicity of the bilateral renal cortex which can be seen with medical renal disease. 2. Suspected left-sided renal calculi. No hydronephrosis. XR CHEST PORTABLE    Result Date: 11/7/2022  New right IJ catheter in the SVC. No pneumothorax. XR CHEST PORTABLE    Result Date: 11/7/2022  COPD. Increased interstitial markings at the lung bases. CT CHEST PULMONARY EMBOLISM W CONTRAST    Result Date: 11/4/2022  1. No evidence of pulmonary embolism or acute pulmonary abnormality. 2.  No acute inflammatory process identified in the abdomen or pelvis.  3.  Punctate left nephrolithiasis. 4.  Findings compatible with hepatic steatosis. 5.  Additional chronic and benign findings, as above. US ABDOMEN LIMITED Specify organ? LIVER, GALLBLADDER, PANCREAS    Result Date: 11/4/2022  Hepatomegaly otherwise unremarkable exam RECOMMENDATIONS: Unavailable         Plan:     Patient status inpatient in the Medical ICU    DKA  -DKA protocol initiated in ED  -Started on insulin drip at 0.1units/kg/hr  -POCT blood sugar every hour  -IV NS until blood glucose < 250 mg/dl  --IV D5 1/2 NS once blood glucose <250mg/dl  -Beta-hydroxybutyrate >16 in ED  --Recheck pending  -Bicarb gtt   -Monitor VBG, electrolytes, magnesium, and phosphorus every 4 hours  -Replacement protocols ordered  -monitor for signs of urinary retention  -NPO for now    Hyperkalemia related to DKA  -Insulin and dextrose initiated in ED  -Hold potassium sparing meds  -monitor telemetry  -EKG PRN for chest pain  -Monitor BMP    Full code    Claire Angle, LUIS - NP  11/8/2022  12:10 AM      Please note that this chart was generated using voice recognition Dragon dictation software. Although every effort was made to ensure the accuracy of this automated transcription, some errors in transcription may have occurred. [address]    Phone [phone]

## 2022-11-08 NOTE — PLAN OF CARE
Problem: Discharge Planning  Goal: Discharge to home or other facility with appropriate resources  Outcome: Progressing  Flowsheets  Taken 11/8/2022 1200 by Barbara Jung  Discharge to home or other facility with appropriate resources:   Identify barriers to discharge with patient and caregiver   Refer to discharge planning if patient needs post-hospital services based on physician order or complex needs related to functional status, cognitive ability or social support system  Taken 11/8/2022 0800 by Barbara Jung  Discharge to home or other facility with appropriate resources:   Identify barriers to discharge with patient and caregiver   Refer to discharge planning if patient needs post-hospital services based on physician order or complex needs related to functional status, cognitive ability or social support system     Problem: Pain  Goal: Verbalizes/displays adequate comfort level or baseline comfort level  Outcome: Progressing     Problem: Metabolic/Fluid and Electrolytes - Adult  Goal: Electrolytes maintained within normal limits  Outcome: Progressing  Flowsheets  Taken 11/8/2022 1200 by Barbara Jung  Electrolytes maintained within normal limits:   Monitor labs and assess patient for signs and symptoms of electrolyte imbalances   Administer electrolyte replacement as ordered   Monitor response to electrolyte replacements, including repeat lab results as appropriate  Taken 11/8/2022 0800 by Barbara Jung  Electrolytes maintained within normal limits:   Monitor labs and assess patient for signs and symptoms of electrolyte imbalances   Administer electrolyte replacement as ordered  Goal: Hemodynamic stability and optimal renal function maintained  Outcome: Progressing  Flowsheets  Taken 11/8/2022 1200 by Barbara Jung  Hemodynamic stability and optimal renal function maintained:   Monitor labs and assess for signs and symptoms of volume excess or deficit   Monitor intake, output and patient weight Monitor urine specific gravity, serum osmolarity and serum sodium as indicated or ordered  Taken 11/8/2022 0800 by Krystalchelsi Farmer  Hemodynamic stability and optimal renal function maintained:   Monitor labs and assess for signs and symptoms of volume excess or deficit   Monitor intake, output and patient weight   Monitor urine specific gravity, serum osmolarity and serum sodium as indicated or ordered  Goal: Glucose maintained within prescribed range  Outcome: Progressing  Flowsheets  Taken 11/8/2022 1200 by Krystalchelsi Farmer  Glucose maintained within prescribed range:   Monitor blood glucose as ordered   Assess for signs and symptoms of hyperglycemia and hypoglycemia  Taken 11/8/2022 0800 by Krystalchelsi Farmer  Glucose maintained within prescribed range:   Monitor blood glucose as ordered   Assess for signs and symptoms of hyperglycemia and hypoglycemia     Problem: Skin/Tissue Integrity  Goal: Absence of new skin breakdown  Description: 1. Monitor for areas of redness and/or skin breakdown  2. Assess vascular access sites hourly  3. Every 4-6 hours minimum:  Change oxygen saturation probe site  4. Every 4-6 hours:  If on nasal continuous positive airway pressure, respiratory therapy assess nares and determine need for appliance change or resting period.   Outcome: Progressing     Problem: Safety - Adult  Goal: Free from fall injury  Outcome: Progressing  Flowsheets (Taken 11/8/2022 0819 by Krystal Farmer)  Free From Fall Injury: Instruct family/caregiver on patient safety     Problem: ABCDS Injury Assessment  Goal: Absence of physical injury  Outcome: Progressing  Flowsheets (Taken 11/8/2022 0819 by Krystal Farmer)  Absence of Physical Injury: Implement safety measures based on patient assessment     Problem: Nutrition Deficit:  Goal: Optimize nutritional status  Outcome: Progressing

## 2022-11-08 NOTE — PROGRESS NOTES
11/08/22 1752   Encounter Summary   Encounter Overview/Reason  Initial Encounter   Service Provided For: Patient   Referral/Consult From: Rounding   Complexity of Encounter Low   Spiritual/Emotional needs   Type Spiritual Support   Assessment/Intervention/Outcome   Assessment Unable to assess  (sleeping)   Intervention Prayer (assurance of)/[place]

## 2022-11-08 NOTE — CONSULTS
[facility] PULMONARY & CRITICAL CARE SPECIALISTS   CONSULT NOTE:      DATE OF CONSULT 11/8/2022    REASON FOR CONSULTATION:   DKA       PCP BINA Avendaño     CHIEF COMPLAINT: DKA    HISTORY OF PRESENT ILLNESS:     Patient is a 47year old male that presented to the ED due to dyspnea and chest pain, he was recently discharged from [address] [place] [address]. [facility] for NSTEMI 2 days ago. States that he woke up yesterday feeling this way and thought he was having another heart attack. Patient is apparently noncompliant but tells me he does take his insulin. Currently denying any nausea, vomiting, fevers or chills. No recent infections. In the ED patient was found to have a glucose of 830, Na 124, B-hydroxybutyrate >16, anion gap unable to be detected, K 7.1. Patient was started on insulin and bicarb drip in the ED or severe acidosis and peaked T waves. On floor, DKA protocol was initiated. Today patient says he is feeling better but doesn't have much of an appetite.          ALLERGIES:  Allergies   Allergen Reactions    Azithromycin Swelling    Acetaminophen-Codeine Other (See Comments) and Nausea And Vomiting    Codeine Itching    Hydrocodone-Acetaminophen Rash    Ibuprofen Other (See Comments)     Stomach cramps    Morphine Other (See Comments)     Gives headaches    Tramadol Itching     vomitting       HOME MEDICATIONS:  Medications Prior to Admission: aspirin 81 MG chewable tablet, Take 1 tablet by mouth daily  atorvastatin (LIPITOR) 80 MG tablet, Take 1 tablet by mouth nightly  metoprolol tartrate (LOPRESSOR) 25 MG tablet, Take 1 tablet by mouth 2 times daily  pantoprazole (PROTONIX) 40 MG tablet, Take 1 tablet by mouth 2 times daily (before meals) for 25 doses  fluconazole (DIFLUCAN) 100 MG tablet, Take 2 tablets by mouth daily for 14 days  insulin glargine (LANTUS) 100 UNIT/ML injection vial, Inject 15 Units into the skin nightly  cyclobenzaprine (FLEXERIL) 10 MG tablet, Take 1 tablet by mouth 3 times daily as needed for Muscle spasms  acetaminophen (TYLENOL) 500 MG tablet, Take 2 tablets by mouth every 6 hours as needed for Pain  vitamin D (ERGOCALCIFEROL) 1.25 MG (21336 UT) CAPS capsule, Take 1 capsule by mouth once a week  insulin glargine (LANTUS SOLOSTAR) 100 UNIT/ML injection pen, Inject 25 Units into the skin 2 times daily (Patient taking differently: Inject 25 Units into the skin daily)  insulin aspart (NOVOLOG FLEXPEN) 100 UNIT/ML injection pen, Inject 5 Units into the skin 3 times daily (before meals)  traZODone (DESYREL) 150 MG tablet, Take 300 mg by mouth nightly   clonazePAM (KLONOPIN) 2 MG tablet, Take 4 mg by mouth daily as needed for Anxiety. calcium carbonate (TUMS) 500 MG chewable tablet, Take 2 tablets by mouth 3 times daily as needed for Heartburn  gabapentin (NEURONTIN) 300 MG capsule, Take 1 capsule by mouth in the morning and 1 capsule at noon and 1 capsule in the evening. Do all this for 5 days.   ondansetron (ZOFRAN ODT) 4 MG disintegrating tablet, Take 1 tablet by mouth every 8 hours as needed for Nausea or Vomiting  Blood Glucose Monitoring Suppl (ONE TOUCH ULTRA 2) w/Device KIT, 1 kit by Does not apply route daily  Lancets MISC, 1 each by Does not apply route 2 times daily  Alcohol Swabs 70 % PADS, Use as needed with blood glucose checks  Insulin Pen Needle ([employer] PEN NEEDLES 29G) 29G X 12MM MISC, 1 each by Does not apply route daily  albuterol sulfate  (90 Base) MCG/ACT inhaler, Inhale 2 puffs into the lungs every 4 hours as needed for Wheezing      PAST MEDICAL HISTORY:  Past Medical History:   Diagnosis Date    Diabetes mellitus ([facility]     Obstructive lung disease ([facility]     8/2022    Panic attack        PAST SURGICAL HISTORY:  Past Surgical History:   Procedure Laterality Date    APPENDECTOMY      FEMUR FRACTURE SURGERY Left 09/30/2022    Cephalomedullary Nail    HAND SURGERY      JOINT REPLACEMENT      TIBIA FRACTURE SURGERY Left 09/30/2022    LEFT FEMUR CEPHALOMEDULLARY NAIL performed by Melissa George Blake Vallejo DO at [address] [facility] ENDOSCOPY N/A 02/15/2022    EGD BIOPSY performed by Mitch Montano MD at [facility] Endoscopy          SOCIAL HISTORY:  Social History     Socioeconomic History    Marital status: Single     Spouse name: Not on file    Number of children: 2    Years of education: Not on file    Highest education level: Not on file   Occupational History    Not on file   Tobacco Use    Smoking status: Every Day     Packs/day: 0.25     Years: 41.00     Pack years: 10.25     Types: Cigarettes    Smokeless tobacco: Never   Substance and Sexual Activity    Alcohol use: Not Currently    Drug use: Yes     Types: Marijuana [place])     Comment: overdose 11/10/20    Sexual activity: Not Currently   Other Topics Concern    Not on file   Social History Narrative    Not on file     Social Determinants of Health     Financial Resource Strain: Low Risk     Difficulty of Paying Living Expenses: Not very hard   Food Insecurity: Food Insecurity Present    Worried About Running Out of Food in the Last Year: Sometimes true    Ran Out of Food in the Last Year: Sometimes true   Transportation Needs: Unmet Transportation Needs    Lack of Transportation (Medical): Yes    Lack of Transportation (Non-Medical): Yes   Physical Activity: Inactive    Days of Exercise per Week: 0 days    Minutes of Exercise per Session: 0 min   Stress: Stress Concern Present    Feeling of Stress :  To some extent   Social Connections: Socially Isolated    Frequency of Communication with Friends and Family: Never    Frequency of Social Gatherings with Friends and Family: Never    Attends Anglican Services: Never    Active Member of Clubs or Organizations: No    Attends Club or Organization Meetings: Never    Marital Status: Never    Intimate Partner Violence: Not At Risk    Fear of Current or Ex-Partner: No    Emotionally Abused: No    Physically Abused: No    Sexually Abused: No   Housing Stability: High Risk    Unable to Pay for Housing in the Last Year: No    Number of Places Lived in the Last Year: 4    Unstable Housing in the Last Year: Yes       FAMILY HISTORY:  History reviewed. No pertinent family history. REVIEW OF SYSTEMS:  All other systems reviewed and are negative. PHYSICAL EXAM:  Vital Signs Blood pressure 119/63, pulse 78, temperature 97.8 °F (36.6 °C), temperature source Axillary, resp. rate 15, height 5' 7\" (1.702 m), weight 112 lb 7 oz (51 kg), SpO2 97 %. Oxygen Amount and Delivery:      Admission Weight Weight: 90 lb (40.8 kg)    General Appearance ill appearing, somnolent   HEENT  Normocephalic, without obvious abnormality, atraumatic, conjunctivae/corneas clear. PERRL, EOM's intact,  no adenopathy, no carotid bruit, no JVD, supple, symmetrical, trachea midline and thyroid not enlarged, symmetric, no tenderness/mass/nodules  Lungs  Clear bilaterally, no wheezes, rales or rhonchi  Heart: regular rate and rhythm, S1, S2 normal, no murmur, click, rub or gallop  Abdomen  soft, non-tender; bowel sounds normal; no masses,  no organomegaly  Extremities normal ROM, no edema   Skin  Skin color, texture, turgor normal. No rashes or lesions  Neurologic: Alert and oriented X 3, normal strength and tone.        Imaging  Clear lung fileds       Lab Review  CBC     Lab Results   Component Value Date/Time    WBC 8.9 11/08/2022 06:01 AM    RBC 2.92 11/08/2022 06:01 AM    HGB 8.0 11/08/2022 06:01 AM    HCT 25.2 11/08/2022 06:01 AM     11/08/2022 06:01 AM    MCV 86.5 11/08/2022 06:01 AM    MCH 27.4 11/08/2022 06:01 AM    MCHC 31.7 11/08/2022 06:01 AM    RDW 14.5 11/08/2022 06:01 AM    METASPCT 1 11/08/2022 06:01 AM    LYMPHOPCT 19 11/08/2022 06:01 AM    MONOPCT 10 11/08/2022 06:01 AM    MYELOPCT 1 11/08/2022 06:01 AM    BASOPCT 0 11/08/2022 06:01 AM    MONOSABS 0.89 11/08/2022 06:01 AM    LYMPHSABS 1.69 11/08/2022 06:01 AM    EOSABS 0.00 11/08/2022 06:01 AM    BASOSABS 0.00 11/08/2022 06:01 AM    DIFFTYPE NOT REPORTED 04/06/2022 01:49 PM       BMP   Lab Results   Component Value Date/Time     11/08/2022 06:01 AM    K 3.7 11/08/2022 06:01 AM    CL 98 11/08/2022 06:01 AM    CO2 22 11/08/2022 06:01 AM    BUN 35 11/08/2022 06:01 AM    CREATININE 1.72 11/08/2022 06:01 AM    GLUCOSE 558 11/08/2022 06:01 AM    CALCIUM 7.3 11/08/2022 06:01 AM       LFTS  Lab Results   Component Value Date/Time    ALKPHOS 145 11/07/2022 07:19 PM    ALT 54 11/07/2022 07:19 PM    AST 31 11/07/2022 07:19 PM    PROT 6.2 11/07/2022 07:19 PM    BILITOT 0.4 11/07/2022 07:19 PM    BILIDIR 0.2 11/04/2022 05:55 AM    IBILI 0.6 11/04/2022 05:55 AM    LABALBU 3.6 11/07/2022 07:19 PM       INR  No results for input(s): PROTIME, INR in the last 72 hours. APTT  Recent Labs     11/05/22  1856 11/06/22  0450 11/06/22  1215   APTT 110.3* 44.4* 21.9       Lactic Acid  Lab Results   Component Value Date/Time    LACTA 1.1 11/08/2022 06:01 AM    LACTA 3.7 11/07/2022 07:15 PM        PRO-BNP   No results for input(s): PROBNP in the last 72 hours. ABGs: No results found for: PHART, PO2ART, VEP7YZC    Lab Results   Component Value Date/Time    MODE NOT REPORTED 02/14/2022 04:30 PM         Impression    DKA, possibly related to noncompliance   Metabolic acidosis, improving   DM1, not controlled/compliant   Acute on chronic kidney disease, patient received contrast by CT scan of chest, abdomen and pelvis on 11/4  History of COPD, however, FEV1 82% predicted which is normal  Recent NSTEMI   History of tobacco use 50+ pack year history  Right lower lobe nodule, granuloma    Plan:      Continue DKA protocol, anion gap currently 14  Monitor kidney function/urine output closely   IV fluids 250ml/h NS  Heparin for DVT prophylaxis   Inhalers as needed   Electrolyte replacement as needed, monitor BMPs q4h  Keep Kayden 4 Gibranindl PGY 2 FM    Patient seen and examined independently by me.  Above discussed and I agree with resident note except where indicated in the EMR revision history. Also see my additional comments and changes indicated by discrete font, text color, italics, and/or initials. Labs, cultures, and radiographs where available were reviewed. Patient was recently admitted at [facility].  At that time, he received contrast for CT chest for PE protocol as well as abdomen and pelvis.   His acute on chronic renal failure was probably due to the contrast.    Agree with DKA protocol and aggressive fluid hydration  Monitor anion gap  Clinically no evidence of infectious etiology, suspect noncompliance    He will need reevaluation of his pulmonary function to see if he has COPD given his tobacco history    No further work-up or CAT scan needed for the right lower lobe nodule that has been followed both the [address] [place] [address] and in [address] [place] [address], his PET/CT was negative    Okay to transfer out later on this evening if out of DKA      Electronically signed by Valentino Clayman, MD on 11/8/2022 at 10:05 AM

## 2022-11-08 NOTE — ED NOTES
Critical glucose of 722 called by lab. Dr. Jackson Organ notified.       Lizabeth Corona RN  11/07/22 9255

## 2022-11-08 NOTE — PROGRESS NOTES
Pt arrived to unit from ED. Pt placed on bedside telemetry monitor, vitals taken. Pt oriented to room and call light given to pt. Bed locked and in lowest position, bed alarm on. Safety measures in place.

## 2022-11-08 NOTE — PROGRESS NOTES
Comprehensive Nutrition Assessment    Type and Reason for Visit:  Initial, Positive Nutrition Screen (Wt loss, poor appetite)    Nutrition Recommendations/Plan:   Will monitor Glu and provide a minimum of 4 carbohydrate choices per tray  Will add Glucerna to all trays for increased calories/protein     Malnutrition Assessment:  Malnutrition Status:  Severe malnutrition (11/08/22 1308)    Context:  Chronic Illness     Findings of the 6 clinical characteristics of malnutrition:  Energy Intake:  75% or less estimated energy requirements for 1 month or longer  Weight Loss:  10% over 6 months     Body Fat Loss:  Unable to assess     Muscle Mass Loss:  Unable to assess    Fluid Accumulation:  No significant fluid accumulation     Strength:  Not Performed    Nutrition Assessment:    Pt was admitted due to DKA. Diet was just initiated this afternoon. Nutrition Related Findings:    no edema, Labs: Glu 277, Meds:Reviewed, PMH: DM, CKD, COPD, MI, (9/30) tibia Fx Wound Type: None       Current Nutrition Intake & Therapies:    Average Meal Intake: Unable to assess     ADULT DIET; Regular; 4 carb choices (60 gm/meal)    Anthropometric Measures:  Height: 5' 7\" (170.2 cm)  Ideal Body Weight (IBW): 148 lbs (67 kg)    Admission Body Weight: 112 lb (50.8 kg)  Current Body Weight: 112 lb (50.8 kg),   IBW.  Weight Source: Bed Scale  Current BMI (kg/m2): 17.5  Usual Body Weight: 128 lb (58.1 kg) (4/22)  % Weight Change (Calculated): -12.5                    BMI Categories: Underweight (BMI less than 18.5)    Estimated Daily Nutrient Needs:  Energy Requirements Based On: Formula  Weight Used for Energy Requirements: Admission  Energy (kcal/day): [place] x 1.3= 1700 kcal + 250 kcal for wt gain= approximately 2000 kcal  Weight Used for Protein Requirements: Admission  Protein (g/day): 1.3g/kg= 65 g       Nutrition Diagnosis:   Severe malnutrition related to inadequate protein-energy intake, impaired nutrient utilization as evidenced by Criteria as identified in malnutrition assessment    Nutrition Interventions:   Food and/or Nutrient Delivery: Modify Current Diet, Start Oral Nutrition Supplement  Nutrition Education/Counseling: No recommendation at this time  Coordination of Nutrition Care: Continue to monitor while inpatient       Goals:     Goals: PO intake 75% or greater       Nutrition Monitoring and Evaluation:      Food/Nutrient Intake Outcomes: Food and Nutrient Intake, Supplement Intake  Physical Signs/Symptoms Outcomes: Biochemical Data, GI Status, Fluid Status or Edema, Skin, Weight    Discharge Planning:     Too soon to determine     Meron Ha, [address] [place], [place]  Contact: [phone]

## 2022-11-08 NOTE — ED NOTES
ATTEMPTED TO CHECK BLOOD SUGAR AND MONITOR READING HIGH AND SO BLOOD SENT TO LAB FOR BLOOD SUGAR     Cat Dove RN  11/07/22 9201

## 2022-11-08 NOTE — DISCHARGE INSTR - COC
Continuity of Care Form    Patient Name: Sarah Cast   :  1970  MRN:  [id number]    Admit date:  2022  Discharge date:  11/10/2022    Code Status Order: Full Code   Advance Directives:     Admitting Physician:  Starr Bolden MD  PCP: BINA Dumont    Discharging Nurse: Alexa Nance Unit/Room#:   Discharging Unit Phone Number: [phone]    Emergency Contact:   Extended Emergency Contact Information  Primary Emergency Contact: [facility], [facility]. Phone: [phone]  Relation: Brother/Sister  Secondary Emergency Contact: Hannah Styles  Mobile Phone: [phone]  Relation: Child    Past Surgical History:  Past Surgical History:   Procedure Laterality Date    APPENDECTOMY      FEMUR FRACTURE SURGERY Left 2022    Cephalomedullary Nail    HAND SURGERY      JOINT REPLACEMENT      TIBIA FRACTURE SURGERY Left 2022    LEFT FEMUR CEPHALOMEDULLARY NAIL performed by Javed Starr DO at [address] [place] [address] N/A 02/15/2022    EGD BIOPSY performed by Nabila Brannon MD at [facility]       Immunization History:   Immunization History   Administered Date(s) Administered    Tdap (Boostrix, Adacel) 2016       Active Problems:  Patient Active Problem List   Diagnosis Code    Colitis K52.9    Uncontrolled type 1 diabetes mellitus with hyperglycemia (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, G63    Osteoarthritis of knee M17.9    Other chest pain R07.89    Shortness of breath R06.02    Major depressive disorder in partial remission ([address] F32.4    Lung nodule < 6cm on CT [id number]    Closed comminuted intertrochanteric fracture of femur, initial encounter ([facility] [address] S72.143A    NSTEMI (non-ST elevated myocardial infarction) ([facility] [address] I21.4    Nausea & vomiting R11.2    Constipation K59.00    Weight loss R63.4    Tobacco abuse Z72.0    Marijuana use F12.90    Acute kidney injury (HCC) N17.9    Stage 3a chronic kidney disease (HCC) N18.31    Odynophagia R13.10    Dysphagia R13.10    Diabetic ketoacidosis without coma associated with type 1 diabetes mellitus (HCC) E10.10       Isolation/Infection:   Isolation            No Isolation          Patient Infection Status       Infection Onset Added Last Indicated Last Indicated By Review Planned Expiration Resolved Resolved By    None active    Resolved    COVID-19 (Rule Out) 02/14/22 02/14/22 02/14/22 COVID-19, Rapid (Ordered)   02/14/22 Rule-Out Test Resulted    COVID-19 (Rule Out) 12/03/21 12/03/21 12/03/21 COVID-19, Rapid (Ordered)   12/03/21 Rule-Out Test Resulted            Nurse Assessment:  Last Vital Signs: /65   Pulse 64   Temp 98.1 °F (36.7 °C) (Oral)   Resp 14   Ht 5' 7\" (1.702 m)   Wt 112 lb 7 oz (51 kg)   SpO2 98%   BMI 17.61 kg/m²     Last documented pain score (0-10 scale): Pain Level: 0  Last Weight:   Wt Readings from Last 1 Encounters:   11/07/22 112 lb 7 oz (51 kg)     Mental Status:  oriented and alert    IV Access:  - None    Nursing Mobility/ADLs:  Walking   Independent  Transfer  Independent  Bathing  Independent  Dressing  Independent  Toileting  Independent  Feeding  [address] [facility] Delivery   whole    Wound Care Documentation and Therapy:  Incision 09/30/22 Hip Left; Lower (Active)   Number of days: 39        Elimination:  Continence:    Bowel: Yes  Bladder: Yes  Urinary Catheter: None   Colostomy/Ileostomy/Ileal Conduit: No       Date of Last BM: 11/08/2022    Intake/Output Summary (Last 24 hours) at 11/8/2022 1148  Last data filed at 11/8/2022 0626  Gross per 24 hour   Intake 2323.26 ml   Output 3100 ml   Net -776.74 ml     I/O last 3 completed shifts: In: 2323.3 [I.V.:2179.2; IV Piggyback:144.1]  Out: 3100 [Urine:3100]    Safety Concerns:     Hyperglycemia/hypoglycemia    Impairments/Disabilities:      None    Nutrition Therapy:  Current Nutrition Therapy: Carb/diabetic diet      Routes of Feeding: Oral  Liquids: No Restrictions  Daily Fluid Restriction: no  Last Modified Barium Swallow with Video (Video Swallowing Test): not done    Treatments at the Time of Hospital Discharge:   Respiratory Treatments: N/A  Oxygen Therapy:  is not on home oxygen therapy. Ventilator:    - No ventilator support    Rehab Therapies: Physical Therapy and Occupational Therapy  Weight Bearing Status/Restrictions: No weight bearing restrictions  Other Medical Equipment (for information only, NOT a DME order):  walker, SC, SUPPOSED TO BE GETTING NEW ONES DELIVERED, AFTER [facility]S ADMISSION, FOR HIS ARE BROKEN AT HOME. Other Treatments: Skilled Nursing assessment and monitoring. Medication education and monitoring per protocol. PT NEEDS GOOD DIABETIC EDUCATION/MONITORING/FOLLOWING.      Patient's personal belongings (please select all that are sent with patient):  None    RN SIGNATURE:  Electronically signed by Lashell Freeman RN on 11/10/22 at 3:48 PM EST    CASE MANAGEMENT/SOCIAL WORK SECTION    Inpatient Status Date: 11/7/22    Readmission Risk Assessment Score:  Readmission Risk              Risk of Unplanned Readmission:  40           Discharging to Facility/ [facility] [address] Maryanne White, [place]  Phone:  [phone]  Fax:  [phone]     Dialysis Facility (if applicable)   Name:  Address:  Dialysis Schedule:  Phone:  Fax:    / signature: Electronically signed by Lynn Hyman RN on 11/8/22 at 11:48 AM EST    PHYSICIAN SECTION    Prognosis: Good    Condition at Discharge: Stable    Rehab Potential (if transferring to Rehab): Good    Recommended Labs or Other Treatments After Discharge:     Physician Certification: I certify the above information and transfer of Shannon Yoo  is necessary for the continuing treatment of the diagnosis listed and that he requires [address] for less 30 days.      Update Admission H&P: No change in H&P    PHYSICIAN SIGNATURE:  Electronically signed by Zainab Seo MD on 11/10/22 at 10:27 AM EST

## 2022-11-08 NOTE — PROGRESS NOTES
Nallely NP notified that blood glucose is reading as \"too high\" on glucometer. Orders received for a stat random glucose.

## 2022-11-08 NOTE — ED NOTES
Admission Dx: DKA    Pts Chief Complaints on Arrival: HIGH BLOOD SUGAR    ADL's - Partial assistance    Pending Diagnostics:  N/A    Residence PTA: single story home    Special Considerations/Circumstances: N/A    Vitals: Current vital signs:  /76   Pulse 100   Temp 97.7 °F (36.5 °C) (Axillary)   Resp 16   Ht 5' 7\" (1.702 m)   Wt 90 lb (40.8 kg)   SpO2 99%   BMI 14.10 kg/m²                MEWS Score: Tiffany Gaines 41, RN  11/07/22 6317

## 2022-11-08 NOTE — PROGRESS NOTES
Nallely NP notified of BMP results. Orders received to D/C bicarb gtt at this time, follow mag and phos protocol replacement scale, and no K replacement at this time.

## 2022-11-08 NOTE — PROGRESS NOTES
Residents paged regarding hypoglycemia. Order received to keep patient intermediate. IV fluid orders received. Patient not eating.

## 2022-11-08 NOTE — CONSULTS
[facility]    Consult                        Today's Date: 11/8/2022  Patient Name: Lanette Goltz  Date of admission: 11/7/2022  6:40 PM  Patient's age: 47 y.o., 1970  Admission Dx: Diabetic ketoacidosis without coma associated with type 1 diabetes mellitus ([facility] [E10.10]  Diabetic ketoacidosis without coma associated with other specified diabetes mellitus ([facility] [address] [facility] [E13.10]    Reason for Consult:  Cardiac evaluation    Requesting Physician: Beckie Astorga MD    CHIEF COMPLAINT:  SOB and high blood sugar    History Obtained From:  patient, electronic medical record    HISTORY OF PRESENT ILLNESS:      The patient is a 47 y.o.  male who is admitted to the hospital for DKA. Patient is a 78-year-old male past medical history significant for type 1 diabetes complicated with diabetic neuropathy and nephropathy, CKD stage III, COPD, and recent hospitalization for femur fracture and different hospitalization for NSTEMI  presented to the ED on 11/7/2022 with chief complaint of shortness of breath and chest pain. Per patient, he has been experiencing shortness of breath since discharge from [facility] on the sixth. Yesterday he started having pressure-like nonradiating chest pain and shortness of breath that woke him up from sleep. He is also been feeling nauseous and fatigued/weak since his discharge. Per patient, he is not been taking his insulin since being discharged from [address] [place] [address]. In ED, vitals were significant for tachycardia with heart rate of 103 and tachypnea with respiratory rate of 26. Labs were significant for elevated blood glucose 830, potassium of 7.1, sodium of 124 -corrected sodium 136 -bicarb less than 6, and beta hydroxybutyrate above 16. VBG's were significant for pH of 6.9. Creatinine was elevated at 2.42 -patient's baseline 1.7-and leukocytosis of 11.4, elevated lactate of 3.7.   UA was negative for UTI, chest x-ray negative for any acute cardiopulmonary abnormalities. Initial troponin of 52 and repeat troponin of 40. EKG significant for sinus tachycardia with heart rate of 151 and peaked T waves noted in leads V3, V4, V5 as well as V6, slightly widened QRS. DKA protocol initiated in ED. Central line was placed in right IJ. Patient was started on insulin and bicarb drip    Last seen in hospital by us on 11/2/22 ( Dr. Dimas Jarquin)  No chest pain or shortness of breath. Presented with nausea vomiting and acute kidney injury. Elevated troponin most likely type II. He had coronary angiography done last year and [facility] and showed normal coronary arteries. We will proceed with echocardiogram and if it is within normal limits no need for repeat coronary angiography. Patient was discharge prior to echo being completed. Past Medical History:   has a past medical history of Diabetes mellitus ([address] Obstructive lung disease ([facility] [address] and Panic attack. Past Surgical History:   has a past surgical history that includes Appendectomy; Hand surgery; Upper gastrointestinal endoscopy (N/A, 02/15/2022); Femur fracture surgery (Left, 09/30/2022); Tibia fracture surgery (Left, 09/30/2022); and joint replacement. Home Medications:    Prior to Admission medications    Medication Sig Start Date End Date Taking?  Authorizing Provider   aspirin 81 MG chewable tablet Take 1 tablet by mouth daily 11/7/22  Yes Joceline Zepeda MD   atorvastatin (LIPITOR) 80 MG tablet Take 1 tablet by mouth nightly 11/6/22  Yes Joceline Zepeda MD   metoprolol tartrate (LOPRESSOR) 25 MG tablet Take 1 tablet by mouth 2 times daily 11/6/22  Yes Joceline Zepeda MD   pantoprazole (PROTONIX) 40 MG tablet Take 1 tablet by mouth 2 times daily (before meals) for 25 doses 11/6/22 11/19/22 Yes Jocelien Zepeda MD   fluconazole (DIFLUCAN) 100 MG tablet Take 2 tablets by mouth daily for 14 days 11/6/22 11/20/22 Yes Joceline Zepeda MD   insulin glargine (LANTUS) 100 UNIT/ML injection vial Inject 15 Units into the skin nightly   Yes Historical Provider, MD   cyclobenzaprine (FLEXERIL) 10 MG tablet Take 1 tablet by mouth 3 times daily as needed for Muscle spasms 10/25/22  Yes Karlee Hook, DO   acetaminophen (TYLENOL) 500 MG tablet Take 2 tablets by mouth every 6 hours as needed for Pain 10/25/22  Yes Karlee Hook, DO   vitamin D (ERGOCALCIFEROL) 1.25 MG (63848 UT) CAPS capsule Take 1 capsule by mouth once a week 9/29/22  Yes Isha Willis, DO   insulin glargine (LANTUS SOLOSTAR) 100 UNIT/ML injection pen Inject 25 Units into the skin 2 times daily  Patient taking differently: Inject 25 Units into the skin daily 4/6/22  Yes BINA Wheat   insulin aspart (NOVOLOG FLEXPEN) 100 UNIT/ML injection pen Inject 5 Units into the skin 3 times daily (before meals) 4/6/22  Yes BINA Wheat   traZODone (DESYREL) 150 MG tablet Take 300 mg by mouth nightly    Yes Historical Provider, MD   clonazePAM (KLONOPIN) 2 MG tablet Take 4 mg by mouth daily as needed for Anxiety. Yes Historical Provider, MD   calcium carbonate (TUMS) 500 MG chewable tablet Take 2 tablets by mouth 3 times daily as needed for Heartburn 11/6/22 11/13/22  Chaz Penn MD   gabapentin (NEURONTIN) 300 MG capsule Take 1 capsule by mouth in the morning and 1 capsule at noon and 1 capsule in the evening. Do all this for 5 days.  10/1/22 10/6/22  Celeste Xiao MD   ondansetron (ZOFRAN ODT) 4 MG disintegrating tablet Take 1 tablet by mouth every 8 hours as needed for Nausea or Vomiting 6/25/22   Patricia Murphy, DO   Blood Glucose Monitoring Suppl (ONE TOUCH ULTRA 2) w/Device KIT 1 kit by Does not apply route daily 3/18/22   Rosamaria Ramp, DO   Lancets MISC 1 each by Does not apply route 2 times daily 3/18/22   Rosamaria Ramp, DO   Alcohol Swabs 70 % PADS Use as needed with blood glucose checks 2/15/22   Asher Chaparro MD   Insulin Pen Needle ([employer] PEN NEEDLES 29G) 29G X 12MM MISC 1 each by Does not apply route daily 2/15/22   Asher Chaparro MD   albuterol sulfate  (90 Base) MCG/ACT inhaler Inhale 2 puffs into the lungs every 4 hours as needed for Wheezing    Historical Provider, MD       Allergies:  Azithromycin, Acetaminophen-codeine, Codeine, Hydrocodone-acetaminophen, Ibuprofen, Morphine, and Tramadol    Social History:   reports that he has been smoking cigarettes. He has a 10.25 pack-year smoking history. He has never used smokeless tobacco. He reports that he does not currently use alcohol. He reports current drug use. Drug: Marijuana [place]). Family History: family history is not on file. No h/o sudden cardiac death. No for premature CAD    REVIEW OF SYSTEMS:    Constitutional: there has been no unanticipated weight loss. There's been No change in energy level, No change in activity level. Eyes: No visual changes or diplopia. No scleral icterus. ENT: No Headaches, hearing loss or vertigo. No mouth sores or sore throat. Cardiovascular: see above  Respiratory: see above  Gastrointestinal: No abdominal pain, appetite loss, blood in stools. Genitourinary: No dysuria, trouble voiding, or hematuria. Musculoskeletal:  No gait disturbance, No weakness or joint complaints. Integumentary: No rash or pruritis. Neurological: No headache or diplopia. No tingling  Psychiatric: No anxiety, or depression. Endocrine: No temperature intolerance. Hematologic/Lymphatic: No abnormal bruising or bleeding, blood clots or swollen lymph nodes. Allergic/Immunologic: No nasal congestion or hives. PHYSICAL EXAM:      BP (!) 98/54   Pulse 62   Temp 98 °F (36.7 °C) (Oral)   Resp 13   Ht 5' 7\" (1.702 m)   Wt 112 lb 7 oz (51 kg)   SpO2 97%   BMI 17.61 kg/m²    Constitutional and General Appearance: alert, cooperative, no distress and appears stated age  HEENT: PERRL, no cervical lymphadenopathy. No masses palpable.  Normal oral mucosa  Respiratory:  Normal excursion and expansion without use of accessory muscles  Resp Auscultation: shallow respirations. Very dminished throughout. On RA without distress  Cardiovascular:  Heart tones are crisp and normal. regular S1 and S2.  Jugular venous pulsation Normal  The carotid upstroke is normal in amplitude and contour without delay or bruit   Currently SR 66  Abdomen:   soft  Bowel sounds hypoactive  Extremities:   No edema  Neurological:  Alert and oriented, lethargic but responsive      DATA:    Diagnostics:    EKG: nonspecific ST and T waves changes, sinus tachycardia. ECHO: ordered, but not yet obtained. Ejection fraction: 55%  Stress Test: not obtained. Cardiac Angiography: previously taken 2020 and show below. Echo 5/13/21 ([facility])  Normal left ventricular size and systolic function 61-53%. Mild mitral regurgitation. Mild tricuspid regurgitation. Stress Test 5/13/22  Narrative      No ischemic ECG changes with Lexiscan     Abnormal nuclear study showing a small area of ischemia involving the   apex     Normal wall motion LV function     Probably still low risk even with the defect given the small area in   question     Cath May 2021   Findings:   1. Normal LVEDP   2. Angiographically normal coronaries with mild luminal irregularities     Labs:     CBC:   Recent Labs     11/07/22  1919 11/08/22  0601   WBC 11.4* 8.9   HGB 9.5* 8.0*   HCT 33.8* 25.2*    283     BMP:   Recent Labs     11/08/22  0601 11/08/22  1005   * 139   K 3.7 3.6*   CO2 22 27   BUN 35* 31*   CREATININE 1.72* 1.68*   LABGLOM 48* 49*   GLUCOSE 558* 277*     BNP: No results for input(s): BNP in the last 72 hours. PT/INR: No results for input(s): PROTIME, INR in the last 72 hours. APTT:  Recent Labs     11/06/22  0450 11/06/22  1215   APTT 44.4* 21.9     CARDIAC ENZYMES:No results for input(s): CKTOTAL, CKMB, CKMBINDEX, TROPONINI in the last 72 hours.   FASTING LIPID PANEL:  Lab Results   Component Value Date/Time    HDL 51 11/05/2022 02:42 AM    TRIG 92 11/05/2022 02:42 AM     LIVER PROFILE:  Recent Labs     11/07/22 1919   AST 31   ALT 54*   LABALBU 3.6       IMPRESSION:    Patient Active Problem List   Diagnosis    Colitis    Uncontrolled type 1 diabetes mellitus with hyperglycemia (HCC)    Melena    Transaminitis    Fibromyalgia    Spinal stenosis    Abnormal stress test    Vitamin D deficiency    Acute blood loss anemia    Hematemesis with nausea    Ulcer of esophagus without bleeding    Candida esophagitis (HCC)    Neuropathy associated with endocrine disorder (HCC)    Osteoarthritis of knee    Other chest pain    Shortness of breath    Major depressive disorder in partial remission (HCC)    Lung nodule < 6cm on CT    Closed comminuted intertrochanteric fracture of femur, initial encounter ([facility])    NSTEMI (non-ST elevated myocardial infarction) ([facility])    Nausea & vomiting    Constipation    Weight loss    Tobacco abuse    Marijuana use    Acute kidney injury ([address]    Stage 3a chronic kidney disease ([address]    Odynophagia    Dysphagia    Diabetic ketoacidosis without coma associated with type 1 diabetes mellitus (HCC)    Severe malnutrition (HCC)     DKA  Trop elevation, type II in setting of LIBBY, significant hyperkalemia, and DKA   Preserved LVEF on echo 5/2021  Nonobstructive CAD on cath 5/2021  LIBBY on CKD3  Hyperkalemia  Medication non-compliance  COPD    RECOMMENDATIONS:  Stable from CV standpoint. Cath and echo from 5/2021 reviewed and normal. Repeat echo already completed. If normal LVEF will not plan any further inpatient ischemic work-up. Trop trending down from last admission at [facility] (at which time pt. Was discharge before echo completed)  LIBBY and DKA per primary  Continue ASA, statin, & BB. Discussed with patient and Nurse    LUIS Snyder - CNP      Attending Cardiologist Addendum: I have reviewed the history, physical, subjective, objective, assessment, and plan with the CNP and agree with the note.  I have made changes to the note above as needed. Thank you for allowing me to participate in the care of this patient, please do not hesitate to call if you have any questions. [place], [id number] [facility]  [facility]  [phone]

## 2022-11-09 PROBLEM — R76.8 HEPATITIS C ANTIBODY TEST POSITIVE: Status: ACTIVE | Noted: 2022-11-09

## 2022-11-09 PROBLEM — R79.89 ELEVATED LFTS: Status: ACTIVE | Noted: 2022-11-09

## 2022-11-09 PROBLEM — E11.10 DIABETIC ACIDOSIS WITHOUT COMA (HCC): Status: ACTIVE | Noted: 2022-11-07

## 2022-11-09 LAB
ABSOLUTE EOS #: 0.1 K/UL (ref 0–0.4)
ABSOLUTE LYMPH #: 2.1 K/UL (ref 1–4.8)
ABSOLUTE MONO #: 0.8 K/UL (ref 0.1–1.3)
AFP: 4 UG/L
ALPHA-1 ANTITRYPSIN: 122 MG/DL (ref 90–200)
ANION GAP SERPL CALCULATED.3IONS-SCNC: 8 MMOL/L (ref 9–17)
BASOPHILS # BLD: 0 % (ref 0–2)
BASOPHILS ABSOLUTE: 0 K/UL (ref 0–0.2)
BUN BLDV-MCNC: 21 MG/DL (ref 6–20)
CALCIUM SERPL-MCNC: 7.3 MG/DL (ref 8.6–10.4)
CERULOPLASMIN: 19 MG/DL (ref 15–30)
CHLORIDE BLD-SCNC: 104 MMOL/L (ref 98–107)
CO2: 25 MMOL/L (ref 20–31)
CREAT SERPL-MCNC: 1.32 MG/DL (ref 0.7–1.2)
EOSINOPHILS RELATIVE PERCENT: 1 % (ref 0–4)
FERRITIN: 360 NG/ML (ref 30–400)
FOLATE: 3 NG/ML
GFR SERPL CREATININE-BSD FRML MDRD: >60 ML/MIN/1.73M2
GLUCOSE BLD-MCNC: 109 MG/DL (ref 75–110)
GLUCOSE BLD-MCNC: 117 MG/DL (ref 75–110)
GLUCOSE BLD-MCNC: 159 MG/DL (ref 75–110)
GLUCOSE BLD-MCNC: 214 MG/DL (ref 70–99)
GLUCOSE BLD-MCNC: 244 MG/DL (ref 75–110)
GLUCOSE BLD-MCNC: 43 MG/DL (ref 75–110)
GLUCOSE BLD-MCNC: 56 MG/DL (ref 75–110)
GLUCOSE BLD-MCNC: 97 MG/DL (ref 75–110)
HCT VFR BLD CALC: 25.1 % (ref 41–53)
HEMOGLOBIN: 8.2 G/DL (ref 13.5–17.5)
IRON SATURATION: 33 % (ref 20–55)
IRON: 65 UG/DL (ref 59–158)
LYMPHOCYTES # BLD: 34 % (ref 24–44)
MCH RBC QN AUTO: 27.9 PG (ref 26–34)
MCHC RBC AUTO-ENTMCNC: 32.8 G/DL (ref 31–37)
MCV RBC AUTO: 85.2 FL (ref 80–100)
MONOCYTES # BLD: 13 % (ref 1–7)
PDW BLD-RTO: 14.5 % (ref 11.5–14.9)
PLATELET # BLD: 262 K/UL (ref 150–450)
PMV BLD AUTO: 9.4 FL (ref 6–12)
POTASSIUM SERPL-SCNC: 3.1 MMOL/L (ref 3.7–5.3)
RBC # BLD: 2.95 M/UL (ref 4.5–5.9)
SEG NEUTROPHILS: 52 % (ref 36–66)
SEGMENTED NEUTROPHILS ABSOLUTE COUNT: 3.2 K/UL (ref 1.3–9.1)
SODIUM BLD-SCNC: 137 MMOL/L (ref 135–144)
TOTAL IRON BINDING CAPACITY: 199 UG/DL (ref 250–450)
UNSATURATED IRON BINDING CAPACITY: 134 UG/DL (ref 112–347)
VITAMIN B-12: 1176 PG/ML (ref 232–1245)
WBC # BLD: 6.2 K/UL (ref 3.5–11)

## 2022-11-09 PROCEDURE — 6360000002 HC RX W HCPCS: Performed by: NURSE PRACTITIONER

## 2022-11-09 PROCEDURE — 82105 ALPHA-FETOPROTEIN SERUM: CPT

## 2022-11-09 PROCEDURE — 82103 ALPHA-1-ANTITRYPSIN TOTAL: CPT

## 2022-11-09 PROCEDURE — 2580000003 HC RX 258: Performed by: NURSE PRACTITIONER

## 2022-11-09 PROCEDURE — 83550 IRON BINDING TEST: CPT

## 2022-11-09 PROCEDURE — 97116 GAIT TRAINING THERAPY: CPT

## 2022-11-09 PROCEDURE — 87902 NFCT AGT GNTYP ALYS HEP C: CPT

## 2022-11-09 PROCEDURE — 97162 PT EVAL MOD COMPLEX 30 MIN: CPT

## 2022-11-09 PROCEDURE — A4216 STERILE WATER/SALINE, 10 ML: HCPCS | Performed by: NURSE PRACTITIONER

## 2022-11-09 PROCEDURE — 2580000003 HC RX 258: Performed by: INTERNAL MEDICINE

## 2022-11-09 PROCEDURE — 6370000000 HC RX 637 (ALT 250 FOR IP): Performed by: STUDENT IN AN ORGANIZED HEALTH CARE EDUCATION/TRAINING PROGRAM

## 2022-11-09 PROCEDURE — 86376 MICROSOMAL ANTIBODY EACH: CPT

## 2022-11-09 PROCEDURE — 80048 BASIC METABOLIC PNL TOTAL CA: CPT

## 2022-11-09 PROCEDURE — 2580000003 HC RX 258: Performed by: STUDENT IN AN ORGANIZED HEALTH CARE EDUCATION/TRAINING PROGRAM

## 2022-11-09 PROCEDURE — 83516 IMMUNOASSAY NONANTIBODY: CPT

## 2022-11-09 PROCEDURE — 82784 ASSAY IGA/IGD/IGG/IGM EACH: CPT

## 2022-11-09 PROCEDURE — 86225 DNA ANTIBODY NATIVE: CPT

## 2022-11-09 PROCEDURE — APPNB30 APP NON BILLABLE TIME 0-30 MINS: Performed by: NURSE PRACTITIONER

## 2022-11-09 PROCEDURE — 97535 SELF CARE MNGMENT TRAINING: CPT

## 2022-11-09 PROCEDURE — 82728 ASSAY OF FERRITIN: CPT

## 2022-11-09 PROCEDURE — 86038 ANTINUCLEAR ANTIBODIES: CPT

## 2022-11-09 PROCEDURE — 82746 ASSAY OF FOLIC ACID SERUM: CPT

## 2022-11-09 PROCEDURE — 99254 IP/OBS CNSLTJ NEW/EST MOD 60: CPT | Performed by: INTERNAL MEDICINE

## 2022-11-09 PROCEDURE — 83540 ASSAY OF IRON: CPT

## 2022-11-09 PROCEDURE — 97166 OT EVAL MOD COMPLEX 45 MIN: CPT

## 2022-11-09 PROCEDURE — 2060000000 HC ICU INTERMEDIATE R&B

## 2022-11-09 PROCEDURE — 85025 COMPLETE CBC W/AUTO DIFF WBC: CPT

## 2022-11-09 PROCEDURE — 6370000000 HC RX 637 (ALT 250 FOR IP): Performed by: NURSE PRACTITIONER

## 2022-11-09 PROCEDURE — 99233 SBSQ HOSP IP/OBS HIGH 50: CPT | Performed by: INTERNAL MEDICINE

## 2022-11-09 PROCEDURE — C9113 INJ PANTOPRAZOLE SODIUM, VIA: HCPCS | Performed by: NURSE PRACTITIONER

## 2022-11-09 PROCEDURE — 82390 ASSAY OF CERULOPLASMIN: CPT

## 2022-11-09 PROCEDURE — 82607 VITAMIN B-12: CPT

## 2022-11-09 PROCEDURE — 36415 COLL VENOUS BLD VENIPUNCTURE: CPT

## 2022-11-09 RX ORDER — POTASSIUM CHLORIDE 20 MEQ/1
40 TABLET, EXTENDED RELEASE ORAL ONCE
Status: COMPLETED | OUTPATIENT
Start: 2022-11-09 | End: 2022-11-09

## 2022-11-09 RX ORDER — SODIUM CHLORIDE 9 MG/ML
INJECTION, SOLUTION INTRAVENOUS CONTINUOUS
Status: DISCONTINUED | OUTPATIENT
Start: 2022-11-09 | End: 2022-11-10 | Stop reason: HOSPADM

## 2022-11-09 RX ORDER — INSULIN GLARGINE 100 [IU]/ML
20 INJECTION, SOLUTION SUBCUTANEOUS ONCE
Status: COMPLETED | OUTPATIENT
Start: 2022-11-09 | End: 2022-11-09

## 2022-11-09 RX ORDER — POTASSIUM CHLORIDE 20 MEQ/1
40 TABLET, EXTENDED RELEASE ORAL PRN
Status: DISCONTINUED | OUTPATIENT
Start: 2022-11-09 | End: 2022-11-10 | Stop reason: HOSPADM

## 2022-11-09 RX ORDER — POTASSIUM CHLORIDE 7.45 MG/ML
10 INJECTION INTRAVENOUS PRN
Status: DISCONTINUED | OUTPATIENT
Start: 2022-11-09 | End: 2022-11-10 | Stop reason: HOSPADM

## 2022-11-09 RX ADMIN — ASPIRIN 81 MG 81 MG: 81 TABLET ORAL at 08:46

## 2022-11-09 RX ADMIN — METOPROLOL TARTRATE 25 MG: 25 TABLET, FILM COATED ORAL at 08:46

## 2022-11-09 RX ADMIN — METOPROLOL TARTRATE 25 MG: 25 TABLET, FILM COATED ORAL at 21:58

## 2022-11-09 RX ADMIN — HEPARIN SODIUM 5000 UNITS: 5000 INJECTION INTRAVENOUS; SUBCUTANEOUS at 21:58

## 2022-11-09 RX ADMIN — HEPARIN SODIUM 5000 UNITS: 5000 INJECTION INTRAVENOUS; SUBCUTANEOUS at 08:52

## 2022-11-09 RX ADMIN — PANTOPRAZOLE SODIUM 40 MG: 40 TABLET, DELAYED RELEASE ORAL at 05:06

## 2022-11-09 RX ADMIN — ATORVASTATIN CALCIUM 80 MG: 80 TABLET, FILM COATED ORAL at 21:59

## 2022-11-09 RX ADMIN — FLUCONAZOLE 200 MG: 100 TABLET ORAL at 08:46

## 2022-11-09 RX ADMIN — CLONAZEPAM 4 MG: 1 TABLET ORAL at 21:58

## 2022-11-09 RX ADMIN — SODIUM CHLORIDE 40 MG: 9 INJECTION, SOLUTION INTRAMUSCULAR; INTRAVENOUS; SUBCUTANEOUS at 19:00

## 2022-11-09 RX ADMIN — POTASSIUM CHLORIDE 40 MEQ: 1500 TABLET, EXTENDED RELEASE ORAL at 08:46

## 2022-11-09 RX ADMIN — CYCLOBENZAPRINE 10 MG: 10 TABLET, FILM COATED ORAL at 14:01

## 2022-11-09 RX ADMIN — POTASSIUM CHLORIDE 40 MEQ: 1500 TABLET, EXTENDED RELEASE ORAL at 06:33

## 2022-11-09 RX ADMIN — INSULIN GLARGINE 20 UNITS: 100 INJECTION, SOLUTION SUBCUTANEOUS at 08:47

## 2022-11-09 RX ADMIN — DEXTROSE AND SODIUM CHLORIDE: 5; 450 INJECTION, SOLUTION INTRAVENOUS at 05:06

## 2022-11-09 RX ADMIN — INSULIN LISPRO 4 UNITS: 100 INJECTION, SOLUTION INTRAVENOUS; SUBCUTANEOUS at 08:47

## 2022-11-09 RX ADMIN — TRAZODONE HYDROCHLORIDE 300 MG: 100 TABLET ORAL at 21:58

## 2022-11-09 RX ADMIN — SODIUM CHLORIDE: 9 INJECTION, SOLUTION INTRAVENOUS at 11:09

## 2022-11-09 ASSESSMENT — ENCOUNTER SYMPTOMS
CHEST TIGHTNESS: 0
COUGH: 0
ABDOMINAL PAIN: 0
SHORTNESS OF BREATH: 0
VOMITING: 0
DIARRHEA: 0
NAUSEA: 1
CHOKING: 0

## 2022-11-09 ASSESSMENT — PAIN SCALES - GENERAL
PAINLEVEL_OUTOF10: 5
PAINLEVEL_OUTOF10: 0
PAINLEVEL_OUTOF10: 8

## 2022-11-09 NOTE — PLAN OF CARE
Problem: Discharge Planning  Goal: Discharge to home or other facility with appropriate resources  11/9/2022 0512 by Ming Fontenot RN  Outcome: Progressing  11/8/2022 1733 by Evelin Benson RN  Outcome: Progressing  Flowsheets  Taken 11/8/2022 1200 by Parul Villatoro  Discharge to home or other facility with appropriate resources:   Identify barriers to discharge with patient and caregiver   Refer to discharge planning if patient needs post-hospital services based on physician order or complex needs related to functional status, cognitive ability or social support system  Taken 11/8/2022 0800 by Parul Villatoro  Discharge to home or other facility with appropriate resources:   Identify barriers to discharge with patient and caregiver   Refer to discharge planning if patient needs post-hospital services based on physician order or complex needs related to functional status, cognitive ability or social support system     Problem: Pain  Goal: Verbalizes/displays adequate comfort level or baseline comfort level  11/9/2022 0512 by Ming Fontenot RN  Outcome: Progressing  11/8/2022 1733 by Evelin Benson RN  Outcome: Progressing     Problem: Metabolic/Fluid and Electrolytes - Adult  Goal: Electrolytes maintained within normal limits  11/9/2022 0512 by Ming Fontenot RN  Outcome: Progressing  11/8/2022 1733 by Evelin Benson RN  Outcome: Progressing  Flowsheets  Taken 11/8/2022 1200 by Parul Villatoro  Electrolytes maintained within normal limits:   Monitor labs and assess patient for signs and symptoms of electrolyte imbalances   Administer electrolyte replacement as ordered   Monitor response to electrolyte replacements, including repeat lab results as appropriate  Taken 11/8/2022 0800 by Parul Villatoro  Electrolytes maintained within normal limits:   Monitor labs and assess patient for signs and symptoms of electrolyte imbalances   Administer electrolyte replacement as ordered  Goal: Hemodynamic stability and optimal renal function maintained  11/9/2022 0512 by Pawan Solis RN  Outcome: Progressing  11/8/2022 1733 by Leopoldo Ala, RN  Outcome: Progressing  Flowsheets  Taken 11/8/2022 1200 by Elsy Nielsen  Hemodynamic stability and optimal renal function maintained:   Monitor labs and assess for signs and symptoms of volume excess or deficit   Monitor intake, output and patient weight   Monitor urine specific gravity, serum osmolarity and serum sodium as indicated or ordered  Taken 11/8/2022 0800 by Elsy Nielsen  Hemodynamic stability and optimal renal function maintained:   Monitor labs and assess for signs and symptoms of volume excess or deficit   Monitor intake, output and patient weight   Monitor urine specific gravity, serum osmolarity and serum sodium as indicated or ordered  Goal: Glucose maintained within prescribed range  11/9/2022 0512 by Pawan Solis RN  Outcome: Progressing  11/8/2022 1733 by Leopoldo Ala, RN  Outcome: Progressing  Flowsheets  Taken 11/8/2022 1200 by Elsy Nielsen  Glucose maintained within prescribed range:   Monitor blood glucose as ordered   Assess for signs and symptoms of hyperglycemia and hypoglycemia  Taken 11/8/2022 0800 by Elsy Nielsen  Glucose maintained within prescribed range:   Monitor blood glucose as ordered   Assess for signs and symptoms of hyperglycemia and hypoglycemia     Problem: Skin/Tissue Integrity  Goal: Absence of new skin breakdown  Description: 1. Monitor for areas of redness and/or skin breakdown  2. Assess vascular access sites hourly  3. Every 4-6 hours minimum:  Change oxygen saturation probe site  4. Every 4-6 hours:  If on nasal continuous positive airway pressure, respiratory therapy assess nares and determine need for appliance change or resting period.   11/9/2022 0512 by Pawan Solis RN  Outcome: Progressing  11/8/2022 1733 by Leopoldo Ala, RN  Outcome: Progressing     Problem: Safety - Adult  Goal: Free from fall injury  11/9/2022 0512 by Shonda Woodard RN  Outcome: Progressing  11/8/2022 1733 by Rafael Prasad RN  Outcome: Progressing  Flowsheets (Taken 11/8/2022 5690 by Terrarianna Burn)  Free From Fall Injury: Instruct family/caregiver on patient safety     Problem: ABCDS Injury Assessment  Goal: Absence of physical injury  11/9/2022 0512 by Shonda Woodard RN  Outcome: Progressing  11/8/2022 1733 by Rafael Prasad RN  Outcome: Progressing  Flowsheets (Taken 11/8/2022 0819 by Terrarianna Burn)  Absence of Physical Injury: Implement safety measures based on patient assessment     Problem: Nutrition Deficit:  Goal: Optimize nutritional status  11/9/2022 0512 by Shonda Woodard RN  Outcome: Progressing  11/8/2022 1733 by Rafael Prasad RN  Outcome: Progressing

## 2022-11-09 NOTE — CONSULTS
Gastroenterology Consult Note      Patient: Amilcar Haynes  : 1970  Acct#:  [id number]     Date:  2022    Subjective:       History of Present Illness  Patient is a 47 y.o.  male admitted with Diabetic ketoacidosis without coma associated with type 1 diabetes mellitus ([address] [E10.10]  Diabetic ketoacidosis without coma associated with other specified diabetes mellitus ([address] [E13.10] who is seen in consult for dysphagia,  Iron deficiency anemia, elevated liver enzymes    Is a 71-year-old gentleman admitted with DKA just Moved to the floor, he came to the emergency room with shortness of breath and diagnosed with DKA and being treated in the ICU for that. Also he did have 1 week history of odynophagia and dysphagia.   He had an EGD done in February this year which was showing Candida esophagitis not sure if he took the Diflucan or not, he was supposed to be on PPI also he is not taking that  He reports 60 pound weight loss this year  No vomiting no hematemesis no melena no hematochezia  He has hepatitis C antibody being positive but the RNA has not been checked  No other GI symptoms    His work-up including CT scan of the abdomen which showed hepatic steatosis  No other pathology  His hemoglobin is 8.2  His white count was 11.4 came down to 6.2 creatinine 1.3 potassium 3.1  His iron saturation is normal  Alkaline phosphatase 145  ALT 54    Endoscopy 2/15/22 egd (geeta) candida esophagitis no colonoscopy       Past Medical History:   Diagnosis Date    Diabetes mellitus ([facility]     Obstructive lung disease ([facility]     2022    Panic attack       Past Surgical History:   Procedure Laterality Date    APPENDECTOMY      FEMUR FRACTURE SURGERY Left 2022    Cephalomedullary Nail    HAND SURGERY      JOINT REPLACEMENT      TIBIA FRACTURE SURGERY Left 2022    LEFT FEMUR CEPHALOMEDULLARY NAIL performed by Lary Vasquez DO at [address] N/A 02/15/2022    EGD BIOPSY performed by Miracle Brown MD at [facility]      Past Endoscopic History as above    Admission Meds  No current facility-administered medications on file prior to encounter. Current Outpatient Medications on File Prior to Encounter   Medication Sig Dispense Refill    aspirin 81 MG chewable tablet Take 1 tablet by mouth daily 30 tablet 3    atorvastatin (LIPITOR) 80 MG tablet Take 1 tablet by mouth nightly 30 tablet 3    metoprolol tartrate (LOPRESSOR) 25 MG tablet Take 1 tablet by mouth 2 times daily 60 tablet 3    pantoprazole (PROTONIX) 40 MG tablet Take 1 tablet by mouth 2 times daily (before meals) for 25 doses 25 tablet 0    fluconazole (DIFLUCAN) 100 MG tablet Take 2 tablets by mouth daily for 14 days 28 tablet 0    insulin glargine (LANTUS) 100 UNIT/ML injection vial Inject 15 Units into the skin nightly      cyclobenzaprine (FLEXERIL) 10 MG tablet Take 1 tablet by mouth 3 times daily as needed for Muscle spasms 50 tablet 0    acetaminophen (TYLENOL) 500 MG tablet Take 2 tablets by mouth every 6 hours as needed for Pain 112 tablet 0    vitamin D (ERGOCALCIFEROL) 1.25 MG (14138 UT) CAPS capsule Take 1 capsule by mouth once a week 8 capsule 1    insulin glargine (LANTUS SOLOSTAR) 100 UNIT/ML injection pen Inject 25 Units into the skin 2 times daily (Patient taking differently: Inject 25 Units into the skin daily) 5 pen 3    insulin aspart (NOVOLOG FLEXPEN) 100 UNIT/ML injection pen Inject 5 Units into the skin 3 times daily (before meals) 5 pen 3    traZODone (DESYREL) 150 MG tablet Take 300 mg by mouth nightly       clonazePAM (KLONOPIN) 2 MG tablet Take 4 mg by mouth daily as needed for Anxiety. calcium carbonate (TUMS) 500 MG chewable tablet Take 2 tablets by mouth 3 times daily as needed for Heartburn 21 tablet 0    gabapentin (NEURONTIN) 300 MG capsule Take 1 capsule by mouth in the morning and 1 capsule at noon and 1 capsule in the evening. Do all this for 5 days.  15 capsule 0 ondansetron (ZOFRAN ODT) 4 MG disintegrating tablet Take 1 tablet by mouth every 8 hours as needed for Nausea or Vomiting 12 tablet 0    Blood Glucose Monitoring Suppl (ONE TOUCH ULTRA 2) w/Device KIT 1 kit by Does not apply route daily 1 kit 0    Lancets MISC 1 each by Does not apply route 2 times daily 300 each 1    Alcohol Swabs 70 % PADS Use as needed with blood glucose checks 100 each 0    Insulin Pen Needle ([employer] PEN NEEDLES 29G) 29G X 12MM MISC 1 each by Does not apply route daily 100 each 3    albuterol sulfate  (90 Base) MCG/ACT inhaler Inhale 2 puffs into the lungs every 4 hours as needed for Wheezing         Patient   Does Use ASA, NSAID No  Allergies  Allergies   Allergen Reactions    Azithromycin Swelling    Acetaminophen-Codeine Other (See Comments) and Nausea And Vomiting    Codeine Itching    Hydrocodone-Acetaminophen Rash    Ibuprofen Other (See Comments)     Stomach cramps    Morphine Other (See Comments)     Gives headaches    Tramadol Itching     vomitting        Social   Social History     Tobacco Use    Smoking status: Every Day     Packs/day: 0.25     Years: 41.00     Pack years: 10.25     Types: Cigarettes    Smokeless tobacco: Never   Substance Use Topics    Alcohol use: Not Currently        PSYCH HISTORY:  Depression No  Anxiety No  Suicide No       History reviewed. No pertinent family history. No family history of colon cancer, Crohn's disease, or ulcerative colitis. Review of Systems  Constitutional: negative  Eyes: negative  Ears, nose, mouth, throat, and face: negative  Respiratory: negative  Cardiovascular: negative  Gastrointestinal: negative  Genitourinary:negative  Integument/breast: negative  Hematologic/lymphatic: negative  Musculoskeletal:negative  Endocrine: negative           Physical Exam  Blood pressure 127/85, pulse 74, temperature 98.3 °F (36.8 °C), resp. rate 18, height 5' 7\" (1.702 m), weight 112 lb 7 oz (51 kg), SpO2 99 %.          General Appearance: alert and oriented to person, place and time, well-developed and well-nourished, in no acute distress  Skin: warm and dry, no rash or erythema  Head: normocephalic and atraumatic  Eyes: pupils equal, round, and reactive to light, extraocular eye movements intact, conjunctivae normal  ENT: hearing grossly normal bilaterally  Neck: neck supple and non tender without mass, no thyromegaly or thyroid nodules, no cervical lymphadenopathy   Pulmonary/Chest: clear to auscultation bilaterally- no wheezes, rales or rhonchi, normal air movement, no respiratory distress  Cardiovascular: normal rate, regular rhythm, normal S1 and S2, no murmurs, rubs, clicks or gallops, distal pulses intact, no carotid bruits  Abdomen: soft, non-tender, non-distended, normal bowel sounds, no masses or organomegaly  Extremities: no cyanosis, clubbing or edema  Musculoskeletal: normal range of motion, no joint swelling, deformity or tenderness  Neurologic: no cranial nerve deficit and muscle strength normal    Data Review:    Recent Labs     11/07/22 1919 11/08/22  0601 11/09/22  0400   WBC 11.4* 8.9 6.2   HGB 9.5* 8.0* 8.2*   HCT 33.8* 25.2* 25.1*   MCV 97.8 86.5 85.2    283 262     Recent Labs     11/08/22  0245 11/08/22  0601 11/08/22  1005 11/09/22  0400   * 134* 139 137   K 3.9 3.7 3.6* 3.1*   CL 90* 98 104 104   CO2 16* 22 27 25   PHOS 1.8* 2.1* 2.0*  --    BUN 40* 35* 31* 21*   CREATININE 1.89* 1.72* 1.68* 1.32*     Recent Labs     11/07/22 1919   AST 31   ALT 54*   BILITOT 0.4   ALKPHOS 145*     No results for input(s): LIPASE, AMYLASE in the last 72 hours. No results for input(s): PROTIME, INR in the last 72 hours. No results for input(s): PTT in the last 72 hours. No results for input(s): OCCULTBLD in the last 72 hours.   CEA:  No results found for: CEA  Ca 125:  No results found for:   Ca 19-9:  No results found for:   Ca 15-3:  No results found for:   AFP:  No components found for: AFAFP  Beta HCG:  No components found for: BHCG  Neuron Specific Enolase:  No results found for: NSE  Imaging Studies:                           All appropriate imaging studies and reports reviewed: Yes                 Assessment:     Principal Problem:    Diabetic ketoacidosis without coma associated with type 1 diabetes mellitus ([address]  Active Problems:    Severe malnutrition (HCC)    Hepatitis C antibody test positive    Elevated LFTs  Resolved Problems:    * No resolved hospital problems. *    Dysphagia/odynophagia  History of Candida esophagitis not sure if was treated  History of reflux was supposed to be on PPI not taking  Hepatitis C antibody positive  Elevated liver enzymes  Steatosis of the liver on imaging  Weight loss  Marijuana abuse  DKA which has resolved    Recommendations:   EGD tomorrow  PPI  Hepatitis C RNA  Laboratory work-up to rule out other cause of  Based on the results of the EGD might need treatment for warm  Alpha-fetoprotein  His CAT scan is not showing a mass in the liver                      Thank you for allowing me to participate in the care of your patient. Please feel free to contact me with any questions or concerns.      Scott Sepulveda MD

## 2022-11-09 NOTE — PROGRESS NOTES
ICU Progress Note (Non-Vent)  [facility]    Patient - Saul Mhaer,  Age - 47 y.o.    - 1970      Room Number -    MRN -  [id number]   [facility] # - [de-identified]  Date of Admission -  2022  6:40 PM    Events of Past 24 Hours   Episode of hypoglycemia after insulin drip was discontinued but then he started eating. Denies any dyspnea, remains on room air    Vitals    height is 5' 7\" (1.702 m) and weight is 112 lb 7 oz (51 kg). His oral temperature is 98.4 °F (36.9 °C). His blood pressure is 128/66 and his pulse is 77. His respiration is 19 and oxygen saturation is 95%. Temperature Range: Temp: 98.4 °F (36.9 °C) Temp  Av.2 °F (36.8 °C)  Min: 97.8 °F (36.6 °C)  Max: 98.8 °F (37.1 °C)  BP Range:  Systolic (85TCK), USI:681 , Min:84 , ZQU:562     Diastolic (94HCB), EAE:57, Min:45, Max:77    Pulse Range: Pulse  Av.3  Min: 60  Max: 93  Respiration Range: Resp  Av  Min: 0  Max: 26  Current Pulse Ox[[id number]]  SpO2: 95 %  24HR Pulse Ox Range:  SpO2  Av.5 %  Min: 93 %  Max: 99 %  Oxygen Amount and Delivery:       Wt Readings from Last 3 Encounters:   22 112 lb 7 oz (51 kg)   22 114 lb 9.5 oz (52 kg)   10/25/22 125 lb (56.7 kg)     I/O     Intake/Output Summary (Last 24 hours) at 2022 0735  Last data filed at 2022 1827  Gross per 24 hour   Intake 2865 ml   Output 1100 ml   Net 1765 ml     DRAIN/TUBE OUTPUT       Invasive Lines   ICP PRESSURE RANGE  No data recorded  CVP PRESSURE RANGE  No data recorded      Medications      insulin glargine  20 Units SubCUTAneous Once    calcium gluconate  1,000 mg IntraVENous Once    insulin lispro  0-16 Units SubCUTAneous TID WC    insulin lispro  0-4 Units SubCUTAneous Nightly    aspirin  81 mg Oral Daily    atorvastatin  80 mg Oral Nightly    fluconazole  200 mg Oral Daily    [Held by provider] insulin glargine  25 Units SubCUTAneous Daily    [Held by provider] insulin glargine  15 Units SubCUTAneous Nightly    metoprolol tartrate  25 mg Oral BID    pantoprazole  40 mg Oral BID AC    heparin (porcine)  5,000 Units SubCUTAneous BID     potassium chloride **OR** potassium alternative oral replacement **OR** potassium chloride, perflutren lipid microspheres, glucose, dextrose bolus **OR** dextrose bolus, glucagon (rDNA), dextrose, lidocaine, albuterol sulfate HFA, clonazePAM, cyclobenzaprine, traZODone, polyethylene glycol  IV Drips/Infusions   dextrose 5 % and 0.45 % NaCl 75 mL/hr at 11/09/22 0506    dextrose         Diet/Nutrition   ADULT DIET; Regular; 4 carb choices (60 gm/meal)  ADULT ORAL NUTRITION SUPPLEMENT; Breakfast, Lunch, Dinner; Diabetic Oral Supplement    Exam      Constitutional - Alert, arousable  General Appearance thin, unkempt  HEENT -normocephalic, atraumatic. PERRLA  Lungs - Chest expands equally, no wheezes, rales or rhonchi. Cardiovascular - Heart sounds are normal.  normal rate and rhythm regular, no murmur, gallop or rub. Abdomen - soft, nontender, nondistended, no masses or organomegaly  Neurologic - CN II-XII are grossly intact.  There are no focal motor deficits  Skin - no bruising or bleeding  Extremities - no cyanosis, clubbing or edema    Lab Results   CBC     Lab Results   Component Value Date/Time    WBC 6.2 11/09/2022 04:00 AM    RBC 2.95 11/09/2022 04:00 AM    HGB 8.2 11/09/2022 04:00 AM    HCT 25.1 11/09/2022 04:00 AM     11/09/2022 04:00 AM    MCV 85.2 11/09/2022 04:00 AM    MCH 27.9 11/09/2022 04:00 AM    MCHC 32.8 11/09/2022 04:00 AM    RDW 14.5 11/09/2022 04:00 AM    METASPCT 1 11/08/2022 06:01 AM    LYMPHOPCT 34 11/09/2022 04:00 AM    MONOPCT 13 11/09/2022 04:00 AM    MYELOPCT 1 11/08/2022 06:01 AM    BASOPCT 0 11/09/2022 04:00 AM    MONOSABS 0.80 11/09/2022 04:00 AM    LYMPHSABS 2.10 11/09/2022 04:00 AM    EOSABS 0.10 11/09/2022 04:00 AM    BASOSABS 0.00 11/09/2022 04:00 AM    DIFFTYPE NOT REPORTED 04/06/2022 01:49 PM       BMP   Lab Results   Component Value Date/Time     11/09/2022 04:00 AM    K 3.1 11/09/2022 04:00 AM     11/09/2022 04:00 AM    CO2 25 11/09/2022 04:00 AM    BUN 21 11/09/2022 04:00 AM    CREATININE 1.32 11/09/2022 04:00 AM    GLUCOSE 214 11/09/2022 04:00 AM       LFTS  Lab Results   Component Value Date/Time    ALKPHOS 145 11/07/2022 07:19 PM    ALT 54 11/07/2022 07:19 PM    AST 31 11/07/2022 07:19 PM    PROT 6.2 11/07/2022 07:19 PM    BILITOT 0.4 11/07/2022 07:19 PM    BILIDIR 0.2 11/04/2022 05:55 AM    IBILI 0.6 11/04/2022 05:55 AM    LABALBU 3.6 11/07/2022 07:19 PM       ABG ABGs: No results found for: PHART, PO2ART, TVQ1JAE    Lab Results   Component Value Date/Time    MODE NOT REPORTED 02/14/2022 04:30 PM         INR  No results for input(s): PROTIME, INR in the last 72 hours. APTT  Recent Labs     11/06/22  1215   APTT 21.9       Lactic Acid  Lab Results   Component Value Date/Time    LACTA 1.1 11/08/2022 06:01 AM    LACTA 3.7 11/07/2022 07:15 PM        BNP   No results for input(s): BNP in the last 72 hours.      Cultures       Radiology     CXR      CT Scans    (See actual reports for details)      SYSTEMS ASSESSMENT    DKA, possibly related to noncompliance-resolved  Metabolic acidosis, improving   DM1, not controlled/compliant   Acute on chronic kidney disease, patient received contrast for CT scan of chest, abdomen and pelvis on 11/4  History of COPD, however, FEV1 82% predicted which is normal  Recent NSTEMI   History of tobacco use 50+ pack year history  Right lower lobe nodule, granuloma    Hemodynamically stable  Anion gap normal with normal bicarbonate  Renal function back to baseline  Replace potassium  Transfer out of ICU  Based on PFTs, he does not appear to have COPD but would have them repeated  Would reevaluate the need for Diflucan, he is ordered approximately 2 weeks by primary service; suggest GI consultation if it is for esophagitis  Further management of blood sugars and hypokalemia per primary service    Critical Care Time   0 min    Electronically signed by Tyler Smith MD on 11/9/2022 at 7:35 AM

## 2022-11-09 NOTE — PROGRESS NOTES
Physical Therapy  Facility/Department: [facility] PROGRESSIVE CARE  Physical Therapy Initial Assessment    Name: Yola Peoples  : 1970  MRN: [id number]  Date of Service: 2022    Discharge Recommendations:  Home with Home health PT, Patient would benefit from continued therapy after discharge, Therapy recommended at discharge          Patient Diagnosis(es): The encounter diagnosis was Diabetic ketoacidosis without coma associated with other specified diabetes mellitus ([facility] [address] Past Medical History:  has a past medical history of Diabetes mellitus ([facility] [address] Obstructive lung disease ([facility] [address] and Panic attack. Past Surgical History:  has a past surgical history that includes Appendectomy; Hand surgery; Upper gastrointestinal endoscopy (N/A, 02/15/2022); Femur fracture surgery (Left, 2022); Tibia fracture surgery (Left, 2022); and joint replacement. Assessment   Assessment: The patient requires SBA for bed mobility, CGA for transfers and ambulation x20' with RW, and CGA for single box step negotiation x1 with BUE support.  PT services are warranted to address deficits and progress pt to prior level of independence and function  Treatment Diagnosis: Impaired mobility, strength, and balance 2* diabetic ketoacidosis and s/p L Femur Fracture & Cephalomedullary Nail  Specific Instructions for Next Treatment: Trial Crutches (pt is familiar with crutch use for mobility); HEP strengthening/ROM BLEs  Therapy Prognosis: Good  Decision Making: Medium Complexity  Exam: ROM, MMT, Balance, and functional mobility assessments  Clinical Presentation: pt is alert, pleasant, and cooperative  Requires PT Follow-Up: Yes  Activity Tolerance  Activity Tolerance: Patient limited by pain     Plan   Physcial Therapy Plan  General Plan: 6-7 times per week  Specific Instructions for Next Treatment: Trial Crutches (pt is familiar with crutch use for mobility); HEP strengthening/ROM BLEs  Current Treatment Recommendations: Strengthening, ROM, Balance training, Functional mobility training, Transfer training, Gait training, Stair training, Pain management, Equipment evaluation, education, & procurement, Safety education & training, Therapeutic activities, Home exercise program, Positioning  Safety Devices  Type of Devices: All fall risk precautions in place, Call light within reach, Gait belt, Patient at risk for falls, Left in chair, Nurse notified     Restrictions  Restrictions/Precautions  Restrictions/Precautions: Fall Risk, General Precautions  Required Braces or Orthoses?: No  Implants present? : Metal implants (Left Hip and femure fracture repair)  Position Activity Restriction  Other position/activity restrictions: Recent Hx of  L Femur Fracture with repair 9/29/22 and has recieved little therapy since; Recent admission to [facility]. V's for NSTEMI     Subjective   Pain: pt reports 7/10 pain in left hip and proximal femur. Pt reports increased pain to 9/10 with movement. RN notified  General  Chart Reviewed: Yes  Patient assessed for rehabilitation services?: Yes  Additional Pertinent Hx: The patient is a 47 y.o. Non- / non  male who presents withShortness of Breath   and he is admitted to the hospital for the management of DKA. Patient is a 28-year-old male past medical history significant for type 1 diabetes complicated with diabetic neuropathy and nephropathy, CKD stage III, COPD, and recent hospitalization for femur fracture and different hospitalization for NSTEMI  presented to the ED on 11/7/2022 with chief complaint of shortness of breath and chest pain. Per patient, he has been experiencing shortness of breath since discharge from [facility] on the sixth. Yesterday he started having pressure-like nonradiating chest pain and shortness of breath that woke him up from sleep. He is also been feeling nauseous and fatigued/weak since his discharge.   Per patient, he is not been taking his insulin since being discharged from [facility] [address] [place]. Response To Previous Treatment: Patient with no complaints from previous session. Family / Caregiver Present: No  Referring Practitioner: Dr. Tre Patterson  Referral Date : 11/08/22  Diagnosis: Diabetic ketoacidosis without coma associated with type 1 diabetes mellitus  Follows Commands: Within Functional Limits  Subjective  Subjective: Pt is agreeable to therapy and stating \"I need to get home to my two (teenage) boys, I don't think its safe for them to be home alone like this\"         Social/Functional History  Social/Functional History  Lives With: Family (2 sons (teenagers))  Type of Home: House  Home Layout: Bed/Bath upstairs, One level (2nd floor duplex)  Home Access: Stairs to enter with rails  Entrance Stairs - Number of Steps: FF Stairs to enter and R rail  Bathroom Shower/Tub: Tub/Shower unit, Curtain (Ordered Shower chair and has been hospitalized during delivery)  Bathroom Toilet: Standard  Bathroom Equipment: Grab bars in shower  [facility]  Has the patient had two or more falls in the past year or any fall with injury in the past year?: Yes  Receives Help From: Family (2 sons)  ADL Assistance: Independent  Homemaking Assistance: Independent  Homemaking Responsibilities: Yes  Ambulation Assistance: Independent (no device)  Transfer Assistance: Independent (no device)  Active : No  Patient's  Info: bus  Occupation:  (on SSI)  IADL Comments: pt sleeps on flat bed  Additional Comments: pt reports that two sons are available prn and able to assist. pt reports that sons are very lazy.  No other family around to provide support  Vision/Hearing  Vision  Vision: Impaired  Vision Exceptions: Wears glasses for reading  Hearing  Hearing: Exceptions to [facility]   Orientation  Overall Orientation Status: Within Normal Limits  Cognition  Overall Cognitive Status: WFL     Objective   Heart Rate: 74  BP: 127/85  MAP (Calculated): 99  Resp: 18  SpO2: 99 %  O2 Device: None (Room air)     Observation/Palpation  Observation: Peripheral IV R antecubital        AROM RLE (degrees)  RLE AROM: WFL  PROM LLE (degrees)  LLE PROM: Exceptions  LLE General PROM: Limited PROM in all directions 2* pain  AROM LLE (degrees)  LLE AROM : Exceptions  LLE General AROM: Limited in all directions 2* pain and weakness (recent Femur fracture and repair affecting LLE function)  AROM RUE (degrees)  RUE General AROM: See OT  AROM LUE (degrees)  LUE General AROM: See OT  Strength RLE  Strength RLE: WFL  Comment: Grossly 4/5  Strength LLE  Strength LLE: Exception  Comment: Grossly 3- to 3/5  Strength RUE  Comment: See OT  Strength LUE  Comment: See OT     Sensation  Overall Sensation Status: Impaired (Chronic N/T in bilateral plantar surfaces of feet, and N/T local to Left incision)     Bed mobility  Supine to Sit: Stand by assistance  Sit to Supine: Unable to assess (Pt sitting in chair at end of session)  Scooting: Stand by assistance  Bed Mobility Comments: Bed mobility completed with HOB elevated with increased time due to LLE pain.   Transfers  Sit to Stand: Contact guard assistance  Stand to Sit: Contact guard assistance  Comment: RW for all transfers  Ambulation  Surface: Level tile  Device: Rolling Walker  Assistance: Contact guard assistance  Quality of Gait: Asymmetrical step lengths (L < R), antalgic, very slow, forward trunk  Gait Deviations: Slow Gina;Decreased step length;Decreased step height;Decreased head and trunk rotation  Distance: 15', 20'  Stairs/Curb  Stairs?: Yes  Stairs  # Steps : 1  Stairs Height: 4\"  Rails: Bilateral  Device: No Device  Assistance: Contact guard assistance  Comment: With verbal cues for sequenceing and stepping pattern, the patient demos steady negotiation of single step but does with much discomfort     Balance  Posture: Fair  Sitting - Static: Fair;+  Sitting - Dynamic: Fair  Standing - Static: Fair (RW)  Standing - Dynamic: Fair (RW)  Comments: no LOB to report           AM-PAC Score  AM-PAC Inpatient Mobility Raw Score : 18 (11/09/22 1343)  AM-PAC Inpatient T-Scale Score : 43.63 (11/09/22 1343)  Mobility Inpatient CMS 0-100% Score: 46.58 (11/09/22 1343)  Mobility Inpatient CMS G-Code Modifier : CK (11/09/22 1343)     Goals  Short Term Goals  Time Frame for Short Term Goals: 4 visits  Short Term Goal 1: pt to demo independent bed mbility on flat bed  Short Term Goal 2: pt to demo all transfers with device and supervision  Short Term Goal 3: pt to ambulate 100' with device and supervision  Short Term Goal 4: pt to demo good technique for ROM and Strength exercises for HEP  Short Term Goal 5: pt to improve standing balance to fair + to dec fall risk  Patient Goals   Patient Goals : to get home ASAP       Education  Patient Education  Education Given To: Patient  Education Provided: Role of Therapy;Plan of Care;Precautions; Fall Prevention Strategies; Equipment;Transfer Training  Education Method: Verbal;Demonstration  Barriers to Learning: Vision;Lack of Family Support  Education Outcome: Verbalized understanding;Demonstrated understanding;Continued education needed      Therapy Time   Individual Concurrent Group Co-treatment   Time In 1343         Time Out 1419         Minutes 36         Timed Code Treatment Minutes: [facility], PT

## 2022-11-09 NOTE — PROGRESS NOTES
[address] [facility]    PROGRESS NOTE             11/9/2022    7:22 AM    Name:   Baljinder Peacock  MRN:     [id number]     Acct:      [de-identified]   Room:   2004/2004-01  IP Day:  2  Admit Date:  11/7/2022  6:40 PM    PCP:  BINA Neal  Code Status:  Full Code    Subjective:     C/C:   Chief Complaint   Patient presents with    Shortness of Breath     Interval History Status: improved. Patient seen and examined at bedside. Per nurse, patient had hypoglycemic episode with blood sugar in the 40s -had minimal oral intake yesterday. Patient does not have an appetite and feels slightly nauseous, I encouraged oral intake. We will start patient on Lantus 20 daily and closely monitor blood glucose. Patient will be transferred out of ICU today. Patient is still complaining of chest pain which seems to be positional in nature most likely musculoskeletal.    Brief History:     The patient is a 47 y.o. Non- / non  male who presents withShortness of Breath   and he is admitted to the hospital for the management of DKA. Patient is a 52-year-old male past medical history significant for type 1 diabetes complicated with diabetic neuropathy and nephropathy, CKD stage III, COPD, and recent hospitalization for femur fracture and different hospitalization for NSTEMI  presented to the ED on 11/7/2022 with chief complaint of shortness of breath and chest pain. Per patient, he has been experiencing shortness of breath since discharge from [facility] on the sixth. Yesterday he started having pressure-like nonradiating chest pain and shortness of breath that woke him up from sleep. He is also been feeling nauseous and fatigued/weak since his discharge. Per patient, he is not been taking his insulin since being [place] [facility].       In ED, vitals were significant for tachycardia with heart rate of 103 and tachypnea with respiratory rate of 26. Labs were significant for elevated blood glucose 830, potassium of 7.1, sodium of 124 -corrected sodium 136 -bicarb less than 6, and beta hydroxybutyrate above 16. VBG's were significant for pH of 6.9. Creatinine was elevated at 2.42 -patient's baseline 1.7-and leukocytosis of 11.4, elevated lactate of 3.7. UA was negative for UTI, chest x-ray negative for any acute cardiopulmonary abnormalities. Initial troponin of 52 and repeat troponin of 40. EKG significant for sinus tachycardia with heart rate of 151 and peaked T waves noted in leads V3, V4, V5 as well as V6, slightly widened QRS. DKA protocol initiated in ED. Central line was placed in right IJ. Patient was started on insulin and bicarb drip     Patient will be admitted to ICU for further management of DKA. Review of Systems:     Review of Systems   Constitutional:  Negative for chills and fever. Eyes:  Negative for visual disturbance. Respiratory:  Negative for cough, choking, chest tightness and shortness of breath. Cardiovascular:  Negative for chest pain and leg swelling. Gastrointestinal:  Positive for nausea. Negative for abdominal pain, diarrhea and vomiting. Genitourinary: Negative. Musculoskeletal: Negative. Skin: Negative. Neurological:  Negative for dizziness, weakness, light-headedness and numbness. Psychiatric/Behavioral: Negative. Medications: Allergies:     Allergies   Allergen Reactions    Azithromycin Swelling    Acetaminophen-Codeine Other (See Comments) and Nausea And Vomiting    Codeine Itching    Hydrocodone-Acetaminophen Rash    Ibuprofen Other (See Comments)     Stomach cramps    Morphine Other (See Comments)     Gives headaches    Tramadol Itching     vomitting       Current Meds:   Scheduled Meds:    calcium gluconate  1,000 mg IntraVENous Once    insulin lispro  0-16 Units SubCUTAneous TID WC    insulin lispro  0-4 Units SubCUTAneous Nightly    aspirin 81 mg Oral Daily    atorvastatin  80 mg Oral Nightly    fluconazole  200 mg Oral Daily    [Held by provider] insulin glargine  25 Units SubCUTAneous Daily    [Held by provider] insulin glargine  15 Units SubCUTAneous Nightly    metoprolol tartrate  25 mg Oral BID    pantoprazole  40 mg Oral BID AC    heparin (porcine)  5,000 Units SubCUTAneous BID     Continuous Infusions:    dextrose 5 % and 0.45 % NaCl 75 mL/hr at 22 0506    dextrose       PRN Meds: potassium chloride **OR** potassium alternative oral replacement **OR** potassium chloride, perflutren lipid microspheres, glucose, dextrose bolus **OR** dextrose bolus, glucagon (rDNA), dextrose, lidocaine, albuterol sulfate HFA, clonazePAM, cyclobenzaprine, traZODone, polyethylene glycol    Data:     Past Medical History:   has a past medical history of Diabetes mellitus ([facility] [address] Obstructive lung disease ([facility] [address] and Panic attack. Social History:   reports that he has been smoking cigarettes. He has a 10.25 pack-year smoking history. He has never used smokeless tobacco. He reports that he does not currently use alcohol. He reports current drug use. Drug: Marijuana [facility]). Family History: History reviewed. No pertinent family history. Vitals:  /66   Pulse 77   Temp 98.4 °F (36.9 °C) (Oral)   Resp 19   Ht 5' 7\" (1.702 m)   Wt 112 lb 7 oz (51 kg)   SpO2 95%   BMI 17.61 kg/m²   Temp (24hrs), Av.2 °F (36.8 °C), Min:97.8 °F (36.6 °C), Max:98.8 °F (37.1 °C)    Recent Labs     22  1645 22  1832 22  2304   POCGLU 84 98 111* 159*       I/O(24Hr):     Intake/Output Summary (Last 24 hours) at 2022 0722  Last data filed at 2022 1827  Gross per 24 hour   Intake 2865 ml   Output 1100 ml   Net 1765 ml       Labs:  [unfilled]    Lab Results   Component Value Date/Time    SPECIAL NOT REPORTED 02/15/2022 05:12 PM     Lab Results   Component Value Date/Time    CULTURE NO GROWTH 34 DAYS 02/15/2022 05:11 PM [facility]    Radiology:    XR CHEST (2 VW)    Result Date: 11/4/2022  EXAMINATION: TWO XRAY VIEWS OF THE CHEST 11/4/2022 1:15 am COMPARISON: 09/29/2022 radiograph HISTORY: ORDERING SYSTEM PROVIDED HISTORY: eval dyspnea TECHNOLOGIST PROVIDED HISTORY: eval dyspnea Reason for Exam: Dyspnea FINDINGS: Heart, mediastinum and pulmonary vascularity are normal.  The lungs are lucent and hyperinflated. There is a stable calcified granuloma in the right lower lobe. No acute pulmonary finding. No significant skeletal finding. Lucent lungs with chronic pattern of hyperinflation. No acute pulmonary finding. XR HIP LEFT (2-3 VIEWS)    Result Date: 11/4/2022  EXAMINATION: TWO XRAY VIEWS OF THE LEFT HIP; 4 XRAY VIEWS OF THE LEFT FEMUR 11/4/2022 1:15 am COMPARISON: None. HISTORY: ORDERING SYSTEM PROVIDED HISTORY: Eval left hip pain postsurgical TECHNOLOGIST PROVIDED HISTORY: Eval left hip pain postsurgical Reason for Exam: Increased leg pain, surgery x2 weeks ; ORDERING SYSTEM PROVIDED HISTORY: eval leg pain post op TECHNOLOGIST PROVIDED HISTORY: eval leg pain post op Reason for Exam: Increased leg pain, surgery x2 weeks FINDINGS: Prior surgical change of ORIF in the left femur. Previous intertrochanteric fracture shows anatomic alignment. There is no new fracture or dislocation at the hip or distally in the femur. The visualized knee is unremarkable. No significant soft tissue finding. Prior surgical change of ORIF with no acute abnormality of the left hip or femur. XR FEMUR LEFT (MIN 2 VIEWS)    Result Date: 11/4/2022  EXAMINATION: TWO XRAY VIEWS OF THE LEFT HIP; 4 XRAY VIEWS OF THE LEFT FEMUR 11/4/2022 1:15 am COMPARISON: None.  HISTORY: ORDERING SYSTEM PROVIDED HISTORY: Eval left hip pain postsurgical TECHNOLOGIST PROVIDED HISTORY: Eval left hip pain postsurgical Reason for Exam: Increased leg pain, surgery x2 weeks ; ORDERING SYSTEM PROVIDED HISTORY: eval leg pain post op TECHNOLOGIST PROVIDED HISTORY: eval leg pain post op Reason for Exam: Increased leg pain, surgery x2 weeks FINDINGS: Prior surgical change of ORIF in the left femur. Previous intertrochanteric fracture shows anatomic alignment. There is no new fracture or dislocation at the hip or distally in the femur. The visualized knee is unremarkable. No significant soft tissue finding. Prior surgical change of ORIF with no acute abnormality of the left hip or femur. CT ABDOMEN PELVIS W IV CONTRAST Additional Contrast? None    Result Date: 11/4/2022  EXAMINATION: CTA OF THE CHEST; CT OF THE ABDOMEN AND PELVIS WITH CONTRAST 11/4/2022 2:44 am TECHNIQUE: CTA of the chest was performed after the administration of intravenous contrast.  Multiplanar reformatted images are provided for review. MIP images are provided for review. Automated exposure control, iterative reconstruction, and/or weight based adjustment of the mA/kV was utilized to reduce the radiation dose to as low as reasonably achievable.; CT of the abdomen and pelvis was performed with the administration of intravenous contrast. Multiplanar reformatted images are provided for review. Automated exposure control, iterative reconstruction, and/or weight based adjustment of the mA/kV was utilized to reduce the radiation dose to as low as reasonably achievable. COMPARISON: 09/29/2022 HISTORY: ORDERING SYSTEM PROVIDED HISTORY: eval PE TECHNOLOGIST PROVIDED HISTORY: eval PE Decision Support Exception - unselect if not a suspected or confirmed emergency medical condition->Emergency Medical Condition (MA) Reason for Exam: r/o pe; ORDERING SYSTEM PROVIDED HISTORY: eval abd pain TECHNOLOGIST PROVIDED HISTORY: eval abd pain Decision Support Exception - unselect if not a suspected or confirmed emergency medical condition->Emergency Medical Condition (MA) Reason for Exam: abd pain FINDINGS: CHEST CT: Pulmonary Arteries: Pulmonary arteries are adequately opacified for evaluation.   No evidence of intraluminal filling defect to suggest pulmonary embolism. Main pulmonary artery is normal in caliber. Mediastinum: No evidence of mediastinal lymphadenopathy. The heart and pericardium demonstrate no acute abnormality. There is no acute abnormality of the thoracic aorta. Lungs/pleura: The lungs are without acute process. Sequela of old granulomatous disease. No focal consolidation or pulmonary edema. No evidence of pleural effusion or pneumothorax. Soft Tissues/Bones: No acute bone or soft tissue abnormality. Probably benign sclerosis of the anterior left 7th and 8th ribs again noted. Degenerative superior endplate changes at T7 and T8 again demonstrated. ABDOMEN PELVIS: Organs: Findings compatible with hepatic steatosis. The gallbladder, pancreas, spleen, adrenals and kidneys reveal no acute findings. Punctate nonobstructing left renal stones. GI/Bowel: There is no bowel dilatation or wall thickening identified. Pelvis: No acute findings. Peritoneum/Retroperitoneum: No free air or free fluid. The aorta is normal in caliber. The visceral branches are patent. No lymphadenopathy. Bones/Soft Tissues: No abnormality identified. Partially visualized left femoral trochanteric fixation hardware. *Unless otherwise specified, incidental findings do not require dedicated imaging follow-up. 1.  No evidence of pulmonary embolism or acute pulmonary abnormality. 2.  No acute inflammatory process identified in the abdomen or pelvis. 3.  Punctate left nephrolithiasis. 4.  Findings compatible with hepatic steatosis. 5.  Additional chronic and benign findings, as above.      US RENAL LIMITED    Result Date: 11/5/2022  EXAMINATION: ULTRASOUND OF THE KIDNEYS 11/5/2022 9:44 am COMPARISON: CT abdomen pelvis from 11/04/2022 HISTORY: ORDERING SYSTEM PROVIDED HISTORY: Ultrasound to r/o element of obstruction and to assess the kidney size/echotextur TECHNOLOGIST PROVIDED HISTORY: Ultrasound to r/o element of obstruction and to assess the kidney size/echotextur 60-year-old male; rule out obstructive uropathy FINDINGS: Right kidney measures 10.5 x 4.6 x 5.5 cm. Right renal cortical thickness measures 1.2 cm. Left kidney measures 9.5 x 5.1 x 5.3 cm. Left renal cortical thickness measures 1.4 cm. Increased echogenicity of the bilateral renal cortex. Suspected punctate left-sided renal calculi. No hydronephrosis. Color flow projects over the bilateral renal parenchyma. No perinephric fluid. 1. Increased echogenicity of the bilateral renal cortex which can be seen with medical renal disease. 2. Suspected left-sided renal calculi. No hydronephrosis. XR CHEST PORTABLE    Result Date: 11/7/2022  EXAMINATION: ONE XRAY VIEW OF THE CHEST 11/7/2022 8:25 pm COMPARISON: Less than an hour ago HISTORY: ORDERING SYSTEM PROVIDED HISTORY: right IJ TECHNOLOGIST PROVIDED HISTORY: right IJ Reason for Exam: Right IJ placement FINDINGS: New right IJ catheter terminates in the superior vena cava. No pneumothorax. Lung bases not included within the field of view. Patient is rotated. Heart and mediastinum normal.  Bony thorax intact. New right IJ catheter in the SVC. No pneumothorax. XR CHEST PORTABLE    Result Date: 11/7/2022  EXAMINATION: ONE XRAY VIEW OF THE CHEST 11/7/2022 7:12 pm COMPARISON: November 4, 2022 HISTORY: ORDERING SYSTEM PROVIDED HISTORY: SOB TECHNOLOGIST PROVIDED HISTORY: SOB Reason for Exam: PT CO SOB X several days FINDINGS: Lungs are hyperaerated. Heart and mediastinum normal.  Bony thorax intact. Possible slight increase in interstitial markings at the lung bases. COPD. Increased interstitial markings at the lung bases.      CT CHEST PULMONARY EMBOLISM W CONTRAST    Result Date: 11/4/2022  EXAMINATION: CTA OF THE CHEST; CT OF THE ABDOMEN AND PELVIS WITH CONTRAST 11/4/2022 2:44 am TECHNIQUE: CTA of the chest was performed after the administration of intravenous contrast.  Multiplanar reformatted images are provided for review. MIP images are provided for review. Automated exposure control, iterative reconstruction, and/or weight based adjustment of the mA/kV was utilized to reduce the radiation dose to as low as reasonably achievable.; CT of the abdomen and pelvis was performed with the administration of intravenous contrast. Multiplanar reformatted images are provided for review. Automated exposure control, iterative reconstruction, and/or weight based adjustment of the mA/kV was utilized to reduce the radiation dose to as low as reasonably achievable. COMPARISON: 09/29/2022 HISTORY: ORDERING SYSTEM PROVIDED HISTORY: eval PE TECHNOLOGIST PROVIDED HISTORY: eval PE Decision Support Exception - unselect if not a suspected or confirmed emergency medical condition->Emergency Medical Condition (MA) Reason for Exam: r/o pe; ORDERING SYSTEM PROVIDED HISTORY: eval abd pain TECHNOLOGIST PROVIDED HISTORY: eval abd pain Decision Support Exception - unselect if not a suspected or confirmed emergency medical condition->Emergency Medical Condition (MA) Reason for Exam: abd pain FINDINGS: CHEST CT: Pulmonary Arteries: Pulmonary arteries are adequately opacified for evaluation. No evidence of intraluminal filling defect to suggest pulmonary embolism. Main pulmonary artery is normal in caliber. Mediastinum: No evidence of mediastinal lymphadenopathy. The heart and pericardium demonstrate no acute abnormality. There is no acute abnormality of the thoracic aorta. Lungs/pleura: The lungs are without acute process. Sequela of old granulomatous disease. No focal consolidation or pulmonary edema. No evidence of pleural effusion or pneumothorax. Soft Tissues/Bones: No acute bone or soft tissue abnormality. Probably benign sclerosis of the anterior left 7th and 8th ribs again noted. Degenerative superior endplate changes at T7 and T8 again demonstrated. ABDOMEN PELVIS: Organs: Findings compatible with hepatic steatosis.   The gallbladder, pancreas, spleen, adrenals and kidneys reveal no acute findings. Punctate nonobstructing left renal stones. GI/Bowel: There is no bowel dilatation or wall thickening identified. Pelvis: No acute findings. Peritoneum/Retroperitoneum: No free air or free fluid. The aorta is normal in caliber. The visceral branches are patent. No lymphadenopathy. Bones/Soft Tissues: No abnormality identified. Partially visualized left femoral trochanteric fixation hardware. *Unless otherwise specified, incidental findings do not require dedicated imaging follow-up. 1.  No evidence of pulmonary embolism or acute pulmonary abnormality. 2.  No acute inflammatory process identified in the abdomen or pelvis. 3.  Punctate left nephrolithiasis. 4.  Findings compatible with hepatic steatosis. 5.  Additional chronic and benign findings, as above. VL DUP LOWER EXTREMITY VENOUS BILATERAL    Result Date: 11/8/2022    [facility] [address] [place] [address]  Vascular Lower Extremities DVT Study Procedure   Patient Name   Frantz Baez     Date of Study           11/08/2022                 [place]   Date of Birth  1970  Gender                  Male   Age            47 year(s)  Race                       Room Number    2004        Height:                 66.93 inch, 170 cm   Corporate ID # [id number]    Weight:                 112 pounds, 51 kg   Patient Acct # [de-identified]   BSA:        1.58 m^2    BMI:      17.65 kg/m^2   MR #           [id number]      Sonographer             Trey Valdes, [facility]   Accession #    [id number]  Interpreting Physician  Hernando Plata   Referring                  Referring Physician     Rhiannon Chavez  Nurse  Practitioner  Procedure Type of Study:   Veins: Lower Extremities DVT Study, Venous Scan Lower Bilateral.  Indications for Study:R/O DVT. Patient Status: In Patient. Technical Quality:Adequate visualization. Conclusions   Summary   Bilateral:  No evidence of deep or superficial venous thrombosis.    Signature ----------------------------------------------------------------  Electronically signed by Adenike West RVT(Sonographer) on  11/08/2022 11:05 AM  ----------------------------------------------------------------   ----------------------------------------------------------------  Electronically signed by Champ Yanez physician)  on 11/08/2022 05:49 PM  ----------------------------------------------------------------  Findings:   Right Impression:                    Left Impression:  The common femoral, femoral,         The common femoral, femoral,  popliteal and tibial veins           popliteal and tibial veins  demonstrate normal compressibility   demonstrate normal compressibility  and augmentation. and augmentation. Normal compressibility of the great  Normal compressibility of the great  saphenous vein. saphenous vein. Normal compressibility of the small  Normal compressibility of the small  saphenous vein. saphenous vein. Velocities are measured in cm/s ; Diameters are measured in cm Right Lower Extremities DVT Study Measurements Right 2D Measurements +------------------------------------+----------+---------------+----------+ ! Location                            ! Visualized! Compressibility! Thrombosis! +------------------------------------+----------+---------------+----------+ ! Common Femoral                      !Yes       ! Yes            ! None      ! +------------------------------------+----------+---------------+----------+ ! Prox Femoral                        !Yes       ! Yes            ! None      ! +------------------------------------+----------+---------------+----------+ ! Mid Femoral                         !Yes       ! Yes            ! None      ! +------------------------------------+----------+---------------+----------+ ! Dist Femoral                        !Yes       ! Yes            ! None      ! +------------------------------------+----------+---------------+----------+ ! Deep Femoral                        !Yes       ! Yes            ! None      ! +------------------------------------+----------+---------------+----------+ ! Popliteal                           !Yes       ! Yes            ! None      ! +------------------------------------+----------+---------------+----------+ ! Sapheno Femoral Junction            ! Yes       ! Yes            ! None      ! +------------------------------------+----------+---------------+----------+ ! PTV                                 ! Yes       ! Yes            ! None      ! +------------------------------------+----------+---------------+----------+ ! Peroneal                            !Yes       ! Yes            ! None      ! +------------------------------------+----------+---------------+----------+ ! Gastroc                             ! Yes       ! Yes            ! None      ! +------------------------------------+----------+---------------+----------+ ! GSV Thigh                           ! Yes       ! Yes            ! None      ! +------------------------------------+----------+---------------+----------+ ! GSV Knee                            ! Yes       ! Yes            ! None      ! +------------------------------------+----------+---------------+----------+ ! GSV Ankle                           ! Yes       ! Yes            ! None      ! +------------------------------------+----------+---------------+----------+ ! SSV                                 ! Yes       ! Yes            ! None      ! +------------------------------------+----------+---------------+----------+ Left Lower Extremities DVT Study Measurements Left 2D Measurements +------------------------------------+----------+---------------+----------+ ! Location                            ! Visualized! Compressibility! Thrombosis! +------------------------------------+----------+---------------+----------+ ! Common Femoral !Yes       !Yes            ! None      ! +------------------------------------+----------+---------------+----------+ ! Prox Femoral                        !Yes       ! Yes            ! None      ! +------------------------------------+----------+---------------+----------+ ! Mid Femoral                         !Yes       ! Yes            ! None      ! +------------------------------------+----------+---------------+----------+ ! Dist Femoral                        !Yes       ! Yes            ! None      ! +------------------------------------+----------+---------------+----------+ ! Deep Femoral                        !Yes       ! Yes            ! None      ! +------------------------------------+----------+---------------+----------+ ! Popliteal                           !Yes       ! Yes            ! None      ! +------------------------------------+----------+---------------+----------+ ! Sapheno Femoral Junction            ! Yes       ! Yes            ! None      ! +------------------------------------+----------+---------------+----------+ ! PTV                                 ! Yes       ! Yes            ! None      ! +------------------------------------+----------+---------------+----------+ ! Peroneal                            !Yes       ! Yes            ! None      ! +------------------------------------+----------+---------------+----------+ ! Gastroc                             ! Yes       ! Yes            ! None      ! +------------------------------------+----------+---------------+----------+ ! GSV Thigh                           ! Yes       ! Yes            ! None      ! +------------------------------------+----------+---------------+----------+ ! GSV Knee                            ! Yes       ! Yes            ! None      ! +------------------------------------+----------+---------------+----------+ ! GSV Ankle                           ! Yes       ! Yes            ! None      ! +------------------------------------+----------+---------------+----------+ ! SSV                                 ! Yes       ! Yes            ! None      ! +------------------------------------+----------+---------------+----------+    US ABDOMEN LIMITED Specify organ? LIVER, GALLBLADDER, PANCREAS    Result Date: 11/4/2022  EXAMINATION: RIGHT UPPER QUADRANT ULTRASOUND 11/4/2022 10:29 am COMPARISON: None. HISTORY: ORDERING SYSTEM PROVIDED HISTORY: eval epigastric pain TECHNOLOGIST PROVIDED HISTORY: eval epigastric pain Specify organ?->LIVER Specify organ?->GALLBLADDER Specify organ?->PANCREAS FINDINGS: LIVER:  The liver measures 18 cm and demonstrates normal echogenicity without evidence of intrahepatic biliary ductal dilatation. BILIARY SYSTEM:  Gallbladder is unremarkable without evidence of pericholecystic fluid, wall thickening or stones. Negative sonographic Mccord's sign. Common bile duct is within normal limits measuring 4 mm. RIGHT KIDNEY: The right kidney is grossly unremarkable without evidence of hydronephrosis. PANCREAS:  Visualized portions of the pancreas are unremarkable. OTHER: No evidence of right upper quadrant ascites. Hepatomegaly otherwise unremarkable exam RECOMMENDATIONS: Unavailable         Physical Examination:        Physical Exam  Constitutional:       Appearance: Normal appearance. He is ill-appearing. HENT:      Head: Normocephalic and atraumatic. Mouth/Throat:      Mouth: Mucous membranes are moist.   Eyes:      Extraocular Movements: Extraocular movements intact. Conjunctiva/sclera: Conjunctivae normal.   Cardiovascular:      Rate and Rhythm: Normal rate and regular rhythm. Heart sounds: No murmur heard. No gallop. Pulmonary:      Effort: Pulmonary effort is normal. No respiratory distress. Breath sounds: No wheezing or rales. Abdominal:      General: Bowel sounds are normal. There is no distension. Palpations: Abdomen is soft. Tenderness:  There is no abdominal tenderness. Musculoskeletal:      Right lower leg: No edema. Left lower leg: No edema. Skin:     General: Skin is warm. Neurological:      Mental Status: He is alert and oriented to person, place, and time. Psychiatric:         Behavior: Behavior normal.     Assessment:        Primary Problem  Diabetic ketoacidosis without coma associated with type 1 diabetes mellitus [facility])    Active Hospital Problems    Diagnosis Date Noted    Severe malnutrition ([facility] [address] [E43] 11/08/2022     Priority: Medium    Diabetic ketoacidosis without coma associated with type 1 diabetes mellitus ([facility] [E10.10] 11/07/2022     Priority: Medium       Plan:        DKA 2/2 medication noncompliance, rule out infection -resolved  - HbA1c: 10 on 9/30/2022  -Patient will be transferred out of ICU  -Gap closed x2  -Patient on Lantus 20 daily and high-dose sliding scale  -D5 and 0.45% NS at 75 mL/hr   -Insulin drip discontinued yesterday   -Glucose checks 4 times daily  -Betahydroxybutarate levels >16 on admission -11.97 yesterday  -Hypoglycemia, phos and potassium replacement protocols in place  -Urinalysis and chest x-ray is unremarkable for any infection.  -Telemetry monitoring  -Pulmonology consulted, will appreciate recommendations  -Inpatient consult to diabetes educator     2. LIBBY on CKD stage IIIa, improving  -Creatinine downtrending to 1.32 from 2.42 on admission -patient's baseline 1.2  -We will monitor kidney functions  -Avoid nephrotoxic drugs    3. Chest pain   -Patient was recently admitted for NSTEMI type II  -Patient had coronary angiography done last year [facility] which showed normal coronary arteries  -Echo on 11/8/2022: Left ventricular ejection fraction 55%  -Audiology consulted, will appreciate recommendations   -Continue aspirin, statin, and beta-blocker   -No further inpatient ischemic work-up necessary    4.   Hypokalemia  -Potassium low at 3.1  -Patient already on replacement protocol  -We will continue to monitor     DVT prophylaxis: Heparin  Diet: Diabetic adult diet  Disposition: Deann Shaver MD  11/9/2022  7:22 AM        I have discussed the care of [facility] , including pertinent history and exam findings,    today with the resident. I have seen and examined the patient and the key elements of all parts of the encounter have been performed by me . I agree with the assessment, plan and orders as documented by the resident. Principal Problem:    Diabetic ketoacidosis without coma associated with type 1 diabetes mellitus ([facility] [address]  Active Problems:    Severe malnutrition ([address]  Resolved Problems:    * No resolved hospital problems. *        Overall  course ;                                   are improving over time.                   Electronically signed by Haja Swanson MD

## 2022-11-09 NOTE — PLAN OF CARE
Problem: Discharge Planning  Goal: Discharge to home or other facility with appropriate resources  11/9/2022 1848 by Javier Fleming RN  Outcome: Progressing  Flowsheets (Taken 11/9/2022 0800 by Fauzia Restrepo RN)  Discharge to home or other facility with appropriate resources: Identify barriers to discharge with patient and caregiver     Problem: Pain  Goal: Verbalizes/displays adequate comfort level or baseline comfort level  11/9/2022 1848 by Javier Fleming RN  Outcome: Progressing     Problem: Metabolic/Fluid and Electrolytes - Adult  Goal: Electrolytes maintained within normal limits  11/9/2022 1848 by Javier Fleming RN  Outcome: Progressing  Flowsheets (Taken 11/9/2022 0800 by Fauzia Restrepo RN)  Electrolytes maintained within normal limits: Monitor labs and assess patient for signs and symptoms of electrolyte imbalances

## 2022-11-09 NOTE — PROGRESS NOTES
The patient called out stating that he has a ride home and would like to leave AMA. RN called medical residents who will come up and explain the risks of leaving.

## 2022-11-09 NOTE — PROGRESS NOTES
[employer] [employer]    PROGRESS NOTE             11/9/2022    10:38 AM    Name:   Santosh Pino  MRN:     [id number]     Acct:      [de-identified]   Room:   2004/2004-01  IP Day:  2  Admit Date:  11/7/2022  6:40 PM    PCP:  BINA Keith  Code Status:  Full Code    Subjective:     C/C:   Chief Complaint   Patient presents with    Shortness of Breath     Interval History Status: improved. Patient seen and examined at bedside. Per nurse, patient had hypoglycemic episode with blood sugar in the 40s -had minimal oral intake yesterday. Patient does not have an appetite and feels slightly nauseous, I encouraged oral intake. We will start patient on Lantus 20 daily and closely monitor blood glucose. Patient will be transferred out of ICU today. Patient is still complaining of chest pain which seems to be positional in nature most likely musculoskeletal.    Brief History:     The patient is a 47 y.o. Non- / non  male who presents withShortness of Breath   and he is admitted to the hospital for the management of DKA. Patient is a 45-year-old male past medical history significant for type 1 diabetes complicated with diabetic neuropathy and nephropathy, CKD stage III, COPD, and recent hospitalization for femur fracture and different hospitalization for NSTEMI  presented to the ED on 11/7/2022 with chief complaint of shortness of breath and chest pain. Per patient, he has been experiencing shortness of breath since discharge from [facility] on the sixth. Yesterday he started having pressure-like nonradiating chest pain and shortness of breath that woke him up from sleep. He is also been feeling nauseous and fatigued/weak since his discharge. Per patient, he is not been taking his insulin since [facility]       In ED, vitals were significant for tachycardia with heart rate of 103 and tachypnea with respiratory rate of 26. Labs were significant for elevated blood glucose 830, potassium of 7.1, sodium of 124 -corrected sodium 136 -bicarb less than 6, and beta hydroxybutyrate above 16. VBG's were significant for pH of 6.9. Creatinine was elevated at 2.42 -patient's baseline 1.7-and leukocytosis of 11.4, elevated lactate of 3.7. UA was negative for UTI, chest x-ray negative for any acute cardiopulmonary abnormalities. Initial troponin of 52 and repeat troponin of 40. EKG significant for sinus tachycardia with heart rate of 151 and peaked T waves noted in leads V3, V4, V5 as well as V6, slightly widened QRS. DKA protocol initiated in ED. Central line was placed in right IJ. Patient was started on insulin and bicarb drip     Patient will be admitted to ICU for further management of DKA. Review of Systems:     Review of Systems   Constitutional:  Negative for chills and fever. Eyes:  Negative for visual disturbance. Respiratory:  Negative for cough, choking, chest tightness and shortness of breath. Cardiovascular:  Negative for chest pain and leg swelling. Gastrointestinal:  Positive for nausea. Negative for abdominal pain, diarrhea and vomiting. Genitourinary: Negative. Musculoskeletal: Negative. Skin: Negative. Neurological:  Negative for dizziness, weakness, light-headedness and numbness. Psychiatric/Behavioral: Negative. Medications: Allergies:     Allergies   Allergen Reactions    Azithromycin Swelling    Acetaminophen-Codeine Other (See Comments) and Nausea And Vomiting    Codeine Itching    Hydrocodone-Acetaminophen Rash    Ibuprofen Other (See Comments)     Stomach cramps    Morphine Other (See Comments)     Gives headaches    Tramadol Itching     vomitting       Current Meds:   Scheduled Meds:    calcium gluconate  1,000 mg IntraVENous Once    insulin lispro  0-16 Units SubCUTAneous TID     insulin lispro  0-4 Units SubCUTAneous Nightly    aspirin  81 mg Oral Daily    atorvastatin  80 mg Oral Nightly    [Held by provider] insulin glargine  25 Units SubCUTAneous Daily    [Held by provider] insulin glargine  15 Units SubCUTAneous Nightly    metoprolol tartrate  25 mg Oral BID    pantoprazole  40 mg Oral BID AC    heparin (porcine)  5,000 Units SubCUTAneous BID     Continuous Infusions:    sodium chloride      dextrose       PRN Meds: potassium chloride **OR** potassium alternative oral replacement **OR** potassium chloride, perflutren lipid microspheres, glucose, dextrose bolus **OR** dextrose bolus, glucagon (rDNA), dextrose, lidocaine, albuterol sulfate HFA, clonazePAM, cyclobenzaprine, traZODone, polyethylene glycol    Data:     Past Medical History:   has a past medical history of Diabetes mellitus ([facility] Obstructive lung disease ([facility] and Panic attack. Social History:   reports that he has been smoking cigarettes. He has a 10.25 pack-year smoking history. He has never used smokeless tobacco. He reports that he does not currently use alcohol. He reports current drug use. Drug: Marijuana Ashia Coad). Family History: History reviewed. No pertinent family history. Vitals:  /66   Pulse 77   Temp 98.4 °F (36.9 °C) (Oral)   Resp 19   Ht 5' 7\" (1.702 m)   Wt 112 lb 7 oz (51 kg)   SpO2 95%   BMI 17.61 kg/m²   Temp (24hrs), Av.3 °F (36.8 °C), Min:97.8 °F (36.6 °C), Max:98.8 °F (37.1 °C)    Recent Labs     22  1832 22  2304 22  0825   POCGLU 98 111* 159* 244*         I/O(24Hr):     Intake/Output Summary (Last 24 hours) at 2022 1038  Last data filed at 2022 1827  Gross per 24 hour   Intake 2865 ml   Output 1100 ml   Net 1765 ml         Labs:  [unfilled]    Lab Results   Component Value Date/Time    SPECIAL NOT REPORTED 02/15/2022 05:12 PM     Lab Results   Component Value Date/Time    CULTURE NO GROWTH 34 DAYS 02/15/2022 05:11 PM       [unfilled]    Radiology:    XR CHEST (2 VW)    Result Date: 11/4/2022  EXAMINATION: TWO XRAY VIEWS OF THE CHEST 11/4/2022 1:15 am COMPARISON: 09/29/2022 radiograph HISTORY: ORDERING SYSTEM PROVIDED HISTORY: eval dyspnea TECHNOLOGIST PROVIDED HISTORY: eval dyspnea Reason for Exam: Dyspnea FINDINGS: Heart, mediastinum and pulmonary vascularity are normal.  The lungs are lucent and hyperinflated. There is a stable calcified granuloma in the right lower lobe. No acute pulmonary finding. No significant skeletal finding. Lucent lungs with chronic pattern of hyperinflation. No acute pulmonary finding. XR HIP LEFT (2-3 VIEWS)    Result Date: 11/4/2022  EXAMINATION: TWO XRAY VIEWS OF THE LEFT HIP; 4 XRAY VIEWS OF THE LEFT FEMUR 11/4/2022 1:15 am COMPARISON: None. HISTORY: ORDERING SYSTEM PROVIDED HISTORY: Eval left hip pain postsurgical TECHNOLOGIST PROVIDED HISTORY: Eval left hip pain postsurgical Reason for Exam: Increased leg pain, surgery x2 weeks ; ORDERING SYSTEM PROVIDED HISTORY: eval leg pain post op TECHNOLOGIST PROVIDED HISTORY: eval leg pain post op Reason for Exam: Increased leg pain, surgery x2 weeks FINDINGS: Prior surgical change of ORIF in the left femur. Previous intertrochanteric fracture shows anatomic alignment. There is no new fracture or dislocation at the hip or distally in the femur. The visualized knee is unremarkable. No significant soft tissue finding. Prior surgical change of ORIF with no acute abnormality of the left hip or femur. XR FEMUR LEFT (MIN 2 VIEWS)    Result Date: 11/4/2022  EXAMINATION: TWO XRAY VIEWS OF THE LEFT HIP; 4 XRAY VIEWS OF THE LEFT FEMUR 11/4/2022 1:15 am COMPARISON: None.  HISTORY: ORDERING SYSTEM PROVIDED HISTORY: Eval left hip pain postsurgical TECHNOLOGIST PROVIDED HISTORY: Eval left hip pain postsurgical Reason for Exam: Increased leg pain, surgery x2 weeks ; ORDERING SYSTEM PROVIDED HISTORY: eval leg pain post op TECHNOLOGIST PROVIDED HISTORY: eval leg pain post op Reason for Exam: Increased leg pain, surgery x2 weeks FINDINGS: Prior surgical change of ORIF in the left femur. Previous intertrochanteric fracture shows anatomic alignment. There is no new fracture or dislocation at the hip or distally in the femur. The visualized knee is unremarkable. No significant soft tissue finding. Prior surgical change of ORIF with no acute abnormality of the left hip or femur. CT ABDOMEN PELVIS W IV CONTRAST Additional Contrast? None    Result Date: 11/4/2022  EXAMINATION: CTA OF THE CHEST; CT OF THE ABDOMEN AND PELVIS WITH CONTRAST 11/4/2022 2:44 am TECHNIQUE: CTA of the chest was performed after the administration of intravenous contrast.  Multiplanar reformatted images are provided for review. MIP images are provided for review. Automated exposure control, iterative reconstruction, and/or weight based adjustment of the mA/kV was utilized to reduce the radiation dose to as low as reasonably achievable.; CT of the abdomen and pelvis was performed with the administration of intravenous contrast. Multiplanar reformatted images are provided for review. Automated exposure control, iterative reconstruction, and/or weight based adjustment of the mA/kV was utilized to reduce the radiation dose to as low as reasonably achievable. COMPARISON: 09/29/2022 HISTORY: ORDERING SYSTEM PROVIDED HISTORY: eval PE TECHNOLOGIST PROVIDED HISTORY: eval PE Decision Support Exception - unselect if not a suspected or confirmed emergency medical condition->Emergency Medical Condition (MA) Reason for Exam: r/o pe; ORDERING SYSTEM PROVIDED HISTORY: eval abd pain TECHNOLOGIST PROVIDED HISTORY: eval abd pain Decision Support Exception - unselect if not a suspected or confirmed emergency medical condition->Emergency Medical Condition (MA) Reason for Exam: abd pain FINDINGS: CHEST CT: Pulmonary Arteries: Pulmonary arteries are adequately opacified for evaluation. No evidence of intraluminal filling defect to suggest pulmonary embolism.   Main pulmonary artery is normal in caliber. Mediastinum: No evidence of mediastinal lymphadenopathy. The heart and pericardium demonstrate no acute abnormality. There is no acute abnormality of the thoracic aorta. Lungs/pleura: The lungs are without acute process. Sequela of old granulomatous disease. No focal consolidation or pulmonary edema. No evidence of pleural effusion or pneumothorax. Soft Tissues/Bones: No acute bone or soft tissue abnormality. Probably benign sclerosis of the anterior left 7th and 8th ribs again noted. Degenerative superior endplate changes at T7 and T8 again demonstrated. ABDOMEN PELVIS: Organs: Findings compatible with hepatic steatosis. The gallbladder, pancreas, spleen, adrenals and kidneys reveal no acute findings. Punctate nonobstructing left renal stones. GI/Bowel: There is no bowel dilatation or wall thickening identified. Pelvis: No acute findings. Peritoneum/Retroperitoneum: No free air or free fluid. The aorta is normal in caliber. The visceral branches are patent. No lymphadenopathy. Bones/Soft Tissues: No abnormality identified. Partially visualized left femoral trochanteric fixation hardware. *Unless otherwise specified, incidental findings do not require dedicated imaging follow-up. 1.  No evidence of pulmonary embolism or acute pulmonary abnormality. 2.  No acute inflammatory process identified in the abdomen or pelvis. 3.  Punctate left nephrolithiasis. 4.  Findings compatible with hepatic steatosis. 5.  Additional chronic and benign findings, as above.      US RENAL LIMITED    Result Date: 11/5/2022  EXAMINATION: ULTRASOUND OF THE KIDNEYS 11/5/2022 9:44 am COMPARISON: CT abdomen pelvis from 11/04/2022 HISTORY: ORDERING SYSTEM PROVIDED HISTORY: Ultrasound to r/o element of obstruction and to assess the kidney size/echotextur TECHNOLOGIST PROVIDED HISTORY: Ultrasound to r/o element of obstruction and to assess the kidney size/echotextur 50-year-old male; rule out exposure control, iterative reconstruction, and/or weight based adjustment of the mA/kV was utilized to reduce the radiation dose to as low as reasonably achievable.; CT of the abdomen and pelvis was performed with the administration of intravenous contrast. Multiplanar reformatted images are provided for review. Automated exposure control, iterative reconstruction, and/or weight based adjustment of the mA/kV was utilized to reduce the radiation dose to as low as reasonably achievable. COMPARISON: 09/29/2022 HISTORY: ORDERING SYSTEM PROVIDED HISTORY: eval PE TECHNOLOGIST PROVIDED HISTORY: eval PE Decision Support Exception - unselect if not a suspected or confirmed emergency medical condition->Emergency Medical Condition (MA) Reason for Exam: r/o pe; ORDERING SYSTEM PROVIDED HISTORY: eval abd pain TECHNOLOGIST PROVIDED HISTORY: eval abd pain Decision Support Exception - unselect if not a suspected or confirmed emergency medical condition->Emergency Medical Condition (MA) Reason for Exam: abd pain FINDINGS: CHEST CT: Pulmonary Arteries: Pulmonary arteries are adequately opacified for evaluation. No evidence of intraluminal filling defect to suggest pulmonary embolism. Main pulmonary artery is normal in caliber. Mediastinum: No evidence of mediastinal lymphadenopathy. The heart and pericardium demonstrate no acute abnormality. There is no acute abnormality of the thoracic aorta. Lungs/pleura: The lungs are without acute process. Sequela of old granulomatous disease. No focal consolidation or pulmonary edema. No evidence of pleural effusion or pneumothorax. Soft Tissues/Bones: No acute bone or soft tissue abnormality. Probably benign sclerosis of the anterior left 7th and 8th ribs again noted. Degenerative superior endplate changes at T7 and T8 again demonstrated. ABDOMEN PELVIS: Organs: Findings compatible with hepatic steatosis.   The gallbladder, pancreas, spleen, adrenals and kidneys reveal no acute findings. Punctate nonobstructing left renal stones. GI/Bowel: There is no bowel dilatation or wall thickening identified. Pelvis: No acute findings. Peritoneum/Retroperitoneum: No free air or free fluid. The aorta is normal in caliber. The visceral branches are patent. No lymphadenopathy. Bones/Soft Tissues: No abnormality identified. Partially visualized left femoral trochanteric fixation hardware. *Unless otherwise specified, incidental findings do not require dedicated imaging follow-up. 1.  No evidence of pulmonary embolism or acute pulmonary abnormality. 2.  No acute inflammatory process identified in the abdomen or pelvis. 3.  Punctate left nephrolithiasis. 4.  Findings compatible with hepatic steatosis. 5.  Additional chronic and benign findings, as above. VL DUP LOWER EXTREMITY VENOUS BILATERAL    Result Date: 11/8/2022    [facility]  Vascular Lower Extremities DVT Study Procedure   Patient Name   Jonathan Bergeron     Date of Study           11/08/2022                 ARLET   Date of Birth  1970  Gender                  Male   Age            47 year(s)  Race                       Room Number    2004        Height:                 66.93 inch, 170 cm   Corporate ID # [id number]    Weight:                 112 pounds, 51 kg   Patient Acct # [de-identified]   BSA:        1.58 m^2    BMI:      17.65 kg/m^2   MR #           [id number]      Sonographer             [facility]   Accession #    [id number]  Interpreting Physician  Hernando Plata   Referring                  Referring Physician     Antony Palacio  Nurse  Practitioner  Procedure Type of Study:   Veins: Lower Extremities DVT Study, Venous Scan Lower Bilateral.  Indications for Study:R/O DVT. Patient Status: In Patient. Technical Quality:Adequate visualization. Conclusions   Summary   Bilateral:  No evidence of deep or superficial venous thrombosis.    Signature   ---------------------------------------------------------------- Electronically signed by Cinda Jaquez RVT(Sonographer) on  11/08/2022 11:05 AM  ----------------------------------------------------------------   ----------------------------------------------------------------  Electronically signed by Heather Chong physician)  on 11/08/2022 05:49 PM  ----------------------------------------------------------------  Findings:   Right Impression:                    Left Impression:  The common femoral, femoral,         The common femoral, femoral,  popliteal and tibial veins           popliteal and tibial veins  demonstrate normal compressibility   demonstrate normal compressibility  and augmentation. and augmentation. Normal compressibility of the great  Normal compressibility of the great  saphenous vein. saphenous vein. Normal compressibility of the small  Normal compressibility of the small  saphenous vein. saphenous vein. Velocities are measured in cm/s ; Diameters are measured in cm Right Lower Extremities DVT Study Measurements Right 2D Measurements +------------------------------------+----------+---------------+----------+ ! Location                            ! Visualized! Compressibility! Thrombosis! +------------------------------------+----------+---------------+----------+ ! Common Femoral                      !Yes       ! Yes            ! None      ! +------------------------------------+----------+---------------+----------+ ! Prox Femoral                        !Yes       ! Yes            ! None      ! +------------------------------------+----------+---------------+----------+ ! Mid Femoral                         !Yes       ! Yes            ! None      ! +------------------------------------+----------+---------------+----------+ ! Dist Femoral                        !Yes       ! Yes            ! None      ! +------------------------------------+----------+---------------+----------+ ! Deep Femoral !Yes       !Yes            ! None      ! +------------------------------------+----------+---------------+----------+ ! Popliteal                           !Yes       ! Yes            ! None      ! +------------------------------------+----------+---------------+----------+ ! Sapheno Femoral Junction            ! Yes       ! Yes            ! None      ! +------------------------------------+----------+---------------+----------+ ! PTV                                 ! Yes       ! Yes            ! None      ! +------------------------------------+----------+---------------+----------+ ! Peroneal                            !Yes       ! Yes            ! None      ! +------------------------------------+----------+---------------+----------+ ! Gastroc                             ! Yes       ! Yes            ! None      ! +------------------------------------+----------+---------------+----------+ ! GSV Thigh                           ! Yes       ! Yes            ! None      ! +------------------------------------+----------+---------------+----------+ ! GSV Knee                            ! Yes       ! Yes            ! None      ! +------------------------------------+----------+---------------+----------+ ! GSV Ankle                           ! Yes       ! Yes            ! None      ! +------------------------------------+----------+---------------+----------+ ! SSV                                 ! Yes       ! Yes            ! None      ! +------------------------------------+----------+---------------+----------+ Left Lower Extremities DVT Study Measurements Left 2D Measurements +------------------------------------+----------+---------------+----------+ ! Location                            ! Visualized! Compressibility! Thrombosis! +------------------------------------+----------+---------------+----------+ ! Common Femoral                      !Yes       ! Yes            ! None      ! +------------------------------------+----------+---------------+----------+ ! Prox Femoral                        !Yes       ! Yes            ! None      ! +------------------------------------+----------+---------------+----------+ ! Mid Femoral                         !Yes       ! Yes            ! None      ! +------------------------------------+----------+---------------+----------+ ! Dist Femoral                        !Yes       ! Yes            ! None      ! +------------------------------------+----------+---------------+----------+ ! Deep Femoral                        !Yes       ! Yes            ! None      ! +------------------------------------+----------+---------------+----------+ ! Popliteal                           !Yes       ! Yes            ! None      ! +------------------------------------+----------+---------------+----------+ ! Sapheno Femoral Junction            ! Yes       ! Yes            ! None      ! +------------------------------------+----------+---------------+----------+ ! PTV                                 ! Yes       ! Yes            ! None      ! +------------------------------------+----------+---------------+----------+ ! Peroneal                            !Yes       ! Yes            ! None      ! +------------------------------------+----------+---------------+----------+ ! Gastroc                             ! Yes       ! Yes            ! None      ! +------------------------------------+----------+---------------+----------+ ! GSV Thigh                           ! Yes       ! Yes            ! None      ! +------------------------------------+----------+---------------+----------+ ! GSV Knee                            ! Yes       ! Yes            ! None      ! +------------------------------------+----------+---------------+----------+ ! GSV Ankle                           ! Yes       ! Yes            ! None      ! +------------------------------------+----------+---------------+----------+ ! SSV !Yes       !Yes            ! None      ! +------------------------------------+----------+---------------+----------+    US ABDOMEN LIMITED Specify organ? LIVER, GALLBLADDER, PANCREAS    Result Date: 11/4/2022  EXAMINATION: RIGHT UPPER QUADRANT ULTRASOUND 11/4/2022 10:29 am COMPARISON: None. HISTORY: ORDERING SYSTEM PROVIDED HISTORY: eval epigastric pain TECHNOLOGIST PROVIDED HISTORY: eval epigastric pain Specify organ?->LIVER Specify organ?->GALLBLADDER Specify organ?->PANCREAS FINDINGS: LIVER:  The liver measures 18 cm and demonstrates normal echogenicity without evidence of intrahepatic biliary ductal dilatation. BILIARY SYSTEM:  Gallbladder is unremarkable without evidence of pericholecystic fluid, wall thickening or stones. Negative sonographic Mccord's sign. Common bile duct is within normal limits measuring 4 mm. RIGHT KIDNEY: The right kidney is grossly unremarkable without evidence of hydronephrosis. PANCREAS:  Visualized portions of the pancreas are unremarkable. OTHER: No evidence of right upper quadrant ascites. Hepatomegaly otherwise unremarkable exam RECOMMENDATIONS: Unavailable         Physical Examination:        Physical Exam  Constitutional:       Appearance: Normal appearance. He is ill-appearing. HENT:      Head: Normocephalic and atraumatic. Mouth/Throat:      Mouth: Mucous membranes are moist.   Eyes:      Extraocular Movements: Extraocular movements intact. Conjunctiva/sclera: Conjunctivae normal.   Cardiovascular:      Rate and Rhythm: Normal rate and regular rhythm. Heart sounds: No murmur heard. No gallop. Pulmonary:      Effort: Pulmonary effort is normal. No respiratory distress. Breath sounds: No wheezing or rales. Abdominal:      General: Bowel sounds are normal. There is no distension. Palpations: Abdomen is soft. Tenderness: There is no abdominal tenderness. Musculoskeletal:      Right lower leg: No edema.       Left lower leg: No edema. Skin:     General: Skin is warm. Neurological:      Mental Status: He is alert and oriented to person, place, and time. Psychiatric:         Behavior: Behavior normal.     Assessment:        Primary Problem  Diabetic ketoacidosis without coma associated with type 1 diabetes mellitus ([facility]    Active Hospital Problems    Diagnosis Date Noted    Severe malnutrition ([facility] [E43] 11/08/2022     Priority: Medium    Diabetic ketoacidosis without coma associated with type 1 diabetes mellitus ([facility] [E10.10] 11/07/2022     Priority: Medium       Plan:        DKA 2/2 medication noncompliance, rule out infection -resolved  - HbA1c: 10 on 9/30/2022  -Patient will be transferred out of ICU  -Gap closed x2  -Patient on Lantus 20 daily and high-dose sliding scale  -D5 and 0.45% NS at 75 mL/hr   -Insulin drip discontinued yesterday   -Glucose checks 4 times daily  -Betahydroxybutarate levels >16 on admission -11.97 yesterday  -Hypoglycemia, phos and potassium replacement protocols in place  -Urinalysis and chest x-ray is unremarkable for any infection.  -Telemetry monitoring  -Pulmonology consulted, will appreciate recommendations  -Inpatient consult to diabetes educator     2. LIBBY on CKD stage IIIa, improving  -Creatinine downtrending to 1.32 from 2.42 on admission -patient's baseline 1.2  -We will monitor kidney functions  -Avoid nephrotoxic drugs    3. Chest pain   -Patient was recently admitted for NSTEMI type II  -Patient had coronary angiography done last year [facility] which showed normal coronary arteries  -Echo on 11/8/2022: Left ventricular ejection fraction 55%  -Audiology consulted, will appreciate recommendations   -Continue aspirin, statin, and beta-blocker   -No further inpatient ischemic work-up necessary    4.   Hypokalemia  -Potassium low at 3.1  -Patient already on replacement protocol  -We will continue to monitor     DVT prophylaxis: Heparin  Diet: Diabetic adult diet  Disposition: Ervin Roberts MD  11/9/2022  10:38 AM        I have discussed the care of [facility] , including pertinent history and exam findings,    today with the resident. I have seen and examined the patient and the key elements of all parts of the encounter have been performed by me . I agree with the assessment, plan and orders as documented by the resident. Principal Problem:    Diabetic ketoacidosis without coma associated with type 1 diabetes mellitus ([address]  Active Problems:    Severe malnutrition ([address]  Resolved Problems:    * No resolved hospital problems. *        Overall  course ;                                   are improving over time.         Patient anion gap closed  Had reading a low blood sugar  Insulin adjusted  As per nursing report, patient has eaten 75% of his breakfast  will change fluids to saline  Patient has difficulty in swallowing  He had EGD back in February of this year, suggestive Candida esophagitis  Does not know whether he was taking fluconazole or not  Difficult situation  Will request GI consult  May need EGD            Electronically signed by Carlo Goodson MD

## 2022-11-09 NOTE — PLAN OF CARE
PRE CONSULT ROUNDING NOTE  HPI  47year old male with pmh of DM obstructive lung disease, iron deficiency anemia untreated hepatitis c, who presented to the ED for shortness of breath. He was treated for DKA. Our service is consulted for dysphagia. Has had a one week hx of odynophagia and dysphagia with food. He is supposed to be on ppi but is not sure if he is taking it. He underwent egd in February of this year and was given diflucan but he is not sure if he ever took it. Reports a 60# weight loss this year. He has not had abdominal pain or emesis. Lft's are also elevated and he was unaware that he was exposed to hepatitis c. Ct abd shows    No evidence of pulmonary embolism or acute pulmonary abnormality. 2.  No acute inflammatory process identified in the abdomen or pelvis. 3.  Punctate left nephrolithiasis. 4.  Findings compatible with hepatic steatosis. Labs show k 3.1 creat 1.32 wbc 11.4>6.2 hgb 8.2 TIBC 227 but iron and iron saturation are normal alk phos 145 alt 54. He has not had fevers chills melena hematochezia hematemesis change in the bowel habits rash.       Endoscopy 2/15/22 egd (geeta) candida esophagitis no colonoscopy     Family reports no hx of liver pancreatic stomach or colon cancer no uc/crohns  Social no smoking no etoh marijuana use, used IV drugs years ago  /66   Pulse 77   Temp 98.4 °F (36.9 °C) (Oral)   Resp 19   Ht 5' 7\" (1.702 m)   Wt 112 lb 7 oz (51 kg)   SpO2 95%   BMI 17.61 kg/m²     ROS as above meds labs imaging and past medical records were reviewed    Exam  General Appearance: alert and oriented to person, place and time, well-developed and mal-nourished, in no acute distress  Skin: warm and dry, no rash or erythema pt has tattoos  Head: normocephalic and atraumatic  Eyes: pupils equal, round, and reactive to light, extraocular eye movements intact, conjunctivae normal  ENT: hearing grossly normal bilaterally  Neck: neck supple and non tender without mass, no thyromegaly or thyroid nodules, no cervical lymphadenopathy   Pulmonary/Chest: clear to auscultation bilaterally- no wheezes, rales or rhonchi, normal air movement, no respiratory distress  Cardiovascular: normal rate, regular rhythm, normal S1 and S2, no murmurs, rubs, clicks or gallops, distal pulses intact, no carotid bruits  Abdomen: soft, non tender, non-distended, normal bowel sounds, no masses or organomegaly no ascites  Extremities: no cyanosis, clubbing or edema  Musculoskeletal: normal range of motion, no joint swelling, deformity or tenderness  Neurologic: no cranial nerve deficit and muscle strength normal    Assessment  Dysphagia, odynophagia  Iron deficiency Anemia  Hepatitis  c ab positive  Elevated lft's imaging shows steatosis  DKA -resolved  Weight loss  [place] abuse    Plan  Will d/w md  Will need egd will arrange for tomorrow  Liver and hep c w/u and will need outpt tx for hep c  ppi  Hepatitis c w/u and afp ordered, likely outpt tx    Formal gi consult to follow  . Asif Jay, APRN - CNP

## 2022-11-09 NOTE — PROGRESS NOTES
[facility]: RENATE ZAVALA   Occupational Therapy Evaluation  Date: 22  Patient Name: Dylon Obrien       Room:   MRN: [id number]  Account: [de-identified]   : 1970  (47 y.o.) Gender: male     Discharge Recommendations:  Further Occupational Therapy is recommended upon facility discharge. OT Equipment Recommendations  Other: TBD    Referring Practitioner: Andie Tirado MD  Diagnosis: Diabetic ketoacidosis      Treatment Diagnosis: Impaired self care status    Past Medical History:  has a past medical history of Diabetes mellitus ([facility] [address] Obstructive lung disease ([facility] [address] and Panic attack. Past Surgical History:   has a past surgical history that includes Appendectomy; Hand surgery; Upper gastrointestinal endoscopy (N/A, 02/15/2022); Femur fracture surgery (Left, 2022); Tibia fracture surgery (Left, 2022); and joint replacement. Restrictions  Restrictions/Precautions  Restrictions/Precautions: Fall Risk;General Precautions  Required Braces or Orthoses?: No  Implants present? : Metal implants (Left Hip and femure fracture repair)      Vitals  Vitals  Heart Rate: 74  Heart Rate Source: Monitor  BP: 127/85  BP Location: Left upper arm  BP Method: Automatic  Patient Position: Semi fowlers  MAP (Calculated): 99  Resp: 18  SpO2: 99 %  O2 Device: None (Room air)     Subjective  Subjective: \"I need to leave here soon as possible. My son's cant be home alone this long. \" Pt was pleasant and agreeable to OT/PT eval  Comments: Ok per [employer] for OT/PT eval  Subjective  Pain: pt reports 7/10 pain in left hip and proximal femur. Pt reports increased pain to 9/10 with movement.  RN notified      Social/Functional History  Social/Functional History  Lives With: Family (2 sons (teenagers))  Type of Home: House  Home Layout: Bed/Bath upstairs, One level (2nd floor duplex)  Home Access: Stairs to enter with rails  Entrance Stairs - Number of Steps: FF Stairs to enter and R rail  Bathroom Shower/Tub: Tub/Shower unit, Curtain (Ordered Shower chair and has been hospitalized during delivery)  Bathroom Toilet: Standard  Bathroom Equipment: Grab bars in shower  Bathroom Accessibility: Yudy belcher  Has the patient had two or more falls in the past year or any fall with injury in the past year?: Yes  Receives Help From: Family (2 sons)  ADL Assistance: Independent  Homemaking Assistance: Independent  Homemaking Responsibilities: Yes  Ambulation Assistance: Independent (no device)  Transfer Assistance: Independent (no device)  Active : No  Patient's  Info: bus  Occupation:  (on SSI)  IADL Comments: pt sleeps on flat bed  Additional Comments: pt reports that two sons are available prn and able to assist. pt reports that sons are very lazy. No other family around to provide support      Objective  Cognition  Orientation  Overall Orientation Status: Within Normal Limits  Cognition  Overall Cognitive Status: WFL   Sensation  Overall Sensation Status: Impaired (Chronic N/T in bilateral plantar surfaces of feet, and N/T local to Left incision)    Activities of Daily Living  ADL  Feeding: Setup  Grooming: Setup  UE Bathing: Stand by assistance  LE Bathing: Contact guard assistance  UE Dressing: Stand by assistance  LE Dressing: Contact guard assistance  LE Dressing Skilled Clinical Factors: Pt donned pants with increased time while sitting in bed. CGA while staing. Pt demonstrated Good understanding of adaptive dressing donning surgical leg first  Toileting: Contact guard assistance  Additional Comments: ADL scores based on clinical reasoning and skilled observation unless otherwise noted.  pt currenlty limited due to decreased strength, balanace, activity tolerance, and pain impacting safety and independence with self care tasks         UE Function  LUE AROM (degrees)  LUE AROM : WFL  Left Hand AROM (degrees)  Left Hand AROM: WFL  Tone LUE  LUE Tone: Normotonic  LUE Strength  Gross LUE Strength: WFL  L Hand General: 4/5    RUE AROM (degrees)  RUE AROM : WFL  Right Hand AROM (degrees)  Right Hand AROM: WFL  Tone RUE  RUE Tone: Normotonic  RUE Strength  Gross RUE Strength: WFL  R Hand General: 4/5         Fine Motor Skills/Coordination  Coordination  Movements Are Fluid And Coordinated: Yes                Mobility  Bed Mobility  Bed mobility  Supine to Sit: Stand by assistance  Sit to Supine: Unable to assess (Pt sitting in chair at end of session)  Scooting: Stand by assistance  Bed Mobility Comments: Bed mobility completed with HOB elevated with increased time due to LLE pain. Balance  Balance  Sitting Balance: Stand by assistance  Standing Balance: Contact guard assistance       Transfers  Transfers  Sit to stand: Contact guard assistance  Stand to sit: Contact guard assistance  Transfer Comments: Verbal cues for hand placement and safety    Functional Mobility  Functional - Mobility Device: Rolling Walker  Activity: Other (to/from door)  Assist Level: Contact guard assistance  Functional Mobility Comments: Verbal cues for hand placement and safety    Assessment  Assessment  Performance deficits / Impairments: Decreased ADL status, Decreased functional mobility , Decreased strength, Decreased endurance, Decreased balance, Decreased high-level IADLs  Treatment Diagnosis: Impaired self care status  Prognosis: Good  Decision Making: Medium Complexity  Discharge Recommendations: Patient would benefit from continued therapy after discharge    Activity Tolerance  Activity Tolerance: Patient limited by fatigue, Patient limited by pain, Patient Tolerated treatment well    Safety Devices  Type of Devices:  All fall risk precautions in place, Call light within reach, Gait belt, Patient at risk for falls, Left in chair, Nurse notified    Patient Education  Patient Education  Education Given To: Patient  Education Provided: Role of Therapy, Plan of Care, Transfer Training, ADL Adaptive Strategies  Education Method: Verbal  Barriers to Learning: None  Education Outcome: Verbalized understanding, Continued education needed      Functional Outcome Measures  AM-PAC Daily Activity Inpatient   How much help for putting on and taking off regular lower body clothing?: A Little  How much help for Bathing?: A Little  How much help for Toileting?: A Little  How much help for putting on and taking off regular upper body clothing?: A Little  How much help for taking care of personal grooming?: A Little  How much help for eating meals?: A Little  AM-[facility] Inpatient Daily Activity Raw Score: 18  AM-PAC Inpatient ADL T-Scale Score : 38.66  ADL Inpatient CMS 0-100% Score: 46.65  ADL Inpatient CMS G-Code Modifier : CK       Goals     Short Term Goals  Time Frame for Short Term Goals: By discharge  Short Term Goal 1: Pt will complete BADLs with modified indepenence and Good safety  Short Term Goal 2: Pt will complete functional transfers/mobility during self care tasks with Modified independence and Good safety with use of lease restrictive device  Short Term Goal 3: Pt will tolerate standing 7+ minutes during functional activity of choice with Good safety  Short Term Goal 4: Pt will verbalize/demonstrate Good understanding of home safety/fall prevention strategies to increase safety and independence with self care and mobility  Short Term Goal 5: Pt will verbalize/demonstrate 3 non-pharmaceutical pain management interventions to increase participation in daily activities  Short Term Goal 6: Pt will participate in 15+ minutes of therapeutic exercises/functional activities to increase safety and independence with self care and mobility    Plan  Occupational Therapy Plan  Times Per Week: 4-6  Current Treatment Recommendations: Self-Care / ADL, Strengthening, Balance training, Functional mobility training, Endurance training, Pain management, Patient/Caregiver education & training, Safety education & training, Equipment evaluation, education, & procurement, Home management training      OT Individual Minutes  OT Individual Minutes  Time In: 5273  Time Out: 8502  Minutes: 36  Time Code Minutes   Timed Code Treatment Minutes: 13 Minutes        Electronically signed by Kishan Ricci OT on 11/9/22 at 3:07 PM EST

## 2022-11-09 NOTE — CARE COORDINATION
ONGOING DISCHARGE PLAN:    Transferred from ICU today. Patient is alert and oriented x4. Spoke with patient regarding discharge plan and patient confirms that plan is still to return to home w/ Kids. Pt. Will continue w/ VNS, [facility]. Pt. States, Chante Sotelo has been weak\" May need SNF, Awaiting Rec from PT/OT. Pt. Off Insulin GTT. HGB today 8.2. PO Diflucan. GI Consult, may need EGD. Will continue to follow for additional discharge needs.     Electronically signed by Balaji Euceda RN on 11/9/2022 at 12:39 PM

## 2022-11-09 NOTE — PROGRESS NOTES
[address]    PROGRESS NOTE             11/9/2022    7:22 AM    Name:   Pamella Duke  MRN:     [id number]     Acct:      [de-identified]   Room:   2004/2004-01  IP Day:  2  Admit Date:  11/7/2022  6:40 PM    PCP:  BINA Bangura  Code Status:  Full Code    Subjective:     C/C:   Chief Complaint   Patient presents with    Shortness of Breath     Interval History Status: improved. Patient seen and examined at bedside. Per nurse, patient had hypoglycemic episode with blood sugar in the 40s -had minimal oral intake yesterday. Patient does not have an appetite and feels slightly nauseous, I encouraged oral intake. We will start patient on Lantus 20 daily and closely monitor blood glucose. Patient will be transferred out of ICU today. Patient is still complaining of chest pain which seems to be positional in nature most likely musculoskeletal.    Brief History:     The patient is a 47 y.o. Non- / non  male who presents withShortness of Breath   and he is admitted to the hospital for the management of DKA. Patient is a 50-year-old male past medical history significant for type 1 diabetes complicated with diabetic neuropathy and nephropathy, CKD stage III, COPD, and recent hospitalization for femur fracture and different hospitalization for NSTEMI  presented to the ED on 11/7/2022 with chief complaint of shortness of breath and chest pain. Per patient, he has been experiencing shortness of breath since discharge from [facility] on the sixth. Yesterday he started having pressure-like nonradiating chest pain and shortness of breath that woke him up from sleep. He is also been feeling nauseous and fatigued/weak since his discharge. Per patient, he is not been taking his insulin [facility]       In ED, vitals were significant for tachycardia with heart rate of 103 and tachypnea with respiratory rate of 26. Labs were significant for elevated blood glucose 830, potassium of 7.1, sodium of 124 -corrected sodium 136 -bicarb less than 6, and beta hydroxybutyrate above 16. VBG's were significant for pH of 6.9. Creatinine was elevated at 2.42 -patient's baseline 1.7-and leukocytosis of 11.4, elevated lactate of 3.7. UA was negative for UTI, chest x-ray negative for any acute cardiopulmonary abnormalities. Initial troponin of 52 and repeat troponin of 40. EKG significant for sinus tachycardia with heart rate of 151 and peaked T waves noted in leads V3, V4, V5 as well as V6, slightly widened QRS. DKA protocol initiated in ED. Central line was placed in right IJ. Patient was started on insulin and bicarb drip     Patient will be admitted to ICU for further management of DKA. Review of Systems:     Review of Systems   Constitutional:  Negative for chills and fever. Eyes:  Negative for visual disturbance. Respiratory:  Negative for cough, choking, chest tightness and shortness of breath. Cardiovascular:  Negative for chest pain and leg swelling. Gastrointestinal:  Positive for nausea. Negative for abdominal pain, diarrhea and vomiting. Genitourinary: Negative. Musculoskeletal: Negative. Skin: Negative. Neurological:  Negative for dizziness, weakness, light-headedness and numbness. Psychiatric/Behavioral: Negative. Medications: Allergies:     Allergies   Allergen Reactions    Azithromycin Swelling    Acetaminophen-Codeine Other (See Comments) and Nausea And Vomiting    Codeine Itching    Hydrocodone-Acetaminophen Rash    Ibuprofen Other (See Comments)     Stomach cramps    Morphine Other (See Comments)     Gives headaches    Tramadol Itching     vomitting       Current Meds:   Scheduled Meds:    calcium gluconate  1,000 mg IntraVENous Once    insulin lispro  0-16 Units SubCUTAneous TID     insulin lispro  0-4 Units SubCUTAneous Nightly    aspirin  81 mg Oral Daily    atorvastatin  80 mg Oral Nightly    fluconazole  200 mg Oral Daily    [Held by provider] insulin glargine  25 Units SubCUTAneous Daily    [Held by provider] insulin glargine  15 Units SubCUTAneous Nightly    metoprolol tartrate  25 mg Oral BID    pantoprazole  40 mg Oral BID AC    heparin (porcine)  5,000 Units SubCUTAneous BID     Continuous Infusions:    dextrose 5 % and 0.45 % NaCl 75 mL/hr at 22 0506    dextrose       PRN Meds: potassium chloride **OR** potassium alternative oral replacement **OR** potassium chloride, perflutren lipid microspheres, glucose, dextrose bolus **OR** dextrose bolus, glucagon (rDNA), dextrose, lidocaine, albuterol sulfate HFA, clonazePAM, cyclobenzaprine, traZODone, polyethylene glycol    Data:     Past Medical History:   has a past medical history of Diabetes mellitus ([facility] [address] Obstructive lung disease ([facility] [address] and Panic attack. Social History:   reports that he has been smoking cigarettes. He has a 10.25 pack-year smoking history. He has never used smokeless tobacco. He reports that he does not currently use alcohol. He reports current drug use. Drug: Marijuana Rachel Cotton). Family History: History reviewed. No pertinent family history. Vitals:  /66   Pulse 77   Temp 98.4 °F (36.9 °C) (Oral)   Resp 19   Ht 5' 7\" (1.702 m)   Wt 112 lb 7 oz (51 kg)   SpO2 95%   BMI 17.61 kg/m²   Temp (24hrs), Av.2 °F (36.8 °C), Min:97.8 °F (36.6 °C), Max:98.8 °F (37.1 °C)    Recent Labs     22  1645 22  1832 22  2304   POCGLU 84 98 111* 159*       I/O(24Hr):     Intake/Output Summary (Last 24 hours) at 2022 0722  Last data filed at 2022 1827  Gross per 24 hour   Intake 2865 ml   Output 1100 ml   Net 1765 ml       Labs:  [unfilled]    Lab Results   Component Value Date/Time    SPECIAL NOT REPORTED 02/15/2022 05:12 PM     Lab Results   Component Value Date/Time    CULTURE NO GROWTH 34 DAYS 02/15/2022 05:11 PM [facility]    Radiology:    XR CHEST (2 VW)    Result Date: 11/4/2022  EXAMINATION: TWO XRAY VIEWS OF THE CHEST 11/4/2022 1:15 am COMPARISON: 09/29/2022 radiograph HISTORY: ORDERING SYSTEM PROVIDED HISTORY: eval dyspnea TECHNOLOGIST PROVIDED HISTORY: eval dyspnea Reason for Exam: Dyspnea FINDINGS: Heart, mediastinum and pulmonary vascularity are normal.  The lungs are lucent and hyperinflated. There is a stable calcified granuloma in the right lower lobe. No acute pulmonary finding. No significant skeletal finding. Lucent lungs with chronic pattern of hyperinflation. No acute pulmonary finding. XR HIP LEFT (2-3 VIEWS)    Result Date: 11/4/2022  EXAMINATION: TWO XRAY VIEWS OF THE LEFT HIP; 4 XRAY VIEWS OF THE LEFT FEMUR 11/4/2022 1:15 am COMPARISON: None. HISTORY: ORDERING SYSTEM PROVIDED HISTORY: Eval left hip pain postsurgical TECHNOLOGIST PROVIDED HISTORY: Eval left hip pain postsurgical Reason for Exam: Increased leg pain, surgery x2 weeks ; ORDERING SYSTEM PROVIDED HISTORY: eval leg pain post op TECHNOLOGIST PROVIDED HISTORY: eval leg pain post op Reason for Exam: Increased leg pain, surgery x2 weeks FINDINGS: Prior surgical change of ORIF in the left femur. Previous intertrochanteric fracture shows anatomic alignment. There is no new fracture or dislocation at the hip or distally in the femur. The visualized knee is unremarkable. No significant soft tissue finding. Prior surgical change of ORIF with no acute abnormality of the left hip or femur. XR FEMUR LEFT (MIN 2 VIEWS)    Result Date: 11/4/2022  EXAMINATION: TWO XRAY VIEWS OF THE LEFT HIP; 4 XRAY VIEWS OF THE LEFT FEMUR 11/4/2022 1:15 am COMPARISON: None.  HISTORY: ORDERING SYSTEM PROVIDED HISTORY: Eval left hip pain postsurgical TECHNOLOGIST PROVIDED HISTORY: Eval left hip pain postsurgical Reason for Exam: Increased leg pain, surgery x2 weeks ; ORDERING SYSTEM PROVIDED HISTORY: eval leg pain post op TECHNOLOGIST PROVIDED HISTORY: eval leg pain post op Reason for Exam: Increased leg pain, surgery x2 weeks FINDINGS: Prior surgical change of ORIF in the left femur. Previous intertrochanteric fracture shows anatomic alignment. There is no new fracture or dislocation at the hip or distally in the femur. The visualized knee is unremarkable. No significant soft tissue finding. Prior surgical change of ORIF with no acute abnormality of the left hip or femur. CT ABDOMEN PELVIS W IV CONTRAST Additional Contrast? None    Result Date: 11/4/2022  EXAMINATION: CTA OF THE CHEST; CT OF THE ABDOMEN AND PELVIS WITH CONTRAST 11/4/2022 2:44 am TECHNIQUE: CTA of the chest was performed after the administration of intravenous contrast.  Multiplanar reformatted images are provided for review. MIP images are provided for review. Automated exposure control, iterative reconstruction, and/or weight based adjustment of the mA/kV was utilized to reduce the radiation dose to as low as reasonably achievable.; CT of the abdomen and pelvis was performed with the administration of intravenous contrast. Multiplanar reformatted images are provided for review. Automated exposure control, iterative reconstruction, and/or weight based adjustment of the mA/kV was utilized to reduce the radiation dose to as low as reasonably achievable. COMPARISON: 09/29/2022 HISTORY: ORDERING SYSTEM PROVIDED HISTORY: eval PE TECHNOLOGIST PROVIDED HISTORY: eval PE Decision Support Exception - unselect if not a suspected or confirmed emergency medical condition->Emergency Medical Condition (MA) Reason for Exam: r/o pe; ORDERING SYSTEM PROVIDED HISTORY: eval abd pain TECHNOLOGIST PROVIDED HISTORY: eval abd pain Decision Support Exception - unselect if not a suspected or confirmed emergency medical condition->Emergency Medical Condition (MA) Reason for Exam: abd pain FINDINGS: CHEST CT: Pulmonary Arteries: Pulmonary arteries are adequately opacified for evaluation.   No evidence of intraluminal filling defect to suggest pulmonary embolism. Main pulmonary artery is normal in caliber. Mediastinum: No evidence of mediastinal lymphadenopathy. The heart and pericardium demonstrate no acute abnormality. There is no acute abnormality of the thoracic aorta. Lungs/pleura: The lungs are without acute process. Sequela of old granulomatous disease. No focal consolidation or pulmonary edema. No evidence of pleural effusion or pneumothorax. Soft Tissues/Bones: No acute bone or soft tissue abnormality. Probably benign sclerosis of the anterior left 7th and 8th ribs again noted. Degenerative superior endplate changes at T7 and T8 again demonstrated. ABDOMEN PELVIS: Organs: Findings compatible with hepatic steatosis. The gallbladder, pancreas, spleen, adrenals and kidneys reveal no acute findings. Punctate nonobstructing left renal stones. GI/Bowel: There is no bowel dilatation or wall thickening identified. Pelvis: No acute findings. Peritoneum/Retroperitoneum: No free air or free fluid. The aorta is normal in caliber. The visceral branches are patent. No lymphadenopathy. Bones/Soft Tissues: No abnormality identified. Partially visualized left femoral trochanteric fixation hardware. *Unless otherwise specified, incidental findings do not require dedicated imaging follow-up. 1.  No evidence of pulmonary embolism or acute pulmonary abnormality. 2.  No acute inflammatory process identified in the abdomen or pelvis. 3.  Punctate left nephrolithiasis. 4.  Findings compatible with hepatic steatosis. 5.  Additional chronic and benign findings, as above.      US RENAL LIMITED    Result Date: 11/5/2022  EXAMINATION: ULTRASOUND OF THE KIDNEYS 11/5/2022 9:44 am COMPARISON: CT abdomen pelvis from 11/04/2022 HISTORY: ORDERING SYSTEM PROVIDED HISTORY: Ultrasound to r/o element of obstruction and to assess the kidney size/echotextur TECHNOLOGIST PROVIDED HISTORY: Ultrasound to r/o element of obstruction and to assess the kidney size/echotextur 81-year-old male; rule out obstructive uropathy FINDINGS: Right kidney measures 10.5 x 4.6 x 5.5 cm. Right renal cortical thickness measures 1.2 cm. Left kidney measures 9.5 x 5.1 x 5.3 cm. Left renal cortical thickness measures 1.4 cm. Increased echogenicity of the bilateral renal cortex. Suspected punctate left-sided renal calculi. No hydronephrosis. Color flow projects over the bilateral renal parenchyma. No perinephric fluid. 1. Increased echogenicity of the bilateral renal cortex which can be seen with medical renal disease. 2. Suspected left-sided renal calculi. No hydronephrosis. XR CHEST PORTABLE    Result Date: 11/7/2022  EXAMINATION: ONE XRAY VIEW OF THE CHEST 11/7/2022 8:25 pm COMPARISON: Less than an hour ago HISTORY: ORDERING SYSTEM PROVIDED HISTORY: right IJ TECHNOLOGIST PROVIDED HISTORY: right IJ Reason for Exam: Right IJ placement FINDINGS: New right IJ catheter terminates in the superior vena cava. No pneumothorax. Lung bases not included within the field of view. Patient is rotated. Heart and mediastinum normal.  Bony thorax intact. New right IJ catheter in the SVC. No pneumothorax. XR CHEST PORTABLE    Result Date: 11/7/2022  EXAMINATION: ONE XRAY VIEW OF THE CHEST 11/7/2022 7:12 pm COMPARISON: November 4, 2022 HISTORY: ORDERING SYSTEM PROVIDED HISTORY: SOB TECHNOLOGIST PROVIDED HISTORY: SOB Reason for Exam: PT CO SOB X several days FINDINGS: Lungs are hyperaerated. Heart and mediastinum normal.  Bony thorax intact. Possible slight increase in interstitial markings at the lung bases. COPD. Increased interstitial markings at the lung bases.      CT CHEST PULMONARY EMBOLISM W CONTRAST    Result Date: 11/4/2022  EXAMINATION: CTA OF THE CHEST; CT OF THE ABDOMEN AND PELVIS WITH CONTRAST 11/4/2022 2:44 am TECHNIQUE: CTA of the chest was performed after the administration of intravenous contrast.  Multiplanar reformatted images are provided for review. MIP images are provided for review. Automated exposure control, iterative reconstruction, and/or weight based adjustment of the mA/kV was utilized to reduce the radiation dose to as low as reasonably achievable.; CT of the abdomen and pelvis was performed with the administration of intravenous contrast. Multiplanar reformatted images are provided for review. Automated exposure control, iterative reconstruction, and/or weight based adjustment of the mA/kV was utilized to reduce the radiation dose to as low as reasonably achievable. COMPARISON: 09/29/2022 HISTORY: ORDERING SYSTEM PROVIDED HISTORY: eval PE TECHNOLOGIST PROVIDED HISTORY: eval PE Decision Support Exception - unselect if not a suspected or confirmed emergency medical condition->Emergency Medical Condition (MA) Reason for Exam: r/o pe; ORDERING SYSTEM PROVIDED HISTORY: eval abd pain TECHNOLOGIST PROVIDED HISTORY: eval abd pain Decision Support Exception - unselect if not a suspected or confirmed emergency medical condition->Emergency Medical Condition (MA) Reason for Exam: abd pain FINDINGS: CHEST CT: Pulmonary Arteries: Pulmonary arteries are adequately opacified for evaluation. No evidence of intraluminal filling defect to suggest pulmonary embolism. Main pulmonary artery is normal in caliber. Mediastinum: No evidence of mediastinal lymphadenopathy. The heart and pericardium demonstrate no acute abnormality. There is no acute abnormality of the thoracic aorta. Lungs/pleura: The lungs are without acute process. Sequela of old granulomatous disease. No focal consolidation or pulmonary edema. No evidence of pleural effusion or pneumothorax. Soft Tissues/Bones: No acute bone or soft tissue abnormality. Probably benign sclerosis of the anterior left 7th and 8th ribs again noted. Degenerative superior endplate changes at T7 and T8 again demonstrated. ABDOMEN PELVIS: Organs: Findings compatible with hepatic steatosis.   The gallbladder, pancreas, spleen, adrenals and kidneys reveal no acute findings. Punctate nonobstructing left renal stones. GI/Bowel: There is no bowel dilatation or wall thickening identified. Pelvis: No acute findings. Peritoneum/Retroperitoneum: No free air or free fluid. The aorta is normal in caliber. The visceral branches are patent. No lymphadenopathy. Bones/Soft Tissues: No abnormality identified. Partially visualized left femoral trochanteric fixation hardware. *Unless otherwise specified, incidental findings do not require dedicated imaging follow-up. 1.  No evidence of pulmonary embolism or acute pulmonary abnormality. 2.  No acute inflammatory process identified in the abdomen or pelvis. 3.  Punctate left nephrolithiasis. 4.  Findings compatible with hepatic steatosis. 5.  Additional chronic and benign findings, as above. VL DUP LOWER EXTREMITY VENOUS BILATERAL    Result Date: 11/8/2022    [facility]  Vascular Lower Extremities DVT Study Procedure   Patient Name   Silver Brennan     Date of Study           11/08/2022                 ARLET   Date of Birth  1970  Gender                  Male   Age            47 year(s)  Race                       Room Number    2004        Height:                 66.93 inch, 170 cm   Corporate ID # [id number]    Weight:                 112 pounds, 51 kg   Patient Acct # [de-identified]   BSA:        1.58 m^2    BMI:      17.65 kg/m^2   MR #           [id number]      Sonographer             Tom Olguin [facility]   Accession #    [id number]  Interpreting Physician  Hernando Plata   Referring                  Referring Physician     Carey Hook  Nurse  Practitioner  Procedure Type of Study:   Veins: Lower Extremities DVT Study, Venous Scan Lower Bilateral.  Indications for Study:R/O DVT. Patient Status: In Patient. Technical Quality:Adequate visualization. Conclusions   Summary   Bilateral:  No evidence of deep or superficial venous thrombosis.    Signature ----------------------------------------------------------------  Electronically signed by Trey Valdes RVT(Sonographer) on  11/08/2022 11:05 AM  ----------------------------------------------------------------   ----------------------------------------------------------------  Electronically signed by Andrea Rose physician)  on 11/08/2022 05:49 PM  ----------------------------------------------------------------  Findings:   Right Impression:                    Left Impression:  The common femoral, femoral,         The common femoral, femoral,  popliteal and tibial veins           popliteal and tibial veins  demonstrate normal compressibility   demonstrate normal compressibility  and augmentation. and augmentation. Normal compressibility of the great  Normal compressibility of the great  saphenous vein. saphenous vein. Normal compressibility of the small  Normal compressibility of the small  saphenous vein. saphenous vein. Velocities are measured in cm/s ; Diameters are measured in cm Right Lower Extremities DVT Study Measurements Right 2D Measurements +------------------------------------+----------+---------------+----------+ ! Location                            ! Visualized! Compressibility! Thrombosis! +------------------------------------+----------+---------------+----------+ ! Common Femoral                      !Yes       ! Yes            ! None      ! +------------------------------------+----------+---------------+----------+ ! Prox Femoral                        !Yes       ! Yes            ! None      ! +------------------------------------+----------+---------------+----------+ ! Mid Femoral                         !Yes       ! Yes            ! None      ! +------------------------------------+----------+---------------+----------+ ! Dist Femoral                        !Yes       ! Yes            ! None      ! +------------------------------------+----------+---------------+----------+ ! Deep Femoral                        !Yes       ! Yes            ! None      ! +------------------------------------+----------+---------------+----------+ ! Popliteal                           !Yes       ! Yes            ! None      ! +------------------------------------+----------+---------------+----------+ ! Sapheno Femoral Junction            ! Yes       ! Yes            ! None      ! +------------------------------------+----------+---------------+----------+ ! PTV                                 ! Yes       ! Yes            ! None      ! +------------------------------------+----------+---------------+----------+ ! Peroneal                            !Yes       ! Yes            ! None      ! +------------------------------------+----------+---------------+----------+ ! Gastroc                             ! Yes       ! Yes            ! None      ! +------------------------------------+----------+---------------+----------+ ! GSV Thigh                           ! Yes       ! Yes            ! None      ! +------------------------------------+----------+---------------+----------+ ! GSV Knee                            ! Yes       ! Yes            ! None      ! +------------------------------------+----------+---------------+----------+ ! GSV Ankle                           ! Yes       ! Yes            ! None      ! +------------------------------------+----------+---------------+----------+ ! SSV                                 ! Yes       ! Yes            ! None      ! +------------------------------------+----------+---------------+----------+ Left Lower Extremities DVT Study Measurements Left 2D Measurements +------------------------------------+----------+---------------+----------+ ! Location                            ! Visualized! Compressibility! Thrombosis! +------------------------------------+----------+---------------+----------+ ! Common Femoral !Yes       !Yes            ! None      ! +------------------------------------+----------+---------------+----------+ ! Prox Femoral                        !Yes       ! Yes            ! None      ! +------------------------------------+----------+---------------+----------+ ! Mid Femoral                         !Yes       ! Yes            ! None      ! +------------------------------------+----------+---------------+----------+ ! Dist Femoral                        !Yes       ! Yes            ! None      ! +------------------------------------+----------+---------------+----------+ ! Deep Femoral                        !Yes       ! Yes            ! None      ! +------------------------------------+----------+---------------+----------+ ! Popliteal                           !Yes       ! Yes            ! None      ! +------------------------------------+----------+---------------+----------+ ! Sapheno Femoral Junction            ! Yes       ! Yes            ! None      ! +------------------------------------+----------+---------------+----------+ ! PTV                                 ! Yes       ! Yes            ! None      ! +------------------------------------+----------+---------------+----------+ ! Peroneal                            !Yes       ! Yes            ! None      ! +------------------------------------+----------+---------------+----------+ ! Gastroc                             ! Yes       ! Yes            ! None      ! +------------------------------------+----------+---------------+----------+ ! GSV Thigh                           ! Yes       ! Yes            ! None      ! +------------------------------------+----------+---------------+----------+ ! GSV Knee                            ! Yes       ! Yes            ! None      ! +------------------------------------+----------+---------------+----------+ ! GSV Ankle                           ! Yes       ! Yes            ! None      ! +------------------------------------+----------+---------------+----------+ ! SSV                                 ! Yes       ! Yes            ! None      ! +------------------------------------+----------+---------------+----------+    US ABDOMEN LIMITED Specify organ? LIVER, GALLBLADDER, PANCREAS    Result Date: 11/4/2022  EXAMINATION: RIGHT UPPER QUADRANT ULTRASOUND 11/4/2022 10:29 am COMPARISON: None. HISTORY: ORDERING SYSTEM PROVIDED HISTORY: eval epigastric pain TECHNOLOGIST PROVIDED HISTORY: eval epigastric pain Specify organ?->LIVER Specify organ?->GALLBLADDER Specify organ?->PANCREAS FINDINGS: LIVER:  The liver measures 18 cm and demonstrates normal echogenicity without evidence of intrahepatic biliary ductal dilatation. BILIARY SYSTEM:  Gallbladder is unremarkable without evidence of pericholecystic fluid, wall thickening or stones. Negative sonographic Mccord's sign. Common bile duct is within normal limits measuring 4 mm. RIGHT KIDNEY: The right kidney is grossly unremarkable without evidence of hydronephrosis. PANCREAS:  Visualized portions of the pancreas are unremarkable. OTHER: No evidence of right upper quadrant ascites. Hepatomegaly otherwise unremarkable exam RECOMMENDATIONS: Unavailable         Physical Examination:        Physical Exam  Constitutional:       Appearance: Normal appearance. He is ill-appearing. HENT:      Head: Normocephalic and atraumatic. Mouth/Throat:      Mouth: Mucous membranes are moist.   Eyes:      Extraocular Movements: Extraocular movements intact. Conjunctiva/sclera: Conjunctivae normal.   Cardiovascular:      Rate and Rhythm: Normal rate and regular rhythm. Heart sounds: No murmur heard. No gallop. Pulmonary:      Effort: Pulmonary effort is normal. No respiratory distress. Breath sounds: No wheezing or rales. Abdominal:      General: Bowel sounds are normal. There is no distension. Palpations: Abdomen is soft. Tenderness:  There is no abdominal tenderness. Musculoskeletal:      Right lower leg: No edema. Left lower leg: No edema. Skin:     General: Skin is warm. Neurological:      Mental Status: He is alert and oriented to person, place, and time. Psychiatric:         Behavior: Behavior normal.     Assessment:        Primary Problem  Diabetic ketoacidosis without coma associated with type 1 diabetes mellitus ([facility]    Active Hospital Problems    Diagnosis Date Noted    Severe malnutrition ([facility] [E43] 11/08/2022     Priority: Medium    Diabetic ketoacidosis without coma associated with type 1 diabetes mellitus ([facility] [E10.10] 11/07/2022     Priority: Medium       Plan:        DKA 2/2 medication noncompliance, rule out infection -resolved  - HbA1c: 10 on 9/30/2022  -Patient will be transferred out of ICU  -Gap closed x2  -Patient on Lantus 20 daily and high-dose sliding scale  -D5 and 0.45% NS at 75 mL/hr   -Insulin drip discontinued yesterday   -Glucose checks 4 times daily  -Betahydroxybutarate levels >16 on admission -11.97 yesterday  -Hypoglycemia, phos and potassium replacement protocols in place  -Urinalysis and chest x-ray is unremarkable for any infection.  -Telemetry monitoring  -Pulmonology consulted, will appreciate recommendations  -Inpatient consult to diabetes educator     2. LIBBY on CKD stage IIIa, improving  -Creatinine downtrending to 1.32 from 2.42 on admission -patient's baseline 1.2  -We will monitor kidney functions  -Avoid nephrotoxic drugs    3. Chest pain   -Patient was recently admitted for NSTEMI type II  -Patient had coronary angiography done last year [facility] which showed normal coronary arteries  -Echo on 11/8/2022: Left ventricular ejection fraction 55%  -Audiology consulted, will appreciate recommendations   -Continue aspirin, statin, and beta-blocker   -No further inpatient ischemic work-up necessary    4.   Hypokalemia  -Potassium low at 3.1  -Patient already on replacement protocol  -We will continue to monitor     DVT prophylaxis: Heparin  Diet: Diabetic adult diet  Disposition: Alfredo Hutchinson MD  11/9/2022  7:22 AM

## 2022-11-09 NOTE — PLAN OF CARE
Transfer of care note    Patient is a 50-year-old male past medical history of type 1 diabetes complicated with diabetic neuropathy and nephropathy, CKD stage III, COPD who presented to the ED on 11/7/2022 with chief complaint of chest pain shortness of breath and was found to be in DKA - Labs were significant for elevated blood glucose 830, potassium of 7.1, sodium of 124 -corrected sodium 136 -bicarb less than 6, and beta hydroxybutyrate above 16, and pH of 6.9. He was admitted to ICU for further management of DKA. He was started on insulin drip -12 hours later anion gap closed x2. He was bridged with subcu insulin, tolerated oral intake and was transferred out of ICU. His home insulin regimen resumed. During the course of hospitalization patient reported having 1 week history of without aphasia, nausea dysphagia, and weight loss, he had EGD done in February showing Candida esophagitis with questionable compliance with Diflucan and PPI -GI was consulted, will plan for EGD tomorrow.     Electronically signed by Kari Bernabe MD on 11/9/2022 at 6:21 PM

## 2022-11-10 ENCOUNTER — ANESTHESIA (OUTPATIENT)
Dept: ENDOSCOPY | Age: 52
DRG: 420 | End: 2022-11-10
Payer: MEDICARE

## 2022-11-10 ENCOUNTER — ANESTHESIA EVENT (OUTPATIENT)
Dept: ENDOSCOPY | Age: 52
DRG: 420 | End: 2022-11-10
Payer: MEDICARE

## 2022-11-10 VITALS
SYSTOLIC BLOOD PRESSURE: 136 MMHG | HEIGHT: 67 IN | BODY MASS INDEX: 18.2 KG/M2 | HEART RATE: 68 BPM | OXYGEN SATURATION: 97 % | WEIGHT: 115.96 LBS | TEMPERATURE: 98.6 F | RESPIRATION RATE: 18 BRPM | DIASTOLIC BLOOD PRESSURE: 86 MMHG

## 2022-11-10 LAB
ABSOLUTE EOS #: 0 K/UL (ref 0–0.4)
ABSOLUTE LYMPH #: 1.4 K/UL (ref 1–4.8)
ABSOLUTE MONO #: 0.8 K/UL (ref 0.1–1.3)
ANION GAP SERPL CALCULATED.3IONS-SCNC: 6 MMOL/L (ref 9–17)
ANTI DNA DOUBLE STRANDED: <0.5 IU/ML
ANTI-NUCLEAR ANTIBODY (ANA): NEGATIVE
BASOPHILS # BLD: 0 % (ref 0–2)
BASOPHILS ABSOLUTE: 0 K/UL (ref 0–0.2)
BUN BLDV-MCNC: 14 MG/DL (ref 6–20)
CALCIUM SERPL-MCNC: 7.9 MG/DL (ref 8.6–10.4)
CHLORIDE BLD-SCNC: 108 MMOL/L (ref 98–107)
CO2: 29 MMOL/L (ref 20–31)
CREAT SERPL-MCNC: 1.08 MG/DL (ref 0.7–1.2)
ENA ANTIBODIES SCREEN: 0.3 U/ML
EOSINOPHILS RELATIVE PERCENT: 1 % (ref 0–4)
GFR SERPL CREATININE-BSD FRML MDRD: >60 ML/MIN/1.73M2
GLUCOSE BLD-MCNC: 111 MG/DL (ref 75–110)
GLUCOSE BLD-MCNC: 165 MG/DL (ref 75–110)
GLUCOSE BLD-MCNC: 82 MG/DL (ref 70–99)
GLUCOSE BLD-MCNC: 85 MG/DL (ref 75–110)
GLUCOSE BLD-MCNC: 92 MG/DL (ref 75–110)
GLUCOSE BLD-MCNC: 94 MG/DL (ref 75–110)
HCT VFR BLD CALC: 26.1 % (ref 41–53)
HEMOGLOBIN: 8.6 G/DL (ref 13.5–17.5)
LYMPHOCYTES # BLD: 25 % (ref 24–44)
MCH RBC QN AUTO: 27.9 PG (ref 26–34)
MCHC RBC AUTO-ENTMCNC: 33 G/DL (ref 31–37)
MCV RBC AUTO: 84.6 FL (ref 80–100)
MITOCHONDRIAL ANTIBODY: 0.7 U/ML (ref 0–4)
MONOCYTES # BLD: 14 % (ref 1–7)
PDW BLD-RTO: 14.6 % (ref 11.5–14.9)
PLATELET # BLD: 252 K/UL (ref 150–450)
PMV BLD AUTO: 8.3 FL (ref 6–12)
POTASSIUM SERPL-SCNC: 3.4 MMOL/L (ref 3.7–5.3)
RBC # BLD: 3.09 M/UL (ref 4.5–5.9)
SEG NEUTROPHILS: 60 % (ref 36–66)
SEGMENTED NEUTROPHILS ABSOLUTE COUNT: 3.4 K/UL (ref 1.3–9.1)
SODIUM BLD-SCNC: 143 MMOL/L (ref 135–144)
WBC # BLD: 5.6 K/UL (ref 3.5–11)

## 2022-11-10 PROCEDURE — 36415 COLL VENOUS BLD VENIPUNCTURE: CPT

## 2022-11-10 PROCEDURE — 3609012400 HC EGD TRANSORAL BIOPSY SINGLE/MULTIPLE: Performed by: INTERNAL MEDICINE

## 2022-11-10 PROCEDURE — A4216 STERILE WATER/SALINE, 10 ML: HCPCS | Performed by: NURSE PRACTITIONER

## 2022-11-10 PROCEDURE — 82947 ASSAY GLUCOSE BLOOD QUANT: CPT

## 2022-11-10 PROCEDURE — 2580000003 HC RX 258: Performed by: NURSE PRACTITIONER

## 2022-11-10 PROCEDURE — 0DB18ZX EXCISION OF UPPER ESOPHAGUS, VIA NATURAL OR ARTIFICIAL OPENING ENDOSCOPIC, DIAGNOSTIC: ICD-10-PCS | Performed by: INTERNAL MEDICINE

## 2022-11-10 PROCEDURE — 7100000001 HC PACU RECOVERY - ADDTL 15 MIN: Performed by: INTERNAL MEDICINE

## 2022-11-10 PROCEDURE — C9113 INJ PANTOPRAZOLE SODIUM, VIA: HCPCS | Performed by: NURSE PRACTITIONER

## 2022-11-10 PROCEDURE — 85025 COMPLETE CBC W/AUTO DIFF WBC: CPT

## 2022-11-10 PROCEDURE — 88305 TISSUE EXAM BY PATHOLOGIST: CPT

## 2022-11-10 PROCEDURE — 6360000002 HC RX W HCPCS: Performed by: NURSE ANESTHETIST, CERTIFIED REGISTERED

## 2022-11-10 PROCEDURE — 88342 IMHCHEM/IMCYTCHM 1ST ANTB: CPT

## 2022-11-10 PROCEDURE — 88341 IMHCHEM/IMCYTCHM EA ADD ANTB: CPT

## 2022-11-10 PROCEDURE — 2580000003 HC RX 258: Performed by: NURSE ANESTHETIST, CERTIFIED REGISTERED

## 2022-11-10 PROCEDURE — 0DB68ZX EXCISION OF STOMACH, VIA NATURAL OR ARTIFICIAL OPENING ENDOSCOPIC, DIAGNOSTIC: ICD-10-PCS | Performed by: INTERNAL MEDICINE

## 2022-11-10 PROCEDURE — 3700000001 HC ADD 15 MINUTES (ANESTHESIA): Performed by: INTERNAL MEDICINE

## 2022-11-10 PROCEDURE — 80048 BASIC METABOLIC PNL TOTAL CA: CPT

## 2022-11-10 PROCEDURE — 2709999900 HC NON-CHARGEABLE SUPPLY: Performed by: INTERNAL MEDICINE

## 2022-11-10 PROCEDURE — 3700000000 HC ANESTHESIA ATTENDED CARE: Performed by: INTERNAL MEDICINE

## 2022-11-10 PROCEDURE — 6360000002 HC RX W HCPCS: Performed by: NURSE PRACTITIONER

## 2022-11-10 PROCEDURE — 99239 HOSP IP/OBS DSCHRG MGMT >30: CPT | Performed by: INTERNAL MEDICINE

## 2022-11-10 PROCEDURE — 7100000000 HC PACU RECOVERY - FIRST 15 MIN: Performed by: INTERNAL MEDICINE

## 2022-11-10 PROCEDURE — 88312 SPECIAL STAINS GROUP 1: CPT

## 2022-11-10 RX ORDER — SODIUM CHLORIDE 9 MG/ML
INJECTION, SOLUTION INTRAVENOUS CONTINUOUS PRN
Status: DISCONTINUED | OUTPATIENT
Start: 2022-11-10 | End: 2022-11-10 | Stop reason: SDUPTHER

## 2022-11-10 RX ORDER — INSULIN GLARGINE 100 [IU]/ML
15 INJECTION, SOLUTION SUBCUTANEOUS 2 TIMES DAILY
Qty: 10 ML | Refills: 3 | Status: SHIPPED | OUTPATIENT
Start: 2022-11-10

## 2022-11-10 RX ORDER — PANTOPRAZOLE SODIUM 40 MG/1
40 TABLET, DELAYED RELEASE ORAL
Qty: 60 TABLET | Refills: 3 | Status: SHIPPED | OUTPATIENT
Start: 2022-11-10 | End: 2022-11-14 | Stop reason: SDUPTHER

## 2022-11-10 RX ORDER — INSULIN LISPRO 100 [IU]/ML
0-16 INJECTION, SOLUTION INTRAVENOUS; SUBCUTANEOUS
Qty: 1 EACH | Refills: 2 | Status: SHIPPED | OUTPATIENT
Start: 2022-11-10 | End: 2022-11-18

## 2022-11-10 RX ORDER — PROPOFOL 10 MG/ML
INJECTION, EMULSION INTRAVENOUS PRN
Status: DISCONTINUED | OUTPATIENT
Start: 2022-11-10 | End: 2022-11-10 | Stop reason: SDUPTHER

## 2022-11-10 RX ORDER — INSULIN LISPRO 100 [IU]/ML
5 INJECTION, SOLUTION INTRAVENOUS; SUBCUTANEOUS
Status: DISCONTINUED | OUTPATIENT
Start: 2022-11-10 | End: 2022-11-10 | Stop reason: HOSPADM

## 2022-11-10 RX ORDER — INSULIN LISPRO 100 [IU]/ML
0-4 INJECTION, SOLUTION INTRAVENOUS; SUBCUTANEOUS NIGHTLY
Qty: 1 EACH | Refills: 2 | Status: SHIPPED | OUTPATIENT
Start: 2022-11-10 | End: 2022-11-18

## 2022-11-10 RX ORDER — FENTANYL CITRATE 50 UG/ML
INJECTION, SOLUTION INTRAMUSCULAR; INTRAVENOUS PRN
Status: DISCONTINUED | OUTPATIENT
Start: 2022-11-10 | End: 2022-11-10 | Stop reason: SDUPTHER

## 2022-11-10 RX ORDER — INSULIN GLARGINE 100 [IU]/ML
15 INJECTION, SOLUTION SUBCUTANEOUS 2 TIMES DAILY
Status: DISCONTINUED | OUTPATIENT
Start: 2022-11-10 | End: 2022-11-10 | Stop reason: HOSPADM

## 2022-11-10 RX ADMIN — PROPOFOL 50 MG: 10 INJECTION, EMULSION INTRAVENOUS at 13:41

## 2022-11-10 RX ADMIN — PROPOFOL 50 MG: 10 INJECTION, EMULSION INTRAVENOUS at 13:48

## 2022-11-10 RX ADMIN — FENTANYL CITRATE 50 MCG: 50 INJECTION, SOLUTION INTRAMUSCULAR; INTRAVENOUS at 13:37

## 2022-11-10 RX ADMIN — SODIUM CHLORIDE: 9 INJECTION, SOLUTION INTRAVENOUS at 13:30

## 2022-11-10 RX ADMIN — SODIUM CHLORIDE 40 MG: 9 INJECTION, SOLUTION INTRAMUSCULAR; INTRAVENOUS; SUBCUTANEOUS at 05:21

## 2022-11-10 RX ADMIN — PROPOFOL 50 MG: 10 INJECTION, EMULSION INTRAVENOUS at 13:37

## 2022-11-10 RX ADMIN — FENTANYL CITRATE 50 MCG: 50 INJECTION, SOLUTION INTRAMUSCULAR; INTRAVENOUS at 13:33

## 2022-11-10 ASSESSMENT — ENCOUNTER SYMPTOMS
NAUSEA: 1
STRIDOR: 0
CHOKING: 0
ABDOMINAL PAIN: 0
CHEST TIGHTNESS: 0
VOMITING: 0
COUGH: 0
SHORTNESS OF BREATH: 0
DIARRHEA: 0
SHORTNESS OF BREATH: 1

## 2022-11-10 ASSESSMENT — LIFESTYLE VARIABLES: SMOKING_STATUS: 0

## 2022-11-10 ASSESSMENT — PAIN - FUNCTIONAL ASSESSMENT: PAIN_FUNCTIONAL_ASSESSMENT: 0-10

## 2022-11-10 ASSESSMENT — PAIN DESCRIPTION - PAIN TYPE: TYPE: CHRONIC PAIN

## 2022-11-10 ASSESSMENT — PAIN SCALES - GENERAL: PAINLEVEL_OUTOF10: 4

## 2022-11-10 ASSESSMENT — PAIN DESCRIPTION - LOCATION: LOCATION: LEG

## 2022-11-10 ASSESSMENT — PAIN DESCRIPTION - ORIENTATION: ORIENTATION: LEFT

## 2022-11-10 NOTE — PROGRESS NOTES
11/10/22 1237   Encounter Summary   Encounter Overview/Reason  Spiritual/Emotional Needs   Service Provided For: Patient   Referral/Consult From: Rounding   Complexity of Encounter Low   Spiritual/Emotional needs   Type Spiritual Support   Assessment/Intervention/Outcome   Assessment Unable to assess   Intervention Prayer (assurance of)/[place]

## 2022-11-10 NOTE — PROGRESS NOTES
The patient was seen and examined. The patient is scheduled for upper endoscopy for dysphagia and odynophagia today. The patient was explained the risks, the benefits and alternatives. He verbalized understanding and agreed to proceed the procedure. Plan for EGD with possible biopsy and dilation.

## 2022-11-10 NOTE — PROGRESS NOTES
[facility]: RENATE ZAVALA   OCCUPATIONAL THERAPY MISSED TREATMENT NOTE   INPATIENT   Date: 11/10/22  Patient Name: Sarah Cast       Room: [facility] Brenda Hebert  MRN: [id number]   Account #: [de-identified]    : 1970  (47 y.o.)  Gender: male   Referring Practitioner: Frances Elder MD  Diagnosis: Diabetic ketoacidosis             REASON FOR MISSED TREATMENT:     -    Cancel therapy at this time. Pt OOR for procedure. 1258.          DOROTA Maddox/QUETA

## 2022-11-10 NOTE — PROGRESS NOTES
Dr. Ann Malik MD / Dr. Madi Ewing MD / Dr. Luma SMITH CNP  Baudilio Fitzgerald CNP                  Pulmonary Progress Note   [facility]      Patient - Ariella Nguyen,  Age - 47 y.o.    - 1970      Room Number - 2095/2095-01   N -  [id number]   Acct # - [de-identified]  Date of Admission -  2022  6:40 PM        Consulting Narinder Muller MD  Primary Care Physician - BINA Jordan     HPI   Pt VS are stable. Remains on room air. Only complaint is that he is thirsty and hungry. OBJECTIVE   VITALS    height is 5' 7\" (1.702 m) and weight is 115 lb 15.4 oz (52.6 kg). His oral temperature is 98.2 °F (36.8 °C). His blood pressure is 128/74 and his pulse is 76. His respiration is 18 and oxygen saturation is 98%. Body mass index is 18.16 kg/m². Temperature Range: Temp: 98.2 °F (36.8 °C) Temp  Av.4 °F (36.9 °C)  Min: 98.1 °F (36.7 °C)  Max: 99 °F (37.2 °C)  BP Range:  Systolic (16BOY), ZSS:545 , Min:127 , MZH:215     Diastolic (25ERE), IOB:47, Min:74, Max:85    Pulse Range: Pulse  Av  Min: 72  Max: 78  Respiration Range: Resp  Av  Min: 18  Max: 18  Current Pulse Ox[de-identified]  SpO2: 98 %  24HR Pulse Ox Range:  SpO2  Av.5 %  Min: 98 %  Max: 99 %  Oxygen Amount and Delivery: Wt Readings from Last 3 Encounters:   11/10/22 115 lb 15.4 oz (52.6 kg)   22 114 lb 9.5 oz (52 kg)   10/25/22 125 lb (56.7 kg)       I/O (24 Hours)    Intake/Output Summary (Last 24 hours) at 11/10/2022 1124  Last data filed at 11/10/2022 0940  Gross per 24 hour   Intake --   Output 2550 ml   Net -2550 ml       EXAM     General Appearance  Awake, alert, oriented, in no acute distress  HEENT - normocephalic, atraumatic   Neck - Supple,  trachea midline. Right IJ TLC  Lungs - nonlabored.  Clear   Cardiovascular - Heart sounds are normal.  Regular rate and rhythm   Abdomen - Soft, nontender, nondistended, no masses or organomegaly  Neurologic - There are no focal motor or sensory deficits  Skin - No bruising or bleeding  Extremities - No  cyanosis, edema    MEDS      insulin glargine  15 Units SubCUTAneous BID    insulin lispro  5 Units SubCUTAneous TID WC    pantoprazole (PROTONIX) 40 mg injection  40 mg IntraVENous Q12H    calcium gluconate  1,000 mg IntraVENous Once    insulin lispro  0-16 Units SubCUTAneous TID WC    insulin lispro  0-4 Units SubCUTAneous Nightly    aspirin  81 mg Oral Daily    atorvastatin  80 mg Oral Nightly    metoprolol tartrate  25 mg Oral BID    heparin (porcine)  5,000 Units SubCUTAneous BID      sodium chloride 75 mL/hr at 11/09/22 1109    dextrose       potassium chloride **OR** potassium alternative oral replacement **OR** potassium chloride, sodium phosphate IVPB **OR** sodium phosphate IVPB, perflutren lipid microspheres, glucose, dextrose bolus **OR** dextrose bolus, glucagon (rDNA), dextrose, lidocaine, albuterol sulfate HFA, clonazePAM, cyclobenzaprine, traZODone, polyethylene glycol    LABS   CBC   Recent Labs     11/10/22  0612   WBC 5.6   HGB 8.6*   HCT 26.1*   MCV 84.6        BMP:   Lab Results   Component Value Date/Time     11/10/2022 06:12 AM    K 3.4 11/10/2022 06:12 AM     11/10/2022 06:12 AM    CO2 29 11/10/2022 06:12 AM    BUN 14 11/10/2022 06:12 AM    LABALBU 3.6 11/07/2022 07:19 PM    CREATININE 1.08 11/10/2022 06:12 AM    CALCIUM 7.9 11/10/2022 06:12 AM    GFRAA >60 10/01/2022 03:48 AM    LABGLOM >60 11/10/2022 06:12 AM     ABGs:No results found for: PHART, PO2ART, [id number]   Lab Results   Component Value Date/Time    MODE NOT REPORTED 02/14/2022 04:30 PM     Ionized Calcium:  No results found for: IONCA  Magnesium:    Lab Results   Component Value Date/Time    MG 2.0 11/08/2022 10:05 AM     Phosphorus:    Lab Results   Component Value Date/Time    PHOS 2.0 11/08/2022 10:05 AM        LIVER PROFILE Recent Labs     11/07/22 1919   AST 31   ALT 54*   BILITOT 0.4   ALKPHOS 145*     INR No results for input(s): INR in the last 72 hours. PTT   Lab Results   Component Value Date    APTT 21.9 11/06/2022         RADIOLOGY     (See actual reports for details)    ASSESSMENT/PLAN       DKA, possibly related to noncompliance-resolved  Metabolic acidosis- resolved  DM1, not controlled/compliant   Acute on chronic kidney disease, patient received contrast for CT scan of chest, abdomen and pelvis on 11/4  History of COPD, however, FEV1 82% predicted which is normal  Recent NSTEMI   History of tobacco use 50+ pack year history  Right lower lobe nodule, granuloma      Plan:  Plans for EGD today  Patient remained stable from pulmonary standpoint. We will sign off.   Please notify our service if we can be of any assistance  Would like him to follow-up in the office for pulmonary function testing in 4 to 6 weeks [phone]      Electronically signed by LUIS Dao CNP on 11/10/2022 at 11:24 AM

## 2022-11-10 NOTE — ANESTHESIA PRE PROCEDURE
Department of Anesthesiology  Preprocedure Note       Name:  Josue Barajas   Age:  47 y.o.  :  1970                                          MRN:  [id number]         Date:  11/10/2022      Surgeon: Ismael Verdin): Lissette Diana MD    Procedure: Procedure(s):  EGD BIOPSY    Medications prior to admission:   Prior to Admission medications    Medication Sig Start Date End Date Taking?  Authorizing Provider   aspirin 81 MG chewable tablet Take 1 tablet by mouth daily 22  Yes Ministerio Johnson MD   atorvastatin (LIPITOR) 80 MG tablet Take 1 tablet by mouth nightly 22  Yes Ministerio Johnson MD   metoprolol tartrate (LOPRESSOR) 25 MG tablet Take 1 tablet by mouth 2 times daily 22  Yes Ministerio Johnson MD   pantoprazole (PROTONIX) 40 MG tablet Take 1 tablet by mouth 2 times daily (before meals) for 25 doses 22 Yes Ministerio Johnson MD   fluconazole (DIFLUCAN) 100 MG tablet Take 2 tablets by mouth daily for 14 days 22 Yes Ministerio Johnson MD   insulin glargine (LANTUS) 100 UNIT/ML injection vial Inject 15 Units into the skin nightly   Yes Historical Provider, MD   cyclobenzaprine (FLEXERIL) 10 MG tablet Take 1 tablet by mouth 3 times daily as needed for Muscle spasms 10/25/22  Yes Lori Rodrigez DO   acetaminophen (TYLENOL) 500 MG tablet Take 2 tablets by mouth every 6 hours as needed for Pain 10/25/22  Yes Lori Rodrigez DO   vitamin D (ERGOCALCIFEROL) 1.25 MG (51444 UT) CAPS capsule Take 1 capsule by mouth once a week 22  Yes Isha Barreto,    insulin glargine (LANTUS SOLOSTAR) 100 UNIT/ML injection pen Inject 25 Units into the skin 2 times daily  Patient taking differently: Inject 25 Units into the skin daily 22  Yes BINA Gomez   insulin aspart (NOVOLOG FLEXPEN) 100 UNIT/ML injection pen Inject 5 Units into the skin 3 times daily (before meals) 22  Yes BINA Gomez   traZODone (DESYREL) 150 MG tablet Take 300 mg by mouth nightly Medicare Jeramy Guerrero. Yes Historical Provider, MD   clonazePAM (KLONOPIN) 2 MG tablet Take 4 mg by mouth daily as needed for Anxiety. Yes Historical Provider, MD   calcium carbonate (TUMS) 500 MG chewable tablet Take 2 tablets by mouth 3 times daily as needed for Heartburn 11/6/22 11/13/22  Norma Pham MD   gabapentin (NEURONTIN) 300 MG capsule Take 1 capsule by mouth in the morning and 1 capsule at noon and 1 capsule in the evening. Do all this for 5 days.  10/1/22 10/6/22  Reji Hahn MD   ondansetron (ZOFRAN ODT) 4 MG disintegrating tablet Take 1 tablet by mouth every 8 hours as needed for Nausea or Vomiting 6/25/22   Steven Neil, DO   Blood Glucose Monitoring Suppl (ONE TOUCH ULTRA 2) w/Device KIT 1 kit by Does not apply route daily 3/18/22   Pee Torres, DO   Lancets MISC 1 each by Does not apply route 2 times daily 3/18/22   Pee Torres, DO   Alcohol Swabs 70 % PADS Use as needed with blood glucose checks 2/15/22   Asher Booth MD   Insulin Pen Needle ([employer] PEN NEEDLES 29G) 29G X 12MM MISC 1 each by Does not apply route daily 2/15/22   Asher Chaparro MD   albuterol sulfate  (90 Base) MCG/ACT inhaler Inhale 2 puffs into the lungs every 4 hours as needed for Wheezing    Historical Provider, MD       Current medications:    Current Facility-Administered Medications   Medication Dose Route Frequency Provider Last Rate Last Admin    [MAR Hold] insulin glargine (LANTUS) injection vial 15 Units  15 Units SubCUTAneous BID Jamey Fothergill, MD        [facility] Hold] insulin lispro (HUMALOG) injection vial 5 Units  5 Units SubCUTAneous TID WC Jamey Fothergill, MD        [facility] Hold] potassium chloride (KLOR-CON M) extended release tablet 40 mEq  40 mEq Oral PRN LUIS Zhao NP   40 mEq at 11/09/22 0633    Or    [MAR Hold] potassium bicarb-citric acid (EFFER-K) effervescent tablet 40 mEq  40 mEq Oral PRN LUIS Zhao NP        Or   [facility] Hold] potassium chloride 10 mEq/100 mL IVPB (Peripheral Line)  10 mEq IntraVENous PRN LUIS White NP [facility] Hold] 0.9 % sodium chloride infusion   IntraVENous Norma Álvarez MD 75 mL/hr at 11/09/22 1109 New Bag at 11/09/22 1109    [MAR Hold] pantoprazole (PROTONIX) 40 mg in sodium chloride (PF) 0.9 % 10 mL injection  40 mg IntraVENous Q12H LUIS Sanders CNP   40 mg at 11/10/22 0521    [MAR Hold] sodium phosphate 8.16 mmol in sodium chloride 0.9 % 250 mL IVPB  0.16 mmol/kg IntraVENous PRN Itzel Fuller MD        Or   Nicolas Peopleswhitney [facility] Hold] sodium phosphate 16.32 mmol in sodium chloride 0.9 % 250 mL IVPB  0.32 mmol/kg IntraVENous PRN MD Nicolas Stoverwhitney [facility] Hold] calcium gluconate 1000 mg in sodium chloride 100 mL  1,000 mg IntraVENous Once LUIS White NP        [facility] Hold] insulin lispro (HUMALOG) injection vial 0-16 Units  0-16 Units SubCUTAneous TID WC Itzel Fuller MD   4 Units at 11/09/22 0847    [MAR Hold] insulin lispro (HUMALOG) injection vial 0-4 Units  0-4 Units SubCUTAneous Nightly Itzel Fuller MD        [facility] Hold] perflutren lipid microspheres (DEFINITY) injection 1.5 mL  1.5 mL IntraVENous ONCE PRN Carey Beard MD        [facility] Hold] glucose chewable tablet 16 g  4 tablet Oral PRN Ervin Sheth MD        [facility] Hold] dextrose bolus 10% 125 mL  125 mL IntraVENous PRN Ervin Sheth MD   Stopped at 11/08/22 1614    Or    [MAR Hold] dextrose bolus 10% 250 mL  250 mL IntraVENous PRN Ervin Sheth MD        [facility] Hold] glucagon (rDNA) injection 1 mg  1 mg SubCUTAneous PRN Ervin Sheth MD        [facility] Hold] dextrose 10 % infusion   IntraVENous Continuous PRN Ervin Sheth MD        [facility] Hold] lidocaine (XYLOCAINE) 2 % uro-jet   Topical PRN Lakeisha Matute DO        [facility] Hold] albuterol sulfate HFA (PROVENTIL;VENTOLIN;PROAIR) 108 (90 Base) MCG/ACT inhaler 2 puff  2 puff Inhalation Q4H PRN LUIS White NP        [facility] Hold] aspirin chewable tablet 81 mg  81 mg Oral Daily [facility], APRN - NP   81 mg at 11/09/22 0846    [MAR Hold] atorvastatin (LIPITOR) tablet 80 mg  80 mg Oral Nightly Connie Resides, APRN - NP   80 mg at 11/09/22 2159    [MAR Hold] clonazePAM (KLONOPIN) tablet 4 mg  4 mg Oral Daily PRN [facility], APRN - NP   4 mg at 11/09/22 2158    [MAR Hold] cyclobenzaprine (FLEXERIL) tablet 10 mg  10 mg Oral TID PRN [facility], APRN - NP   10 mg at 11/09/22 1401    [MAR Hold] metoprolol tartrate (LOPRESSOR) tablet 25 mg  25 mg Oral BID [facility], APRN - NP   25 mg at 11/09/22 2158    [MAR Hold] traZODone (DESYREL) tablet 300 mg  300 mg Oral Nightly PRN [facility], APRN - NP   300 mg at 11/09/22 2158    [MAR Hold] polyethylene glycol (GLYCOLAX) packet 17 g  17 g Oral Daily PRN [facility], APRN - NP        [facility] Hold] heparin (porcine) injection 5,000 Units  5,000 Units SubCUTAneous BID [facility], APRN - NP   5,000 Units at 11/09/22 2158       Allergies:     Allergies   Allergen Reactions    Azithromycin Swelling    Acetaminophen-Codeine Other (See Comments) and Nausea And Vomiting    Codeine Itching    Hydrocodone-Acetaminophen Rash    Ibuprofen Other (See Comments)     Stomach cramps    Morphine Other (See Comments)     Gives headaches    Tramadol Itching     vomitting       Problem List:    Patient Active Problem List   Diagnosis Code    Colitis K52.9    Uncontrolled type 1 diabetes mellitus with hyperglycemia ([facility]) E10.65    Melena K92.1    Transaminitis R74.01    Fibromyalgia M79.7    Spinal stenosis M48.00    Abnormal stress test R94.39    Vitamin D deficiency E55.9    Acute blood loss anemia D62    Hematemesis with nausea K92.0    Ulcer of esophagus without bleeding K22.10    Candida esophagitis ([facility]) B37.81    Neuropathy associated with endocrine disorder ([facility]) E34.9, G63    Osteoarthritis of knee M17.9    Other chest pain R07.89    Shortness of breath R06.02    Major depressive disorder in partial remission (HCC) F32.4    Lung nodule < 6cm on CT [id number]    Closed comminuted intertrochanteric fracture of femur, initial encounter ([facility] S72.143A    NSTEMI (non-ST elevated myocardial infarction) (HCC) I21.4    Nausea & vomiting R11.2    Constipation K59.00    Weight loss R63.4    Tobacco abuse Z72.0    Marijuana use F12.90    Acute kidney injury ([facility] N17.9    Stage 3a chronic kidney disease (HCC) N18.31    Odynophagia R13.10    Dysphagia R13.10    Diabetic acidosis without coma (HCC) E11.10    Severe malnutrition (HCC) E43    Hepatitis C antibody test positive R76.8    Elevated LFTs R79.89       Past Medical History:        Diagnosis Date    Diabetes mellitus ([facility]     Obstructive lung disease ([facility]     8/2022    Panic attack        Past Surgical History:        Procedure Laterality Date    APPENDECTOMY      FEMUR FRACTURE SURGERY Left 09/30/2022    Cephalomedullary Nail    HAND SURGERY      JOINT REPLACEMENT      TIBIA FRACTURE SURGERY Left 09/30/2022    LEFT FEMUR CEPHALOMEDULLARY NAIL performed by Kia Boyle DO at [address] [place] [address] N/A 02/15/2022    EGD BIOPSY performed by Mele Dahl MD at [facility]       Social History:    Social History     Tobacco Use    Smoking status: Every Day     Packs/day: 0.25     Years: 41.00     Pack years: 10.25     Types: Cigarettes    Smokeless tobacco: Never   Substance Use Topics    Alcohol use: Not Currently                                Ready to quit: Not Answered  Counseling given: Not Answered      Vital Signs (Current):   Vitals:    11/10/22 0000 11/10/22 0600 11/10/22 0657 11/10/22 1241   BP: 130/85  128/74 (!) 135/92   Pulse: 72  76 73   Resp: 18  18 16   Temp: 98.1 °F (36.7 °C)  98.2 °F (36.8 °C) 97.5 °F (36.4 °C)   TempSrc: Oral  Oral Infrared   SpO2: 99%  98% 98%   Weight:  115 lb 15.4 oz (52.6 kg)     Height:                                                  BP Readings from Last 3 Encounters:   11/10/22 (!) 135/92   11/06/22 (!) 146/95   10/01/22 125/72       NPO Status: Time of last liquid consumption: 2359                        Time of last solid consumption: 2359                        Date of last liquid consumption: 11/09/22                        Date of last solid food consumption: 11/09/22    BMI:   Wt Readings from Last 3 Encounters:   11/10/22 115 lb 15.4 oz (52.6 kg)   11/06/22 114 lb 9.5 oz (52 kg)   10/25/22 125 lb (56.7 kg)     Body mass index is 18.16 kg/m².     CBC:   Lab Results   Component Value Date/Time    WBC 5.6 11/10/2022 06:12 AM    RBC 3.09 11/10/2022 06:12 AM    HGB 8.6 11/10/2022 06:12 AM    HCT 26.1 11/10/2022 06:12 AM    MCV 84.6 11/10/2022 06:12 AM    RDW 14.6 11/10/2022 06:12 AM     11/10/2022 06:12 AM     HB 8.6    CMP:   Lab Results   Component Value Date/Time     11/10/2022 06:12 AM    K 3.4 11/10/2022 06:12 AM     11/10/2022 06:12 AM    CO2 29 11/10/2022 06:12 AM    BUN 14 11/10/2022 06:12 AM    CREATININE 1.08 11/10/2022 06:12 AM    GFRAA >60 10/01/2022 03:48 AM    LABGLOM >60 11/10/2022 06:12 AM    GLUCOSE 82 11/10/2022 06:12 AM    PROT 6.2 11/07/2022 07:19 PM    CALCIUM 7.9 11/10/2022 06:12 AM    BILITOT 0.4 11/07/2022 07:19 PM    ALKPHOS 145 11/07/2022 07:19 PM    AST 31 11/07/2022 07:19 PM    ALT 54 11/07/2022 07:19 PM       POC Tests:   Recent Labs     11/10/22  1248   POCGLU 94       Coags:   Lab Results   Component Value Date/Time    APTT 21.9 11/06/2022 12:15 PM       HCG (If Applicable): No results found for: PREGTESTUR, PREGSERUM, HCG, HCGQUANT     ABGs: No results found for: PHART, PO2ART, PHV1SQK, ODN7HIF, BEART, K8YKQXJD     Type & Screen (If Applicable):  No results found for: LABABO, LABRH    Drug/Infectious Status (If Applicable):  Lab Results   Component Value Date/Time    HEPCAB REACTIVE 04/06/2022 01:49 PM       COVID-19 Screening (If Applicable):   Lab Results   Component Value Date/Time    COVID19 Not Detected 02/14/2022 02:38 PM           Anesthesia Evaluation  Patient summary reviewed and Nursing notes reviewed no history of anesthetic complications:   Airway: Mallampati: II  TM distance: >3 FB   Neck ROM: full  Mouth opening: > = 3 FB   Dental:    (+) edentulous      Pulmonary:normal exam  breath sounds clear to auscultation  (+) shortness of breath: no interval change,      (-) pneumonia, COPD, asthma, recent URI, sleep apnea, rhonchi, wheezes, rales, stridor, not a current smoker and no decreased breath sounds                           Cardiovascular:  Exercise tolerance: good (>4 METS),   (+) past MI: no interval change,     (-) pacemaker, hypertension, valvular problems/murmurs, CAD, CABG/stent, dysrhythmias,  angina,  CHF, orthopnea, PND,  LOMELI, murmur, weak pulses,  friction rub, systolic click, carotid bruit,  JVD, peripheral edema, no pulmonary hypertension and no hyperlipidemia    ECG reviewed  Rhythm: regular  Rate: normal           Beta Blocker:  Not on Beta Blocker         Neuro/Psych:   (+) neuromuscular disease:, psychiatric history: stable with treatmentdepression/anxiety    (-) seizures, TIA, CVA and headaches           GI/Hepatic/Renal:   (+) PUD,      (-) hiatal hernia, GERD, hepatitis, liver disease, no renal disease, bowel prep and no morbid obesity       Endo/Other:    (+) DiabetesType II DM, , no malignancy/cancer. (-) hypothyroidism, hyperthyroidism, blood dyscrasia, arthritis, no electrolyte abnormalities, no malignancy/cancer               Abdominal:             Vascular:     - PVD, DVT and PE. Other Findings: L Teeth left          Anesthesia Plan      general     ASA 2       Induction: intravenous. MIPS: Postoperative opioids intended and Prophylactic antiemetics administered. Anesthetic plan and risks discussed with patient. Plan discussed with CRNA.                     Camille Lowery MD   11/10/2022

## 2022-11-10 NOTE — PROGRESS NOTES
Physical Therapy        Physical Therapy Cancel Note      DATE: 11/10/2022    NAME: Ariella Nguyen  MRN: [id number]   : 1970      Patient not seen this date for Physical Therapy due to:    Patient Declined: Pt declines therapy at this time d/t getting ready for EGD then has plans to D/C later this date.        Electronically signed by Alis Malik PTA on 11/10/2022 at 11:49 AM

## 2022-11-10 NOTE — OP NOTE
Operative Note      Patient: Santosh Pino  YOB: 1970  MRN: [id number]    Date of Procedure: 11/10/2022    Pre-Op Diagnosis: DYSPHAGIA    Post-Op Diagnosis:  LA grade D esophagitis. Procedure(s):  EGD BIOPSY  ATTENDING: Miley Barillas MD.    PROCEDURE: Esophagogastroduodenoscopy (EGD) with biopsies. INDICATION: This is a 67-year-old male who presented with dysphagia and odynophagia. Patient has history of Candida  and GERD responsive to PPI. An apparent scope he was indicated for evaluation of the symptoms. CONSENT: Informed consent was obtained after explaining the risks of procedure in detail including bleeding, perforation, aspiration, arrhythmia, risks related to anesthesia and sedation medication, benefits, alternatives and complications including surgery, prolonged hospitalization and rarely death to the patient. Patient verbalized understanding and agreed to proceed with procedure. ANESTHESIA: MAC    DESCRICPTION OF PROCEDURE: The patient was placed in left lateral decubitus position. Oxygen and cardiac monitoring document was attached. Patient's vital signs were continuously monitored for the procedure. After appropriate sedation was achieved, an Olympus adult gastroscope  was advanced under direct vision for patient's oral cavity into into esophagus. The upper esophagus appeared unremarkable. LA grade D reflux esophagitis was seen involving lower and mid esophagus. Multiple biopsies were obtained to rule out HSV, CMV and malignancy. Proximal esophageal biopsies were obtained to rule out EOE as well. The GE junction was noted to have nodule versus reactive mucosa which was biopsied to rule out malignancy. The endoscope was then advanced into the stomach, mild to moderate antral gastritis was seen. Random gastric biopsies were obtained to rule out H. pylori. On retroflexion, cardia and fundus appeared unremarkable.   Endoscope was then advanced into the first and

## 2022-11-10 NOTE — PROGRESS NOTES
[id number] [employer]    PROGRESS NOTE             11/10/2022    10:21 AM    Name:   Eva Stokes  MRN:     [id number]     Acct:      [de-identified]   Room:   2095/2095-01  IP Day:  3  Admit Date:  11/7/2022  6:40 PM    PCP:  BINA Sim  Code Status:  Full Code    Subjective:     C/C:   Chief Complaint   Patient presents with    Shortness of Breath     Interval History Status: improved. Patient, clinically doing better  Plan for EGD today  Patient told me that he has lost a lot of weight in last 1 year  He is working up with [facility] for pulmonary nodules  Told me had PET scan      Brief History:     The patient is a 47 y.o. Non- / non  male who presents withShortness of Breath   and he is admitted to the hospital for the management of DKA. Patient is a 79-year-old male past medical history significant for type 1 diabetes complicated with diabetic neuropathy and nephropathy, CKD stage III, COPD, and recent hospitalization for femur fracture and different hospitalization for NSTEMI  presented to the ED on 11/7/2022 with chief complaint of shortness of breath and chest pain. Per patient, he has been experiencing shortness of breath since discharge from [place] on the sixth. Yesterday he started having pressure-like nonradiating chest pain and shortness of breath that woke him up from sleep. He is also been feeling nauseous and fatigued/weak since his discharge. Per patient, he is not been taking his insulin since being discharged from [facility]. [facility]. In ED, vitals were significant for tachycardia with heart rate of 103 and tachypnea with respiratory rate of 26. Labs were significant for elevated blood glucose 830, potassium of 7.1, sodium of 124 -corrected sodium 136 -bicarb less than 6, and beta hydroxybutyrate above 16. VBG's were significant for pH of 6.9.   Creatinine was elevated at 2.42 -patient's baseline 1.7-and leukocytosis of 11.4, elevated lactate of 3.7. UA was negative for UTI, chest x-ray negative for any acute cardiopulmonary abnormalities. Initial troponin of 52 and repeat troponin of 40. EKG significant for sinus tachycardia with heart rate of 151 and peaked T waves noted in leads V3, V4, V5 as well as V6, slightly widened QRS. DKA protocol initiated in ED. Central line was placed in right IJ. Patient was started on insulin and bicarb drip     Patient will be admitted to ICU for further management of DKA. Review of Systems:     Review of Systems   Constitutional:  Negative for chills and fever. Eyes:  Negative for visual disturbance. Respiratory:  Negative for cough, choking, chest tightness and shortness of breath. Cardiovascular:  Negative for chest pain and leg swelling. Gastrointestinal:  Positive for nausea. Negative for abdominal pain, diarrhea and vomiting. Genitourinary: Negative. Musculoskeletal: Negative. Skin: Negative. Neurological:  Negative for dizziness, weakness, light-headedness and numbness. Psychiatric/Behavioral: Negative. Medications: Allergies:     Allergies   Allergen Reactions    Azithromycin Swelling    Acetaminophen-Codeine Other (See Comments) and Nausea And Vomiting    Codeine Itching    Hydrocodone-Acetaminophen Rash    Ibuprofen Other (See Comments)     Stomach cramps    Morphine Other (See Comments)     Gives headaches    Tramadol Itching     vomitting       Current Meds:   Scheduled Meds:    insulin glargine  15 Units SubCUTAneous BID    insulin lispro  5 Units SubCUTAneous TID WC    pantoprazole (PROTONIX) 40 mg injection  40 mg IntraVENous Q12H    calcium gluconate  1,000 mg IntraVENous Once    insulin lispro  0-16 Units SubCUTAneous TID WC    insulin lispro  0-4 Units SubCUTAneous Nightly    aspirin  81 mg Oral Daily    atorvastatin  80 mg Oral Nightly    metoprolol tartrate  25 mg Oral BID    heparin (porcine) 5,000 Units SubCUTAneous BID     Continuous Infusions:    sodium chloride 75 mL/hr at 22 1109    dextrose       PRN Meds: potassium chloride **OR** potassium alternative oral replacement **OR** potassium chloride, sodium phosphate IVPB **OR** sodium phosphate IVPB, perflutren lipid microspheres, glucose, dextrose bolus **OR** dextrose bolus, glucagon (rDNA), dextrose, lidocaine, albuterol sulfate HFA, clonazePAM, cyclobenzaprine, traZODone, polyethylene glycol    Data:     Past Medical History:   has a past medical history of Diabetes mellitus ([facility] [address] Obstructive lung disease ([facility] [address] and Panic attack. Social History:   reports that he has been smoking cigarettes. He has a 10.25 pack-year smoking history. He has never used smokeless tobacco. He reports that he does not currently use alcohol. He reports current drug use. Drug: Marijuana Burnell Goldberg). Family History: History reviewed. No pertinent family history. Vitals:  /74   Pulse 76   Temp 98.2 °F (36.8 °C) (Oral)   Resp 18   Ht 5' 7\" (1.702 m)   Wt 115 lb 15.4 oz (52.6 kg)   SpO2 98%   BMI 18.16 kg/m²   Temp (24hrs), Av.4 °F (36.9 °C), Min:98.1 °F (36.7 °C), Max:99 °F (37.2 °C)    Recent Labs     22  1646 11/09/22  2013 11/09/22  2051 11/10/22  0655   POCGLU 109 56* 97 85         I/O(24Hr):     Intake/Output Summary (Last 24 hours) at 11/10/2022 1021  Last data filed at 11/10/2022 0940  Gross per 24 hour   Intake --   Output 2550 ml   Net -2550 ml         Labs:  [unfilled]    Lab Results   Component Value Date/Time    SPECIAL NOT REPORTED 02/15/2022 05:12 PM     Lab Results   Component Value Date/Time    CULTURE NO GROWTH 34 DAYS 02/15/2022 05:11 PM       [unfilled]    Radiology:    XR CHEST (2 VW)    Result Date: 2022  EXAMINATION: TWO XRAY VIEWS OF THE CHEST 2022 1:15 am COMPARISON: 2022 radiograph HISTORY: ORDERING SYSTEM PROVIDED HISTORY: eval dyspnea TECHNOLOGIST PROVIDED HISTORY: eval dyspnea Reason for Exam: Dyspnea FINDINGS: Heart, mediastinum and pulmonary vascularity are normal.  The lungs are lucent and hyperinflated. There is a stable calcified granuloma in the right lower lobe. No acute pulmonary finding. No significant skeletal finding. Lucent lungs with chronic pattern of hyperinflation. No acute pulmonary finding. XR HIP LEFT (2-3 VIEWS)    Result Date: 11/4/2022  EXAMINATION: TWO XRAY VIEWS OF THE LEFT HIP; 4 XRAY VIEWS OF THE LEFT FEMUR 11/4/2022 1:15 am COMPARISON: None. HISTORY: ORDERING SYSTEM PROVIDED HISTORY: Eval left hip pain postsurgical TECHNOLOGIST PROVIDED HISTORY: Eval left hip pain postsurgical Reason for Exam: Increased leg pain, surgery x2 weeks ; ORDERING SYSTEM PROVIDED HISTORY: eval leg pain post op TECHNOLOGIST PROVIDED HISTORY: eval leg pain post op Reason for Exam: Increased leg pain, surgery x2 weeks FINDINGS: Prior surgical change of ORIF in the left femur. Previous intertrochanteric fracture shows anatomic alignment. There is no new fracture or dislocation at the hip or distally in the femur. The visualized knee is unremarkable. No significant soft tissue finding. Prior surgical change of ORIF with no acute abnormality of the left hip or femur. XR FEMUR LEFT (MIN 2 VIEWS)    Result Date: 11/4/2022  EXAMINATION: TWO XRAY VIEWS OF THE LEFT HIP; 4 XRAY VIEWS OF THE LEFT FEMUR 11/4/2022 1:15 am COMPARISON: None. HISTORY: ORDERING SYSTEM PROVIDED HISTORY: Eval left hip pain postsurgical TECHNOLOGIST PROVIDED HISTORY: Eval left hip pain postsurgical Reason for Exam: Increased leg pain, surgery x2 weeks ; ORDERING SYSTEM PROVIDED HISTORY: eval leg pain post op TECHNOLOGIST PROVIDED HISTORY: eval leg pain post op Reason for Exam: Increased leg pain, surgery x2 weeks FINDINGS: Prior surgical change of ORIF in the left femur. Previous intertrochanteric fracture shows anatomic alignment. There is no new fracture or dislocation at the hip or distally in the femur.   The visualized knee is unremarkable. No significant soft tissue finding. Prior surgical change of ORIF with no acute abnormality of the left hip or femur. CT ABDOMEN PELVIS W IV CONTRAST Additional Contrast? None    Result Date: 11/4/2022  EXAMINATION: CTA OF THE CHEST; CT OF THE ABDOMEN AND PELVIS WITH CONTRAST 11/4/2022 2:44 am TECHNIQUE: CTA of the chest was performed after the administration of intravenous contrast.  Multiplanar reformatted images are provided for review. MIP images are provided for review. Automated exposure control, iterative reconstruction, and/or weight based adjustment of the mA/kV was utilized to reduce the radiation dose to as low as reasonably achievable.; CT of the abdomen and pelvis was performed with the administration of intravenous contrast. Multiplanar reformatted images are provided for review. Automated exposure control, iterative reconstruction, and/or weight based adjustment of the mA/kV was utilized to reduce the radiation dose to as low as reasonably achievable. COMPARISON: 09/29/2022 HISTORY: ORDERING SYSTEM PROVIDED HISTORY: eval PE TECHNOLOGIST PROVIDED HISTORY: eval PE Decision Support Exception - unselect if not a suspected or confirmed emergency medical condition->Emergency Medical Condition (MA) Reason for Exam: r/o pe; ORDERING SYSTEM PROVIDED HISTORY: eval abd pain TECHNOLOGIST PROVIDED HISTORY: eval abd pain Decision Support Exception - unselect if not a suspected or confirmed emergency medical condition->Emergency Medical Condition (MA) Reason for Exam: abd pain FINDINGS: CHEST CT: Pulmonary Arteries: Pulmonary arteries are adequately opacified for evaluation. No evidence of intraluminal filling defect to suggest pulmonary embolism. Main pulmonary artery is normal in caliber. Mediastinum: No evidence of mediastinal lymphadenopathy. The heart and pericardium demonstrate no acute abnormality. There is no acute abnormality of the thoracic aorta.  Lungs/pleura: The lungs are without acute process. Sequela of old granulomatous disease. No focal consolidation or pulmonary edema. No evidence of pleural effusion or pneumothorax. Soft Tissues/Bones: No acute bone or soft tissue abnormality. Probably benign sclerosis of the anterior left 7th and 8th ribs again noted. Degenerative superior endplate changes at T7 and T8 again demonstrated. ABDOMEN PELVIS: Organs: Findings compatible with hepatic steatosis. The gallbladder, pancreas, spleen, adrenals and kidneys reveal no acute findings. Punctate nonobstructing left renal stones. GI/Bowel: There is no bowel dilatation or wall thickening identified. Pelvis: No acute findings. Peritoneum/Retroperitoneum: No free air or free fluid. The aorta is normal in caliber. The visceral branches are patent. No lymphadenopathy. Bones/Soft Tissues: No abnormality identified. Partially visualized left femoral trochanteric fixation hardware. *Unless otherwise specified, incidental findings do not require dedicated imaging follow-up. 1.  No evidence of pulmonary embolism or acute pulmonary abnormality. 2.  No acute inflammatory process identified in the abdomen or pelvis. 3.  Punctate left nephrolithiasis. 4.  Findings compatible with hepatic steatosis. 5.  Additional chronic and benign findings, as above. US RENAL LIMITED    Result Date: 11/5/2022  EXAMINATION: ULTRASOUND OF THE KIDNEYS 11/5/2022 9:44 am COMPARISON: CT abdomen pelvis from 11/04/2022 HISTORY: ORDERING SYSTEM PROVIDED HISTORY: Ultrasound to r/o element of obstruction and to assess the kidney size/echotextur TECHNOLOGIST PROVIDED HISTORY: Ultrasound to r/o element of obstruction and to assess the kidney size/echotextur 69-year-old male; rule out obstructive uropathy FINDINGS: Right kidney measures 10.5 x 4.6 x 5.5 cm. Right renal cortical thickness measures 1.2 cm. Left kidney measures 9.5 x 5.1 x 5.3 cm. Left renal cortical thickness measures 1.4 cm.  Increased echogenicity of the bilateral renal cortex. Suspected punctate left-sided renal calculi. No hydronephrosis. Color flow projects over the bilateral renal parenchyma. No perinephric fluid. 1. Increased echogenicity of the bilateral renal cortex which can be seen with medical renal disease. 2. Suspected left-sided renal calculi. No hydronephrosis. XR CHEST PORTABLE    Result Date: 11/7/2022  EXAMINATION: ONE XRAY VIEW OF THE CHEST 11/7/2022 8:25 pm COMPARISON: Less than an hour ago HISTORY: ORDERING SYSTEM PROVIDED HISTORY: right IJ TECHNOLOGIST PROVIDED HISTORY: right IJ Reason for Exam: Right IJ placement FINDINGS: New right IJ catheter terminates in the superior vena cava. No pneumothorax. Lung bases not included within the field of view. Patient is rotated. Heart and mediastinum normal.  Bony thorax intact. New right IJ catheter in the SVC. No pneumothorax. XR CHEST PORTABLE    Result Date: 11/7/2022  EXAMINATION: ONE XRAY VIEW OF THE CHEST 11/7/2022 7:12 pm COMPARISON: November 4, 2022 HISTORY: ORDERING SYSTEM PROVIDED HISTORY: SOB TECHNOLOGIST PROVIDED HISTORY: SOB Reason for Exam: PT CO SOB X several days FINDINGS: Lungs are hyperaerated. Heart and mediastinum normal.  Bony thorax intact. Possible slight increase in interstitial markings at the lung bases. COPD. Increased interstitial markings at the lung bases. CT CHEST PULMONARY EMBOLISM W CONTRAST    Result Date: 11/4/2022  EXAMINATION: CTA OF THE CHEST; CT OF THE ABDOMEN AND PELVIS WITH CONTRAST 11/4/2022 2:44 am TECHNIQUE: CTA of the chest was performed after the administration of intravenous contrast.  Multiplanar reformatted images are provided for review. MIP images are provided for review.  Automated exposure control, iterative reconstruction, and/or weight based adjustment of the mA/kV was utilized to reduce the radiation dose to as low as reasonably achievable.; CT of the abdomen and pelvis was performed with the administration of intravenous contrast. Multiplanar reformatted images are provided for review. Automated exposure control, iterative reconstruction, and/or weight based adjustment of the mA/kV was utilized to reduce the radiation dose to as low as reasonably achievable. COMPARISON: 09/29/2022 HISTORY: ORDERING SYSTEM PROVIDED HISTORY: eval PE TECHNOLOGIST PROVIDED HISTORY: eval PE Decision Support Exception - unselect if not a suspected or confirmed emergency medical condition->Emergency Medical Condition (MA) Reason for Exam: r/o pe; ORDERING SYSTEM PROVIDED HISTORY: eval abd pain TECHNOLOGIST PROVIDED HISTORY: eval abd pain Decision Support Exception - unselect if not a suspected or confirmed emergency medical condition->Emergency Medical Condition (MA) Reason for Exam: abd pain FINDINGS: CHEST CT: Pulmonary Arteries: Pulmonary arteries are adequately opacified for evaluation. No evidence of intraluminal filling defect to suggest pulmonary embolism. Main pulmonary artery is normal in caliber. Mediastinum: No evidence of mediastinal lymphadenopathy. The heart and pericardium demonstrate no acute abnormality. There is no acute abnormality of the thoracic aorta. Lungs/pleura: The lungs are without acute process. Sequela of old granulomatous disease. No focal consolidation or pulmonary edema. No evidence of pleural effusion or pneumothorax. Soft Tissues/Bones: No acute bone or soft tissue abnormality. Probably benign sclerosis of the anterior left 7th and 8th ribs again noted. Degenerative superior endplate changes at T7 and T8 again demonstrated. ABDOMEN PELVIS: Organs: Findings compatible with hepatic steatosis. The gallbladder, pancreas, spleen, adrenals and kidneys reveal no acute findings. Punctate nonobstructing left renal stones. GI/Bowel: There is no bowel dilatation or wall thickening identified. Pelvis: No acute findings. Peritoneum/Retroperitoneum: No free air or free fluid.   The aorta is normal in caliber. The visceral branches are patent. No lymphadenopathy. Bones/Soft Tissues: No abnormality identified. Partially visualized left femoral trochanteric fixation hardware. *Unless otherwise specified, incidental findings do not require dedicated imaging follow-up. 1.  No evidence of pulmonary embolism or acute pulmonary abnormality. 2.  No acute inflammatory process identified in the abdomen or pelvis. 3.  Punctate left nephrolithiasis. 4.  Findings compatible with hepatic steatosis. 5.  Additional chronic and benign findings, as above. VL DUP LOWER EXTREMITY VENOUS BILATERAL    Result Date: 11/8/2022    [facility]  Vascular Lower Extremities DVT Study Procedure   Patient Name   Calvin Kussmaul     Date of Study           11/08/2022                 ARLET   Date of Birth  1970  Gender                  Male   Age            47 year(s)  Race                       Room Number    2004        Height:                 66.93 inch, 170 cm   Corporate ID # [id number]    Weight:                 112 pounds, 51 kg   Patient Acct # [de-identified]   BSA:        1.58 m^2    BMI:      17.65 kg/m^2   MR #           [id number]      Sonographer             Vilma Shultz RVT   Accession #    [id number]  Interpreting Physician  Hernando Plata   Referring                  Referring Physician     Dorothy Paris  Nurse  Practitioner  Procedure Type of Study:   Veins: Lower Extremities DVT Study, Venous Scan Lower Bilateral.  Indications for Study:R/O DVT. Patient Status: In Patient. Technical Quality:Adequate visualization. Conclusions   Summary   Bilateral:  No evidence of deep or superficial venous thrombosis.    Signature   ----------------------------------------------------------------  Electronically signed by Vilma Shultz RVT(Sonographer) on  11/08/2022 11:05 AM  ----------------------------------------------------------------   ----------------------------------------------------------------  Electronically signed by [facility] physician)  on 11/08/2022 05:49 PM  ----------------------------------------------------------------  Findings:   Right Impression:                    Left Impression:  The common femoral, femoral,         The common femoral, femoral,  popliteal and tibial veins           popliteal and tibial veins  demonstrate normal compressibility   demonstrate normal compressibility  and augmentation. and augmentation. Normal compressibility of the great  Normal compressibility of the great  saphenous vein. saphenous vein. Normal compressibility of the small  Normal compressibility of the small  saphenous vein. saphenous vein. Velocities are measured in cm/s ; Diameters are measured in cm Right Lower Extremities DVT Study Measurements Right 2D Measurements +------------------------------------+----------+---------------+----------+ ! Location                            ! Visualized! Compressibility! Thrombosis! +------------------------------------+----------+---------------+----------+ ! Common Femoral                      !Yes       ! Yes            ! None      ! +------------------------------------+----------+---------------+----------+ ! Prox Femoral                        !Yes       ! Yes            ! None      ! +------------------------------------+----------+---------------+----------+ ! Mid Femoral                         !Yes       ! Yes            ! None      ! +------------------------------------+----------+---------------+----------+ ! Dist Femoral                        !Yes       ! Yes            ! None      ! +------------------------------------+----------+---------------+----------+ ! Deep Femoral                        !Yes       ! Yes            ! None      ! +------------------------------------+----------+---------------+----------+ ! Popliteal                           !Yes       ! Yes            ! None      ! +------------------------------------+----------+---------------+----------+ ! Sapheno Femoral Junction            ! Yes       ! Yes            ! None      ! +------------------------------------+----------+---------------+----------+ ! PTV                                 ! Yes       ! Yes            ! None      ! +------------------------------------+----------+---------------+----------+ ! Peroneal                            !Yes       ! Yes            ! None      ! +------------------------------------+----------+---------------+----------+ ! Gastroc                             ! Yes       ! Yes            ! None      ! +------------------------------------+----------+---------------+----------+ ! GSV Thigh                           ! Yes       ! Yes            ! None      ! +------------------------------------+----------+---------------+----------+ ! GSV Knee                            ! Yes       ! Yes            ! None      ! +------------------------------------+----------+---------------+----------+ ! GSV Ankle                           ! Yes       ! Yes            ! None      ! +------------------------------------+----------+---------------+----------+ ! SSV                                 ! Yes       ! Yes            ! None      ! +------------------------------------+----------+---------------+----------+ Left Lower Extremities DVT Study Measurements Left 2D Measurements +------------------------------------+----------+---------------+----------+ ! Location                            ! Visualized! Compressibility! Thrombosis! +------------------------------------+----------+---------------+----------+ ! Common Femoral                      !Yes       ! Yes            ! None      ! +------------------------------------+----------+---------------+----------+ ! Prox Femoral                        !Yes       ! Yes            ! None      ! +------------------------------------+----------+---------------+----------+ ! Vicenta Femoral !Yes       !Yes            ! None      ! +------------------------------------+----------+---------------+----------+ ! Dist Femoral                        !Yes       ! Yes            ! None      ! +------------------------------------+----------+---------------+----------+ ! Deep Femoral                        !Yes       ! Yes            ! None      ! +------------------------------------+----------+---------------+----------+ ! Popliteal                           !Yes       ! Yes            ! None      ! +------------------------------------+----------+---------------+----------+ ! Sapheno Femoral Junction            ! Yes       ! Yes            ! None      ! +------------------------------------+----------+---------------+----------+ ! PTV                                 ! Yes       ! Yes            ! None      ! +------------------------------------+----------+---------------+----------+ ! Peroneal                            !Yes       ! Yes            ! None      ! +------------------------------------+----------+---------------+----------+ ! Gastroc                             ! Yes       ! Yes            ! None      ! +------------------------------------+----------+---------------+----------+ ! GSV Thigh                           ! Yes       ! Yes            ! None      ! +------------------------------------+----------+---------------+----------+ ! GSV Knee                            ! Yes       ! Yes            ! None      ! +------------------------------------+----------+---------------+----------+ ! GSV Ankle                           ! Yes       ! Yes            ! None      ! +------------------------------------+----------+---------------+----------+ ! SSV                                 ! Yes       ! Yes            ! None      ! +------------------------------------+----------+---------------+----------+    US ABDOMEN LIMITED Specify organ?  LIVER, GALLBLADDER, PANCREAS    Result Date: 11/4/2022  EXAMINATION: RIGHT UPPER QUADRANT ULTRASOUND 11/4/2022 10:29 am COMPARISON: None. HISTORY: ORDERING SYSTEM PROVIDED HISTORY: eval epigastric pain TECHNOLOGIST PROVIDED HISTORY: eval epigastric pain Specify organ?->LIVER Specify organ?->GALLBLADDER Specify organ?->PANCREAS FINDINGS: LIVER:  The liver measures 18 cm and demonstrates normal echogenicity without evidence of intrahepatic biliary ductal dilatation. BILIARY SYSTEM:  Gallbladder is unremarkable without evidence of pericholecystic fluid, wall thickening or stones. Negative sonographic Mccord's sign. Common bile duct is within normal limits measuring 4 mm. RIGHT KIDNEY: The right kidney is grossly unremarkable without evidence of hydronephrosis. PANCREAS:  Visualized portions of the pancreas are unremarkable. OTHER: No evidence of right upper quadrant ascites. Hepatomegaly otherwise unremarkable exam RECOMMENDATIONS: Unavailable         Physical Examination:        Physical Exam  Constitutional:       Appearance: Normal appearance. He is ill-appearing. HENT:      Head: Normocephalic and atraumatic. Mouth/Throat:      Mouth: Mucous membranes are moist.   Eyes:      Extraocular Movements: Extraocular movements intact. Conjunctiva/sclera: Conjunctivae normal.   Cardiovascular:      Rate and Rhythm: Normal rate and regular rhythm. Heart sounds: No murmur heard. No gallop. Pulmonary:      Effort: Pulmonary effort is normal. No respiratory distress. Breath sounds: No wheezing or rales. Abdominal:      General: Bowel sounds are normal. There is no distension. Palpations: Abdomen is soft. Tenderness: There is no abdominal tenderness. Musculoskeletal:      Right lower leg: No edema. Left lower leg: No edema. Skin:     General: Skin is warm. Neurological:      Mental Status: He is alert and oriented to person, place, and time.    Psychiatric:         Behavior: Behavior normal.     Assessment:        Primary Problem  Diabetic acidosis without coma [facility])    Active Hospital Problems    Diagnosis Date Noted    Hepatitis C antibody test positive [R76.8] 11/09/2022     Priority: Medium    Elevated LFTs [R79.89] 11/09/2022     Priority: Medium    Severe malnutrition ([facility] [address] [E43] 11/08/2022     Priority: Medium    Diabetic acidosis without coma ([facility] [address] [E11.10] 11/07/2022     Priority: Medium       Plan:        DKA 2/2 medication noncompliance, rule out infection -resolved  - HbA1c: 10 on 9/30/2022  -Patient will be transferred out of ICU  -Gap closed x2  -Patient on Lantus 20 daily and high-dose sliding scale  -D5 and 0.45% NS at 75 mL/hr   -Insulin drip discontinued yesterday   -Glucose checks 4 times daily  -Betahydroxybutarate levels >16 on admission -11.97 yesterday  -Hypoglycemia, phos and potassium replacement protocols in place  -Urinalysis and chest x-ray is unremarkable for any infection.  -Telemetry monitoring  -Pulmonology consulted, will appreciate recommendations  -Inpatient consult to diabetes educator     2. LIBBY on CKD stage IIIa, improving  -Creatinine downtrending to 1.32 from 2.42 on admission -patient's baseline 1.2  -We will monitor kidney functions  -Avoid nephrotoxic drugs    3. Chest pain   -Patient was recently admitted for NSTEMI type II  -Patient had coronary angiography done last year [facility] which showed normal coronary arteries  -Echo on 11/8/2022: Left ventricular ejection fraction 55%  -Audiology consulted, will appreciate recommendations   -Continue aspirin, statin, and beta-blocker   -No further inpatient ischemic work-up necessary    4.   Hypokalemia  -Potassium low at 3.1  -Patient already on replacement protocol  -We will continue to monitor     DVT prophylaxis: Heparin  Diet: Diabetic adult diet  Disposition: Home  11/10   Patient, clinically doing better  Plan for EGD today  History of Candida esophagitis  Had weight loss  Had pulmonary nodules, current smoker, told me that he is following closely with pulmonologist  Will have PET scan  Likely discharge later today after EGD  Work-up for weight loss as outpatient with PCP  Reducing dose of insulin to 15 units nightly, 5 unit Premeal with sliding scale  Yenifer Hodge MD  11/10/2022  10:21 AM

## 2022-11-10 NOTE — PROGRESS NOTES
[facility]   Progress Note                   Date:   11/9/2022  Patient name: Sil Luna  Date of admission:  11/7/2022  6:40 PM  MRN:   [id number]  YOB: 1970  PCP: BINA Pepe    Reason for Admission:     Subjective:       Clinical Changes / Abnormalities  Doing well and moved to the floor this morning; he notes occasional sharp chest discomfort with change in position      Medications:   Scheduled Meds:   pantoprazole (PROTONIX) 40 mg injection  40 mg IntraVENous Q12H    calcium gluconate  1,000 mg IntraVENous Once    insulin lispro  0-16 Units SubCUTAneous TID WC    insulin lispro  0-4 Units SubCUTAneous Nightly    aspirin  81 mg Oral Daily    atorvastatin  80 mg Oral Nightly    insulin glargine  25 Units SubCUTAneous Daily    insulin glargine  15 Units SubCUTAneous Nightly    metoprolol tartrate  25 mg Oral BID    heparin (porcine)  5,000 Units SubCUTAneous BID     Continuous Infusions:   sodium chloride 75 mL/hr at 11/09/22 1109    dextrose       CBC:   Recent Labs     11/07/22 1919 11/08/22  0601 11/09/22  0400   WBC 11.4* 8.9 6.2   HGB 9.5* 8.0* 8.2*    283 262     BMP:    Recent Labs     11/08/22  0601 11/08/22  1005 11/09/22  0400   * 139 137   K 3.7 3.6* 3.1*   CL 98 104 104   CO2 22 27 25   BUN 35* 31* 21*   CREATININE 1.72* 1.68* 1.32*   GLUCOSE 558* 277* 214*     Hepatic:   Recent Labs     11/07/22 1919   AST 31   ALT 54*   BILITOT 0.4   ALKPHOS 145*     Troponin: No results for input(s): TROPONINI in the last 72 hours. BNP: No results for input(s): BNP in the last 72 hours. Lipids: No results for input(s): CHOL, HDL in the last 72 hours. Invalid input(s): LDLCALCU  INR: No results for input(s): INR in the last 72 hours.     Objective:   Vitals: /84   Pulse 78   Temp 99 °F (37.2 °C)   Resp 18   Ht 5' 7\" (1.702 m)   Wt 112 lb 7 oz (51 kg)   SpO2 98%   BMI 17.61 kg/m²   General appearance: alert and cooperative with exam  HEENT: Head: Normocephalic, no lesions, without obvious abnormality. Neck: no adenopathy, no carotid bruit, no JVD, supple, symmetrical, trachea midline and thyroid not enlarged, symmetric, no tenderness/mass/nodules  Lungs: clear to auscultation bilaterally  Heart: regular rate and rhythm, S1, S2 normal, no murmur, click, rub or gallop  Abdomen: soft, non-tender; bowel sounds normal; no masses,  no organomegaly  Extremities: extremities normal, atraumatic, no cyanosis or edema  Neurologic: Mental status: Alert, oriented, thought content appropriate    2D ECHO ( 11/8/22)  Summary  Small LV cavity. Mildly increased LV wall thickness  Estimated LV EF 55 %. No obvious segmental wall motion abnormalities. Normal right ventricular size and function. Left atrium is normal in size. Right atrium is normal in size. Normal aortic valve structure and function without stenosis or regurgitation. Normal aortic root dimension. Thickened anterior mitral valve leaflet. Mild mitral regurgitation. Myxomatous thickening of the tricuspid valve. Moderate tricuspid regurgitation. Estimated right ventricular systolic pressure is 32 mmHg. Stress Test 5/13/22      No ischemic ECG changes with Lexiscan     Abnormal nuclear study showing a small area of ischemia involving the   apex     Normal wall motion LV function     Probably still low risk even with the defect given the small area in   question      Cath May 2021   Findings:   1. Normal LVEDP   2.  Angiographically normal coronaries with mild luminal irregularities       Assessment / Acute Cardiac Problems:   DKA, improved  Troponin elevation, type II in setting of LIBBY, significant hyperkalemia, and DKA   Preserved LVEF on echo 11/8/22  Nonobstructive CAD on cath 5/2021  Atypical CP, likely musculoskeletal strain vs. esophagitis; 2D echo 11/8/22 shows normal segmental LV wall motion  LIBBY, improved, with CKDHyperkalemia  Medication non-compliance  COPD    Patient Active Problem

## 2022-11-10 NOTE — CARE COORDINATION
Continuity of Care Form    Patient Name: Davina Bolaños   :  1970  MRN:  [id number]    Admit date:  2022  Discharge date:  11/10/2022    Code Status Order: Full Code   Advance Directives:     Admitting Physician:  Benito Durbin MD  PCP: Nelly Lesches, PA    Discharging Nurse: Carlos Hampton Unit/Room#:   Discharging Unit Phone Number: [phone]    Emergency Contact:   Extended Emergency Contact Information  Primary Emergency Contact: [facility], [facility]. Phone: [phone]  Relation: Brother/Sister  Secondary Emergency Contact: [employer]  Mobile Phone: [phone]  Relation: Child    Past Surgical History:  Past Surgical History:   Procedure Laterality Date    APPENDECTOMY      FEMUR FRACTURE SURGERY Left 2022    Cephalomedullary Nail    HAND SURGERY      JOINT REPLACEMENT      TIBIA FRACTURE SURGERY Left 2022    LEFT FEMUR CEPHALOMEDULLARY NAIL performed by Herve Minaya DO at [address] [place] [address] N/A 02/15/2022    EGD BIOPSY performed by Colton Aguirre MD at [facility]       Immunization History:   Immunization History   Administered Date(s) Administered    Tdap (Boostrix, Adacel) 2016       Active Problems:  Patient Active Problem List   Diagnosis Code    Colitis K52.9    Uncontrolled type 1 diabetes mellitus with hyperglycemia ([facility] [address] E10.65    Melena K92.1    Transaminitis R74.01    Fibromyalgia M79.7    Spinal stenosis M48.00    Abnormal stress test R94.39    Vitamin D deficiency E55.9    Acute blood loss anemia D62    Hematemesis with nausea K92.0    Ulcer of esophagus without bleeding K22.10    Candida esophagitis (HCC) B37.81    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, G63    Osteoarthritis of knee M17.9    Other chest pain R07.89    Shortness of breath R06.02    Major depressive disorder in partial remission ([address] F32.4    Lung nodule < 6cm on CT [id number]    Closed comminuted intertrochanteric fracture of femur, initial encounter ([facility] [address] S72.143A    NSTEMI (non-ST elevated myocardial infarction) ([facility] [address] I21.4    Nausea & vomiting R11.2    Constipation K59.00    Weight loss R63.4    Tobacco abuse Z72.0    Marijuana use F12.90    Acute kidney injury (HCC) N17.9    Stage 3a chronic kidney disease (HCC) N18.31    Odynophagia R13.10    Dysphagia R13.10    Diabetic ketoacidosis without coma associated with type 1 diabetes mellitus (HCC) E10.10       Isolation/Infection:   Isolation            No Isolation          Patient Infection Status       Infection Onset Added Last Indicated Last Indicated By Review Planned Expiration Resolved Resolved By    None active    Resolved    COVID-19 (Rule Out) 02/14/22 02/14/22 02/14/22 COVID-19, Rapid (Ordered)   02/14/22 Rule-Out Test Resulted    COVID-19 (Rule Out) 12/03/21 12/03/21 12/03/21 COVID-19, Rapid (Ordered)   12/03/21 Rule-Out Test Resulted            Nurse Assessment:  Last Vital Signs: /65   Pulse 64   Temp 98.1 °F (36.7 °C) (Oral)   Resp 14   Ht 5' 7\" (1.702 m)   Wt 112 lb 7 oz (51 kg)   SpO2 98%   BMI 17.61 kg/m²     Last documented pain score (0-10 scale): Pain Level: 0  Last Weight:   Wt Readings from Last 1 Encounters:   11/07/22 112 lb 7 oz (51 kg)     Mental Status:  oriented and alert    IV Access:  - None    Nursing Mobility/ADLs:  Walking   Independent  Transfer  Independent  Bathing  Independent  Dressing  Independent  Toileting  Independent  Feeding  [address] [facility] Delivery   whole    Wound Care Documentation and Therapy:  Incision 09/30/22 Hip Left; Lower (Active)   Number of days: 39        Elimination:  Continence:    Bowel: Yes  Bladder: Yes  Urinary Catheter: None   Colostomy/Ileostomy/Ileal Conduit: No       Date of Last BM: 11/08/2022    Intake/Output Summary (Last 24 hours) at 11/8/2022 1148  Last data filed at 11/8/2022 0626  Gross per 24 hour   Intake 2323.26 ml   Output 3100 ml   Net -776.74 ml     I/O last 3 completed shifts: In: 2323.3 [I.V.:2179.2; IV Piggyback:144.1]  Out: 3100 [Urine:3100]    Safety Concerns:     Hyperglycemia/hypoglycemia    Impairments/Disabilities:      None    Nutrition Therapy:  Current Nutrition Therapy: Carb/diabetic diet      Routes of Feeding: Oral  Liquids: No Restrictions  Daily Fluid Restriction: no  Last Modified Barium Swallow with Video (Video Swallowing Test): not done    Treatments at the Time of Hospital Discharge:   Respiratory Treatments: N/A  Oxygen Therapy:  is not on home oxygen therapy. Ventilator:    - No ventilator support    Rehab Therapies: Physical Therapy and Occupational Therapy  Weight Bearing Status/Restrictions: No weight bearing restrictions  Other Medical Equipment (for information only, NOT a DME order):  walker, [place], SUPPOSED TO BE GETTING NEW ONES DELIVERED, AFTER [facility]'S ADMISSION, FOR HIS ARE BROKEN AT HOME. Other Treatments: Skilled Nursing assessment and monitoring. Medication education and monitoring per protocol. PT NEEDS GOOD DIABETIC EDUCATION/MONITORING/FOLLOWING.      Patient's personal belongings (please select all that are sent with patient):  None    RN SIGNATURE:  Electronically signed by La Nena Corbett RN on 11/10/22 at 3:48 PM EST    CASE MANAGEMENT/SOCIAL WORK SECTION    Inpatient Status Date: 11/7/22    Readmission Risk Assessment Score:  Readmission Risk              Risk of Unplanned Readmission:  40           Discharging to Facility/ [facility] [address] [place], [place]  Phone:  [phone]  Fax:  [phone]     Dialysis Facility (if applicable)   Name:  Address:  Dialysis Schedule:  Phone:  Fax:    / signature: Electronically signed by Shawn Thomas RN on 11/8/22 at 11:48 AM EST    PHYSICIAN SECTION    Prognosis: Good    Condition at Discharge: Stable    Rehab Potential (if transferring to Rehab): Good    Recommended Labs or Other Treatments After Discharge:     Physician Certification: I certify the above information and transfer of Kin [place]  is necessary for the continuing treatment of the diagnosis listed and that he requires [address] for less 30 days. Update Admission H&P: No change in H&P    PHYSICIAN SIGNATURE:  Electronically signed by Luis Anderson MD on 11/10/22 at 10:27 AM EST    CONTINUE taking these medications    CONTINUE taking these medications   Alcohol Swabs 70 % Pads  Use as needed with blood glucose checks   [employer] Pen Sunspot 29G 29G X 12MM Misc  Generic drug: Insulin Pen Needle  1 each by Does not apply route daily   Lancets Misc  1 each by Does not apply route 2 times daily   ONE TOUCH ULTRA 2 w/Device Kit  1 kit by Does not apply route daily     ASK your doctor about these medications    ASK your doctor about these medications   acetaminophen 500 MG tablet  Commonly known as: TYLENOL  Take 2 tablets by mouth every 6 hours as needed for Pain   albuterol sulfate  (90 Base) MCG/ACT inhaler  Commonly known as: PROVENTIL;VENTOLIN;PROAIR  Inhale 2 puffs into the lungs every 4 hours as needed for Wheezing   aspirin 81 MG chewable tablet  Take 1 tablet by mouth daily   atorvastatin 80 MG tablet  Commonly known as: LIPITOR  Take 1 tablet by mouth nightly   calcium carbonate 500 MG chewable tablet  Commonly known as: TUMS  Take 2 tablets by mouth 3 times daily as needed for Heartburn   clonazePAM 2 MG tablet  Commonly known as: KLONOPIN  Take 4 mg by mouth daily as needed for Anxiety. cyclobenzaprine 10 MG tablet  Commonly known as: FLEXERIL  Take 1 tablet by mouth 3 times daily as needed for Muscle spasms   fluconazole 100 MG tablet  Commonly known as: DIFLUCAN  Take 2 tablets by mouth daily for 14 days   gabapentin 300 MG capsule  Commonly known as: NEURONTIN  Take 1 capsule by mouth in the morning and 1 capsule at noon and 1 capsule in the evening. Do all this for 5 days.    * insulin glargine 100 UNIT/ML injection vial  Commonly known as: LANTUS  Inject 15 Units into the skin nightly   * Lantus SoloStar 100 UNIT/ML injection pen  Generic drug: insulin glargine  Inject 25 Units into the skin 2 times daily   metoprolol tartrate 25 MG tablet  Commonly known as: LOPRESSOR  Take 1 tablet by mouth 2 times daily   NovoLOG FlexPen 100 UNIT/ML injection pen  Generic drug: insulin aspart  Inject 5 Units into the skin 3 times daily (before meals)   ondansetron 4 MG disintegrating tablet  Commonly known as: Zofran ODT  Take 1 tablet by mouth every 8 hours as needed for Nausea or Vomiting   pantoprazole 40 MG tablet  Commonly known as: PROTONIX  Take 1 tablet by mouth 2 times daily (before meals) for 25 doses   traZODone 150 MG tablet  Commonly known as: DESYREL  Take 300 mg by mouth nightly   vitamin D 1.25 MG (67437 UT) Caps capsule  Commonly known as: ERGOCALCIFEROL  Take 1 capsule by mouth once a week

## 2022-11-10 NOTE — PROGRESS NOTES
[facility] with cell phone and . Patient A&O x's 4, VS, WNL. Cab transported patient home.

## 2022-11-11 ENCOUNTER — TELEPHONE (OUTPATIENT)
Dept: INTERNAL MEDICINE CLINIC | Age: 52
End: 2022-11-11

## 2022-11-11 LAB
IGM: 73 MG/DL (ref 40–230)
LIVER-KIDNEY MICROSOMAL AB: NORMAL
SMOOTH MUSCLE ANTIBODY: 8 UNITS (ref 0–19)

## 2022-11-11 NOTE — TELEPHONE ENCOUNTER
Patient has type 1 diabetes, admitted with DKA  He should be taking 15 unit of Lantus daily  Should be taking 5 unit of Humalog before meals with low dose sliding scale

## 2022-11-11 NOTE — TELEPHONE ENCOUNTER
Jesús from [facility] called and needs clarification on Insulin. There was 3 different insulin scripts sent and the patient uses pens and not vials. If vials are necessary needles need ordered as well. Please clarify what patient needs to be on. Patient was in the hospital when medication was ordered.     Please advise

## 2022-11-11 NOTE — DISCHARGE SUMMARY
[facility] Internal Medicine    Discharge Summary     Patient ID: Davina Bolaños  :  1970   MRN: [id number]     ACCOUNT:  [de-identified]   Patient's PCP: Nelly Lesches, PA  Admit Date: 2022   Discharge Date: 2022    Length of Stay: 3  Code Status:  Prior  Admitting Physician: Benito Durbin MD  Discharge Physician: Moon Echols MD     Active Discharge Diagnoses:     Primary Problem  Diabetic acidosis without coma [facility])      [facility] Problems    Diagnosis Date Noted    Hepatitis C antibody test positive [R76.8] 2022     Priority: Medium    Elevated LFTs [R79.89] 2022     Priority: Medium    Severe malnutrition ([address] [E43] 2022     Priority: Medium    Diabetic acidosis without coma ([address] [E11.10] 2022     Priority: Medium       Admission Condition:  poor     Discharged Condition: fair    Hospital Stay:     Hospital Course:  Davina Bolaños is a 47 y.o. male who was admitted for the management of Diabetic acidosis without coma ([address] , presented to ER with Shortness of Breath  Patient past medical history type 1 diabetes, noncompliance, admitted with DKA, treated with IV insulin which was later on transition to subcutaneous insulin  Patient has history of weight loss, odynophagia, dysphagia, underwent EGD suggestive of severe reflux esophagitis, patient treated with 40 mg prednisone twice a day  He will follow-up with GI as outpatient in 6 to 8 weeks for repeat EGD  Patient will follow with primary care physician          Significant therapeutic interventions:     Significant Diagnostic Studies:   Labs / Micro:        ,     Radiology:    XR CHEST (2 VW)    Result Date: 2022  EXAMINATION: TWO XRAY VIEWS OF THE CHEST 2022 1:15 am COMPARISON: 2022 radiograph HISTORY: ORDERING SYSTEM PROVIDED HISTORY: eval dyspnea TECHNOLOGIST PROVIDED HISTORY: eval dyspnea Reason for Exam: Dyspnea FINDINGS: Heart, mediastinum and pulmonary vascularity are normal.  The lungs are lucent and hyperinflated. There is a stable calcified granuloma in the right lower lobe. No acute pulmonary finding. No significant skeletal finding. Lucent lungs with chronic pattern of hyperinflation. No acute pulmonary finding. XR HIP LEFT (2-3 VIEWS)    Result Date: 11/4/2022  EXAMINATION: TWO XRAY VIEWS OF THE LEFT HIP; 4 XRAY VIEWS OF THE LEFT FEMUR 11/4/2022 1:15 am COMPARISON: None. HISTORY: ORDERING SYSTEM PROVIDED HISTORY: Eval left hip pain postsurgical TECHNOLOGIST PROVIDED HISTORY: Eval left hip pain postsurgical Reason for Exam: Increased leg pain, surgery x2 weeks ; ORDERING SYSTEM PROVIDED HISTORY: eval leg pain post op TECHNOLOGIST PROVIDED HISTORY: eval leg pain post op Reason for Exam: Increased leg pain, surgery x2 weeks FINDINGS: Prior surgical change of ORIF in the left femur. Previous intertrochanteric fracture shows anatomic alignment. There is no new fracture or dislocation at the hip or distally in the femur. The visualized knee is unremarkable. No significant soft tissue finding. Prior surgical change of ORIF with no acute abnormality of the left hip or femur. XR FEMUR LEFT (MIN 2 VIEWS)    Result Date: 11/4/2022  EXAMINATION: TWO XRAY VIEWS OF THE LEFT HIP; 4 XRAY VIEWS OF THE LEFT FEMUR 11/4/2022 1:15 am COMPARISON: None. HISTORY: ORDERING SYSTEM PROVIDED HISTORY: Eval left hip pain postsurgical TECHNOLOGIST PROVIDED HISTORY: Eval left hip pain postsurgical Reason for Exam: Increased leg pain, surgery x2 weeks ; ORDERING SYSTEM PROVIDED HISTORY: eval leg pain post op TECHNOLOGIST PROVIDED HISTORY: eval leg pain post op Reason for Exam: Increased leg pain, surgery x2 weeks FINDINGS: Prior surgical change of ORIF in the left femur. Previous intertrochanteric fracture shows anatomic alignment. There is no new fracture or dislocation at the hip or distally in the femur.   The visualized knee is unremarkable. No significant soft tissue finding. Prior surgical change of ORIF with no acute abnormality of the left hip or femur. CT ABDOMEN PELVIS W IV CONTRAST Additional Contrast? None    Result Date: 11/4/2022  EXAMINATION: CTA OF THE CHEST; CT OF THE ABDOMEN AND PELVIS WITH CONTRAST 11/4/2022 2:44 am TECHNIQUE: CTA of the chest was performed after the administration of intravenous contrast.  Multiplanar reformatted images are provided for review. MIP images are provided for review. Automated exposure control, iterative reconstruction, and/or weight based adjustment of the mA/kV was utilized to reduce the radiation dose to as low as reasonably achievable.; CT of the abdomen and pelvis was performed with the administration of intravenous contrast. Multiplanar reformatted images are provided for review. Automated exposure control, iterative reconstruction, and/or weight based adjustment of the mA/kV was utilized to reduce the radiation dose to as low as reasonably achievable. COMPARISON: 09/29/2022 HISTORY: ORDERING SYSTEM PROVIDED HISTORY: eval PE TECHNOLOGIST PROVIDED HISTORY: eval PE Decision Support Exception - unselect if not a suspected or confirmed emergency medical condition->Emergency Medical Condition (MA) Reason for Exam: r/o pe; ORDERING SYSTEM PROVIDED HISTORY: eval abd pain TECHNOLOGIST PROVIDED HISTORY: eval abd pain Decision Support Exception - unselect if not a suspected or confirmed emergency medical condition->Emergency Medical Condition (MA) Reason for Exam: abd pain FINDINGS: CHEST CT: Pulmonary Arteries: Pulmonary arteries are adequately opacified for evaluation. No evidence of intraluminal filling defect to suggest pulmonary embolism. Main pulmonary artery is normal in caliber. Mediastinum: No evidence of mediastinal lymphadenopathy. The heart and pericardium demonstrate no acute abnormality. There is no acute abnormality of the thoracic aorta. Lungs/pleura:  The lungs are without acute process. Sequela of old granulomatous disease. No focal consolidation or pulmonary edema. No evidence of pleural effusion or pneumothorax. Soft Tissues/Bones: No acute bone or soft tissue abnormality. Probably benign sclerosis of the anterior left 7th and 8th ribs again noted. Degenerative superior endplate changes at T7 and T8 again demonstrated. ABDOMEN PELVIS: Organs: Findings compatible with hepatic steatosis. The gallbladder, pancreas, spleen, adrenals and kidneys reveal no acute findings. Punctate nonobstructing left renal stones. GI/Bowel: There is no bowel dilatation or wall thickening identified. Pelvis: No acute findings. Peritoneum/Retroperitoneum: No free air or free fluid. The aorta is normal in caliber. The visceral branches are patent. No lymphadenopathy. Bones/Soft Tissues: No abnormality identified. Partially visualized left femoral trochanteric fixation hardware. *Unless otherwise specified, incidental findings do not require dedicated imaging follow-up. 1.  No evidence of pulmonary embolism or acute pulmonary abnormality. 2.  No acute inflammatory process identified in the abdomen or pelvis. 3.  Punctate left nephrolithiasis. 4.  Findings compatible with hepatic steatosis. 5.  Additional chronic and benign findings, as above. US RENAL LIMITED    Result Date: 11/5/2022  EXAMINATION: ULTRASOUND OF THE KIDNEYS 11/5/2022 9:44 am COMPARISON: CT abdomen pelvis from 11/04/2022 HISTORY: ORDERING SYSTEM PROVIDED HISTORY: Ultrasound to r/o element of obstruction and to assess the kidney size/echotextur TECHNOLOGIST PROVIDED HISTORY: Ultrasound to r/o element of obstruction and to assess the kidney size/echotextur 50-year-old male; rule out obstructive uropathy FINDINGS: Right kidney measures 10.5 x 4.6 x 5.5 cm. Right renal cortical thickness measures 1.2 cm. Left kidney measures 9.5 x 5.1 x 5.3 cm. Left renal cortical thickness measures 1.4 cm.  Increased echogenicity of the bilateral renal cortex. Suspected punctate left-sided renal calculi. No hydronephrosis. Color flow projects over the bilateral renal parenchyma. No perinephric fluid. 1. Increased echogenicity of the bilateral renal cortex which can be seen with medical renal disease. 2. Suspected left-sided renal calculi. No hydronephrosis. XR CHEST PORTABLE    Result Date: 11/7/2022  EXAMINATION: ONE XRAY VIEW OF THE CHEST 11/7/2022 8:25 pm COMPARISON: Less than an hour ago HISTORY: ORDERING SYSTEM PROVIDED HISTORY: right IJ TECHNOLOGIST PROVIDED HISTORY: right IJ Reason for Exam: Right IJ placement FINDINGS: New right IJ catheter terminates in the superior vena cava. No pneumothorax. Lung bases not included within the field of view. Patient is rotated. Heart and mediastinum normal.  Bony thorax intact. New right IJ catheter in the SVC. No pneumothorax. XR CHEST PORTABLE    Result Date: 11/7/2022  EXAMINATION: ONE XRAY VIEW OF THE CHEST 11/7/2022 7:12 pm COMPARISON: November 4, 2022 HISTORY: ORDERING SYSTEM PROVIDED HISTORY: SOB TECHNOLOGIST PROVIDED HISTORY: SOB Reason for Exam: PT CO SOB X several days FINDINGS: Lungs are hyperaerated. Heart and mediastinum normal.  Bony thorax intact. Possible slight increase in interstitial markings at the lung bases. COPD. Increased interstitial markings at the lung bases. CT CHEST PULMONARY EMBOLISM W CONTRAST    Result Date: 11/4/2022  EXAMINATION: CTA OF THE CHEST; CT OF THE ABDOMEN AND PELVIS WITH CONTRAST 11/4/2022 2:44 am TECHNIQUE: CTA of the chest was performed after the administration of intravenous contrast.  Multiplanar reformatted images are provided for review. MIP images are provided for review.  Automated exposure control, iterative reconstruction, and/or weight based adjustment of the mA/kV was utilized to reduce the radiation dose to as low as reasonably achievable.; CT of the abdomen and pelvis was performed with the administration of intravenous contrast. Multiplanar reformatted images are provided for review. Automated exposure control, iterative reconstruction, and/or weight based adjustment of the mA/kV was utilized to reduce the radiation dose to as low as reasonably achievable. COMPARISON: 09/29/2022 HISTORY: ORDERING SYSTEM PROVIDED HISTORY: eval PE TECHNOLOGIST PROVIDED HISTORY: eval PE Decision Support Exception - unselect if not a suspected or confirmed emergency medical condition->Emergency Medical Condition (MA) Reason for Exam: r/o pe; ORDERING SYSTEM PROVIDED HISTORY: eval abd pain TECHNOLOGIST PROVIDED HISTORY: eval abd pain Decision Support Exception - unselect if not a suspected or confirmed emergency medical condition->Emergency Medical Condition (MA) Reason for Exam: abd pain FINDINGS: CHEST CT: Pulmonary Arteries: Pulmonary arteries are adequately opacified for evaluation. No evidence of intraluminal filling defect to suggest pulmonary embolism. Main pulmonary artery is normal in caliber. Mediastinum: No evidence of mediastinal lymphadenopathy. The heart and pericardium demonstrate no acute abnormality. There is no acute abnormality of the thoracic aorta. Lungs/pleura: The lungs are without acute process. Sequela of old granulomatous disease. No focal consolidation or pulmonary edema. No evidence of pleural effusion or pneumothorax. Soft Tissues/Bones: No acute bone or soft tissue abnormality. Probably benign sclerosis of the anterior left 7th and 8th ribs again noted. Degenerative superior endplate changes at T7 and T8 again demonstrated. ABDOMEN PELVIS: Organs: Findings compatible with hepatic steatosis. The gallbladder, pancreas, spleen, adrenals and kidneys reveal no acute findings. Punctate nonobstructing left renal stones. GI/Bowel: There is no bowel dilatation or wall thickening identified. Pelvis: No acute findings. Peritoneum/Retroperitoneum: No free air or free fluid. The aorta is normal in caliber.   The visceral branches are patent. No lymphadenopathy. Bones/Soft Tissues: No abnormality identified. Partially visualized left femoral trochanteric fixation hardware. *Unless otherwise specified, incidental findings do not require dedicated imaging follow-up. 1.  No evidence of pulmonary embolism or acute pulmonary abnormality. 2.  No acute inflammatory process identified in the abdomen or pelvis. 3.  Punctate left nephrolithiasis. 4.  Findings compatible with hepatic steatosis. 5.  Additional chronic and benign findings, as above. VL DUP LOWER EXTREMITY VENOUS BILATERAL    Result Date: 11/8/2022    [facility]  Vascular Lower Extremities DVT Study Procedure   Patient Name   Mark Vallejo     Date of Study           11/08/2022                 ARLET   Date of Birth  1970  Gender                  Male   Age            47 year(s)  Race                       Room Number    2004        Height:                 66.93 inch, 170 cm   Corporate ID # [id number]    Weight:                 112 pounds, 51 kg   Patient Acct # [de-identified]   BSA:        1.58 m^2    BMI:      17.65 kg/m^2   MR #           [id number]      Sonographer             Harry Zhang RVT   Accession #    [id number]  Interpreting Physician  Hernando Plata   Referring                  Referring Physician     Samara Dumont  Nurse  Practitioner  Procedure Type of Study:   Veins: Lower Extremities DVT Study, Venous Scan Lower Bilateral.  Indications for Study:R/O DVT. Patient Status: In Patient. Technical Quality:Adequate visualization. Conclusions   Summary   Bilateral:  No evidence of deep or superficial venous thrombosis.    Signature   ----------------------------------------------------------------  Electronically signed by Harry Zhang RVT(Sonographer) on  11/08/2022 11:05 AM  ----------------------------------------------------------------   ----------------------------------------------------------------  Electronically signed by Hernando Plata(Interpreting physician)  on 11/08/2022 05:49 PM  ----------------------------------------------------------------  Findings:   Right Impression:                    Left Impression:  The common femoral, femoral,         The common femoral, femoral,  popliteal and tibial veins           popliteal and tibial veins  demonstrate normal compressibility   demonstrate normal compressibility  and augmentation. and augmentation. Normal compressibility of the great  Normal compressibility of the great  saphenous vein. saphenous vein. Normal compressibility of the small  Normal compressibility of the small  saphenous vein. saphenous vein. Velocities are measured in cm/s ; Diameters are measured in cm Right Lower Extremities DVT Study Measurements Right 2D Measurements +------------------------------------+----------+---------------+----------+ ! Location                            ! Visualized! Compressibility! Thrombosis! +------------------------------------+----------+---------------+----------+ ! Common Femoral                      !Yes       ! Yes            ! None      ! +------------------------------------+----------+---------------+----------+ ! Prox Femoral                        !Yes       ! Yes            ! None      ! +------------------------------------+----------+---------------+----------+ ! Mid Femoral                         !Yes       ! Yes            ! None      ! +------------------------------------+----------+---------------+----------+ ! Dist Femoral                        !Yes       ! Yes            ! None      ! +------------------------------------+----------+---------------+----------+ ! Deep Femoral                        !Yes       ! Yes            ! None      ! +------------------------------------+----------+---------------+----------+ ! Popliteal                           !Yes       ! Yes            ! None      ! +------------------------------------+----------+---------------+----------+ ! Sapheno Femoral Junction            ! Yes       ! Yes            ! None      ! +------------------------------------+----------+---------------+----------+ ! PTV                                 ! Yes       ! Yes            ! None      ! +------------------------------------+----------+---------------+----------+ ! Peroneal                            !Yes       ! Yes            ! None      ! +------------------------------------+----------+---------------+----------+ ! Gastroc                             ! Yes       ! Yes            ! None      ! +------------------------------------+----------+---------------+----------+ ! GSV Thigh                           ! Yes       ! Yes            ! None      ! +------------------------------------+----------+---------------+----------+ ! GSV Knee                            ! Yes       ! Yes            ! None      ! +------------------------------------+----------+---------------+----------+ ! GSV Ankle                           ! Yes       ! Yes            ! None      ! +------------------------------------+----------+---------------+----------+ ! SSV                                 ! Yes       ! Yes            ! None      ! +------------------------------------+----------+---------------+----------+ Left Lower Extremities DVT Study Measurements Left 2D Measurements +------------------------------------+----------+---------------+----------+ ! Location                            ! Visualized! Compressibility! Thrombosis! +------------------------------------+----------+---------------+----------+ ! Common Femoral                      !Yes       ! Yes            ! None      ! +------------------------------------+----------+---------------+----------+ ! Prox Femoral                        !Yes       ! Yes            ! None      ! +------------------------------------+----------+---------------+----------+ ! Vicenta Femoral !Yes       !Yes            ! None      ! +------------------------------------+----------+---------------+----------+ ! Dist Femoral                        !Yes       ! Yes            ! None      ! +------------------------------------+----------+---------------+----------+ ! Deep Femoral                        !Yes       ! Yes            ! None      ! +------------------------------------+----------+---------------+----------+ ! Popliteal                           !Yes       ! Yes            ! None      ! +------------------------------------+----------+---------------+----------+ ! Sapheno Femoral Junction            ! Yes       ! Yes            ! None      ! +------------------------------------+----------+---------------+----------+ ! PTV                                 ! Yes       ! Yes            ! None      ! +------------------------------------+----------+---------------+----------+ ! Peroneal                            !Yes       ! Yes            ! None      ! +------------------------------------+----------+---------------+----------+ ! Gastroc                             ! Yes       ! Yes            ! None      ! +------------------------------------+----------+---------------+----------+ ! GSV Thigh                           ! Yes       ! Yes            ! None      ! +------------------------------------+----------+---------------+----------+ ! GSV Knee                            ! Yes       ! Yes            ! None      ! +------------------------------------+----------+---------------+----------+ ! GSV Ankle                           ! Yes       ! Yes            ! None      ! +------------------------------------+----------+---------------+----------+ ! SSV                                 ! Yes       ! Yes            ! None      ! +------------------------------------+----------+---------------+----------+    US ABDOMEN LIMITED Specify organ?  LIVER, GALLBLADDER, PANCREAS    Result Date: 11/4/2022  EXAMINATION: RIGHT UPPER QUADRANT ULTRASOUND 11/4/2022 10:29 am COMPARISON: None. HISTORY: ORDERING SYSTEM PROVIDED HISTORY: eval epigastric pain TECHNOLOGIST PROVIDED HISTORY: eval epigastric pain Specify organ?->LIVER Specify organ?->GALLBLADDER Specify organ?->PANCREAS FINDINGS: LIVER:  The liver measures 18 cm and demonstrates normal echogenicity without evidence of intrahepatic biliary ductal dilatation. BILIARY SYSTEM:  Gallbladder is unremarkable without evidence of pericholecystic fluid, wall thickening or stones. Negative sonographic Mccord's sign. Common bile duct is within normal limits measuring 4 mm. RIGHT KIDNEY: The right kidney is grossly unremarkable without evidence of hydronephrosis. PANCREAS:  Visualized portions of the pancreas are unremarkable. OTHER: No evidence of right upper quadrant ascites. Hepatomegaly otherwise unremarkable exam RECOMMENDATIONS: Unavailable         Consultations:    Consults:     Final Specialist Recommendations/Findings:   IP CONSULT TO PULMONOLOGY  IP CONSULT TO INTERNAL MEDICINE  IP CONSULT TO DIABETES EDUCATOR  IP CONSULT TO CARDIOLOGY  IP CONSULT TO GI      The patient was seen and examined on day of discharge and this discharge summary is in conjunction with any daily progress note from day of discharge. Discharge plan:     Disposition: Home    Physician Follow Up:     [address] [place] [address],[address]  [phone]  Follow up  They will call you at home to set up a time to come to your house.     Veleta Class, [address] [facility] [address]  [place] [facility] [phone]    Follow up      Veleta Class, [address] [facility] [address]  [place] [facility] [id number] [phone]             Requiring Further Evaluation/Follow Up POST HOSPITALIZATION/Incidental Findings:    Diet: cardiac diet    Activity: As tolerated    Instructions to Patient:     Discharge Medications:      Medication List        START taking these medications      * insulin lispro 100 UNIT/ML Soln injection vial  Commonly known as: HUMALOG  Inject 0-4 Units into the skin nightly     * insulin lispro 100 UNIT/ML Soln injection vial  Commonly known as: HUMALOG  Inject 0-16 Units into the skin 3 times daily (with meals)           * This list has 2 medication(s) that are the same as other medications prescribed for you. Read the directions carefully, and ask your doctor or other care provider to review them with you. CHANGE how you take these medications      insulin glargine 100 UNIT/ML injection vial  Commonly known as: LANTUS  Inject 15 Units into the skin 2 times daily  What changed:   when to take this  Another medication with the same name was removed. Continue taking this medication, and follow the directions you see here.             CONTINUE taking these medications      acetaminophen 500 MG tablet  Commonly known as: TYLENOL  Take 2 tablets by mouth every 6 hours as needed for Pain     albuterol sulfate  (90 Base) MCG/ACT inhaler  Commonly known as: PROVENTIL;VENTOLIN;PROAIR     Alcohol Swabs 70 % Pads  Use as needed with blood glucose checks     aspirin 81 MG chewable tablet  Take 1 tablet by mouth daily     atorvastatin 80 MG tablet  Commonly known as: LIPITOR  Take 1 tablet by mouth nightly     calcium carbonate 500 MG chewable tablet  Commonly known as: TUMS  Take 2 tablets by mouth 3 times daily as needed for Heartburn     clonazePAM 2 MG tablet  Commonly known as: KLONOPIN     cyclobenzaprine 10 MG tablet  Commonly known as: FLEXERIL  Take 1 tablet by mouth 3 times daily as needed for Muscle spasms     [employer] Pen [place] 29G 29G X 12MM Misc  Generic drug: Insulin Pen Needle  1 each by Does not apply route daily     Lancets Misc  1 each by Does not apply route 2 times daily     metoprolol tartrate 25 MG tablet  Commonly known as: LOPRESSOR  Take 1 tablet by mouth 2 times daily     NovoLOG FlexPen 100 UNIT/ML injection pen  Generic drug: insulin aspart  Inject 5 Units into the skin 3 times daily (before meals)     ondansetron 4 MG disintegrating tablet  Commonly known as: Zofran ODT  Take 1 tablet by mouth every 8 hours as needed for Nausea or Vomiting     ONE TOUCH ULTRA 2 w/Device Kit  1 kit by Does not apply route daily     pantoprazole 40 MG tablet  Commonly known as: PROTONIX  Take 1 tablet by mouth 2 times daily (before meals) for 30 doses     traZODone 150 MG tablet  Commonly known as: DESYREL            STOP taking these medications      fluconazole 100 MG tablet  Commonly known as: DIFLUCAN     gabapentin 300 MG capsule  Commonly known as: NEURONTIN     vitamin D 1.25 MG (39882 UT) Caps capsule  Commonly known as: ERGOCALCIFEROL               Where to Get Your Medications        These medications were sent to [address], [address], [address] [place] [id number]      Phone: [phone]   insulin glargine 100 UNIT/ML injection vial  insulin lispro 100 UNIT/ML Soln injection vial  insulin lispro 100 UNIT/ML Soln injection vial  pantoprazole 40 MG tablet         Time Spent on discharge is  35 mins in patient examination, evaluation, counseling as well as medication reconciliation, prescriptions for required medications, discharge plan and follow up. Electronically signed by   Jatin Whelan MD  11/11/2022  1:11 PM      Thank you BINA Tobin for the opportunity to be involved in this patient's care.

## 2022-11-11 NOTE — ANESTHESIA POSTPROCEDURE EVALUATION
POST- ANESTHESIA EVALUATION       Pt Name: Kelly Bruce  MRN: [id number]  YOB: 1970  Date of evaluation: 11/11/2022  Time:  1:26 AM      /86   Pulse 68   Temp 98.6 °F (37 °C) (Oral)   Resp 18   Ht 5' 7\" (1.702 m)   Wt 115 lb 15.4 oz (52.6 kg)   SpO2 97%   BMI 18.16 kg/m²      Consciousness Level  Awake  Cardiopulmonary Status  Stable  Pain Adequately Treated YES  Nausea / Vomiting  NO  Adequate Hydration  YES  Anesthesia Related Complications NONE      Electronically signed by Alexander Ewing MD on 11/11/2022 at 1:26 AM       Department of Anesthesiology  Postprocedure Note    Patient: Kelly Bruce  MRN: [id number]  YOB: 1970  Date of evaluation: 11/11/2022      Procedure Summary     Date: 11/10/22 Room / Location: [place] [address] 04 / [address] [place] [address]    Anesthesia Start: 3023 Anesthesia Stop: 7200    Procedure: EGD BIOPSY (Esophagus) Diagnosis:       Dysphagia, unspecified type      (DYSPHAGIA)    Surgeons:  Angel Whaley MD Responsible Provider: Alexander Ewing MD    Anesthesia Type: General ASA Status: 2          Anesthesia Type: General    Sal Phase I: Sal Score: 10    Sal Phase II:        Anesthesia Post Evaluation

## 2022-11-14 ENCOUNTER — TELEPHONE (OUTPATIENT)
Dept: PRIMARY CARE CLINIC | Age: 52
End: 2022-11-14

## 2022-11-14 DIAGNOSIS — B37.0 THRUSH: Primary | ICD-10-CM

## 2022-11-14 DIAGNOSIS — I21.4 NSTEMI (NON-ST ELEVATED MYOCARDIAL INFARCTION) (HCC): ICD-10-CM

## 2022-11-14 DIAGNOSIS — E10.65 UNCONTROLLED TYPE 1 DIABETES MELLITUS WITH HYPERGLYCEMIA (HCC): ICD-10-CM

## 2022-11-14 DIAGNOSIS — N17.9 ACUTE KIDNEY INJURY (HCC): ICD-10-CM

## 2022-11-14 DIAGNOSIS — B37.81 CANDIDA ESOPHAGITIS (HCC): ICD-10-CM

## 2022-11-14 DIAGNOSIS — E43 SEVERE MALNUTRITION (HCC): ICD-10-CM

## 2022-11-14 LAB — HEPATITIS C GENOTYPE: NORMAL

## 2022-11-14 RX ORDER — PANTOPRAZOLE SODIUM 40 MG/1
40 TABLET, DELAYED RELEASE ORAL
Qty: 60 TABLET | Refills: 0 | Status: SHIPPED | OUTPATIENT
Start: 2022-11-14 | End: 2022-11-18 | Stop reason: SDUPTHER

## 2022-11-14 RX ORDER — FLUCONAZOLE 100 MG/1
100 TABLET ORAL DAILY
Qty: 3 TABLET | Refills: 0 | Status: SHIPPED | OUTPATIENT
Start: 2022-11-14 | End: 2022-11-17

## 2022-11-14 RX ORDER — ONDANSETRON 4 MG/1
4 TABLET, ORALLY DISINTEGRATING ORAL EVERY 8 HOURS PRN
Qty: 12 TABLET | Refills: 0 | Status: SHIPPED | OUTPATIENT
Start: 2022-11-14 | End: 2022-11-18 | Stop reason: SDUPTHER

## 2022-11-14 NOTE — TELEPHONE ENCOUNTER
Sent in. Will send in vit D once patient is here for visit to make sure stomach better first as this can cause some stomach upset.

## 2022-11-14 NOTE — TELEPHONE ENCOUNTER
He called stating that his medications were not sent to the pharmacy when he was Discharged a few days ago. I did get him scheduled for Friday this week for a F/U. His discharge summary stated to stop  Vit D 1.25 and Fluconazole but he says he needs them sent in. It does sound like he has a yeast infection he says it hurts to swallow anything and he was told in the hospital to take it for 14 days and only got a couple of doses of it before being discharged. Also needs: Metoprolol 25 mg BID                      Ondansetron 4mg every 8 hours                      Pantroprazole 40mg  BID                         [address] [facility].

## 2022-11-15 LAB
GLUCOSE BLD-MCNC: 49 MG/DL (ref 75–110)
SURGICAL PATHOLOGY REPORT: NORMAL

## 2022-11-15 RX ORDER — ALBUTEROL SULFATE 90 UG/1
2 AEROSOL, METERED RESPIRATORY (INHALATION) 4 TIMES DAILY PRN
Qty: 54 G | Refills: 1 | Status: SHIPPED | OUTPATIENT
Start: 2022-11-15

## 2022-11-15 NOTE — PROGRESS NOTES
Physician Progress Note      Quan Euceda  CSN #:                  [id number]  :                       1970  ADMIT DATE:       2022 12:57 AM  DISCH DATE:        2022 6:55 PM  RESPONDING  PROVIDER #:        Pamela Murray MD          QUERY TEXT:    Patient admitted with NSTEMI. Noted documentation of NSTEMI and Type 2 MI   per Cardiology progress note dated 22. If possible, please document in progress notes and discharge summary if you   are evaluating and /or treating any of the following: The medical record reflects the following:  Risk Factors: Type 1 DM, HTN, and CKD  Clinical Indicators: Chest pain and SOB, HS troponins 150 down to 40. Echo   22: Estimated LV EF 55 %. No obvious segmental wall motion abnormalities. Normal right ventricular size   and function. Per Cardiology consult note: Chest pain rule out NSTEMI vs type   II NSTEMI. Cardio progress note 22: Elevated troponin likely NSTEMI type   2. Elevated troponin most likely type II. He had coronary angiography done   last year and [facility] and showed normal coronary arteries. We will   proceed with echocardiogram and if it is within normal limits no need for   repeat coronary angiography. No heart cath done. Discharge Summary: NSTEMI. Treatment: Cardio consult, echo, labs/monitoring    Thank-you,  Levon Huang RN, CDS  [email or web address]. Information Assurance  Options provided:  -- NSTEMI confirmed and Type 2 MI ruled out  -- Type 2 MI confirmed and NSTEMI ruled out  -- Other - I will add my own diagnosis  -- Disagree - Not applicable / Not valid  -- Disagree - Clinically unable to determine / Unknown  -- Refer to Clinical Documentation Reviewer    PROVIDER RESPONSE TEXT:    After study, Type 2 MI confirmed and NSTEMI ruled out.     Query created by: Markel Moser on 2022 7:26 AM      Electronically signed by:  Pamela Murray MD 11/15/2022 6:49 PM

## 2022-11-15 NOTE — TELEPHONE ENCOUNTER
----- Message from Luis Roberts sent at 11/15/2022  1:12 PM EST -----  Subject: Refill Request    QUESTIONS  Name of Medication? albuterol sulfate  (90 Base) MCG/ACT inhaler  Patient-reported dosage and instructions? as directed  How many days do you have left? 0  Preferred Pharmacy? [facility]  Pharmacy phone number (if available)? [phone]  Additional Information for Provider? pt was told to take this post ER and   it was never called in   ---------------------------------------------------------------------------  --------------  [address]  What is the best way for the office to contact you? OK to leave message on   voicemail  Preferred Call Back Phone Number? [phone]  ---------------------------------------------------------------------------  --------------  SCRIPT ANSWERS  Relationship to Patient?  Self

## 2022-11-18 ENCOUNTER — OFFICE VISIT (OUTPATIENT)
Dept: PRIMARY CARE CLINIC | Age: 52
End: 2022-11-18
Payer: MEDICARE

## 2022-11-18 VITALS
SYSTOLIC BLOOD PRESSURE: 116 MMHG | HEART RATE: 123 BPM | OXYGEN SATURATION: 92 % | BODY MASS INDEX: 17.61 KG/M2 | DIASTOLIC BLOOD PRESSURE: 84 MMHG | WEIGHT: 112.2 LBS | HEIGHT: 67 IN

## 2022-11-18 DIAGNOSIS — I21.4 NSTEMI (NON-ST ELEVATED MYOCARDIAL INFARCTION) (HCC): ICD-10-CM

## 2022-11-18 DIAGNOSIS — Z59.9 FINANCIAL DIFFICULTIES: ICD-10-CM

## 2022-11-18 DIAGNOSIS — E10.65 UNCONTROLLED TYPE 1 DIABETES MELLITUS WITH HYPERGLYCEMIA (HCC): ICD-10-CM

## 2022-11-18 DIAGNOSIS — S72.142D CLOSED DISPLACED INTERTROCHANTERIC FRACTURE OF LEFT FEMUR WITH ROUTINE HEALING, SUBSEQUENT ENCOUNTER: ICD-10-CM

## 2022-11-18 DIAGNOSIS — S72.002D CLOSED FRACTURE OF LEFT HIP WITH ROUTINE HEALING, SUBSEQUENT ENCOUNTER: ICD-10-CM

## 2022-11-18 DIAGNOSIS — B37.81 CANDIDA ESOPHAGITIS (HCC): Primary | ICD-10-CM

## 2022-11-18 DIAGNOSIS — N17.9 ACUTE KIDNEY INJURY (HCC): ICD-10-CM

## 2022-11-18 DIAGNOSIS — E43 SEVERE MALNUTRITION (HCC): ICD-10-CM

## 2022-11-18 DIAGNOSIS — B37.0 THRUSH: ICD-10-CM

## 2022-11-18 LAB
CHP ED QC CHECK: NORMAL
GLUCOSE BLD-MCNC: 60 MG/DL

## 2022-11-18 PROCEDURE — 3046F HEMOGLOBIN A1C LEVEL >9.0%: CPT | Performed by: PHYSICIAN ASSISTANT

## 2022-11-18 PROCEDURE — 1111F DSCHRG MED/CURRENT MED MERGE: CPT | Performed by: PHYSICIAN ASSISTANT

## 2022-11-18 PROCEDURE — G8484 FLU IMMUNIZE NO ADMIN: HCPCS | Performed by: PHYSICIAN ASSISTANT

## 2022-11-18 PROCEDURE — 4004F PT TOBACCO SCREEN RCVD TLK: CPT | Performed by: PHYSICIAN ASSISTANT

## 2022-11-18 PROCEDURE — 99215 OFFICE O/P EST HI 40 MIN: CPT | Performed by: PHYSICIAN ASSISTANT

## 2022-11-18 PROCEDURE — G8427 DOCREV CUR MEDS BY ELIG CLIN: HCPCS | Performed by: PHYSICIAN ASSISTANT

## 2022-11-18 PROCEDURE — G8418 CALC BMI BLW LOW PARAM F/U: HCPCS | Performed by: PHYSICIAN ASSISTANT

## 2022-11-18 PROCEDURE — 3017F COLORECTAL CA SCREEN DOC REV: CPT | Performed by: PHYSICIAN ASSISTANT

## 2022-11-18 PROCEDURE — 2022F DILAT RTA XM EVC RTNOPTHY: CPT | Performed by: PHYSICIAN ASSISTANT

## 2022-11-18 PROCEDURE — 82962 GLUCOSE BLOOD TEST: CPT | Performed by: PHYSICIAN ASSISTANT

## 2022-11-18 RX ORDER — OLANZAPINE 2.5 MG/1
TABLET ORAL
COMMUNITY
Start: 2022-11-03 | End: 2022-11-18

## 2022-11-18 RX ORDER — BLOOD-GLUCOSE TRANSMITTER
1 EACH MISCELLANEOUS WEEKLY
Qty: 1 EACH | Refills: 4 | Status: SHIPPED | OUTPATIENT
Start: 2022-11-18

## 2022-11-18 RX ORDER — PANTOPRAZOLE SODIUM 40 MG/1
40 TABLET, DELAYED RELEASE ORAL
Qty: 60 TABLET | Refills: 0 | Status: SHIPPED | OUTPATIENT
Start: 2022-11-18 | End: 2022-12-03

## 2022-11-18 RX ORDER — CYCLOBENZAPRINE HCL 10 MG
10 TABLET ORAL 3 TIMES DAILY PRN
Qty: 30 TABLET | Refills: 0 | Status: SHIPPED | OUTPATIENT
Start: 2022-11-18 | End: 2023-01-05 | Stop reason: ALTCHOICE

## 2022-11-18 RX ORDER — PAROXETINE HYDROCHLORIDE 20 MG/1
TABLET, FILM COATED ORAL
COMMUNITY
Start: 2022-11-06 | End: 2022-11-18

## 2022-11-18 RX ORDER — OXYCODONE HYDROCHLORIDE 5 MG/1
5 TABLET ORAL EVERY 6 HOURS PRN
Qty: 21 TABLET | Refills: 0 | Status: SHIPPED | OUTPATIENT
Start: 2022-11-18 | End: 2022-12-12 | Stop reason: SDUPTHER

## 2022-11-18 RX ORDER — BLOOD-GLUCOSE SENSOR
1 EACH MISCELLANEOUS WEEKLY
Qty: 3 EACH | Refills: 4 | Status: SHIPPED | OUTPATIENT
Start: 2022-11-18

## 2022-11-18 RX ORDER — ONDANSETRON 4 MG/1
4 TABLET, ORALLY DISINTEGRATING ORAL EVERY 8 HOURS PRN
Qty: 12 TABLET | Refills: 0 | Status: SHIPPED | OUTPATIENT
Start: 2022-11-18

## 2022-11-18 RX ORDER — BLOOD-GLUCOSE,RECEIVER,CONT
1 EACH MISCELLANEOUS WEEKLY
Qty: 1 EACH | Refills: 4 | Status: SHIPPED | OUTPATIENT
Start: 2022-11-18

## 2022-11-18 SDOH — ECONOMIC STABILITY - INCOME SECURITY: PROBLEM RELATED TO HOUSING AND ECONOMIC CIRCUMSTANCES, UNSPECIFIED: Z59.9

## 2022-11-18 ASSESSMENT — ENCOUNTER SYMPTOMS
SINUS PRESSURE: 0
VOMITING: 0
DIARRHEA: 0
CONSTIPATION: 0
SHORTNESS OF BREATH: 0
RHINORRHEA: 0
COUGH: 0
ABDOMINAL PAIN: 0
PHOTOPHOBIA: 0
CHEST TIGHTNESS: 0
SORE THROAT: 0
ABDOMINAL DISTENTION: 0
EYE DISCHARGE: 0

## 2022-11-18 NOTE — PROGRESS NOTES
[facility] CARE  [address] [place] [id number]  Dept: [phone]    Madeleine Latif is a 47 y.o. male Established patient, who presents today for his medical conditions/complaints as noted below. Chief Complaint   Patient presents with    Follow-up     Pt is here for a hospital f/u       HPI:     HPI: Patient is a 52-year-old male with a very complex medical history as well as social determinants of health barriers. Patient having troubles with transportation, housing and food. Had broken femur, NSTEMI, and followed by DKA. He is taking insulin 15 BID. 5 of quick acting before meals. OT Monday at 10:30. Med 1 care. Needs to follow with ortho, cardiology, and needs good control of diabetes. Has not been checking sugars needs new glucometer. Needs help with medication. Needs a walker with seat. Has a phone. The number is in system and correct. The patient has difficulty ambulating and requires use of roller walker. A cane is not a safe option. Patient tires easily and cannot stand for long periods due to pain. Patient would benefit from a seat on walker. Patient's mobility limitations cannot be resolved with this walker. Patient able to use this walker safely. Functional mobility limitation would be resolved with use of the walker. See also needed to reduce risk of falls from tiring easily and pain. Patient has esophagitis with ulcerations. Patient having pain with any solid foods at this time. Patient unable to tolerate foods and has been losing weight rapidly. Patient would benefit from boost.  Patient is diabetic and needs glucose controlled formula vanilla boost.  1 boost 2 times daily. Does not like how his psych medications make him fel. Gets 4 hours of sleep with trazodone 300. Takes klonopin as needed. No other psych medications.      Reviewed prior notes Cardiology, Orthopedics, and Pulmonary  Reviewed previous Labs, Imaging, and Hospital Records    LDL Cholesterol (mg/dL)   Date Value   11/05/2022 95   04/06/2022 185 (H)       (goal LDL is <100)   AST (U/L)   Date Value   11/07/2022 31     ALT (U/L)   Date Value   11/07/2022 54 (H)     BUN (mg/dL)   Date Value   11/10/2022 14     Hemoglobin A1C (%)   Date Value   09/30/2022 10.0 (H)     TSH (uIU/mL)   Date Value   11/07/2022 1.41     BP Readings from Last 3 Encounters:   11/18/22 116/84   11/10/22 136/86   11/06/22 (!) 146/95          (goal 120/80)  Hemoglobin A1C   Date Value Ref Range Status   09/30/2022 10.0 (H) 4.0 - 6.0 % Final     Past Medical History:   Diagnosis Date    Diabetes mellitus ([address]     Obstructive lung disease ([facility] [address]     8/2022    Panic attack       Past Surgical History:   Procedure Laterality Date    APPENDECTOMY      FEMUR FRACTURE SURGERY Left 09/30/2022    Cephalomedullary Nail    HAND SURGERY      JOINT REPLACEMENT      TIBIA FRACTURE SURGERY Left 09/30/2022    LEFT FEMUR CEPHALOMEDULLARY NAIL performed by Romana Iba, DO at . [facility] [address] N/A 02/15/2022    EGD BIOPSY performed by Emmanuel Doty MD at [address] [place] [address] 11/10/2022    EGD BIOPSY performed by Lara Rendon MD at [facility]       No family history on file. Social History     Tobacco Use    Smoking status: Every Day     Packs/day: 0.25     Years: 41.00     Pack years: 10.25     Types: Cigarettes    Smokeless tobacco: Never   Substance Use Topics    Alcohol use: Not Currently      Current Outpatient Medications   Medication Sig Dispense Refill    Continuous Blood Gluc  (DEXCOM G6 ) JANEL 1 each by Does not apply route once a week 1 each 4    Continuous Blood Gluc Transmit (DEXCOM G6 TRANSMITTER) MISC 1 each by Does not apply route once a week 1 each 4    Continuous Blood Gluc Sensor (DEXCOM G6 SENSOR) MISC 1 each by Does not apply route once a week 3 each 4    Misc.  Devices (STEP N REST WALKER) MISC 1 each by Does not apply route continuous Seated, wheeled walker 1 each 0    pantoprazole (PROTONIX) 40 MG tablet Take 1 tablet by mouth 2 times daily (before meals) for 30 doses 60 tablet 0    ondansetron (ZOFRAN ODT) 4 MG disintegrating tablet Take 1 tablet by mouth every 8 hours as needed for Nausea or Vomiting 12 tablet 0    Magic Mouthwash (MIRACLE MOUTHWASH) Swish and swallow 5 mLs 4 times daily as needed for Irritation Equal parts benadryl. Lidocaine, nystatin, and Maalox. 240 mL 0    albuterol sulfate HFA (VENTOLIN HFA) 108 (90 Base) MCG/ACT inhaler Inhale 2 puffs into the lungs 4 times daily as needed for Wheezing 54 g 1    metoprolol tartrate (LOPRESSOR) 25 MG tablet Take 1 tablet by mouth 2 times daily 60 tablet 0    insulin glargine (LANTUS) 100 UNIT/ML injection vial Inject 15 Units into the skin 2 times daily 10 mL 3    atorvastatin (LIPITOR) 80 MG tablet Take 1 tablet by mouth nightly 30 tablet 3    acetaminophen (TYLENOL) 500 MG tablet Take 2 tablets by mouth every 6 hours as needed for Pain 112 tablet 0    insulin aspart (NOVOLOG FLEXPEN) 100 UNIT/ML injection pen Inject 5 Units into the skin 3 times daily (before meals) 5 pen 3    Blood Glucose Monitoring Suppl (ONE TOUCH ULTRA 2) w/Device KIT 1 kit by Does not apply route daily 1 kit 0    Lancets MISC 1 each by Does not apply route 2 times daily 300 each 1    Alcohol Swabs 70 % PADS Use as needed with blood glucose checks 100 each 0    Insulin Pen Needle ([employer] PEN NEEDLES 29G) 29G X 12MM MISC 1 each by Does not apply route daily 100 each 3    traZODone (DESYREL) 150 MG tablet Take 300 mg by mouth nightly       clonazePAM (KLONOPIN) 2 MG tablet Take 4 mg by mouth daily as needed for Anxiety.        albuterol sulfate  (90 Base) MCG/ACT inhaler Inhale 2 puffs into the lungs every 4 hours as needed for Wheezing      vitamin C (ASCORBIC ACID) 500 MG tablet Take 1 tablet by mouth daily 30 tablet 3    cyclobenzaprine (FLEXERIL) 10 MG tablet Take 1 tablet by mouth 3 times daily as needed for Muscle spasms 30 tablet 0    aspirin 81 MG chewable tablet Take 1 tablet by mouth daily (Patient not taking: Reported on 11/18/2022) 30 tablet 3     No current facility-administered medications for this visit. Allergies   Allergen Reactions    Azithromycin Swelling    Acetaminophen-Codeine Other (See Comments) and Nausea And Vomiting    Codeine Itching    Hydrocodone-Acetaminophen Rash    Ibuprofen Other (See Comments)     Stomach cramps    Morphine Other (See Comments)     Gives headaches    Tramadol Itching     vomitting       Health Maintenance   Topic Date Due    COVID-19 Vaccine (1) Never done    Pneumococcal 0-64 years Vaccine (1 - PCV) Never done    HIV screen  Never done    Diabetic microalbuminuria test  Never done    Diabetic retinal exam  Never done    Hepatitis B vaccine (1 of 3 - Risk 3-dose series) Never done    Shingles vaccine (1 of 2) Never done    Flu vaccine (1) Never done    A1C test (Diabetic or Prediabetic)  12/30/2022    Diabetic foot exam  04/06/2023    Depression Monitoring  04/06/2023    Lipids  11/05/2023    Colorectal Cancer Screen  04/25/2025    DTaP/Tdap/Td vaccine (2 - Td or Tdap) 01/25/2026    Hepatitis C screen  Completed    Hepatitis A vaccine  Aged Out    Hib vaccine  Aged Out    Meningococcal (ACWY) vaccine  Aged Out       Subjective:      Review of Systems   Constitutional:  Negative for chills and fever. HENT:  Negative for congestion, hearing loss, rhinorrhea, sinus pressure and sore throat. Eyes:  Negative for photophobia, discharge and visual disturbance. Respiratory:  Negative for cough, chest tightness and shortness of breath. Cardiovascular:  Negative for chest pain, palpitations and leg swelling. Gastrointestinal:  Negative for abdominal distention, abdominal pain, constipation, diarrhea and vomiting. Endocrine: Negative for polydipsia and polyuria.    Genitourinary:  Negative for decreased urine volume, difficulty urinating, frequency and urgency. Musculoskeletal:  Negative for arthralgias, gait problem and myalgias. Skin:  Negative for rash. Allergic/Immunologic: Negative for food allergies. Neurological:  Negative for dizziness, weakness, numbness and headaches. Hematological:  Negative for adenopathy. Psychiatric/Behavioral:  Negative for dysphoric mood and sleep disturbance. The patient is not nervous/anxious. Objective:     /84   Pulse (!) 123   Ht 5' 7\" (1.702 m)   Wt 112 lb 3.2 oz (50.9 kg)   SpO2 92%   BMI 17.57 kg/m²   Physical Exam  Constitutional:       Appearance: Normal appearance. HENT:      Head: Normocephalic and atraumatic. Right Ear: External ear normal.      Left Ear: External ear normal.   Cardiovascular:      Rate and Rhythm: Normal rate and regular rhythm. Pulses: Normal pulses. Heart sounds: Normal heart sounds. Pulmonary:      Effort: Pulmonary effort is normal. No respiratory distress. Breath sounds: Normal breath sounds. Musculoskeletal:         General: Normal range of motion. Neurological:      General: No focal deficit present. Mental Status: He is alert and oriented to person, place, and time. Assessment and Plan:          1. Candida esophagitis (HCC)  -     pantoprazole (PROTONIX) 40 MG tablet; Take 1 tablet by mouth 2 times daily (before meals) for 30 doses, Disp-60 tablet, R-0Normal  -     ondansetron (ZOFRAN ODT) 4 MG disintegrating tablet; Take 1 tablet by mouth every 8 hours as needed for Nausea or Vomiting, Disp-12 tablet, R-0Normal  -     Magic Mouthwash (MIRACLE MOUTHWASH); Swish and swallow 5 mLs 4 times daily as needed for Irritation Equal parts benadryl. Lidocaine, nystatin, and Maalox. , Disp-240 mL, R-0Normal  2. Financial difficulties  -     [facility] Referral for Social Determinants of Health (Primary Care Practices)  3.  Uncontrolled type 1 diabetes mellitus with hyperglycemia (HCC)  -     Continuous Blood Gluc  ([address]) JANEL; 1 each by Does not apply route once a week, Disp-1 each, R-4Normal  -     Continuous Blood Gluc Transmit (DEXCOM G6 TRANSMITTER) MISC; 1 each by Does not apply route once a week, Disp-1 each, R-4Normal  -     Continuous Blood Gluc Sensor (DEXCOM G6 SENSOR) MISC; 1 each by Does not apply route once a week, Disp-3 each, R-4Normal  -     [facility]) Medication Mgmt (Diabetes - Clinical Pharmacy) - [address] Diabetes Education - [facility]  4. NSTEMI (non-ST elevated myocardial infarction) ([facility]  5. Severe malnutrition (HCC)  -     pantoprazole (PROTONIX) 40 MG tablet; Take 1 tablet by mouth 2 times daily (before meals) for 30 doses, Disp-60 tablet, R-0Normal  -     ondansetron (ZOFRAN ODT) 4 MG disintegrating tablet; Take 1 tablet by mouth every 8 hours as needed for Nausea or Vomiting, Disp-12 tablet, R-0Normal  6. Acute kidney injury ([facility] [address]  7. Closed fracture of left hip with routine healing, subsequent encounter  -     Misc. Devices (STEP N REST WALKER) MISC; CONTINUOUS Starting Fri 11/18/2022, Disp-1 each, R-0, PrintSeated, wheeled walker  8. Thrush  -     Magic Mouthwash (MIRACLE MOUTHWASH); Swish and swallow 5 mLs 4 times daily as needed for Irritation Equal parts benadryl. Lidocaine, nystatin, and Maalox. , Disp-240 mL, R-0Normal     Patient with very complex case. Multiple life-threatening illnesses and multiple social, and economic and financial barriers to health. Given glucose tablet and glucometer in the office. Patient had low blood sugar of 60 and sent home with glucometer and multiple glucose tablets. Patient's glucose sensor has broken. We will give Dexcom sample today and hopefully get patient multiple Dexcom's this way we can keep closer eye on his sugars. Needs referral to social determinants of health to help with transportation, food, housing, and health care.   Care coordinator referral to help with patient's follow-up appointments patient needs an appointment with GI, pulmonology, and cardiology. Needs to get echo completed as ordered in the hospital.          Return in about 4 weeks (around 12/16/2022) for Diabetes check on follow ups and social determanants of health. .     Patient given educational materials - see patient instructions. Discussed use, benefit, and side effects of prescribed medications. All patient questions answered. Pt voiced understanding. Reviewed health maintenance. Instructed to continue current medications, diet and exercise. Patient agreed with treatment plan. Follow up as directed.      Electronically signed by BINA Hoskins on 12/5/2022 at 5:21 PM

## 2022-11-18 NOTE — TELEPHONE ENCOUNTER
DOS 9/30/22    Procedure(s):  Left femur cephalomedullary nail insertion    Patient is requesting pain medication refill. Scheduled appt for 11/22/22. Medication pended.  Please advise

## 2022-11-21 ENCOUNTER — CARE COORDINATION (OUTPATIENT)
Dept: CARE COORDINATION | Age: 52
End: 2022-11-21

## 2022-11-21 ENCOUNTER — TELEPHONE (OUTPATIENT)
Dept: PHARMACY | Age: 52
End: 2022-11-21

## 2022-11-21 ASSESSMENT — ENCOUNTER SYMPTOMS: DYSPNEA ASSOCIATED WITH: EXERTION

## 2022-11-21 NOTE — CARE COORDINATION
Ambulatory Care Coordination Note  11/21/2022    ACC: Rafi Granados, RN    PCP referral. Enrolled in care management. Recent admits: fractured left femur 9/29-10/1    STEMI 11/4-11/6    DKA, esophagitis 11/7-11/10. Needs to follow up with cardiology, GI (for possible repeat EGD, Hep C), nephrology. Needs to schedule echo. Very overwhelmed, stated has no idea what he's supposed to be doing or who he has to see, no  appts made yet. Stated memory has been getting worse, can't keep things straight, requested writer to schedule appts. Getting nursing visits from [address], no therapy visits right now since no walker. Had CT lung screening at [address] [place] [address], results suspicious. PET scan showed resolution of previous pulm nodular opacities but left 7th rib issues, possibly from trauma. Pulmology ordered a bone scan and DEXA scan. PFT showed mild ventilatory defect. He is a smoker. DM- diagnosed several years ago. Did not see a PCP for several years, went to urgent care for refills of insulin. A1c 10.0. Did not have a working glucometer (One Touch Ultra), given different one at PCP office but just had sample of strips to use until got Dex Com. Referred to clinical pharmacy and DM education for management. Due to candida esophagitis, just now starting to eat food after a couple weeks so blood sugars low. Taking Lantus 15 units instead of prescribed 25 units. Still hurts to swallow but able to get soft foods down and some fluids. Discussed no spicy foods, no caffeine or carbonated beverages, avoid chocolate. No abd pain, no diarrhea. Follows with ortho since pinning for femur fracture. Had a walker that broke when he fell on it. PCP ordered a new one. Has chronic back pain issues. Follows with [address] [place] provider at [facility], Dr. Terri Moreau. SDOH- he uses Medicaid cab for appts but was told will have used up his alloted rides 12/7/22.  He thinks he may be able to get a car by then but hard getting in and out since femur fracture. Lives on second floor of apartment building, hard climbing steps. History of homelessness earlier this year. Food insecurity. Not working right now. Raising two sons age 13 and 15, no other support or help with children. If has money for food it goes to them first. BMI is low at 17.5. Community health navigator will be following also. They had been referred in the past but unable to contact him. Instructed to listen to all voicemails and call back, he should be expecting calls. CC Plan:   -Will call to schedule echo, specialty appts, will call [facility] to check on bone and DEXA scans, will call to check on Dex Com at pharmacy. Diabetes Assessment    Medic Alert ID: No  Meal Planning: Avoidance of concentrated sweets   How often do you test your blood sugar?: Meals   Do you have barriers with adherence to non-pharmacologic self-management interventions?  (Nutrition/Exercise/Self-Monitoring): Yes   Have you ever had to go to the ED for symptoms of low blood sugar?: No       Do you have hyperglycemia symptoms?: No   Do you have hypoglycemia symptoms?: No   Blood Sugar Monitoring Regimen: Before Meals   Blood Sugar Trends: Fluctuating        ,   COPD Assessment    Does the patient understand envrionmental exposure?: Yes  Does the patient have a nebulizer?: No  Does the patient use a space with inhaled medications?: No            Symptoms:     Symptom course: stable  Breathlessness: exertion  Increase use of rapid acting/rescue inhaled medications?: Not Applicable  Change in chronic cough?: No/At Baseline  Sputum characteristics: Unable to Specify  Self Monitoring - SaO2: No  Have you had a recent diagnosis of pneumonia either by PCP or at a hospital?: No     , and   General Assessment    Do you have any symptoms that are causing concern?: Yes  Progression since Onset: Unchanged  Reported Symptoms: Pain (Comment: back pain, left leg pain)                           Offered patient enrollment in the Remote Patient Monitoring (RPM) program for in-home monitoring: Yes, but did not enroll at this time. Prior to Admission medications    Medication Sig Start Date End Date Taking? Authorizing Provider   Continuous Blood Gluc  (Candelario Sparks) JANEL 1 each by Does not apply route once a week 11/18/22   BINA Chaudhary   Continuous Blood Gluc Transmit (DEXCOM G6 TRANSMITTER) MISC 1 each by Does not apply route once a week 11/18/22   BINA Chaudhary   Continuous Blood Gluc Sensor (DEXCOM G6 SENSOR) MISC 1 each by Does not apply route once a week 11/18/22   BINA Chaudhary   Misc. Devices (STEP N REST WALKER) MISC 1 each by Does not apply route continuous Seated, wheeled walker 11/18/22   BINA Chaudhary   pantoprazole (PROTONIX) 40 MG tablet Take 1 tablet by mouth 2 times daily (before meals) for 30 doses 11/18/22 12/3/22  BINA Chaudhary   ondansetron (ZOFRAN ODT) 4 MG disintegrating tablet Take 1 tablet by mouth every 8 hours as needed for Nausea or Vomiting 11/18/22   BINA Chaudhary   Magic Mouthwash (MIRACLE MOUTHWASH) Swish and swallow 5 mLs 4 times daily as needed for Irritation Equal parts benadryl. Lidocaine, nystatin, and Maalox. 11/18/22   BINA Chaudhary   oxyCODONE (ROXICODONE) 5 MG immediate release tablet Take 1 tablet by mouth every 6 hours as needed for Pain (use as a last resort when all other prescribed medications have failed to adequately control pain) for up to 7 days.  11/18/22 11/25/22  LUIS Veloz CNP   cyclobenzaprine (FLEXERIL) 10 MG tablet Take 1 tablet by mouth 3 times daily as needed for Muscle spasms 11/18/22   LUIS Veloz CNP   albuterol sulfate HFA (VENTOLIN HFA) 108 (90 Base) MCG/ACT inhaler Inhale 2 puffs into the lungs 4 times daily as needed for Wheezing 11/15/22   BINA Chaudhary   metoprolol tartrate (LOPRESSOR) 25 MG tablet Take 1 tablet by mouth 2 times daily 11/14/22   BINA Chaudhary   insulin glargine (LANTUS) 100 UNIT/ML injection vial Inject 15 Units into the skin 2 times daily 11/10/22   Maritza Gresham MD   aspirin 81 MG chewable tablet Take 1 tablet by mouth daily  Patient not taking: Reported on 11/18/2022 11/7/22   Noah Batista MD   atorvastatin (LIPITOR) 80 MG tablet Take 1 tablet by mouth nightly 11/6/22   Noah Batista MD   acetaminophen (TYLENOL) 500 MG tablet Take 2 tablets by mouth every 6 hours as needed for Pain 10/25/22   Swapnil Lan DO   insulin aspart (NOVOLOG FLEXPEN) 100 UNIT/ML injection pen Inject 5 Units into the skin 3 times daily (before meals) 4/6/22   BINA Jordan   Blood Glucose Monitoring Suppl (ONE TOUCH ULTRA 2) w/Device KIT 1 kit by Does not apply route daily 3/18/22   Luma Gould DO   Lancets MISC 1 each by Does not apply route 2 times daily 3/18/22   Luma Gould DO   Alcohol Swabs 70 % PADS Use as needed with blood glucose checks 2/15/22   Asher Chaparro MD   Insulin Pen Needle ([employer] PEN NEEDLES 29G) 29G X 12MM MISC 1 each by Does not apply route daily 2/15/22   Asher Chaparro MD   traZODone (DESYREL) 150 MG tablet Take 300 mg by mouth nightly     Historical Provider, MD   clonazePAM (KLONOPIN) 2 MG tablet Take 4 mg by mouth daily as needed for Anxiety.      Historical Provider, MD   albuterol sulfate  (90 Base) MCG/ACT inhaler Inhale 2 puffs into the lungs every 4 hours as needed for Wheezing    Historical Provider, MD       Future Appointments   Date Time Provider [facility]   11/22/2022  1:00 PM Swapnil Lan DO [address] [address]   12/20/2022  1:30 PM BINA Jordan [facility]

## 2022-11-21 NOTE — TELEPHONE ENCOUNTER
Clinic received new referral for Diabetes Management, called to schedule, spoke with patient. He cannot schedule at this time due to ambulation and transportation issues. He discussed issue / concerns with vision. Most recent A1c = 10.0 at the end of September. I encouraged patient to be adherent with insulin and take as prescribed. Patient unable to write down our number so I did call back and leave a vm with our number.     Soraya Hope, [facility], CACP  Clinical Pharmacist Medication Management  11/21/2022  9:19 AM

## 2022-11-22 ENCOUNTER — CARE COORDINATION (OUTPATIENT)
Dept: CARE COORDINATION | Age: 52
End: 2022-11-22

## 2022-11-22 ENCOUNTER — OFFICE VISIT (OUTPATIENT)
Dept: ORTHOPEDIC SURGERY | Age: 52
End: 2022-11-22

## 2022-11-22 VITALS — WEIGHT: 112 LBS | HEIGHT: 67 IN | BODY MASS INDEX: 17.58 KG/M2

## 2022-11-22 DIAGNOSIS — S72.142D CLOSED DISPLACED INTERTROCHANTERIC FRACTURE OF LEFT FEMUR WITH ROUTINE HEALING, SUBSEQUENT ENCOUNTER: Primary | ICD-10-CM

## 2022-11-22 PROCEDURE — 99024 POSTOP FOLLOW-UP VISIT: CPT | Performed by: ORTHOPAEDIC SURGERY

## 2022-11-22 RX ORDER — ASCORBIC ACID 500 MG
500 TABLET ORAL DAILY
Qty: 30 TABLET | Refills: 3 | Status: SHIPPED | OUTPATIENT
Start: 2022-11-22

## 2022-11-22 NOTE — CARE COORDINATION
Called [facility], his Dex Com is approved and all supplies ready to be picked up. Appts made:  Cardiology [facility] 12/14/22 2:00 pm [address]  GI Dr. Sally Bliss 1/30/23 2:15 pm [facility]  Nephrology Dr. Vargas Mcintosh 2/6/23 3:50 pm [address]) [address]    He has pending tests to get done: bone scan, DEXA scan and CT chest, ordered by [facility] Melanie Jones (follow up on his CT lung screen and PET scan). He told them he needs to wait to schedule these. Spoke with Yemi Weston at [address] [place] [address], they will get the walker order from patient and send to a DME company since patient not able to get out to go get the walker himself. Left VM message informing patient of these appts and that Dex Com is ready for  at [employer]. Will call again next week.

## 2022-11-22 NOTE — PROGRESS NOTES
[address] [facility]  [address] [place] [id number]  Dept: [phone]  Dept Fax: [phone]        Orthopa[facility] Trauma Clinic Follow Up      Subjective:   Date of Surgery: 9/30/2022 left intertrochanteric femur fracture status post CMN    Kailash Michel is a 47y.o. year old male who presents to the clinic today for routine 8-week followup regarding his left intertrochanteric femur fracture status post CMN. Patient was recently discharged from the hospital for NSTEMI status post heart catheter and diabetic ketoacidosis on 11/7/2022. Patient reports he also had 2 episodes of fainting prior to hospital admission. Patient also sustained a fall a few days ago that caused his walker to break. Patient has not been able to continue to ambulate with walker, though is working on having it replaced and establish home care physical therapy. Patient has not been able to do physical therapy due to recent health history since in hospital for surgery. Patient denies any new pain. Patient denies any new numbness, tingling, or weakness; patient has neuropathy at baseline due to type 1 diabetes. Patient has difficulty finding the comfortable position while seated and is unable to lie on his left side due to pain. Patient is not currently icing. Patient recently had his medications refilled for oxycodone 5 mg (21 tablets) and 10 mg cyclobenzaprine(30 tablets) on 11/18/2022, patient is yet to  medications. Patient is here for evaluation and radiographs. Review of Systems  Gen: no fever, chills, malaise  CV: no chest pain or palpitations  Resp: no cough or shortness of breath  GI: no nausea, vomiting, diarrhea, or constipation  Neuro: no numbness, tingling, or weakness  Msk: Left hip pain  10 remaining systems reviewed and negative    Objective : There were no vitals filed for this visit. Body mass index is 17.54 kg/m².   General: No acute distress, resting comfortably in the clinic  Neuro: alert. oriented  Eyes: Extra-ocular muscles intact  Pulm: Respirations unlabored and regular. Skin: warm, well perfused  Psych:   Patient has good fund of knowledge and displays understanging of exam, diagnosis, and plan. MSK:    LLE: Skin intact with well-healed surgical incisions, no signs of dehiscence, drainage, erythema. Patient is tender palpation over the left hip and along the thigh. Patient no pain with external rotation of hip and increased pain with internal rotation. Patient is able to hold leg in complete extension with pain against gravity. Knee range of motion 0-130 degrees. Compartments soft. 2+ DP pulse. TA/EHL/FHL/GS motor intact. Deep and Superficial Peroneal/Saphenous/Sural/Plantar sensation significantly diminished with neuropathy at baseline. Radiology:  History: 9/30/2022 left intertrochanteric femur fracture status post CMN    Comparison: 11/4/2022    Findings: 2 views of the left femur(AP/lateral) showing retained intramedullary fixation and relative anatomic alignment without loss of reduction or hardware failure when compared to previous films. There is increased callus formation and bony consolidation noted as well. Impression: Stable left intertrochanteric femur fracture status post CMN with interval healing     Assessment:   47y.o. year old male with left intertrochanteric femur fracture status post CMN, 9/30/2022  Plan:      Patient seen and evaluated clinic today with x-rays reviewed with patient. Educated patient that he is healing appropriately with improved callus formation. Stressed to patient that it is important that he begins doing physical therapy as well as at home exercise as tolerated to improve on current state of deconditioning. Patient provided with phone number for [employer] health to replace his walker and get shower chair delivered from previous DME orders.   Recommended the patient  previously ordered medications and discussed the use of vitamin C for nerve pain, sent to pharmacy. Patient is seeing his primary care provider to adjust blood pressure medications and reduced fainting episodes. Also encourage patient to regularly see his primary care doctor to ensure current medication regimens do not interfere with 1 another. Patient reports he is unable to take Tylenol or Motrin, at this time we recommend utilizing ice for anti-inflammatory effect. Patient encouraged to follow-up in 6-weeks time. Patient understood and agreed with the plan with all questions being answered to the patient's satisfaction at the time of visit. Follow up:Return in about 6 weeks (around 1/3/2023) for evaluation with x-rays OUT of cast/splint/brace.     Orders Placed This Encounter   Medications    vitamin C (ASCORBIC ACID) 500 MG tablet     Sig: Take 1 tablet by mouth daily     Dispense:  30 tablet     Refill:  3            Orders Placed This Encounter   Procedures    XR FEMUR LEFT (MIN 2 VIEWS)     Standing Status:   Future     Number of Occurrences:   1     Standing Expiration Date:   11/22/2023     Order Specific Question:   Reason for exam:     Answer:   monitor healing post op       Electronically signed by Colton Dutta DO on 11/22/2022 at 2:57 PM

## 2022-11-22 NOTE — CARE COORDINATION
Appointment made with CHUN- Dr. Mccord Ast- Friday, January 20, 2023 @2:15 pm at [address] clinic. Patient to bring photo ID and Insurance card, mask is not mandatory, but recommended. Patient will also be put on a cancellation list and called if anything earlier comes up.     [facility] Cardiology appointment made with CORTEZ Coughlin  Wednesday, December 14, 2022 @ [address] Office [address], use entrance [address].

## 2022-11-23 ENCOUNTER — TELEPHONE (OUTPATIENT)
Dept: PRIMARY CARE CLINIC | Age: 52
End: 2022-11-23

## 2022-11-23 NOTE — TELEPHONE ENCOUNTER
Patient is calling needing orders for a walker ( seat and basket ) and shower chair. Just had surgery 11/7/22. Please fax to number below [phone] [facility]

## 2022-11-25 ENCOUNTER — TELEPHONE (OUTPATIENT)
Dept: PRIMARY CARE CLINIC | Age: 52
End: 2022-11-25

## 2022-11-25 ENCOUNTER — CARE COORDINATION (OUTPATIENT)
Dept: CARE COORDINATION | Age: 52
End: 2022-11-25

## 2022-11-25 NOTE — TELEPHONE ENCOUNTER
Kb Wayland was contacted by a Laya Stallworth to discuss a referral for SDOH related needs. Writer spoke with: Patient and explained the services and assistance that can be provided through the Laya Stallworth program.     Patient agreeable to receiving resources and support from [address] [place] [address]. Intake Notes:   Patient states:   Someone with [facility] is helping him with transportation. He doesn't remember the lady's name, but she is supposed to be calling him back with an appointment time. Probably won't be before December 7 though. (Are they trying to reschedule the 12/20/22 appointment for an earlier day?)  Called [employer] and SNAP increased. He has two teenage sons. They've been getting food at a corner grocery store. When he receives SSI on 12/1, he wants to buy a car. Then he can go to stores like [employer] for food. Plan of Care: N/A    Action steps to be completed by writer:  Writer will follow up with Patient. Action steps to be completed by patient:  Patient has Writer's phone number to call if he has questions or something changes.     Patient has given verbal permission to leave detailed messages regarding SDOH referral on their phone: N/A    Patient has given verbal permission to submit applications on their behalf: N/A    Patient voiced understanding of action plan and responsibilities, was provided with writer's contact information, and agrees to call should they require additional assistance: yes      Nery Levin MA

## 2022-11-28 ENCOUNTER — CARE COORDINATION (OUTPATIENT)
Dept: CARE COORDINATION | Age: 52
End: 2022-11-28

## 2022-11-28 NOTE — CARE COORDINATION
Left VM message asking patient to return call to discuss the appts made for him to he can make sure he has transportation for cardiology appt next month. Will mail him the list of appts with phone numbers and addresses of the offices. Will follow up next week.     Future Appointments   Date Time Provider Jerilyn Salvador   12/20/2022  1:30 PM [facility] [address] [facility]   1/3/2023  1:15 PM Carlos Gordon [facility]   1/20/2023  2:15 PM Rojas Vega MD [facility]   2/6/2023  3:50 PM Alexsandra Osorio MD AFL Neph [facility] None

## 2022-11-28 NOTE — LETTER
November 28, 2022    Daryle Gourd,        These are the appointments that have been made for you:    1. Cardiology: [facility]   [address]    phone [phone]     Appt: 12/14/22 2:00 pm    2. Gastroenterologist:  Dr. Tenisha Bautista [address] [facility] [address]    phone [phone]    Appt: 1/30/23 2:15 pm     3. Nephrologist Dr. Claudia Ferreira (building 2) [facility] [address]    phone [phone]    Appt: 2/6/23 3:50 pm      I left voicemail messages on your phone with this information. Please call the cardiology office to reschedule that appointment if you are unable to keep it due to transportation issues. You can call me if you have questions.         Take care,    Tevin Munson, RN, Ambulatory Care Manager  [facility]  [phone]

## 2022-11-28 NOTE — TELEPHONE ENCOUNTER
If he has left intertrochanteric femur fracture I am not sure this is the type of walker he should have. Do you think he should contact Ortho for the proper type of walker he needs? I am not comfortable ordering this for him, he could very easily fall and reinjure or hurt himself in some other way. I did order the shower chair for him and fax it. Please advise.

## 2022-11-28 NOTE — TELEPHONE ENCOUNTER
I saw that you ordered it in your last note. I went ahead and sent both orders. Along with the note.

## 2022-11-29 ENCOUNTER — CARE COORDINATION (OUTPATIENT)
Dept: CARE COORDINATION | Age: 52
End: 2022-11-29

## 2022-11-29 NOTE — CARE COORDINATION
Remote Patient Monitoring Note      Date/Time:  11/29/2022 9:24 AM    CCSS reviewed patients reported daily Remote Patient Monitoring metrics. All reported metrics are within alert parameters. Plan/Follow Up:  Will continue to review, monitor and address alerts with follow up based on severity of symptoms and risk factors   Current Patient Metrics ---- Blood Pressure: 152/84, 58bpm Pulseox: 98%, 58bpm Survey: C Weight: 224.5lbs Note Created at: 11/29/2022 09:24 AM ET ---- Time-Spent: 2 minutes 0 seconds

## 2022-11-30 ENCOUNTER — CARE COORDINATION (OUTPATIENT)
Dept: CARE COORDINATION | Age: 52
End: 2022-11-30

## 2022-12-06 ENCOUNTER — CARE COORDINATION (OUTPATIENT)
Dept: CARE COORDINATION | Age: 52
End: 2022-12-06

## 2022-12-06 NOTE — CARE COORDINATION
Attempted to call patient to discuss appts again, check if got Dex Com and is using- unable to leave a message as voicemail full. Will attempt to call again next week.     Future Appointments   Date Time Provider Jerilyn Salvador   12/20/2022  1:30 PM Luis F Neal [facility]   1/3/2023  1:15 PM Mat Mell Emmanuel [facility]   1/20/2023  2:15 PM Alize Kyle MD [facility]   2/6/2023  3:50 PM Chicho Joseph MD [facility] None

## 2022-12-12 ENCOUNTER — TELEPHONE (OUTPATIENT)
Dept: PRIMARY CARE CLINIC | Age: 52
End: 2022-12-12

## 2022-12-12 DIAGNOSIS — S72.142D CLOSED DISPLACED INTERTROCHANTERIC FRACTURE OF LEFT FEMUR WITH ROUTINE HEALING, SUBSEQUENT ENCOUNTER: ICD-10-CM

## 2022-12-12 RX ORDER — OXYCODONE HYDROCHLORIDE 5 MG/1
5 TABLET ORAL 2 TIMES DAILY PRN
Qty: 14 TABLET | Refills: 0 | Status: SHIPPED | OUTPATIENT
Start: 2022-12-12 | End: 2022-12-19

## 2022-12-12 NOTE — TELEPHONE ENCOUNTER
Called patient today, no answer, vm full so could not leave message.      Halima David Corbin, AZAEL  Clinical Pharmacist Medication Management  12/12/2022  12:24 PM

## 2022-12-12 NOTE — TELEPHONE ENCOUNTER
Left detailed message with Hamida Harry at [facility]. Instructions given.

## 2022-12-12 NOTE — TELEPHONE ENCOUNTER
Date of Surgery: 9/30/2022 left intertrochanteric femur fracture status post CMN    Patient called in requesting a refill of pain med  States he is only using it sparingly, but does not have any left.     Please advise

## 2022-12-12 NOTE — TELEPHONE ENCOUNTER
Patient should get medications which were prescribed at last visit and begin taking these as they can help his symptoms. Patient should also complete swallow study and should follow up with GI for a scope so they can see why he is having this pain with food.

## 2022-12-12 NOTE — TELEPHONE ENCOUNTER
Margret Barnes from [facility] calling. Pt is no better since last hosp f/up. Not worse, just not better. Weighs about 100 lbs. Has difficulty swallowing. Afraid that he is going to choke. Chews his food up really small. Sometimes when he takes a bite of something, he has a reflux reaction and just swallows the first bite and then chokes. Has severe Chest pains at least 3 times a week. Will not have any transportation until after Jan 1st. Never picked up his rx's that he got from his last appt. Stated that he is trying to get them picked up, or delivered. Said that, if he could just eat and get some weight on, he would feel better. Stated that he should weigh 150-160lbs. Also has dizziness with the CP. Any recommendations?  Can call Margret Barnes back @ [phone]

## 2022-12-13 ENCOUNTER — CARE COORDINATION (OUTPATIENT)
Dept: CARE COORDINATION | Age: 52
End: 2022-12-13

## 2022-12-13 DIAGNOSIS — B37.0 THRUSH: ICD-10-CM

## 2022-12-13 DIAGNOSIS — B37.81 CANDIDA ESOPHAGITIS (HCC): ICD-10-CM

## 2022-12-13 NOTE — TELEPHONE ENCOUNTER
Medication refill sent in  Informed patient this is the last narcotic refill to be sent in per provider.

## 2022-12-13 NOTE — TELEPHONE ENCOUNTER
Patient already has referral to social determinants of health. Please advise social determinants of health of patient's current condition. Patient should go to the ER if he is having trouble with pain and feels like he is going to die. Okay for referral to pain management.

## 2022-12-13 NOTE — CARE COORDINATION
Patient called me back but couldn't talk long as was getting another call. He has a cardiology appt tomorrow but stated can't attend, has no transportation and has no insurance until January. He stated will call me back. Writer called [facility] Cardiology and canceled the appt. If no return call will call patient again tomorrow.

## 2022-12-13 NOTE — TELEPHONE ENCOUNTER
Patient states he never picked up rx for magic mouth wash and rx for glucose tablets. Patient states he cannot afford glucose tablets OTC. Patient states he was told by [facility] they could deliver for him due to transportation issues. Patient wanted to inform dominique he is struggling and things are \"rough\" patient states he can barely afford prescriptions. Patient states he is looking for help. I advised patient we can put in referral for SDOH. Patient was scheduled 12/20 for appt but had to cancel due to transportation. Patient states he used all transportation for this year. Patient does not have any one else he can ask for a ride. Patient states he has two sons age 15 and 13 and \"doesn't know how he is going to feed them tomorrow\". Patient states he is trying to better his health. Patient wanted to keep appt for 12/20 but couldn't. Patient states he feels like Heath Go is going to die\" patient states he feels his health is not good for his age. Patient is very concerned and not sure what to do. Patient is req referral for pain management.

## 2022-12-14 ENCOUNTER — TELEPHONE (OUTPATIENT)
Dept: PRIMARY CARE CLINIC | Age: 52
End: 2022-12-14

## 2022-12-14 ENCOUNTER — CARE COORDINATION (OUTPATIENT)
Dept: CARE COORDINATION | Age: 52
End: 2022-12-14

## 2022-12-14 DIAGNOSIS — M17.9 OSTEOARTHRITIS OF KNEE, UNSPECIFIED LATERALITY, UNSPECIFIED OSTEOARTHRITIS TYPE: ICD-10-CM

## 2022-12-14 DIAGNOSIS — R47.02 DYSPHASIA: Primary | ICD-10-CM

## 2022-12-14 DIAGNOSIS — M48.00 SPINAL STENOSIS, UNSPECIFIED SPINAL REGION: Primary | ICD-10-CM

## 2022-12-14 ASSESSMENT — ENCOUNTER SYMPTOMS: DYSPNEA ASSOCIATED WITH: EXERTION

## 2022-12-14 NOTE — TELEPHONE ENCOUNTER
Mary Pruitt was contacted by Jenny Fish MA, a Laya Stallworth, regarding a Social Determinants of Health referral.     Eric Benson was unable to leave a message. (Voicemail full/VM not set up/busy signal)    Follow-up attempt.

## 2022-12-14 NOTE — CARE COORDINATION
Ambulatory Care Coordination Note  12/14/2022    ACC: Ryan Corey, RN    He called me back. Major symptom is pain causing difficulty swallowing. Discussed the order of Constance Yao, is hoping pharmacy will deliver to him, he doesn't have transportation to get there. Causes pain in chest, \"feels like a rope\" tightening around his chest when swallowing. He thinks is still taking Protonix. Reminded of upcoming GI appt. He lost the letter writer mailed to him with all the appts and office information. No chest pain other than when trying to eat. Reminded he will need to reschedule cardiology appt soon. DM- he got Dex Com and likes it, is alerted for high or low blood sugars sooner than when he has symptoms. Taking Lantus 25 units in morning and 10 at HS instead of 15 units twice daily and has been keeping blood sugars stable. He has very little money for food, with no transportation and he can't walk, he sends his kids to the [employer] store for food, everything is expensive. Gave him phone number for community health navigator, instructed him to call her tomorrow to discuss. He currently has insurance but will need to sign up for a different Medicaid plan so doesn't know about scheduling tests right now. He will probably sign up with [facility]. Still getting Med 1 nurse visits but hasn't gotten any PT visits, they tell him they've tried to contact him but he said their number didn't come up on phone. Lives in 2 story house, hard to climb up the steps. Very long phone conversation, talked about being diagnosed with Hep C in the past and may have gotten some treatment for it. 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 Enrolling his kids in online schooling so if they have to eventually move they can stay in school.   CC Plan:   -Check with Med 1 nurse about PT visits, ask if he can be switched to pharmacy that will automatically deliver, check on wheeled walker order.  -sent message to PCP about swallow study order, does he need to continue protonix (was ordered twice daily for 15 days) and get enteric coated ASA instead of chewable. Diabetes Assessment    Medic Alert ID: No  Meal Planning: Avoidance of concentrated sweets   How often do you test your blood sugar?: Meals   Do you have barriers with adherence to non-pharmacologic self-management interventions? (Nutrition/Exercise/Self-Monitoring): Yes   Have you ever had to go to the ED for symptoms of low blood sugar?: No       Do you have hyperglycemia symptoms?: No   Do you have hypoglycemia symptoms?: No   Blood Sugar Monitoring Regimen: Before Meals, At Bedtime   Blood Sugar Trends: Fluctuating        ,   COPD Assessment    Does the patient understand envrionmental exposure?: Yes  Does the patient have a nebulizer?: No  Does the patient use a space with inhaled medications?: No            Symptoms:     Symptom course: stable  Breathlessness: exertion  Increase use of rapid acting/rescue inhaled medications?: No  Change in chronic cough?: No/At Baseline  Change in sputum?: No/At Baseline  Sputum characteristics: Unable to Specify  Self Monitoring - SaO2: No  Have you had a recent diagnosis of pneumonia either by PCP or at a hospital?: No     , and   General Assessment    Do you have any symptoms that are causing concern?: Yes  Progression since Onset: Gradually Worsening  Reported Symptoms: Pain, Other (Comment: pain and difficulty swallowing)             Offered patient enrollment in the Remote Patient Monitoring (RPM) program for in-home monitoring: Patient declined. Lab Results       None                 Goals Addressed                   This Visit's Progress     Conditions and Symptoms   Improving     I will schedule office visits, as directed by my provider. I will keep my appointment or reschedule if I have to cancel. I will notify my provider of any barriers to my plan of care.   I will follow my Zone Management tool to seek urgent or emergent care. I will notify my provider of any symptoms that indicate a worsening of my condition. Barriers: lack of support, overwhelmed by complexity of regimen, and lack of education  Plan for overcoming my barriers: ACM calls, CHN to follow, assist with appts, education  Confidence: 6/10  Anticipated Goal Completion Date: 2/20/23                Prior to Admission medications    Medication Sig Start Date End Date Taking? Authorizing Provider   Magic Mouthwash (MIRACLE MOUTHWASH) Swish and swallow 5 mLs 4 times daily as needed for Irritation Equal parts benadryl. Lidocaine, nystatin, and Maalox. 12/13/22   BINA Billingsley   oxyCODONE (ROXICODONE) 5 MG immediate release tablet Take 1 tablet by mouth 2 times daily as needed for Pain for up to 7 days. 12/12/22 12/19/22  LUIS Orta CNP   vitamin C (ASCORBIC ACID) 500 MG tablet Take 1 tablet by mouth daily 11/22/22   Ole Credit, DO   Continuous Blood Gluc  (DEXCOM G6 ) JANEL 1 each by Does not apply route once a week 11/18/22   BINA Billingsley   Continuous Blood Gluc Transmit (DEXCOM G6 TRANSMITTER) MISC 1 each by Does not apply route once a week 11/18/22   BINA Billingsley   Continuous Blood Gluc Sensor (DEXCOM G6 SENSOR) MISC 1 each by Does not apply route once a week 11/18/22   BINA Billingsley   Misc.  Devices (STEP N REST WALKER) MISC 1 each by Does not apply route continuous Seated, wheeled walker 11/18/22   BINA Billingsley   pantoprazole (PROTONIX) 40 MG tablet Take 1 tablet by mouth 2 times daily (before meals) for 30 doses 11/18/22 12/3/22  BINA Billingsley   ondansetron (ZOFRAN ODT) 4 MG disintegrating tablet Take 1 tablet by mouth every 8 hours as needed for Nausea or Vomiting 11/18/22   BINA Billingsley   cyclobenzaprine (FLEXERIL) 10 MG tablet Take 1 tablet by mouth 3 times daily as needed for Muscle spasms 11/18/22   LUIS Orta CNP   albuterol sulfate HFA (VENTOLIN HFA) 108 (90 Base) MCG/ACT inhaler Inhale 2 puffs into the lungs 4 times daily as needed for Wheezing 11/15/22   BINA Soria   metoprolol tartrate (LOPRESSOR) 25 MG tablet Take 1 tablet by mouth 2 times daily 11/14/22   BINA Soria   insulin glargine (LANTUS) 100 UNIT/ML injection vial Inject 15 Units into the skin 2 times daily 11/10/22   Angelia Whyte MD   aspirin 81 MG chewable tablet Take 1 tablet by mouth daily  Patient not taking: Reported on 11/18/2022 11/7/22   Charo Jenkins MD   atorvastatin (LIPITOR) 80 MG tablet Take 1 tablet by mouth nightly 11/6/22   Charo Jenkins MD   acetaminophen (TYLENOL) 500 MG tablet Take 2 tablets by mouth every 6 hours as needed for Pain 10/25/22   Kristie Pham DO   insulin aspart (NOVOLOG FLEXPEN) 100 UNIT/ML injection pen Inject 5 Units into the skin 3 times daily (before meals) 4/6/22   BINA Soria   Blood Glucose Monitoring Suppl (ONE TOUCH ULTRA 2) w/Device KIT 1 kit by Does not apply route daily 3/18/22   Dallas Pates, DO   Lancets MISC 1 each by Does not apply route 2 times daily 3/18/22   Dallas Pates, DO   Alcohol Swabs 70 % PADS Use as needed with blood glucose checks 2/15/22   Asher Chaparro MD   Insulin Pen Needle ([employer] PEN NEEDLES 29G) 29G X 12MM MISC 1 each by Does not apply route daily 2/15/22   Asher Chaparro MD   traZODone (DESYREL) 150 MG tablet Take 300 mg by mouth nightly     Historical Provider, MD   clonazePAM (KLONOPIN) 2 MG tablet Take 4 mg by mouth daily as needed for Anxiety.      Historical Provider, MD   albuterol sulfate  (90 Base) MCG/ACT inhaler Inhale 2 puffs into the lungs every 4 hours as needed for Wheezing    Historical Provider, MD       Future Appointments   Date Time Provider Jerilyn Madeleine   1/3/2023  1:15 PM Kristie Pham DO [address] [address]   1/20/2023  2:15 PM Tello Rowe  [facility]   2/6/2023  3:50 PM Chicho Joseph MD [facility] None

## 2022-12-14 NOTE — CARE COORDINATION
Attempted to call patient again, unable to leave a message. Sent message to community health navigator about his recent telephone encounter with PCP office, saying he didn't have food. She will also attempt to contact him.

## 2022-12-14 NOTE — TELEPHONE ENCOUNTER
Forms are being faxed over for patient.  (Not received yet)    Deyanira Abreu who takes care of paperwork for patient will like call back once forms are completed/signed and faxed back    CASSANDRA

## 2022-12-15 ENCOUNTER — CARE COORDINATION (OUTPATIENT)
Dept: CARE COORDINATION | Age: 52
End: 2022-12-15

## 2022-12-15 DIAGNOSIS — R47.02 DYSPHASIA: Primary | ICD-10-CM

## 2022-12-15 NOTE — CARE COORDINATION
Spoke with pharmacist at [facility] about  Matt. It is not covered by insurance, copay would be about $40. I inquired about getting all his medications delivered- they don't automatically deliver medications unless a patient calls and asks them too, they would need a call each and every time. Called patient about cost of Magic Mouthwash, he stated can't afford that. Discussed about switching to a pharmacy that would automatically deliver (like [facility] or [facility]) he stated can't switch right now because [facility] is preferred by his insurance, but could maybe switch after has new insurance next year.

## 2022-12-15 NOTE — TELEPHONE ENCOUNTER
Arianna Reza was contacted by whitney Stallworth for follow-up regarding SDOH related needs. Writer spoke with: Patient    Progress Notes:   Patient and I filled out an online application for next-day home delivery of food through [address]. For the application, he said the best number to reach him is [phone]. He is aware that this service is once a month. He is agreeable to me calling him back in one or two days to ensure that he has heard from [address]. I mentioned to him that we can also discuss home delivery of food that could be delivered more often. He said that [employer] is helping him with his heating bill. He said he hopes his provider is figuring out how to communicate with [facility] for home delivery of prescriptions. He said [facility] doesn't answer their phone. Action steps to be completed by writer:  I will call Patient in one or two business days. Action steps to be completed by patient:  Patient is aware that [address] may call him to follow up on application. Patient has my new phone number [phone].     Patient has given verbal permission to leave detailed messages regarding SDOH referral on their phone: N/A    Patient has given verbal permission to submit applications on their behalf: yes    Beth Tabares MA

## 2022-12-16 DIAGNOSIS — R47.02 DYSPHASIA: Primary | ICD-10-CM

## 2022-12-16 DIAGNOSIS — R94.2 ABNORMAL PET SCAN OF LUNG: ICD-10-CM

## 2022-12-16 RX ORDER — OMEPRAZOLE 40 MG/1
40 CAPSULE, DELAYED RELEASE ORAL
Qty: 90 CAPSULE | Refills: 1 | Status: SHIPPED | OUTPATIENT
Start: 2022-12-16

## 2022-12-16 RX ORDER — ASPIRIN 81 MG/1
81 TABLET ORAL DAILY
Qty: 90 TABLET | Refills: 1 | Status: SHIPPED | OUTPATIENT
Start: 2022-12-16

## 2022-12-16 NOTE — TELEPHONE ENCOUNTER
Please call pharmacy and okay for verbal order to split magic mouth wash into individual prescriptions if that would be covered.

## 2022-12-16 NOTE — TELEPHONE ENCOUNTER
Swallow study ordered, more omeprazole sent and patient should continue taking this along with Magic mouthwash, nuc med bone scan ordered.

## 2022-12-16 NOTE — TELEPHONE ENCOUNTER
Pt is unable to get sooner appt due to transportation- patient states he will have to wait until next year - FYI

## 2022-12-19 ENCOUNTER — TELEPHONE (OUTPATIENT)
Dept: INTERNAL MEDICINE | Age: 52
End: 2022-12-19

## 2022-12-19 NOTE — TELEPHONE ENCOUNTER
Christen Flores was contacted by whitney Stallworth for follow-up regarding SDOH related needs. Writer spoke with: Patient    Progress Notes:   Patient called me. Patient received text messages from [address] Sunday evening that they've been approved for food delivery. Text messages did not provide next steps. I called [address] and listened to automated messages. Once approved, client can request home delivery online or by phone. I called Patient back. Patient asked for [employer]'s phone number. [phone]  He asked if it would be food that would need to be cooked - yes. He said he will call [address] now. Action steps to be completed by writer:  Follow up with Patient tomorrow to ensure they've heard from [address]. Action steps to be completed by patient:  Patient will call [address].     Patient has given verbal permission to leave detailed messages regarding SDOH referral on their phone: N/A    Patient has given verbal permission to submit applications on their behalf: yes    Stevan Garcia MA

## 2022-12-20 ENCOUNTER — CARE COORDINATION (OUTPATIENT)
Dept: CARE COORDINATION | Age: 52
End: 2022-12-20

## 2022-12-20 RX ORDER — LIDOCAINE HYDROCHLORIDE 20 MG/ML
5 SOLUTION OROPHARYNGEAL 4 TIMES DAILY PRN
Qty: 240 ML | Refills: 0 | Status: SHIPPED | OUTPATIENT
Start: 2022-12-20

## 2022-12-20 NOTE — TELEPHONE ENCOUNTER
This should not have copay as I sent them in separately. Patient should not need glucose tabs or tums as this liquid has the same medication as the tums.

## 2022-12-20 NOTE — TELEPHONE ENCOUNTER
Magic mouth wash was $40 says he can't afford that. Asking if something else can be sent in. Also needs glucose tablets sent in.

## 2022-12-20 NOTE — TELEPHONE ENCOUNTER
Pt states that he cannot afford the $40 copay for the mouthwash. Also asking for glucose tablets and tums to be sent in.

## 2022-12-20 NOTE — CARE COORDINATION
Left VM message asking patient to return call for update on symptoms. PCP prescribed individual ingredients of Magic Mouthwash, notified patient of that and also of omeprazole refill. [facility], they already notified patient of medications and they will be delivered to him. Will call again next week to discuss scheduling him for testing and appts for next year (swallow study, NM bone scan, need to reschedule cardiology appt at [facility], review GI and nephrology appts).     Future Appointments   Date Time Provider Jerilyn Salvador   1/3/2023  1:15 PM Kia Boyle DO ORTHO SPECIA [facility]   1/9/2023  1:30 PM STV GI RM 6 ST[facility]Z RAD [facility] Radiolog   1/20/2023  2:15 PM Dyan Durbin MD ST [facility] GI [facility]   2/6/2023  3:50 PM Pretty Parmar MD [facility] None

## 2022-12-22 ENCOUNTER — TELEPHONE (OUTPATIENT)
Dept: PHARMACY | Age: 52
End: 2022-12-22

## 2022-12-28 ENCOUNTER — TELEPHONE (OUTPATIENT)
Dept: PRIMARY CARE CLINIC | Age: 52
End: 2022-12-28

## 2022-12-28 NOTE — TELEPHONE ENCOUNTER
Pt asking for an rx for vanilla glucose boost to go to [address] [place] [address] on [address]. States he's not able to eat because of severe pain when eating but can drink if he uses the magic mouthwash first and lifts his head back he can swallow liquids without too much pain. States he has testing ordered but can't get it done until after the new year when he can get more transportation thru insurance.

## 2022-12-28 NOTE — TELEPHONE ENCOUNTER
Please fill out prescription and fax to pharmacy of patient's choice. I will sign for this okay for 30 drinks. Diagnosis of dysphagia, and malnutrition.

## 2022-12-29 ENCOUNTER — CARE COORDINATION (OUTPATIENT)
Dept: CARE COORDINATION | Age: 52
End: 2022-12-29

## 2022-12-29 ENCOUNTER — TELEPHONE (OUTPATIENT)
Dept: PRIMARY CARE CLINIC | Age: 52
End: 2022-12-29

## 2022-12-29 DIAGNOSIS — E10.65 UNCONTROLLED TYPE 1 DIABETES MELLITUS WITH HYPERGLYCEMIA (HCC): ICD-10-CM

## 2022-12-29 DIAGNOSIS — E10.65 UNCONTROLLED TYPE 1 DIABETES MELLITUS WITH HYPERGLYCEMIA (HCC): Primary | ICD-10-CM

## 2022-12-29 NOTE — CARE COORDINATION
Ambulatory Care Coordination Note  12/29/2022    ACC: Cristina Jung, RN    Patient has several issues:  -hasn't started PT with [facility] yet because needs to have a walker to have therapy. Has orders for rollator and shower chair at [address] [place] [address]  -still unable to eat, can take a few sips of fluids after drinking Magic Mouthwash but still very painful and difficult. PCP ordered Boost but [address] [place] [address] needs more documentation. He needs only vanilla and must be for diabetic patient. 13year old son mixing ingredients for Magic Mouthwash together since patient's vision in bad and he can't see to measure.  -his transmitter (?) is broken on Dex Com so can't use. Only has 3 strips left for fingerstick glucometer, has to test about 5 times to get a reading. Encouraged him to change glucometer battery.  -hard to get around, difficult to get to bathroom, frequent stops due to left leg pain and not having a rollator. Hard to go anywhere, lives on second floor of apartment building.  -lost his paper with all appts listed, he is confused when appts are, didn't have a writing utensil to be able to write anything down.    -Writer called [address] [place] [address], they received addended progress note 12/7 from PCP about why needing a rollator and a shower bench but info hasn't been reviewed yet, Ines will send to  for review. -Writer pended refill of glucometer test strips (order already pended by office staff for new transmitter and sensors)    He does have appts for diabetes education, NM bone scan, swallow study. He doesn't want to reschedule cardiology appt right now, wants to focus on his swallowing problems. He has ortho appt next week he didn't know about but thought he didn't have to go back to ortho. Per note, [facility]. CC Plan:   -Will call early next week to review appts again with him since too late to send information in mail, he doesn't have MyChart.         Offered patient enrollment in the Remote Patient Monitoring (RPM) program for in-home monitoring: Patient declined. Lab Results       None                 Goals Addressed                   This Visit's Progress     Conditions and Symptoms   No change     I will schedule office visits, as directed by my provider. I will keep my appointment or reschedule if I have to cancel. I will notify my provider of any barriers to my plan of care. I will follow my Zone Management tool to seek urgent or emergent care. I will notify my provider of any symptoms that indicate a worsening of my condition. Barriers: lack of support, overwhelmed by complexity of regimen, and lack of education  Plan for overcoming my barriers: ACM calls, CHN to follow, assist with appts, education  Confidence: 6/10  Anticipated Goal Completion Date: 2/20/23                Prior to Admission medications    Medication Sig Start Date End Date Taking?  Authorizing Provider   nystatin (MYCOSTATIN) 571049 UNIT/ML suspension Take 1.3 mLs by mouth 4 times daily for 10 days Retain in mouth as long as possible 12/20/22 12/30/22  BINA Guzman   aluminum hydroxide-magnesium carbonate (ACID GONE)  MG/15ML suspension Take 5 mLs by mouth 4 times daily (with meals and nightly) 12/20/22   BINA Guzman   lidocaine viscous hcl (XYLOCAINE) 2 % SOLN solution Take 5 mLs by mouth 4 times daily as needed for Irritation or Pain (For pain with eating) 12/20/22   BINA Guzman   diphenhydrAMINE (BENYLIN) 12.5 MG/5ML liquid Take 5 mLs by mouth 4 times daily as needed for Allergies 12/20/22 1/19/23  BINA Guzman   Nutritional Supplements (GLUCOSE MANAGEMENT) TABS Take 1 tablet by mouth daily as needed (Low sugar reaction) 12/20/22   BINA Guzman   aspirin EC 81 MG EC tablet Take 1 tablet by mouth daily 12/16/22   BIAN Guzman   omeprazole (PRILOSEC) 40 MG delayed release capsule Take 1 capsule by mouth every morning (before breakfast) 12/16/22   BINA Guzman Magic Mouthwash (MIRACLE MOUTHWASH) Swish and swallow 5 mLs 4 times daily as needed for Irritation Equal parts benadryl. Lidocaine, nystatin, and Maalox. 12/13/22   Nelly Lesches, PA   vitamin C (ASCORBIC ACID) 500 MG tablet Take 1 tablet by mouth daily 11/22/22   Bobbi Garza DO   Continuous Blood Gluc  ([address]) JANEL 1 each by Does not apply route once a week 11/18/22   Nelly Lesches, PA   Continuous Blood Gluc Transmit (DEXCOM G6 TRANSMITTER) MISC 1 each by Does not apply route once a week 11/18/22   Nelly Lesches, PA   Continuous Blood Gluc Sensor (DEXCOM G6 SENSOR) MISC 1 each by Does not apply route once a week 11/18/22   Nelly Lesches, PA   Misc.  Devices (STEP N REST WALKER) MISC 1 each by Does not apply route continuous Seated, wheeled walker 11/18/22   Nelly Lesches, PA   pantoprazole (PROTONIX) 40 MG tablet Take 1 tablet by mouth 2 times daily (before meals) for 30 doses 11/18/22 12/3/22  Nelly Lesches, PA   ondansetron (ZOFRAN ODT) 4 MG disintegrating tablet Take 1 tablet by mouth every 8 hours as needed for Nausea or Vomiting 11/18/22   Nelly Lesches, PA   cyclobenzaprine (FLEXERIL) 10 MG tablet Take 1 tablet by mouth 3 times daily as needed for Muscle spasms 11/18/22   LUIS Nye CNP   albuterol sulfate HFA (VENTOLIN HFA) 108 (90 Base) MCG/ACT inhaler Inhale 2 puffs into the lungs 4 times daily as needed for Wheezing 11/15/22   Nelly Lesches, PA   metoprolol tartrate (LOPRESSOR) 25 MG tablet Take 1 tablet by mouth 2 times daily 11/14/22   Nelly Lesches, PA   insulin glargine (LANTUS) 100 UNIT/ML injection vial Inject 15 Units into the skin 2 times daily 11/10/22   Moon Echols MD   atorvastatin (LIPITOR) 80 MG tablet Take 1 tablet by mouth nightly 11/6/22   Yaya Ellison MD   acetaminophen (TYLENOL) 500 MG tablet Take 2 tablets by mouth every 6 hours as needed for Pain 10/25/22   Herve Minaya, DO   insulin aspart (NOVOLOG FLEXPEN) 100 UNIT/ML injection pen Inject 5 Units into the skin 3 times daily (before meals) 4/6/22   BINA Michael   Blood Glucose Monitoring Suppl (ONE TOUCH ULTRA 2) w/Device KIT 1 kit by Does not apply route daily 3/18/22   Edmond Nim, DO   Lancets MISC 1 each by Does not apply route 2 times daily 3/18/22   Edmond Nim, DO   Alcohol Swabs 70 % PADS Use as needed with blood glucose checks 2/15/22   Asher Chaparro MD   Insulin Pen Needle ([employer] PEN NEEDLES 29G) 29G X 12MM MISC 1 each by Does not apply route daily 2/15/22   Asher Colunga MD   traZODone (DESYREL) 150 MG tablet Take 300 mg by mouth nightly     Historical Provider, MD   clonazePAM (KLONOPIN) 2 MG tablet Take 4 mg by mouth daily as needed for Anxiety.      Historical Provider, MD   albuterol sulfate  (90 Base) MCG/ACT inhaler Inhale 2 puffs into the lungs every 4 hours as needed for Wheezing    Historical Provider, MD       Future Appointments   Date Time Provider Jerilyn Salvador   1/3/2023  1:15 PM Roxanne Connell DO ORTHO SPECIA [facility]   1/4/2023 11:00 AM [facility] [place] NM ROOM [facility] PB NM [facility] [place]   1/4/2023  2:00 PM [facility] [place] NM ROOM [facility] PB NM [facility]   1/5/2023 11:20 AM [facility] MEDICATION MGMT [facility] MED MGMT [facility]   1/9/2023  1:30 PM STV GI RM 6 STVZ RAD ST[facility] Radiolog   1/20/2023  2:15 PM Ceci Pappas MD ST [facility] GI [facility]   2/6/2023  3:50 PM Jazmyne Rosario MD [facility] None

## 2022-12-30 RX ORDER — GLUCOSAMINE HCL/CHONDROITIN SU 500-400 MG
CAPSULE ORAL
Qty: 100 STRIP | Refills: 0 | Status: SHIPPED | OUTPATIENT
Start: 2022-12-30

## 2022-12-30 RX ORDER — BLOOD-GLUCOSE TRANSMITTER
1 EACH MISCELLANEOUS WEEKLY
Qty: 1 EACH | Refills: 0 | Status: SHIPPED | OUTPATIENT
Start: 2022-12-30

## 2022-12-30 RX ORDER — BLOOD-GLUCOSE SENSOR
1 EACH MISCELLANEOUS WEEKLY
Qty: 4 EACH | Refills: 0 | Status: SHIPPED | OUTPATIENT
Start: 2022-12-30

## 2022-12-30 NOTE — CARE COORDINATION
Misha Brannon from [address] [place] [address] on [address] called to say his orders for rollator and shower bench were approved, they tried to contact patient a few weeks ago to tell him to pick  them up, were unable to leave a message (this contradicts with what writer was told yesterday by another [address] [place] [address] employee). He stated they do not deliver this equipment, patient or patient representative needs to go pick it up at [address] location. Called patient and notified him.

## 2023-01-02 NOTE — TELEPHONE ENCOUNTER
Courtesy notification of patient's concern. Patient calls into the after hours service to ask for refill on Magic Mouthwash and asks that the ingredients are ordered separately so that the medication is covered by insurance.  Patient uses [facility].

## 2023-01-03 ENCOUNTER — CARE COORDINATION (OUTPATIENT)
Dept: CARE COORDINATION | Age: 53
End: 2023-01-03

## 2023-01-03 ENCOUNTER — TELEPHONE (OUTPATIENT)
Dept: PRIMARY CARE CLINIC | Age: 53
End: 2023-01-03

## 2023-01-03 RX ORDER — LIDOCAINE HYDROCHLORIDE 20 MG/ML
SOLUTION OROPHARYNGEAL
Qty: 200 ML | Refills: 2 | Status: SHIPPED | OUTPATIENT
Start: 2023-01-03

## 2023-01-03 ASSESSMENT — ENCOUNTER SYMPTOMS: DYSPNEA ASSOCIATED WITH: EXERTION

## 2023-01-03 NOTE — TELEPHONE ENCOUNTER
Pt states his transmitter broke and insurance needs called in order to do a PA.  Was using a regular glucometer but is having issues with pharmacy/insurance

## 2023-01-03 NOTE — TELEPHONE ENCOUNTER
I have addended note. Okay for script for glucose controlled Vanilla boost twice daily for the next month with diagnosis of dysphagia, and malnutrition.

## 2023-01-03 NOTE — CARE COORDINATION
Ambulatory Care Coordination Note  1/3/2023    ACC: Lynn Sales, RN    Reviewed all appts again with patient. He canceled his ortho appt today since didn't have transportation arranged far enough ahead of time. He did get it arranged for all upcoming appts. Discussed needs to reschedule cardiology appt, he will stop at [facility] tomorrow to schedule appt- he has to go to [facility] tomorrow for bone scan. He will then also stop at pharmacy to get medications and stop at [facility] to get rollator walker and shower chair. DM-Doesn't have strips for glucometer. Walked to [employer] last week, had a low sugar reaction. Grabbed orange juice and drank it but then a lot of pain and burning after drinking it. Has appt with diabetic educator this week, strongly urged patient to keep appt to discuss his medications, glucometer/Dex Com issues. Swallowing- will get refill of lidocaine viscous and Maalox tomorrow. Still unable to eat more than a bite of food due to severe pain with swallowing, able to drink a little better. Discussed he had endoscopy in November showing severe gastritis and GI recommended repeating EGD in 6-8 weeks. Has GI appt next week. COPD- frequent dry cough. He's very worried about getting COVID or the flu. Discussed wearing a mask, social distancing, hygiene measures. Ambulation- able to walk a few steps. [facility] told him they now have PT staff so will start getting therapy hopefully soon. Using an old walker that is broken, is very short and hard to use. No falls at home. CC Plan:   -Writer spoke with pharmacist Agata Ariza at [facility], he will have One Touch Ultra test strips, lidocaine and Maalox ready for patient to  tomorrow. He was unable to order a new DexCom transmitter, would need to contact insurance company to see if they can do an override so patient can get another one.  -Follow up next week to review testing, progress notes, see if got glucometer strips.   Diabetes Assessment    Medic Alert ID: No  Meal Planning: Avoidance of concentrated sweets   How often do you test your blood sugar?: Meals   Do you have barriers with adherence to non-pharmacologic self-management interventions? (Nutrition/Exercise/Self-Monitoring): Yes   Have you ever had to go to the ED for symptoms of low blood sugar?: No       Do you have hyperglycemia symptoms?: No   Do you have hypoglycemia symptoms?: Yes   Frequency of Episodes: 1 Per: Week        ,   COPD Assessment    Does the patient understand envrionmental exposure?: Yes  Does the patient have a nebulizer?: No  Does the patient use a space with inhaled medications?: No     Increase in cough         Symptoms:     Symptom course: stable  Breathlessness: exertion  Increase use of rapid acting/rescue inhaled medications?: No  Change in chronic cough?: Increased  Change in sputum?: No/At Baseline  Sputum characteristics: Unable to Specify  Self Monitoring - SaO2: No  Have you had a recent diagnosis of pneumonia either by PCP or at a hospital?: No     , and   General Assessment    Do you have any symptoms that are causing concern?: Yes  Progression since Onset: Unchanged  Reported Symptoms: Other (Comment: pain/difficulty with swallowing)             Offered patient enrollment in the Remote Patient Monitoring (RPM) program for in-home monitoring: Patient declined. Lab Results       None                 Goals Addressed                   This Visit's Progress     Conditions and Symptoms   Improving     I will schedule office visits, as directed by my provider. I will keep my appointment or reschedule if I have to cancel. I will notify my provider of any barriers to my plan of care. I will follow my Zone Management tool to seek urgent or emergent care. I will notify my provider of any symptoms that indicate a worsening of my condition.     Barriers: lack of support, overwhelmed by complexity of regimen, and lack of education  Plan for overcoming my barriers: ACM calls, CHN to follow, assist with appts, education  Confidence: 6/10  Anticipated Goal Completion Date: 2/20/23                Prior to Admission medications    Medication Sig Start Date End Date Taking? Authorizing Provider   lidocaine viscous hcl (XYLOCAINE) 2 % SOLN solution use 5 milliliter by mouth four times a day AS NEEDED FOR PAIN or IRRITATION WITH EATING 1/3/23   Daniel Meals, PA   Continuous Blood Gluc Transmit (DEXCOM G6 TRANSMITTER) MISC 1 each by Does not apply route once a week 12/30/22   Neil Solitario MD   Continuous Blood Gluc Sensor (DEXCOM G6 SENSOR) MISC 1 each by Does not apply route once a week 12/30/22   Neil Solitario MD   blood glucose monitor strips Test 4 times a day & as needed for symptoms of irregular blood glucose. Dispense sufficient amount for indicated testing frequency plus additional to accommodate PRN testing needs. Dispense brand that correlates with his glucometer and that's covered by his insurance. Needs delivered to him. 12/30/22   Neil Solitario MD   aluminum hydroxide-magnesium carbonate (ACID GONE)  MG/15ML suspension Take 5 mLs by mouth 4 times daily (with meals and nightly) 12/20/22   Daniel Meals, PA   diphenhydrAMINE (BENYLIN) 12.5 MG/5ML liquid Take 5 mLs by mouth 4 times daily as needed for Allergies 12/20/22 1/19/23  Daniel Meals, PA   Nutritional Supplements (GLUCOSE MANAGEMENT) TABS Take 1 tablet by mouth daily as needed (Low sugar reaction) 12/20/22   Daniel Meals, PA   aspirin EC 81 MG EC tablet Take 1 tablet by mouth daily 12/16/22   Daniel Meals, PA   omeprazole (PRILOSEC) 40 MG delayed release capsule Take 1 capsule by mouth every morning (before breakfast) 12/16/22   Daniel Meals, PA   vitamin C (ASCORBIC ACID) 500 MG tablet Take 1 tablet by mouth daily 11/22/22   Nicole Mix,    Continuous Blood Gluc  (DEXCOM G6 ) JANEL 1 each by Does not apply route once a week 11/18/22   Daniel Dai, BINA Cota.  Devices (STEP N REST WALKER) MISC 1 each by Does not apply route continuous Seated, wheeled walker 11/18/22   BINA Jose   ondansetron (ZOFRAN ODT) 4 MG disintegrating tablet Take 1 tablet by mouth every 8 hours as needed for Nausea or Vomiting 11/18/22   BINA Jose   cyclobenzaprine (FLEXERIL) 10 MG tablet Take 1 tablet by mouth 3 times daily as needed for Muscle spasms 11/18/22   LUIS Martinez - CNP   albuterol sulfate HFA (VENTOLIN HFA) 108 (90 Base) MCG/ACT inhaler Inhale 2 puffs into the lungs 4 times daily as needed for Wheezing 11/15/22   BINA Jose   metoprolol tartrate (LOPRESSOR) 25 MG tablet Take 1 tablet by mouth 2 times daily 11/14/22   BINA Jose   insulin glargine (LANTUS) 100 UNIT/ML injection vial Inject 15 Units into the skin 2 times daily 11/10/22   Carly Calix MD   atorvastatin (LIPITOR) 80 MG tablet Take 1 tablet by mouth nightly 11/6/22   Sesar Odom MD   acetaminophen (TYLENOL) 500 MG tablet Take 2 tablets by mouth every 6 hours as needed for Pain 10/25/22   Julieth Hussein DO   insulin aspart (NOVOLOG FLEXPEN) 100 UNIT/ML injection pen Inject 5 Units into the skin 3 times daily (before meals) 4/6/22   BINA Jose   Blood Glucose Monitoring Suppl (ONE TOUCH ULTRA 2) w/Device KIT 1 kit by Does not apply route daily 3/18/22   Neptali Orourke, DO   Lancets MISC 1 each by Does not apply route 2 times daily 3/18/22   Neptali Orourke, DO   Alcohol Swabs 70 % PADS Use as needed with blood glucose checks 2/15/22   Asher Chaparro MD   Insulin Pen Needle ([employer] PEN NEEDLES 29G) 29G X 12MM MISC 1 each by Does not apply route daily 2/15/22   Asher Chaparro MD   traZODone (DESYREL) 150 MG tablet Take 300 mg by mouth nightly     Historical Provider, MD   clonazePAM (KLONOPIN) 2 MG tablet Take 4 mg by mouth daily as needed for Anxiety.      Historical Provider, MD   albuterol sulfate  (90 Base) MCG/ACT inhaler Inhale 2 puffs into the lungs every 4 hours as needed for Wheezing    Historical Provider, MD       Future Appointments   Date Time Provider Jerilyn Salvador   [facility]2023 11[facility]00 [facility] [place]   1/4/2023  2:00 [facility] PB NM [facility] [place]   1/5/2023 11:20 AM STV[facility] MEDICATION MGMT [facility] MED [facility]MT [facility]   1/9/2023  1:30 PM STV GI RM 6 STVZ RAD [facility]   1/10/2023  2:45 PM Delta Jenkins DO ORTHO SPECIA [facility]   1/20/2023  2:15 PM Salina Sainz MD ST V GI [facility]   2/6/2023  3:50 PM Jb Hebert MD AFL [facility] None

## 2023-01-04 ENCOUNTER — TELEPHONE (OUTPATIENT)
Dept: PRIMARY CARE CLINIC | Age: 53
End: 2023-01-04

## 2023-01-04 NOTE — TELEPHONE ENCOUNTER
Patient called states that the transmitter for the dexcom he flo have to pay out of pocket $500 to replace it. Says pharmacy told him we can call and do a pre auth and see if insurance would cove early otherwise he has to wait 90 days. You would need to send a new script for the transmitter. Please advise    Pharmacy [address]

## 2023-01-04 NOTE — TELEPHONE ENCOUNTER
Karol Bahena brought me a dexcom kit. Called patient it is the same one he has. He will come . normal...

## 2023-01-05 ENCOUNTER — HOSPITAL ENCOUNTER (OUTPATIENT)
Dept: PHARMACY | Age: 53
Setting detail: THERAPIES SERIES
Discharge: HOME | End: 2023-01-05
Payer: MEDICARE

## 2023-01-05 PROCEDURE — 99213 OFFICE O/P EST LOW 20 MIN: CPT

## 2023-01-05 RX ORDER — PANTOPRAZOLE SODIUM 40 MG/1
40 TABLET, DELAYED RELEASE ORAL 2 TIMES DAILY
COMMUNITY

## 2023-01-05 NOTE — PROGRESS NOTES
Diabetic Medication Management Program  [facility]    [address], [address]  Phone: [phone]  Fax: [phone]    NAME: Nitin Santos  MEDICAL RECORD NUMBER:  [id number]  AGE: 47 y.o. GENDER: male  : 1970  EPISODE DATE:  2023       Nitin Santos is a 47 y.o. male that presents for an initial diabetes mellitus office visit with Medication Management Service per referral from Dr. Lacy Jones for Comprehensive Medication Review with Diabetes Mellitus focus with A1c goal < 8 % per referral.     Goals per referral:   Fasting blood glucose: <  per ADA guidelines (update this as on referral)   Peak postprandial glucose: < 180 per ADA Guidelines (update this as on referral)  A1C: < 8.0    HPI     Recent diabetic course: Diabetic ketoacidosis hospital stay for 2022  Estimated date of diagnosis: Type 1 diabetes since age 23  Has patient completed comprehensive diabetic education in the past? Yes at age 23 (32 years ago)  Recent ED visit or hospitalization related to diabetes? yes  2022    SUBJECTIVE     Mr. Shavonne Henderson is a 47 y.o. male here for the Diabetes Service for self-management education, medication review including over the counter medications and herbal products, overall wellbeing assessment, transition of care and any needed adjustments with updates and recommendations communicated to the referring physician. Patient Findings:   []  Missed doses   []  Emergency Room Visit or Hospitalization    [x]  Medication changes has reduced to Novolog 1-2 units about once daily when he is able to eat and decreased Lantus to 10 units twice daily.     []  Diet changes   [x]  Acute illness   []  Activity changes      Symptoms of hypoglycemia   []  None     []  Shakiness    [x]  Lightheadedness or dizziness   []  Confusion      [x]  Sweating   [x]  Other eyesight-goes to double vision and stars, has also passed  out       Symptoms of hyperglycemia   [] None   [x]  Frequent urination    [x]  Increased thirst   [x]  Other feels like his body is being crushed    Medication adverse reactions    []  None   []  Diarrhea / Nausea / Vomiting / Constipation / flatulence   []  Hypertension   []  Peripheral edema     []  Signs of infection   []  Headache   []  Vision changes   []  Increased cholesterol    []  Weight gain   []  Change in renal function   []  Increased potassium  [x]  Other [id number]     Comments:      Removed 5 medications from the from Epic that patient was no longer taking. Called [facility] to confirm current medications. Recent hospital admission/ED visit related to diabetes? Yes - Nov 2022 diabetic ketoacidosis  . OBJECTIVE     PMHx:    Past Medical History:   Diagnosis Date    Diabetes mellitus ([facility]     Obstructive lung disease ([facility]     8/2022    Panic attack        Social History:    Social History     Tobacco Use    Smoking status: Every Day     Packs/day: 0.25     Years: 41.00     Pack years: 10.25     Types: Cigarettes    Smokeless tobacco: Never   Substance Use Topics    Alcohol use: Not Currently       Pertinent Labs:    Recent A1c: 10.0    Lab Results   Component Value Date    LABA1C 10.0 (H) 09/30/2022    LABA1C 11.0 04/06/2022    LABA1C 10.6 (H) 02/14/2022     Lab Results   Component Value Date    CHOL 164 11/05/2022    TRIG 92 11/05/2022    HDL 51 11/05/2022     Lab Results   Component Value Date    CREATININE 1.08 11/10/2022    BUN 14 11/10/2022     11/10/2022    K 3.4 (L) 11/10/2022     (H) 11/10/2022    CO2 29 11/10/2022     Lab Results   Component Value Date/Time    ALT 54 11/07/2022 07:19 PM       Weight:  Wt Readings from Last 3 Encounters:   11/22/22 112 lb (50.8 kg)   11/18/22 112 lb 3.2 oz (50.9 kg)   11/10/22 115 lb 15.4 oz (52.6 kg)       Current medications:  Prior to Admission medications    Medication Sig Start Date End Date Taking?  Authorizing Provider   pantoprazole (PROTONIX) 40 MG tablet Take 40 mg by mouth 2 times daily   Yes Historical Provider, MD   PNEUMOCOCCAL 20-ALPHONSO CONJ VACC IM Inject into the muscle   Yes Historical Provider, MD   lidocaine viscous hcl (XYLOCAINE) 2 % SOLN solution use 5 milliliter by mouth four times a day AS NEEDED FOR PAIN or IRRITATION WITH EATING 1/3/23  Yes BINA Berry   aluminum hydroxide-magnesium carbonate (ACID GONE)  MG/15ML suspension Take 5 mLs by mouth 4 times daily (with meals and nightly) 12/20/22  Yes BINA Berry   diphenhydrAMINE (BENYLIN) 12.5 MG/5ML liquid Take 5 mLs by mouth 4 times daily as needed for Allergies 12/20/22 1/19/23 Yes BINA Berry   Nutritional Supplements (GLUCOSE MANAGEMENT) TABS Take 1 tablet by mouth daily as needed (Low sugar reaction) 12/20/22  Yes BINA Berry   aspirin EC 81 MG EC tablet Take 1 tablet by mouth daily 12/16/22  Yes BINA Berry   ondansetron (ZOFRAN ODT) 4 MG disintegrating tablet Take 1 tablet by mouth every 8 hours as needed for Nausea or Vomiting 11/18/22  Yes BINA Berry   albuterol sulfate HFA (VENTOLIN HFA) 108 (90 Base) MCG/ACT inhaler Inhale 2 puffs into the lungs 4 times daily as needed for Wheezing 11/15/22  Yes BINA Berry   metoprolol tartrate (LOPRESSOR) 25 MG tablet Take 1 tablet by mouth 2 times daily 11/14/22  Yes BINA Berry   insulin glargine (LANTUS) 100 UNIT/ML injection vial Inject 15 Units into the skin 2 times daily  Patient taking differently: Inject 15 Units into the skin 2 times daily Patient has temporarily reduced his dose since he can not not swallow any foods.  (Lantus 10 units twice daily) 11/10/22  Yes Brittany Knight MD   atorvastatin (LIPITOR) 80 MG tablet Take 1 tablet by mouth nightly 11/6/22  Yes Teja Molina MD   insulin aspart (NOVOLOG FLEXPEN) 100 UNIT/ML injection pen Inject 5 Units into the skin 3 times daily (before meals)  Patient taking differently: Inject 5 Units into the skin 3 times daily (before meals) Patient has temporarily reduced his dose to 1-2 units with the small snack he has each day. 4/6/22  Yes BINA Arnold   traZODone (DESYREL) 150 MG tablet Take 300 mg by mouth nightly    Yes Historical Provider, MD   clonazePAM (KLONOPIN) 2 MG tablet Take 4 mg by mouth daily as needed for Anxiety. Yes Historical Provider, MD   Continuous Blood Gluc Transmit (DEXCOM G6 TRANSMITTER) MISC 1 each by Does not apply route once a week 12/30/22   Tina López MD   Continuous Blood Gluc Sensor (DEXCOM G6 SENSOR) MISC 1 each by Does not apply route once a week 12/30/22   Tina López MD   blood glucose monitor strips Test 4 times a day & as needed for symptoms of irregular blood glucose. Dispense sufficient amount for indicated testing frequency plus additional to accommodate PRN testing needs. Dispense brand that correlates with his glucometer and that's covered by his insurance. Needs delivered to him. 12/30/22   Tina López MD   omeprazole (PRILOSEC) 40 MG delayed release capsule Take 1 capsule by mouth every morning (before breakfast) 12/16/22 1/5/23  BINA Arnold   vitamin C (ASCORBIC ACID) 500 MG tablet Take 1 tablet by mouth daily 11/22/22 1/5/23  Wilton Starr, DO   Continuous Blood Gluc  (DEXCOM G6 ) JANEL 1 each by Does not apply route once a week 11/18/22   BINA Arnodl   Misc.  Devices (STEP N REST WALKER) MISC 1 each by Does not apply route continuous Seated, wheeled walker 11/18/22   BINA Arnold   cyclobenzaprine (FLEXERIL) 10 MG tablet Take 1 tablet by mouth 3 times daily as needed for Muscle spasms 11/18/22 1/5/23  Kerri Goodwin APRN - CNP   acetaminophen (TYLENOL) 500 MG tablet Take 2 tablets by mouth every 6 hours as needed for Pain 10/25/22 1/5/23  Clementine Vang, DO   Blood Glucose Monitoring Suppl (ONE TOUCH ULTRA 2) w/Device KIT 1 kit by Does not apply route daily 3/18/22   Pillo Lamb, DO   Lancets MISC 1 each by Does not apply route 2 times daily 3/18/22   Mike Hashimoto, DO   Alcohol Swabs 70 % PADS Use as needed with blood glucose checks 2/15/22   Asher Chaparro MD   Insulin Pen Needle ([employer] PEN NEEDLES 29G) 29G X 12MM MISC 1 each by Does not apply route daily 2/15/22   Asher Bonilla MD   albuterol sulfate  (90 Base) MCG/ACT inhaler Inhale 2 puffs into the lungs every 4 hours as needed for Wheezing  1/5/23  Historical Provider, MD       Immunizations:   Most Recent Immunizations   Administered Date(s) Administered    Tdap (Boostrix, Adacel) 01/25/2016       Home Blood Glucose Results:       Blood glucose trends noted: many low and high readings. ASSESSMENT     What type of diabetes do you have? Type 1    DIABETES MANAGEMENT  Do you currently check your blood sugar levels at home? Was using Dexcom but it broke. He has to  a new one later this weekend  Discussed hypoglycemia and hyperglycemia signs and symptoms. PT weight= 116.4 (in office today)  BP= 149/96 (in office today)      Immunizations:   Most Recent Immunizations   Administered Date(s) Administered    Tdap (Boostrix, Adacel) 01/25/2016       I will recommend the following immunizations to patient: influenza Pneumonia    NUTRITION    Are you currently following any type of meal plan or diet (ex: high protein, high fat, low carb, vegan, etc)? No  He can not tolerate any foods. He is only eating a couple of bites of food daily. Do you drink sugared beverages? No        EXERCISE  What type of exercise(s) do you do? None currently due to being so weak due to lack of food. A1c at goal: No: 10. Blood Pressure at goal: No: 149/96  Weight at goal: patient is underweight. Physical activity at goal: No: He is so weak due to lack   Smoking Cessation: No: He has decreased his smoking from 2 packs each day to 2 packs each week. I encouraged him to quit smoking. Cholesterol at target:  Yes  Annual eye exam completed: No: unsure when-years ago  Comprehensive Foot Exam Completed: No: over one year ago. Annual urine albumin and serum creatinine: No: Patient  coming up    Statin: Yes      ACE/ARB: No: Recommend starting an ACE-Inhibitor. Aspirin: Yes    Current Medications Affecting Diabetes:  Lantus 10 units twice daily -only reduced to this dose while he is not able to eat    Diabetic Regimen/Compliance:  Patient self adjusted his insulin doses due to an inability to eat. Other Medication Compliance: Compliant with regimen     Medications attempted in the past:  none    Recent exacerbations / new problems:  diabetic ketoacidosis November 2022    Assessment/Plan     Diabetes Management:   Goals:  Apply new Dexcom  Set up diabetic foot exam  Set up diabetic eye exam  Switch chewable aspirin to enteric coated aspirin. He has enteric formulation in his chart but has chewable at home. An ACE-inhibitor would be appropriate- (I will call his physician to discuss)  Get influenza and Prevnar 20 vaccine. Needs a urine albumin test  Patient has severe stomach and esophageal pain. He can barely even swallow. He has to get some improvement in this in order to then work on his diabetes management.    The following education was provided during today's visit:     [x] Medication adherence   [] Insulin technique and storage   [] Healthy lifestyle including diet and exercise changes   [x] Hypoglycemia symptoms and management   [] Blood glucose goals    [] Introduction to FreeStyle [place] 2 and continuing glucose monitoring   [] Interpreting Ambulatory Glucose Profile (AGP) Report with focus on page 1, average number of scans and glucose levels per day, and snapshot     The following brochure(s)/handout(s) discussed with patient during visit:       [] What is diabetes   [] Checking your blood sugar   [] Know your numbers   [x] Hypoglycemia symptoms and management/Hyperglycemia symptoms   [] Blood glucose log sheet(s)   [] Planning Healthy Meals   [] My Plate   [] DM snacks   [] One Week-DM meal plan   [] Building a balanced meal   [] FreeStyle Willam 2 - Get Started    [] Today's Ambulatory Glucose Profile (AGP) Report    Counseling at today's visit: discussed management of hypoglycemic episodes. Discussed foot care. Reminded to get yearly retinal exam.  Discussed ways to avoid symptomatic hypoglycemia. Labs: fasting blood sugar, fasting lipid panel, and hemoglobin A1C. Physician Follow-up:  As scheduled    Medication Management Follow-up:   Diabetes Service telephone visit on  to check in and see if any improvement in his diet after his  barium swallow study. [address] [place] [address]  Ph., CACP, Clinical Pharmacist  Anticoagulation Services, [address] [facility]  2023  4:32 PM      For Pharmacy Admin Tracking Only    Intervention Detail: Adherence Monitorin and Vaccine Recommended/Administered  Total # of Interventions Recommended: 2  Total # of Interventions Accepted: 2  Time Spent (min):  90 minutes

## 2023-01-09 ENCOUNTER — TELEPHONE (OUTPATIENT)
Dept: PRIMARY CARE CLINIC | Age: 53
End: 2023-01-09

## 2023-01-09 ENCOUNTER — HOSPITAL ENCOUNTER (OUTPATIENT)
Dept: GENERAL RADIOLOGY | Age: 53
Discharge: HOME | End: 2023-01-11
Payer: MEDICARE

## 2023-01-09 DIAGNOSIS — R47.02 DYSPHASIA: ICD-10-CM

## 2023-01-09 DIAGNOSIS — R47.02 DYSPHASIA: Primary | ICD-10-CM

## 2023-01-09 PROCEDURE — 92611 MOTION FLUOROSCOPY/SWALLOW: CPT

## 2023-01-09 PROCEDURE — 74230 X-RAY XM SWLNG FUNCJ C+: CPT

## 2023-01-09 NOTE — PROCEDURES
INSTRUMENTAL SWALLOW REPORT  MODIFIED BARIUM SWALLOW    NAME: Colt Bloom   : 1970  MRN: [id number]       Date of Eval: 2023              Referring Diagnosis(es):      Past Medical History:  has a past medical history of Diabetes mellitus ([address] Obstructive lung disease ([address] and Panic attack. Past Surgical History:  has a past surgical history that includes Appendectomy; Hand surgery; Upper gastrointestinal endoscopy (N/A, 02/15/2022); Femur fracture surgery (Left, 2022); Tibia fracture surgery (Left, 2022); joint replacement; and Upper gastrointestinal endoscopy (N/A, 11/10/2022). Type of Study: Initial MBS      Patient Complaints/Reason for Referral:  Colt Bloom was referred for a MBS to assess the efficiency of his/her swallow function, assess for aspiration, and to make recommendations regarding safe dietary consistencies, effective compensatory strategies, and safe eating environment. Onset of problem:      Pt. Reports that he has severe pain with swallowing and is unable to eat due to pain after swallow in his esophageal region       Behavior/Cognition/Vision/Hearing:  Behavior/Cognition: Alert; Cooperative    Impressions:  Patient presents with safe swallow for Easy to Chew diet with thin liquids as evidenced by no observed aspiration noted with consistencies tested. Recommend small sips and bites, only feed when alert and awake and upright at 90 degrees for all PO intake. Recommend close monitoring for overt/clinical s/s of aspiration and D/C PO intake and complete Modified Barium Swallow Study should they occur. Recommend GI consult to assess esophageal function due to patients c/o severe pain with swallow. Pt. Radha Mcconnell as he is still in pain with swallow and has had so much weight loss and wants answers to why he is in pain when he swallows    Treatment Dx and ICD 10: R13.1   Patient Position: Lateral       Consistencies Administered:  Thin cup;Easy to Chew;Pureed      Dysphagia Outcome Severity Scale: Level 7: Normal in all situations  Penetration-Aspiration Scale (PAS): 1 - Material does not enter the airway    Recommended Diet:  Solid consistency: Soft and Bite-Sized; Easy to Chew  Liquid consistency: Thin  Liquid administration via: Cup    Medication administration: PO    Safe Swallow Protocol:  Supervision: Independent  Compensatory Swallowing Strategies : Alternate solids and liquids;Eat/Feed slowly;Upright as possible for all oral intake;Small bites/sips    Recommendations/Treatment  Requires SLP Intervention: NO        D/C Recommendations: Ongoing speech therapy is recommended during this hospitalization; Ongoing speech therapy is recommended at next level of care  Postural Changes and/or Swallow Maneuvers: Upright 90 degrees      Recommended Exercises:    Therapeutic Interventions: Diet tolerance monitoring         Education: Images and recommendations were reviewed with pt following this exam.   Patient Education: yes  Patient Education Response: Verbalizes understanding    Prognosis  Prognosis for safe diet advancement: fair      Goals:    Long Term:       To Maximize safety with intake, optimize nutrition/hydration and minimize risk for aspiration. Short Term:  Goals: The patient will tolerate recommended diet without observed clinical signs of aspiration      Oral Preparation / Oral Phase:     Adequate mastication and oral manipulation for consistencies tested.   Mild spillover with soft solids      Pharyngeal Phase  Pharyngeal Phase: WFL    Puree: No penetration no aspiration  Soft Solids: No penetration no aspiration no stasis  Thin cup: No penetration no aspiration min vallec and pyriform stasis    Esophageal Phase  Esophageal Screen: WFL        Pain       0/10      Therapy Time:   Individual Concurrent Group Co-treatment   Time In  1330         Time Out  1345         Minutes  15                   ROBERTO Stone, 1/9/2023, 2:56 PM

## 2023-01-09 NOTE — TELEPHONE ENCOUNTER
Asking for referral to [facility] for 2nd opinion for swallowing. He is worried he is going to die if he isn't able to find out what is wrong.  Please advise

## 2023-01-09 NOTE — RESULT ENCOUNTER NOTE
Call patient and advise that test results were okay. Keep follow up on 1-20 for dysphagia with GI so they can get him scheduled for a EGD for pain with swallowing.

## 2023-01-10 ENCOUNTER — OFFICE VISIT (OUTPATIENT)
Dept: ORTHOPEDIC SURGERY | Age: 53
End: 2023-01-10

## 2023-01-10 ENCOUNTER — TELEPHONE (OUTPATIENT)
Dept: PHARMACY | Age: 53
End: 2023-01-10

## 2023-01-10 ENCOUNTER — TELEPHONE (OUTPATIENT)
Dept: PRIMARY CARE CLINIC | Age: 53
End: 2023-01-10

## 2023-01-10 VITALS — WEIGHT: 112 LBS | HEIGHT: 67 IN | BODY MASS INDEX: 17.58 KG/M2

## 2023-01-10 DIAGNOSIS — S72.142D CLOSED DISPLACED INTERTROCHANTERIC FRACTURE OF LEFT FEMUR WITH ROUTINE HEALING, SUBSEQUENT ENCOUNTER: Primary | ICD-10-CM

## 2023-01-10 DIAGNOSIS — R47.02 DYSPHASIA: Primary | ICD-10-CM

## 2023-01-10 DIAGNOSIS — R47.02 DYSPHASIA: ICD-10-CM

## 2023-01-10 RX ORDER — OXYCODONE HYDROCHLORIDE 5 MG/1
5 TABLET ORAL EVERY 6 HOURS PRN
Qty: 14 TABLET | Refills: 0 | Status: SHIPPED | OUTPATIENT
Start: 2023-01-10 | End: 2023-01-17

## 2023-01-10 RX ORDER — SUCRALFATE ORAL 1 G/10ML
1 SUSPENSION ORAL 4 TIMES DAILY
Qty: 1200 ML | Refills: 3 | Status: SHIPPED | OUTPATIENT
Start: 2023-01-10

## 2023-01-10 RX ORDER — METOCLOPRAMIDE HYDROCHLORIDE 5 MG/5ML
10 SOLUTION ORAL
Qty: 1200 ML | Refills: 0 | Status: SHIPPED | OUTPATIENT
Start: 2023-01-10 | End: 2023-01-10

## 2023-01-10 RX ORDER — SUCRALFATE ORAL 1 G/10ML
1 SUSPENSION ORAL 4 TIMES DAILY
Qty: 1200 ML | Refills: 3 | Status: SHIPPED | OUTPATIENT
Start: 2023-01-10 | End: 2023-01-10

## 2023-01-10 RX ORDER — CYCLOBENZAPRINE HCL 10 MG
10 TABLET ORAL 3 TIMES DAILY PRN
Qty: 30 TABLET | Refills: 0 | Status: SHIPPED | OUTPATIENT
Start: 2023-01-10 | End: 2023-01-20

## 2023-01-10 RX ORDER — METOCLOPRAMIDE HYDROCHLORIDE 5 MG/5ML
10 SOLUTION ORAL
Qty: 1200 ML | Refills: 0 | Status: SHIPPED | OUTPATIENT
Start: 2023-01-10

## 2023-01-10 NOTE — TELEPHONE ENCOUNTER
Fax number is linked to Dr. Jonatan Rodríguez at [facility]. Internal Medicine and Geriatrics. Referral entered and faxed.

## 2023-01-10 NOTE — TELEPHONE ENCOUNTER
Diabetes Management:   Goals:  Apply new Dexcom  Set up diabetic foot exam  Set up diabetic eye exam  Switch chewable aspirin to enteric coated aspirin. He has enteric formulation in his chart but has chewable at home. An ACE-inhibitor would be appropriate- (I will call his physician to discuss)  Get influenza and Prevnar 20 vaccine. Needs a urine albumin test            Called provider to discuss plan for patient referral was for advice. Left message with office asking provider to call me back. Provider Martinez Real called back: The above recommendations were reviewed and patient's most prominent problem with being unable to eat were discussed. Patient is losing weight and unable to eat for some time. He has an appointment for next Monday for [facility] for a second opinion. Patient may have gastroparesis. Carafate and Tj were called in for the patient today to help patient. I then called patient with an update. He was here at [facility] for another appointment. I resent medications to that pharmacy so patient may pick them up. [address] [place] [address]  Ph., CACP, Clinical Pharmacist  Anticoagulation Services, [address] [place] [address] Coumadin Clinic  1/10/2023  3:30 PM      For Pharmacy Admin Tracking Only    Intervention Detail: New Rx: 2, reason: Needs Additional Therapy  Total # of Interventions Recommended: 1  Total # of Interventions Accepted: 1  Time Spent (min): 20

## 2023-01-10 NOTE — PROGRESS NOTES
[facility]  [id number] [facility]  Dept Phone: [phone]  Dept Fax: [phone]      Orthopaedic Trauma Clinic Follow Up      Subjective:   Date of Surgery: 9/30/2022    Sandoval Hackett is a 47y.o. year old male who presents to the clinic today for routine follow up 3.5 months status post operatively from CMN fixation of his left intertrochanteric femur fracture. Patient presents today with multiple health complaints and is very tearful about his current health situation. He states he has lost over 50 pounds in the last few months, has a history of known lung tumors and he is fearful and concerned that this is possibly spread to his esophagus region due to the symptoms that he is having, thinking that it may be cancer. Patient states he has a second opinion for GI evaluation at [facility] clinic next Monday. Patient states in terms of his left femur, he continues to have pain rated 8-10 out of 10 daily and constantly. States he was supposed to receive home physical therapy but they never showed up or called him until yesterday when he had his first at home PT evaluation. Since his initial injury, he has not received any formal physical therapy due to his other comorbidities. Patient states the pain is worse with weightbearing and is worse in the morning but in general he complains of constant pain along the left lateral hip into the gluteal region. He is requesting a refill on pain medication and a referral to pain management. He continues to have numbness along the left lateral thigh that remains unchanged. Denies any other numbness or tingling. Denies any new injuries or falls.     Review of Systems  Gen: no fever, chills, malaise  CV: no chest pain or palpitations  Resp: no cough or shortness of breath  GI: no nausea, vomiting, diarrhea, or constipation  Neuro: no seizures, vertigo, or headache  Msk: Left hip pain  10 remaining systems reviewed and negative    Objective : There were no vitals filed for this visit. Body mass index is 17.54 kg/m². General: No acute distress, resting comfortably in the clinic  Neuro: alert. oriented  Eyes: Extra-ocular muscles intact  Pulm: Respirations unlabored and regular. Skin: warm, well perfused  Psych:   Patient has good fund of knowledge and displays understanding of exam, diagnosis, and plan. LLE: Skin intact. Surgical incisions well approximated and healing without evidence of infection. Significant pain with light palpation over the lateral hip along the greater trochanter and gluteal region as well as down the aspect of the thigh. Minimally able to actively flex the hip due to pain. No pain in the groin with log roll. Approximately 15 to 110 degrees of range of motion of the knee. Able to straight leg raise. Compartments soft. 2+ DP pulse. TA/EHL/FHL/GS motor intact. Deep and Superficial Peroneal/Saphenous/Sural SILT with decreased sensation along the lateral femoral nerve root distribution. Radiology:  Imaging studies from today were independently reviewed and read as listed below. Any relevant images obtained prior to today's visit were also independently interpreted. History: left intertrochanteric femur fracture status post CMN    Comparison: 11/22/2022    Findings: 2 views of the left femur (AP, lateral) in a skeletally mature patient showing intramedullary nail fixation of the left femur with two screws traversing into the femoral head and a single distal locking screw. There is no evidence of any residual fracture lines on x-ray indicating a healed fracture. No acute orthopedic hardware complications or loss of fixation. No new fractures or dislocations. Impression: Stable healed left intertrochanteric femur fracture s/p CMN. Assessment:   47y.o. year old male with left intertrochanteric femur fracture status post CMN; DOS: 9/30/2022.   Plan:   - Reviewed x-rays with the patient which demonstrate that his fracture is healed with no hardware complications. - Stressed the importance of following up with [place] clinic appointment regarding his swallowing issues, chest pain and recent weight loss as there is a high concern for his overall health at this time. - In terms of pain in the left femur, this is highly attributed to lack of physical therapy based on significant weakness and stiffness throughout the entire left lower extremity. Again discussed that patient really needs to be participating in physical therapy whether that is home therapy or outpatient therapy. Patient states he will continue home therapy for now until he can get a car next month then wishes to come to [facility] for outpatient physical therapy. Home therapy exercises printed out and provided to the patient. - Last refill of narcotics sent to pharmacy on file and instructed patient to call pain management to make an appointment. Referral provided. Follow up:Return in about 3 months (around 4/10/2023) for x-rays. Orders Placed This Encounter   Medications    cyclobenzaprine (FLEXERIL) 10 MG tablet     Sig: Take 1 tablet by mouth 3 times daily as needed for Muscle spasms     Dispense:  30 tablet     Refill:  0    oxyCODONE (ROXICODONE) 5 MG immediate release tablet     Sig: Take 1 tablet by mouth every 6 hours as needed for Pain for up to 7 days. Intended supply: 7 days.  Take lowest dose possible to manage pain Max Daily Amount: 20 mg     Dispense:  14 tablet     Refill:  0     Reduce doses taken as pain becomes manageable            Orders Placed This Encounter   Procedures    XR FEMUR LEFT (MIN 2 VIEWS)     Standing Status:   Future     Number of Occurrences:   1     Standing Expiration Date:   1/10/2024     Order Specific Question:   Reason for exam:     Answer:   monitor    Merary Hicks's     Referral Priority:   Routine     Referral Type:   Eval and Treat     Referral Reason: Specialty Services Required     Requested Specialty:   Physical Therapist     Number of Visits Requested:   [address] Clyde Souza MD, Pain Management, [place]     Referral Priority:   Routine     Referral Type:   Eval and Treat     Referral Reason:   Specialty Services Required     Referred to Provider:   Cheyenne Ku MD     Requested Specialty:   Pain Management     Number of Visits Requested:   1       Electronically signed by LUIS Veloz CNP on 1/10/2023 at 4:43 PM    This note is created with the assistance of a speech recognition program.  While intending to generate a document that actually reflects the content of the visit, the document can still have some errors including those of syntax and sound a like substitutions which may escape proof reading.   In such instances, actual meaning can be extrapolated by contextual diversion

## 2023-01-10 NOTE — TELEPHONE ENCOUNTER
Please call patient for appointment. May need Reglan, gastric emptying study, repeat EGD, a1c diabetic foot exam, ace inhibitor.

## 2023-01-10 NOTE — TELEPHONE ENCOUNTER
Val (Pharmacist) from [facility]. [facility], asking if you would call her re pt's diabetes plan and pt as well.     Call her @ [phone]  TORIN

## 2023-01-13 ENCOUNTER — HOSPITAL ENCOUNTER (OUTPATIENT)
Dept: PHARMACY | Age: 53
Setting detail: THERAPIES SERIES
Discharge: HOME | End: 2023-01-13
Payer: MEDICARE

## 2023-01-13 ENCOUNTER — CARE COORDINATION (OUTPATIENT)
Dept: CARE COORDINATION | Age: 53
End: 2023-01-13

## 2023-01-13 ENCOUNTER — TELEPHONE (OUTPATIENT)
Dept: PRIMARY CARE CLINIC | Age: 53
End: 2023-01-13

## 2023-01-13 DIAGNOSIS — M79.641 RIGHT HAND PAIN: Primary | ICD-10-CM

## 2023-01-13 NOTE — PROGRESS NOTES
[facility] Tele[facility]  [address], [address]  Phone: [phone]  Fax: [phone]    NAME: Good eLe  MEDICAL RECORD NUMBER:  [id number]  AGE: 47 y.o. GENDER: male  : 1970  EPISODE DATE:  2023     Mr. Ceron was referred to [facility] by Marlee Darby. Patient acknowledges working in consult agreement with clinical pharmacist and this provider. Called patient today to check in with patient and see if he was getting any relief from the new prescriptions: sucralfate and metoclopramide that were called in for him. He was not able to pick them up. The pharmacy did not have them and they would not be available until the following day. So now the next time he will have transportation will be on 23. He then has follow-up at [facility] that day and an appointment  with his pcp Marlee CURRAN on 23. I also let him know that [address] [place] [address] filled his liquid diphenhydramine which he told me he did not have. He will check with the pharmacy. Patient has a low BS a few days ago. He reports it was in the 45s which he treated at home. I advised calling 911 for blood sugar below 54. Reviewed treatment for low blood sugar. He still is unable to eat much at all. I will reach out to patient again at the end of next week. He has to address his inability to eat (weight loss of 50 pounds) and extreme leg /thigh pain before he can address his other medical conditions. [address] [place] [address]  Ph., CACP, Clinical Pharmacist  Anticoagulation Services, [facility] Coumadin Clinic  2023  9:54 AM    For Pharmacy Admin Tracking Only    Intervention Detail: Adherence Monitorin  Total # of Interventions Recommended: 1  Total # of Interventions Accepted: 1  Time Spent (min): 15        I

## 2023-01-13 NOTE — TELEPHONE ENCOUNTER
Right hand x-ray ordered for patient. Please advise patient has not advisable to delay an egd if the gastro office here is planning to do 1 on the 20th.

## 2023-01-13 NOTE — TELEPHONE ENCOUNTER
Elen Hence (Med 1 Care Nurse) calling. Pt's BP today was 164/111 P-81. Pt also hit the wall with his left hand and injured his pinky finger, has some swelling. . Also checked his feet and the tips of his toes are red, needs nails trimmed. Advised to f/up with podiatry. She told him to maybe f/up with [facility], or ER, but pt declines, told her that he has things to do with his kids, but may later. Frances Astorga    [phone] Elen Hence

## 2023-01-13 NOTE — CARE COORDINATION
He couldn't talk long, is getting PT from Med. He injured his right hand, very swollen and painful, is asking for an x ray, doesn't want to go to ED. Wanted second opinion for GI at [facility]- has appt Monday 1/116. Reminded him he also has GI appt in [place] 1/20, he will wait to see what [facility] doctor says then decide if will keep that appt. He just got new meds Carafate and Reglan from pharmacy, hasn't started taking yet. Unable to address blood sugars or other issues during this call, will follow up next week.

## 2023-01-13 NOTE — TELEPHONE ENCOUNTER
Okay for podiatry referral and left hand XR. Patient should follow up with me in office and needs EGD at GI office please see if they are planning to repeat this at the next visit.

## 2023-01-13 NOTE — TELEPHONE ENCOUNTER
Estelita Evans was contacted by a Laya Stallworth for follow-up regarding SDOH related needs. Writer spoke with: Patient    Progress Notes:   I asked Patient about the [employer] delivery in December. He said they would deliver once a month. I let Patient know that there are more frequent food delivery options we can discuss if he's interested. Patient would like to receive this information by mail. Action steps to be completed by writer:  I will mail Patient the list of Meal Delivery Programs; a resource from [facility], and then I will follow up with Patient. Action steps to be completed by patient:  Patient will be on the look out for mail from me.     Patient has given verbal permission to leave detailed messages regarding SDOH referral on their phone: N/A    Patient has given verbal permission to submit applications on their behalf: yes    Donna Meza MA

## 2023-01-16 ENCOUNTER — TELEPHONE (OUTPATIENT)
Dept: GASTROENTEROLOGY | Age: 53
End: 2023-01-16

## 2023-01-16 DIAGNOSIS — E10.65 UNCONTROLLED TYPE 1 DIABETES MELLITUS WITH HYPERGLYCEMIA (HCC): ICD-10-CM

## 2023-01-16 RX ORDER — INSULIN ASPART 100 [IU]/ML
5 INJECTION, SOLUTION INTRAVENOUS; SUBCUTANEOUS
Qty: 5 ADJUSTABLE DOSE PRE-FILLED PEN SYRINGE | Refills: 3 | Status: SHIPPED | OUTPATIENT
Start: 2023-01-16

## 2023-01-16 RX ORDER — INSULIN GLARGINE 100 [IU]/ML
15 INJECTION, SOLUTION SUBCUTANEOUS 2 TIMES DAILY
Qty: 10 ML | Refills: 3 | Status: SHIPPED | OUTPATIENT
Start: 2023-01-16

## 2023-01-16 NOTE — TELEPHONE ENCOUNTER
LAST VISIT:   11/18/2022     Future Appointments   Date Time Provider Jerilyn Salvador   1/20/2023  9:00 AM [facility]   1/20/2023 10:00 AM BINA Rodriguez [facility]   1/20/2023  2:15 PM Jerica Delatorre MD [facility]   2/6/2023  3:50 PM Gunnar Simpson MD [facility] None   2/7/2023 11:10 AM Frankie Patrick MD [facility]   4/11/2023  1:00 PM Shyam Leon, DO [address] [address]

## 2023-01-16 NOTE — TELEPHONE ENCOUNTER
----- Message from Jabier Polk sent at 1/16/2023 11:43 AM EST -----  Happy Monday   Nurse navigator Olman Gallardo   [phone] call base on pt chart it looks like pt may be need a EGD prior to davida 1/20/22, pt was under the impression that EGD will be performed at that appt.       1/16/23:  Called and confirmed office     visit for 1/20/23

## 2023-01-17 ENCOUNTER — CARE COORDINATION (OUTPATIENT)
Dept: CARE COORDINATION | Age: 53
End: 2023-01-17

## 2023-01-17 ASSESSMENT — ENCOUNTER SYMPTOMS: DYSPNEA ASSOCIATED WITH: EXERTION

## 2023-01-17 NOTE — CARE COORDINATION
Ambulatory Care Coordination Note  1/17/2023    ACC: Senthil Stringer, RN    He saw GI at [facility] but doesn't want to go back, wants to stay in [place]. Has GI appt 1/20 with Dr. Gemini Caldwell. [facility] provider wanted him to have gastric emptying study and possibly colonoscopy. Provider wrote that patient wasn't taking anything for dysphagia and they couldn't find endoscopy report in [facility] chart. Reviewed with patient- he is taking Protonix twice a day, taking Reglan and Carafate liquids but mixing them together with lidocaine because he thought they were ingredients in Magic Mouthwash. Encouraged not to mix them with anything unless gets ok from pharmacist, probably better to take alone. Still not really able to swallow much, very painful, has a lot of heartburn. Mostly just drinking fluids. He hasn't rescheduled cardiology appt, he forgot about it, stated can't remember those things. Writer will call to schedule appt. No chest pain, stated is heartburn, not chest pain. He will get a new transmitter for Dex Com from PCP office at appt this week. He won't keep diabetic medication mgmt appt since he still is not eating. Did not get test strips for his fingerstick glucometer so not checking blood sugars. He got right hand x ray at [facility] ED- showed fracture fifth metacarpal, nondisplaced. He didn't stay to consult with ED physician because ED very busy, was afraid of being exposed to [place]. [facility] GI provider also wanted him to go to ED for CT abdomen and labs but he didn't stay for that. Stated is losing insurance 1/31/23. He has left messages with his [facility]  about signing up on another Medicaid plan, will continue to call to get resolved. CC Actions and Plan:   -requested refills of Benadryl and Maalox for Magic Mouthwash. -Attempted to call [facility] to schedule appt- on hold over 20 mins, not able to schedule.   - canceled med mgmt diabetes appt  COPD Assessment    Does the patient understand envrionmental exposure?: Yes  Does the patient have a nebulizer?: No  Does the patient use a space with inhaled medications?: No            Symptoms:     Symptom course: stable  Breathlessness: exertion  Increase use of rapid acting/rescue inhaled medications?: No  Change in chronic cough?: No/At Baseline  Change in sputum?: No/At Baseline  Sputum characteristics: Unable to Specify  Self Monitoring - SaO2: No  Have you had a recent diagnosis of pneumonia either by PCP or at a hospital?: No      and   General Assessment    Do you have any symptoms that are causing concern?: Yes  Progression since Onset: Unchanged  Reported Symptoms: Pain (Comment: right hand, throat)           Offered patient enrollment in the Remote Patient Monitoring (RPM) program for in-home monitoring: Patient declined. Lab Results       None                 Goals Addressed                   This Visit's Progress     Conditions and Symptoms   On track     I will schedule office visits, as directed by my provider. I will keep my appointment or reschedule if I have to cancel. I will notify my provider of any barriers to my plan of care. I will follow my Zone Management tool to seek urgent or emergent care. I will notify my provider of any symptoms that indicate a worsening of my condition. Barriers: lack of support, overwhelmed by complexity of regimen, and lack of education  Plan for overcoming my barriers: ACM calls, CHN to follow, assist with appts, education  Confidence: 6/10  Anticipated Goal Completion Date: 2/20/23                Prior to Admission medications    Medication Sig Start Date End Date Taking? Authorizing Provider   insulin aspart (NOVOLOG FLEXPEN) 100 UNIT/ML injection pen Inject 5 Units into the skin 3 times daily (before meals) Patient has temporarily reduced his dose to 1-2 units with the small snack he has each day.  1/16/23   BINA Monroy   insulin glargine (LANTUS) 100 UNIT/ML injection vial Inject 15 Units into the skin 2 times daily 1/16/23 BINA Martinez   sucralfate (CARAFATE) 1 GM/10ML suspension Take 10 mLs by mouth 4 times daily 1/10/23   BINA Martinez   metoclopramide (REGLAN) 10 MG/10ML SOLN Take 10 mLs by mouth 3 times daily (before meals) 1/10/23   BINA Martinez   cyclobenzaprine (FLEXERIL) 10 MG tablet Take 1 tablet by mouth 3 times daily as needed for Muscle spasms 1/10/23 1/20/23  Leticiae Lock APRN - CNP   oxyCODONE (ROXICODONE) 5 MG immediate release tablet Take 1 tablet by mouth every 6 hours as needed for Pain for up to 7 days. Intended supply: 7 days. Take lowest dose possible to manage pain Max Daily Amount: 20 mg 1/10/23 1/17/23  Marnette Lock, APRN - CNP   pantoprazole (PROTONIX) 40 MG tablet Take 40 mg by mouth 2 times daily    Historical Provider, MD   PNEUMOCOCCAL 20-ALPHONSO CONJ VACC IM Inject into the muscle    Historical Provider, MD   lidocaine viscous hcl (XYLOCAINE) 2 % SOLN solution use 5 milliliter by mouth four times a day AS NEEDED FOR PAIN or IRRITATION WITH EATING 1/3/23   BINA Martinez   Continuous Blood Gluc Transmit (DEXCOM G6 TRANSMITTER) MISC 1 each by Does not apply route once a week 12/30/22   Naya Fallon MD   Continuous Blood Gluc Sensor (DEXCOM G6 SENSOR) MISC 1 each by Does not apply route once a week 12/30/22   Naya Fallon MD   blood glucose monitor strips Test 4 times a day & as needed for symptoms of irregular blood glucose. Dispense sufficient amount for indicated testing frequency plus additional to accommodate PRN testing needs. Dispense brand that correlates with his glucometer and that's covered by his insurance. Needs delivered to him.  12/30/22   Naya Fallon MD   aluminum hydroxide-magnesium carbonate (ACID GONE)  MG/15ML suspension Take 5 mLs by mouth 4 times daily (with meals and nightly) 12/20/22   BINA Martinez   diphenhydrAMINE (BENYLIN) 12.5 MG/5ML liquid Take 5 mLs by mouth 4 times daily as needed for Allergies 12/20/22 1/19/23  BINA Martinez Nutritional Supplements (GLUCOSE MANAGEMENT) TABS Take 1 tablet by mouth daily as needed (Low sugar reaction) 12/20/22   BINA Dillard   aspirin EC 81 MG EC tablet Take 1 tablet by mouth daily 12/16/22   BINA Dillard   Continuous Blood Gluc  (DEXCOM G6 ) JANEL 1 each by Does not apply route once a week 11/18/22   BINA Dillard   Misc. Devices (STEP N REST WALKER) MISC 1 each by Does not apply route continuous Seated, wheeled walker 11/18/22   BINA Dillard   ondansetron (ZOFRAN ODT) 4 MG disintegrating tablet Take 1 tablet by mouth every 8 hours as needed for Nausea or Vomiting 11/18/22   BINA Dillard   albuterol sulfate HFA (VENTOLIN HFA) 108 (90 Base) MCG/ACT inhaler Inhale 2 puffs into the lungs 4 times daily as needed for Wheezing 11/15/22   BINA Dillard   metoprolol tartrate (LOPRESSOR) 25 MG tablet Take 1 tablet by mouth 2 times daily 11/14/22   BINA Dillard   atorvastatin (LIPITOR) 80 MG tablet Take 1 tablet by mouth nightly 11/6/22   Brittney Gonsalves MD   Blood Glucose Monitoring Suppl (ONE TOUCH ULTRA 2) w/Device KIT 1 kit by Does not apply route daily 3/18/22   Brad Rain, DO   Lancets MISC 1 each by Does not apply route 2 times daily 3/18/22   Brad Rain, DO   Alcohol Swabs 70 % PADS Use as needed with blood glucose checks 2/15/22   Asher Chaparro MD   Insulin Pen Needle ([employer] PEN NEEDLES 29G) 29G X 12MM MISC 1 each by Does not apply route daily 2/15/22   Asher Chaparro MD   traZODone (DESYREL) 150 MG tablet Take 300 mg by mouth nightly     Historical Provider, MD   clonazePAM (KLONOPIN) 2 MG tablet Take 4 mg by mouth daily as needed for Anxiety.      Historical Provider, MD       Future Appointments   Date Time Provider Jerilyn Salvador   1/20/2023  9:00 AM [facility] MEDICATION MGMT [facility] MGMT [facility]   1/20/2023 10:00 AM BINA Dillard Gold   1/20/2023  2:15 PM Elizabeth Arana MD [facility]   2/6/2023  3:50 PM James Macias MD AFL [facility] None   2/7/2023 11:10 AM Will Saavedra MD [facility]   4/11/2023  1:00 PM Tk Pressley DO [id number] [id number]

## 2023-01-18 ENCOUNTER — APPOINTMENT (OUTPATIENT)
Dept: CT IMAGING | Age: 53
DRG: 469 | End: 2023-01-18
Payer: MEDICARE

## 2023-01-18 ENCOUNTER — APPOINTMENT (OUTPATIENT)
Dept: GENERAL RADIOLOGY | Age: 53
DRG: 469 | End: 2023-01-18
Payer: MEDICARE

## 2023-01-18 ENCOUNTER — HOSPITAL ENCOUNTER (INPATIENT)
Age: 53
LOS: 1 days | Discharge: LEFT BEFORE DISCHARGE | DRG: 469 | End: 2023-01-19
Attending: EMERGENCY MEDICINE | Admitting: INTERNAL MEDICINE
Payer: MEDICARE

## 2023-01-18 DIAGNOSIS — S62.91XA CLOSED FRACTURE OF RIGHT HAND, INITIAL ENCOUNTER: ICD-10-CM

## 2023-01-18 DIAGNOSIS — R55 SYNCOPE AND COLLAPSE: Primary | ICD-10-CM

## 2023-01-18 LAB
ABSOLUTE EOS #: 0.2 K/UL (ref 0–0.4)
ABSOLUTE LYMPH #: 1.7 K/UL (ref 1–4.8)
ABSOLUTE MONO #: 0.8 K/UL (ref 0.1–1.3)
ALBUMIN SERPL-MCNC: 3.5 G/DL (ref 3.5–5.2)
ALP BLD-CCNC: 89 U/L (ref 40–129)
ALT SERPL-CCNC: 10 U/L (ref 5–41)
ANION GAP SERPL CALCULATED.3IONS-SCNC: 12 MMOL/L (ref 9–17)
AST SERPL-CCNC: 10 U/L
BASOPHILS # BLD: 0 % (ref 0–2)
BASOPHILS ABSOLUTE: 0 K/UL (ref 0–0.2)
BILIRUB SERPL-MCNC: 0.5 MG/DL (ref 0.3–1.2)
BILIRUBIN DIRECT: 0.1 MG/DL
BILIRUBIN, INDIRECT: 0.4 MG/DL (ref 0–1)
BUN BLDV-MCNC: 25 MG/DL (ref 6–20)
CALCIUM SERPL-MCNC: 9.1 MG/DL (ref 8.6–10.4)
CHLORIDE BLD-SCNC: 94 MMOL/L (ref 98–107)
CO2: 28 MMOL/L (ref 20–31)
CREAT SERPL-MCNC: 1.87 MG/DL (ref 0.7–1.2)
EOSINOPHILS RELATIVE PERCENT: 1 % (ref 0–4)
GFR SERPL CREATININE-BSD FRML MDRD: 43 ML/MIN/1.73M2
GLUCOSE BLD-MCNC: 139 MG/DL (ref 75–110)
GLUCOSE BLD-MCNC: 191 MG/DL (ref 75–110)
GLUCOSE BLD-MCNC: 239 MG/DL (ref 75–110)
GLUCOSE BLD-MCNC: 253 MG/DL (ref 75–110)
GLUCOSE BLD-MCNC: 340 MG/DL (ref 70–99)
HCT VFR BLD CALC: 30.6 % (ref 41–53)
HEMOGLOBIN: 10.2 G/DL (ref 13.5–17.5)
INR BLD: 1
LIPASE: 9 U/L (ref 13–60)
LYMPHOCYTES # BLD: 15 % (ref 24–44)
MAGNESIUM: 1.7 MG/DL (ref 1.6–2.6)
MCH RBC QN AUTO: 26.7 PG (ref 26–34)
MCHC RBC AUTO-ENTMCNC: 33.4 G/DL (ref 31–37)
MCV RBC AUTO: 79.9 FL (ref 80–100)
MONOCYTES # BLD: 7 % (ref 1–7)
PARTIAL THROMBOPLASTIN TIME: 21.9 SEC (ref 24–36)
PDW BLD-RTO: 16.2 % (ref 11.5–14.9)
PHOSPHORUS: 3.1 MG/DL (ref 2.5–4.5)
PLATELET # BLD: 327 K/UL (ref 150–450)
PMV BLD AUTO: 8.6 FL (ref 6–12)
POTASSIUM SERPL-SCNC: 4 MMOL/L (ref 3.7–5.3)
PROTHROMBIN TIME: 12.6 SEC (ref 11.8–14.6)
RBC # BLD: 3.84 M/UL (ref 4.5–5.9)
SEG NEUTROPHILS: 77 % (ref 36–66)
SEGMENTED NEUTROPHILS ABSOLUTE COUNT: 8.5 K/UL (ref 1.3–9.1)
SODIUM BLD-SCNC: 134 MMOL/L (ref 135–144)
TOTAL PROTEIN: 6.5 G/DL (ref 6.4–8.3)
TROPONIN, HIGH SENSITIVITY: 13 NG/L (ref 0–22)
TROPONIN, HIGH SENSITIVITY: 14 NG/L (ref 0–22)
WBC # BLD: 11.2 K/UL (ref 3.5–11)

## 2023-01-18 PROCEDURE — 99223 1ST HOSP IP/OBS HIGH 75: CPT | Performed by: INTERNAL MEDICINE

## 2023-01-18 PROCEDURE — 99285 EMERGENCY DEPT VISIT HI MDM: CPT

## 2023-01-18 PROCEDURE — 70450 CT HEAD/BRAIN W/O DYE: CPT

## 2023-01-18 PROCEDURE — 6370000000 HC RX 637 (ALT 250 FOR IP): Performed by: INTERNAL MEDICINE

## 2023-01-18 PROCEDURE — 85025 COMPLETE CBC W/AUTO DIFF WBC: CPT

## 2023-01-18 PROCEDURE — 85730 THROMBOPLASTIN TIME PARTIAL: CPT

## 2023-01-18 PROCEDURE — 80076 HEPATIC FUNCTION PANEL: CPT

## 2023-01-18 PROCEDURE — G0378 HOSPITAL OBSERVATION PER HR: HCPCS

## 2023-01-18 PROCEDURE — 51798 US URINE CAPACITY MEASURE: CPT

## 2023-01-18 PROCEDURE — 6370000000 HC RX 637 (ALT 250 FOR IP): Performed by: NURSE PRACTITIONER

## 2023-01-18 PROCEDURE — 2580000003 HC RX 258: Performed by: NURSE PRACTITIONER

## 2023-01-18 PROCEDURE — 84100 ASSAY OF PHOSPHORUS: CPT

## 2023-01-18 PROCEDURE — 74177 CT ABD & PELVIS W/CONTRAST: CPT

## 2023-01-18 PROCEDURE — 97166 OT EVAL MOD COMPLEX 45 MIN: CPT

## 2023-01-18 PROCEDURE — 84484 ASSAY OF TROPONIN QUANT: CPT

## 2023-01-18 PROCEDURE — 36415 COLL VENOUS BLD VENIPUNCTURE: CPT

## 2023-01-18 PROCEDURE — 96361 HYDRATE IV INFUSION ADD-ON: CPT

## 2023-01-18 PROCEDURE — 73120 X-RAY EXAM OF HAND: CPT

## 2023-01-18 PROCEDURE — 96372 THER/PROPH/DIAG INJ SC/IM: CPT

## 2023-01-18 PROCEDURE — 96360 HYDRATION IV INFUSION INIT: CPT

## 2023-01-18 PROCEDURE — 85610 PROTHROMBIN TIME: CPT

## 2023-01-18 PROCEDURE — 72125 CT NECK SPINE W/O DYE: CPT

## 2023-01-18 PROCEDURE — 97162 PT EVAL MOD COMPLEX 30 MIN: CPT

## 2023-01-18 PROCEDURE — 83735 ASSAY OF MAGNESIUM: CPT

## 2023-01-18 PROCEDURE — 71045 X-RAY EXAM CHEST 1 VIEW: CPT

## 2023-01-18 PROCEDURE — 83690 ASSAY OF LIPASE: CPT

## 2023-01-18 PROCEDURE — 82947 ASSAY GLUCOSE BLOOD QUANT: CPT

## 2023-01-18 PROCEDURE — 6360000002 HC RX W HCPCS: Performed by: NURSE PRACTITIONER

## 2023-01-18 PROCEDURE — 97530 THERAPEUTIC ACTIVITIES: CPT

## 2023-01-18 PROCEDURE — 93005 ELECTROCARDIOGRAM TRACING: CPT | Performed by: EMERGENCY MEDICINE

## 2023-01-18 PROCEDURE — 73100 X-RAY EXAM OF WRIST: CPT

## 2023-01-18 PROCEDURE — 6360000004 HC RX CONTRAST MEDICATION: Performed by: EMERGENCY MEDICINE

## 2023-01-18 PROCEDURE — 80048 BASIC METABOLIC PNL TOTAL CA: CPT

## 2023-01-18 PROCEDURE — 2580000003 HC RX 258: Performed by: EMERGENCY MEDICINE

## 2023-01-18 RX ORDER — SUCRALFATE 1 G/1
1 TABLET ORAL
Status: DISCONTINUED | OUTPATIENT
Start: 2023-01-18 | End: 2023-01-19 | Stop reason: HOSPADM

## 2023-01-18 RX ORDER — 0.9 % SODIUM CHLORIDE 0.9 %
1000 INTRAVENOUS SOLUTION INTRAVENOUS ONCE
Status: COMPLETED | OUTPATIENT
Start: 2023-01-18 | End: 2023-01-18

## 2023-01-18 RX ORDER — LIDOCAINE HYDROCHLORIDE 20 MG/ML
5 SOLUTION OROPHARYNGEAL
Status: DISCONTINUED | OUTPATIENT
Start: 2023-01-18 | End: 2023-01-19 | Stop reason: HOSPADM

## 2023-01-18 RX ORDER — SODIUM CHLORIDE 9 MG/ML
INJECTION, SOLUTION INTRAVENOUS CONTINUOUS
Status: DISCONTINUED | OUTPATIENT
Start: 2023-01-18 | End: 2023-01-19

## 2023-01-18 RX ORDER — INSULIN LISPRO 100 [IU]/ML
0-8 INJECTION, SOLUTION INTRAVENOUS; SUBCUTANEOUS
Status: DISCONTINUED | OUTPATIENT
Start: 2023-01-18 | End: 2023-01-19 | Stop reason: HOSPADM

## 2023-01-18 RX ORDER — POTASSIUM CHLORIDE 20 MEQ/1
40 TABLET, EXTENDED RELEASE ORAL PRN
Status: DISCONTINUED | OUTPATIENT
Start: 2023-01-18 | End: 2023-01-19 | Stop reason: HOSPADM

## 2023-01-18 RX ORDER — ONDANSETRON 2 MG/ML
4 INJECTION INTRAMUSCULAR; INTRAVENOUS EVERY 6 HOURS PRN
Status: DISCONTINUED | OUTPATIENT
Start: 2023-01-18 | End: 2023-01-19 | Stop reason: HOSPADM

## 2023-01-18 RX ORDER — TRAZODONE HYDROCHLORIDE 100 MG/1
300 TABLET ORAL NIGHTLY
Status: DISCONTINUED | OUTPATIENT
Start: 2023-01-18 | End: 2023-01-19 | Stop reason: HOSPADM

## 2023-01-18 RX ORDER — METOCLOPRAMIDE HYDROCHLORIDE 5 MG/5ML
10 SOLUTION ORAL
Status: DISCONTINUED | OUTPATIENT
Start: 2023-01-18 | End: 2023-01-19 | Stop reason: HOSPADM

## 2023-01-18 RX ORDER — ATORVASTATIN CALCIUM 80 MG/1
80 TABLET, FILM COATED ORAL NIGHTLY
Status: DISCONTINUED | OUTPATIENT
Start: 2023-01-18 | End: 2023-01-19 | Stop reason: HOSPADM

## 2023-01-18 RX ORDER — 0.9 % SODIUM CHLORIDE 0.9 %
100 INTRAVENOUS SOLUTION INTRAVENOUS ONCE
Status: COMPLETED | OUTPATIENT
Start: 2023-01-18 | End: 2023-01-18

## 2023-01-18 RX ORDER — ONDANSETRON 4 MG/1
4 TABLET, ORALLY DISINTEGRATING ORAL EVERY 8 HOURS PRN
Status: DISCONTINUED | OUTPATIENT
Start: 2023-01-18 | End: 2023-01-19 | Stop reason: HOSPADM

## 2023-01-18 RX ORDER — OXYCODONE HYDROCHLORIDE AND ACETAMINOPHEN 5; 325 MG/1; MG/1
1 TABLET ORAL EVERY 8 HOURS PRN
Status: DISCONTINUED | OUTPATIENT
Start: 2023-01-18 | End: 2023-01-19 | Stop reason: HOSPADM

## 2023-01-18 RX ORDER — POTASSIUM CHLORIDE 7.45 MG/ML
10 INJECTION INTRAVENOUS PRN
Status: DISCONTINUED | OUTPATIENT
Start: 2023-01-18 | End: 2023-01-19 | Stop reason: HOSPADM

## 2023-01-18 RX ORDER — DEXTROSE MONOHYDRATE 100 MG/ML
INJECTION, SOLUTION INTRAVENOUS CONTINUOUS PRN
Status: DISCONTINUED | OUTPATIENT
Start: 2023-01-18 | End: 2023-01-19 | Stop reason: HOSPADM

## 2023-01-18 RX ORDER — POLYETHYLENE GLYCOL 3350 17 G/17G
17 POWDER, FOR SOLUTION ORAL DAILY PRN
Status: DISCONTINUED | OUTPATIENT
Start: 2023-01-18 | End: 2023-01-19 | Stop reason: HOSPADM

## 2023-01-18 RX ORDER — PANTOPRAZOLE SODIUM 40 MG/1
40 TABLET, DELAYED RELEASE ORAL 2 TIMES DAILY
Status: DISCONTINUED | OUTPATIENT
Start: 2023-01-18 | End: 2023-01-19

## 2023-01-18 RX ORDER — ENOXAPARIN SODIUM 100 MG/ML
40 INJECTION SUBCUTANEOUS DAILY
Status: DISCONTINUED | OUTPATIENT
Start: 2023-01-18 | End: 2023-01-19 | Stop reason: HOSPADM

## 2023-01-18 RX ORDER — SODIUM CHLORIDE 0.9 % (FLUSH) 0.9 %
10 SYRINGE (ML) INJECTION PRN
Status: COMPLETED | OUTPATIENT
Start: 2023-01-18 | End: 2023-01-18

## 2023-01-18 RX ORDER — ALBUTEROL SULFATE 90 UG/1
2 AEROSOL, METERED RESPIRATORY (INHALATION) 4 TIMES DAILY PRN
Status: DISCONTINUED | OUTPATIENT
Start: 2023-01-18 | End: 2023-01-19 | Stop reason: HOSPADM

## 2023-01-18 RX ORDER — SODIUM CHLORIDE 0.9 % (FLUSH) 0.9 %
10 SYRINGE (ML) INJECTION PRN
Status: DISCONTINUED | OUTPATIENT
Start: 2023-01-18 | End: 2023-01-19 | Stop reason: HOSPADM

## 2023-01-18 RX ORDER — MAGNESIUM SULFATE 1 G/100ML
1000 INJECTION INTRAVENOUS PRN
Status: DISCONTINUED | OUTPATIENT
Start: 2023-01-18 | End: 2023-01-19 | Stop reason: HOSPADM

## 2023-01-18 RX ORDER — INSULIN GLARGINE 100 [IU]/ML
15 INJECTION, SOLUTION SUBCUTANEOUS 2 TIMES DAILY
Status: DISCONTINUED | OUTPATIENT
Start: 2023-01-18 | End: 2023-01-19 | Stop reason: HOSPADM

## 2023-01-18 RX ORDER — SODIUM CHLORIDE 0.9 % (FLUSH) 0.9 %
5-40 SYRINGE (ML) INJECTION EVERY 12 HOURS SCHEDULED
Status: DISCONTINUED | OUTPATIENT
Start: 2023-01-18 | End: 2023-01-19 | Stop reason: HOSPADM

## 2023-01-18 RX ORDER — INSULIN LISPRO 100 [IU]/ML
0-4 INJECTION, SOLUTION INTRAVENOUS; SUBCUTANEOUS NIGHTLY
Status: DISCONTINUED | OUTPATIENT
Start: 2023-01-18 | End: 2023-01-19 | Stop reason: HOSPADM

## 2023-01-18 RX ORDER — SODIUM CHLORIDE 9 MG/ML
INJECTION, SOLUTION INTRAVENOUS PRN
Status: DISCONTINUED | OUTPATIENT
Start: 2023-01-18 | End: 2023-01-19 | Stop reason: HOSPADM

## 2023-01-18 RX ORDER — ASPIRIN 81 MG/1
81 TABLET ORAL DAILY
Status: DISCONTINUED | OUTPATIENT
Start: 2023-01-18 | End: 2023-01-19 | Stop reason: HOSPADM

## 2023-01-18 RX ORDER — OXYCODONE HYDROCHLORIDE 5 MG/1
5 TABLET ORAL ONCE
Status: COMPLETED | OUTPATIENT
Start: 2023-01-18 | End: 2023-01-18

## 2023-01-18 RX ADMIN — SUCRALFATE 1 G: 1 TABLET ORAL at 17:13

## 2023-01-18 RX ADMIN — PANTOPRAZOLE SODIUM 40 MG: 40 TABLET, DELAYED RELEASE ORAL at 20:26

## 2023-01-18 RX ADMIN — SODIUM CHLORIDE, PRESERVATIVE FREE 10 ML: 5 INJECTION INTRAVENOUS at 03:23

## 2023-01-18 RX ADMIN — INSULIN LISPRO 4 UNITS: 100 INJECTION, SOLUTION INTRAVENOUS; SUBCUTANEOUS at 13:46

## 2023-01-18 RX ADMIN — IOPAMIDOL 75 ML: 755 INJECTION, SOLUTION INTRAVENOUS at 03:23

## 2023-01-18 RX ADMIN — Medication 5 ML: at 17:12

## 2023-01-18 RX ADMIN — METOCLOPRAMIDE HYDROCHLORIDE 10 MG: 5 SOLUTION ORAL at 11:35

## 2023-01-18 RX ADMIN — SODIUM CHLORIDE, PRESERVATIVE FREE 10 ML: 5 INJECTION INTRAVENOUS at 09:03

## 2023-01-18 RX ADMIN — INSULIN LISPRO 2 UNITS: 100 INJECTION, SOLUTION INTRAVENOUS; SUBCUTANEOUS at 09:31

## 2023-01-18 RX ADMIN — TRAZODONE HYDROCHLORIDE 300 MG: 100 TABLET ORAL at 20:26

## 2023-01-18 RX ADMIN — SODIUM CHLORIDE 100 ML: 9 INJECTION, SOLUTION INTRAVENOUS at 03:22

## 2023-01-18 RX ADMIN — METOPROLOL TARTRATE 25 MG: 25 TABLET, FILM COATED ORAL at 20:26

## 2023-01-18 RX ADMIN — ASPIRIN 81 MG: 81 TABLET, COATED ORAL at 11:29

## 2023-01-18 RX ADMIN — OXYCODONE HYDROCHLORIDE 5 MG: 5 TABLET ORAL at 06:29

## 2023-01-18 RX ADMIN — METOPROLOL TARTRATE 25 MG: 25 TABLET, FILM COATED ORAL at 11:29

## 2023-01-18 RX ADMIN — PANTOPRAZOLE SODIUM 40 MG: 40 TABLET, DELAYED RELEASE ORAL at 11:29

## 2023-01-18 RX ADMIN — SODIUM CHLORIDE 1000 ML: 9 INJECTION, SOLUTION INTRAVENOUS at 02:20

## 2023-01-18 RX ADMIN — OXYCODONE HYDROCHLORIDE AND ACETAMINOPHEN 1 TABLET: 5; 325 TABLET ORAL at 20:27

## 2023-01-18 RX ADMIN — ATORVASTATIN CALCIUM 80 MG: 80 TABLET, FILM COATED ORAL at 20:26

## 2023-01-18 RX ADMIN — SODIUM CHLORIDE: 9 INJECTION, SOLUTION INTRAVENOUS at 09:10

## 2023-01-18 RX ADMIN — METOCLOPRAMIDE HYDROCHLORIDE 10 MG: 5 SOLUTION ORAL at 17:12

## 2023-01-18 RX ADMIN — INSULIN GLARGINE 15 UNITS: 100 INJECTION, SOLUTION SUBCUTANEOUS at 20:28

## 2023-01-18 RX ADMIN — SUCRALFATE 1 G: 1 TABLET ORAL at 20:26

## 2023-01-18 RX ADMIN — SUCRALFATE 1 G: 1 TABLET ORAL at 11:29

## 2023-01-18 RX ADMIN — ENOXAPARIN SODIUM 40 MG: 100 INJECTION SUBCUTANEOUS at 09:02

## 2023-01-18 RX ADMIN — INSULIN GLARGINE 15 UNITS: 100 INJECTION, SOLUTION SUBCUTANEOUS at 11:30

## 2023-01-18 RX ADMIN — Medication 5 ML: at 11:29

## 2023-01-18 ASSESSMENT — PAIN DESCRIPTION - ORIENTATION
ORIENTATION: LEFT
ORIENTATION: RIGHT;LEFT

## 2023-01-18 ASSESSMENT — ENCOUNTER SYMPTOMS
VOMITING: 0
PHOTOPHOBIA: 0
TROUBLE SWALLOWING: 0
NAUSEA: 0
COUGH: 0
COLOR CHANGE: 0
SHORTNESS OF BREATH: 0
DIARRHEA: 0
ABDOMINAL PAIN: 0

## 2023-01-18 ASSESSMENT — PAIN DESCRIPTION - DESCRIPTORS
DESCRIPTORS: SHARP;THROBBING
DESCRIPTORS: ACHING
DESCRIPTORS: ACHING

## 2023-01-18 ASSESSMENT — PAIN SCALES - GENERAL
PAINLEVEL_OUTOF10: 6
PAINLEVEL_OUTOF10: 10
PAINLEVEL_OUTOF10: 10

## 2023-01-18 ASSESSMENT — PAIN - FUNCTIONAL ASSESSMENT: PAIN_FUNCTIONAL_ASSESSMENT: 0-10

## 2023-01-18 ASSESSMENT — PAIN DESCRIPTION - LOCATION
LOCATION: GENERALIZED
LOCATION: NECK
LOCATION: LEG;HAND

## 2023-01-18 ASSESSMENT — PAIN DESCRIPTION - FREQUENCY: FREQUENCY: INTERMITTENT

## 2023-01-18 NOTE — H&P
[facility]  Radha Hill MD; Og Arriola MD; Berenice Ulloa MD; MD Dalila Gamble MD; MD NANCY Honeycutt [facility] Internal Medicine   [facility]    HISTORY AND PHYSICAL EXAMINATION            Date:   1/18/2023  Patient name:  Areli Garcia  Date of admission:  1/18/2023  2:06 AM  MRN:   [id number]  Account:  [de-identified]  YOB: 1970  PCP:    BINA Galeana  Room:   8867/7456-01  Code Status:    Full Code    Chief Complaint:     Chief Complaint   Patient presents with    Loss of Consciousness    Fall    Neck Pain   Fall  Sore throat  Odynophagia  History Obtained From:     Patient medical record nursing staff    History of Present Illness:     Areli Garcia is a 47 y.o. Non- / non  male who presents with Loss of Consciousness, Fall, and Neck Pain   and is admitted to the hospital for the management of Syncope and collapse. Areli Garcia is a 47 y.o. Non- / non  male who presents with Loss of Consciousness, Fall, and Neck Pain   and is admitted to the hospital for the management of Syncope and collapse. Patient to ED via EMS after having an episode of syncope and collapse. Patient's child said that he passed out 4 times; however, the patient only recalls 2 episodes. Patient reports that this has happened in the past.  Patient found to have a nondisplaced fracture of right fifth metacarpal bone; however it is unclear as to how this injury occurred. ED provider thought that he injured his hand during a syncopal episode but there is documentation of a visit at [facility] ED on 1/16 that notes the fracture and states patient left the ED AMA without treatment. Splint was applied to right hand and this ED today.      Past Medical History:     Past Medical History:   Diagnosis Date    Diabetes mellitus ([address]     Obstructive lung disease ([address]     8/2022 Panic attack         Past Surgical History:     Past Surgical History:   Procedure Laterality Date    APPENDECTOMY      FEMUR FRACTURE SURGERY Left 09/30/2022    Cephalomedullary Nail    HAND SURGERY      JOINT REPLACEMENT      TIBIA FRACTURE SURGERY Left 09/30/2022    LEFT FEMUR CEPHALOMEDULLARY NAIL performed by Julian Lama DO at [facility] N/A 02/15/2022    [facility] 11/10/[facility]    EGD BIOPSY performed by Aaliyah Rangel MD at [address] [place] [address]        Medications Prior to Admission:     Prior to Admission medications    Medication Sig Start Date End Date Taking? Authorizing Provider   insulin aspart (NOVOLOG FLEXPEN) 100 UNIT/ML injection pen Inject 5 Units into the skin 3 times daily (before meals) Patient has temporarily reduced his dose to 1-2 units with the small snack he has each day.  1/16/23   BINA Garay   insulin glargine (LANTUS) 100 UNIT/ML injection vial Inject 15 Units into the skin 2 times daily 1/16/23   BINA Garay   diphenhydrAMINE (BENYLIN) 12.5 MG/5ML liquid Take 5 mLs by mouth 4 times daily as needed for Allergies 1/17/23 2/16/23  BINA Garay   aluminum hydroxide-magnesium carbonate (ACID GONE)  MG/15ML suspension Take 5 mLs by mouth 4 times daily (with meals and nightly)  Patient not taking: Reported on 1/18/2023 1/17/23   BINA Garay   sucralfate (CARAFATE) 1 GM/10ML suspension Take 10 mLs by mouth 4 times daily 1/10/23   BINA Garay   metoclopramide (REGLAN) 10 MG/10ML SOLN Take 10 mLs by mouth 3 times daily (before meals) 1/10/23   BINA Garay   cyclobenzaprine (FLEXERIL) 10 MG tablet Take 1 tablet by mouth 3 times daily as needed for Muscle spasms 1/10/23 1/20/23  Chrystine Schlatter, APRN - CNP   pantoprazole (PROTONIX) 40 MG tablet Take 40 mg by mouth 2 times daily    Historical Provider, MD   PNEUMOCOCCAL 20-ALPHONSO CONJ VACC IM Inject into the muscle    Historical Provider, MD   lidocaine viscous hcl (XYLOCAINE) 2 % SOLN solution use 5 milliliter by mouth four times a day AS NEEDED FOR PAIN or IRRITATION WITH EATING 1/3/23   BINA Tam   Continuous Blood Gluc Transmit (DEXCOM G6 TRANSMITTER) MISC 1 each by Does not apply route once a week 12/30/22   Ruslan Muñoz MD   Continuous Blood Gluc Sensor (DEXCOM G6 SENSOR) MISC 1 each by Does not apply route once a week 12/30/22   Ruslan Muñoz MD   blood glucose monitor strips Test 4 times a day & as needed for symptoms of irregular blood glucose. Dispense sufficient amount for indicated testing frequency plus additional to accommodate PRN testing needs. Dispense brand that correlates with his glucometer and that's covered by his insurance. Needs delivered to him. 12/30/22   Ruslan Muñoz MD   Nutritional Supplements (GLUCOSE MANAGEMENT) TABS Take 1 tablet by mouth daily as needed (Low sugar reaction) 12/20/22   BINA Tam   aspirin EC 81 MG EC tablet Take 1 tablet by mouth daily  Patient not taking: Reported on 1/18/2023 12/16/22   BINA Tam   Continuous Blood Gluc  ([address]) [address] 1 each by Does not apply route once a week 11/18/22   BINA Tam   Misc.  Devices (STEP N REST WALKER) MISC 1 each by Does not apply route continuous Seated, wheeled walker 11/18/22   BINA Tam   ondansetron (ZOFRAN ODT) 4 MG disintegrating tablet Take 1 tablet by mouth every 8 hours as needed for Nausea or Vomiting 11/18/22   BINA Tam   albuterol sulfate HFA (VENTOLIN HFA) 108 (90 Base) MCG/ACT inhaler Inhale 2 puffs into the lungs 4 times daily as needed for Wheezing 11/15/22   BINA Tam   metoprolol tartrate (LOPRESSOR) 25 MG tablet Take 1 tablet by mouth 2 times daily 11/14/22   BINA Tam   atorvastatin (LIPITOR) 80 MG tablet Take 1 tablet by mouth nightly 11/6/22   Theo Eldridge MD   Blood Glucose Monitoring Suppl (ONE TOUCH ULTRA 2) w/Device KIT 1 kit by Does not apply route daily 3/18/22   Brad Rain, DO   Lancets MISC 1 each by Does not apply route 2 times daily 3/18/22   Brad Rain, DO   Alcohol Swabs 70 % PADS Use as needed with blood glucose checks 2/15/22   Asher Chaparro MD   Insulin Pen Needle ([employer] PEN NEEDLES 29G) 29G X 12MM MISC 1 each by Does not apply route daily 2/15/22   Asher Jennings MD   traZODone (DESYREL) 150 MG tablet Take 300 mg by mouth nightly     Historical Provider, MD   clonazePAM (KLONOPIN) 2 MG tablet Take 4 mg by mouth daily as needed for Anxiety. Historical Provider, MD        Allergies:     Azithromycin, Acetaminophen-codeine, Codeine, Hydrocodone-acetaminophen, Ibuprofen, Morphine, and Tramadol    Social History:     Tobacco:    reports that he has been smoking cigarettes. He has a 10.25 pack-year smoking history. He has never used smokeless tobacco.  Alcohol:      reports that he does not currently use alcohol. Drug Use:  reports current drug use. Drug: Marijuana Veldon Breath). Family History:     History reviewed. No pertinent family history. Review of Systems:     Positive and Negative as described in HPI.   Right hand fracture recent left hip fracture and left to open reduction internal fixation  CONSTITUTIONAL:  negative for fevers, chills, sweats, fatigue, weight loss  HEENT:  negative for vision, hearing changes, runny nose, throat pain  RESPIRATORY:  negative for shortness of breath, cough, congestion, wheezing  CARDIOVASCULAR:  negative for chest pain, palpitations  GASTROINTESTINAL: Positive for pain on swallowing  GENITOURINARY:  negative for difficulty of urination, burning with urination, frequency   INTEGUMENT:  negative for rash, skin lesions, easy bruising   HEMATOLOGIC/LYMPHATIC:  negative for swelling/edema   ALLERGIC/IMMUNOLOGIC:  negative for urticaria , itching  ENDOCRINE:  negative increase in drinking, increase in urination, hot or cold intolerance  MUSCULOSKELETAL:  negative joint pains, muscle aches, swelling of joints  NEUROLOGICAL:  negative for headaches, dizziness, lightheadedness, numbness, pain, tingling extremities  BEHAVIOR/PSYCH:  negative for depression, anxiety    Physical Exam:   /75   Pulse 64   Temp 97.6 °F (36.4 °C)   Resp 18   Ht 5' 6\" (1.676 m)   Wt 114 lb (51.7 kg)   SpO2 97%   BMI 18.40 kg/m²   Temp (24hrs), Av.7 °F (36.5 °C), Min:97.6 °F (36.4 °C), Max:97.8 °F (36.6 °C)    Recent Labs     23  0812 23  1152   POCGLU 239* 253*       Intake/Output Summary (Last 24 hours) at 2023 1332  Last data filed at 2023 1332  Gross per 24 hour   Intake --   Output 400 ml   Net -400 ml     Severe protein calorie malnutrition BMI 18.4  Right hand metacarpal fracture  General Appearance: alert, well appearing, and in no acute distress  Mental status: oriented to person, place, and time  Head: normocephalic, atraumatic  Eye: no icterus, redness, pupils equal and reactive, extraocular eye movements intact, conjunctiva clear  Ear: normal external ear, no discharge, hearing intact  Nose: no drainage noted  Mouth: mucous membranes moist  Neck: supple, no carotid bruits, thyroid not palpable  Lungs: Bilateral equal air entry, clear to ausculation, no wheezing, rales or rhonchi, normal effort  Cardiovascular: normal rate, regular rhythm, no murmur, gallop, rub  Abdomen: Soft, nontender, nondistended, normal bowel sounds, no hepatomegaly or splenomegaly  Neurologic: There are no new focal motor or sensory deficits, normal muscle tone and bulk, no abnormal sensation, normal speech, cranial nerves II through XII grossly intact  Skin: No gross lesions, rashes, bruising or bleeding on exposed skin area  Extremities: Recent ORIF on left hip post fracture  peripheral pulses palpable, no pedal edema or calf pain with palpation  Psych: normal affect    Investigations: Laboratory Testing:  Recent Results (from the past 24 hour(s))   Troponin    Collection Time: 01/18/23  2:20 AM   Result Value Ref Range    Troponin, High Sensitivity 14 0 - 22 ng/L   APTT    Collection Time: 01/18/23  2:20 AM   Result Value Ref Range    PTT 21.9 (L) 24.0 - 36.0 sec   Protime-INR    Collection Time: 01/18/23  2:20 AM   Result Value Ref Range    Protime 12.6 11.8 - 14.6 sec    INR 1.0    Phosphorus    Collection Time: 01/18/23  2:20 AM   Result Value Ref Range    Phosphorus 3.1 2.5 - 4.5 mg/dL   Magnesium    Collection Time: 01/18/23  2:20 AM   Result Value Ref Range    Magnesium 1.7 1.6 - 2.6 mg/dL   Basic Metabolic Panel    Collection Time: 01/18/23  2:20 AM   Result Value Ref Range    Glucose 340 (H) 70 - 99 mg/dL    BUN 25 (H) 6 - 20 mg/dL    Creatinine 1.87 (H) 0.70 - 1.20 mg/dL    Est, Glom Filt Rate 43 (L) >60 mL/min/1.73m2    Calcium 9.1 8.6 - 10.4 mg/dL    Sodium 134 (L) 135 - 144 mmol/L    Potassium 4.0 3.7 - 5.3 mmol/L    Chloride 94 (L) 98 - 107 mmol/L    CO2 28 20 - 31 mmol/L    Anion Gap 12 9 - 17 mmol/L   CBC with Auto Differential    Collection Time: 01/18/23  2:20 AM   Result Value Ref Range    WBC 11.2 (H) 3.5 - 11.0 k/uL    RBC 3.84 (L) 4.5 - 5.9 m/uL    Hemoglobin 10.2 (L) 13.5 - 17.5 g/dL    Hematocrit 30.6 (L) 41 - 53 %    MCV 79.9 (L) 80 - 100 fL    MCH 26.7 26 - 34 pg    MCHC 33.4 31 - 37 g/dL    RDW 16.2 (H) 11.5 - 14.9 %    Platelets 741 256 - 010 k/uL    MPV 8.6 6.0 - 12.0 fL    Seg Neutrophils 77 (H) 36 - 66 %    Lymphocytes 15 (L) 24 - 44 %    Monocytes 7 1 - 7 %    Eosinophils % 1 0 - 4 %    Basophils 0 0 - 2 %    Segs Absolute 8.50 1.3 - 9.1 k/uL    Absolute Lymph # 1.70 1.0 - 4.8 k/uL    Absolute Mono # 0.80 0.1 - 1.3 k/uL    Absolute Eos # 0.20 0.0 - 0.4 k/uL    Basophils Absolute 0.00 0.0 - 0.2 k/uL   Hepatic Function Panel    Collection Time: 01/18/23  2:20 AM   Result Value Ref Range    Albumin 3.5 3.5 - 5.2 g/dL    Alkaline Phosphatase 89 40 - 129 U/L    ALT 10 5 - 41 U/L    AST 10 <40 U/L    Total Bilirubin 0.5 0.3 - 1.2 mg/dL    Bilirubin, Direct 0.1 <0.3 mg/dL    Bilirubin, Indirect 0.4 0.0 - 1.0 mg/dL    Total Protein 6.5 6.4 - 8.3 g/dL   Lipase    Collection Time: 01/18/23  2:20 AM   Result Value Ref Range    Lipase 9 (L) 13 - 60 U/L   Troponin    Collection Time: 01/18/23  5:07 AM   Result Value Ref Range    Troponin, High Sensitivity 13 0 - 22 ng/L   POC Glucose Fingerstick    Collection Time: 01/18/23  8:12 AM   Result Value Ref Range    POC Glucose 239 (H) 75 - 110 mg/dL   POC Glucose Fingerstick    Collection Time: 01/18/23 11:52 AM   Result Value Ref Range    POC Glucose 253 (H) 75 - 110 mg/dL       Imaging/Diagnostics:  XR WRIST RIGHT (2 VIEWS)    Result Date: 1/18/2023  Fracture without significant displacement at the junction of base and the shaft of the right 5th metacarpal bone. In the rest of the right wrist there is no fracture or malalignment. XR HAND RIGHT (2 VIEWS)    Result Date: 1/18/2023  Fracture, without significant displacement in the proximal portion of the right 5th metacarpal bone, located, 1 cm distal to the base of this bone. No other diagnostic findings. CT HEAD WO CONTRAST    Result Date: 1/18/2023  No evidence of intracranial hemorrhage or any other definable acute intracranial abnormality. No definable focal abnormality or acute process in brain. No definable fracture in the skull. Evidence of marked mucosal thickening in the right maxillary sinus, suggesting chronic sinusitis. But the maxillary sinuses are not completely included. CT CERVICAL SPINE WO CONTRAST    Result Date: 1/18/2023  No acute abnormality of the cervical spine. CT ABDOMEN PELVIS W IV CONTRAST Additional Contrast? None    Result Date: 1/18/2023  No significant small bowel distension or dilation. Only minimal gas scattered in some loops of small bowel without any abnormal fluid levels. Cecum is in low position in right side of pelvis.  Appendix is not identifiable and there is no secondary sign of acute appendicitis. Appendicular area significantly overlapped by multiple loops of small bowel. Moderate amount of stool and gas scattered in the right colon and transverse colon and small to moderate amount of gas scattered in the descending colon. Moderate amount of stool is also present throughout sigmoid colon and rectum. Findings suggestive of constipation. No demonstrable abnormality in liver, gallbladder, pancreas, or adrenal glands. Normal size of spleen with calcified granulomas. Normal kidneys bilaterally. No evidence of obstructive uropathy. XR CHEST PORTABLE    Result Date: 1/18/2023  No acute cardiopulmonary process demonstrated. Clear lungs bilaterally. No pneumothorax or pleural effusion. Stable normal cardiac size, without evidence of cardiac decompensation.        Assessment :      Hospital Problems             Last Modified POA    * (Principal) Syncope and collapse 1/18/2023 Yes    Tobacco abuse 1/18/2023 Yes    Stage 3a chronic kidney disease ([address] 1/18/2023 Yes    Uncontrolled type 1 diabetes mellitus with hyperglycemia ([address] 1/18/2023 Yes       Plan:     Patient status n the Progressive Unit/Step down  71-year-old gentleman with a severe protein calorie malnutrition history of drug abuse history of falls in the past left hip fracture last year status post open reduction internal fixation  Very poor historian lives with 2 children at home 816 and 13  Complains of odynophagia not taking adequate oral intake noted to have acute kidney injury creatinine 1.8 baseline is 1  Uncontrolled diabetes mellitus with severe protein calorie malnutrition  Fall could be orthostatic check orthostatic blood pressure dehydration secondary to odynophagia and GI consult for EGD rule out esophageal candidiasis we will check for HIV screen and hepatitis C  Patient has history of drug abuse  IV fluid resuscitation  Patient requires inpatient hospital admission due to above multiple comorbid conditions      Consultations:   IP CONSULT TO INTERNAL MEDICINE  IP CONSULT TO SOCIAL WORK     Patient is admitted as inpatient status because of co-morbidities listed above, severity of signs and symptoms as outlined, requirement for current medical therapies and most importantly because of direct risk to patient if care not provided in a hospital setting. Expected length of stay > 48 hours. Yamileth Maldonado MD  1/18/2023  1:32 PM    Copy sent to BINA Richards    Please note that this chart was generated using voice recognition Dragon dictation software. Although every effort was made to ensure the accuracy of this automated transcription, some errors in transcription may have occurred.

## 2023-01-18 NOTE — PROGRESS NOTES
Bladder scan ordered. Patient voided 175mL and was bladder scanned with 180mL on bladder scan. Dr. Val Pandya was notified via perfect serve. No new orders at this time.

## 2023-01-18 NOTE — PROGRESS NOTES
Kenna Pelaez. NP (GI), was notified of new patient consult for trouble eating due to painful swallowing. Awaiting response. 1438: Will see patient tomorrow.

## 2023-01-18 NOTE — DISCHARGE INSTR - COC
Continuity of Care Form    Patient Name: Kaylee Mcadams   :  1970  MRN:  [id number]    Admit date:  2023  Discharge date:  ***    Code Status Order: Full Code   Advance Directives:     Admitting Physician:  Angelia Harding MD  PCP: Luis F Petty    Discharging Nurse: [facility] Unit/Room#: 4020/5502-26  Discharging Unit Phone Number: ***    Emergency Contact:   Extended Emergency Contact Information  Primary Emergency Contact: [facility]. Phone: [phone]  Relation: Brother/Sister  Secondary Emergency Contact: Steve Jain  Mobile Phone: [phone]  Relation: Child    Past Surgical History:  Past Surgical History:   Procedure Laterality Date    APPENDECTOMY      FEMUR FRACTURE SURGERY Left 2022    Cephalomedullary Nail    HAND SURGERY      JOINT REPLACEMENT      TIBIA FRACTURE SURGERY Left 2022    LEFT FEMUR CEPHALOMEDULLARY NAIL performed by Fay Castro DO at [address] [place] [address] N/A 02/15/2022    EGD BIOPSY performed by Yao Soto MD at [address] [place] [address] N/A 11/10/2022    EGD BIOPSY performed by Adrián Saez MD at [facility]       Immunization History:   Immunization History   Administered Date(s) Administered    Tdap (Boostrix, Adacel) 2016       Active Problems:  Patient Active Problem List   Diagnosis Code    Colitis K52.9    Uncontrolled type 1 diabetes mellitus with hyperglycemia ([facility] [address] E10.65    Melena K92.1    Transaminitis R74.01    Fibromyalgia M79.7    Spinal stenosis M48.00    Abnormal stress test R94.39    Vitamin D deficiency E55.9    Acute blood loss anemia D62    Hematemesis with nausea K92.0    Ulcer of esophagus without bleeding K22.10    Candida esophagitis (HCC) B37.81    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, G63    Osteoarthritis of knee M17.9    Other chest pain R07.89    Shortness of breath R06.02    Major depressive disorder in partial remission (HCC) F32.4    Lung nodule < 6cm on CT [id number]    Closed comminuted intertrochanteric fracture of femur, initial encounter ([facility] [address] S72.143A    NSTEMI (non-ST elevated myocardial infarction) (HCC) I21.4    Nausea & vomiting R11.2    Constipation K59.00    Weight loss R63.4    Tobacco abuse Z72.0    Marijuana use F12.90    Acute kidney injury (HCC) N17.9    Stage 3a chronic kidney disease (HCC) N18.31    Odynophagia R13.10    Dysphagia R13.10    Diabetic acidosis without coma (HCC) E11.10    Severe malnutrition (HCC) E43    Hepatitis C antibody test positive R76.8    Elevated LFTs R79.89    Syncope and collapse R55       Isolation/Infection:   Isolation            No Isolation          Patient Infection Status       Infection Onset Added Last Indicated Last Indicated By Review Planned Expiration Resolved Resolved By    None active    Resolved    COVID-19 (Rule Out) 02/14/22 02/14/22 02/14/22 COVID-19, Rapid (Ordered)   02/14/22 Rule-Out Test Resulted    COVID-19 (Rule Out) 12/03/21 12/03/21 12/03/21 COVID-19, Rapid (Ordered)   12/03/21 Rule-Out Test Resulted            Nurse Assessment:  Last Vital Signs: /75   Pulse 64   Temp 97.6 °F (36.4 °C)   Resp 18   Ht 5' 6\" (1.676 m)   Wt 114 lb (51.7 kg)   SpO2 97%   BMI 18.40 kg/m²     Last documented pain score (0-10 scale): Pain Level: 10  Last Weight:   Wt Readings from Last 1 Encounters:   01/18/23 114 lb (51.7 kg)     Mental Status:  {IP PT MENTAL STATUS:20030}    IV Access:  { PAULETTE IV ACCESS:726023084}    Nursing Mobility/ADLs:  Walking   {CHP DME LDFC:585356789}  Transfer  {CHP DME STXY:939701596}  Bathing  {CHP DME YJZE:187678214}  Dressing  {CHP DME ICOL:057625474}  Toileting  {CHP DME YBPP:020841993}  Feeding  {CHP DME NSPR:554995909}  Med Admin  {CHP DME DSWN:376562030}  Med Delivery   { PAULETTE MED Delivery:765527711}    Wound Care Documentation and Therapy:  Incision 09/30/22 Hip Left; Lower (Active)   Number of days: 110 Elimination:  Continence: Bowel: {YES / UN:71292}  Bladder: {YES / DP:43168}  Urinary Catheter: {Urinary Catheter:933626579}   Colostomy/Ileostomy/Ileal Conduit: {YES / NZ:34183}       Date of Last BM: ***  No intake or output data in the 24 hours ending 01/18/23 1241  No intake/output data recorded. Safety Concerns:     508 Recommind Safety Concerns:681697050}    Impairments/Disabilities:      508 Recommind Impairments/Disabilities:177199882}    Nutrition Therapy:  Current Nutrition Therapy:   508 Recommind Diet List:982728618}    Routes of Feeding: {CHP DME Other Feedings:805062215}  Liquids: {Slp liquid thickness:73692}  Daily Fluid Restriction: {CHP DME Yes amt example:291910834}  Last Modified Barium Swallow with Video (Video Swallowing Test): {Done Not Done PDTO:559662995}    Treatments at the Time of Hospital Discharge:   Respiratory Treatments: ***  Oxygen Therapy:  {Therapy; copd oxygen:01358}  Ventilator:    { CC Vent NAHZ:757705154}    Rehab Therapies: Physical Therapy and Occupational Therapy  Weight Bearing Status/Restrictions: No weight bearing restrictions  Other Medical Equipment (for information only, NOT a DME order):  walker and shower chair, glucometer, DEXCOM  Other Treatments: Skilled Nursing assessment and monitoring. Medication education and monitoring per protocol.       Patient's personal belongings (please select all that are sent with patient):  {P DME Belongings:651544637}    RN SIGNATURE:  {Esignature:542474878}    CASE MANAGEMENT/SOCIAL WORK SECTION    Inpatient Status Date:     Readmission Risk Assessment Score:  Readmission Risk              Risk of Unplanned Readmission:  0           Discharging to Facility/ [facility] [place]  Phone:  [phone]  Fax:  [phone]     Dialysis Facility (if applicable)   Name:  Address:  Dialysis Schedule:  Phone:  Fax:    / signature: Electronically signed by Evreardo Reyes RN on 1/18/23 at 12:42 PM EST    PHYSICIAN SECTION    Prognosis: {Prognosis:0106143388}    Condition at Discharge: 508 Yakelin Tom Patient Condition:731660001}    Rehab Potential (if transferring to Rehab): {Prognosis:3557360970}    Recommended Labs or Other Treatments After Discharge: ***    Physician Certification: I certify the above information and transfer of Juarez Johnson  is necessary for the continuing treatment of the diagnosis listed and that he requires {Admit to Appropriate Level of Care:51168} for {GREATER/LESS:933458756} 30 days.      Update Admission H&P: {CHP DME Changes in IMESJ:478367639}    PHYSICIAN SIGNATURE:  {Esignature:190261834}

## 2023-01-18 NOTE — PROGRESS NOTES
Physical Therapy  Facility/Department: [facility] PROGRESSIVE CARE  Physical Therapy Initial Assessment    Name: Pastora Ariza  : 1970  MRN: [id number]  Date of Service: 2023    Discharge Recommendations:  Therapy recommended at discharge, Patient would benefit from continued therapy after discharge          Patient Diagnosis(es): The primary encounter diagnosis was Syncope and collapse. A diagnosis of Closed fracture of right hand, initial encounter was also pertinent to this visit. Past Medical History:  has a past medical history of Diabetes mellitus ([facility] [address] Obstructive lung disease ([facility] [address] and Panic attack. Past Surgical History:  has a past surgical history that includes Appendectomy; Hand surgery; Upper gastrointestinal endoscopy (N/A, 02/15/2022); Femur fracture surgery (Left, 2022); Tibia fracture surgery (Left, 2022); joint replacement; and Upper gastrointestinal endoscopy (N/A, 11/10/2022). Assessment   Assessment: The patient requires CGA-Mirlande x1 for OOB level ground mobility with use of RW this date, however pt would benefit from single UE device to be used in L hand for balance with mobility d/t R hand metacarpal fracture. The patient demonstrates impaired balance, tolerance for activity, pain, and LLE weakness. PT services warranted to progress safety and independence with appropriate device. Treatment Diagnosis: Impaired mobility 2* + orthostatic hypotension and LLE Pain and ROM deficits  Specific Instructions for Next Treatment: LLE PROM/AAROM/AROM all directions, LLE Quad sets, Hamstring & gastroc/soleus stretching to tolerance. Progress amb as able  Therapy Prognosis: Fair  Decision Making: Medium Complexity  Exam: ROM, MMT Balance, and functional mobility assessments  Requires PT Follow-Up: Yes  Activity Tolerance  Activity Tolerance: Treatment limited secondary to medical complications; Patient limited by pain; Patient limited by fatigue     Plan   Physcial Therapy Plan  General Plan: 5-7 times per week  Specific Instructions for Next Treatment: LLE PROM/AAROM/AROM all directions, LLE Quad sets, Hamstring & gastroc/soleus stretching to tolerance. Progress amb as able  Current Treatment Recommendations: Strengthening, ROM, Functional mobility training, Transfer training, Balance training, Gait training, Stair training, Pain management, Home exercise program, Safety education & training, Positioning, Equipment evaluation, education, & procurement, Therapeutic activities  Safety Devices  Type of Devices: Call light within reach, Gait belt, All fall risk precautions in place, Left in bed, Bed alarm in place, Patient at risk for falls, Nurse notified (Nurse Dhaval Keen notified; also requested that patient recieves ensures to support nutrition and intake)  Restraints  Restraints Initially in Place: No     Restrictions  Restrictions/Precautions  Restrictions/Precautions: Fall Risk, General Precautions, Up as Tolerated  Required Braces or Orthoses?: No  Implants present? : Metal implants (Cephalomedullary nailing)  Position Activity Restriction  Other position/activity restrictions: OT/PT eval and treat     Subjective   Pain: pt reports mouth pain 6/10, Pain in chest 9/10, and 7-8/10 LLE pain that can increased to 10/10 with weightbearing at times  General  Chart Reviewed: Yes  Patient assessed for rehabilitation services?: Yes  Additional Pertinent Hx: Dennys Whitehead is a 47 y.o. Non- / non  male who presents with Loss of Consciousness, Fall, and Neck Pain   and is admitted to the hospital for the management of Syncope and collapse. Patient to ED via EMS after having an episode of syncope and collapse. Patient's child said that he passed out 4 times; however, the patient only recalls 2 episodes. Patient reports that this has happened in the past.  Patient found to have a nondisplaced fracture of right fifth metacarpal bone; however it is unclear as to how this injury occurred.   ED provider thought that he injured his hand during a syncopal episode but there is documentation of a visit at [facility] ED on 1/16 that notes the fracture and states patient left the ED AMA without treatment. Splint was applied to right hand and this ED today. Response To Previous Treatment: Not applicable  Family / Caregiver Present: No  Referring Practitioner: LUIS Grayson CNP  Referral Date : 01/18/23  Diagnosis: Syncope and Collapse  Follows Commands: Within Functional Limits  General Comment  Comments: [id number] Ana Trejo Per [employer] to proceed with PT/OT assessments  Subjective  Subjective: Pt is resting in bed and agreeable to assessments; \"I haven't been eating much because it hurts when I swallow just about anything\" pt explains that he recently had a swallow test/swallow study at [facility] but no further medical treatment was persued to address his pain with swallowing.  \"I feel like I've been living each day as if I may die tomorrow becuase I don't know if I'll wake up tthe next morning, I feel like I'm 89\"         Social/Functional History  Social/Functional History  Lives With: Family (2 teenage sons)  Type of Home: House  Home Layout: Bed/Bath upstairs  Home Access: Stairs to enter with rails  Entrance Stairs - Number of Steps: FF Stairs to enter with R rail  Entrance Stairs - Rails: Right  Bathroom Shower/Tub: Tub/Shower unit, Curtain, Shower chair with back  Bathroom Toilet: Standard  Bathroom Equipment: Shower chair  Bathroom Accessibility: Walker accessible  Home Equipment: Rollator  Has the patient had two or more falls in the past year or any fall with injury in the past year?: Yes  Receives Help From: Family (two teenage sons)  ADL Assistance: Independent  Homemaking Assistance: Independent  Homemaking Responsibilities: Yes  Ambulation Assistance: Independent (no Device (Has been independently ambulating prior to his femur fx s/p CM nailing in 9/29/2022))  Transfer Assistance: Independent (no device)  Active : No  Patient's  Info: bus  Occupation:  (on SSI)  IADL Comments: pt sleeps on flat bed at home  Additional Comments: pt reports he has prn assist available from his sons. pt has interest in lift chair as he sometimes struggles to rise from his chairs at home  [address]: Impaired  Vision Exceptions: Wears glasses for reading  Hearing  Hearing: Exceptions to [address] [facility] [address]   Orientation  Overall Orientation Status: Within Functional Limits  Orientation Level: Oriented X4  Cognition  Overall Cognitive Status: WFL     Objective   Heart Rate: 64  BP: 122/75  MAP (Calculated): 91  Resp: 18  SpO2: 97 %  O2 Device: None (Room air)     Observation/Palpation  Palpation: Peripheral IV R forearm        AROM RLE (degrees)  RLE AROM: WFL  AROM LLE (degrees)  LLE AROM : Exceptions  LLE General AROM: Limited knee extension lacking ~5-7 degrees from TKE. AROM RUE (degrees)  RUE General AROM: See OT  AROM LUE (degrees)  LUE General AROM: See OT  Strength RLE  Strength RLE: WFL  Comment: Grossly 4 to 4+/5  Strength LLE  Strength LLE: Exception  Comment: Grossly 3- to 3/5  Strength RUE  Comment: See OT  Strength LUE  Comment: See OT     Sensation  Overall Sensation Status: Impaired (Neuropathy bilateral LEs, Left lateral thigh is \"totally numb\" and frequently itchy)     Bed mobility  Supine to Sit: Modified independent  Sit to Supine: Modified independent  Scooting: Modified independent  Bed Mobility Comments: HOB slightly elevated, use of hand rails  Transfers  Sit to Stand: Contact guard assistance  Stand to Sit: Contact guard assistance  Comment: Transfers with no device, pt without complaint upon standing upright from bed  Ambulation  Surface: Level tile  Device: Rolling Walker  Assistance: Minimal assistance  Quality of Gait: slow, forward flexed posture  Gait Deviations: Decreased step length;Decreased step height; Increased CAMELIA; Slow Gina  Distance: 14'x2  Comments: d/t patient's R hand injury pt defers use of R hand onto walker handle and decides to lean and rest forarm onto R handle, this writer makes corrections to manage steering RW while pt maintains NWB to R hand to avoid injury. Next treatment will be trialing quad cane vs single point cane in Left hand  Stairs/Curb  Stairs?: No (deferred this date d/t pt struggling to complete level ground amb and recently + orthostatic hypotension)     Balance  Posture: Fair  Sitting - Static: Good  Sitting - Dynamic: Good  Standing - Static: Fair;+ (single UE supprt on RW)  Standing - Dynamic: Fair (RW - single UE support)         AM-PAC Score  AM-PAC Inpatient Mobility Raw Score : 19 (01/18/23 1428)  AM-PAC Inpatient T-Scale Score : 45.44 (01/18/23 1428)  Mobility Inpatient CMS 0-100% Score: 41.77 (01/18/23 1428)  Mobility Inpatient CMS G-Code Modifier : CK (01/18/23 1428)        Goals  Short Term Goals  Time Frame for Short Term Goals: 6-7 visits  Short Term Goal 1: pt to demo all bed mob on flat bed independently  Short Term Goal 2: pt to perform all transfers using least restrictive device as needed with supervision  Short Term Goal 3: pt to ambulate 48' with least restrictive device as needed with supervision  Short Term Goal 4: pt to negotiate FF Stairs with single R rail and device as needed with SBA to allow for safe home accesss  Short Term Goal 5: pt to demo good technique for LLE strengthening and ROM exercises with HEP handout  Patient Goals   Patient Goals : pt does not state goals       Education  Patient Education  Education Given To: Patient  Education Provided: Role of Therapy;Plan of Care;Precautions;Transfer Training; Fall Prevention Strategies; Equipment  Education Method: Verbal  Barriers to Learning: Vision; Hearing  Education Outcome: Verbalized understanding;Continued education needed      Therapy Time   Individual Concurrent Group Co-treatment   Time In 1426         Time Out 1454         Minutes 28         Timed Code Treatment Minutes: 8 Minutes       Isac Bull, PT

## 2023-01-18 NOTE — PROGRESS NOTES
Lisseth Sierra is a 47 y.o. Non- / non  male who presents with Loss of Consciousness, Fall, and Neck Pain   and is admitted to the hospital for the management of Syncope and collapse. Patient to ED via EMS after having an episode of syncope and collapse. Patient's child said that he passed out 4 times; however, the patient only recalls 2 episodes. Patient reports that this has happened in the past.  Patient found to have a nondisplaced fracture of right fifth metacarpal bone; however it is unclear as to how this injury occurred. ED provider thought that he injured his hand during a syncopal episode but there is documentation of a visit at the [facility] ED on 1/16 that notes the fracture and states patient left the ED AMA without treatment. Splint was applied to right hand and this ED today.         Patient status observation in the Progressive Unit/Step down    Syncopal episode  -Check orthostatic VS q shift x 2  -EKG- per ED note - no signs of acute cardiac ischemia  -2D Echocardiogram - completed 2 months prior  --LVEF - 55%  -Trop 14, then   -CT head - no evidence of cranial hemorrhage or other acute intercranial abnormality  --No definable focal abnormality or acute process and brain  --No definable fracture and skull  --Evidence of marked mucosal thickening in right maxillary sinus  --- Suggestive of chronic sinusitis but maxillary sinuses are not completely included  -CT cervical spine-no acute abnormality  -IVF - 1 liter bolus in ED; continue IVF on admission  -Neuro checks q 4 hours  -PT & OT eval and treat    CKD  -Patient with history of CKD Stage 3a  -Creatinine: 1.87  ---Baseline: 1.08-2.42 over past 3 months    Diabetes Mellitus  -Glucose 340 on arrival  -Continue home dose insulin  -hold oral hypoglycemics/metformin for now  -POCT ac and hs with sliding scale coverage     Disposition 2 days      Consultations:   IP CONSULT TO INTERNAL MEDICINE  IP CONSULT TO SOCIAL 1000 LUIS Zarate - CNP  1/18/2023  5:28 AM

## 2023-01-18 NOTE — PROGRESS NOTES
[address]    Occupational Therapy Evaluation  Date: 23  Patient Name: Anuradha Nuñez       Room: 9641/5395-18  MRN: [id number]  Account: [de-identified]   : 1970  (47 y.o.) Gender: male     Discharge Recommendations:  Further Occupational Therapy is recommended upon facility discharge. OT Equipment Recommendations  Other: TBD    Referring Practitioner: LUIS Mcgill CNP  Diagnosis: Syncope and collapse Additional Pertinent Hx: Anuradha Nuñez is a 47 y.o. Non- / non  male who presents with Loss of Consciousness, Fall, and Neck Pain   and is admitted to the hospital for the management of Syncope and collapse. Anuradha Nuñez is a 47 y.o. Non- / non  male who presents with Loss of Consciousness, Fall, and Neck Pain   and is admitted to the hospital for the management of Syncope and collapse. Patient to ED via EMS after having an episode of syncope and collapse. Patient's child said that he passed out 4 times; however, the patient only recalls 2 episodes. Patient reports that this has happened in the past.  Patient found to have a nondisplaced fracture of right fifth metacarpal bone; however it is unclear as to how this injury occurred. ED provider thought that he injured his hand during a syncopal episode but there is documentation of a visit at [facility] ED on  that notes the fracture and states patient left the ED AMA without treatment. Splint was applied to right hand and this ED today. Treatment Diagnosis: Impaired self-care status    Past Medical History:  has a past medical history of Diabetes mellitus ([address] Obstructive lung disease ([address] and Panic attack. Past Surgical History:   has a past surgical history that includes Appendectomy; Hand surgery; Upper gastrointestinal endoscopy (N/A, 02/15/2022); Femur fracture surgery (Left, 2022);  Tibia fracture surgery (Left, 2022); joint replacement; and Upper gastrointestinal endoscopy (N/A, 11/10/2022). Restrictions  Restrictions/Precautions  Required Braces or Orthoses?: No  Implants present? : Metal implants  Position Activity Restriction  Other position/activity restrictions: OT/PT eval and treat      Vitals  Vitals  Heart Rate: 64  BP: 122/75  MAP (Calculated): 91  Resp: 18  SpO2: 97 %  O2 Device: None (Room air)     Subjective  Comments: Okay for OT/PT eval per RN Jeremiah Sandoval  Subjective  Pain: pt reports mouthpain 6/10, Pain in chest 9/10, and 7-8/10 LLE pain      Social/Functional History  Social/Functional History  Lives With: Family (2 teenage sons)  Type of Home: House  Home Layout: Bed/Bath upstairs  Bathroom Equipment: Shower chair  Home Equipment: Rollator  Additional Comments: pt reports interest in lift chair. pt reports having had a swallow test at [facility] a few weeks ago      Objective  Orientation  Overall Orientation Status: Within Functional Limits  Cognition  Overall Cognitive Status: WFL   Sensation  Overall Sensation Status: Impaired (Neuropathy bilateral LEs)    ADL  Feeding: Setup  Grooming: Setup  UE Bathing: Stand by assistance  LE Bathing: Minimal assistance  UE Dressing: Stand by assistance  LE Dressing: Minimal assistance  Toileting: Minimal assistance  Additional Comments: ADL scores are based on skilled observation and clinical reasoning unless otherwise noted. Pt is currently limited by increased pain, decreased activity tolerance, balance impairment and weakness which impacts the pt's ability to safely and independently complete self-care.     UE Function  LUE AROM (degrees)  LUE AROM : WFL  Left Hand AROM (degrees)  Left Hand AROM: WFL  Tone LUE  LUE Tone: Normotonic  LUE Strength  Gross LUE Strength: WFL  L Hand General: 4/5  LUE Strength Comment: Grossly 4/5    RUE AROM (degrees)  RUE AROM : WFL  Right Hand AROM (degrees)  Right Hand General AROM: NT d/t splint on RUE  Tone RUE  RUE Tone: Normotonic  RUE Strength  Gross RUE Strength: WFL  RUE Strength Comment: Not formally tested d/t splint on RUE    Fine Motor Skills/Coordination  Coordination  Movements Are Fluid And Coordinated: No  Coordination and Movement Description: Fine motor impairments, Decreased speed, Decreased accuracy, Right UE, Left UE     Bed Mobility  Bed mobility  Supine to Sit: Modified independent  Sit to Supine: Modified independent  Scooting: Modified independent  Bed Mobility Comments: HOB slightly elevated, use of hand rails    Balance  Balance  Sitting Balance: Stand by assistance  Standing Balance: Contact guard assistance  Standing Balance  Time: 1-2 minutes  Activity: Functional transfers/mobility  Comment: Use of RW, LUE support only d/t splint on RUE    Transfers  Transfers  Sit to stand: Contact guard assistance  Stand to sit: Contact guard assistance  Transfer Comments: CGA for safety, good hand placement    Functional Mobility  Functional - Mobility Device: Rolling Walker  Activity: Other (To/from doorway)  Assist Level: Minimal assistance  Functional Mobility Comments: Min A for guiding RW as pt only with LUE support d/t splint on RUE    Assessment  Assessment  Performance deficits / Impairments: Decreased functional mobility , Decreased ADL status, Decreased ROM, Decreased strength, Decreased endurance, Decreased balance, Decreased sensation, Decreased high-level IADLs, Decreased fine motor control, Decreased coordination  Treatment Diagnosis: Impaired self-care status  Prognosis: Good  Decision Making: Medium Complexity  Discharge Recommendations: Patient would benefit from continued therapy after discharge    Activity Tolerance  Activity Tolerance: Patient Tolerated treatment well    Safety Devices  Type of Devices: Bed alarm in place, Call light within reach, Gait belt, All fall risk precautions in place, Left in bed    Patient Education  Patient Education  Education Given To: Patient  Education Provided: Role of Therapy, Plan of Care, Transfer Training  Education Method: Demonstration, Verbal  Barriers to Learning: None  Education Outcome: Verbalized understanding, Continued education needed      Functional Outcome Measures  AM-PAC Daily Activity Inpatient   How much help for putting on and taking off regular lower body clothing?: A Little  How much help for Bathing?: A Little  How much help for Toileting?: A Little  How much help for putting on and taking off regular upper body clothing?: A Little  How much help for taking care of personal grooming?: A Little  How much help for eating meals?: A Little  AM-[facility] Inpatient Daily Activity Raw Score: 18  AM-PAC Inpatient ADL T-Scale Score : 38.66  ADL Inpatient CMS 0-100% Score: 46.65  ADL Inpatient CMS G-Code Modifier : CK       Goals     Short Term Goals  Time Frame for Short Term Goals: By discharge  Short Term Goal 1: Pt will perform BADLs with Mod I, good safety, and use of AE/DME/Modified techniques as needed  Short Term Goal 2: Pt will perform functional transfers/mobility with Mod I, good safety, and use of least restrictive device  Short Term Goal 3: Pt will participate in 15+ minutes of therapeutic exercise/functional activity to improve safety and independence with self-care  Short Term Goal 4: Pt will tolerate standing for 8+ minutes, Mod I, with 0-1 UE support and no LOB during self-care/functional activity  Short Term Goal 5: Pt will verbalize/demonstrate good understanding of home safety/fall prevention strategies to improve safety and independence with self-care    Plan  Occupational Therapy Plan  Times Per Week: 4-6  Current Treatment Recommendations: Strengthening, ROM, Balance training, Functional mobility training, Endurance training, Pain management, Safety education & training, Patient/Caregiver education & training, Equipment evaluation, education, & procurement, Positioning, Self-Care / ADL, Home management training, Coordination training      OT Individual Minutes  OT Individual Minutes  Time In: 8137  Time Out: 9162  Minutes: 28  Time Code Minutes   Timed Code Treatment Minutes: 14 Minutes    Electronically signed by Sana Lugo OTR/L on 1/18/23 at 3:33 PM EST

## 2023-01-18 NOTE — CARE COORDINATION
CASE MANAGEMENT NOTE:    Admission Date:  1/18/2023 Berna Moreira is a 47 y.o.  male    Admitted for : Syncope and collapse [R55]  Closed fracture of right hand, initial encounter Eleni Johnson    Met with:  Patient    PCP:  BINA Quan                                Insurance:  [employer] [employer]      Is patient alert and oriented at time of discussion:  Yes    Current Residence/ Living Arrangements:  independently at home with kids        Current Services PTA:  Yes, [id number]    Does patient go to outpatient dialysis: No  If yes, location and chair time:   Who is their nephrologist?     Is patient agreeable to VNS: Yes    Freedom of choice provided:  Yes    List of [address] provided: Yes    VNS chosen:  Yes, continue current services    DME:  walker, shower chair, and  DEXCOM (also old glucometer-no test strips)  Waiting on boost and magic mouth wash approval    Home Oxygen: No    Nebulizer: No    CPAP/BIPAP: No    Supplier: N/A    Potential Assistance Needed: No    SNF needed: No    Freedom of choice and list provided: NA    Pharmacy:  [facility]       Is patient currently receiving oral anticoagulation therapy? No    Is the Patient an [facility] with Readmission Risk Score greater than 14%? No  If yes, pt needs a follow up appointment made within 7 days.     Family Members/Caregivers that are willing and able to care for patient at home:    No    If yes, list name here:  NA    Family/caregivers educated on resources available including DME and respite care: NA    Transportation Provider:  medical cabs             Discharge Plan:  1/18/2023 [employer];  independently at home with kids; DME walker, shower chair, and  DEXCOM  is broken but getting replaced at PCP office (also old glucometer-no test strips) Waiting on boost and magic mouth wash approval; VNS current with [id number]; PAULETTE needs signed/completed//KR                     Electronically signed by: Jennifer Vu RN on 1/18/2023 at 12:36 PM

## 2023-01-18 NOTE — ED PROVIDER NOTES
EMERGENCY DEPARTMENT ENCOUNTER    Pt Name: Mitali Jaeger  MRN: [id number]  [place] 1970  Date of evaluation: 1/18/23  CHIEF COMPLAINT       Chief Complaint   Patient presents with    Loss of Consciousness    Fall    Neck Pain     HISTORY OF PRESENT ILLNESS   64-year-old male presenting to the ER after a syncopal event. Patient states it happens once per year. Patient has a history of coronary artery disease. Patient is not admitting to chest pain but does admit to neck pain on the left side between his left side of his neck and shoulder that started after the fall after the syncopal event. Patient states his child states he had 4 syncopal events. The history is provided by the patient. Loss of Consciousness  Episode history:  Multiple  Most recent episode: Today  Chronicity:  New  Associated symptoms: no chest pain, no dizziness, no fever, no nausea, no palpitations, no shortness of breath and no vomiting          REVIEW OF SYSTEMS     Review of Systems   Constitutional:  Negative for activity change, fatigue and fever. HENT:  Negative for congestion, ear pain and trouble swallowing. Eyes:  Negative for photophobia and visual disturbance. Respiratory:  Negative for cough and shortness of breath. Cardiovascular:  Positive for syncope. Negative for chest pain and palpitations. Gastrointestinal:  Negative for abdominal pain, diarrhea, nausea and vomiting. Genitourinary:  Negative for dysuria, flank pain and urgency. Musculoskeletal:  Positive for arthralgias (L shoulder) and neck pain. Negative for myalgias. Skin:  Negative for color change and rash. Neurological:  Positive for syncope. Negative for dizziness and facial asymmetry. Psychiatric/Behavioral:  Negative for agitation and behavioral problems.     PASTMEDICAL HISTORY     Past Medical History:   Diagnosis Date    Diabetes mellitus ([address]     Obstructive lung disease ([facility]     8/2022    Panic attack      Past Problem List  Patient Active Problem List   Diagnosis Code    Colitis K52.9    Uncontrolled type 1 diabetes mellitus with hyperglycemia ([facility]) E10.65    Melena K92.1    Transaminitis R74.01    Fibromyalgia M79.7    Spinal stenosis M48.00    Abnormal stress test R94.39    Vitamin D deficiency E55.9    Acute blood loss anemia D62    Hematemesis with nausea K92.0    Ulcer of esophagus without bleeding K22.10    Candida esophagitis ([facility]) B37.81    Neuropathy associated with endocrine disorder ([facility]) E34.9, G63    Osteoarthritis of knee M17.9    Other chest pain R07.89    Shortness of breath R06.02    Major depressive disorder in partial remission ([facility]) F32.4    Lung nodule < 6cm on CT [id number]    Closed comminuted intertrochanteric fracture of femur, initial encounter ([facility] S72.143A    NSTEMI (non-ST elevated myocardial infarction) ([facility]) I21.4    Nausea & vomiting R11.2    Constipation K59.00    Weight loss R63.4    Tobacco abuse Z72.0    Marijuana use F12.90    Acute kidney injury ([facility] N17.9    Stage 3a chronic kidney disease ([facility]) N18.31    Odynophagia R13.10    Dysphagia R13.10    Diabetic acidosis without coma ([facility]) E11.10    Severe malnutrition ([facility]) E43    Hepatitis C antibody test positive R76.8    Elevated LFTs R79.89    Syncope and collapse R55     SURGICAL HISTORY       Past Surgical History:   Procedure Laterality Date    APPENDECTOMY      FEMUR FRACTURE SURGERY Left 09/30/2022    Cephalomedullary Nail    HAND SURGERY      JOINT REPLACEMENT      TIBIA FRACTURE SURGERY Left 09/30/2022    LEFT FEMUR CEPHALOMEDULLARY NAIL performed by Bg Hook DO at [address] N/A 02/15/2022    EGD BIOPSY performed by Oliver Stanton MD at [address] 11/10/2022    EGD BIOPSY performed by Darshan Soni MD at [facility]       Previous Medications    ALBUTEROL SULFATE HFA (VENTOLIN HFA) 108 (90 BASE) MCG/ACT INHALER    Inhale 2 puffs into the lungs 4 times daily as needed for Wheezing    ALCOHOL SWABS 70 % PADS    Use as needed with blood glucose checks    ALUMINUM HYDROXIDE-MAGNESIUM CARBONATE (ACID GONE)  MG/15ML SUSPENSION    Take 5 mLs by mouth 4 times daily (with meals and nightly)    ASPIRIN EC 81 MG EC TABLET    Take 1 tablet by mouth daily    ATORVASTATIN (LIPITOR) 80 MG TABLET    Take 1 tablet by mouth nightly    BLOOD GLUCOSE MONITOR STRIPS    Test 4 times a day & as needed for symptoms of irregular blood glucose. Dispense sufficient amount for indicated testing frequency plus additional to accommodate PRN testing needs. Dispense brand that correlates with his glucometer and that's covered by his insurance. Needs delivered to him. BLOOD GLUCOSE MONITORING SUPPL (ONE TOUCH ULTRA 2) W/DEVICE KIT    1 kit by Does not apply route daily    CLONAZEPAM (KLONOPIN) 2 MG TABLET    Take 4 mg by mouth daily as needed for Anxiety. CONTINUOUS BLOOD GLUC  (DEXCOM G6 ) JANEL    1 each by Does not apply route once a week    CONTINUOUS BLOOD GLUC SENSOR (DEXCOM G6 SENSOR) MISC    1 each by Does not apply route once a week    CONTINUOUS BLOOD GLUC TRANSMIT (DEXCOM G6 TRANSMITTER) MISC    1 each by Does not apply route once a week    CYCLOBENZAPRINE (FLEXERIL) 10 MG TABLET    Take 1 tablet by mouth 3 times daily as needed for Muscle spasms    DIPHENHYDRAMINE (BENYLIN) 12.5 MG/5ML LIQUID    Take 5 mLs by mouth 4 times daily as needed for Allergies    INSULIN ASPART (NOVOLOG FLEXPEN) 100 UNIT/ML INJECTION PEN    Inject 5 Units into the skin 3 times daily (before meals) Patient has temporarily reduced his dose to 1-2 units with the small snack he has each day.     INSULIN GLARGINE (LANTUS) 100 UNIT/ML INJECTION VIAL    Inject 15 Units into the skin 2 times daily    INSULIN PEN NEEDLE ([employer] PEN NEEDLES 29G) 29G X 12MM MISC    1 each by Does not apply route daily    LANCETS MISC    1 each by Does not apply route 2 times daily LIDOCAINE VISCOUS HCL (XYLOCAINE) 2 % SOLN SOLUTION    use 5 milliliter by mouth four times a day AS NEEDED FOR PAIN or IRRITATION WITH EATING    METOCLOPRAMIDE (REGLAN) 10 MG/10ML SOLN    Take 10 mLs by mouth 3 times daily (before meals)    METOPROLOL TARTRATE (LOPRESSOR) 25 MG TABLET    Take 1 tablet by mouth 2 times daily    MISC. DEVICES (STEP N REST WALKER) MISC    1 each by Does not apply route continuous Seated, wheeled walker    NUTRITIONAL SUPPLEMENTS (GLUCOSE MANAGEMENT) TABS    Take 1 tablet by mouth daily as needed (Low sugar reaction)    ONDANSETRON (ZOFRAN ODT) 4 MG DISINTEGRATING TABLET    Take 1 tablet by mouth every 8 hours as needed for Nausea or Vomiting    PANTOPRAZOLE (PROTONIX) 40 MG TABLET    Take 40 mg by mouth 2 times daily    PNEUMOCOCCAL 20-ALPHONSO CONJ VACC IM    Inject into the muscle    SUCRALFATE (CARAFATE) 1 GM/10ML SUSPENSION    Take 10 mLs by mouth 4 times daily    TRAZODONE (DESYREL) 150 MG TABLET    Take 300 mg by mouth nightly      ALLERGIES     is allergic to azithromycin, acetaminophen-codeine, codeine, hydrocodone-acetaminophen, ibuprofen, morphine, and tramadol. FAMILY HISTORY     has no family status information on file. SOCIAL HISTORY       Social History     Tobacco Use    Smoking status: Every Day     Packs/day: 0.25     Years: 41.00     Pack years: 10.25     Types: Cigarettes    Smokeless tobacco: Never   Substance Use Topics    Alcohol use: Not Currently    Drug use: Yes     Types: Marijuana (Weed)     Comment: overdose 11/10/20     PHYSICAL EXAM     INITIAL VITALS: /68   Pulse 64   Temp 97.8 °F (36.6 °C) (Oral)   Resp 18   Ht 5' 6\" (1.676 m)   Wt 114 lb (51.7 kg)   SpO2 96%   BMI 18.40 kg/m²    Physical Exam  Constitutional:       General: He is not in acute distress. Appearance: Normal appearance. HENT:      Head: Normocephalic and atraumatic.       Right Ear: External ear normal.      Left Ear: External ear normal.      Nose: Nose normal. No congestion or rhinorrhea. Eyes:      Extraocular Movements: Extraocular movements intact. Pupils: Pupils are equal, round, and reactive to light. Cardiovascular:      Rate and Rhythm: Normal rate and regular rhythm. Pulses: Normal pulses. Heart sounds: Normal heart sounds. Pulmonary:      Effort: Pulmonary effort is normal. No respiratory distress. Breath sounds: Normal breath sounds. Abdominal:      General: Bowel sounds are normal.      Palpations: Abdomen is soft. Tenderness: There is generalized abdominal tenderness. Musculoskeletal:         General: No deformity. Normal range of motion. Right hand: Tenderness present. Cervical back: Normal range of motion. No rigidity. Skin:     General: Skin is warm and dry. Neurological:      General: No focal deficit present. Mental Status: He is alert and oriented to person, place, and time. Mental status is at baseline. Psychiatric:         Mood and Affect: Mood normal.         Behavior: Behavior normal.       MEDICAL DECISION MAKING / ED COURSE:         1)  Number and Complexity of Problems Addressed at this Encounter  Problem List This Visit: Syncope, right hand injury, abdominal pain    Differential Diagnosis: ACS, intracranial abnormality, right hand bony injury      Pertinent Comorbid Conditions: Coronary artery disease. 2)  Data Reviewed and Analyzed  (Lab and radiology tests/orders below in next section)    External Documents Reviewed: Chart reviewed    My EKG interpretation: Did not display signs of acute cardiac ischemia    Imaging that is independently reviewed and interpreted by me as: CT abdomen pelvis, CT head and cervical spine as well as chest x-ray did not display signs of acute abnormality. It did however display signs of constipation. X-ray of the right upper extremity did display signs of an acute fracture. 3)  Treatment and Disposition    Patient with a right upper extremity fracture. Splint was applied by the nurse under my direct supervision. Patient with multiple syncopal events. Cardiac work-up in the ER did not display positive signs of acute cardiac ischemia. EKG was reviewed and interpreted by myself, the ED physician. Patient with abdominal tenderness on exam.  CT imaging of the abdomen did not display signs of acute pathology. CT imaging of the head and cervical spine did not display signs of acute pathology. Patient admitted after speaking with the admitting team for a syncopal work-up. Patient understands and agrees with the plan. CRITICAL CARE:       PROCEDURES:    Splint was applied to the right upper extremity, short arm that limits mobility at the digits as well as the wrist.  This was applied by the nurse under my direct supervision. Neurovascular status was intact prior to and afterwards. The patient's pain did improve and the patient tolerated the procedure well. Procedures      DATA FOR LAB AND RADIOLOGY TESTS ORDERED BELOW ARE REVIEWED BY THE ED CLINICIAN:    RADIOLOGY: All x-rays, CT, MRI, and formal ultrasound images (except ED bedside ultrasound) are read by the radiologist, see reports below, unless otherwise noted in MDM or here. Reports below are reviewed by myself. CT ABDOMEN PELVIS W IV CONTRAST Additional Contrast? None   Preliminary Result   No significant small bowel distension or dilation. Only minimal gas   scattered in some loops of small bowel without any abnormal fluid levels. Cecum is in low position in right side of pelvis. The appendix is not identifiable and there is no secondary sign of acute   appendicitis. The appendicular area significantly overlapped by multiple   loops of small bowel. Moderate amount of stool and gas scattered in the right colon and transverse   colon and small to moderate amount of gas scattered in the descending colon.    Moderate amount of stool is also present throughout sigmoid colon and rectum. Findings suggestive of constipation. No demonstrable abnormality in liver, gallbladder, pancreas, or adrenal   glands. Normal size of spleen with calcified granulomas. Normal kidneys   bilaterally. No evidence of obstructive uropathy. RECOMMENDATIONS:         CT HEAD WO CONTRAST   Preliminary Result   No evidence of intracranial hemorrhage or any other definable acute   intracranial abnormality. No definable focal abnormality or acute process in brain. No definable fracture in the skull. Evidence of marked mucosal thickening in the right maxillary sinus,   suggesting chronic sinusitis. But the maxillary sinuses are not completely   included. CT CERVICAL SPINE WO CONTRAST   Final Result   No acute abnormality of the cervical spine. XR HAND RIGHT (2 VIEWS)   Preliminary Result   Fracture, without significant displacement in the proximal portion of the   right 5th metacarpal bone, located, 1 cm distal to the base of this bone. No other diagnostic findings. XR WRIST RIGHT (2 VIEWS)   Preliminary Result   Fracture without significant displacement at the junction of base and the   shaft of the right 5th metacarpal bone. In the rest of the right wrist there is no fracture or malalignment. XR CHEST PORTABLE   Preliminary Result   No acute cardiopulmonary process demonstrated. Clear lungs bilaterally. No pneumothorax or pleural effusion. Stable normal cardiac size, without evidence of cardiac decompensation. LABS: Lab orders shown below, the results are reviewed by myself, and all abnormals are listed below.   Labs Reviewed   APTT - Abnormal; Notable for the following components:       Result Value    PTT 21.9 (*)     All other components within normal limits   BASIC METABOLIC PANEL - Abnormal; Notable for the following components:    Glucose 340 (*)     BUN 25 (*)     Creatinine 1.87 (*)     Est, Glom Filt Rate 43 (*) Sodium 134 (*)     Chloride 94 (*)     All other components within normal limits   CBC WITH AUTO DIFFERENTIAL - Abnormal; Notable for the following components:    WBC 11.2 (*)     RBC 3.84 (*)     Hemoglobin 10.2 (*)     Hematocrit 30.6 (*)     MCV 79.9 (*)     RDW 16.2 (*)     Seg Neutrophils 77 (*)     Lymphocytes 15 (*)     All other components within normal limits   LIPASE - Abnormal; Notable for the following components:    Lipase 9 (*)     All other components within normal limits   TROPONIN   TROPONIN   PROTIME-INR   PHOSPHORUS   MAGNESIUM   HEPATIC FUNCTION PANEL       Vitals Reviewed:    Vitals:    01/18/23 0207 01/18/23 0215 01/18/23 0515 01/18/23 0530   BP: (!) 134/97 121/82 119/77 100/68   Pulse: 84 91 76 64   Resp: 16 14 16 18   Temp: 97.6 °F (36.4 °C)   97.8 °F (36.6 °C)   TempSrc: Oral   Oral   SpO2: 95% 97%  96%   Weight: 114 lb (51.7 kg)      Height: 5' 6\" (1.676 m)        MEDICATIONS GIVEN TO PATIENT THIS ENCOUNTER:  Orders Placed This Encounter   Medications    0.9 % sodium chloride bolus    0.9 % sodium chloride bolus    iopamidol (ISOVUE-370) 76 % injection 75 mL    sodium chloride flush 0.9 % injection 10 mL     DISCHARGE PRESCRIPTIONS:  New Prescriptions    No medications on file     PHYSICIAN CONSULTS ORDERED THIS ENCOUNTER:  IP CONSULT TO INTERNAL MEDICINE  IP CONSULT TO SOCIAL WORK  FINAL IMPRESSION      1. Syncope and collapse    2. Closed fracture of right hand, initial encounter          DISPOSITION/PLAN   DISPOSITION Admitted 01/18/2023 05:25:13 AM      OUTPATIENT FOLLOW UP THE PATIENT:  No follow-up provider specified.     DO Cristóbal Sherwood DO  01/18/23 0601       Cristóbal Dueñas DO  01/18/23 9101

## 2023-01-18 NOTE — ED NOTES
Report given to PCU, RN. Report method by phone   The following was reviewed with receiving RN:   Current vital signs:  /68   Pulse 64   Temp 97.8 °F (36.6 °C) (Oral)   Resp 18   Ht 5' 6\" (1.676 m)   Wt 114 lb (51.7 kg)   SpO2 96%   BMI 18.40 kg/m²                MEWS Score: 2     Any medication or safety alerts were reviewed. Any pending diagnostics and notifications were also reviewed, as well as any safety concerns or issues, abnormal labs, abnormal imaging, and abnormal assessment findings. Questions were answered.           Franklin Decker RN  01/18/23 0765

## 2023-01-19 VITALS
OXYGEN SATURATION: 96 % | SYSTOLIC BLOOD PRESSURE: 136 MMHG | DIASTOLIC BLOOD PRESSURE: 90 MMHG | WEIGHT: 116.18 LBS | TEMPERATURE: 97.8 F | BODY MASS INDEX: 18.67 KG/M2 | HEIGHT: 66 IN | HEART RATE: 67 BPM | RESPIRATION RATE: 18 BRPM

## 2023-01-19 PROBLEM — D64.9 ANEMIA: Status: ACTIVE | Noted: 2023-01-19

## 2023-01-19 PROBLEM — B19.20 HEPATITIS C VIRUS INFECTION WITHOUT HEPATIC COMA: Status: ACTIVE | Noted: 2023-01-19

## 2023-01-19 PROBLEM — N17.9 AKI (ACUTE KIDNEY INJURY) (HCC): Status: ACTIVE | Noted: 2023-01-19

## 2023-01-19 LAB
ANION GAP SERPL CALCULATED.3IONS-SCNC: 7 MMOL/L (ref 9–17)
BUN BLDV-MCNC: 15 MG/DL (ref 6–20)
CALCIUM SERPL-MCNC: 8.1 MG/DL (ref 8.6–10.4)
CHLORIDE BLD-SCNC: 103 MMOL/L (ref 98–107)
CO2: 27 MMOL/L (ref 20–31)
CREAT SERPL-MCNC: 1.28 MG/DL (ref 0.7–1.2)
EKG ATRIAL RATE: 79 BPM
EKG P AXIS: 84 DEGREES
EKG P-R INTERVAL: 148 MS
EKG Q-T INTERVAL: 414 MS
EKG QRS DURATION: 84 MS
EKG QTC CALCULATION (BAZETT): 474 MS
EKG R AXIS: 76 DEGREES
EKG T AXIS: 74 DEGREES
EKG VENTRICULAR RATE: 79 BPM
GFR SERPL CREATININE-BSD FRML MDRD: >60 ML/MIN/1.73M2
GLUCOSE BLD-MCNC: 105 MG/DL (ref 75–110)
GLUCOSE BLD-MCNC: 125 MG/DL (ref 70–99)
GLUCOSE BLD-MCNC: 169 MG/DL (ref 75–110)
GLUCOSE BLD-MCNC: 30 MG/DL (ref 75–110)
GLUCOSE BLD-MCNC: 43 MG/DL (ref 75–110)
GLUCOSE BLD-MCNC: 80 MG/DL (ref 75–110)
GLUCOSE BLD-MCNC: 92 MG/DL (ref 75–110)
MAGNESIUM: 1.4 MG/DL (ref 1.6–2.6)
POTASSIUM SERPL-SCNC: 3.2 MMOL/L (ref 3.7–5.3)
SODIUM BLD-SCNC: 137 MMOL/L (ref 135–144)

## 2023-01-19 PROCEDURE — G0378 HOSPITAL OBSERVATION PER HR: HCPCS

## 2023-01-19 PROCEDURE — 1200000000 HC SEMI PRIVATE

## 2023-01-19 PROCEDURE — APPNB30 APP NON BILLABLE TIME 0-30 MINS: Performed by: NURSE PRACTITIONER

## 2023-01-19 PROCEDURE — 97530 THERAPEUTIC ACTIVITIES: CPT

## 2023-01-19 PROCEDURE — 82947 ASSAY GLUCOSE BLOOD QUANT: CPT

## 2023-01-19 PROCEDURE — 93010 ELECTROCARDIOGRAM REPORT: CPT | Performed by: INTERNAL MEDICINE

## 2023-01-19 PROCEDURE — 6360000002 HC RX W HCPCS: Performed by: NURSE PRACTITIONER

## 2023-01-19 PROCEDURE — 83735 ASSAY OF MAGNESIUM: CPT

## 2023-01-19 PROCEDURE — 36415 COLL VENOUS BLD VENIPUNCTURE: CPT

## 2023-01-19 PROCEDURE — 2580000003 HC RX 258: Performed by: INTERNAL MEDICINE

## 2023-01-19 PROCEDURE — 97110 THERAPEUTIC EXERCISES: CPT

## 2023-01-19 PROCEDURE — 2580000003 HC RX 258: Performed by: NURSE PRACTITIONER

## 2023-01-19 PROCEDURE — 80048 BASIC METABOLIC PNL TOTAL CA: CPT

## 2023-01-19 PROCEDURE — 99232 SBSQ HOSP IP/OBS MODERATE 35: CPT | Performed by: INTERNAL MEDICINE

## 2023-01-19 PROCEDURE — 6370000000 HC RX 637 (ALT 250 FOR IP): Performed by: NURSE PRACTITIONER

## 2023-01-19 RX ORDER — DEXTROSE AND SODIUM CHLORIDE 5; .45 G/100ML; G/100ML
INJECTION, SOLUTION INTRAVENOUS CONTINUOUS
Status: DISCONTINUED | OUTPATIENT
Start: 2023-01-19 | End: 2023-01-19 | Stop reason: HOSPADM

## 2023-01-19 RX ADMIN — DEXTROSE AND SODIUM CHLORIDE: 5; 450 INJECTION, SOLUTION INTRAVENOUS at 12:33

## 2023-01-19 RX ADMIN — METOPROLOL TARTRATE 25 MG: 25 TABLET, FILM COATED ORAL at 11:01

## 2023-01-19 RX ADMIN — ENOXAPARIN SODIUM 40 MG: 100 INJECTION SUBCUTANEOUS at 11:02

## 2023-01-19 RX ADMIN — MAGNESIUM SULFATE HEPTAHYDRATE 1000 MG: 1 INJECTION, SOLUTION INTRAVENOUS at 15:26

## 2023-01-19 RX ADMIN — SUCRALFATE 1 G: 1 TABLET ORAL at 11:01

## 2023-01-19 RX ADMIN — MAGNESIUM SULFATE HEPTAHYDRATE 1000 MG: 1 INJECTION, SOLUTION INTRAVENOUS at 11:24

## 2023-01-19 RX ADMIN — DEXTROSE MONOHYDRATE 250 ML: 100 INJECTION, SOLUTION INTRAVENOUS at 04:36

## 2023-01-19 RX ADMIN — POTASSIUM CHLORIDE 40 MEQ: 1500 TABLET, EXTENDED RELEASE ORAL at 11:20

## 2023-01-19 RX ADMIN — SUCRALFATE 1 G: 1 TABLET ORAL at 06:19

## 2023-01-19 RX ADMIN — PANTOPRAZOLE SODIUM 40 MG: 40 TABLET, DELAYED RELEASE ORAL at 11:01

## 2023-01-19 RX ADMIN — ASPIRIN 81 MG: 81 TABLET, COATED ORAL at 11:01

## 2023-01-19 RX ADMIN — Medication 5 ML: at 07:21

## 2023-01-19 RX ADMIN — METOCLOPRAMIDE HYDROCHLORIDE 10 MG: 5 SOLUTION ORAL at 11:00

## 2023-01-19 RX ADMIN — Medication 5 ML: at 11:00

## 2023-01-19 RX ADMIN — METOCLOPRAMIDE HYDROCHLORIDE 10 MG: 5 SOLUTION ORAL at 06:19

## 2023-01-19 NOTE — PROGRESS NOTES
[address]   OCCUPATIONAL THERAPY MISSED TREATMENT NOTE   INPATIENT   Date: 23  Patient Name: Cherelle Heading       Room: 6878/2047-88  MRN: [id number]   Account #: [de-identified]    : 1970  (47 y.o.)  Gender: male   Referring Practitioner: LUIS Cueto CNP  Diagnosis: Syncope and collapse             REASON FOR MISSED TREATMENT:  Patient refusal   -    Pt declines at this time due to headache. Occupational therapy will attempt again if time allows.    [phone]        Michael Montesinos OT

## 2023-01-19 NOTE — PROGRESS NOTES
Pt c/o \"feels like sugar is low\". Blood sugar 30, patient alert and oriented, D10 bolus started. Blood sugar after infusion 169.

## 2023-01-19 NOTE — PROGRESS NOTES
[address]   INPATIENT OCCUPATIONAL THERAPY  PROGRESS NOTE  Date: 2023  Patient Name: Lisseth Sierra       Room: 5006/8855-01  MRN: [id number]    : 1970  (47 y.o.)  Gender: male   Referring Practitioner: LUIS Dang CNP  Diagnosis: Syncope and collapse    Restrictions/Precautions  Restrictions/Precautions  Restrictions/Precautions: Fall Risk;General Precautions; Up as Tolerated  Required Braces or Orthoses?: No  Implants present? : Metal implants (Cephalomedullary nailing)    Vitals  O2 Device: None (Room air)    Subjective  Subjective  Subjective: \"I will try a little. \" pt was agreeable to OT session  Subjective  Pain: Pt reports 8/10 pain in R hand and leg  Comments: OK per RN Noy Lizama for OT sessiom    Objective  Orientation  Overall Orientation Status: Within Functional Limits  Cognition  Overall Cognitive Status: WFL    Activities of Daily Living  ADL  Feeding: Independent  Feeding Skilled Clinical Factors: pt reports able to self feed without any assistance  Grooming: Independent  UE Bathing: Modified independent   LE Bathing: Modified independent   UE Dressing: Modified independent   LE Dressing: Modified independent   LE Dressing Skilled Clinical Factors: Pt able to ramon bilateral socks while sitting EOB  Toileting: Modified independent   Additional Comments: ADL scores based on clinical reasoning, patient report, and skilled observation unless otherwise noted. Balance  Balance  Sitting Balance: Independent  Standing Balance: Independent    Transfers/Mobility  Bed mobility  Supine to Sit: Modified independent  Sit to Supine: Modified independent  Scooting: Modified independent  Bed Mobility Comments: Bed mobility completed with HOB elevated  Transfers  Sit to stand: Independent  Stand to sit: Independent    Functional Mobility  Functional - Mobility Device: No device  Activity: Other (in room)  Assist Level:  Independent  Functional Mobility Comments: No LOB      OT Exercises  Exercise Treatment: OT facilitated pts engagement in LUE with use of 1.5 wrist weight for 15 reps each in each plane  A/AROM Exercises: OT facilitated pts engagment in AROM with use of RUE to increase strength and assist with reducing edema. Pt educated on hand exercises to assist with mobility and reduce edema in fingers while splited. Good carryover noted.       Patient Education  Patient Education  Education Given To: Patient  Education Provided: Role of Therapy, Plan of Care, Fall Prevention Strategies  Education Method: Demonstration, Verbal  Barriers to Learning: None  Education Outcome: Verbalized understanding, Demonstrated understanding      Goals  Short Term Goals  Time Frame for Short Term Goals: By discharge  Short Term Goal 1: Pt will perform BADLs with Mod I, good safety, and use of AE/DME/Modified techniques as needed (Goal met 1/19 Annita Counts OTR/L)  Short Term Goal 2: Pt will perform functional transfers/mobility with Mod I, good safety, and use of least restrictive device (Goal met Annita Counts OTR/L)  Short Term Goal 3: Pt will participate in 15+ minutes of therapeutic exercise/functional activity to improve safety and independence with self-care  Short Term Goal 4: Pt will tolerate standing for 8+ minutes, Mod I, with 0-1 UE support and no LOB during self-care/functional activity (Goal met Annita Counts OTR/L)  Short Term Goal 5: Pt will verbalize/demonstrate good understanding of home safety/fall prevention strategies to improve safety and independence with self-care  Occupational Therapy Plan  Times Per Week: 4-6 (Discontinue OT 1/19 Annita Counts OTR/L)  Current Treatment Recommendations: Strengthening, ROM, Balance training, Functional mobility training, Endurance training, Pain management, Safety education & training, Patient/Caregiver education & training, Equipment evaluation, education, & procurement, Positioning, Self-Care / ADL, Home management training, Coordination training      Assessment  Activity Tolerance  Activity Tolerance: Patient Tolerated treatment well  Assessment  Performance deficits / Impairments: Decreased functional mobility , Decreased ADL status, Decreased ROM, Decreased strength, Decreased endurance, Decreased balance, Decreased sensation, Decreased high-level IADLs, Decreased fine motor control, Decreased coordination  Assessment: Pt independent in room at this time with self care and mobility. Pt denies any further concerns with self care tasks. Pt educated on home safety and exercises for home with Good carryover. No further OT needed at this time.   Treatment Diagnosis: Impaired self-care status  Prognosis: Good  Decision Making: Medium Complexity  No Skilled OT: Safe to return home (Met Goals)  Discharge Recommendations: Patient would benefit from continued therapy after discharge  OT Equipment Recommendations  Other: TBD  Safety Devices  Type of Devices: Call light within reach, Gait belt, All fall risk precautions in place, Left in bed, Bed alarm in place, Patient at risk for falls, Nurse notified (Nurse Nishant Andrade notified; also requested that patient recieves ensures to support nutrition and intake)      AM-PAC Daily Activities Inpatient  AM-PAC Daily Activity Inpatient   How much help for putting on and taking off regular lower body clothing?: None  How much help for Bathing?: None  How much help for Toileting?: None  How much help for putting on and taking off regular upper body clothing?: None  How much help for taking care of personal grooming?: None  How much help for eating meals?: None  AM-PAC Inpatient Daily Activity Raw Score: 24  AM-PAC Inpatient ADL T-Scale Score : 57.54  ADL Inpatient CMS 0-100% Score: 0  ADL Inpatient CMS G-Code Modifier : [address]    OT Minutes  OT Individual Minutes  Time In: [address]  Time Out: [address] [address]  Minutes: 31  Time Code Minutes   Timed Code Treatment Minutes: 31 Minutes      Nolvia Santana OT

## 2023-01-19 NOTE — PROGRESS NOTES
Patient signed Kwame Collado paperwork. He states a doctor came in an ok'd him leaving but there are no notes and all doctors notified of him leaving. Patient's IV was removed without complication. Heart monitor removed. Patient got dressed and states he has all his belongings. His brother is on his way to get him.

## 2023-01-19 NOTE — CARE COORDINATION
Writer scheduled cardiology appt at [facility]: 2/8/23 at 2:15 pm, [facility] [address] office. He is currently admitted after syncopal episode, GI is being consulted. Will call patient after discharge to review appt, follow up from admit.

## 2023-01-19 NOTE — PROGRESS NOTES
[facility] Internal Medicine  Bridgett Haq MD; Gaston Benitez MD; Hector Wilson MD; MD Andria Coombs MD; MD NANCY Paredes [facility] Internal Medicine   [facility]    HISTORY AND PHYSICAL EXAMINATION            Date:   1/19/2023  Patient name:  Lisseth Sierra  Date of admission:  1/18/2023  2:06 AM  MRN:   [id number]  Account:  [de-identified]  YOB: 1970  PCP:    BINA Zambrano  Room:   [facility]1230-  Code Status:    Full Code    Chief Complaint:     Chief Complaint   Patient presents with    Loss of Consciousness    Fall    Neck Pain   Fall  Sore throat  Odynophagia  History Obtained From:     Patient medical record nursing staff    History of Present Illness:     Lisseth Sierra is a 47 y.o. Non- / non  male who presents with Loss of Consciousness, Fall, and Neck Pain   and is admitted to the hospital for the management of Syncope and collapse. Lisseth Sierra is a 47 y.o. Non- / non  male who presents with Loss of Consciousness, Fall, and Neck Pain   and is admitted to the hospital for the management of Syncope and collapse. Patient to ED via EMS after having an episode of syncope and collapse. Patient's child said that he passed out 4 times; however, the patient only recalls 2 episodes. Patient reports that this has happened in the past.  Patient found to have a nondisplaced fracture of right fifth metacarpal bone; however it is unclear as to how this injury occurred. ED provider thought that he injured his hand during a syncopal episode but there is documentation of a visit at [facility] ED on 1/16 that notes the fracture and states patient left the ED AMA without treatment. Splint was applied to right hand and this ED today.      Past Medical History:     Past Medical History:   Diagnosis Date    Diabetes mellitus ([address]     Obstructive lung disease ([address]     8/2022 Panic attack         Past Surgical History:     Past Surgical History:   Procedure Laterality Date    APPENDECTOMY      FEMUR FRACTURE SURGERY Left 09/30/2022    Cephalomedullary Nail    HAND SURGERY      JOINT REPLACEMENT      TIBIA FRACTURE SURGERY Left 09/30/2022    LEFT FEMUR CEPHALOMEDULLARY NAIL performed by Leodan Ruth DO at [facility] OR    UPPER GASTROINTESTINAL ENDOSCOPY N/A 02/15/2022    EGD BIOPSY performed by Dio Laura MD at [facility] Endoscopy    UPPER GASTROINTESTINAL ENDOSCOPY N/A 11/10/2022    EGD BIOPSY performed by Nydia Prado MD at [facility]        Medications Prior to Admission:     Prior to Admission medications    Medication Sig Start Date End Date Taking? Authorizing Provider   insulin aspart (NOVOLOG FLEXPEN) 100 UNIT/ML injection pen Inject 5 Units into the skin 3 times daily (before meals) Patient has temporarily reduced his dose to 1-2 units with the small snack he has each day. 1/16/23   BINA Castaneda   insulin glargine (LANTUS) 100 UNIT/ML injection vial Inject 15 Units into the skin 2 times daily 1/16/23   BINA Castaneda   diphenhydrAMINE (BENYLIN) 12.5 MG/5ML liquid Take 5 mLs by mouth 4 times daily as needed for Allergies 1/17/23 2/16/23  BINA Castaneda   aluminum hydroxide-magnesium carbonate (ACID GONE)  MG/15ML suspension Take 5 mLs by mouth 4 times daily (with meals and nightly)  Patient not taking: Reported on 1/18/2023 1/17/23   BINA Castaneda   sucralfate (CARAFATE) 1 GM/10ML suspension Take 10 mLs by mouth 4 times daily 1/10/23   BINA Castaneda   metoclopramide (REGLAN) 10 MG/10ML SOLN Take 10 mLs by mouth 3 times daily (before meals) 1/10/23   BINA Castaneda   cyclobenzaprine (FLEXERIL) 10 MG tablet Take 1 tablet by mouth 3 times daily as needed for Muscle spasms 1/10/23 1/20/23  LUIS Angelo - CNP   pantoprazole (PROTONIX) 40 MG tablet Take 40 mg by mouth 2 times daily    Historical Provider, MD   PNEUMOCOCCAL 20-ALPHONSO CONJ VACC  IM Inject into the muscle    Historical Provider, MD   lidocaine viscous hcl (XYLOCAINE) 2 % SOLN solution use 5 milliliter by mouth four times a day AS NEEDED FOR PAIN or IRRITATION WITH EATING 1/3/23   BINA Galeana   Continuous Blood Gluc Transmit (DEXCOM G6 TRANSMITTER) MISC 1 each by Does not apply route once a week 12/30/22   Garret Rangel MD   Continuous Blood Gluc Sensor (DEXCOM G6 SENSOR) MISC 1 each by Does not apply route once a week 12/30/22   Garret Rangel MD   blood glucose monitor strips Test 4 times a day & as needed for symptoms of irregular blood glucose. Dispense sufficient amount for indicated testing frequency plus additional to accommodate PRN testing needs. Dispense brand that correlates with his glucometer and that's covered by his insurance. Needs delivered to him. 12/30/22   Garret Rangel MD   Nutritional Supplements (GLUCOSE MANAGEMENT) TABS Take 1 tablet by mouth daily as needed (Low sugar reaction) 12/20/22   BINA Galeana   aspirin EC 81 MG EC tablet Take 1 tablet by mouth daily  Patient not taking: Reported on 1/18/2023 12/16/22   BINA Galeana   Continuous Blood Gluc  ([address]) [address] 1 each by Does not apply route once a week 11/18/22   BINA Galeana   Misc.  Devices (STEP N REST WALKER) MISC 1 each by Does not apply route continuous Seated, wheeled walker 11/18/22   BINA Galeana   ondansetron (ZOFRAN ODT) 4 MG disintegrating tablet Take 1 tablet by mouth every 8 hours as needed for Nausea or Vomiting 11/18/22   BINA Galeana   albuterol sulfate HFA (VENTOLIN HFA) 108 (90 Base) MCG/ACT inhaler Inhale 2 puffs into the lungs 4 times daily as needed for Wheezing 11/15/22   BINA Galeana   metoprolol tartrate (LOPRESSOR) 25 MG tablet Take 1 tablet by mouth 2 times daily 11/14/22   BINA Galeana   atorvastatin (LIPITOR) 80 MG tablet Take 1 tablet by mouth nightly 11/6/22   Latoya Lopez MD   Blood Glucose Monitoring Suppl (ONE TOUCH ULTRA 2) w/Device KIT 1 kit by Does not apply route daily 3/18/22   Roselia Bottoms, DO   Lancets MISC 1 each by Does not apply route 2 times daily 3/18/22   Roselia Bottoms, DO   Alcohol Swabs 70 % PADS Use as needed with blood glucose checks 2/15/22   Asher Chaparro MD   Insulin Pen Needle ([employer] PEN NEEDLES 29G) 29G X 12MM MISC 1 each by Does not apply route daily 2/15/22   Asher Ty MD   traZODone (DESYREL) 150 MG tablet Take 300 mg by mouth nightly     Historical Provider, MD   clonazePAM (KLONOPIN) 2 MG tablet Take 4 mg by mouth daily as needed for Anxiety. Historical Provider, MD        Allergies:     Azithromycin, Acetaminophen-codeine, Codeine, Hydrocodone-acetaminophen, Ibuprofen, Morphine, and Tramadol    Social History:     Tobacco:    reports that he has been smoking cigarettes. He has a 10.25 pack-year smoking history. He has never used smokeless tobacco.  Alcohol:      reports that he does not currently use alcohol. Drug Use:  reports current drug use. Drug: Marijuana [place]). Family History:     History reviewed. No pertinent family history. Review of Systems:     Positive and Negative as described in HPI.   Right hand fracture recent left hip fracture and left to open reduction internal fixation  CONSTITUTIONAL:  negative for fevers, chills, sweats, fatigue, weight loss  HEENT:  negative for vision, hearing changes, runny nose, throat pain  RESPIRATORY:  negative for shortness of breath, cough, congestion, wheezing  CARDIOVASCULAR:  negative for chest pain, palpitations  GASTROINTESTINAL: Positive for pain on swallowing  GENITOURINARY:  negative for difficulty of urination, burning with urination, frequency   INTEGUMENT:  negative for rash, skin lesions, easy bruising   HEMATOLOGIC/LYMPHATIC:  negative for swelling/edema   ALLERGIC/IMMUNOLOGIC:  negative for urticaria , itching  ENDOCRINE:  negative increase in drinking, increase in urination, hot or cold intolerance  MUSCULOSKELETAL:  negative joint pains, muscle aches, swelling of joints  NEUROLOGICAL:  negative for headaches, dizziness, lightheadedness, numbness, pain, tingling extremities  BEHAVIOR/PSYCH:  negative for depression, anxiety    Physical Exam:   /70   Pulse 74   Temp 97.4 °F (36.3 °C) (Oral)   Resp 18   Ht 5' 6\" (1.676 m)   Wt 116 lb 2.9 oz (52.7 kg)   SpO2 94%   BMI 18.75 kg/m²   Temp (24hrs), Av.9 °F (36.6 °C), Min:97.4 °F (36.3 °C), Max:98.9 °F (37.2 °C)    Recent Labs     23  0431 23  0451 23  0640   POCGLU 191* 30* 169* 105         Intake/Output Summary (Last 24 hours) at 2023 0855  Last data filed at 2023 0205  Gross per 24 hour   Intake 240 ml   Output 975 ml   Net -735 ml       Severe protein calorie malnutrition BMI 18.4  Right hand metacarpal fracture  General Appearance: alert, well appearing, and in no acute distress  Mental status: oriented to person, place, and time  Head: normocephalic, atraumatic  Eye: no icterus, redness, pupils equal and reactive, extraocular eye movements intact, conjunctiva clear  Ear: normal external ear, no discharge, hearing intact  Nose: no drainage noted  Mouth: mucous membranes moist  Neck: supple, no carotid bruits, thyroid not palpable  Lungs: Bilateral equal air entry, clear to ausculation, no wheezing, rales or rhonchi, normal effort  Cardiovascular: normal rate, regular rhythm, no murmur, gallop, rub  Abdomen: Soft, nontender, nondistended, normal bowel sounds, no hepatomegaly or splenomegaly  Neurologic: There are no new focal motor or sensory deficits, normal muscle tone and bulk, no abnormal sensation, normal speech, cranial nerves II through XII grossly intact  Skin: No gross lesions, rashes, bruising or bleeding on exposed skin area  Extremities: Recent ORIF on left hip post fracture  peripheral pulses palpable, no pedal edema or calf pain with palpation  Psych: normal affect    Investigations:      Laboratory Testing:  Recent Results (from the past 24 hour(s))   POC Glucose Fingerstick    Collection Time: 01/18/23 11:52 AM   Result Value Ref Range    POC Glucose 253 (H) 75 - 110 mg/dL   POC Glucose Fingerstick    Collection Time: 01/18/23  4:22 PM   Result Value Ref Range    POC Glucose 139 (H) 75 - 110 mg/dL   POC Glucose Fingerstick    Collection Time: 01/18/23  8:07 PM   Result Value Ref Range    POC Glucose 191 (H) 75 - 110 mg/dL   POC Glucose Fingerstick    Collection Time: 01/19/23  4:31 AM   Result Value Ref Range    POC Glucose 30 (LL) 75 - 110 mg/dL   POC Glucose Fingerstick    Collection Time: 01/19/23  4:51 AM   Result Value Ref Range    POC Glucose 169 (H) 75 - 110 mg/dL   Basic Metabolic Panel w/ Reflex to MG    Collection Time: 01/19/23  5:59 AM   Result Value Ref Range    Glucose 125 (H) 70 - 99 mg/dL    BUN 15 6 - 20 mg/dL    Creatinine 1.28 (H) 0.70 - 1.20 mg/dL    Est, Glom Filt Rate >60 >60 mL/min/1.73m2    Calcium 8.1 (L) 8.6 - 10.4 mg/dL    Sodium 137 135 - 144 mmol/L    Potassium 3.2 (L) 3.7 - 5.3 mmol/L    Chloride 103 98 - 107 mmol/L    CO2 27 20 - 31 mmol/L    Anion Gap 7 (L) 9 - 17 mmol/L   Magnesium    Collection Time: 01/19/23  5:59 AM   Result Value Ref Range    Magnesium 1.4 (L) 1.6 - 2.6 mg/dL   POC Glucose Fingerstick    Collection Time: 01/19/23  6:40 AM   Result Value Ref Range    POC Glucose 105 75 - 110 mg/dL       Imaging/Diagnostics:  XR WRIST RIGHT (2 VIEWS)    Result Date: 1/18/2023  Fracture without significant displacement at the junction of base and the shaft of the right 5th metacarpal bone. In the rest of the right wrist there is no fracture or malalignment. XR HAND RIGHT (2 VIEWS)    Result Date: 1/18/2023  Fracture, without significant displacement in the proximal portion of the right 5th metacarpal bone, located, 1 cm distal to the base of this bone. No other diagnostic findings.      CT HEAD WO CONTRAST    Result Date: 1/18/2023  No evidence of intracranial hemorrhage or any other definable acute intracranial abnormality. No definable focal abnormality or acute process in brain. No definable fracture in the skull. Evidence of marked mucosal thickening in the right maxillary sinus, suggesting chronic sinusitis. But the maxillary sinuses are not completely included. CT CERVICAL SPINE WO CONTRAST    Result Date: 1/18/2023  No acute abnormality of the cervical spine. CT ABDOMEN PELVIS W IV CONTRAST Additional Contrast? None    Result Date: 1/18/2023  No significant small bowel distension or dilation. Only minimal gas scattered in some loops of small bowel without any abnormal fluid levels. Cecum is in low position in right side of pelvis. Appendix is not identifiable and there is no secondary sign of acute appendicitis. Appendicular area significantly overlapped by multiple loops of small bowel. Moderate amount of stool and gas scattered in the right colon and transverse colon and small to moderate amount of gas scattered in the descending colon. Moderate amount of stool is also present throughout sigmoid colon and rectum. Findings suggestive of constipation. No demonstrable abnormality in liver, gallbladder, pancreas, or adrenal glands. Normal size of spleen with calcified granulomas. Normal kidneys bilaterally. No evidence of obstructive uropathy. XR CHEST PORTABLE    Result Date: 1/18/2023  No acute cardiopulmonary process demonstrated. Clear lungs bilaterally. No pneumothorax or pleural effusion. Stable normal cardiac size, without evidence of cardiac decompensation.        Assessment :      Hospital Problems             Last Modified POA    * (Principal) Syncope and collapse 1/18/2023 Yes    Tobacco abuse 1/18/2023 Yes    Stage 3a chronic kidney disease ([address] 1/18/2023 Yes    LIBBY (acute kidney injury) ([address] 1/19/2023 Yes    Uncontrolled type 1 diabetes mellitus with hyperglycemia ([address] 1/18/2023 Yes     Plan:     Patient status n the Progressive Unit/Step down  81-year-old gentleman with a severe protein calorie malnutrition history of drug abuse history of falls in the past left hip fracture last year status post open reduction internal fixation  Very poor historian lives with 2 children at home 816 and 13  Complains of odynophagia not taking adequate oral intake noted to have acute kidney injury creatinine 1.8 baseline is 1  Uncontrolled diabetes mellitus with severe protein calorie malnutrition  Fall could be orthostatic check orthostatic blood pressure dehydration secondary to odynophagia and GI consult for EGD rule out esophageal candidiasis we will check for HIV screen and hepatitis C  Patient has history of drug abuse  IV fluid resuscitation  Patient requires inpatient hospital admission due to above multiple comorbid conditions  [address]  Hypotension likely cause of syncope  LIBBY improved with IV fluid resuscitation  Plan for EGD tomorrow n.p.o. from midnight  Discharge planning after EGD tomorrow  OT PT eval    Consultations:   IP CONSULT TO INTERNAL MEDICINE  IP CONSULT TO SOCIAL WORK  IP CONSULT TO GI     Patient is admitted as inpatient status because of co-morbidities listed above, severity of signs and symptoms as outlined, requirement for current medical therapies and most importantly because of direct risk to patient if care not provided in a hospital setting. Expected length of stay > 48 hours. Juan Rosales MD  1/19/2023  8:55 AM    Copy sent to BINA Garsia    Please note that this chart was generated using voice recognition Dragon dictation software. Although every effort was made to ensure the accuracy of this automated transcription, some errors in transcription may have occurred.

## 2023-01-19 NOTE — CARE COORDINATION
ONGOING DISCHARGE PLAN:    Patient is alert and oriented x4. Spoke with patient regarding discharge plan and patient confirms that plan is still home. IV fluids, creatinine improved 1.28  K 3.2  GI consult-EGD planned for 120    States he has an appt 1/20 with Ye CURRAN to get new Dexcom. Patient wants to discharge today and follow up with GI later. Would like to sign himself out AMA. I told him to speak with his nurse to see what the physician would say. Will continue to follow for additional discharge needs.     Electronically signed by Jannie Maurer RN on 1/19/2023 at 3:16 PM

## 2023-01-19 NOTE — PROGRESS NOTES
Comprehensive Nutrition Assessment    Type and Reason for Visit:  Initial, Positive Nutrition Screen (wt loss)    Nutrition Recommendations/Plan:   Will provide 5 carbohydrate choices per tray and add Glucerna 3 times daily     Malnutrition Assessment:  Malnutrition Status: Moderate malnutrition (01/19/23 1419)    Context:  Chronic Illness     Findings of the 6 clinical characteristics of malnutrition:  Energy Intake:  75% or less estimated energy requirements for 1 month or longer  Weight Loss:   (9.4% wt loss over 8 months)     Body Fat Loss:   (Moderate) Triceps   Muscle Mass Loss:   (Moderate) Hand (interosseous)  Fluid Accumulation:  No significant fluid accumulation     Strength:  Not Performed    Nutrition Assessment:    Pt was admitted due to Syncope/Fx of hand. Pt states po is difficult with sore throat. Pt states drinking is a little easier than solid food. Plan is for EGD on 1/20. Nutrition Related Findings:    no edema, labs: Glu POC , Meds: Reglan, PMH: DM, Obstruction Lung Disease, CKD, Hep C, Polysubstance Abuse, Malnutrition Wound Type: None       Current Nutrition Intake & Therapies:    Average Meal Intake: 1-25%     ADULT DIET; Regular; 4 carb choices (60 gm/meal)    Anthropometric Measures:  Height: 5' 6\" (167.6 cm)  Ideal Body Weight (IBW): 142 lbs (65 kg)    Admission Body Weight: 116 lb (52.6 kg)  Current Body Weight: 116 lb (52.6 kg),   IBW. Weight Source: Bed Scale  Current BMI (kg/m2): 18.7  Usual Body Weight: 128 lb (58.1 kg) (4/22)  % Weight Change (Calculated): -9.4                    BMI Categories: Normal Weight (BMI 18.5-24. 9)    Estimated Daily Nutrient Needs:  Energy Requirements Based On: Formula  Weight Used for Energy Requirements: Admission  Energy (kcal/day): [place] x 1.3= 1700 kcal (8958-6334 kcal for wt gain)  Weight Used for Protein Requirements: Admission  Protein (g/day): 1.5g/kg= 80 g       Nutrition Diagnosis:   Moderate malnutrition related to inadequate protein-energy intake as evidenced by Criteria as identified in malnutrition assessment    Nutrition Interventions:   Food and/or Nutrient Delivery: Modify Current Diet, Start Oral Nutrition Supplement  Nutrition Education/Counseling: Education completed (encouraged supplements)  Coordination of Nutrition Care: Continue to monitor while inpatient       Goals:     Goals: PO intake 50% or greater       Nutrition Monitoring and Evaluation:      Food/Nutrient Intake Outcomes: Food and Nutrient Intake, Supplement Intake  Physical Signs/Symptoms Outcomes: Biochemical Data, GI Status, Fluid Status or Edema, Skin, Weight    Discharge Planning:     Too soon to determine     Jose Miguel Hassans, [address] [place] [address], LD  Contact: [phone]

## 2023-01-19 NOTE — PLAN OF CARE
PRE CONSULT ROUNDING NOTE  HPI   47year old male with pmh of DM obstructive lung disease, iron deficiency anemia untreated hepatitis c (genotype undetermined), who presented to the ED s/p fall. Our service is consulted for odynophagia. He was seen in November for the same issue. States he has had odynophagia since that admission that is unchanged. Was supposed to be on acid reflux medication and currently isnt, he does state that he did finish a \"prescription from last time\" but does not know the name of the medication, is not having dysphagia or abdominal pain. No fevers or chills. Ct abd shows   No significant small bowel distension or dilation. Only minimal gas   scattered in some loops of small bowel without any abnormal fluid levels. Cecum is in low position in right side of pelvis. Appendix is not identifiable and there is no secondary sign of acute   appendicitis. Appendicular area significantly overlapped by multiple loops   of small bowel. Moderate amount of stool and gas scattered in the right colon and transverse   colon and small to moderate amount of gas scattered in the descending colon. Moderate amount of stool is also present throughout sigmoid colon and rectum. Findings suggestive of constipation. No demonstrable abnormality in liver,   gallbladder, pancreas, or adrenal glands. Normal size of spleen with   calcified granulomas. Normal kidneys bilaterally. No evidence of   obstructive uropathy. Last AFP in November of 2022 4 liver autoimmune testing normal in November of 2022 creat 1.28 wbc 11.2 hgb 10.2 iron level and saturation normal lft  and inr normal. States he has had weight loss and nausea, has been taking magic mouth was for his pain. He has not had hematemesis hematochezia melena change in the bowel habits or rash.      He has not had fevers chills melena hematochezia hematemesis change in the bowel habits rash    Endoscopy no colonoscopy 11/7/22 egd (yudelka) LA grade D esophagitis bx negative     Family reports  no hx of liver pancreatic stomach or colon cancer no uc/crohns  Social no smoking no etoh marijuana use used IV drugs years ago    /70   Pulse 74   Temp 97.4 °F (36.3 °C) (Oral)   Resp 18   Ht 5' 6\" (1.676 m)   Wt 116 lb 2.9 oz (52.7 kg)   SpO2 94%   BMI 18.75 kg/m²     ROS as above meds labs imaging and past medical records were reviewed    Exam  General Appearance: alert and oriented to person, place and time, well-developed and well-nourished, in no acute distress  Skin: warm and dry, no rash or erythema  Head: normocephalic and atraumatic  Eyes: pupils equal, round, and reactive to light, extraocular eye movements intact, conjunctivae normal  ENT: hearing grossly normal bilaterally  Neck: neck supple and non tender without mass, no thyromegaly or thyroid nodules, no cervical lymphadenopathy   Pulmonary/Chest: clear to auscultation bilaterally- no wheezes, rales or rhonchi, normal air movement, no respiratory distress  Cardiovascular: normal rate, regular rhythm, normal S1 and S2, no murmurs, rubs, clicks or gallops, distal pulses intact, no carotid bruits  Abdomen: soft,  non tender, non-distended, normal bowel sounds, no masses or organomegaly no ascites  Extremities: no cyanosis, clubbing or edema right arm with ace wrap  Musculoskeletal: normal range of motion, no joint swelling, deformity or tenderness  Neurologic: no cranial nerve deficit and muscle strength normal    Assessment  Odynophagia  Fall  Anemia  leucocytosis  Untreated hepatitis c  Weight loss    Plan  Will d/w md  Ppi  May need repeat egd, outpt colonoscopy for weight loss and anemia  Outpt f/u for hep c   Formal gi consult to follow  . Dhara Caldwell, APRN - CNP

## 2023-01-19 NOTE — PROGRESS NOTES
Physical Therapy        Physical Therapy Cancel Note      DATE: 2023    NAME: Larry Smith  MRN: [id number]   : 1970      Patient not seen this date for Physical Therapy due to:    Patient Declined: pt refused in AM due to having a headache.  Will attempt to see again if time permits      Electronically signed by Jaquan Stewart PT on 2023 at 10:17 AM

## 2023-01-19 NOTE — PROGRESS NOTES
Pt wants to go home AMA. RN tried explaining the benefits of staying he insists he will follow up with doctors outpatient and already has appts set up. RN will notify MD's. Dr. Magdy Law and Monse Concepcion NP (GI) were notified via perfect serve.

## 2023-01-19 NOTE — PROGRESS NOTES
Dr. Noelle Eli was notified of patient having low blood sugars frequently. Per his orders start D5 0.45 at 75ml/hr.

## 2023-01-19 NOTE — PLAN OF CARE
Problem: Discharge Planning  Goal: Discharge to home or other facility with appropriate resources  Outcome: Progressing  Flowsheets (Taken 1/19/2023 0306)  Discharge to home or other facility with appropriate resources:   Identify barriers to discharge with patient and caregiver   Arrange for needed discharge resources and transportation as appropriate   Identify discharge learning needs (meds, wound care, etc)   Refer to discharge planning if patient needs post-hospital services based on physician order or complex needs related to functional status, cognitive ability or social support system     Problem: Pain  Goal: Verbalizes/displays adequate comfort level or baseline comfort level  Outcome: Progressing  Flowsheets (Taken 1/19/2023 0306)  Verbalizes/displays adequate comfort level or baseline comfort level:   Encourage patient to monitor pain and request assistance   Assess pain using appropriate pain scale   Administer analgesics based on type and severity of pain and evaluate response   Implement non-pharmacological measures as appropriate and evaluate response   Notify Licensed Independent Practitioner if interventions unsuccessful or patient reports new pain     Problem: Chronic Conditions and Co-morbidities  Goal: Patient's chronic conditions and co-morbidity symptoms are monitored and maintained or improved  Outcome: Progressing  Flowsheets (Taken 1/19/2023 0306)  Care Plan - Patient's Chronic Conditions and Co-Morbidity Symptoms are Monitored and Maintained or Improved:   Monitor and assess patient's chronic conditions and comorbid symptoms for stability, deterioration, or improvement   Collaborate with multidisciplinary team to address chronic and comorbid conditions and prevent exacerbation or deterioration   Update acute care plan with appropriate goals if chronic or comorbid symptoms are exacerbated and prevent overall improvement and discharge     Problem: Skin/Tissue Integrity  Goal: Absence of new skin breakdown  Description: 1. Monitor for areas of redness and/or skin breakdown  2. Assess vascular access sites hourly  3. Every 4-6 hours minimum:  Change oxygen saturation probe site  4. Every 4-6 hours:  If on nasal continuous positive airway pressure, respiratory therapy assess nares and determine need for appliance change or resting period. Outcome: Progressing  Note: No evidence of new skin breakdown this shift. Problem: Safety - Adult  Goal: Free from fall injury  Outcome: Progressing  Flowsheets (Taken 1/19/2023 0306)  Free From Fall Injury: Instruct family/caregiver on patient safety  Note: Pt free from falls this shift. Call light within reach, calls out appropriately, room free of clutter. Problem: ABCDS Injury Assessment  Goal: Absence of physical injury  Outcome: Progressing  Flowsheets (Taken 1/19/2023 0306)  Absence of Physical Injury: Implement safety measures based on patient assessment  Note: Pt free from injury this shift. Bed locked, in lowest position.

## 2023-01-20 ENCOUNTER — TELEPHONE (OUTPATIENT)
Dept: PHARMACY | Age: 53
End: 2023-01-20

## 2023-01-20 ENCOUNTER — CARE COORDINATION (OUTPATIENT)
Dept: CARE COORDINATION | Age: 53
End: 2023-01-20

## 2023-01-20 ENCOUNTER — OFFICE VISIT (OUTPATIENT)
Dept: PRIMARY CARE CLINIC | Age: 53
End: 2023-01-20

## 2023-01-20 ENCOUNTER — TELEPHONE (OUTPATIENT)
Dept: GASTROENTEROLOGY | Age: 53
End: 2023-01-20

## 2023-01-20 VITALS
DIASTOLIC BLOOD PRESSURE: 80 MMHG | OXYGEN SATURATION: 98 % | SYSTOLIC BLOOD PRESSURE: 130 MMHG | HEIGHT: 66 IN | HEART RATE: 76 BPM | BODY MASS INDEX: 18.96 KG/M2 | WEIGHT: 118 LBS

## 2023-01-20 DIAGNOSIS — N17.9 AKI (ACUTE KIDNEY INJURY) (HCC): ICD-10-CM

## 2023-01-20 DIAGNOSIS — I21.4 NSTEMI (NON-ST ELEVATED MYOCARDIAL INFARCTION) (HCC): ICD-10-CM

## 2023-01-20 DIAGNOSIS — N18.31 STAGE 3A CHRONIC KIDNEY DISEASE (HCC): ICD-10-CM

## 2023-01-20 DIAGNOSIS — E43 SEVERE MALNUTRITION (HCC): ICD-10-CM

## 2023-01-20 DIAGNOSIS — R47.02 DYSPHASIA: ICD-10-CM

## 2023-01-20 DIAGNOSIS — Z23 NEED FOR VACCINATION: ICD-10-CM

## 2023-01-20 DIAGNOSIS — E10.65 UNCONTROLLED TYPE 1 DIABETES MELLITUS WITH HYPERGLYCEMIA (HCC): Primary | ICD-10-CM

## 2023-01-20 DIAGNOSIS — Z59.82 TRANSPORTATION INSECURITY: ICD-10-CM

## 2023-01-20 DIAGNOSIS — S72.143A CLOSED COMMINUTED INTERTROCHANTERIC FRACTURE OF FEMUR, INITIAL ENCOUNTER (HCC): ICD-10-CM

## 2023-01-20 DIAGNOSIS — R13.19 ESOPHAGEAL DYSPHAGIA: ICD-10-CM

## 2023-01-20 DIAGNOSIS — Z59.819 HOUSING INSTABILITY: ICD-10-CM

## 2023-01-20 DIAGNOSIS — R94.39 ABNORMAL STRESS TEST: ICD-10-CM

## 2023-01-20 DIAGNOSIS — N17.9 ACUTE KIDNEY INJURY (HCC): ICD-10-CM

## 2023-01-20 DIAGNOSIS — E34.9 NEUROPATHY ASSOCIATED WITH ENDOCRINE DISORDER (HCC): ICD-10-CM

## 2023-01-20 DIAGNOSIS — B37.81 CANDIDA ESOPHAGITIS (HCC): ICD-10-CM

## 2023-01-20 DIAGNOSIS — F32.4 MAJOR DEPRESSIVE DISORDER IN PARTIAL REMISSION, UNSPECIFIED WHETHER RECURRENT (HCC): ICD-10-CM

## 2023-01-20 DIAGNOSIS — G63 NEUROPATHY ASSOCIATED WITH ENDOCRINE DISORDER (HCC): ICD-10-CM

## 2023-01-20 LAB — HBA1C MFR BLD: 8 %

## 2023-01-20 RX ORDER — AMLODIPINE BESYLATE 10 MG/1
TABLET ORAL
COMMUNITY
Start: 2022-12-02

## 2023-01-20 RX ORDER — DEXTROSE 3.75 G
TABLET,CHEWABLE ORAL
COMMUNITY
Start: 2022-12-20

## 2023-01-20 RX ORDER — TRAZODONE HYDROCHLORIDE 300 MG/1
TABLET ORAL
COMMUNITY
Start: 2023-01-09

## 2023-01-20 RX ORDER — INSULIN LISPRO 100 [IU]/ML
INJECTION, SOLUTION INTRAVENOUS; SUBCUTANEOUS
COMMUNITY
Start: 2023-01-14

## 2023-01-20 RX ORDER — LIDOCAINE HYDROCHLORIDE 20 MG/ML
SOLUTION OROPHARYNGEAL
Qty: 200 ML | Refills: 2 | Status: SHIPPED | OUTPATIENT
Start: 2023-01-20

## 2023-01-20 RX ORDER — PAROXETINE HYDROCHLORIDE 20 MG/1
TABLET, FILM COATED ORAL
COMMUNITY
Start: 2023-01-07

## 2023-01-20 RX ORDER — BLOOD-GLUCOSE TRANSMITTER
1 EACH MISCELLANEOUS
Qty: 1 EACH | Refills: 4 | Status: SHIPPED | OUTPATIENT
Start: 2023-01-20

## 2023-01-20 RX ORDER — ATORVASTATIN CALCIUM 40 MG/1
TABLET, FILM COATED ORAL
COMMUNITY
Start: 2022-12-02

## 2023-01-20 SDOH — ECONOMIC STABILITY - TRANSPORTATION SECURITY: TRANSPORTATION INSECURITY: Z59.82

## 2023-01-20 SDOH — ECONOMIC STABILITY - HOUSING INSECURITY: HOUSING INSTABILITY UNSPECIFIED: Z59.819

## 2023-01-20 ASSESSMENT — PATIENT HEALTH QUESTIONNAIRE - PHQ9
SUM OF ALL RESPONSES TO PHQ QUESTIONS 1-9: 1
SUM OF ALL RESPONSES TO PHQ9 QUESTIONS 1 & 2: 1
10. IF YOU CHECKED OFF ANY PROBLEMS, HOW DIFFICULT HAVE THESE PROBLEMS MADE IT FOR YOU TO DO YOUR WORK, TAKE CARE OF THINGS AT HOME, OR GET ALONG WITH OTHER PEOPLE: 0
SUM OF ALL RESPONSES TO PHQ QUESTIONS 1-9: 1
SUM OF ALL RESPONSES TO PHQ QUESTIONS 1-9: 1
3. TROUBLE FALLING OR STAYING ASLEEP: 0
5. POOR APPETITE OR OVEREATING: 0
7. TROUBLE CONCENTRATING ON THINGS, SUCH AS READING THE NEWSPAPER OR WATCHING TELEVISION: 0
6. FEELING BAD ABOUT YOURSELF - OR THAT YOU ARE A FAILURE OR HAVE LET YOURSELF OR YOUR FAMILY DOWN: 0
2. FEELING DOWN, DEPRESSED OR HOPELESS: 1
8. MOVING OR SPEAKING SO SLOWLY THAT OTHER PEOPLE COULD HAVE NOTICED. OR THE OPPOSITE, BEING SO FIGETY OR RESTLESS THAT YOU HAVE BEEN MOVING AROUND A LOT MORE THAN USUAL: 0
4. FEELING TIRED OR HAVING LITTLE ENERGY: 0
SUM OF ALL RESPONSES TO PHQ QUESTIONS 1-9: 1
1. LITTLE INTEREST OR PLEASURE IN DOING THINGS: 0
9. THOUGHTS THAT YOU WOULD BE BETTER OFF DEAD, OR OF HURTING YOURSELF: 0

## 2023-01-20 ASSESSMENT — ENCOUNTER SYMPTOMS
SORE THROAT: 0
RHINORRHEA: 0
TROUBLE SWALLOWING: 1
SINUS PRESSURE: 0
DYSPNEA ASSOCIATED WITH: EXERTION
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
## 2023-01-20 NOTE — TELEPHONE ENCOUNTER
Spoke to Irene with care coordination. Patient has decided that he does not want to use our service at this time since he is not able to eat. Also he has other health issues that he needs to address first.      Jade WALL.  Ph., CACP, Clinical Pharmacist  Anticoagulation Services, [address] [place] [address] Coumadin [facility]  1/20/2023  10:36 AM      For Pharmacy Admin Tracking Only    Intervention Detail:   Total # of Interventions Recommended: 0  Total # of Interventions Accepted: 0  Time Spent (min): 10

## 2023-01-20 NOTE — CARE COORDINATION
Ambulatory Care Coordination Note  1/20/2023    ACC: Cammie Geiger, RN    Had admission for syncopal episode, signed out AMA yesterday. He saw GI in hospital but they did not do any procedures (he told them he was having an EGD today), no changes to medications. Writer discussed with him earlier this week that it was not an endoscopy, it was a follow up appt. Attempted to call [facility] to verify what medications he has (especially pantoprazole) unable to speak to anyone. Spoke with patient. No further syncope or problems other than his painful swallowing issues, still problems with leg. Patient read his pantoprazole pill bottle to writer, stated is taking twice a day and has 1 refill left. Was supposed to see GI today for follow up and discuss scheduling EGD but provider canceled appt, rescheduled in 5 weeks. Writer spoke with Kb Delaney at GI office. He has to see Dr. Blaire Wills in office before could schedule EGD. She will address with provider next week to see if could schedule sooner appt. DM- he got transmitter for Dex Com today from PCP office and will start using again. Discussed need to reschedule appt with [facility] pharmacist after has GI and cardio follow ups. He never got glucometer strips so hasn't checked blood sugars in a few weeks. COPD-no worsening symptoms. Gave him info about cardiology appt, he will arrange transportation. Strongly emphasized importance of keeping appt. He denied any chest pain or worse dyspnea, no leg swelling. CC Plan:   -Follow up in a week to review symptoms, appts, review medications. Diabetes Assessment    Medic Alert ID: No  Meal Planning: Avoidance of concentrated sweets   How often do you test your blood sugar?: Meals   Do you have barriers with adherence to non-pharmacologic self-management interventions?  (Nutrition/Exercise/Self-Monitoring): Yes   Have you ever had to go to the ED for symptoms of low blood sugar?: No       Do you have hyperglycemia symptoms?: No   Do you have hypoglycemia symptoms?: No        ,   COPD Assessment    Does the patient understand envrionmental exposure?: Yes  Does the patient have a nebulizer?: No  Does the patient use a space with inhaled medications?: No            Symptoms:     Symptom course: stable  Breathlessness: exertion  Increase use of rapid acting/rescue inhaled medications?: Not Applicable  Change in chronic cough?: No/At Baseline  Change in sputum?: No/At Baseline  Sputum characteristics:  (Comment: doesn't expectorate)  Self Monitoring - SaO2: No  Have you had a recent diagnosis of pneumonia either by PCP or at a hospital?: No     , and   General Assessment    Do you have any symptoms that are causing concern?: Yes  Progression since Onset: Unchanged  Reported Symptoms: Other (Comment: difficult/painful swallowing)           Offered patient enrollment in the Remote Patient Monitoring (RPM) program for in-home monitoring: No-patient declined. Lab Results       None                 Goals Addressed                   This Visit's Progress     Conditions and Symptoms   Improving     I will schedule office visits, as directed by my provider. I will keep my appointment or reschedule if I have to cancel. I will notify my provider of any barriers to my plan of care. I will follow my Zone Management tool to seek urgent or emergent care. I will notify my provider of any symptoms that indicate a worsening of my condition. Barriers: lack of support, overwhelmed by complexity of regimen, and lack of education  Plan for overcoming my barriers: ACM calls, CHN to follow, assist with appts, education  Confidence: 6/10  Anticipated Goal Completion Date: 2/20/23                Prior to Admission medications    Medication Sig Start Date End Date Taking?  Authorizing Provider   amLODIPine (NORVASC) 10 MG tablet take 1 tablet by mouth once daily 12/2/22   Historical Provider, MD   atorvastatin (LIPITOR) 40 MG tablet take 1 tablet by mouth every evening 12/2/22   Historical Provider, MD   TRUEPLUS GLUCOSE 4 g chewable tablet chew and swallow as directed for LOW BLOOD SUGAR REACTION 12/20/22   Historical Provider, MD   insulin lispro, 1 Unit Dial, (HUMALOG/ADMELOG) 100 UNIT/ML SOPN INJECT 0-16 UNITS INTO THE SKIN THREE TIMES A DAY WITH MEALS 1/14/23   Historical Provider, MD   PARoxetine (PAXIL) 20 MG tablet take 1 tablet by mouth at bedtime 1/7/23   Historical Provider, MD   traZODone (DESYREL) 300 MG tablet  1/9/23   Historical Provider, MD   lidocaine viscous hcl (XYLOCAINE) 2 % SOLN solution use 5 milliliter by mouth four times a day AS NEEDED FOR PAIN or IRRITATION WITH EATING 1/20/23   BINA Martinez   Continuous Blood Gluc Transmit (DEXCOM G6 TRANSMITTER) MISC 1 each by Does not apply route every 10 days 1/20/23   BINA Martinez   aluminum hydroxide-magnesium carbonate (ACID GONE)  MG/15ML suspension Take 5 mLs by mouth 4 times daily (with meals and nightly) 1/20/23   BINA Martinez   diphenhydrAMINE (BENYLIN) 12.5 MG/5ML liquid Take 5 mLs by mouth 4 times daily as needed for Allergies 1/17/23 2/16/23  BINA Martinez   insulin aspart (NOVOLOG FLEXPEN) 100 UNIT/ML injection pen Inject 5 Units into the skin 3 times daily (before meals) Patient has temporarily reduced his dose to 1-2 units with the small snack he has each day.  1/16/23   BINA Martinez   insulin glargine (LANTUS) 100 UNIT/ML injection vial Inject 15 Units into the skin 2 times daily 1/16/23   BINA Martinez   sucralfate (CARAFATE) 1 GM/10ML suspension Take 10 mLs by mouth 4 times daily 1/10/23   BINA Martinez   metoclopramide (REGLAN) 10 MG/10ML SOLN Take 10 mLs by mouth 3 times daily (before meals) 1/10/23   BINA Martinez   cyclobenzaprine (FLEXERIL) 10 MG tablet Take 1 tablet by mouth 3 times daily as needed for Muscle spasms 1/10/23 1/20/23  Nii Debar, APRN - CNP   pantoprazole (PROTONIX) 40 MG tablet Take 40 mg by mouth 2 times daily    Historical Provider, MD   PNEUMOCOCCAL 20-ALPHONSO CONJ VACC IM Inject into the muscle    Historical Provider, MD   Continuous Blood Gluc Transmit (DEXCOM G6 TRANSMITTER) MISC 1 each by Does not apply route once a week 12/30/22   Garret Rangel MD   Continuous Blood Gluc Sensor (DEXCOM G6 SENSOR) MISC 1 each by Does not apply route once a week 12/30/22   Garret Rangel MD   blood glucose monitor strips Test 4 times a day & as needed for symptoms of irregular blood glucose. Dispense sufficient amount for indicated testing frequency plus additional to accommodate PRN testing needs. Dispense brand that correlates with his glucometer and that's covered by his insurance. Needs delivered to him. 12/30/22   Garret Rangel MD   Nutritional Supplements (GLUCOSE MANAGEMENT) TABS Take 1 tablet by mouth daily as needed (Low sugar reaction) 12/20/22   BINA Galeana   aspirin EC 81 MG EC tablet Take 1 tablet by mouth daily 12/16/22   BINA Galeana   Continuous Blood Gluc  ([address]) [address] 1 each by Does not apply route once a week 11/18/22   BINA Galeana   Misc.  Devices (STEP N REST WALKER) MISC 1 each by Does not apply route continuous Seated, wheeled walker 11/18/22   BINA Galeana   ondansetron (ZOFRAN ODT) 4 MG disintegrating tablet Take 1 tablet by mouth every 8 hours as needed for Nausea or Vomiting 11/18/22   BINA Galeana   albuterol sulfate HFA (VENTOLIN HFA) 108 (90 Base) MCG/ACT inhaler Inhale 2 puffs into the lungs 4 times daily as needed for Wheezing 11/15/22   BINA Galeana   metoprolol tartrate (LOPRESSOR) 25 MG tablet Take 1 tablet by mouth 2 times daily 11/14/22   BINA Galeana   Blood Glucose Monitoring Suppl (ONE TOUCH ULTRA 2) w/Device KIT 1 kit by Does not apply route daily 3/18/22   Delon Dirk, DO   Lancets MISC 1 each by Does not apply route 2 times daily 3/18/22   Delon Dirk, DO   Alcohol Swabs 70 % PADS Use as needed with blood glucose checks 2/15/22   Asher Chaparro MD   Insulin Pen Needle ([employer] PEN NEEDLES 29G) 29G X 12MM MISC 1 each by Does not apply route daily 2/15/22   Asher Chaparro MD   clonazePAM (KLONOPIN) 2 MG tablet Take 4 mg by mouth daily as needed for Anxiety.      Historical Provider, MD       Future Appointments   Date Time Provider Jerilyn Salvador   2/6/2023  3:50 PM Rachel Red MD [facility] None   2/7/2023 11:10 AM MD [facility]   2/27/2023  1:30 PM Mikey Moore MD [facility]   4/11/2023  1:00 PM Naz Marcum DO [address] [place] [address]   4/21/2023  1:00 PM BINA Morillo

## 2023-01-20 NOTE — CONSULTS
Gastroenterology Consult Note      Patient: Rosanna Pelletier  : 1970  Acct#:  [id number]     Date:  2023    Subjective:       History of Present Illness  Patient is a 47 y.o.  male admitted with Syncope and collapse [R55]  Closed fracture of right hand, initial encounter [S62.91XA]  LIBBY (acute kidney injury) ([facility] [address] [N17.9] who is seen in consult for odynophagia, anemia  64-year-old gentleman with history of anemia with iron deficiency, history of hepatitis C, recent EGD by our service in 2022  Patient at this time came because of a fall and he reports odynophagia for which a consultation was placed for us. As mentioned he was seen for the same thing in November and had EGD by Dr. Delaney Fraga refer to the result below. He said his odynophagia is back and unchanged, was supposed to be on PPI but is not taking. He said he was given only 14 days, could not tell us which 1. Denied any dysphagia  Denied any abdominal pain denied any nausea or vomiting  In fact he wants to go home and he showed me on his phone that he has an appointment to have an EGD repeated at [facility] tomorrow? During this admission  His work-up included  Ct abd shows   No significant small bowel distension or dilation. Only minimal gas   scattered in some loops of small bowel without any abnormal fluid levels. Cecum is in low position in right side of pelvis. Appendix is not identifiable and there is no secondary sign of acute   appendicitis. Appendicular area significantly overlapped by multiple loops   of small bowel. Moderate amount of stool and gas scattered in the right colon and transverse   colon and small to moderate amount of gas scattered in the descending colon. Moderate amount of stool is also present throughout sigmoid colon and rectum. Findings suggestive of constipation. No demonstrable abnormality in liver,   gallbladder, pancreas, or adrenal glands.   Normal size of spleen with   calcified granulomas. Normal kidneys bilaterally. No evidence of   obstructive uropathy. Last AFP in November of 2022 4 liver autoimmune testing normal in November of 2022 creat 1.28 wbc 11.2 hgb 10.2 iron level and saturation normal lft  and inr normal. States he has had weight loss and nausea, has been taking magic mouth was for his pain. He has not had hematemesis hematochezia melena change in the bowel habits or rash. He has not had fevers chills melena hematochezia hematemesis change in the bowel habits rash     Endoscopy no colonoscopy 11/7/22 egd (yudelka) LA grade D esophagitis bx negative     Family reports  no hx of liver pancreatic stomach or colon cancer no uc/crohns  Social no smoking no etoh marijuana use used IV drugs years ago          Past Medical History:   Diagnosis Date    Diabetes mellitus ([facility] [address]     Obstructive lung disease ([facility] [address]     8/2022    Panic attack       Past Surgical History:   Procedure Laterality Date    APPENDECTOMY      FEMUR FRACTURE SURGERY Left 09/30/2022    Cephalomedullary Nail    HAND SURGERY      JOINT REPLACEMENT      TIBIA FRACTURE SURGERY Left 09/30/2022    LEFT FEMUR CEPHALOMEDULLARY NAIL performed by Fannie Beyer DO at [address] N/A 02/15/2022    EGD BIOPSY performed by Saud Hernandez MD at [place] [address] N/A 11/10/2022    EGD BIOPSY performed by Vicky Benitez MD at [facility]      Past Endoscopic History above    Admission Meds  No current facility-administered medications on file prior to encounter. Current Outpatient Medications on File Prior to Encounter   Medication Sig Dispense Refill    insulin aspart (NOVOLOG FLEXPEN) 100 UNIT/ML injection pen Inject 5 Units into the skin 3 times daily (before meals) Patient has temporarily reduced his dose to 1-2 units with the small snack he has each day.  5 Adjustable Dose Pre-filled Pen Syringe 3    insulin glargine (LANTUS) 100 UNIT/ML injection vial Inject 15 Units into the skin 2 times daily 10 mL 3    diphenhydrAMINE (BENYLIN) 12.5 MG/5ML liquid Take 5 mLs by mouth 4 times daily as needed for Allergies 240 mL 1    aluminum hydroxide-magnesium carbonate (ACID GONE)  MG/15ML suspension Take 5 mLs by mouth 4 times daily (with meals and nightly) (Patient not taking: Reported on 1/18/2023) 240 mL 1    sucralfate (CARAFATE) 1 GM/10ML suspension Take 10 mLs by mouth 4 times daily 1200 mL 3    metoclopramide (REGLAN) 10 MG/10ML SOLN Take 10 mLs by mouth 3 times daily (before meals) 1200 mL 0    cyclobenzaprine (FLEXERIL) 10 MG tablet Take 1 tablet by mouth 3 times daily as needed for Muscle spasms 30 tablet 0    pantoprazole (PROTONIX) 40 MG tablet Take 40 mg by mouth 2 times daily      PNEUMOCOCCAL 20-ALPHONSO CONJ VACC IM Inject into the muscle      lidocaine viscous hcl (XYLOCAINE) 2 % SOLN solution use 5 milliliter by mouth four times a day AS NEEDED FOR PAIN or IRRITATION WITH EATING 200 mL 2    Continuous Blood Gluc Transmit (DEXCOM G6 TRANSMITTER) MISC 1 each by Does not apply route once a week 1 each 0    Continuous Blood Gluc Sensor (DEXCOM G6 SENSOR) MISC 1 each by Does not apply route once a week 4 each 0    blood glucose monitor strips Test 4 times a day & as needed for symptoms of irregular blood glucose. Dispense sufficient amount for indicated testing frequency plus additional to accommodate PRN testing needs. Dispense brand that correlates with his glucometer and that's covered by his insurance. Needs delivered to him. 100 strip 0    Nutritional Supplements (GLUCOSE MANAGEMENT) TABS Take 1 tablet by mouth daily as needed (Low sugar reaction) 30 tablet 0    aspirin EC 81 MG EC tablet Take 1 tablet by mouth daily (Patient not taking: Reported on 1/18/2023) 90 tablet 1    Continuous Blood Gluc  (DEXCOM G6 ) JANEL 1 each by Does not apply route once a week 1 each 4    Misc.  Devices (STEP N REST WALKER) MISC 1 each by Does not apply route continuous Seated, wheeled walker 1 each 0    ondansetron (ZOFRAN ODT) 4 MG disintegrating tablet Take 1 tablet by mouth every 8 hours as needed for Nausea or Vomiting 12 tablet 0    albuterol sulfate HFA (VENTOLIN HFA) 108 (90 Base) MCG/ACT inhaler Inhale 2 puffs into the lungs 4 times daily as needed for Wheezing 54 g 1    metoprolol tartrate (LOPRESSOR) 25 MG tablet Take 1 tablet by mouth 2 times daily 60 tablet 0    atorvastatin (LIPITOR) 80 MG tablet Take 1 tablet by mouth nightly 30 tablet 3    Blood Glucose Monitoring Suppl (ONE TOUCH ULTRA 2) w/Device KIT 1 kit by Does not apply route daily 1 kit 0    Lancets MISC 1 each by Does not apply route 2 times daily 300 each 1    Alcohol Swabs 70 % PADS Use as needed with blood glucose checks 100 each 0    Insulin Pen Needle ([employer] PEN NEEDLES 29G) 29G X 12MM MISC 1 each by Does not apply route daily 100 each 3    traZODone (DESYREL) 150 MG tablet Take 300 mg by mouth nightly       clonazePAM (KLONOPIN) 2 MG tablet Take 4 mg by mouth daily as needed for Anxiety. Patient   Does Use ASA, NSAID No  Allergies  Allergies   Allergen Reactions    Azithromycin Swelling    Acetaminophen-Codeine Other (See Comments) and Nausea And Vomiting    Codeine Itching    Hydrocodone-Acetaminophen Rash    Ibuprofen Other (See Comments)     Stomach cramps    Morphine Other (See Comments)     Gives headaches    Tramadol Itching     vomitting        Social   Social History     Tobacco Use    Smoking status: Every Day     Packs/day: 0.25     Years: 41.00     Pack years: 10.25     Types: Cigarettes    Smokeless tobacco: Never   Substance Use Topics    Alcohol use: Not Currently        PSYCH HISTORY:  Depression No  Anxiety No  Suicide No       History reviewed. No pertinent family history. No family history of colon cancer, Crohn's disease, or ulcerative colitis.      Review of Systems  Constitutional: negative  Eyes: negative  Ears, nose, mouth, throat, and face: negative  Respiratory: negative  Cardiovascular: negative  Gastrointestinal: negative  Genitourinary:negative  Integument/breast: negative  Hematologic/lymphatic: negative  Musculoskeletal:negative  Endocrine: negative           Physical Exam  Blood pressure (!) 136/90, pulse 67, temperature 97.8 °F (36.6 °C), temperature source Oral, resp. rate 18, height 5' 6\" (1.676 m), weight 116 lb 2.9 oz (52.7 kg), SpO2 96 %.          General Appearance: alert and oriented to person, place and time, well-developed and well-nourished, in no acute distress  Skin: warm and dry, no rash or erythema  Head: normocephalic and atraumatic  Eyes: pupils equal, round, and reactive to light, extraocular eye movements intact, conjunctivae normal  ENT: hearing grossly normal bilaterally  Neck: neck supple and non tender without mass, no thyromegaly or thyroid nodules, no cervical lymphadenopathy   Pulmonary/Chest: clear to auscultation bilaterally- no wheezes, rales or rhonchi, normal air movement, no respiratory distress  Cardiovascular: normal rate, regular rhythm, normal S1 and S2, no murmurs, rubs, clicks or gallops, distal pulses intact, no carotid bruits  Abdomen: soft, non-tender, non-distended, normal bowel sounds, no masses or organomegaly  Extremities: no cyanosis, clubbing or edema  Musculoskeletal: normal range of motion, no joint swelling, deformity or tenderness  Neurologic: no cranial nerve deficit and muscle strength normal    Data Review:    Recent Labs     01/18/23 0220   WBC 11.2*   HGB 10.2*   HCT 30.6*   MCV 79.9*        Recent Labs     01/18/23 0220 01/19/23  0559   * 137   K 4.0 3.2*   CL 94* 103   CO2 28 27   PHOS 3.1  --    BUN 25* 15   CREATININE 1.87* 1.28*     Recent Labs     01/18/23 0220   AST 10   ALT 10   BILIDIR 0.1   BILITOT 0.5   ALKPHOS 89     Recent Labs     01/18/23 0220   LIPASE 9*     Recent Labs     01/18/23 0220   PROTIME 12.6   INR 1.0     No results for input(s): PTT in the last 72 hours. No results for input(s): OCCULTBLD in the last 72 hours. CEA:  No results found for: CEA  Ca 125:  No results found for:   Ca 19-9:  No results found for:   Ca 15-3:  No results found for:   AFP:  No components found for: AFAFP  Beta HCG:  No components found for: BHCG  Neuron Specific Enolase:  No results found for: NSE  Imaging Studies:                           All appropriate imaging studies and reports reviewed: Yes                 Assessment:     Principal Problem:    Syncope and collapse  Active Problems:    Tobacco abuse    Stage 3a chronic kidney disease (HCC)    LIBBY (acute kidney injury) ([facility] [address]    Anemia    Hepatitis C virus infection without hepatic coma    Uncontrolled type 1 diabetes mellitus with hyperglycemia (HCC)  Resolved Problems:    * No resolved hospital problems. *      Odynophagia  Anemia  Leukocytosis  Recent EGD in November 2022  History of hep C  Weight loss  Fall    Recommendations:   PPI  She will need an outpatient follow-up  He has an appointment to have an EGD tomorrow at [facility] he wants to leave actually is already being discharged what he is telling me  Told of the symptoms come back to come back  Make sure to take his PPI  And avoid any NSAIDs at this time  You may need a colonoscopy because of the anemia and maybe he needs a repeat EGD which is scheduled as mentioned                      Thank you for allowing me to participate in the care of your patient. Please feel free to contact me with any questions or concerns.      Swati Bob MD

## 2023-01-20 NOTE — TELEPHONE ENCOUNTER
Alexandro Richmond called they wanted to confirm that patient had an EGD scheduled with  on 1/20/23. Ptient had an appointment for a hospital follow up not EGD. Patient was seen in the ER and  consulted on this and was going to do the EGD. Alexandro Richmond would like for you to review this . He feels the patient needs the EGD.

## 2023-01-23 DIAGNOSIS — K20.90 ESOPHAGITIS: Primary | ICD-10-CM

## 2023-01-24 ENCOUNTER — TELEPHONE (OUTPATIENT)
Dept: PRIMARY CARE CLINIC | Age: 53
End: 2023-01-24

## 2023-01-24 NOTE — TELEPHONE ENCOUNTER
Pt is calling stating he was denied the glucose vanilla boost drinks. He has 7 days to appeal, it was denied on the 1/19/2023.     He cant have the chocolate flavor, vanilla or strawberry, just FYI.    He gets it from medical service company

## 2023-01-25 NOTE — TELEPHONE ENCOUNTER
Spoke to insurance they couldn't tell me anything without a CPT code even though it has already been denied. Spoke to [facility] and Kane Licona needs to do an Appeal to try and get it covered. They were supposed to fax the denial to the office on 01/24 but I haven't seen it yet. Will call them again this morning to try and get it.

## 2023-01-27 NOTE — TELEPHONE ENCOUNTER
Bar Burnette was contacted by Rambo Otto MA, a Laya Stallworth, regarding a Social Determinants of Health referral.     A message was left with the writer's contact information. Follow-up attempt.

## 2023-01-30 ENCOUNTER — CARE COORDINATION (OUTPATIENT)
Dept: CARE COORDINATION | Age: 53
End: 2023-01-30

## 2023-01-30 ENCOUNTER — TELEPHONE (OUTPATIENT)
Dept: GASTROENTEROLOGY | Age: 53
End: 2023-01-30

## 2023-01-30 NOTE — TELEPHONE ENCOUNTER
COLIN/Anthony Polanco    PBG 2/8/23 @ 10:15am    Verbal instructions given via phone  Written mailed

## 2023-01-30 NOTE — CARE COORDINATION
GI ordered an endoscopy but not scheduled yet. Looks like his office has been trying to contact patient to schedule. Writer gave him GI office phone number to call to schedule, emphasized he needs to call there himself TODAY to schedule. He doesn't want a swallow study, says he can swallow but is extremely painful, only drinking liquids, can't tolerate food. He stated if can't get this test done soon he's going to ED to be admitted and will ask for a feeding tube. PCP office was working on appeal to get nutritional supplement drinks since was denied by insurance. Will follow up in a few days.     Future Appointments   Date Time Provider Jerilyn Salvador   2/6/2023  3:50 PM Sushila Whitman APRN - CNP [facility] None   2/7/2023 11:10 AM Sole Purdy MD [facility]   2/27/2023  1:30 PM Laura Keita MD [facility]   4/11/2023  1:00 PM Summer Walden DO [address] [place] [address]   4/21/2023  1:00 PM BINA Sim

## 2023-01-31 ENCOUNTER — TELEPHONE (OUTPATIENT)
Dept: PRIMARY CARE CLINIC | Age: 53
End: 2023-01-31

## 2023-01-31 DIAGNOSIS — R47.02 DYSPHASIA: ICD-10-CM

## 2023-01-31 RX ORDER — LIDOCAINE HYDROCHLORIDE 20 MG/ML
SOLUTION OROPHARYNGEAL
Qty: 200 ML | Refills: 2 | Status: SHIPPED | OUTPATIENT
Start: 2023-01-31

## 2023-01-31 NOTE — TELEPHONE ENCOUNTER
Pt calling. States that his [facility] Nurse agrees that pt looks very weak, has no strength. Pt tried picking up a gallon of milk and dropped it. Thinks that he needs to be back in the hosp to get a feeding tube. I asked him, if he was going to the er and he said that he has to wait until his check comes tomorrow. Needs to get his kids some food before going into the hosp, otherwise they have nothing to eat. Pt has a 15 and 12 yr old at home. They will take a bus to get food tomorrow and after that, he is going to the ER. MIGUELI.

## 2023-02-02 NOTE — TELEPHONE ENCOUNTER
Suleman Escobar was contacted by Guillermo Dudley MA, a [employer]aya Stallworth, regarding a Social Determinants of Health referral.     A message was left with the writer's contact information. Second follow-up attempt.

## 2023-02-03 ENCOUNTER — CARE COORDINATION (OUTPATIENT)
Dept: CARE COORDINATION | Age: 53
End: 2023-02-03

## 2023-02-03 NOTE — CARE COORDINATION
Ambulatory Care Coordination Note  2/3/2023    ACC: Hallie Matson RN    He is scheduled for endoscopy 2/8/23. His 12year old son will go with him, he will use insurance transportation to get there. He is weak, just laying in bed. Couldn't get his lidocaine viscous for a few days because didn't have new insurance card, is being delivered today so said will be able to drink some this weekend. Stated is taking the Protonix, metoclopramide and Carafate also. Mixing the lidocaine with Mylanta. Only able to eat a few bites of food a day, otherwise is drinking water and Gatorade. No vomiting. Still very painful to swallow but lidocaine makes a little more tolerable if holds is head way back. Discussed again may need to go to ED if symptoms get worse and unable to drink anything, he said is trying to hold off due to his kids, wants to be home for them. DM- not checking blood sugars, stated the strips don't work with his One Touch glucometer. Writer called [employer], he got One Touch Ultra strips 1/3/23 and they should fit his glucometer. Informed patient, he stated isn't concerned about it right now, he knows s/s of low and high blood sugars and hasn't had any symptoms. Recent A1c down to 8.0 from 10.0. Discussed need to eat protein foods due to weakness, weight loss. Reviewed appts- has pain mgmt appt next week also, gave him address and appt info. Discussed food- he has enough at home for his sons, they are preparing everything themselves. Denied any needs for at home. CC Plan:   -Follow up next week after endoscopy. Diabetes Assessment    Medic Alert ID: No  Meal Planning: Avoidance of concentrated sweets   How often do you test your blood sugar?: Meals   Do you have barriers with adherence to non-pharmacologic self-management interventions?  (Nutrition/Exercise/Self-Monitoring): Yes   Have you ever had to go to the ED for symptoms of low blood sugar?: No       Do you have hyperglycemia symptoms?: No   Do you have hypoglycemia symptoms?: No   Blood Sugar Monitoring Regimen: Not Testing         and   General Assessment    Do you have any symptoms that are causing concern?: Yes  Progression since Onset: Unchanged  Reported Symptoms: Other, Pain, Weakness (Comment: pain with swallowing,)           Offered patient enrollment in the Remote Patient Monitoring (RPM) program for in-home monitoring: Patient is not eligible for RPM program.    Lab Results       None                 Goals Addressed                   This Visit's Progress     Conditions and Symptoms   Improving     I will schedule office visits, as directed by my provider. I will keep my appointment or reschedule if I have to cancel. I will notify my provider of any barriers to my plan of care. I will follow my Zone Management tool to seek urgent or emergent care. I will notify my provider of any symptoms that indicate a worsening of my condition. Barriers: lack of support, overwhelmed by complexity of regimen, and lack of education  Plan for overcoming my barriers: ACM calls, CHN to follow, assist with appts, education  Confidence: 6/10  Anticipated Goal Completion Date: 2/20/23                Prior to Admission medications    Medication Sig Start Date End Date Taking?  Authorizing Provider   lidocaine viscous hcl (XYLOCAINE) 2 % SOLN solution use 5 milliliter by mouth four times a day AS NEEDED FOR PAIN or IRRITATION WITH EATING 1/31/23   BINA Rm   amLODIPine (NORVASC) 10 MG tablet take 1 tablet by mouth once daily 12/2/22   Historical Provider, MD   atorvastatin (LIPITOR) 40 MG tablet take 1 tablet by mouth every evening 12/2/22   Historical Provider, MD   TRUEPLUS GLUCOSE 4 g chewable tablet chew and swallow as directed for LOW BLOOD SUGAR REACTION 12/20/22   Historical Provider, MD   insulin lispro, 1 Unit Dial, (HUMALOG/ADMELOG) 100 UNIT/ML SOPN INJECT 0-16 UNITS INTO THE SKIN THREE TIMES A DAY WITH MEALS 1/14/23   Historical Provider, MD PARoxetine (PAXIL) 20 MG tablet take 1 tablet by mouth at bedtime 1/7/23   Historical Provider, MD   traZODone (DESYREL) 300 MG tablet  1/9/23   Historical Provider, MD   Continuous Blood Gluc Transmit (DEXCOM G6 TRANSMITTER) MISC 1 each by Does not apply route every 10 days 1/20/23   BINA Wheat   aluminum hydroxide-magnesium carbonate (ACID GONE)  MG/15ML suspension Take 5 mLs by mouth 4 times daily (with meals and nightly) 1/20/23   BINA Wheat   diphenhydrAMINE (BENYLIN) 12.5 MG/5ML liquid Take 5 mLs by mouth 4 times daily as needed for Allergies 1/17/23 2/16/23  BINA Wheat   insulin aspart (NOVOLOG FLEXPEN) 100 UNIT/ML injection pen Inject 5 Units into the skin 3 times daily (before meals) Patient has temporarily reduced his dose to 1-2 units with the small snack he has each day. 1/16/23   BINA Wheat   insulin glargine (LANTUS) 100 UNIT/ML injection vial Inject 15 Units into the skin 2 times daily 1/16/23   BINA Wheat   sucralfate (CARAFATE) 1 GM/10ML suspension Take 10 mLs by mouth 4 times daily 1/10/23   BINA Wheat   metoclopramide (REGLAN) 10 MG/10ML SOLN Take 10 mLs by mouth 3 times daily (before meals) 1/10/23   BINA Wheat   pantoprazole (PROTONIX) 40 MG tablet Take 40 mg by mouth 2 times daily    Historical Provider, MD   PNEUMOCOCCAL 20-ALPHONSO CONJ VACC IM Inject into the muscle    Historical Provider, MD   Continuous Blood Gluc Transmit (DEXCOM G6 TRANSMITTER) MISC 1 each by Does not apply route once a week 12/30/22   Itz Nesbitt MD   Continuous Blood Gluc Sensor (DEXCOM G6 SENSOR) MISC 1 each by Does not apply route once a week 12/30/22   Itz Nesbitt MD   blood glucose monitor strips Test 4 times a day & as needed for symptoms of irregular blood glucose. Dispense sufficient amount for indicated testing frequency plus additional to accommodate PRN testing needs.  Dispense brand that correlates with his glucometer and that's covered by his insurance. Needs delivered to him. 12/30/22   Alex Dick MD   Nutritional Supplements (GLUCOSE MANAGEMENT) TABS Take 1 tablet by mouth daily as needed (Low sugar reaction) 12/20/22   BINA Pepe   aspirin EC 81 MG EC tablet Take 1 tablet by mouth daily 12/16/22   BINA Pepe   Continuous Blood Gluc  ([address]) [address] 1 each by Does not apply route once a week 11/18/22   BINA Pepe   Misc. Devices (STEP N REST WALKER) MISC 1 each by Does not apply route continuous Seated, wheeled walker 11/18/22   BINA Pepe   ondansetron (ZOFRAN ODT) 4 MG disintegrating tablet Take 1 tablet by mouth every 8 hours as needed for Nausea or Vomiting 11/18/22   BINA Pepe   albuterol sulfate HFA (VENTOLIN HFA) 108 (90 Base) MCG/ACT inhaler Inhale 2 puffs into the lungs 4 times daily as needed for Wheezing 11/15/22   BINA Pepe   metoprolol tartrate (LOPRESSOR) 25 MG tablet Take 1 tablet by mouth 2 times daily 11/14/22   BINA Pepe   Blood Glucose Monitoring Suppl (ONE TOUCH ULTRA 2) w/Device KIT 1 kit by Does not apply route daily 3/18/22   Cade Giraldo, DO   Lancets MISC 1 each by Does not apply route 2 times daily 3/18/22   Cade Giraldo, DO   Alcohol Swabs 70 % PADS Use as needed with blood glucose checks 2/15/22   Asher Chaparro MD   Insulin Pen Needle ([employer] PEN NEEDLES 29G) 29G X 12MM MISC 1 each by Does not apply route daily 2/15/22   Asher Chaparro MD   clonazePAM (KLONOPIN) 2 MG tablet Take 4 mg by mouth daily as needed for Anxiety.      Historical Provider, MD       Future Appointments   Date Time Provider Jerilyn Salvador   2/6/2023  3:50 PM Joy Wen APRN - CNP [facility] None   2/7/2023 11:10 AM Emeli Lombardi MD [facility]   2/27/2023  1:30 PM Naila Sexton MD [facility]   4/11/2023  1:00 PM DO NICKI D[address]az [address]   4/21/2023  1:00 PM BINA Astudillo

## 2023-02-07 ENCOUNTER — TELEPHONE (OUTPATIENT)
Dept: ORTHOPEDIC SURGERY | Age: 53
End: 2023-02-07

## 2023-02-07 ENCOUNTER — INITIAL CONSULT (OUTPATIENT)
Dept: PAIN MANAGEMENT | Age: 53
End: 2023-02-07
Payer: MEDICAID

## 2023-02-07 VITALS
HEART RATE: 78 BPM | HEIGHT: 66 IN | BODY MASS INDEX: 18.96 KG/M2 | OXYGEN SATURATION: 96 % | DIASTOLIC BLOOD PRESSURE: 100 MMHG | SYSTOLIC BLOOD PRESSURE: 138 MMHG | WEIGHT: 118 LBS

## 2023-02-07 DIAGNOSIS — G89.29 CHRONIC PAIN OF LEFT LOWER EXTREMITY: Primary | ICD-10-CM

## 2023-02-07 DIAGNOSIS — M79.605 CHRONIC PAIN OF LEFT LOWER EXTREMITY: Primary | ICD-10-CM

## 2023-02-07 DIAGNOSIS — R69 SEVERE COMORBID ILLNESS: ICD-10-CM

## 2023-02-07 PROCEDURE — 99243 OFF/OP CNSLTJ NEW/EST LOW 30: CPT | Performed by: ANESTHESIOLOGY

## 2023-02-07 RX ORDER — ATORVASTATIN CALCIUM 80 MG/1
TABLET, FILM COATED ORAL
COMMUNITY
Start: 2023-02-07

## 2023-02-07 RX ORDER — OLANZAPINE 2.5 MG/1
TABLET ORAL
COMMUNITY
Start: 2023-01-26

## 2023-02-07 ASSESSMENT — PATIENT HEALTH QUESTIONNAIRE - PHQ9
SUM OF ALL RESPONSES TO PHQ9 QUESTIONS 1 & 2: 2
2. FEELING DOWN, DEPRESSED OR HOPELESS: 1
SUM OF ALL RESPONSES TO PHQ QUESTIONS 1-9: 2
1. LITTLE INTEREST OR PLEASURE IN DOING THINGS: 1
SUM OF ALL RESPONSES TO PHQ QUESTIONS 1-9: 2

## 2023-02-07 ASSESSMENT — ENCOUNTER SYMPTOMS
DIARRHEA: 0
SHORTNESS OF BREATH: 1
CHEST TIGHTNESS: 0
NAUSEA: 1
VOMITING: 1
CONSTIPATION: 0
WHEEZING: 0

## 2023-02-07 NOTE — PROGRESS NOTES
The patient is a 47 y. o. Non- / non  male. Chief Complaint   Patient presents with    New Patient        Requesting physician for the evaluation of David Toledo 1970: LUIS Caceres - CNP    Pain History  Pain score today  10  1. Location:leg  2. Radiation:yes  3. Character:aching, shooting, sharp, throbbing  5. Duration:months  6. Onset: months  7. Did an injury cause pain: yes  8. Aggravating factors:walking, movement  9. Alleviating factors:medication  10. Associated symptoms (numbness / tingling / weakness):  yes  -Where at:left leg  -Down into finger tips or toes (specify which finger or toes):no  -constant or intermitting: constant  11. Red Flags: (weight loss / chills / loss of bladder or bowel control):no    Previous management history  1. Previous diagnostic workup: (Imaging/EMG)   CT, MRI, or Xray: yes  What part of the body:leg  What facility did they have it at:[facility]  What year or specific date: 2022  EMG:  no    2. Previous non interventional treatments tried:  chiropractor or physical therapy: PT  What part of the body:leg  What facility was it done at: home therapy  How long ago was it last tried: currently  Did it work: No  Did they complete it:No    3. Previous Medications tried  NSAID's: no  Neurontin: no  Lyrica: no  Trycyclic antidepressant (Ellavil / Pamelor ): no  Cymbalta: no  Opioids (Ultram / Vicodin / Percocet / Morphine / Dilaudid / Oramorph/ Fentanyl etc.):yes  Last Pain medication taken (name of med and date):    4. Previous Interventional pain procedures tried:  What kind of injection:N/A  Who did the injection: N/A  did the injection help: N/A  Last time injection was done:N/A    5.  Previous surgeries for pain  What part of the body did they have the surgery:left leg  What physician did the [address] did they have the surgery done:[facility]  Date of Surgery:unknown    Social History:  Marital status:single  Employment History:unemployed  Working  No  Full time Or Part time: N/A  Disability  Yes   Legal Issues related to pain complaint: No     Pain Disability Index score : 100    Lab Results   Component Value Date    LABA1C 8.0 01/20/2023     Lab Results   Component Value Date     09/30/2022         Informant: patient          Past Medical History:   Diagnosis Date    Diabetes mellitus ([facility] [address]     Obstructive lung disease ([facility] [address]     8/2022    Panic attack         Past Surgical History:   Procedure Laterality Date    APPENDECTOMY      FEMUR FRACTURE SURGERY Left 09/30/2022    Cephalomedullary Nail    HAND SURGERY      JOINT REPLACEMENT      TIBIA FRACTURE SURGERY Left 09/30/2022    LEFT FEMUR CEPHALOMEDULLARY NAIL performed by Ekta Cortez DO at [address] [place] [address] N/A 02/15/2022    EGD BIOPSY performed by Fara Montero MD at [place] [address] N/A 11/10/2022    EGD BIOPSY performed by Arsenio Rodriguez MD at [address] [place] [address] History     Socioeconomic History    Marital status: Single     Spouse name: None    Number of children: 2    Years of education: None    Highest education level: None   Tobacco Use    Smoking status: Every Day     Packs/day: 0.25     Years: 41.00     Pack years: 10.25     Types: Cigarettes    Smokeless tobacco: Never   Substance and Sexual Activity    Alcohol use: Not Currently    Drug use: Yes     Types: Marijuana Merlin Ferro)     Comment: overdose 11/10/20    Sexual activity: Not Currently     Social Determinants of Health     Financial Resource Strain: Low Risk     Difficulty of Paying Living Expenses: Not very hard   Food Insecurity: Food Insecurity Present    Worried About Running Out of Food in the Last Year: Sometimes true    Ran Out of Food in the Last Year: Sometimes true   Transportation Needs: Unmet Transportation Needs    Lack of Transportation (Medical): Yes    Lack of Transportation (Non-Medical): Yes   Physical Activity: Inactive Days of Exercise per Week: 0 days    Minutes of Exercise per Session: 0 min   Stress: Stress Concern Present    Feeling of Stress : To some extent   Social Connections: Socially Isolated    Frequency of Communication with Friends and Family: Never    Frequency of Social Gatherings with Friends and Family: Never    Attends Taoism Services: Never    Active Member of Clubs or Organizations: No    Attends Club or Organization Meetings: Never    Marital Status: Never    Intimate Partner Violence: Not At Risk    Fear of Current or Ex-Partner: No    Emotionally Abused: No    Physically Abused: No    Sexually Abused: No   Housing Stability: High Risk    Unable to Pay for Housing in the Last Year: No    Number of [place] in the Last Year: 4    Unstable Housing in the Last Year: Yes       No family history on file. Allergies   Allergen Reactions    Azithromycin Swelling    Acetaminophen-Codeine Other (See Comments) and Nausea And Vomiting    Codeine Itching    Hydrocodone-Acetaminophen Rash    Ibuprofen Other (See Comments)     Stomach cramps    Morphine Other (See Comments)     Gives headaches    Tramadol Itching     vomitting       There were no vitals filed for this visit.     Current Outpatient Medications   Medication Sig Dispense Refill    lidocaine viscous hcl (XYLOCAINE) 2 % SOLN solution use 5 milliliter by mouth four times a day AS NEEDED FOR PAIN or IRRITATION WITH EATING 200 mL 2    amLODIPine (NORVASC) 10 MG tablet take 1 tablet by mouth once daily      atorvastatin (LIPITOR) 40 MG tablet take 1 tablet by mouth every evening      TRUEPLUS GLUCOSE 4 g chewable tablet chew and swallow as directed for LOW BLOOD SUGAR REACTION      insulin lispro, 1 Unit Dial, (HUMALOG/ADMELOG) 100 UNIT/ML SOPN INJECT 0-16 UNITS INTO THE SKIN THREE TIMES A DAY WITH MEALS      PARoxetine (PAXIL) 20 MG tablet take 1 tablet by mouth at bedtime      traZODone (DESYREL) 300 MG tablet       Continuous Blood Gluc Transmit (DEXCOM G6 TRANSMITTER) MISC 1 each by Does not apply route every 10 days 1 each 4    aluminum hydroxide-magnesium carbonate (ACID GONE)  MG/15ML suspension Take 5 mLs by mouth 4 times daily (with meals and nightly) 240 mL 1    diphenhydrAMINE (BENYLIN) 12.5 MG/5ML liquid Take 5 mLs by mouth 4 times daily as needed for Allergies 240 mL 1    insulin aspart (NOVOLOG FLEXPEN) 100 UNIT/ML injection pen Inject 5 Units into the skin 3 times daily (before meals) Patient has temporarily reduced his dose to 1-2 units with the small snack he has each day. 5 Adjustable Dose Pre-filled Pen Syringe 3    insulin glargine (LANTUS) 100 UNIT/ML injection vial Inject 15 Units into the skin 2 times daily 10 mL 3    sucralfate (CARAFATE) 1 GM/10ML suspension Take 10 mLs by mouth 4 times daily 1200 mL 3    metoclopramide (REGLAN) 10 MG/10ML SOLN Take 10 mLs by mouth 3 times daily (before meals) 1200 mL 0    pantoprazole (PROTONIX) 40 MG tablet Take 40 mg by mouth 2 times daily      PNEUMOCOCCAL 20-ALPHONSO CONJ VACC IM Inject into the muscle      Continuous Blood Gluc Transmit (DEXCOM G6 TRANSMITTER) MISC 1 each by Does not apply route once a week 1 each 0    Continuous Blood Gluc Sensor (DEXCOM G6 SENSOR) MISC 1 each by Does not apply route once a week 4 each 0    blood glucose monitor strips Test 4 times a day & as needed for symptoms of irregular blood glucose. Dispense sufficient amount for indicated testing frequency plus additional to accommodate PRN testing needs. Dispense brand that correlates with his glucometer and that's covered by his insurance. Needs delivered to him. 100 strip 0    Nutritional Supplements (GLUCOSE MANAGEMENT) TABS Take 1 tablet by mouth daily as needed (Low sugar reaction) 30 tablet 0    aspirin EC 81 MG EC tablet Take 1 tablet by mouth daily 90 tablet 1    Continuous Blood Gluc  (DEXCOM G6 ) JANEL 1 each by Does not apply route once a week 1 each 4    Misc.  Devices (STEP N REST WALKER) MISC 1 each by Does not apply route continuous Seated, wheeled walker 1 each 0    ondansetron (ZOFRAN ODT) 4 MG disintegrating tablet Take 1 tablet by mouth every 8 hours as needed for Nausea or Vomiting 12 tablet 0    albuterol sulfate HFA (VENTOLIN HFA) 108 (90 Base) MCG/ACT inhaler Inhale 2 puffs into the lungs 4 times daily as needed for Wheezing 54 g 1    metoprolol tartrate (LOPRESSOR) 25 MG tablet Take 1 tablet by mouth 2 times daily 60 tablet 0    Blood Glucose Monitoring Suppl (ONE TOUCH ULTRA 2) w/Device KIT 1 kit by Does not apply route daily 1 kit 0    Lancets MISC 1 each by Does not apply route 2 times daily 300 each 1    Alcohol Swabs 70 % PADS Use as needed with blood glucose checks 100 each 0    Insulin Pen Needle ([employer] PEN NEEDLES 29G) 29G X 12MM MISC 1 each by Does not apply route daily 100 each 3    clonazePAM (KLONOPIN) 2 MG tablet Take 4 mg by mouth daily as needed for Anxiety. No current facility-administered medications for this visit. Review of Systems   Constitutional:  Positive for chills. Negative for fatigue and fever. Respiratory:  Positive for shortness of breath. Negative for chest tightness and wheezing. Cardiovascular:  Negative for leg swelling. Gastrointestinal:  Positive for nausea and vomiting. Negative for constipation and diarrhea. Neurological:  Positive for weakness and numbness. Negative for dizziness. Objective:  Vital signs: (most recent): Height 5' 6\" (1.676 m), weight 118 lb (53.5 kg). No fever. Assessment & Plan  No diagnosis found. No orders of the defined types were placed in this encounter. No orders of the defined types were placed in this encounter.            Electronically signed by Wu Briceno on 2/7/2023 at 11:17 AM

## 2023-02-07 NOTE — TELEPHONE ENCOUNTER
Date of Surgery: 9/30/2022  Last office visit : 1/10/23    Plan: Last refill of narcotics sent to pharmacy on file and instructed patient to call pain management to make an appointment. Referral provided     Patient called today , belligerent , cursing , upset due to pain management not being able to help. Patient was referred to management
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
## 2023-02-08 NOTE — TELEPHONE ENCOUNTER
Rachell Barajas was contacted by a Laya Stallworth for follow-up regarding SDOH related needs. Writer spoke with: Patient    Progress Notes:   Jhoan Tapia has a partnership with [employer] to provide transportation; patient would like me to mail him information. Patient says, \"it feels like there is a rope around my stomach. \"  He says stomach is misshaped. I asked if he received another food delivery from [employer]; no.      He called insurance because his sons' got insurance cards. A couple days later he got his cards. Action steps to be completed by writer:  I will mail patient information about [employer] and [employer]. Action steps to be completed by patient:  Patient will pay attention to his mail.     Patient has given verbal permission to leave detailed messages regarding SDOH referral on their phone: N/A    Patient has given verbal permission to submit applications on their behalf: yes    Jhonny Jolley MA

## 2023-02-08 NOTE — TELEPHONE ENCOUNTER
Patient and I spoke at length, with Tino Cushing, LPN as witness to conversation. His first complaint is that he went to a pain management office that \"showed him on a screen\" that there was a note that said Jackeline Hawley was just looking for pain pills\" from one of our providers. He said he did not know the name, address or phone number (phone number he gave me was an unknown cell phone number) of the pain management doctor and that he wasn't even there for 5 mins and the pain doctor said he \"doesn't prescribe pain meds to him\" and that he was made to feel like a junkie and he was Steve Green" out the door. Stated the office was yelling at him in the waiting room, made him feel like he was a junkie, he says he is \"not a junkie or a dope head. \"   He stated he was crying in the waiting room of the office visit with pain management. Patient went on to state that he smokes cigarettes and weed. Stated through the call numerous times he smokes weed, is not abusing drugs, though he said \"Perc 30's\" work for him. He is constantly cursing through the call using fuck, fucking and damn repeatedly. He stated he is in a lot of pain and the pain doctor would only prescribe stuff that doesn't work for him. I reassured the patient that there is no documentation saying he is \"just looking for pain meds\". Apologized for his poor treatment with his pain doctor, who again, the patient states he doesn't know who he saw or went. I advised I would speak with Dr. Theresa Mcgovern in regards to another referral for pain management and the patient stated he cannot live with this pain and that if he had to live like this he wants his \"damn leg cut off. \"    Patient s/p CMN fixation of his left intertrochanteric femur fracture on 9/30/22. Per last notes, xray states Stable healed left intertrochanteric femur fracture s/p CMN.      Advised patient again, I would follow up with Dr. Theresa Mcgovern with these concerns and discuss another referral to pain management.

## 2023-02-08 NOTE — TELEPHONE ENCOUNTER
If he has a provider of choice, we can give him a new referral for pain management. As you have said to the patient, there is no documentation on our end regarding him being pain seeking and we have never discussed this with the patient in person nor in our notes. Patient asked for a pain management referral which is appropriate since we are done with prescribing narcotics since his injury was over 4 months ago.

## 2023-02-09 ENCOUNTER — CARE COORDINATION (OUTPATIENT)
Dept: CARE COORDINATION | Age: 53
End: 2023-02-09

## 2023-02-09 ENCOUNTER — TELEPHONE (OUTPATIENT)
Dept: PRIMARY CARE CLINIC | Age: 53
End: 2023-02-09

## 2023-02-09 NOTE — CARE COORDINATION
He was supposed to have endoscopy yesterday, procedure not done, no notes. Attempted to call patient, left VM message asking for return call. Will call again next week.     Future Appointments   Date Time Provider Jerilyn Salvador   2/27/2023  1:30 PM MD BLANQUITA Qui[facility]ones [facility]   4/11/2023  1:00 PM [facility] [place],  [address] [address]   4/21/2023  1:00 PM BINA Degroot [facility] [address] [facility]

## 2023-02-09 NOTE — TELEPHONE ENCOUNTER
Called patient and he stated that he would like referral mailed to him and he would find provider.  Referral completed and address verified for mailing

## 2023-02-09 NOTE — TELEPHONE ENCOUNTER
Pt calling to state that he missed his appt to get the EGD done due to transportation issues. Needs to find out what is going on with him, so probably going to the er again after he can get some groceries in the house for his kids. Just an FYI.

## 2023-02-10 DIAGNOSIS — R47.02 DYSPHASIA: ICD-10-CM

## 2023-02-10 RX ORDER — LIDOCAINE HYDROCHLORIDE 20 MG/ML
10 SOLUTION OROPHARYNGEAL
Qty: 200 ML | Refills: 0 | Status: SHIPPED | OUTPATIENT
Start: 2023-02-10 | End: 2023-02-20

## 2023-02-10 NOTE — TELEPHONE ENCOUNTER
Jourdan Patient. Patient requesting an increased dose of lidocaine. He stated that the last few days he has increased dose himself and was able to eat more. He stated without the increase, he is in so much pain that he can't eat.  Please advise

## 2023-02-15 ENCOUNTER — CARE COORDINATION (OUTPATIENT)
Dept: CARE COORDINATION | Age: 53
End: 2023-02-15

## 2023-02-15 ASSESSMENT — ENCOUNTER SYMPTOMS: DYSPNEA ASSOCIATED WITH: EXERTION

## 2023-02-15 NOTE — CARE COORDINATION
Ambulatory Care Coordination Note  2/15/2023    Patient Current Location:  Home: [facility] [id number]      Spoke with patient. He missed the endoscopy because Medicaid cab did not show up. Discussed his upcoming GI appt, he will call to arrange ride, he has contact number of new insurance. Feels very weak, not able to do much. No improvement in swallowing problems. Can sometimes eat pudding, has to be laying on left side with head back to swallow, otherwise just drinking some fluids. Insurance denied Boost Glucose Control. Several problems:  - transportation issues- he plans on buying a car after next check. Then won't be able to pay his electric and gas bills for a few months.  -[place]ives [place] unsafe neighborhood, wants to move to a one story house after lease up in about 6 weeks. -Needs to see cardiologist, he still wants to hold off until GI problem fixed  -Needs to get bone scan, also wants to see what happens with GI issues.  -Pain- has an open referral to pain management, wants to go to a different provider, gave him number for [facility] to call.  -Not checking blood sugars, his transmitter is broken, could have gotten one from DM education nurse but also postponing those appts. Current strips do not work on fingerstick glucometer. Writer called [employer] and confirmed this, he needs to bring wrong strips back (One Touch Ultra) and get the correct ones (for One Touch Verio Reflect). Discussed needing to make health a priority and getting problems resolved. He goes to [facility] provider at [facility], has a , encouraged to discuss better management of finances, appts, making decisions. He smokes marijuana for pain and for stress relief. Has no support system, no family. Only income is disability. CC Plan:   -Follow up next week to see if got new glucometer strips. -Sent message to PCP about new order for nutritional supplements since had an insurance change.   Diabetes Assessment    Medic Alert ID: No  Meal Planning: Avoidance of concentrated sweets   How often do you test your blood sugar?: Meals   Do you have barriers with adherence to non-pharmacologic self-management interventions? (Nutrition/Exercise/Self-Monitoring): Yes   Have you ever had to go to the ED for symptoms of low blood sugar?: No       No patient-reported symptoms   Do you have hyperglycemia symptoms?: No   Do you have hypoglycemia symptoms?: No   Blood Sugar Monitoring Regimen: Not Testing        ,   COPD Assessment    Does the patient understand envrionmental exposure?: Yes  Does the patient have a nebulizer?: No  Does the patient use a space with inhaled medications?: No            Symptoms:     Symptom course: stable  Breathlessness: exertion  Increase use of rapid acting/rescue inhaled medications?: Not Applicable  Change in chronic cough?: No/At Baseline  Change in sputum?: No/At Baseline  Sputum characteristics: Unable to Specify  Self Monitoring - SaO2: No  Have you had a recent diagnosis of pneumonia either by PCP or at a hospital?: No     , and   General Assessment    Do you have any symptoms that are causing concern?: Yes  Progression since Onset: Unchanged  Reported Symptoms: Pain (Comment: pain with swallowing)                Offered patient enrollment in the Remote Patient Monitoring (RPM) program for in-home monitoring: Patient declined. Lab Results       None                 Goals Addressed                   This Visit's Progress     Conditions and Symptoms   Improving     I will schedule office visits, as directed by my provider. I will keep my appointment or reschedule if I have to cancel. I will notify my provider of any barriers to my plan of care. I will follow my Zone Management tool to seek urgent or emergent care. I will notify my provider of any symptoms that indicate a worsening of my condition.     Barriers: lack of support, overwhelmed by complexity of regimen, and lack of education  Plan for overcoming my barriers: ACM calls, CHN to follow, assist with appts, education  Confidence: 6/10  Anticipated Goal Completion Date: 2/20/23                Future Appointments   Date Time Provider Jerilyn Salvador   2/27/2023  1:30 PM Carlene Camara MD [facility]   4/11/2023  1:00 PM Gisele Lycnh DO [address] [address]   4/21/2023  1:00 PM Maryjo Garcia[address]

## 2023-02-20 ENCOUNTER — TELEPHONE (OUTPATIENT)
Dept: PRIMARY CARE CLINIC | Age: 53
End: 2023-02-20

## 2023-02-20 NOTE — CARE COORDINATION
Left VM message asking Pepe Redding, enteral rep at [facility] to call me back regarding supplement order. She called back, stated needs more clinical documentation. Faxed to her the PCP progress notes from 1/20/23.

## 2023-02-22 ENCOUNTER — CARE COORDINATION (OUTPATIENT)
Dept: CARE COORDINATION | Age: 53
End: 2023-02-22

## 2023-02-22 NOTE — CARE COORDINATION
Spoke with patient and reviewed GI appt next week again- time, address. He stated already arranged transportation for appt. He hopes GI will admit him and do an EGD that day. The lidocaine is not effective any more, still hurts to drink anything, can't eat. Very weak, gait is wobbly, loses balance easily. Trying to exercise, his kids walk with him down to sidewalk. Hasn't had PT for his leg yet due to weakness. Writer left VM message on [employer]. [facility]'s phone number to check if they received PCP progress note writer faxed them and see if that has enough documentation supporting why patient needs nutritional supplements.

## 2023-02-24 ENCOUNTER — CARE COORDINATION (OUTPATIENT)
Dept: CARE COORDINATION | Age: 53
End: 2023-02-24

## 2023-02-27 ENCOUNTER — APPOINTMENT (OUTPATIENT)
Dept: CT IMAGING | Age: 53
DRG: 420 | End: 2023-02-27
Payer: MEDICAID

## 2023-02-27 ENCOUNTER — APPOINTMENT (OUTPATIENT)
Dept: GENERAL RADIOLOGY | Age: 53
DRG: 420 | End: 2023-02-27
Payer: MEDICAID

## 2023-02-27 ENCOUNTER — HOSPITAL ENCOUNTER (INPATIENT)
Age: 53
LOS: 3 days | Discharge: HOME | DRG: 420 | End: 2023-03-02
Attending: EMERGENCY MEDICINE | Admitting: STUDENT IN AN ORGANIZED HEALTH CARE EDUCATION/TRAINING PROGRAM
Payer: MEDICAID

## 2023-02-27 DIAGNOSIS — R13.10 DYSPHAGIA, UNSPECIFIED TYPE: ICD-10-CM

## 2023-02-27 DIAGNOSIS — E10.10 DIABETIC KETOACIDOSIS WITHOUT COMA ASSOCIATED WITH TYPE 1 DIABETES MELLITUS (HCC): Primary | ICD-10-CM

## 2023-02-27 PROBLEM — E11.10 DIABETIC KETOACIDOSIS WITHOUT COMA ASSOCIATED WITH TYPE 2 DIABETES MELLITUS (HCC): Status: ACTIVE | Noted: 2023-02-27

## 2023-02-27 PROBLEM — R73.9 HYPERGLYCEMIA: Status: ACTIVE | Noted: 2023-02-27

## 2023-02-27 LAB
ABSOLUTE EOS #: 0.09 K/UL (ref 0–0.44)
ABSOLUTE IMMATURE GRANULOCYTE: 0.16 K/UL (ref 0–0.3)
ABSOLUTE LYMPH #: 1.57 K/UL (ref 1.1–3.7)
ABSOLUTE MONO #: 0.17 K/UL (ref 0.1–1.2)
ACETAMINOPHEN LEVEL: <5 UG/ML (ref 10–30)
ALBUMIN SERPL-MCNC: 4.4 G/DL (ref 3.5–5.2)
ALBUMIN/GLOBULIN RATIO: 1.4 (ref 1–2.5)
ALP SERPL-CCNC: 107 U/L (ref 40–129)
ALT SERPL-CCNC: 21 U/L (ref 5–41)
ANION GAP SERPL CALCULATED.3IONS-SCNC: 17 MMOL/L (ref 9–17)
ANION GAP SERPL CALCULATED.3IONS-SCNC: 29 MMOL/L (ref 9–17)
ANION GAP SERPL CALCULATED.3IONS-SCNC: 30 MMOL/L (ref 9–17)
AST SERPL-CCNC: 18 U/L
BASOPHILS # BLD: 1 % (ref 0–2)
BASOPHILS ABSOLUTE: 0.06 K/UL (ref 0–0.2)
BETA-HYDROXYBUTYRATE: 7.43 MMOL/L (ref 0.02–0.27)
BILIRUB SERPL-MCNC: 0.8 MG/DL (ref 0.3–1.2)
BNP SERPL-MCNC: 107 PG/ML
BUN SERPL-MCNC: 29 MG/DL (ref 6–20)
BUN SERPL-MCNC: 30 MG/DL (ref 6–20)
BUN SERPL-MCNC: 33 MG/DL (ref 6–20)
CALCIUM SERPL-MCNC: 8.7 MG/DL (ref 8.6–10.4)
CALCIUM SERPL-MCNC: 9 MG/DL (ref 8.6–10.4)
CALCIUM SERPL-MCNC: 9.9 MG/DL (ref 8.6–10.4)
CARBOXYHEMOGLOBIN: 1.3 % (ref 0–5)
CARBOXYHEMOGLOBIN: 2.6 % (ref 0–5)
CHLORIDE SERPL-SCNC: 100 MMOL/L (ref 98–107)
CHLORIDE SERPL-SCNC: 88 MMOL/L (ref 98–107)
CHLORIDE SERPL-SCNC: 90 MMOL/L (ref 98–107)
CO2 SERPL-SCNC: 13 MMOL/L (ref 20–31)
CO2 SERPL-SCNC: 16 MMOL/L (ref 20–31)
CO2 SERPL-SCNC: 22 MMOL/L (ref 20–31)
CREAT SERPL-MCNC: 1.41 MG/DL (ref 0.7–1.2)
CREAT SERPL-MCNC: 1.47 MG/DL (ref 0.7–1.2)
CREAT SERPL-MCNC: 1.93 MG/DL (ref 0.7–1.2)
EOSINOPHILS RELATIVE PERCENT: 1 % (ref 1–4)
ETHANOL PERCENT: <0.01 %
ETHANOL: <10 MG/DL
FIO2: ABNORMAL
FIO2: ABNORMAL
FLUAV AG SPEC QL: NEGATIVE
FLUBV AG SPEC QL: NEGATIVE
GFR SERPL CREATININE-BSD FRML MDRD: 41 ML/MIN/1.73M2
GFR SERPL CREATININE-BSD FRML MDRD: 57 ML/MIN/1.73M2
GFR SERPL CREATININE-BSD FRML MDRD: >60 ML/MIN/1.73M2
GLUCOSE BLD-MCNC: 304 MG/DL (ref 75–110)
GLUCOSE BLD-MCNC: 336 MG/DL (ref 75–110)
GLUCOSE BLD-MCNC: 395 MG/DL (ref 75–110)
GLUCOSE BLD-MCNC: 449 MG/DL (ref 75–110)
GLUCOSE BLD-MCNC: 508 MG/DL (ref 75–110)
GLUCOSE SERPL-MCNC: 365 MG/DL (ref 70–99)
GLUCOSE SERPL-MCNC: 557 MG/DL (ref 70–99)
GLUCOSE SERPL-MCNC: 560 MG/DL (ref 70–99)
HCO3 VENOUS: 16.1 MMOL/L (ref 24–30)
HCO3 VENOUS: 18.3 MMOL/L (ref 24–30)
HCT VFR BLD AUTO: 40.1 % (ref 40.7–50.3)
HGB BLD-MCNC: 12.5 G/DL (ref 13–17)
IMMATURE GRANULOCYTES: 2 %
LIPASE SERPL-CCNC: 42 U/L (ref 13–60)
LYMPHOCYTES # BLD: 17 % (ref 24–43)
MAGNESIUM SERPL-MCNC: 2 MG/DL (ref 1.6–2.6)
MCH RBC QN AUTO: 26.5 PG (ref 25.2–33.5)
MCHC RBC AUTO-ENTMCNC: 31.2 G/DL (ref 28.4–34.8)
MCV RBC AUTO: 85 FL (ref 82.6–102.9)
MONOCYTES # BLD: 2 % (ref 3–12)
NEGATIVE BASE EXCESS, VEN: 10.5 MMOL/L (ref 0–2)
NEGATIVE BASE EXCESS, VEN: 6.6 MMOL/L (ref 0–2)
NRBC AUTOMATED: 0 PER 100 WBC
O2 SAT, VEN: 85.3 % (ref 60–85)
O2 SAT, VEN: 94.7 % (ref 60–85)
PATIENT TEMP: 37
PATIENT TEMP: 37
PCO2, VEN: 36.7 MM HG (ref 39–55)
PCO2, VEN: 40 MM HG (ref 39–55)
PDW BLD-RTO: 15.9 % (ref 11.8–14.4)
PH VENOUS: 7.23 (ref 7.32–7.42)
PH VENOUS: 7.32 (ref 7.32–7.42)
PHOSPHATE SERPL-MCNC: 5.2 MG/DL (ref 2.5–4.5)
PLATELET # BLD AUTO: 322 K/UL (ref 138–453)
PMV BLD AUTO: 12.7 FL (ref 8.1–13.5)
PO2, VEN: 53.9 MM HG (ref 30–50)
PO2, VEN: 88.1 MM HG (ref 30–50)
POTASSIUM SERPL-SCNC: 4.1 MMOL/L (ref 3.7–5.3)
POTASSIUM SERPL-SCNC: 4.8 MMOL/L (ref 3.7–5.3)
POTASSIUM SERPL-SCNC: 4.9 MMOL/L (ref 3.7–5.3)
PROT SERPL-MCNC: 7.6 G/DL (ref 6.4–8.3)
RBC # BLD: 4.72 M/UL (ref 4.21–5.77)
RBC # BLD: ABNORMAL 10*6/UL
SALICYLATE LEVEL: <1 MG/DL (ref 3–10)
SARS-COV-2 RDRP RESP QL NAA+PROBE: NOT DETECTED
SEG NEUTROPHILS: 77 % (ref 36–65)
SEGMENTED NEUTROPHILS ABSOLUTE COUNT: 7.49 K/UL (ref 1.5–8.1)
SODIUM SERPL-SCNC: 132 MMOL/L (ref 135–144)
SODIUM SERPL-SCNC: 134 MMOL/L (ref 135–144)
SODIUM SERPL-SCNC: 139 MMOL/L (ref 135–144)
SPECIMEN DESCRIPTION: NORMAL
TOXIC TRICYCLIC SC,BLOOD: NEGATIVE
TROPONIN I SERPL DL<=0.01 NG/ML-MCNC: 15 NG/L (ref 0–22)
TROPONIN I SERPL DL<=0.01 NG/ML-MCNC: 15 NG/L (ref 0–22)
WBC # BLD AUTO: 9.5 K/UL (ref 3.5–11.3)

## 2023-02-27 PROCEDURE — 74177 CT ABD & PELVIS W/CONTRAST: CPT

## 2023-02-27 PROCEDURE — 2580000003 HC RX 258: Performed by: STUDENT IN AN ORGANIZED HEALTH CARE EDUCATION/TRAINING PROGRAM

## 2023-02-27 PROCEDURE — 6360000002 HC RX W HCPCS: Performed by: STUDENT IN AN ORGANIZED HEALTH CARE EDUCATION/TRAINING PROGRAM

## 2023-02-27 PROCEDURE — G0480 DRUG TEST DEF 1-7 CLASSES: HCPCS

## 2023-02-27 PROCEDURE — 83735 ASSAY OF MAGNESIUM: CPT

## 2023-02-27 PROCEDURE — 80048 BASIC METABOLIC PNL TOTAL CA: CPT

## 2023-02-27 PROCEDURE — 2580000003 HC RX 258

## 2023-02-27 PROCEDURE — 85025 COMPLETE CBC W/AUTO DIFF WBC: CPT

## 2023-02-27 PROCEDURE — 80053 COMPREHEN METABOLIC PANEL: CPT

## 2023-02-27 PROCEDURE — 99285 EMERGENCY DEPT VISIT HI MDM: CPT

## 2023-02-27 PROCEDURE — 96374 THER/PROPH/DIAG INJ IV PUSH: CPT

## 2023-02-27 PROCEDURE — 71045 X-RAY EXAM CHEST 1 VIEW: CPT

## 2023-02-27 PROCEDURE — 2500000003 HC RX 250 WO HCPCS

## 2023-02-27 PROCEDURE — 84100 ASSAY OF PHOSPHORUS: CPT

## 2023-02-27 PROCEDURE — 96375 TX/PRO/DX INJ NEW DRUG ADDON: CPT

## 2023-02-27 PROCEDURE — 6370000000 HC RX 637 (ALT 250 FOR IP)

## 2023-02-27 PROCEDURE — 87804 INFLUENZA ASSAY W/OPTIC: CPT

## 2023-02-27 PROCEDURE — 6370000000 HC RX 637 (ALT 250 FOR IP): Performed by: STUDENT IN AN ORGANIZED HEALTH CARE EDUCATION/TRAINING PROGRAM

## 2023-02-27 PROCEDURE — 6360000002 HC RX W HCPCS

## 2023-02-27 PROCEDURE — 84484 ASSAY OF TROPONIN QUANT: CPT

## 2023-02-27 PROCEDURE — 93005 ELECTROCARDIOGRAM TRACING: CPT | Performed by: STUDENT IN AN ORGANIZED HEALTH CARE EDUCATION/TRAINING PROGRAM

## 2023-02-27 PROCEDURE — 87635 SARS-COV-2 COVID-19 AMP PRB: CPT

## 2023-02-27 PROCEDURE — 80179 DRUG ASSAY SALICYLATE: CPT

## 2023-02-27 PROCEDURE — 82947 ASSAY GLUCOSE BLOOD QUANT: CPT

## 2023-02-27 PROCEDURE — 6360000002 HC RX W HCPCS: Performed by: EMERGENCY MEDICINE

## 2023-02-27 PROCEDURE — 82805 BLOOD GASES W/O2 SATURATION: CPT

## 2023-02-27 PROCEDURE — 83690 ASSAY OF LIPASE: CPT

## 2023-02-27 PROCEDURE — 82010 KETONE BODYS QUAN: CPT

## 2023-02-27 PROCEDURE — 36415 COLL VENOUS BLD VENIPUNCTURE: CPT

## 2023-02-27 PROCEDURE — 80143 DRUG ASSAY ACETAMINOPHEN: CPT

## 2023-02-27 PROCEDURE — 80307 DRUG TEST PRSMV CHEM ANLYZR: CPT

## 2023-02-27 PROCEDURE — 6360000004 HC RX CONTRAST MEDICATION: Performed by: EMERGENCY MEDICINE

## 2023-02-27 PROCEDURE — 2060000000 HC ICU INTERMEDIATE R&B

## 2023-02-27 PROCEDURE — 83880 ASSAY OF NATRIURETIC PEPTIDE: CPT

## 2023-02-27 RX ORDER — ONDANSETRON 2 MG/ML
4 INJECTION INTRAMUSCULAR; INTRAVENOUS EVERY 6 HOURS PRN
Status: DISCONTINUED | OUTPATIENT
Start: 2023-02-27 | End: 2023-03-02 | Stop reason: HOSPADM

## 2023-02-27 RX ORDER — 0.9 % SODIUM CHLORIDE 0.9 %
1000 INTRAVENOUS SOLUTION INTRAVENOUS ONCE
Status: COMPLETED | OUTPATIENT
Start: 2023-02-27 | End: 2023-02-27

## 2023-02-27 RX ORDER — ONDANSETRON 4 MG/1
4 TABLET, ORALLY DISINTEGRATING ORAL EVERY 8 HOURS PRN
Status: DISCONTINUED | OUTPATIENT
Start: 2023-02-27 | End: 2023-03-02 | Stop reason: HOSPADM

## 2023-02-27 RX ORDER — ONDANSETRON 2 MG/ML
4 INJECTION INTRAMUSCULAR; INTRAVENOUS ONCE
Status: COMPLETED | OUTPATIENT
Start: 2023-02-27 | End: 2023-02-27

## 2023-02-27 RX ORDER — SODIUM CHLORIDE 0.9 % (FLUSH) 0.9 %
5-40 SYRINGE (ML) INJECTION EVERY 12 HOURS SCHEDULED
Status: DISCONTINUED | OUTPATIENT
Start: 2023-02-27 | End: 2023-03-02 | Stop reason: HOSPADM

## 2023-02-27 RX ORDER — SODIUM CHLORIDE 450 MG/100ML
INJECTION, SOLUTION INTRAVENOUS CONTINUOUS
Status: DISCONTINUED | OUTPATIENT
Start: 2023-02-27 | End: 2023-02-28

## 2023-02-27 RX ORDER — SODIUM CHLORIDE, SODIUM LACTATE, POTASSIUM CHLORIDE, AND CALCIUM CHLORIDE .6; .31; .03; .02 G/100ML; G/100ML; G/100ML; G/100ML
1000 INJECTION, SOLUTION INTRAVENOUS ONCE
Status: COMPLETED | OUTPATIENT
Start: 2023-02-27 | End: 2023-02-27

## 2023-02-27 RX ORDER — DEXTROSE AND SODIUM CHLORIDE 5; .45 G/100ML; G/100ML
INJECTION, SOLUTION INTRAVENOUS CONTINUOUS PRN
Status: DISCONTINUED | OUTPATIENT
Start: 2023-02-27 | End: 2023-02-28

## 2023-02-27 RX ORDER — FENTANYL CITRATE 50 UG/ML
25 INJECTION, SOLUTION INTRAMUSCULAR; INTRAVENOUS ONCE
Status: COMPLETED | OUTPATIENT
Start: 2023-02-27 | End: 2023-02-27

## 2023-02-27 RX ORDER — SODIUM CHLORIDE 450 MG/100ML
INJECTION, SOLUTION INTRAVENOUS
Status: COMPLETED
Start: 2023-02-27 | End: 2023-02-27

## 2023-02-27 RX ORDER — METRONIDAZOLE 500 MG/100ML
500 INJECTION, SOLUTION INTRAVENOUS EVERY 8 HOURS
Status: DISCONTINUED | OUTPATIENT
Start: 2023-02-27 | End: 2023-03-02 | Stop reason: HOSPADM

## 2023-02-27 RX ORDER — MAGNESIUM SULFATE 1 G/100ML
1000 INJECTION INTRAVENOUS PRN
Status: DISCONTINUED | OUTPATIENT
Start: 2023-02-27 | End: 2023-03-02

## 2023-02-27 RX ORDER — DEXTROSE MONOHYDRATE 100 MG/ML
INJECTION, SOLUTION INTRAVENOUS CONTINUOUS PRN
Status: DISCONTINUED | OUTPATIENT
Start: 2023-02-27 | End: 2023-02-28

## 2023-02-27 RX ORDER — POLYETHYLENE GLYCOL 3350 17 G/17G
17 POWDER, FOR SOLUTION ORAL DAILY PRN
Status: DISCONTINUED | OUTPATIENT
Start: 2023-02-27 | End: 2023-03-02 | Stop reason: HOSPADM

## 2023-02-27 RX ORDER — HEPARIN SODIUM 5000 [USP'U]/ML
5000 INJECTION, SOLUTION INTRAVENOUS; SUBCUTANEOUS 2 TIMES DAILY
Status: DISCONTINUED | OUTPATIENT
Start: 2023-02-27 | End: 2023-02-27

## 2023-02-27 RX ORDER — POTASSIUM CHLORIDE 7.45 MG/ML
10 INJECTION INTRAVENOUS PRN
Status: DISCONTINUED | OUTPATIENT
Start: 2023-02-27 | End: 2023-03-02

## 2023-02-27 RX ORDER — HEPARIN SODIUM 5000 [USP'U]/ML
5000 INJECTION, SOLUTION INTRAVENOUS; SUBCUTANEOUS EVERY 8 HOURS SCHEDULED
Status: DISCONTINUED | OUTPATIENT
Start: 2023-02-27 | End: 2023-03-02 | Stop reason: HOSPADM

## 2023-02-27 RX ORDER — ACETAMINOPHEN 650 MG/1
650 SUPPOSITORY RECTAL EVERY 6 HOURS PRN
Status: DISCONTINUED | OUTPATIENT
Start: 2023-02-27 | End: 2023-03-02 | Stop reason: HOSPADM

## 2023-02-27 RX ORDER — SODIUM CHLORIDE 0.9 % (FLUSH) 0.9 %
5-40 SYRINGE (ML) INJECTION PRN
Status: DISCONTINUED | OUTPATIENT
Start: 2023-02-27 | End: 2023-03-02 | Stop reason: HOSPADM

## 2023-02-27 RX ORDER — ACETAMINOPHEN 325 MG/1
650 TABLET ORAL EVERY 6 HOURS PRN
Status: DISCONTINUED | OUTPATIENT
Start: 2023-02-27 | End: 2023-03-02 | Stop reason: HOSPADM

## 2023-02-27 RX ADMIN — SODIUM CHLORIDE: 4.5 INJECTION, SOLUTION INTRAVENOUS at 17:57

## 2023-02-27 RX ADMIN — SODIUM CHLORIDE 1000 ML: 9 INJECTION, SOLUTION INTRAVENOUS at 14:12

## 2023-02-27 RX ADMIN — FENTANYL CITRATE 25 MCG: 50 INJECTION, SOLUTION INTRAMUSCULAR; INTRAVENOUS at 20:52

## 2023-02-27 RX ADMIN — ONDANSETRON 4 MG: 2 INJECTION INTRAMUSCULAR; INTRAVENOUS at 21:14

## 2023-02-27 RX ADMIN — CEFTRIAXONE SODIUM 1000 MG: 10 INJECTION, POWDER, FOR SOLUTION INTRAVENOUS at 22:51

## 2023-02-27 RX ADMIN — POTASSIUM CHLORIDE 10 MEQ: 10 INJECTION, SOLUTION INTRAVENOUS at 21:02

## 2023-02-27 RX ADMIN — ONDANSETRON 4 MG: 2 INJECTION INTRAMUSCULAR; INTRAVENOUS at 14:12

## 2023-02-27 RX ADMIN — SODIUM CHLORIDE 0.1 UNITS/KG/HR: 9 INJECTION, SOLUTION INTRAVENOUS at 17:53

## 2023-02-27 RX ADMIN — FENTANYL CITRATE 25 MCG: 50 INJECTION, SOLUTION INTRAMUSCULAR; INTRAVENOUS at 14:22

## 2023-02-27 RX ADMIN — HEPARIN SODIUM 5000 UNITS: 5000 INJECTION INTRAVENOUS; SUBCUTANEOUS at 22:52

## 2023-02-27 RX ADMIN — SODIUM CHLORIDE, PRESERVATIVE FREE 10 ML: 5 INJECTION INTRAVENOUS at 21:18

## 2023-02-27 RX ADMIN — SODIUM CHLORIDE: 4.5 INJECTION, SOLUTION INTRAVENOUS at 22:12

## 2023-02-27 RX ADMIN — ACETAMINOPHEN 650 MG: 325 TABLET ORAL at 21:14

## 2023-02-27 RX ADMIN — POTASSIUM CHLORIDE 10 MEQ: 10 INJECTION, SOLUTION INTRAVENOUS at 22:25

## 2023-02-27 RX ADMIN — POTASSIUM CHLORIDE 10 MEQ: 10 INJECTION, SOLUTION INTRAVENOUS at 23:06

## 2023-02-27 RX ADMIN — IOPAMIDOL 75 ML: 755 INJECTION, SOLUTION INTRAVENOUS at 15:50

## 2023-02-27 RX ADMIN — SODIUM CHLORIDE 1000 ML: 9 INJECTION, SOLUTION INTRAVENOUS at 17:43

## 2023-02-27 RX ADMIN — METRONIDAZOLE 500 MG: 500 INJECTION, SOLUTION INTRAVENOUS at 23:50

## 2023-02-27 RX ADMIN — SODIUM CHLORIDE, POTASSIUM CHLORIDE, SODIUM LACTATE AND CALCIUM CHLORIDE 1000 ML: 600; 310; 30; 20 INJECTION, SOLUTION INTRAVENOUS at 21:20

## 2023-02-27 ASSESSMENT — PAIN SCALES - GENERAL
PAINLEVEL_OUTOF10: 10

## 2023-02-27 ASSESSMENT — PAIN DESCRIPTION - LOCATION
LOCATION: LEG;CHEST;HEAD
LOCATION: HEAD

## 2023-02-27 NOTE — H&P
[address]     Department of Internal Medicine - Staff Internal Medicine Teaching Service          ADMISSION NOTE/HISTORY AND PHYSICAL EXAMINATION   Date: 2/27/2023  Patient Name: Chilo Razo  Date of admission: 2/27/2023  1:38 PM  YOB: 1970  PCP: BINA Hammond  History Obtained From:  patient, electronic medical record    CHIEF COMPLAINT     Chief complaint: Nausea and vomiting    HISTORY OF PRESENTING ILLNESS     The patient is a pleasant 47 y.o. male presented with nausea and vomiting and was admitted to the hospital for management of DKA     Patient has a past medical history significant for uncontrolled type 1 diabetes mellitus complicated with nephropathy CKD stage III and neuropathy, COPD. Reported that he has not eaten any for many days because of the severe pain in his throat and mouth and unable to swallow. Patient also reported that he has had chest pain similar to MI and diffuse abdominal pain. Also he had left thigh pain and he has a recent surgery ORIF for femur fracture. Also has a history of lung nodule, transaminitis, hep C. Patient is also cachectic and ill-appearing. In ED, vitals were significant for tachycardia with heart rate of 103 and tachypnea with respiratory rate of 28. Labs were significant for elevated blood glucose 560, potassium of 4.8, sodium of 134 -corrected sodium 141-bicarb 16, and beta hydroxybutyrate above 7.43. VBG's were significant for pH of 7.319. Creatinine was elevated at 1.93 -patient's baseline 1.7. troponin 15/15. EKG No acute changes.          Review of Systems:  General ROS: Completed and except as mentioned above were negative   HEENT ROS: Completed and except as mentioned above were negative   Allergy and Immunology ROS:  Completed and except as mentioned above were negative  Hematological and Lymphatic ROS:  Completed and except as mentioned above were negative  Respiratory ROS:  Completed and except as mentioned above were negative  Cardiovascular ROS:  Completed and except as mentioned above were negative  Gastrointestinal ROS: Completed and except as mentioned above were negative  Genito-Urinary ROS:  Completed and except as mentioned above were negative  Musculoskeletal ROS:  Completed and except as mentioned above were negative  Neurological ROS:  Completed and except as mentioned above were negative  Skin & Dermatological ROS:  Completed and except as mentioned above were negative  Psychological ROS:  Completed and except as mentioned above were negative    PAST MEDICAL HISTORY     Past Medical History:   Diagnosis Date    Diabetes mellitus ([facility] [address]     Obstructive lung disease ([facility] [address]     8/2022    Panic attack        PAST SURGICAL HISTORY     Past Surgical History:   Procedure Laterality Date    APPENDECTOMY      FEMUR FRACTURE SURGERY Left 09/30/2022    Cephalomedullary Nail    HAND SURGERY      JOINT REPLACEMENT      TIBIA FRACTURE SURGERY Left 09/30/2022    LEFT FEMUR CEPHALOMEDULLARY NAIL performed by Omar Quintanilla DO at [address] [facility] 02/15/2022    EGD BIOPSY performed by Maia Shipley MD at [address] 11/10/2022    EGD BIOPSY performed by Ashwin Dela Cruz MD at [facility]     Azithromycin, Acetaminophen-codeine, Codeine, Hydrocodone-acetaminophen, Ibuprofen, Morphine, and Tramadol    MEDICATIONS PRIOR TO ADMISSION     Prior to Admission medications    Medication Sig Start Date End Date Taking?  Authorizing Provider   diphenhydrAMINE (BENYLIN) 12.5 MG/5ML liquid take 5 milliliters by mouth four times a day if needed for allergies 2/20/23   BINA Denton   aluminum hydroxide-magnesium carbonate (ACID GONE)  MG/15ML suspension Take 5 mLs by mouth 4 times daily (with meals and nightly) 2/10/23   Titi Schwartz MD   atorvastatin (LIPITOR) 80 MG tablet  2/7/23   Historical Provider, MD   OLANZapine (ZYPREXA) 2.5 MG tablet take 1 tablet by mouth at bedtime 1/26/23   Historical Provider, MD   amLODIPine (NORVASC) 10 MG tablet take 1 tablet by mouth once daily 12/2/22   Historical Provider, MD   atorvastatin (LIPITOR) 40 MG tablet take 1 tablet by mouth every evening  Patient not taking: Reported on 2/7/2023 12/2/22   Historical Provider, MD   TRUEPLUS GLUCOSE 4 g chewable tablet chew and swallow as directed for LOW BLOOD SUGAR REACTION 12/20/22   Historical Provider, MD   insulin lispro, 1 Unit Dial, (HUMALOG/ADMELOG) 100 UNIT/ML SOPN INJECT 0-16 UNITS INTO THE SKIN THREE TIMES A DAY WITH MEALS 1/14/23   Historical Provider, MD   PARoxetine (PAXIL) 20 MG tablet take 1 tablet by mouth at bedtime 1/7/23   Historical Provider, MD   traZODone (DESYREL) 300 MG tablet  1/9/23   Historical Provider, MD   Continuous Blood Gluc Transmit (DEXCOM G6 TRANSMITTER) MISC 1 each by Does not apply route every 10 days 1/20/23   BINA Diaz   insulin aspart (NOVOLOG FLEXPEN) 100 UNIT/ML injection pen Inject 5 Units into the skin 3 times daily (before meals) Patient has temporarily reduced his dose to 1-2 units with the small snack he has each day.  1/16/23   BINA Diaz   insulin glargine (LANTUS) 100 UNIT/ML injection vial Inject 15 Units into the skin 2 times daily 1/16/23   BINA Diaz   sucralfate (CARAFATE) 1 GM/10ML suspension Take 10 mLs by mouth 4 times daily 1/10/23   BINA Diaz   metoclopramide (REGLAN) 10 MG/10ML SOLN Take 10 mLs by mouth 3 times daily (before meals) 1/10/23   BINA Diaz   pantoprazole (PROTONIX) 40 MG tablet Take 40 mg by mouth 2 times daily    Historical Provider, MD   PNEUMOCOCCAL 20-ALPHONSO CONJ VACC IM Inject into the muscle    Historical Provider, MD   Continuous Blood Gluc Transmit (DEXCOM G6 TRANSMITTER) MISC 1 each by Does not apply route once a week 12/30/22   Yue Valle MD   Continuous Blood Gluc Sensor (DEXCOM G6 SENSOR) MISC 1 each by Does not apply route once a week 12/30/22   Grecia Strickland MD   blood glucose monitor strips Test 4 times a day & as needed for symptoms of irregular blood glucose. Dispense sufficient amount for indicated testing frequency plus additional to accommodate PRN testing needs. Dispense brand that correlates with his glucometer and that's covered by his insurance. Needs delivered to him. 12/30/22   Grecia Strickland MD   Nutritional Supplements (GLUCOSE MANAGEMENT) TABS Take 1 tablet by mouth daily as needed (Low sugar reaction) 12/20/22   BINA Hall   aspirin EC 81 MG EC tablet Take 1 tablet by mouth daily 12/16/22   Pino Factor, PA   Continuous Blood Gluc  ([address]) [address] 1 each by Does not apply route once a week 11/18/22   BINA Hall   Misc. Devices (STEP N REST WALKER) MISC 1 each by Does not apply route continuous Seated, wheeled walker 11/18/22   Pino Payne PA   ondansetron (ZOFRAN ODT) 4 MG disintegrating tablet Take 1 tablet by mouth every 8 hours as needed for Nausea or Vomiting 11/18/22   Pino Factor PA   albuterol sulfate HFA (VENTOLIN HFA) 108 (90 Base) MCG/ACT inhaler Inhale 2 puffs into the lungs 4 times daily as needed for Wheezing 11/15/22   BINA aHll   metoprolol tartrate (LOPRESSOR) 25 MG tablet Take 1 tablet by mouth 2 times daily 11/14/22   Pino Factor PA   Blood Glucose Monitoring Suppl (ONE TOUCH ULTRA 2) w/Device KIT 1 kit by Does not apply route daily 3/18/22   Boo Trotter, DO   Lancets MISC 1 each by Does not apply route 2 times daily 3/18/22   Boo Trotter, DO   Alcohol Swabs 70 % PADS Use as needed with blood glucose checks 2/15/22   Asher Chaparro MD   Insulin Pen Needle ([employer] PEN NEEDLES 29G) 29G X 12MM MISC 1 each by Does not apply route daily 2/15/22   Asher Chaparro MD   clonazePAM (KLONOPIN) 2 MG tablet Take 4 mg by mouth daily as needed for Anxiety.      Historical Provider, MD SOCIAL HISTORY     Tobacco: 10 years pack history  Alcohol: Not currently  Illicits: Marijuana      FAMILY HISTORY     No family history on file. PHYSICAL EXAM     Vitals: BP (!) 144/84   Pulse (!) 102   Temp 97.5 °F (36.4 °C) (Oral)   Resp 22   Ht 5' 8\" (1.727 m)   Wt 100 lb (45.4 kg)   SpO2 92%   BMI 15.20 kg/m²   Tmax: Temp (24hrs), Av.5 °F (36.4 °C), Min:97.5 °F (36.4 °C), Max:97.5 °F (36.4 °C)    Last Body weight:   Wt Readings from Last 3 Encounters:   23 100 lb (45.4 kg)   23 118 lb (53.5 kg)   23 118 lb (53.5 kg)     Body Mass Index : Body mass index is 15.2 kg/m². PHYSICAL EXAMINATION:  Constitutional: This is a well developed, well nourished, Less than 16.0 - Severe malnutrition / Protein energy malnutrition Grade III 47y.o. year old male who is alert, oriented, cooperative and in no apparent distress. Head:normocephalic and atraumatic. EENT:  PERRLA. No conjunctival injections. Septum was midline, mucosa was without erythema, exudates or cobblestoning. No thrush was noted. Neck: Supple without thyromegaly. No elevated JVP. Trachea was midline. Respiratory: Chest was symmetrical without dullness to percussion. Breath sounds bilaterally were clear to auscultation. There were no wheezes, rhonchi or rales. There is no intercostal retraction or use of accessory muscles. No egophony noted. Cardiovascular: Regular without murmur, clicks, gallops or rubs. Abdomen: Slightly rounded and soft without organomegaly. No rebound, rigidity or guarding was appreciated. Lymphatic: No lymphadenopathy. Musculoskeletal: Normal curvature of the spine. No gross muscle weakness. Extremities:  No lower extremity edema, ulcerations, tenderness, varicosities or erythema. Muscle size, tone and strength are normal.  No involuntary movements are noted. Skin:  Warm and dry. Good color, turgor and pigmentation. No lesions or scars.   No cyanosis or clubbing  Neurological/Psychiatric: The patient's general behavior, level of consciousness, thought content and emotional status is normal.          INVESTIGATIONS     Laboratory Testing:     Recent Results (from the past 24 hour(s))   CBC with Auto Differential    Collection Time: 02/27/23  2:03 PM   Result Value Ref Range    WBC 9.5 3.5 - 11.3 k/uL    RBC 4.72 4.21 - 5.77 m/uL    Hemoglobin 12.5 (L) 13.0 - 17.0 g/dL    Hematocrit 40.1 (L) 40.7 - 50.3 %    MCV 85.0 82.6 - 102.9 fL    MCH 26.5 25.2 - 33.5 pg    MCHC 31.2 28.4 - 34.8 g/dL    RDW 15.9 (H) 11.8 - 14.4 %    Platelets 039 751 - 987 k/uL    MPV 12.7 8.1 - 13.5 fL    NRBC Automated 0.0 0.0 per 100 WBC    Seg Neutrophils 77 (H) 36 - 65 %    Lymphocytes 17 (L) 24 - 43 %    Monocytes 2 (L) 3 - 12 %    Eosinophils % 1 1 - 4 %    Basophils 1 0 - 2 %    Immature Granulocytes 2 (H) 0 %    Segs Absolute 7.49 1.50 - 8.10 k/uL    Absolute Lymph # 1.57 1.10 - 3.70 k/uL    Absolute Mono # 0.17 0.10 - 1.20 k/uL    Absolute Eos # 0.09 0.00 - 0.44 k/uL    Basophils Absolute 0.06 0.00 - 0.20 k/uL    Absolute Immature Granulocyte 0.16 0.00 - 0.30 k/uL    RBC Morphology ANISOCYTOSIS PRESENT    Troponin    Collection Time: 02/27/23  2:03 PM   Result Value Ref Range    Troponin, High Sensitivity 15 0 - 22 ng/L   Brain Natriuretic Peptide    Collection Time: 02/27/23  2:03 PM   Result Value Ref Range    Pro- <300 pg/mL   Comprehensive Metabolic Panel    Collection Time: 02/27/23  2:03 PM   Result Value Ref Range    Glucose 560 (HH) 70 - 99 mg/dL    BUN 29 (H) 6 - 20 mg/dL    Creatinine 1.93 (H) 0.70 - 1.20 mg/dL    Est, Glom Filt Rate 41 (L) >60 mL/min/1.73m2    Calcium 9.9 8.6 - 10.4 mg/dL    Sodium 134 (L) 135 - 144 mmol/L    Potassium 4.8 3.7 - 5.3 mmol/L    Chloride 88 (L) 98 - 107 mmol/L    CO2 16 (L) 20 - 31 mmol/L    Anion Gap 30 (H) 9 - 17 mmol/L    Alkaline Phosphatase 107 40 - 129 U/L    ALT 21 5 - 41 U/L    AST 18 <40 U/L    Total Bilirubin 0.8 0.3 - 1.2 mg/dL    Total Protein 7.6 6.4 - 8.3 g/dL    Albumin 4.4 3.5 - 5.2 g/dL    Albumin/Globulin Ratio 1.4 1.0 - 2.5   Lipase    Collection Time: 02/27/23  2:03 PM   Result Value Ref Range    Lipase 42 13 - 60 U/L   Acetaminophen Level    Collection Time: 02/27/23  2:03 PM   Result Value Ref Range    Acetaminophen Level <5 (L) 10 - 30 ug/mL   Ethanol    Collection Time: 02/27/23  2:03 PM   Result Value Ref Range    Ethanol <10 <10 mg/dL    Ethanol percent <4.482 <4.359 %   Salicylate    Collection Time: 02/27/23  2:03 PM   Result Value Ref Range    Salicylate Lvl <1 (L) 3 - 10 mg/dL   TOXIC TRICYCLIC SC,B    Collection Time: 02/27/23  2:03 PM   Result Value Ref Range    Toxic Tricyclic Sc,Blood NEGATIVE NEGATIVE   RAPID INFLUENZA A/B ANTIGENS    Collection Time: 02/27/23  2:11 PM    Specimen: Nasopharyngeal   Result Value Ref Range    Flu A Antigen NEGATIVE NEGATIVE    Flu B Antigen NEGATIVE NEGATIVE   COVID-19, Rapid    Collection Time: 02/27/23  2:19 PM    Specimen: Nasopharyngeal Swab   Result Value Ref Range    Specimen Description . NASOPHARYNGEAL SWAB     SARS-CoV-2, Rapid Not Detected Not Detected   Troponin    Collection Time: 02/27/23  3:28 PM   Result Value Ref Range    Troponin, High Sensitivity 15 0 - 22 ng/L   BLOOD GAS, VENOUS    Collection Time: 02/27/23  4:49 PM   Result Value Ref Range    pH, Alfredo 7.230 (LL) 7.320 - 7.420    pCO2, Alfredo 40.0 39 - 55 mm Hg    pO2, Alfredo 88.1 (H) 30 - 50 mm Hg    HCO3, Venous 16.1 (L) 24 - 30 mmol/L    Negative Base Excess, Alfredo 10.5 (H) 0.0 - 2.0 mmol/L    O2 Sat, Alfredo 94.7 (H) 60.0 - 85.0 %    Carboxyhemoglobin 2.6 0 - 5 %    Pt Temp 37.0     FIO2 Unknown        Imaging:   CT ABDOMEN PELVIS W IV CONTRAST Additional Contrast? None    Result Date: 2/27/2023  Significant wall thickening noted throughout the ascending colon likely suggestive of colitis. Appendix is not definitely visualized. No obstructive uropathy. 2 mm nonobstructing stone of superior pole of left kidney.  Axial hiatal hernia     XR CHEST PORTABLE    Result Date: 2/27/2023  No acute abnormality visualized. ASSESSMENT & PLAN     ASSESSMENT / PLAN:     Active Problems:  Diabetic ketoacidosis without coma associated with type 1 diabetes mellitus (HCC)  -NPO effective immediately  -DKA protocol started  -BMP q4h, initial Mag and Phos levels  -Glucose checks every hour  -Betahydroxybutarate levels 7  -IV  half Normal Saline at 200 mL/hr  -Insulin Regular started at 0.1 Units/kg/hr  -D5 and 0.45% NS at 150 mL/hr when blood glucose less than 250 mg/dL  -Will monitor bicarb and anion gap, bridge with home lantus dose and begin diet PO when bicarb >15 and gap closed x 2 measurements  -Hypoglycemia, phos and potassium replacement protocols in place    2. Colitis  -Likely the trigger for DKA  -CT showed significant wall thickening noted throughout the ascending colon likely suggestive of colitis  -Started on Rocephin and flagyl    3. Odynophagia  -Patient was seen by GI on a previous admission on 1/19/2023 and was recommended to do EGD apparently not done.  -Previous EGDs showed LA grade D ulcerative esophagitis in the distal third    4. H/O Hepatitis c positive  -Had CAT scan is not showing a mass in the liver    5. CKD stage IIIa  -Creatinine elevated 2.42 on admission -patient's baseline 1.7  -IV half normal saline 200 mL/h  -We will monitor kidney functions  -Avoid nephrotoxic drugs    6. COPD not on home oxygen  -Resume home medications    7. Nonobstructive CAD on cath 5/2021    8. Severe malnutrition / Protein energy malnutrition Grade III      DVT ppx: Heparin SC  GI ppx: Not indicated    PT/OT: consulted  Discharge Planning: Case management consulted    Neli Beatty MD  Internal Medicine Resident, PGY-1  [facility];  [place], [place]  2/27/2023, 5:40 PM

## 2023-02-27 NOTE — ED NOTES
Pt respirations are even and unlabored, pt is alert and oriented X 4, speaking in complete sentences, bed is in the lowest position, call light is within reach, NAD noted. Will continue to follow plan of care.         Rosie Wilson RN  02/27/23 [id number]

## 2023-02-27 NOTE — ED NOTES
Pt arrives to ED 22 via triage. Pt co chest pain and weakness. Pt was brought in by transport. Pt was scheduled to have an appointment with his gastroenterologist today, but they sent him here because he was weak. Pt states he has not been able to swallow since he was last admitted to the hospital 5 or 6 months ago. Pt states that he had a heart attack about 5 months ago. Pt states he has generalized pain 10/10. Pt respirations are even and unlabored, pt is alert and oriented X 4, speaking in complete sentences, bed is in the lowest position, call light is within reach, NAD noted. Will continue to follow plan of care.         Kai Myers RN  02/27/23 [id number]

## 2023-02-27 NOTE — ED NOTES
Writer Perfect Served admitting service regarding pain meds, waiting on response     Duane Cesar RN  02/27/23 [id number]

## 2023-02-27 NOTE — ED PROVIDER NOTES
[facility]  ED  Emergency Department  Emergency Medicine Resident Sign-out     Care of Ayush Mondragon was assumed from Dr. Karlee Young and is being seen for Chest Pain  . The patient's initial evaluation and plan have been discussed with the prior provider who initially evaluated the patient.      EMERGENCY DEPARTMENT COURSE / MEDICAL DECISION MAKING:       MEDICATIONS GIVEN:  Orders Placed This Encounter   Medications    0.9 % sodium chloride bolus    ondansetron (ZOFRAN) injection 4 mg    fentaNYL (SUBLIMAZE) injection 25 mcg    iopamidol (ISOVUE-370) 76 % injection 75 mL    0.9 % sodium chloride bolus    glucose chewable tablet 16 g    OR Linked Order Group     dextrose bolus 10% 125 mL     dextrose bolus 10% 250 mL    glucagon (rDNA) injection 1 mg    dextrose 10 % infusion    insulin regular (HUMULIN R;NOVOLIN R) 100 Units in sodium chloride 0.9 % 100 mL infusion    OR Linked Order Group     dextrose bolus 10% 125 mL     dextrose bolus 10% 250 mL    potassium chloride 10 mEq/100 mL IVPB (Peripheral Line)    magnesium sulfate 1000 mg in dextrose 5% 100 mL IVPB    OR Linked Order Group     sodium phosphate 10 mmol in sodium chloride 0.9 % 250 mL IVPB     sodium phosphate 15 mmol in dextrose 5 % 250 mL IVPB     sodium phosphate 20 mmol in dextrose 5 % 500 mL IVPB    0.45 % sodium chloride infusion    dextrose 5 % and 0.45 % sodium chloride infusion       LABS / RADIOLOGY:     Labs Reviewed   CBC WITH AUTO DIFFERENTIAL - Abnormal; Notable for the following components:       Result Value    Hemoglobin 12.5 (*)     Hematocrit 40.1 (*)     RDW 15.9 (*)     Seg Neutrophils 77 (*)     Lymphocytes 17 (*)     Monocytes 2 (*)     Immature Granulocytes 2 (*)     All other components within normal limits   COMPREHENSIVE METABOLIC PANEL - Abnormal; Notable for the following components:    Glucose 560 (*)     BUN 29 (*)     Creatinine 1.93 (*)     Est, Glom Filt Rate 41 (*)     Sodium 134 (*)     Chloride 88 (*) CO2 16 (*)     Anion Gap 30 (*)     All other components within normal limits   ACETAMINOPHEN LEVEL - Abnormal; Notable for the following components:    Acetaminophen Level <5 (*)     All other components within normal limits   SALICYLATE LEVEL - Abnormal; Notable for the following components:    Salicylate Lvl <1 (*)     All other components within normal limits   BLOOD GAS, VENOUS - Abnormal; Notable for the following components:    pH, Alfredo 7.230 (*)     pO2, Alfredo 88.1 (*)     HCO3, Venous 16.1 (*)     Negative Base Excess, Alfredo 10.5 (*)     O2 Sat, Alfredo 94.7 (*)     All other components within normal limits   BETA-HYDROXYBUTYRATE - Abnormal; Notable for the following components:    Beta-Hydroxybutyrate 7.43 (*)     All other components within normal limits   COVID-19, RAPID   RAPID INFLUENZA A/B ANTIGENS   TROPONIN   TROPONIN   BRAIN NATRIURETIC PEPTIDE   LIPASE   ETHANOL   TOXIC TRICYCLIC SC,B   BASIC METABOLIC PANEL   BASIC METABOLIC PANEL   BASIC METABOLIC PANEL   BASIC METABOLIC PANEL   MAGNESIUM   MAGNESIUM   PHOSPHORUS   PHOSPHORUS   MAGNESIUM   PHOSPHORUS   POCT GLUCOSE   POCT GLUCOSE   POCT GLUCOSE   POCT GLUCOSE   POCT GLUCOSE   POCT GLUCOSE   POCT GLUCOSE   POCT GLUCOSE   POCT GLUCOSE   POCT GLUCOSE   POCT GLUCOSE   POCT GLUCOSE   POCT GLUCOSE   POCT GLUCOSE   POCT GLUCOSE   POCT GLUCOSE   POCT GLUCOSE   POCT GLUCOSE       CT ABDOMEN PELVIS W IV CONTRAST Additional Contrast? None    Result Date: 2/27/2023  EXAMINATION: CT OF THE ABDOMEN AND PELVIS WITH CONTRAST 2/27/2023 3:42 pm TECHNIQUE: CT of the abdomen and pelvis was performed with the administration of intravenous contrast. Multiplanar reformatted images are provided for review. Automated exposure control, iterative reconstruction, and/or weight based adjustment of the mA/kV was utilized to reduce the radiation dose to as low as reasonably achievable.  COMPARISON: 01/18/2023 HISTORY: ORDERING SYSTEM PROVIDED HISTORY: abdominal pain, guarding TECHNOLOGIST PROVIDED HISTORY: abdominal pain, guarding Decision Support Exception - unselect if not a suspected or confirmed emergency medical condition->Emergency Medical Condition (MA) Reason for Exam: abdominal pain, guarding FINDINGS: LOWER CHEST:  Visualized portion of the lower chest demonstrates no acute abnormality. Axial hiatal hernia. KIDNEYS AND URINARY TRACT: 2 mm nonobstructing stone of superior pole of left kidney. .  There is no evidence for hydronephrosis. The ureters are of normal course and caliber. ORGANS: Punctate calcification of the spleen related to prior granulomatous disease exposure. Visualized portions of the liver, spleen, pancreas, gallbladder, and adrenal glands demonstrate no acute abnormality. GI/BOWEL: No bowel obstruction. Appendix is not visualized. Significant wall thickening noted throughout the ascending colon likely suggestive of colitis. PELVIS: The bladder and pelvic organs are unremarkable. PERITONEUM/RETROPERITONEUM: No free air or free fluid is noted. No pathologically enlarged lymphadenopathy. The vasculature do not demonstrate acute abnormality. BONES/SOFT TISSUES: The osseous structures demonstrate no acute abnormality. Changes related to instrumented fusion of left femoral neck via dynamic hip screw. Significant wall thickening noted throughout the ascending colon likely suggestive of colitis. Appendix is not definitely visualized. No obstructive uropathy. 2 mm nonobstructing stone of superior pole of left kidney. Axial hiatal hernia     XR CHEST PORTABLE    Result Date: 2/27/2023  EXAMINATION: ONE XRAY VIEW OF THE CHEST 2/27/2023 2:20 pm COMPARISON: January 18, 2023 HISTORY: ORDERING SYSTEM PROVIDED HISTORY: chest pain TECHNOLOGIST PROVIDED HISTORY: chest pain Reason for Exam: chest pian  port upr at 215pm FINDINGS: No infiltrate or consolidation or effusion is identified.   The heart size is normal.  A calcified granuloma is present within the right mid lung.     No acute abnormality visualized. RECENT VITALS:     Temp: 97.5 °F (36.4 °C),  Heart Rate: (!) 102, Resp: 22, BP: (!) 144/84, SpO2: 92 %    This patient is a 47 y.o. Male with being admitted for DKA. Previous resident did plan for admission. Admitting service had questions, nursing staff had questions on insulin drip. Did assign myself to review and answer questions. Plan for admission as previous resident stated. Patient to be admitted to internal medicine. OUTSTANDING TASKS / RECOMMENDATIONS:    Admission for DKA     FINAL IMPRESSION:   No diagnosis found. DISPOSITION:         DISPOSITION:  []  Discharge   []  Transfer -    [x]  Admission -  Internal medicine   []  Against Medical Advice   []  Eloped   FOLLOW-UP: No follow-up provider specified.    DISCHARGE MEDICATIONS: New Prescriptions    No medications on file           Bucky Faust DO  Emergency Medicine Resident  [facility]        [place] Faust, [place]  Resident  02/27/23 8638

## 2023-02-27 NOTE — ED NOTES
Lab called with critical lab value of glucose 560. Resident notified.       Tyrone Le RN  02/27/23 [id number]

## 2023-02-27 NOTE — ED NOTES
Writer Perfect Served admitting service regarding pt vomiting and not having nausea meds. Also Perfect Served admitting servie regarding which fluids are to be run. Waiting on response.      Jasiel Sales RN  02/27/23 [id number]

## 2023-02-27 NOTE — Clinical Note
Discharge Plan[de-identified] Other/[facility])   Telemetry/Cardiac Monitoring Required?: Yes

## 2023-02-27 NOTE — ED NOTES
Labeled blood specimens sent to lab via tube system.     [] Lavender   [] on ice   [] Blue   [x] Green/yellow  [] Green/black [] on ice  [] Pink  [] Red  [] Yellow  [] on ice  [] Blood Cultures      Debby Garcia RN  02/27/23 8510 Refill request received for metformin.     Called and left voicemail for patient to call back to clarify dose and which pharmacy she would like it sent to.

## 2023-02-27 NOTE — ED PROVIDER NOTES
[facility]  ED  Emergency Department Encounter  Emergency Medicine Resident     Pt Name:Elroy Howard  MRN: [id number]  [place] 1970  Date of evaluation: 2/27/23  PCP:  BINA Johnston  Note Started: 1:49 PM EST      CHIEF COMPLAINT       Chief Complaint   Patient presents with    Chest Pain       HISTORY OF PRESENT ILLNESS  (Location/Symptom, Timing/Onset, Context/Setting, Quality, Duration, Modifying Factors, Severity.)      Kwame Aldana is a 47 y.o. male who presents with history of diabetes presents with chest pain, abdominal pain, painful swallowing. States these pain have been ongoing for several days. Chest pain is substernal in the midline, not radiating. States it feels like his past MI. Endorsing abdominal pain, diffuse and generalized. No n/v, diarrhea, constipation. States that he hasn't eaten in a while due to painful swallowing. States that he gets pain in his esophagus and down his back when he swallows liquids or solids. Denies fever/chills or recent illnesses. Denies shortness of breath. PAST MEDICAL / SURGICAL / SOCIAL / FAMILY HISTORY      has a past medical history of Diabetes mellitus ([facility] [address] Obstructive lung disease ([facility] [address] and Panic attack. has a past surgical history that includes Appendectomy; Hand surgery; Upper gastrointestinal endoscopy (N/A, 02/15/2022); Femur fracture surgery (Left, 09/30/2022); Tibia fracture surgery (Left, 09/30/2022); joint replacement; and Upper gastrointestinal endoscopy (N/A, 11/10/2022).     Social History     Socioeconomic History    Marital status: Single     Spouse name: Not on file    Number of children: 2    Years of education: Not on file    Highest education level: Not on file   Occupational History    Not on file   Tobacco Use    Smoking status: Every Day     Packs/day: 0.25     Years: 41.00     Pack years: 10.25     Types: Cigarettes    Smokeless tobacco: Never   Substance and Sexual Activity    Alcohol use: Not Currently Drug use: Yes     Types: Marijuana Chaz Miser)     Comment: overdose 11/10/20    Sexual activity: Not Currently   Other Topics Concern    Not on file   Social History Narrative    Not on file     Social Determinants of Health     Financial Resource Strain: Low Risk     Difficulty of Paying Living Expenses: Not very hard   Food Insecurity: Food Insecurity Present    Worried About Running Out of Food in the Last Year: Sometimes true    Ran Out of Food in the Last Year: Sometimes true   Transportation Needs: Unmet Transportation Needs    Lack of Transportation (Medical): Yes    Lack of Transportation (Non-Medical): Yes   Physical Activity: Inactive    Days of Exercise per Week: 0 days    Minutes of Exercise per Session: 0 min   Stress: Stress Concern Present    Feeling of Stress : To some extent   Social Connections: Socially Isolated    Frequency of Communication with Friends and Family: Never    Frequency of Social Gatherings with Friends and Family: Never    Attends Confucianist Services: Never    Active Member of Clubs or Organizations: No    Attends Club or Organization Meetings: Never    Marital Status: Never    Intimate Partner Violence: Not At Risk    Fear of Current or Ex-Partner: No    Emotionally Abused: No    Physically Abused: No    Sexually Abused: No   Housing Stability: High Risk    Unable to Pay for Housing in the Last Year: No    Number of [place] in the Last Year: 4    Unstable Housing in the Last Year: Yes       No family history on file. Allergies:  Azithromycin, Acetaminophen-codeine, Codeine, Hydrocodone-acetaminophen, Ibuprofen, Morphine, and Tramadol    Home Medications:  Prior to Admission medications    Medication Sig Start Date End Date Taking?  Authorizing Provider   diphenhydrAMINE (BENYLIN) 12.5 MG/5ML liquid take 5 milliliters by mouth four times a day if needed for allergies 2/20/23   BINA Soria   aluminum hydroxide-magnesium carbonate (ACID GONE)  MG/15ML suspension Take 5 mLs by mouth 4 times daily (with meals and nightly) 2/10/23   Sho Rolle MD   atorvastatin (LIPITOR) 80 MG tablet  2/7/23   Historical Provider, MD   OLANZapine (ZYPREXA) 2.5 MG tablet take 1 tablet by mouth at bedtime 1/26/23   Historical Provider, MD   amLODIPine (NORVASC) 10 MG tablet take 1 tablet by mouth once daily 12/2/22   Historical Provider, MD   atorvastatin (LIPITOR) 40 MG tablet take 1 tablet by mouth every evening  Patient not taking: Reported on 2/7/2023 12/2/22   Historical Provider, MD   TRUEPLUS GLUCOSE 4 g chewable tablet chew and swallow as directed for LOW BLOOD SUGAR REACTION 12/20/22   Historical Provider, MD   insulin lispro, 1 Unit Dial, (HUMALOG/ADMELOG) 100 UNIT/ML SOPN INJECT 0-16 UNITS INTO THE SKIN THREE TIMES A DAY WITH MEALS 1/14/23   Historical Provider, MD   PARoxetine (PAXIL) 20 MG tablet take 1 tablet by mouth at bedtime 1/7/23   Historical Provider, MD   traZODone (DESYREL) 300 MG tablet  1/9/23   Historical Provider, MD   Continuous Blood Gluc Transmit (DEXCOM G6 TRANSMITTER) MISC 1 each by Does not apply route every 10 days 1/20/23   BINA Garcia   insulin aspart (NOVOLOG FLEXPEN) 100 UNIT/ML injection pen Inject 5 Units into the skin 3 times daily (before meals) Patient has temporarily reduced his dose to 1-2 units with the small snack he has each day.  1/16/23   BINA Garcia   insulin glargine (LANTUS) 100 UNIT/ML injection vial Inject 15 Units into the skin 2 times daily 1/16/23   BINA Garcia   sucralfate (CARAFATE) 1 GM/10ML suspension Take 10 mLs by mouth 4 times daily 1/10/23   BINA Garcia   metoclopramide (REGLAN) 10 MG/10ML SOLN Take 10 mLs by mouth 3 times daily (before meals) 1/10/23   BINA Garcia   pantoprazole (PROTONIX) 40 MG tablet Take 40 mg by mouth 2 times daily    Historical Provider, MD   PNEUMOCOCCAL 20-ALPHONSO CONJ VACC IM Inject into the muscle    Historical Provider, MD   Continuous Blood Gluc Transmit (DEXCOM G6 TRANSMITTER) MISC 1 each by Does not apply route once a week 12/30/22   Shakira Mckeon MD   Continuous Blood Gluc Sensor (DEXCOM G6 SENSOR) MISC 1 each by Does not apply route once a week 12/30/22   Shakira Mckeon MD   blood glucose monitor strips Test 4 times a day & as needed for symptoms of irregular blood glucose. Dispense sufficient amount for indicated testing frequency plus additional to accommodate PRN testing needs. Dispense brand that correlates with his glucometer and that's covered by his insurance. Needs delivered to him. 12/30/22   Shakira Mckeon MD   Nutritional Supplements (GLUCOSE MANAGEMENT) TABS Take 1 tablet by mouth daily as needed (Low sugar reaction) 12/20/22   BINA Dawson   aspirin EC 81 MG EC tablet Take 1 tablet by mouth daily 12/16/22   BINA Dawson   Continuous Blood Gluc  ([address]) [address] 1 each by Does not apply route once a week 11/18/22   BINA Dawson   Misc.  Devices (STEP N REST WALKER) MISC 1 each by Does not apply route continuous Seated, wheeled walker 11/18/22   BINA Dawson   ondansetron (ZOFRAN ODT) 4 MG disintegrating tablet Take 1 tablet by mouth every 8 hours as needed for Nausea or Vomiting 11/18/22   BINA Dawson   albuterol sulfate HFA (VENTOLIN HFA) 108 (90 Base) MCG/ACT inhaler Inhale 2 puffs into the lungs 4 times daily as needed for Wheezing 11/15/22   BINA Dawson   metoprolol tartrate (LOPRESSOR) 25 MG tablet Take 1 tablet by mouth 2 times daily 11/14/22   BINA Dawson   Blood Glucose Monitoring Suppl (ONE TOUCH ULTRA 2) w/Device KIT 1 kit by Does not apply route daily 3/18/22   Av Rusty, DO   Lancets MISC 1 each by Does not apply route 2 times daily 3/18/22   Avcurt Ojeda, DO   Alcohol Swabs 70 % PADS Use as needed with blood glucose checks 2/15/22   Asher Chaparro MD   Insulin Pen Needle ([employer] PEN NEEDLES 29G) 29G X 12MM MISC 1 each by Does not apply route daily 2/15/22   Asher Chaparro MD   clonazePAM (KLONOPIN) 2 MG tablet Take 4 mg by mouth daily as needed for Anxiety. Historical Provider, MD       REVIEW OF SYSTEMS       Review of Systems   Constitutional:  Negative for chills and fever. HENT:  Positive for trouble swallowing. Negative for congestion. Eyes:  Negative for visual disturbance. Respiratory:  Negative for shortness of breath. Cardiovascular:  Positive for chest pain. Negative for leg swelling. Gastrointestinal:  Positive for abdominal pain. Negative for constipation, diarrhea, nausea and vomiting. Genitourinary:  Negative for difficulty urinating and dysuria. Musculoskeletal:  Negative for back pain. Skin:  Negative for wound. Neurological:  Negative for weakness, numbness and headaches. PHYSICAL EXAM    INITIAL VITALS:   BP (!) 144/84   Pulse (!) 102   Temp 97.5 °F (36.4 °C) (Oral)   Resp 22   Ht 5' 8\" (1.727 m)   Wt 100 lb (45.4 kg)   SpO2 92%   BMI 15.20 kg/m²   I have reviewed the triage vital signs. Const: Well nourished, well developed, appears stated age, no acute distress  Eyes: PERRL, no conjunctival injection  HENT: NCAT, Neck supple without meningismus   CV: RRR, Warm, well-perfused extremities  RESP: CTAB, Unlabored respiratory effort  GI: hard, diffusely tender to palpation. non-distended, no masses  MSK: No gross deformities appreciated  Skin: Warm, dry. No rashes  Neuro: Alert, CNs II-XII grossly intact. Sensation and motor function of extremities grossly intact. Psych: Appropriate mood and affect. DDX/DIAGNOSTIC RESULTS / EMERGENCY DEPARTMENT COURSE / MDM     Medical Decision Making  46 yo M presents with chest pain, abdominal pain, difficulty swallowing. History of diabetes and MI. States that he hasn't eaten in a while. Not taking diabetes meds. Examination concerning for hard tender abdomen.  Concern for intra-abdominal pathology, esophageal dysmotility, ACS/MI, pneumonia, pneumothorax. Will get labwork, imaging, treat pain and symptoms. Will reevaluate. Amount and/or Complexity of Data Reviewed  Labs: ordered. Decision-making details documented in ED Course. Radiology: ordered. ECG/medicine tests: ordered. Decision-making details documented in ED Course. Risk  OTC drugs. Prescription drug management. Drug therapy requiring intensive monitoring for toxicity. Decision regarding hospitalization. EKG  Rhythm: normal sinus   Rate: normal  Axis: left  Ectopy: none  Conduction: normal  ST Segments: no acute change  T Waves: no acute change  Q Waves: nonspecific    EKG  Impression: non-specific EKG  All EKG's are interpreted by the Emergency Department Physician who either signs or Co-signs this chart in the absence of a cardiologist.    EMERGENCY DEPARTMENT COURSE:  ED Course as of 02/27/23 2056 Mon Feb 27, 2023   1630 Glucose, Random(!!): 560  Will give fluids and reevaluate. [AR]   1631 Anion Gap(!): 30  We will check beta hydroxybutyrate and VBG. [AR]   1631 Creatinine(!): 1.93  LIBBY, receiving fluids. [AR]   1631 Flu A Antigen: NEGATIVE [AR]   2786 SARS-CoV-2, Rapid: Not Detected  Negative COVID and flu [AR]   1713 Elevated glucose with elevated anion gap. Acidotic on VBG. Awaiting beta hydroxybutyrate. We will start DKA protocol and infusing insulin. Internal medicine consult. Internal medicine to admit patient. Patient understands and agrees the plan [AR]      ED Course User Index  [AR] Danette Carlson DO       CONSULTS:  IP CONSULT TO INTERNAL MEDICINE    FINAL IMPRESSION      1. Diabetic ketoacidosis without coma associated with type 1 diabetes mellitus ([facility] [address]      DISPOSITION / PLAN     DISPOSITION Admitted 02/27/2023 05:11:26 PM      PATIENT REFERRED TO:  No follow-up provider specified.     DISCHARGE MEDICATIONS:  New Prescriptions    No medications on file       Danette Carlson DO  Emergency Medicine Resident    (Please note that portions of this note were completed with a voice recognition program.  Efforts were made to edit the dictations but occasionally words are mis-transcribed.)       Alberto Mclaughlin DO  Resident  03/01/23 1017

## 2023-02-27 NOTE — ED NOTES
Labeled COVID swab sent to lab via tube system.     [x] COVID-19 swab      [x] Rapid   [] Non- Rapid/PCR  [] Respiratory Panel with COVID    Labeled flu swab sent to lab via tube system     Shweta Malcolm RN  02/27/23 5329

## 2023-02-27 NOTE — CARE COORDINATION
Left another VM message on [facility] enteral rep's phone asking about approval for nutritional supplements. Called patient, he has not gotten a call from [facility]. He plans on attending his GI appt today, will be there at 1:15 pm. He voiced hoping the doctor will just put a tube feeding in or something so he can get better quicker until the problem with his throat is fixed. Writer called [facility], spoke with a different rep, they did receive the 1/20 provider progress note that writer faxed to him (after having to look in his chart for 5 mins) and believe would be covered by insurance. Writer will follow up in a few days.     Future Appointments   Date Time Provider Jerilyn Salvador   2/27/2023  1:30 PM Teri Wheatley MD [facility]   4/11/2023  1:00 PM Hema Payton DO [address] [address]   4/21/2023  1:00 PM BINA Velasquez

## 2023-02-27 NOTE — ED NOTES
Pt respirations are even and unlabored, pt is alert and oriented X 4, speaking in complete sentences, bed is in the lowest position, call light is within reach, NAD noted. Will continue to follow plan of care.         Milton Donaldson RN  02/27/23 6723

## 2023-02-27 NOTE — ED PROVIDER NOTES
[address] HANDOFF       Handoff taken on the following patient from prior Attending Physician:  Pt Name: Leonor Lorenz  PCP:  Vida Juares  I was available and discussed any additional care issues that arose and coordinated the management plans with the resident(s) caring for the patient during my duty period. Any areas of disagreement with resident's documentation of care or procedures are noted on the chart. I was personally present for the key portions of any/all procedures during my duty period. I have documented in the chart those procedures where I was not present during the key portions.            Antonio Perez MD  02/27/23 6337

## 2023-02-27 NOTE — ED PROVIDER NOTES
[facility]  Emergency Department  Faculty Attestation     I performed a history and physical examination of the patient and discussed management with the resident. I reviewed the residents note and agree with the documented findings and plan of care. Any areas of disagreement are noted on the chart. I was personally present for the key portions of any procedures. I have documented in the chart those procedures where I was not present during the key portions. I have reviewed the emergency nurses triage note. I agree with the chief complaint, past medical history, past surgical history, allergies, medications, social and family history as documented unless otherwise noted below. For Physician Assistant/ Nurse Practitioner cases/documentation I have personally evaluated this patient and have completed at least one if not all key elements of the E/M (history, physical exam, and MDM). Additional findings are as noted. Primary Care Physician:  BINA Del Real    Screenings:  [unfilled]    CHIEF COMPLAINT       Chief Complaint   Patient presents with    Chest Pain       RECENT VITALS:   Temp: 97.5 °F (36.4 °C),  Heart Rate: 95, Resp: 19, BP: (!) 160/117    LABS:  Labs Reviewed - No data to display    Radiology  No orders to display       CRITICAL CARE: There was a high probability of clinically significant/life threatening deterioration in this patient's condition which required my urgent intervention. Total critical care time was none minutes. This excludes any time for separately reportable procedures.      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 He has not eaten for many days, severe pain in his mouth and throat with every swallow, chest heaviness similar to his MI, diffuse abdominal pain, pain in his left femur where he had a recent surgery, fatigue, some difficulty breathing. He has history of trauma, ORIF for femur fracture, CKD, MI, esophagitis, odynophagia, lung nodules which are improving, transaminitis, anemia, hep C. No known history of cancer, HIV, steroid use. On exam he is cachectic, ill-appearing, mildly hypertensive, borderline tachycardia, afebrile, anicteric. Mouth is dry. Cap refill delayed. Neck is supple. Lungs are clear. Abdomen is firm, diffuse tenderness with guarding. No peripheral edema. Impression is esophagitis, malnutrition, weight loss, dehydration, concern for other metabolic abnormality, probable, acute abdomen. Plan is IV access, fluids, analgesics, labs, CT abdomen, troponins, reassess, anticipate admission. [place] Baeza.  Abraham Gutierrez MD, [address] [facility]  Attending Emergency  Physician                Edwin Desouza MD  02/27/23 5930       Edwin Desouza MD  02/27/23 1075       Edwin Desouza MD  02/27/23 6676

## 2023-02-27 NOTE — ED NOTES
Pt respirations are even and unlabored, pt is alert and oriented X 4, speaking in complete sentences, bed is in the lowest position, call light is within reach, NAD noted. Will continue to follow plan of care.         Mike Dang RN  02/27/23 2076

## 2023-02-27 NOTE — ED NOTES
Labeled blood specimens sent to lab via tube system.     [x] Lavender   [] on ice   [x] Blue   [x] Green/yellow  [x] Green/black [] on ice  [] Pink  [] Red  [] Yellow  [] on ice  [] Blood Cultures      Molly Lorenzo RN  02/27/23 2585

## 2023-02-27 NOTE — ED NOTES
Pt respirations are even and unlabored, pt is alert and oriented X 4, speaking in complete sentences, bed is in the lowest position, call light is within reach, NAD noted. Will continue to follow plan of care.         Navin Freitas RN  02/27/23 5703

## 2023-02-28 ENCOUNTER — CARE COORDINATION (OUTPATIENT)
Dept: CARE COORDINATION | Age: 53
End: 2023-02-28

## 2023-02-28 LAB
ANION GAP SERPL CALCULATED.3IONS-SCNC: 10 MMOL/L (ref 9–17)
ANION GAP SERPL CALCULATED.3IONS-SCNC: 10 MMOL/L (ref 9–17)
ANION GAP SERPL CALCULATED.3IONS-SCNC: 8 MMOL/L (ref 9–17)
ANION GAP SERPL CALCULATED.3IONS-SCNC: 9 MMOL/L (ref 9–17)
BUN SERPL-MCNC: 16 MG/DL (ref 6–20)
BUN SERPL-MCNC: 18 MG/DL (ref 6–20)
BUN SERPL-MCNC: 20 MG/DL (ref 6–20)
BUN SERPL-MCNC: 22 MG/DL (ref 6–20)
BUN SERPL-MCNC: 23 MG/DL (ref 6–20)
BUN SERPL-MCNC: 27 MG/DL (ref 6–20)
CALCIUM SERPL-MCNC: 8.1 MG/DL (ref 8.6–10.4)
CALCIUM SERPL-MCNC: 8.3 MG/DL (ref 8.6–10.4)
CALCIUM SERPL-MCNC: 8.3 MG/DL (ref 8.6–10.4)
CALCIUM SERPL-MCNC: 8.4 MG/DL (ref 8.6–10.4)
CALCIUM SERPL-MCNC: 8.5 MG/DL (ref 8.6–10.4)
CALCIUM SERPL-MCNC: 8.5 MG/DL (ref 8.6–10.4)
CHLORIDE SERPL-SCNC: 101 MMOL/L (ref 98–107)
CHLORIDE SERPL-SCNC: 101 MMOL/L (ref 98–107)
CHLORIDE SERPL-SCNC: 102 MMOL/L (ref 98–107)
CO2 SERPL-SCNC: 22 MMOL/L (ref 20–31)
CO2 SERPL-SCNC: 23 MMOL/L (ref 20–31)
CO2 SERPL-SCNC: 24 MMOL/L (ref 20–31)
CO2 SERPL-SCNC: 25 MMOL/L (ref 20–31)
CO2 SERPL-SCNC: 25 MMOL/L (ref 20–31)
CO2 SERPL-SCNC: 26 MMOL/L (ref 20–31)
CREAT SERPL-MCNC: 1.25 MG/DL (ref 0.7–1.2)
CREAT SERPL-MCNC: 1.31 MG/DL (ref 0.7–1.2)
CREAT SERPL-MCNC: 1.37 MG/DL (ref 0.7–1.2)
CREAT SERPL-MCNC: 1.41 MG/DL (ref 0.7–1.2)
CREAT SERPL-MCNC: 1.46 MG/DL (ref 0.7–1.2)
CREAT SERPL-MCNC: 1.46 MG/DL (ref 0.7–1.2)
GFR SERPL CREATININE-BSD FRML MDRD: 58 ML/MIN/1.73M2
GFR SERPL CREATININE-BSD FRML MDRD: 58 ML/MIN/1.73M2
GFR SERPL CREATININE-BSD FRML MDRD: >60 ML/MIN/1.73M2
GLUCOSE BLD-MCNC: 120 MG/DL (ref 75–110)
GLUCOSE BLD-MCNC: 140 MG/DL (ref 75–110)
GLUCOSE BLD-MCNC: 141 MG/DL (ref 75–110)
GLUCOSE BLD-MCNC: 142 MG/DL (ref 75–110)
GLUCOSE BLD-MCNC: 144 MG/DL (ref 75–110)
GLUCOSE BLD-MCNC: 158 MG/DL (ref 75–110)
GLUCOSE BLD-MCNC: 163 MG/DL (ref 75–110)
GLUCOSE BLD-MCNC: 172 MG/DL (ref 75–110)
GLUCOSE BLD-MCNC: 201 MG/DL (ref 75–110)
GLUCOSE BLD-MCNC: 257 MG/DL (ref 75–110)
GLUCOSE BLD-MCNC: 278 MG/DL (ref 75–110)
GLUCOSE BLD-MCNC: 44 MG/DL (ref 75–110)
GLUCOSE BLD-MCNC: 50 MG/DL (ref 75–110)
GLUCOSE BLD-MCNC: 85 MG/DL (ref 75–110)
GLUCOSE SERPL-MCNC: 124 MG/DL (ref 70–99)
GLUCOSE SERPL-MCNC: 145 MG/DL (ref 70–99)
GLUCOSE SERPL-MCNC: 158 MG/DL (ref 70–99)
GLUCOSE SERPL-MCNC: 244 MG/DL (ref 70–99)
GLUCOSE SERPL-MCNC: 59 MG/DL (ref 70–99)
GLUCOSE SERPL-MCNC: 95 MG/DL (ref 70–99)
MAGNESIUM SERPL-MCNC: 1.6 MG/DL (ref 1.6–2.6)
MAGNESIUM SERPL-MCNC: 1.7 MG/DL (ref 1.6–2.6)
PHOSPHATE SERPL-MCNC: 1.7 MG/DL (ref 2.5–4.5)
PHOSPHATE SERPL-MCNC: 2.8 MG/DL (ref 2.5–4.5)
POTASSIUM SERPL-SCNC: 3.9 MMOL/L (ref 3.7–5.3)
POTASSIUM SERPL-SCNC: 4 MMOL/L (ref 3.7–5.3)
POTASSIUM SERPL-SCNC: 4 MMOL/L (ref 3.7–5.3)
POTASSIUM SERPL-SCNC: 4.1 MMOL/L (ref 3.7–5.3)
POTASSIUM SERPL-SCNC: 4.3 MMOL/L (ref 3.7–5.3)
POTASSIUM SERPL-SCNC: 4.4 MMOL/L (ref 3.7–5.3)
SODIUM SERPL-SCNC: 133 MMOL/L (ref 135–144)
SODIUM SERPL-SCNC: 134 MMOL/L (ref 135–144)
SODIUM SERPL-SCNC: 135 MMOL/L (ref 135–144)
SODIUM SERPL-SCNC: 136 MMOL/L (ref 135–144)

## 2023-02-28 PROCEDURE — 2580000003 HC RX 258

## 2023-02-28 PROCEDURE — 2580000003 HC RX 258: Performed by: STUDENT IN AN ORGANIZED HEALTH CARE EDUCATION/TRAINING PROGRAM

## 2023-02-28 PROCEDURE — 6360000002 HC RX W HCPCS

## 2023-02-28 PROCEDURE — G0108 DIAB MANAGE TRN  PER INDIV: HCPCS

## 2023-02-28 PROCEDURE — 99223 1ST HOSP IP/OBS HIGH 75: CPT | Performed by: STUDENT IN AN ORGANIZED HEALTH CARE EDUCATION/TRAINING PROGRAM

## 2023-02-28 PROCEDURE — 6370000000 HC RX 637 (ALT 250 FOR IP)

## 2023-02-28 PROCEDURE — 84100 ASSAY OF PHOSPHORUS: CPT

## 2023-02-28 PROCEDURE — C9113 INJ PANTOPRAZOLE SODIUM, VIA: HCPCS | Performed by: STUDENT IN AN ORGANIZED HEALTH CARE EDUCATION/TRAINING PROGRAM

## 2023-02-28 PROCEDURE — 6370000000 HC RX 637 (ALT 250 FOR IP): Performed by: STUDENT IN AN ORGANIZED HEALTH CARE EDUCATION/TRAINING PROGRAM

## 2023-02-28 PROCEDURE — 2060000000 HC ICU INTERMEDIATE R&B

## 2023-02-28 PROCEDURE — A4216 STERILE WATER/SALINE, 10 ML: HCPCS | Performed by: STUDENT IN AN ORGANIZED HEALTH CARE EDUCATION/TRAINING PROGRAM

## 2023-02-28 PROCEDURE — 80048 BASIC METABOLIC PNL TOTAL CA: CPT

## 2023-02-28 PROCEDURE — 83735 ASSAY OF MAGNESIUM: CPT

## 2023-02-28 PROCEDURE — 36415 COLL VENOUS BLD VENIPUNCTURE: CPT

## 2023-02-28 PROCEDURE — 82947 ASSAY GLUCOSE BLOOD QUANT: CPT

## 2023-02-28 PROCEDURE — 2500000003 HC RX 250 WO HCPCS

## 2023-02-28 PROCEDURE — 6360000002 HC RX W HCPCS: Performed by: STUDENT IN AN ORGANIZED HEALTH CARE EDUCATION/TRAINING PROGRAM

## 2023-02-28 PROCEDURE — A4216 STERILE WATER/SALINE, 10 ML: HCPCS

## 2023-02-28 PROCEDURE — 87040 BLOOD CULTURE FOR BACTERIA: CPT

## 2023-02-28 RX ORDER — SODIUM CHLORIDE 9 MG/ML
INJECTION INTRAVENOUS
Status: COMPLETED
Start: 2023-02-28 | End: 2023-02-28

## 2023-02-28 RX ORDER — FENTANYL CITRATE 50 UG/ML
25 INJECTION, SOLUTION INTRAMUSCULAR; INTRAVENOUS ONCE
Status: COMPLETED | OUTPATIENT
Start: 2023-02-28 | End: 2023-02-28

## 2023-02-28 RX ORDER — INSULIN LISPRO 100 [IU]/ML
0-4 INJECTION, SOLUTION INTRAVENOUS; SUBCUTANEOUS NIGHTLY
Status: DISCONTINUED | OUTPATIENT
Start: 2023-02-28 | End: 2023-03-02

## 2023-02-28 RX ORDER — INSULIN LISPRO 100 [IU]/ML
0-4 INJECTION, SOLUTION INTRAVENOUS; SUBCUTANEOUS
Status: DISCONTINUED | OUTPATIENT
Start: 2023-02-28 | End: 2023-03-02

## 2023-02-28 RX ORDER — DEXTROSE MONOHYDRATE 100 MG/ML
INJECTION, SOLUTION INTRAVENOUS CONTINUOUS PRN
Status: DISCONTINUED | OUTPATIENT
Start: 2023-02-28 | End: 2023-03-02 | Stop reason: HOSPADM

## 2023-02-28 RX ORDER — FLUCONAZOLE 2 MG/ML
400 INJECTION, SOLUTION INTRAVENOUS EVERY 24 HOURS
Status: DISCONTINUED | OUTPATIENT
Start: 2023-02-28 | End: 2023-03-02 | Stop reason: HOSPADM

## 2023-02-28 RX ORDER — INSULIN GLARGINE 100 [IU]/ML
20 INJECTION, SOLUTION SUBCUTANEOUS ONCE
Status: COMPLETED | OUTPATIENT
Start: 2023-02-28 | End: 2023-02-28

## 2023-02-28 RX ADMIN — HEPARIN SODIUM 5000 UNITS: 5000 INJECTION INTRAVENOUS; SUBCUTANEOUS at 13:27

## 2023-02-28 RX ADMIN — POTASSIUM CHLORIDE 10 MEQ: 10 INJECTION, SOLUTION INTRAVENOUS at 00:06

## 2023-02-28 RX ADMIN — POTASSIUM CHLORIDE 10 MEQ: 10 INJECTION, SOLUTION INTRAVENOUS at 03:46

## 2023-02-28 RX ADMIN — HEPARIN SODIUM 5000 UNITS: 5000 INJECTION INTRAVENOUS; SUBCUTANEOUS at 05:54

## 2023-02-28 RX ADMIN — DEXTROSE AND SODIUM CHLORIDE: 5; 450 INJECTION, SOLUTION INTRAVENOUS at 02:15

## 2023-02-28 RX ADMIN — SODIUM CHLORIDE 10 ML: 9 INJECTION, SOLUTION INTRAMUSCULAR; INTRAVENOUS; SUBCUTANEOUS at 11:23

## 2023-02-28 RX ADMIN — SODIUM CHLORIDE 0.05 UNITS/KG/HR: 9 INJECTION, SOLUTION INTRAVENOUS at 04:51

## 2023-02-28 RX ADMIN — SERTRALINE 50 MG: 50 TABLET, FILM COATED ORAL at 13:26

## 2023-02-28 RX ADMIN — FLUCONAZOLE 400 MG: 2 INJECTION, SOLUTION INTRAVENOUS at 13:26

## 2023-02-28 RX ADMIN — ONDANSETRON 4 MG: 2 INJECTION INTRAMUSCULAR; INTRAVENOUS at 08:16

## 2023-02-28 RX ADMIN — POTASSIUM CHLORIDE 10 MEQ: 10 INJECTION, SOLUTION INTRAVENOUS at 04:49

## 2023-02-28 RX ADMIN — SODIUM CHLORIDE, PRESERVATIVE FREE 10 ML: 5 INJECTION INTRAVENOUS at 20:34

## 2023-02-28 RX ADMIN — DEXTROSE MONOHYDRATE 125 ML: 100 INJECTION, SOLUTION INTRAVENOUS at 16:44

## 2023-02-28 RX ADMIN — CEFTRIAXONE SODIUM 1000 MG: 10 INJECTION, POWDER, FOR SOLUTION INTRAVENOUS at 20:31

## 2023-02-28 RX ADMIN — SODIUM PHOSPHATE, MONOBASIC, MONOHYDRATE AND SODIUM PHOSPHATE, DIBASIC, ANHYDROUS 15 MMOL: 276; 142 INJECTION, SOLUTION INTRAVENOUS at 03:22

## 2023-02-28 RX ADMIN — DEXTROSE MONOHYDRATE 125 ML: 100 INJECTION, SOLUTION INTRAVENOUS at 23:43

## 2023-02-28 RX ADMIN — POTASSIUM CHLORIDE 10 MEQ: 10 INJECTION, SOLUTION INTRAVENOUS at 01:15

## 2023-02-28 RX ADMIN — FENTANYL CITRATE 25 MCG: 50 INJECTION, SOLUTION INTRAMUSCULAR; INTRAVENOUS at 15:32

## 2023-02-28 RX ADMIN — METRONIDAZOLE 500 MG: 500 INJECTION, SOLUTION INTRAVENOUS at 15:32

## 2023-02-28 RX ADMIN — SODIUM CHLORIDE, PRESERVATIVE FREE 10 ML: 5 INJECTION INTRAVENOUS at 08:18

## 2023-02-28 RX ADMIN — POTASSIUM CHLORIDE 10 MEQ: 10 INJECTION, SOLUTION INTRAVENOUS at 02:45

## 2023-02-28 RX ADMIN — FENTANYL CITRATE 25 MCG: 50 INJECTION, SOLUTION INTRAMUSCULAR; INTRAVENOUS at 21:36

## 2023-02-28 RX ADMIN — INSULIN GLARGINE 20 UNITS: 100 INJECTION, SOLUTION SUBCUTANEOUS at 08:21

## 2023-02-28 RX ADMIN — DIPHENHYDRAMINE HYDROCHLORIDE 5 ML: 12.5 LIQUID ORAL at 13:26

## 2023-02-28 RX ADMIN — SODIUM CHLORIDE 40 MG: 9 INJECTION, SOLUTION INTRAMUSCULAR; INTRAVENOUS; SUBCUTANEOUS at 11:22

## 2023-02-28 RX ADMIN — METRONIDAZOLE 500 MG: 500 INJECTION, SOLUTION INTRAVENOUS at 08:18

## 2023-02-28 ASSESSMENT — PAIN SCALES - GENERAL
PAINLEVEL_OUTOF10: 10
PAINLEVEL_OUTOF10: 8
PAINLEVEL_OUTOF10: 8
PAINLEVEL_OUTOF10: 9
PAINLEVEL_OUTOF10: 9
PAINLEVEL_OUTOF10: 7
PAINLEVEL_OUTOF10: 10
PAINLEVEL_OUTOF10: 9
PAINLEVEL_OUTOF10: 8

## 2023-02-28 ASSESSMENT — PAIN DESCRIPTION - ORIENTATION
ORIENTATION: MID
ORIENTATION: MID

## 2023-02-28 ASSESSMENT — PAIN DESCRIPTION - LOCATION
LOCATION: ABDOMEN;CHEST;LEG
LOCATION: ARM;LEG;HEAD
LOCATION: CHEST
LOCATION: OTHER (COMMENT)
LOCATION: ABDOMEN;CHEST;LEG
LOCATION: CHEST

## 2023-02-28 ASSESSMENT — PAIN DESCRIPTION - DESCRIPTORS
DESCRIPTORS: ACHING;DISCOMFORT;TENDER
DESCRIPTORS: DISCOMFORT;PRESSURE

## 2023-02-28 NOTE — PROGRESS NOTES
Juan Espinosa inquiring about discontinuing insulin gtt s/p verbal order to stop insulin gtt after 1 hr from Lantus administration;also inquired about pt being on a sliding scale since insulin gtt is off

## 2023-02-28 NOTE — PROGRESS NOTES
Writer at bedside along with pt , Rusty Schroeder gave verbal permission for Melba Hilliard to receive any health information about him whenever she is at bedside or calls in to inquire about his health

## 2023-02-28 NOTE — PROGRESS NOTES
[facility]  Internal Medicine Teaching Residency Program  Inpatient Daily Progress Note  ______________________________________________________________________________    Patient: Kailash Michel  YOB: 1970   PKC:[id number]    Acct: [de-identified]     Room: [facility]-  Admit date: 2/27/2023  Today's date: 02/28/23  Number of days in the hospital: 1    SUBJECTIVE   Admitting Diagnosis: DKA (diabetic ketoacidosis) ([facility] [address]  CC: Nausea and vomiting  Pt examined at bedside. Chart & results reviewed. Patient is hemodynamically stable and afebrile  Glucose in 140s  Anion gap 30/17/8  Bicarb 16/22/25  Received more than 60 units in insulin drip      ROS:  Constitutional:  negative for chills, fevers, sweats  Respiratory:  negative for cough, dyspnea on exertion, hemoptysis, shortness of breath, wheezing  Cardiovascular:  negative for chest pain, chest pressure/discomfort, lower extremity edema, palpitations  Gastrointestinal:  negative for abdominal pain, constipation, diarrhea, nausea, vomiting  Neurological:  negative for dizziness, headache  BRIEF HISTORY     The patient is a pleasant 47 y.o. male presented with nausea and vomiting and was admitted to the hospital for management of DKA      Patient has a past medical history significant for uncontrolled type 1 diabetes mellitus complicated with nephropathy CKD stage III and neuropathy, COPD. Reported that he has not eaten any for many days because of the severe pain in his throat and mouth and unable to swallow. Patient also reported that he has had chest pain similar to MI and diffuse abdominal pain. Also he had left thigh pain and he has a recent surgery ORIF for femur fracture. Also has a history of lung nodule, transaminitis, hep C. Patient is also cachectic and ill-appearing. In ED, vitals were significant for tachycardia with heart rate of 103 and tachypnea with respiratory rate of 28.   Labs were significant for elevated blood glucose 560, potassium of 4.8, sodium of 134 -corrected sodium 141-bicarb 16, and beta hydroxybutyrate above 7.43. VBG's were significant for pH of 7.319. Creatinine was elevated at 1.93 -patient's baseline 1.7. troponin 15/15. EKG No acute changes. OBJECTIVE     Vital Signs:  BP (!) 151/93   Pulse (!) 103   Temp 97.6 °F (36.4 °C) (Oral)   Resp 15   Ht 5' 8\" (1.727 m)   Wt 100 lb (45.4 kg)   SpO2 94%   BMI 15.20 kg/m²     Temp (24hrs), Av.6 °F (36.4 °C), Min:97.5 °F (36.4 °C), Max:97.6 °F (36.4 °C)    In: 765   Out: 375 [Urine:375]    Body Mass Index : Body mass index is 15.2 kg/m². PHYSICAL EXAMINATION:  Constitutional: This is a well developed, well nourished, Less than 16.0 - Severe malnutrition / Protein energy malnutrition Grade III 47y.o. year old male who is alert, oriented, cooperative and in no apparent distress. Head:normocephalic and atraumatic. EENT:  PERRLA. No conjunctival injections. Septum was midline, mucosa was without erythema, exudates or cobblestoning. No thrush was noted. Neck: Supple without thyromegaly. No elevated JVP. Trachea was midline. Respiratory: Chest was symmetrical without dullness to percussion. Breath sounds bilaterally were clear to auscultation. There were no wheezes, rhonchi or rales. There is no intercostal retraction or use of accessory muscles. No egophony noted. Cardiovascular: Regular without murmur, clicks, gallops or rubs. Abdomen: Slightly rounded and soft without organomegaly. No rebound, rigidity or guarding was appreciated. Lymphatic: No lymphadenopathy. Musculoskeletal: Normal curvature of the spine. No gross muscle weakness. Extremities:  No lower extremity edema, ulcerations, tenderness, varicosities or erythema. Muscle size, tone and strength are normal.  No involuntary movements are noted. Skin:  Warm and dry. Good color, turgor and pigmentation. No lesions or scars.   No cyanosis or clubbing  Neurological/Psychiatric: The patient's general behavior, level of consciousness, thought content and emotional status is normal.         Medications:  Scheduled Medications:    sodium chloride flush  5-40 mL IntraVENous 2 times per day    heparin (porcine)  5,000 Units SubCUTAneous 3 times per day    cefTRIAXone (ROCEPHIN) IV  1,000 mg IntraVENous Q24H    metroNIDAZOLE  500 mg IntraVENous Q8H     Continuous Infusions:    dextrose      insulin 0.225 Units/kg/hr (02/27/23 8318)    sodium chloride 200 mL/hr at 02/27/23 2212    dextrose 5 % and 0.45 % NaCl       PRN Medicationsglucose, 4 tablet, PRN  dextrose bolus, 125 mL, PRN   Or  dextrose bolus, 250 mL, PRN  glucagon (rDNA), 1 mg, PRN  dextrose, , Continuous PRN  dextrose bolus, 125 mL, PRN   Or  dextrose bolus, 250 mL, PRN  potassium chloride, 10 mEq, PRN  magnesium sulfate, 1,000 mg, PRN  sodium phosphate IVPB, 10 mmol, PRN   Or  sodium phosphate IVPB, 15 mmol, PRN   Or  sodium phosphate IVPB, 20 mmol, PRN  dextrose 5 % and 0.45 % NaCl, , Continuous PRN  sodium chloride flush, 5-40 mL, PRN  ondansetron, 4 mg, Q8H PRN   Or  ondansetron, 4 mg, Q6H PRN  polyethylene glycol, 17 g, Daily PRN  acetaminophen, 650 mg, Q6H PRN   Or  acetaminophen, 650 mg, Q6H PRN        Diagnostic Labs:  CBC:   Recent Labs     02/27/23  1403   WBC 9.5   RBC 4.72   HGB 12.5*   HCT 40.1*   MCV 85.0   RDW 15.9*        BMP:   Recent Labs     02/27/23  1403 02/27/23  1528 02/27/23  1750 02/27/23  2221   *  --  132* 139   K 4.8  --  4.9 4.1   CL 88*  --  90* 100   CO2 16*  --  13* 22   PHOS  --  5.2*  --   --    BUN 29*  --  33* 30*   CREATININE 1.93*  --  1.47* 1.41*     BNP: No results for input(s): BNP in the last 72 hours. PT/INR: No results for input(s): PROTIME, INR in the last 72 hours. APTT: No results for input(s): APTT in the last 72 hours. CARDIAC ENZYMES: No results for input(s): CKMB, CKMBINDEX, TROPONINI in the last 72 hours.     Invalid input(s): CKTOTAL;3  FASTING LIPID PANEL:  Lab Results   Component Value Date    CHOL 164 11/05/2022    HDL 51 11/05/2022    TRIG 92 11/05/2022     LIVER PROFILE:   Recent Labs     02/27/23  1403   AST 18   ALT 21   BILITOT 0.8   ALKPHOS 107      MICROBIOLOGY:   Lab Results   Component Value Date/Time    CULTURE NO GROWTH 34 DAYS 02/15/2022 05:11 PM       Imaging:    CT ABDOMEN PELVIS W IV CONTRAST Additional Contrast? None    Result Date: 2/27/2023  Significant wall thickening noted throughout the ascending colon likely suggestive of colitis. Appendix is not definitely visualized. No obstructive uropathy. 2 mm nonobstructing stone of superior pole of left kidney. Axial hiatal hernia     XR CHEST PORTABLE    Result Date: 2/27/2023  No acute abnormality visualized. ASSESSMENT & PLAN     Active Problems:  Diabetic ketoacidosis without coma associated with type 1 diabetes mellitus (HCC)  -NPO effective immediately, DKA protocol started, BMP q4h, initial Mag and Phos levels  -Glucose checks every hour  -Betahydroxybutarate levels 7 on admission  -IV  half Normal Saline at 200 mL/hr  -Insulin Regular started at 0.1 Units/kg/hr  -D5 and 0.45% NS at 150 mL/hr   -bicarb normalized and anion gap closed  -Hypoglycemia, phos and potassium replacement protocols in place     2. Colitis  -Likely the trigger for DKA  -CT showed significant wall thickening noted throughout the ascending colon likely suggestive of colitis  -Started on Rocephin and flagyl  -Blood culture sent     3. Odynophagia  -Patient was seen by GI on a previous admission on 1/19/2023 and was recommended to do EGD apparently not done.  -Previous EGDs showed LA grade D ulcerative esophagitis in the distal third  -GI consulted appreciate recommendations  -IV fluconazole to be started    4. H/O Hepatitis c positive  -Had CAT scan is not showing a mass in the liver     5.   CKD stage IIIa  -Creatinine elevated 2.42 on admission -patient's baseline 1.7  -IV half normal saline 200 mL/h  -We will monitor kidney functions  -Avoid nephrotoxic drugs     6. COPD not on home oxygen  -Resume home medications     7. Nonobstructive CAD on cath 5/2021     8. Severe malnutrition / Protein energy malnutrition Grade III   -Glucose controlled Ensure started     DVT ppx: Heparin SC  GI ppx: Not indicated     PT/OT: consulted  Discharge Planning: Case management consulted    Xu Singh MD  Internal Medicine Resident, PGY-1  [address], [place]  2/28/2023, 12:41 AM

## 2023-02-28 NOTE — PROGRESS NOTES
Writer messaged Keira Sanchez, NP with GI to inform her that pt c/o of chest pain when swallowing;also informed her that pt stated that he will cooperate with whatever the doctors advise as he wants to feel better.   Writer inquired on what the plan is;per Valentin pt to be NPO at midnight for scope tomorrow

## 2023-02-28 NOTE — CONSULTS
[facility] ([facility]) Gastroenterology  Consultation Note     . Chief Complaint:  Nausea vomiting    Reason for consult:    Odynophagia    History of present illness: This is a 47 y.o. male with PMH including DM type I uncontrolled complicated with known nephropathy CKD stage III, neuropathy, COPD presented to the ED with complaints of nausea and vomiting poor oral intake over the last few days due to odynophagia and dysphagia. Patient recently had ORIF of left femur fracture  In the ER the patient was found to be tachycardic, tachypneic  Blood glucose level was 560, potassium 4.8, sodium 134, bicarb 16, beta hydroxybutyrate 7.43, creatinine 1.9  CBC unremarkable    CT abdomen and pelvis with IV contrast showed significant wall thickening throughout the ascending colon most likely suggestive of colitis    On exam, patient reports odynophagia and dysphagia-treated with Magic mouthwash with no improvement in symptoms. Previous GI history:   2/15/2022 EGD per Dr. Becerril Re  Findings:  Esophageal candidiasis of the mid and distal esophagus. Biopsied  LA grade D ulcerative esophagitis in the distal third. Biopsied  Normal stomach mucosa on forward and retroflexed views. Normal examined duodenum. Recommendations  Await pathology results. Change to oral pantoprazole 40 mg twice daily for 8 weeks. Follow an antireflux lifestyle. Start Diflucan 200 mg first dose today then 100 mg daily for total of 14 days. Start soft diet and advance as tolerated. Patient will need follow-up in GI clinic. Repeat EGD in 2 months to check for healing of ulcers. Okay for discharge from GI standpoint. GI will sign off. Please call for any questions or concerns. Biopsies were negative for H. Pylori. Distal esophageal biopsies showed columnar mucosa with ulceration and granulation tissue, CMV and HSV 1 and 2 negative.   No metaplasia or dysplasia noted  Past Medical/Social/Family History:  Past Medical History:   Diagnosis Date Diabetes mellitus ([facility] [address]     Obstructive lung disease ([facility] [address]     8/2022    Panic attack      Past Surgical History:   Procedure Laterality Date    APPENDECTOMY      FEMUR FRACTURE SURGERY Left 09/30/2022    Cephalomedullary Nail    HAND SURGERY      JOINT REPLACEMENT      TIBIA FRACTURE SURGERY Left 09/30/2022    LEFT FEMUR CEPHALOMEDULLARY NAIL performed by Bronson Hawk DO at [facility] N/A 02/15/2022    EGD BIOPSY performed by Adebayo Zamora MD at [address] 11/10/2022    EGD BIOPSY performed by Brandie Edgar MD at [facility] ENDO     No family history on file. Previous records/history/ and notes were reviewed    Allergies: Allergies   Allergen Reactions    Azithromycin Swelling    Acetaminophen-Codeine Other (See Comments) and Nausea And Vomiting    Codeine Itching    Hydrocodone-Acetaminophen Rash    Ibuprofen Other (See Comments)     Stomach cramps    Morphine Other (See Comments)     Gives headaches    Tramadol Itching     vomitting       Home medications:  Prior to Admission medications    Medication Sig Start Date End Date Taking?  Authorizing Provider   diphenhydrAMINE (BENYLIN) 12.5 MG/5ML liquid take 5 milliliters by mouth four times a day if needed for allergies 2/20/23   BINA Trejo   aluminum hydroxide-magnesium carbonate (ACID GONE)  MG/15ML suspension Take 5 mLs by mouth 4 times daily (with meals and nightly) 2/10/23   Tina Henson MD   atorvastatin (LIPITOR) 80 MG tablet  2/7/23   Historical Provider, MD   OLANZapine (ZYPREXA) 2.5 MG tablet take 1 tablet by mouth at bedtime 1/26/23   Historical Provider, MD   amLODIPine (NORVASC) 10 MG tablet take 1 tablet by mouth once daily 12/2/22   Historical Provider, MD   atorvastatin (LIPITOR) 40 MG tablet take 1 tablet by mouth every evening  Patient not taking: Reported on 2/7/2023 12/2/22   Historical Provider, MD   TRUEPLUS GLUCOSE 4 g chewable tablet chew and swallow as directed for LOW BLOOD SUGAR REACTION 12/20/22   Historical Provider, MD   insulin lispro, 1 Unit Dial, (HUMALOG/ADMELOG) 100 UNIT/ML SOPN INJECT 0-16 UNITS INTO THE SKIN THREE TIMES A DAY WITH MEALS 1/14/23   Historical Provider, MD   PARoxetine (PAXIL) 20 MG tablet take 1 tablet by mouth at bedtime 1/7/23   Historical Provider, MD   traZODone (DESYREL) 300 MG tablet  1/9/23   Historical Provider, MD   Continuous Blood Gluc Transmit (DEXCOM G6 TRANSMITTER) MISC 1 each by Does not apply route every 10 days 1/20/23   BINA Bae   insulin aspart (NOVOLOG FLEXPEN) 100 UNIT/ML injection pen Inject 5 Units into the skin 3 times daily (before meals) Patient has temporarily reduced his dose to 1-2 units with the small snack he has each day. 1/16/23   BINA Bae   insulin glargine (LANTUS) 100 UNIT/ML injection vial Inject 15 Units into the skin 2 times daily 1/16/23   BINA Bae   sucralfate (CARAFATE) 1 GM/10ML suspension Take 10 mLs by mouth 4 times daily 1/10/23   BINA Bae   metoclopramide (REGLAN) 10 MG/10ML SOLN Take 10 mLs by mouth 3 times daily (before meals) 1/10/23   BINA Bae   pantoprazole (PROTONIX) 40 MG tablet Take 40 mg by mouth 2 times daily    Historical Provider, MD   PNEUMOCOCCAL 20-ALPHONSO CONJ VACC IM Inject into the muscle    Historical Provider, MD   Continuous Blood Gluc Transmit (DEXCOM G6 TRANSMITTER) MISC 1 each by Does not apply route once a week 12/30/22   Da Magallanes MD   Continuous Blood Gluc Sensor (DEXCOM G6 SENSOR) MISC 1 each by Does not apply route once a week 12/30/22   Da Magallanes MD   blood glucose monitor strips Test 4 times a day & as needed for symptoms of irregular blood glucose. Dispense sufficient amount for indicated testing frequency plus additional to accommodate PRN testing needs. Dispense brand that correlates with his glucometer and that's covered by his insurance. Needs delivered to him.  12/30/22 Sangeetha Thorpe MD   Nutritional Supplements (GLUCOSE MANAGEMENT) TABS Take 1 tablet by mouth daily as needed (Low sugar reaction) 12/20/22   BINA Rodarte   aspirin EC 81 MG EC tablet Take 1 tablet by mouth daily 12/16/22   BINA Rodarte   Continuous Blood Gluc  (DEXCOM G6 ) JANEL 1 each by Does not apply route once a week 11/18/22   BINA Rodarte   Misc. Devices (STEP N REST WALKER) MISC 1 each by Does not apply route continuous Seated, wheeled walker 11/18/22   BINA Rodarte   ondansetron (ZOFRAN ODT) 4 MG disintegrating tablet Take 1 tablet by mouth every 8 hours as needed for Nausea or Vomiting 11/18/22   BINA Rodarte   albuterol sulfate HFA (VENTOLIN HFA) 108 (90 Base) MCG/ACT inhaler Inhale 2 puffs into the lungs 4 times daily as needed for Wheezing 11/15/22   BINA Rodarte   metoprolol tartrate (LOPRESSOR) 25 MG tablet Take 1 tablet by mouth 2 times daily 11/14/22   BINA Rodarte   Blood Glucose Monitoring Suppl (ONE TOUCH ULTRA 2) w/Device KIT 1 kit by Does not apply route daily 3/18/22   Rm Eva, DO   Lancets MISC 1 each by Does not apply route 2 times daily 3/18/22   Rm Velasquez, DO   Alcohol Swabs 70 % PADS Use as needed with blood glucose checks 2/15/22   Asher Chaparro MD   Insulin Pen Needle ([employer] PEN NEEDLES 29G) 29G X 12MM MISC 1 each by Does not apply route daily 2/15/22   Asher Chaparro MD   clonazePAM (KLONOPIN) 2 MG tablet Take 4 mg by mouth daily as needed for Anxiety.      Historical Provider, MD     .  Current Medications:  Scheduled Meds:   pantoprazole (PROTONIX) 40 mg injection  40 mg IntraVENous Daily    sertraline  50 mg Oral Daily    fluconazole  400 mg IntraVENous Q24H    sodium chloride flush  5-40 mL IntraVENous 2 times per day    heparin (porcine)  5,000 Units SubCUTAneous 3 times per day    cefTRIAXone (ROCEPHIN) IV  1,000 mg IntraVENous Q24H    metroNIDAZOLE  500 mg IntraVENous Q8H     . Continuous Infusions:   dextrose      dextrose 5 % and 0.45 % NaCl 150 mL/hr at 23 0449     . PRN Meds:magic (miracle) mouthwash, glucose, dextrose bolus **OR** dextrose bolus, glucagon (rDNA), dextrose, dextrose bolus **OR** dextrose bolus, potassium chloride, magnesium sulfate, sodium phosphate IVPB **OR** sodium phosphate IVPB **OR** sodium phosphate IVPB, dextrose 5 % and 0.45 % NaCl, sodium chloride flush, ondansetron **OR** ondansetron, polyethylene glycol, acetaminophen **OR** acetaminophen    REVIEW OF SYSTEMS:     Constitutional: No fever, no chills, no lethargy, no weakness, no weight loss  HEENT:  Odynophagia, dysphagia  Cardiac:  No chest pain, dyspnea, orthopnea or PND. Chest:   No cough, phlegm or wheezing. Abdomen:  No abdominal pain, nausea, vomiting, constipation, diarrhea, hematochezia/melena  Neuro:  No focal weakness, abnormal movements or seizure like activity. Skin:   No rashes, no itching. :   No hematuria, no pyuria, no dysuria, no flank pain. Extremities:  No swelling or joint pains. ROS was otherwise negative except as mentioned in the [address] PHYSICAL EXAM:    BP (!) 137/90   Pulse 63   Temp 98.3 °F (36.8 °C) (Oral)   Resp 17   Ht 5' 8\" (1.727 m)   Wt 128 lb 15.5 oz (58.5 kg)   SpO2 95%   BMI 19.61 kg/m²   . TMAX[24]    General: Well developed, Well nourished, No apparent distress  Head:  Normocephalic, Atraumatic  EENT: EOMI, Sclera not icteric, Oropharynx moist  Neck:  Supple, Trachea midline  Lungs:CTA Bilaterally  Heart: RRR, No murmur, No rub, No gallop, PMI nondisplaced. Abdomen:Soft, Non tender, Not distended, BS WNL,  No masses. No hepatomegalia   Ext:No clubbing. No cyanosis. No edema. Skin: No rashes. No jaundice. No stigmata of liver disease.     Neuro:  A&O x Three, No focal neurological deficits    Imagin2023 CT abdomen and pelvis with IV contrast  FINDINGS:   LOWER CHEST:  Visualized portion of the lower chest demonstrates no acute   abnormality. Axial hiatal hernia. KIDNEYS AND URINARY TRACT: 2 mm nonobstructing stone of superior pole of left   kidney. .  There is no evidence for hydronephrosis. The ureters are of normal   course and caliber. ORGANS: Punctate calcification of the spleen related to prior granulomatous   disease exposure. Visualized portions of the liver, spleen, pancreas,   gallbladder, and adrenal glands demonstrate no acute abnormality. GI/BOWEL: No bowel obstruction. Appendix is not visualized. Significant   wall thickening noted throughout the ascending colon likely suggestive of   colitis. PELVIS: The bladder and pelvic organs are unremarkable. PERITONEUM/RETROPERITONEUM: No free air or free fluid is noted. No   pathologically enlarged lymphadenopathy. The vasculature do not demonstrate   acute abnormality. BONES/SOFT TISSUES: The osseous structures demonstrate no acute abnormality. Changes related to instrumented fusion of left femoral neck via dynamic hip   screw. Impression   Significant wall thickening noted throughout the ascending colon likely   suggestive of colitis. Appendix is not definitely visualized. No obstructive uropathy. 2 mm nonobstructing stone of superior pole of left   kidney. Axial hiatal hernia     Hemotological labs: Anemia studies:  No results for input(s): LABIRON, TIBC, FERRITIN, [id number], FOLATE, OCCULTBLD in the last 72 hours. CBC:  Recent Labs     02/27/23  1403   WBC 9.5   HGB 12.5*   MCV 85.0   RDW 15.9*          PT/INR:  No results for input(s): PROTIME, INR in the last 72 hours.     BMP:  Recent Labs     02/28/23  0614 02/28/23  0958 02/28/23  1303    135 134*   K 4.4 4.1 4.0    102 101   CO2 25 24 23   BUN 23* 22* 20   CREATININE 1.41* 1.25* 1.46*   GLUCOSE 145* 158* 95   CALCIUM 8.1* 8.5* 8.3*       Liver work up:  Hepatitis Functional Panel:  Recent Labs 02/27/23  1403   ALKPHOS 107   ALT 21   AST 18   PROT 7.6   BILITOT 0.8   LABALBU 4.4       Amylase/Lipase/Ammonia:  Recent Labs     02/27/23  1403   LIPASE 42       Acute Hepatitis Panel:  Lab Results   Component Value Date/Time    HEPCAB REACTIVE 04/06/2022 01:49 PM            Principal Problem:    DKA (diabetic ketoacidosis) ([address]  Active Problems:    Nausea & vomiting    Stage 3a chronic kidney disease (HCC)    Odynophagia    Severe malnutrition (HCC)    Hyperglycemia    Colitis    Candida esophagitis (HCC)  Resolved Problems:    * No resolved hospital problems. *       GI Impression:  80-year-old male with uncontrolled type 1 diabetes, history of esophageal candidiasis presenting with nausea vomiting, odynophagia and dysphagia. Possible colitis on CT    Recommendations and plan:    Recommend patient to be started on Diflucan 200 mg IV  Protonix 40 mg IV twice daily  Okay for full liquid diet-n.p.o. at midnight  Medical management per primary  We will plan for endoscopy tomorrow      This plan was formulated in collaboration with Dr. Isidro Mcgrath MD  Please feel free to contact us with any questions or concerns      [facility]  [address] - [facility]   [phone]  2/28/2023    1:52 PM     Estimated time of 60 mins reviewing chart, assessing patient and formulating plan of care    This note was created with the assistance of a speech-recognition program.  Although the intention is to generate a document that actually reflects the content of the visit, no guarantees can be provided that every mistake has been identified and corrected by editing.

## 2023-02-28 NOTE — PROGRESS NOTES
Luiz Riojas informing him that pt is c/o difficulty & pain while swallowing food & beverages;writer also informed Dr. Roxanne Riojas that pt is inquiring about peg tube placement.     Writer inquiring about insulin gtt status, per Dr. Roxanne Riojas can discontinue insulin gtt one hour after admin Lantus

## 2023-02-28 NOTE — CARE COORDINATION
Transitional Planning:  Call from [facility] at [facility]. They cannot accept as pt was d/c due to non compliance. 15:45  Provided HC list.  Pt has no preference. Referrals to [facility], [address] and [facility].

## 2023-02-28 NOTE — PLAN OF CARE
Problem: Discharge Planning  Goal: Discharge to home or other facility with appropriate resources  2/28/2023 1045 by Mir White RN  Outcome: Progressing     Problem: Pain  Goal: Verbalizes/displays adequate comfort level or baseline comfort level  2/28/2023 1045 by Mir White RN  Outcome: Progressing     Problem: Skin/Tissue Integrity  Goal: Absence of new skin breakdown  Description: 1. Monitor for areas of redness and/or skin breakdown  2. Assess vascular access sites hourly  3. Every 4-6 hours minimum:  Change oxygen saturation probe site  4. Every 4-6 hours:  If on nasal continuous positive airway pressure, respiratory therapy assess nares and determine need for appliance change or resting period.   2/28/2023 1045 by Mir White RN  Outcome: Progressing     Problem: Safety - Adult  Goal: Free from fall injury  Outcome: Progressing

## 2023-02-28 NOTE — CARE COORDINATION
Case Management Assessment  Initial Evaluation    Date/Time of Evaluation: 2/28/2023 12:46 PM  Assessment Completed by: Dwight Suggs RN    If patient is discharged prior to next notation, then this note serves as note for discharge by case management. Patient Name: Shreyas Sprague                   YOB: 1970  Diagnosis: Hyperglycemia [R73.9]  Diabetic ketoacidosis without coma associated with type 2 diabetes mellitus ([facility] [E11.10]                   Date / Time: 2/27/2023  1:38 PM    Patient Admission Status: Inpatient   Readmission Risk (Low < 19, Mod (19-27), High > 27): Readmission Risk Score: 20.2    Current PCP: BINA Garcia  PCP verified by CM? Yes    Chart Reviewed: Yes      History Provided by: Patient  Patient Orientation: Alert and Oriented    Patient Cognition: Alert    Hospitalization in the last 30 days (Readmission):  No    If yes, Readmission Assessment in CM Navigator will be completed. Advance Directives:      Code Status: Full Code   Patient's Primary Decision Maker is: Legal Next of Kin    Primary Decision Maker: Leodan Rodriguez  Child - [phone]    Discharge Planning:    Patient lives with: Family Members Type of Home:    Primary Care Giver: Other (Comment) (children)  Patient Support Systems include: Children   Current Financial resources: Medicaid  Current community resources:    Current services prior to admission:              Current DME:              Type of Home Care services:       ADLS  Prior functional level: Bathing, Dressing, Feeding, Cooking, Housework, Shopping  Current functional level: Bathing, Dressing, Feeding, Cooking, Housework, Shopping    PT AM-PAC:   /24  OT AM-PAC:   /24    Family can provide assistance at DC: Yes  Would you like Case Management to discuss the discharge plan with any other family members/significant others, and if so, who?  No  Plans to Return to Present Housing: Yes  Other Identified Issues/Barriers to RETURNING to current housing: pain control and glucose regulation------------------------------------------------------------------  Potential Assistance needed at discharge:              Potential DME:    Patient expects to discharge to:    Plan for transportation at discharge:      Financial    Payor: Herbie Sacks / Plan: Herbie Sacks / Product Type: *No Product type* /     Does insurance require precert for SNF: Yes    Potential assistance Purchasing Medications: No  Meds-to-Beds request: Yes       [facility], [address] [phone]  [address] [address] [address]  Phone: [phone] Fax: [phone]      Notes:    Factors facilitating achievement of predicted outcomes: Cooperative    Barriers to discharge: Pain    Additional Case Management Notes: Plan is to return home with children. States is current with [facility] for SN. Referral sent. Will need medical cab. The Plan for Transition of Care is related to the following treatment goals of Hyperglycemia [R73.9]  Diabetic k[facility]acidosis without coma [facility] with [facility] [B09.73]    IF APPLICABLE: The Patient and/or patient representative Billy Renteria and his family were provided with a choice of provider and agrees with the discharge plan. Freedom of choice list with basic dialogue that supports the patient's individualized plan of care/goals and shares the quality data associated with the providers was provided to:     Patient Representative Name:       The Patient and/or Patient Representative Agree with the Discharge Plan?   Yes    Antoinette Quiros RN  Case Management Department  Ph: [phone]

## 2023-02-28 NOTE — PROGRESS NOTES
Inpatient Diabetes  Education     Type and Reason for Visit: Patient Education  Question: Reason for Consult? Answer: dka  Chart reviewed - type of diabtes type1 - follows with BINA Denton and also has outpatient care Paola Mane RN . He is know diabetic, not new to insulin, dx, or BGSM. From notes patient has had issues with swallowing/ soar throat since last nov and CM , Kaitlin López RNwas working on getting oral supplements in home  ( orders from 1/20/ 23) and GI follow up in last week. Home BG and insulin self care also appear to be inconsistent. Dexcom has been ordered, but patient as not yet started / used per PCP follow up  notes 1/2023. DKA contributing factors -  starvation and or missed insulin doses   Bmi 19.6 now  noted wt is up 10 lbs in last 2 weeks: Wt Readings from Last 3 Encounters:   02/28/23 128 lb 15.5 oz (58.5 kg)   02/07/23 118 lb (53.5 kg)   01/20/23 118 lb (53.5 kg)      Rounded on patient this am, patient stated very tired - awake all night with ED care / finger pokes for BG checks, he stated still having sore throat /  unable to drink and felling so much pain to swallow. He did not want to engage  with self care teaching/ conversation this am.    Lab Results   Component Value Date    LABA1C 8.0 01/20/2023    LABA1C 10.0 (H) 09/30/2022    LABA1C 11.0 04/06/2022       Would suggest RD assessment - nutrition needs and link to outpatient oral supplements as per CM notes. Out patient diabetes education  contact number provided - [phone] to patient and placed on the discharge summary. Will follow up tomorrow for education and support.     Burgess Baeza, MALLORY

## 2023-02-28 NOTE — PLAN OF CARE
Problem: Discharge Planning  Goal: Discharge to home or other facility with appropriate resources  Outcome: Progressing  Flowsheets (Taken 2/27/2023 1830 by Arpit Torres RN)  Discharge to home or other facility with appropriate resources: Identify barriers to discharge with patient and caregiver     Problem: Pain  Goal: Verbalizes/displays adequate comfort level or baseline comfort level  Outcome: Progressing     Problem: Gastrointestinal - Adult  Goal: Minimal or absence of nausea and vomiting  Outcome: Progressing  Goal: Maintains or returns to baseline bowel function  Outcome: Progressing  Goal: Maintains adequate nutritional intake  Outcome: Progressing     Problem: Metabolic/Fluid and Electrolytes - Adult  Goal: Electrolytes maintained within normal limits  Outcome: Progressing  Goal: Glucose maintained within prescribed range  Outcome: Progressing     Problem: Skin/Tissue Integrity  Goal: Absence of new skin breakdown  Description: 1. Monitor for areas of redness and/or skin breakdown  2. Assess vascular access sites hourly  3. Every 4-6 hours minimum:  Change oxygen saturation probe site  4. Every 4-6 hours:  If on nasal continuous positive airway pressure, respiratory therapy assess nares and determine need for appliance change or resting period.   Outcome: Progressing

## 2023-02-28 NOTE — CARE COORDINATION
Writer called pharmacy to check if due to get Dex Com 6 transmitter and he is eligible starting this week to get another one.  Will address with patient after discharged from hospital.    Future Appointments   Date Time Provider Jerilyn Salvador   4/11/2023  1:00 PM Vance Galvan DO [address] [address]   4/21/2023  1:00 PM BINA Johnston

## 2023-02-28 NOTE — PROGRESS NOTES
Paulette Rand in regards to pt c/o chest pain while swallowing;writer informed Dr. Clement Rand that pt was administered magic mouthwash and pt still c/o pain while swallowing;orders received

## 2023-02-28 NOTE — PROGRESS NOTES
Physician Progress Note      Cielo White  CSN #:                  [id number]  :                       1970  ADMIT DATE:       2023 1:38 PM  [address] [place] DATE:  RESPONDING  PROVIDER #:        SHANA López TEXT:    Pt admitted with DKA and has colitis documented. If possible, please document   the type of colitis in the medical record. The medical record reflects the following:  Risk Factors: DM type 1, CKD3,  Clinical Indicators: CT ABDOMEN; Significant wall thickening noted throughout   the ascending colon likely suggestive of colitis. Treatment: Rocephin and flagyl IV, monitoring    Thank you, Please call if questions  Guadalupe Ramirez RN CDS  [phone]  Options provided:  -- Bacterial Colitis  -- Toxic Colitis  -- Colitis due to other etiology, Please document other etiology. -- Other - I will add my own diagnosis  -- Disagree - Not applicable / Not valid  -- Disagree - Clinically unable to determine / Unknown  -- Refer to Clinical Documentation Reviewer    PROVIDER RESPONSE TEXT:    This patient has bacterial colitis. Query created by: Karolina Carvajal on 2023 10:55 AM      Electronically signed by:   SHANA GAVIN 2023 11:05 AM

## 2023-03-01 ENCOUNTER — ANESTHESIA EVENT (OUTPATIENT)
Dept: OPERATING ROOM | Age: 53
DRG: 420 | End: 2023-03-01
Payer: MEDICAID

## 2023-03-01 ENCOUNTER — ANESTHESIA (OUTPATIENT)
Dept: OPERATING ROOM | Age: 53
DRG: 420 | End: 2023-03-01
Payer: MEDICAID

## 2023-03-01 LAB
ABSOLUTE EOS #: 0.07 K/UL (ref 0–0.44)
ABSOLUTE IMMATURE GRANULOCYTE: 0.04 K/UL (ref 0–0.3)
ABSOLUTE LYMPH #: 2.61 K/UL (ref 1.1–3.7)
ABSOLUTE MONO #: 0.5 K/UL (ref 0.1–1.2)
ANION GAP SERPL CALCULATED.3IONS-SCNC: 11 MMOL/L (ref 9–17)
ANION GAP SERPL CALCULATED.3IONS-SCNC: 7 MMOL/L (ref 9–17)
ANION GAP SERPL CALCULATED.3IONS-SCNC: 8 MMOL/L (ref 9–17)
ANION GAP SERPL CALCULATED.3IONS-SCNC: 8 MMOL/L (ref 9–17)
ANION GAP SERPL CALCULATED.3IONS-SCNC: 9 MMOL/L (ref 9–17)
ANION GAP SERPL CALCULATED.3IONS-SCNC: 9 MMOL/L (ref 9–17)
BASOPHILS # BLD: 1 % (ref 0–2)
BASOPHILS ABSOLUTE: 0.05 K/UL (ref 0–0.2)
BUN SERPL-MCNC: 10 MG/DL (ref 6–20)
BUN SERPL-MCNC: 10 MG/DL (ref 6–20)
BUN SERPL-MCNC: 11 MG/DL (ref 6–20)
BUN SERPL-MCNC: 13 MG/DL (ref 6–20)
BUN SERPL-MCNC: 15 MG/DL (ref 6–20)
BUN SERPL-MCNC: 9 MG/DL (ref 6–20)
CALCIUM SERPL-MCNC: 8.5 MG/DL (ref 8.6–10.4)
CALCIUM SERPL-MCNC: 8.6 MG/DL (ref 8.6–10.4)
CALCIUM SERPL-MCNC: 8.6 MG/DL (ref 8.6–10.4)
CALCIUM SERPL-MCNC: 8.7 MG/DL (ref 8.6–10.4)
CALCIUM SERPL-MCNC: 8.8 MG/DL (ref 8.6–10.4)
CALCIUM SERPL-MCNC: 9 MG/DL (ref 8.6–10.4)
CHLORIDE SERPL-SCNC: 103 MMOL/L (ref 98–107)
CHLORIDE SERPL-SCNC: 103 MMOL/L (ref 98–107)
CHLORIDE SERPL-SCNC: 98 MMOL/L (ref 98–107)
CHLORIDE SERPL-SCNC: 98 MMOL/L (ref 98–107)
CHLORIDE SERPL-SCNC: 99 MMOL/L (ref 98–107)
CHLORIDE SERPL-SCNC: 99 MMOL/L (ref 98–107)
CO2 SERPL-SCNC: 26 MMOL/L (ref 20–31)
CO2 SERPL-SCNC: 27 MMOL/L (ref 20–31)
CO2 SERPL-SCNC: 28 MMOL/L (ref 20–31)
CO2 SERPL-SCNC: 28 MMOL/L (ref 20–31)
CREAT SERPL-MCNC: 0.96 MG/DL (ref 0.7–1.2)
CREAT SERPL-MCNC: 1.09 MG/DL (ref 0.7–1.2)
CREAT SERPL-MCNC: 1.11 MG/DL (ref 0.7–1.2)
CREAT SERPL-MCNC: 1.15 MG/DL (ref 0.7–1.2)
CREAT SERPL-MCNC: 1.21 MG/DL (ref 0.7–1.2)
CREAT SERPL-MCNC: 1.22 MG/DL (ref 0.7–1.2)
EKG ATRIAL RATE: 95 BPM
EKG P AXIS: 87 DEGREES
EKG P-R INTERVAL: 144 MS
EKG Q-T INTERVAL: 364 MS
EKG QRS DURATION: 92 MS
EKG QTC CALCULATION (BAZETT): 457 MS
EKG R AXIS: -90 DEGREES
EKG T AXIS: 71 DEGREES
EKG VENTRICULAR RATE: 95 BPM
EOSINOPHILS RELATIVE PERCENT: 1 % (ref 1–4)
GFR SERPL CREATININE-BSD FRML MDRD: >60 ML/MIN/1.73M2
GLUCOSE BLD-MCNC: 115 MG/DL (ref 75–110)
GLUCOSE BLD-MCNC: 120 MG/DL (ref 75–110)
GLUCOSE BLD-MCNC: 134 MG/DL (ref 75–110)
GLUCOSE BLD-MCNC: 148 MG/DL (ref 75–110)
GLUCOSE BLD-MCNC: 148 MG/DL (ref 75–110)
GLUCOSE BLD-MCNC: 155 MG/DL (ref 75–110)
GLUCOSE BLD-MCNC: 160 MG/DL (ref 75–110)
GLUCOSE BLD-MCNC: 172 MG/DL (ref 75–110)
GLUCOSE BLD-MCNC: 56 MG/DL (ref 75–110)
GLUCOSE BLD-MCNC: 71 MG/DL (ref 75–110)
GLUCOSE BLD-MCNC: 74 MG/DL (ref 75–110)
GLUCOSE BLD-MCNC: 80 MG/DL (ref 75–110)
GLUCOSE BLD-MCNC: 82 MG/DL (ref 75–110)
GLUCOSE BLD-MCNC: 82 MG/DL (ref 75–110)
GLUCOSE BLD-MCNC: 97 MG/DL (ref 75–110)
GLUCOSE SERPL-MCNC: 142 MG/DL (ref 70–99)
GLUCOSE SERPL-MCNC: 163 MG/DL (ref 70–99)
GLUCOSE SERPL-MCNC: 177 MG/DL (ref 70–99)
GLUCOSE SERPL-MCNC: 59 MG/DL (ref 70–99)
GLUCOSE SERPL-MCNC: 79 MG/DL (ref 70–99)
GLUCOSE SERPL-MCNC: 90 MG/DL (ref 70–99)
HCT VFR BLD AUTO: 30.5 % (ref 40.7–50.3)
HGB BLD-MCNC: 9.5 G/DL (ref 13–17)
IMMATURE GRANULOCYTES: 0 %
LYMPHOCYTES # BLD: 26 % (ref 24–43)
MAGNESIUM SERPL-MCNC: 1.5 MG/DL (ref 1.6–2.6)
MAGNESIUM SERPL-MCNC: 1.6 MG/DL (ref 1.6–2.6)
MAGNESIUM SERPL-MCNC: 1.6 MG/DL (ref 1.6–2.6)
MCH RBC QN AUTO: 26.4 PG (ref 25.2–33.5)
MCHC RBC AUTO-ENTMCNC: 31.1 G/DL (ref 28.4–34.8)
MCV RBC AUTO: 84.7 FL (ref 82.6–102.9)
MONOCYTES # BLD: 5 % (ref 3–12)
NRBC AUTOMATED: 0 PER 100 WBC
PDW BLD-RTO: 15.9 % (ref 11.8–14.4)
PHOSPHATE SERPL-MCNC: 2.8 MG/DL (ref 2.5–4.5)
PHOSPHATE SERPL-MCNC: 2.8 MG/DL (ref 2.5–4.5)
PHOSPHATE SERPL-MCNC: 2.9 MG/DL (ref 2.5–4.5)
PHOSPHATE SERPL-MCNC: 3.3 MG/DL (ref 2.5–4.5)
PHOSPHATE SERPL-MCNC: 3.4 MG/DL (ref 2.5–4.5)
PHOSPHATE SERPL-MCNC: 3.6 MG/DL (ref 2.5–4.5)
PLATELET # BLD AUTO: 240 K/UL (ref 138–453)
PMV BLD AUTO: 10.8 FL (ref 8.1–13.5)
POTASSIUM SERPL-SCNC: 3.6 MMOL/L (ref 3.7–5.3)
POTASSIUM SERPL-SCNC: 3.8 MMOL/L (ref 3.7–5.3)
POTASSIUM SERPL-SCNC: 3.8 MMOL/L (ref 3.7–5.3)
POTASSIUM SERPL-SCNC: 3.9 MMOL/L (ref 3.7–5.3)
POTASSIUM SERPL-SCNC: 4.1 MMOL/L (ref 3.7–5.3)
POTASSIUM SERPL-SCNC: 4.1 MMOL/L (ref 3.7–5.3)
RBC # BLD: 3.6 M/UL (ref 4.21–5.77)
RBC # BLD: ABNORMAL 10*6/UL
SEG NEUTROPHILS: 67 % (ref 36–65)
SEGMENTED NEUTROPHILS ABSOLUTE COUNT: 6.77 K/UL (ref 1.5–8.1)
SODIUM SERPL-SCNC: 134 MMOL/L (ref 135–144)
SODIUM SERPL-SCNC: 134 MMOL/L (ref 135–144)
SODIUM SERPL-SCNC: 135 MMOL/L (ref 135–144)
SODIUM SERPL-SCNC: 137 MMOL/L (ref 135–144)
SODIUM SERPL-SCNC: 137 MMOL/L (ref 135–144)
SODIUM SERPL-SCNC: 138 MMOL/L (ref 135–144)
WBC # BLD AUTO: 10 K/UL (ref 3.5–11.3)

## 2023-03-01 PROCEDURE — 2500000003 HC RX 250 WO HCPCS: Performed by: NURSE PRACTITIONER

## 2023-03-01 PROCEDURE — 87252 VIRUS INOCULATION TISSUE: CPT

## 2023-03-01 PROCEDURE — 99232 SBSQ HOSP IP/OBS MODERATE 35: CPT | Performed by: STUDENT IN AN ORGANIZED HEALTH CARE EDUCATION/TRAINING PROGRAM

## 2023-03-01 PROCEDURE — 6360000002 HC RX W HCPCS

## 2023-03-01 PROCEDURE — 2500000003 HC RX 250 WO HCPCS

## 2023-03-01 PROCEDURE — 2580000003 HC RX 258: Performed by: STUDENT IN AN ORGANIZED HEALTH CARE EDUCATION/TRAINING PROGRAM

## 2023-03-01 PROCEDURE — 2060000000 HC ICU INTERMEDIATE R&B

## 2023-03-01 PROCEDURE — 85025 COMPLETE CBC W/AUTO DIFF WBC: CPT

## 2023-03-01 PROCEDURE — 2500000003 HC RX 250 WO HCPCS: Performed by: ANESTHESIOLOGY

## 2023-03-01 PROCEDURE — 87140 CULTURE TYPE IMMUNOFLUORESC: CPT

## 2023-03-01 PROCEDURE — 2580000003 HC RX 258: Performed by: NURSE PRACTITIONER

## 2023-03-01 PROCEDURE — 80048 BASIC METABOLIC PNL TOTAL CA: CPT

## 2023-03-01 PROCEDURE — C9113 INJ PANTOPRAZOLE SODIUM, VIA: HCPCS

## 2023-03-01 PROCEDURE — 6360000002 HC RX W HCPCS: Performed by: ANESTHESIOLOGY

## 2023-03-01 PROCEDURE — 0DJ08ZZ INSPECTION OF UPPER INTESTINAL TRACT, VIA NATURAL OR ARTIFICIAL OPENING ENDOSCOPIC: ICD-10-PCS | Performed by: INTERNAL MEDICINE

## 2023-03-01 PROCEDURE — 2580000003 HC RX 258: Performed by: ANESTHESIOLOGY

## 2023-03-01 PROCEDURE — 88305 TISSUE EXAM BY PATHOLOGIST: CPT

## 2023-03-01 PROCEDURE — 6360000002 HC RX W HCPCS: Performed by: NURSE ANESTHETIST, CERTIFIED REGISTERED

## 2023-03-01 PROCEDURE — 7100000011 HC PHASE II RECOVERY - ADDTL 15 MIN: Performed by: INTERNAL MEDICINE

## 2023-03-01 PROCEDURE — G0108 DIAB MANAGE TRN  PER INDIV: HCPCS

## 2023-03-01 PROCEDURE — 84100 ASSAY OF PHOSPHORUS: CPT

## 2023-03-01 PROCEDURE — 2709999900 HC NON-CHARGEABLE SUPPLY: Performed by: INTERNAL MEDICINE

## 2023-03-01 PROCEDURE — 7100000010 HC PHASE II RECOVERY - FIRST 15 MIN: Performed by: INTERNAL MEDICINE

## 2023-03-01 PROCEDURE — 83735 ASSAY OF MAGNESIUM: CPT

## 2023-03-01 PROCEDURE — 88342 IMHCHEM/IMCYTCHM 1ST ANTB: CPT

## 2023-03-01 PROCEDURE — 36415 COLL VENOUS BLD VENIPUNCTURE: CPT

## 2023-03-01 PROCEDURE — 3609012400 HC EGD TRANSORAL BIOPSY SINGLE/MULTIPLE: Performed by: INTERNAL MEDICINE

## 2023-03-01 PROCEDURE — 3700000000 HC ANESTHESIA ATTENDED CARE: Performed by: INTERNAL MEDICINE

## 2023-03-01 PROCEDURE — 87255 GENET VIRUS ISOLATE HSV: CPT

## 2023-03-01 PROCEDURE — 88312 SPECIAL STAINS GROUP 1: CPT

## 2023-03-01 PROCEDURE — 93010 ELECTROCARDIOGRAM REPORT: CPT | Performed by: INTERNAL MEDICINE

## 2023-03-01 PROCEDURE — 3700000001 HC ADD 15 MINUTES (ANESTHESIA): Performed by: INTERNAL MEDICINE

## 2023-03-01 PROCEDURE — 87015 SPECIMEN INFECT AGNT CONCNTJ: CPT

## 2023-03-01 PROCEDURE — 6360000002 HC RX W HCPCS: Performed by: NURSE PRACTITIONER

## 2023-03-01 PROCEDURE — 2580000003 HC RX 258

## 2023-03-01 RX ORDER — DEXTROSE MONOHYDRATE 25 G/50ML
12.5 INJECTION, SOLUTION INTRAVENOUS ONCE
Status: COMPLETED | OUTPATIENT
Start: 2023-03-01 | End: 2023-03-01

## 2023-03-01 RX ORDER — SODIUM CHLORIDE 0.9 % (FLUSH) 0.9 %
5-40 SYRINGE (ML) INJECTION PRN
Status: DISCONTINUED | OUTPATIENT
Start: 2023-03-01 | End: 2023-03-01 | Stop reason: HOSPADM

## 2023-03-01 RX ORDER — FENTANYL CITRATE 50 UG/ML
25 INJECTION, SOLUTION INTRAMUSCULAR; INTRAVENOUS ONCE
Status: COMPLETED | OUTPATIENT
Start: 2023-03-01 | End: 2023-03-01

## 2023-03-01 RX ORDER — SODIUM CHLORIDE 9 MG/ML
INJECTION, SOLUTION INTRAVENOUS CONTINUOUS
Status: DISCONTINUED | OUTPATIENT
Start: 2023-03-01 | End: 2023-03-01 | Stop reason: HOSPADM

## 2023-03-01 RX ORDER — ONDANSETRON 2 MG/ML
4 INJECTION INTRAMUSCULAR; INTRAVENOUS
Status: DISCONTINUED | OUTPATIENT
Start: 2023-03-01 | End: 2023-03-01 | Stop reason: HOSPADM

## 2023-03-01 RX ORDER — SODIUM CHLORIDE 9 MG/ML
INJECTION, SOLUTION INTRAVENOUS PRN
Status: DISCONTINUED | OUTPATIENT
Start: 2023-03-01 | End: 2023-03-01 | Stop reason: HOSPADM

## 2023-03-01 RX ORDER — FENTANYL CITRATE 50 UG/ML
25 INJECTION, SOLUTION INTRAMUSCULAR; INTRAVENOUS EVERY 5 MIN PRN
Status: COMPLETED | OUTPATIENT
Start: 2023-03-01 | End: 2023-03-01

## 2023-03-01 RX ORDER — SODIUM CHLORIDE 0.9 % (FLUSH) 0.9 %
5-40 SYRINGE (ML) INJECTION EVERY 12 HOURS SCHEDULED
Status: DISCONTINUED | OUTPATIENT
Start: 2023-03-01 | End: 2023-03-01 | Stop reason: HOSPADM

## 2023-03-01 RX ORDER — PROPOFOL 10 MG/ML
INJECTION, EMULSION INTRAVENOUS PRN
Status: DISCONTINUED | OUTPATIENT
Start: 2023-03-01 | End: 2023-03-01 | Stop reason: SDUPTHER

## 2023-03-01 RX ORDER — FENTANYL CITRATE 50 UG/ML
50 INJECTION, SOLUTION INTRAMUSCULAR; INTRAVENOUS EVERY 5 MIN PRN
Status: DISCONTINUED | OUTPATIENT
Start: 2023-03-01 | End: 2023-03-01 | Stop reason: HOSPADM

## 2023-03-01 RX ORDER — DEXTROSE MONOHYDRATE 100 MG/ML
INJECTION, SOLUTION INTRAVENOUS CONTINUOUS PRN
Status: DISCONTINUED | OUTPATIENT
Start: 2023-03-01 | End: 2023-03-02 | Stop reason: HOSPADM

## 2023-03-01 RX ORDER — MAGNESIUM HYDROXIDE/ALUMINUM HYDROXICE/SIMETHICONE 120; 1200; 1200 MG/30ML; MG/30ML; MG/30ML
30 SUSPENSION ORAL ONCE
Status: COMPLETED | OUTPATIENT
Start: 2023-03-02 | End: 2023-03-02

## 2023-03-01 RX ADMIN — DEXTROSE MONOHYDRATE 125 ML: 100 INJECTION, SOLUTION INTRAVENOUS at 02:02

## 2023-03-01 RX ADMIN — PROPOFOL 150 MG: 10 INJECTION, EMULSION INTRAVENOUS at 09:39

## 2023-03-01 RX ADMIN — FENTANYL CITRATE 25 MCG: 50 INJECTION, SOLUTION INTRAMUSCULAR; INTRAVENOUS at 10:20

## 2023-03-01 RX ADMIN — SODIUM CHLORIDE, PRESERVATIVE FREE 40 MG: 5 INJECTION INTRAVENOUS at 21:31

## 2023-03-01 RX ADMIN — SODIUM CHLORIDE: 9 INJECTION, SOLUTION INTRAVENOUS at 09:26

## 2023-03-01 RX ADMIN — SODIUM CHLORIDE, PRESERVATIVE FREE 10 ML: 5 INJECTION INTRAVENOUS at 21:31

## 2023-03-01 RX ADMIN — CEFTRIAXONE SODIUM 1000 MG: 10 INJECTION, POWDER, FOR SOLUTION INTRAVENOUS at 21:31

## 2023-03-01 RX ADMIN — FENTANYL CITRATE 25 MCG: 50 INJECTION, SOLUTION INTRAMUSCULAR; INTRAVENOUS at 10:15

## 2023-03-01 RX ADMIN — METRONIDAZOLE 500 MG: 500 INJECTION, SOLUTION INTRAVENOUS at 00:42

## 2023-03-01 RX ADMIN — METRONIDAZOLE 500 MG: 500 INJECTION, SOLUTION INTRAVENOUS at 16:49

## 2023-03-01 RX ADMIN — DEXTROSE MONOHYDRATE 125 ML: 100 INJECTION, SOLUTION INTRAVENOUS at 06:02

## 2023-03-01 RX ADMIN — DEXTROSE MONOHYDRATE 12.5 G: 25 INJECTION, SOLUTION INTRAVENOUS at 08:53

## 2023-03-01 RX ADMIN — FLUCONAZOLE 400 MG: 2 INJECTION, SOLUTION INTRAVENOUS at 12:55

## 2023-03-01 RX ADMIN — FENTANYL CITRATE 25 MCG: 50 INJECTION, SOLUTION INTRAMUSCULAR; INTRAVENOUS at 12:47

## 2023-03-01 RX ADMIN — FENTANYL CITRATE 25 MCG: 50 INJECTION, SOLUTION INTRAMUSCULAR; INTRAVENOUS at 19:12

## 2023-03-01 ASSESSMENT — ENCOUNTER SYMPTOMS
ABDOMINAL PAIN: 1
VOMITING: 0
DIARRHEA: 0
STRIDOR: 0
RHINORRHEA: 0
BACK PAIN: 0
ABDOMINAL PAIN: 0
TROUBLE SWALLOWING: 1
SHORTNESS OF BREATH: 1
SHORTNESS OF BREATH: 0
COUGH: 0
EYE REDNESS: 0
COLOR CHANGE: 0
CHEST TIGHTNESS: 0
CONSTIPATION: 0
EYE DISCHARGE: 0
NAUSEA: 0

## 2023-03-01 ASSESSMENT — PAIN DESCRIPTION - LOCATION
LOCATION: CHEST
LOCATION: CHEST

## 2023-03-01 ASSESSMENT — PAIN SCALES - GENERAL
PAINLEVEL_OUTOF10: 10
PAINLEVEL_OUTOF10: 8
PAINLEVEL_OUTOF10: 6
PAINLEVEL_OUTOF10: 6

## 2023-03-01 ASSESSMENT — PAIN DESCRIPTION - DESCRIPTORS
DESCRIPTORS: BURNING
DESCRIPTORS: BURNING
DESCRIPTORS: PRESSURE

## 2023-03-01 ASSESSMENT — LIFESTYLE VARIABLES: SMOKING_STATUS: 0

## 2023-03-01 ASSESSMENT — PAIN - FUNCTIONAL ASSESSMENT: PAIN_FUNCTIONAL_ASSESSMENT: 0-10

## 2023-03-01 NOTE — PROGRESS NOTES
Assessment:  responded to a referral for spiritual and emotional support. Patient was awake and alert at the time. Family was not present. However, patient did make mention of his two children. When asked how he was feeling, patient responded; \"rough. \" Patient said what was most worrisome was his inability to swallow. Patient said he had been starving. Intervention:  provided ministry of presence, offered support, prayed with patient and reassured him that he was in good hands. Outcome: Patient expressed appreciation for the spiritual and emotional support he received. Plan: Follow up visits recommended for ongoing assessment of patient's condition and for more prayers and support.

## 2023-03-01 NOTE — PROGRESS NOTES
[facility]  Internal Medicine Teaching Residency Program  Inpatient Daily Progress Note  ______________________________________________________________________________    Patient: Rey Allred  YOB: 1970   IOI:[id number]    Acct: [de-identified]     Room: [facility]  Admit date: 2/27/2023  Today's date: 03/01/23  Number of days in the hospital: 2    SUBJECTIVE   Admitting Diagnosis: DKA (diabetic ketoacidosis) ([facility]  CC: Nausea and vomiting    Patient examined at bedside today. Labs and chart reviewed. Vitals reviewed patient remained afebrile overnight. Patient is still complaining of chest pain, discomfort. Patient is s/p EGD with biopsies. Patient states that he has a very good appetite and is willing to eat regular diet. She denies any fever and chills but is complaining generalized body aches. Review of Systems   Constitutional:  Negative for activity change, appetite change, chills, fatigue and fever. HENT:  Negative for congestion, postnasal drip and rhinorrhea. Eyes:  Negative for discharge and redness. Respiratory:  Negative for cough, chest tightness and shortness of breath. Cardiovascular:  Negative for chest pain. Gastrointestinal:  Negative for abdominal pain, constipation, diarrhea, nausea and vomiting. Genitourinary:  Negative for dysuria and urgency. Musculoskeletal:  Negative for back pain. Skin:  Negative for color change and rash. Allergic/Immunologic: Negative for immunocompromised state. Neurological:  Negative for weakness, light-headedness and headaches. Hematological:  Does not bruise/bleed easily. Psychiatric/Behavioral:  Negative for confusion.       BRIEF HISTORY     The patient is a pleasant 47 y.o. male presented with nausea and vomiting and was admitted to the hospital for management of DKA      Patient has a past medical history significant for uncontrolled type 1 diabetes mellitus complicated with nephropathy CKD stage III and neuropathy, COPD. Reported that he has not eaten any for many days because of the severe pain in his throat and mouth and unable to swallow. Patient also reported that he has had chest pain similar to MI and diffuse abdominal pain. Also he had left thigh pain and he has a recent surgery ORIF for femur fracture. Also has a history of lung nodule, transaminitis, hep C. Patient is also cachectic and ill-appearing. In ED, vitals were significant for tachycardia with heart rate of 103 and tachypnea with respiratory rate of 28. Labs were significant for elevated blood glucose 560, potassium of 4.8, sodium of 134 -corrected sodium 141-bicarb 16, and beta hydroxybutyrate above 7.43. VBG's were significant for pH of 7.319. Creatinine was elevated at 1.93 -patient's baseline 1.7. troponin 15/15. EKG No acute changes. OBJECTIVE     Vital Signs:  BP (!) 157/87   Pulse 53   Temp 97.7 °F (36.5 °C) (Oral)   Resp 12   Ht 5' 8\" (1.727 m)   Wt 130 lb (59 kg)   SpO2 97%   BMI 19.77 kg/m²     Temp (24hrs), Av.1 °F (36.7 °C), Min:97 °F (36.1 °C), Max:99.2 °F (37.3 °C)    In: 660   Out: 2900 [Urine:2900]      Physical Exam  Vitals and nursing note reviewed. Constitutional:       General: He is awake. He is not in acute distress. Appearance: Normal appearance. He is not ill-appearing. HENT:      Head: Normocephalic and atraumatic. Nose: Nose normal.   Eyes:      General: No scleral icterus. Conjunctiva/sclera: Conjunctivae normal.   Cardiovascular:      Rate and Rhythm: Normal rate. Heart sounds: Normal heart sounds. Pulmonary:      Effort: Pulmonary effort is normal. No respiratory distress. Breath sounds: Normal breath sounds. Chest:      Chest wall: No tenderness. Abdominal:      General: Abdomen is flat. There is no distension. Palpations: Abdomen is soft. There is no mass. Tenderness: There is no abdominal tenderness. Musculoskeletal:      Right lower leg: No edema. Left lower leg: No edema. Skin:     General: Skin is warm. Neurological:      General: No focal deficit present. Mental Status: He is alert and oriented to person, place, and time. Psychiatric:         Mood and Affect: Mood normal.         Behavior: Behavior is cooperative.          Medications:  Scheduled Medications:    pantoprazole (PROTONIX) 40 mg injection  40 mg IntraVENous Daily    sertraline  50 mg Oral Daily    fluconazole  400 mg IntraVENous Q24H    insulin lispro  0-4 Units SubCUTAneous TID WC    insulin lispro  0-4 Units SubCUTAneous Nightly    sodium chloride flush  5-40 mL IntraVENous 2 times per day    [Held by provider] heparin (porcine)  5,000 Units SubCUTAneous 3 times per day    cefTRIAXone (ROCEPHIN) IV  1,000 mg IntraVENous Q24H    metroNIDAZOLE  500 mg IntraVENous Q8H     Continuous Infusions:    dextrose      dextrose       PRN Medicationsdextrose, , Continuous PRN  magic (miracle) mouthwash, 5 mL, 4x Daily PRN  dextrose bolus, 125 mL, PRN   Or  dextrose bolus, 250 mL, PRN  dextrose, , Continuous PRN  glucose, 4 tablet, PRN  glucagon (rDNA), 1 mg, PRN  potassium chloride, 10 mEq, PRN  magnesium sulfate, 1,000 mg, PRN  sodium phosphate IVPB, 10 mmol, PRN   Or  sodium phosphate IVPB, 15 mmol, PRN   Or  sodium phosphate IVPB, 20 mmol, PRN  sodium chloride flush, 5-40 mL, PRN  ondansetron, 4 mg, Q8H PRN   Or  ondansetron, 4 mg, Q6H PRN  polyethylene glycol, 17 g, Daily PRN  acetaminophen, 650 mg, Q6H PRN   Or  acetaminophen, 650 mg, Q6H PRN        Diagnostic Labs:  CBC:   Recent Labs     02/27/23  1403 03/01/23  0143   WBC 9.5 10.0   RBC 4.72 3.60*   HGB 12.5* 9.5*   HCT 40.1* 30.5*   MCV 85.0 84.7   RDW 15.9* 15.9*    240     BMP:   Recent Labs     03/01/23  0459 03/01/23  1103 03/01/23  1446    137 134*   K 3.8 4.1 4.1    98 99   CO2 27 28 26   PHOS 3.3 3.4 3.6   BUN 13 11 10   CREATININE 1.21* 1.09 1.22* BNP: No results for input(s): BNP in the last 72 hours. PT/INR: No results for input(s): PROTIME, INR in the last 72 hours. APTT: No results for input(s): APTT in the last 72 hours. CARDIAC ENZYMES: No results for input(s): CKMB, CKMBINDEX, TROPONINI in the last 72 hours. Invalid input(s): CKTOTAL;3  FASTING LIPID PANEL:  Lab Results   Component Value Date    CHOL 164 11/05/2022    HDL 51 11/05/2022    TRIG 92 11/05/2022     LIVER PROFILE:   Recent Labs     02/27/23  1403   AST 18   ALT 21   BILITOT 0.8   ALKPHOS 107      MICROBIOLOGY:   Lab Results   Component Value Date/Time    CULTURE NO GROWTH 1 DAY 02/28/2023 12:31 PM       Imaging:    CT ABDOMEN PELVIS W IV CONTRAST Additional Contrast? None    Result Date: 2/27/2023  Significant wall thickening noted throughout the ascending colon likely suggestive of colitis. Appendix is not definitely visualized. No obstructive uropathy. 2 mm nonobstructing stone of superior pole of left kidney. Axial hiatal hernia     XR CHEST PORTABLE    Result Date: 2/27/2023  No acute abnormality visualized. ASSESSMENT & PLAN     ASSESSMENT / PLAN:     Odynophagia  Patient s/p EGD with biopsies, findings showed large ulceration in mid esophagus, portal hypertensive gastropathy. Gastroenterology recommended Protonix 40 mg twice daily and repeat EGD in 2 months    DKA (diabetic ketoacidosis) ([facility]  Resolved    Stage 3a chronic kidney disease ([facility]  Continue to monitor    Colitis  Continue Rocephin and Flagyl IV    Candida esophagitis (HCC)  Continue fluconazole IV    DVT ppx : Heparin on hold  GI ppx: Protonix twice daily    Jeri Zuniga M.D. Internal Medicine Resident PGY-1  [address]    3:43 PM 3/1/2023     Please note that part of this chart was generated using voice recognition dictation software.  Although every effort was made to ensure the accuracy of this automated transcription, some errors in transcription may have occurred.

## 2023-03-01 NOTE — PROGRESS NOTES
Inpatient Diabetes Education    Lilly Nguyen was seen for evaluation and education on Type 1 Diabetes    Lab Results   Component Value Date    LABA1C 8.0 01/20/2023    LABA1C 10.0 (H) 09/30/2022    LABA1C 11.0 04/06/2022     Staff nurse in room with patient when I entered. Blood sugar check was 82. Lunch tray was at bedside. Patient was able to tolerate small bites of cream soup as we spoke. Sai Sims states that he was diagnosed with type 1 diabetes at the age of 23. He reports that he is experienced with taking insulin (uses basal -bolus plan at home) and has been to diabetes education several times over the years. His PCP prescribes his insulin and he saw him recently in January 2023. Please see diabetes education progress notes from yesterday and this morning - they are copied below for reference. Patient states that he is confident with his ability for diabetes self care as far as taking his insulin. He admits to his teenage sons helping with food preparation in the home as of late. He also states that he has been getting some support from home visiting service. He has a brother in [facility] and some family in [place] but states that he is not close with them.  supporting patient in problem solving getting new transmitter for his Dexcom CGM. Per [facility] - patient is due to have new one  Basic education about CGM/Dexcom done  Reviewed with patient Sensor - change every 10 days and transmitter good for 3 months. Following Support materials were provided for patient to take home:  __X_ added to previously given materials - list of Dexcom You Tube Videos    Patient verbalizes understanding. Suggest  _X__ Follow -up with HCP / PCP within one week. EBEN ROWLAND RN             Date: 3-1-23, morning  Pt gone for EGD. Dropped off 3 sample bottles of one touch Verio IQ test strips and dexcom information.       [id number]    Inpatient Diabetes  Education     Type and Reason for Visit: Patient Education  Question: Reason for Consult? Answer: dka  Chart reviewed - type of diabtes type1 - follows with BINA Slater and also has outpatient care Tee Quarles RN . He is know diabetic, not new to insulin, dx, or BGSM. From notes patient has had issues with swallowing/ soar throat since last nov and CM , Cathyann Gitelman, RNwas working on getting oral supplements in home  ( orders from 1/20/ 23) and GI follow up in last week. Home BG and insulin self care also appear to be inconsistent. Dexcom has been ordered, but patient as not yet started / used per PCP follow up  notes 1/2023. DKA contributing factors -  starvation and or missed insulin doses   Bmi 19.6 now  noted wt is up 10 lbs in last 2 weeks: Wt Readings from Last 3 Encounters:   02/28/23 130 lb (59 kg)   02/07/23 118 lb (53.5 kg)   01/20/23 118 lb (53.5 kg)      Rounded on patient this am, patient stated very tired - awake all night with ED care / finger pokes for BG checks, he stated still having sore throat /  unable to drink and felling so much pain to swallow. He did not want to engage  with self care teaching/ conversation this am.    Lab Results   Component Value Date    LABA1C 8.0 01/20/2023    LABA1C 10.0 (H) 09/30/2022    LABA1C 11.0 04/06/2022       Would suggest RD assessment - nutrition needs and link to outpatient oral supplements as per CM notes. Out patient diabetes education  contact number provided - [phone] to patient and placed on the discharge summary. Will follow up tomorrow for education and support.

## 2023-03-01 NOTE — DISCHARGE INSTRUCTIONS
You were admitted to the hospital for nausea, severe pain while swallowing, uncontrolled hyperglycemia. During this admission you were started on insulin drip and underwent EGD which showed a large ulcer in your esophagus as well as inflammation in your stomach. You are being discharged to home on oral antibiotics for esophagitis as well as colitis (inflammation of the colon). - Please take a full liquid, low acid, carb controlled diet  - Keep head of bed elevated  - Do not lay down for 30 minutes after eating  - Please take your antibiotics as prescribed. - Please take your insulin, both long-acting as well as short-acting as prescribed. Please monitor blood sugar at home. If your blood sugar is low, then cut down on your Lantus/long-acting insulin to 10 units twice daily.  - Your blood pressure has been elevated during this admission, please continue to take your Norvasc daily. Your medication called Lopressor has been discontinued due to low heart rate, please follow-up with your PCP regarding monitoring and better control of blood pressure. - Please follow-up with gastroenterology outpatient to get a repeat EGD in 2 months to check for complete resolution and healing of esophagitis as well as screening colonoscopy for colitis. - Aspirin has been discontinued due to increased risk of ulceration. Please do not take aspirin, Motrin, Aleve, ibuprofen, naproxen, Advil for pain relief as this will worsen your esophageal ulceration.  - Please return to the ER for any worsening symptoms.

## 2023-03-01 NOTE — ANESTHESIA POSTPROCEDURE EVALUATION
Department of Anesthesiology  Postprocedure Note    Patient: Tangela Márquez  MRN: [id number]  YOB: 1970  Date of evaluation: 3/1/2023      Procedure Summary     Date: 03/01/23 Room / Location: [address]    Anesthesia Start: 0930 Anesthesia Stop: 3050    Procedure: EGD BIOPSY Diagnosis:       Dysphagia, unspecified type      (DYSPHAGIA)    Surgeons: Raphael Kirk MD Responsible Provider: Parminder Lai MD    Anesthesia Type: MAC ASA Status: 3          Anesthesia Type: No value filed.     Sal Phase I: Sal Score: 10    Sal Phase II: Sal Score: 10  POST-OP ANESTHESIA NOTE       BP (!) 157/87   Pulse 53   Temp 97.7 °F (36.5 °C) (Oral)   Resp 12   Ht 5' 8\" (1.727 m)   Wt 130 lb (59 kg)   SpO2 97%   BMI 19.77 kg/m²    Pain Assessment: Face, Legs, Activity, Cry, and Consolability (FLACC)  Pain Level: 6           Anesthesia Post Evaluation    Patient location during evaluation: PACU  Patient participation: complete - patient participated  Level of consciousness: awake  Pain score: 6  Airway patency: patent  Nausea & Vomiting: no nausea and no vomiting  Complications: no  Cardiovascular status: hemodynamically stable  Respiratory status: acceptable  Hydration status: stable

## 2023-03-01 NOTE — PROGRESS NOTES
Pt's bs 44, attempted to give pt juice with sugar, but pt was unable to swallow d/t pain. D10 bolus administered. On-call internal med notified via perfectserve. On recheck, bs 120, on-call resident notified. No new orders. Bs 74 at 0200, bolus given per order from resident. Recheck 134. On-call notified via perfectserve. 0400: bs 82, on-call notified, no new orders. 0600: bs 59, bolus administered, resident notified. No new orders. Recheck 115.

## 2023-03-01 NOTE — OP NOTE
Operative Note      Patient: Surinder Zavala  YOB: 1970  MRN: [id number]    Date of Procedure: 3/1/2023    Pre-Op Diagnosis: DYSPHAGIA    Post-Op Diagnosis:  Ulcerative esophagitis        Procedure(s):  EGD ESOPHAGOGASTRODUODENOSCOPY    Surgeon(s):  Kortney Calderon MD    Assistant:   First Assistant: Sesar Vasquez RN    Anesthesia: Monitor Anesthesia Care    Estimated Blood Loss (mL): Minimal    Complications: None    Specimens:   ID Type Source Tests Collected by Time Destination   A : esophageal bx- for CMV and HSV Tissue Esophagus SURGICAL PATHOLOGY Kortney Calderon MD 3/1/2023 0940    B : esophageal bx- for CMV and HSV Tissue Esophagus SURGICAL PATHOLOGY Kortney Calderon MD 3/1/2023 7162        Implants:  * No implants in log *      Drains: * No LDAs found *      Description of Procedure:  Informed consent was obtained from the patient after explanation of the procedure including indications, description of the procedure,  benefits and possible risks and complications of the procedure, and alternatives. Questions were answered. The patient's history was reviewed and a directed physical examination was performed prior to the procedure. Patient was monitored throughout the procedure with pulse oximetry and periodic assessment of vital signs. Patient was sedated as noted above. With the patient in the left lateral decubitus position, the Olympus videoendoscope was placed in the patient's mouth and under direct visualization passed into the esophagus. Visualization of the esophagus, stomach, and duodenum was performed during both introduction and withdrawal of the endoscope and retroflexed view of the proximal stomach was obtained. The scope was passed to the 2nd portion of the duodenum. The patient tolerated the procedure well and was taken to the recovery area in good condition. Findings[de-identified]   Esophagus: Circumferential large ulceration was seen in the mid esophagus.  Biopsies were done. Stomach: Portal hypertensive gastropathy was seen in the body of the stomach. Normal stomach seen during retroflexion view   Duodenum: Normal.    Recommendations: Follow with the biopsy results. Biopsies sent for HSV and CMV stains. Protonix 40 mg BID. Repeat EGD in 2 months to document resolution of ulceration                                       Consider colonoscopy for further evaluation of colitis seen in the Ascending colon.                                       Further recommendations as per inpatient GI team       Electronically signed by Derek Vásquez MD on 3/1/2023 at 9:44 AM

## 2023-03-01 NOTE — PLAN OF CARE
Problem: Discharge Planning  Goal: Discharge to home or other facility with appropriate resources  Outcome: Progressing     Problem: Pain  Goal: Verbalizes/displays adequate comfort level or baseline comfort level  Outcome: Progressing     Problem: Gastrointestinal - Adult  Goal: Minimal or absence of nausea and vomiting  Outcome: Progressing  Goal: Maintains or returns to baseline bowel function  Outcome: Progressing  Goal: Maintains adequate nutritional intake  Outcome: Progressing     Problem: Metabolic/Fluid and Electrolytes - Adult  Goal: Electrolytes maintained within normal limits  Outcome: Progressing  Goal: Glucose maintained within prescribed range  Outcome: Progressing     Problem: Skin/Tissue Integrity  Goal: Absence of new skin breakdown  Description: 1. Monitor for areas of redness and/or skin breakdown  2. Assess vascular access sites hourly  3. Every 4-6 hours minimum:  Change oxygen saturation probe site  4. Every 4-6 hours:  If on nasal continuous positive airway pressure, respiratory therapy assess nares and determine need for appliance change or resting period.   Outcome: Progressing     Problem: Safety - Adult  Goal: Free from fall injury  Outcome: Progressing     Problem: ABCDS Injury Assessment  Goal: Absence of physical injury  Outcome: Progressing

## 2023-03-01 NOTE — PROGRESS NOTES
Patient BS 72. Going to EGD. Message sent tp Dr. Lakesha Lewis via perfect serve. Cannot cover per protocol since patient is above 70. Dr. Brayan Street to bedside once patient left for procedure. Start D10 drip once patient returns to floor and monitor BS q1hr for hyperglycemia.  No need for EGD and esophogram.

## 2023-03-01 NOTE — ANESTHESIA PRE PROCEDURE
Department of Anesthesiology  Preprocedure Note       Name:  Kai Zambrano   Age:  47 y.o.  :  1970                                          MRN:  [id number]         Date:  3/1/2023      Surgeon: Luther Fuchs):  Vonda Castorena MD    Procedure: Procedure(s):  EGD ESOPHAGOGASTRODUODENOSCOPY    Medications prior to admission:   Prior to Admission medications    Medication Sig Start Date End Date Taking?  Authorizing Provider   diphenhydrAMINE (BENYLIN) 12.5 MG/5ML liquid take 5 milliliters by mouth four times a day if needed for allergies 23   BINA Leavitt   aluminum hydroxide-magnesium carbonate (ACID GONE)  MG/15ML suspension Take 5 mLs by mouth 4 times daily (with meals and nightly) 2/10/23   Willi Sampson MD   atorvastatin (LIPITOR) 80 MG tablet  23   Historical Provider, MD   OLANZapine (ZYPREXA) 2.5 MG tablet take 1 tablet by mouth at bedtime 23   Historical Provider, MD   amLODIPine (NORVASC) 10 MG tablet take 1 tablet by mouth once daily 22   Historical Provider, MD   atorvastatin (LIPITOR) 40 MG tablet take 1 tablet by mouth every evening  Patient not taking: Reported on 2023   Historical Provider, MD   TRUEPLUS GLUCOSE 4 g chewable tablet chew and swallow as directed for LOW BLOOD SUGAR REACTION 22   Historical Provider, MD   insulin lispro, 1 Unit Dial, (HUMALOG/ADMELOG) 100 UNIT/ML SOPN INJECT 0-16 UNITS INTO THE SKIN THREE TIMES A DAY WITH MEALS 23   Historical Provider, MD   PARoxetine (PAXIL) 20 MG tablet take 1 tablet by mouth at bedtime 23   Historical Provider, MD   traZODone (DESYREL) 300 MG tablet  23   Historical Provider, MD   Continuous Blood Gluc Transmit (DEXCOM G6 TRANSMITTER) MISC 1 each by Does not apply route every 10 days 23   ArtBINA Guidry   insulin aspart (NOVOLOG FLEXPEN) 100 UNIT/ML injection pen Inject 5 Units into the skin 3 times daily (before meals) Patient has temporarily reduced his dose to 1-2 units with the small snack he has each day. 1/16/23   BINA Degroot   insulin glargine (LANTUS) 100 UNIT/ML injection vial Inject 15 Units into the skin 2 times daily 1/16/23   BINA Degroot   sucralfate (CARAFATE) 1 GM/10ML suspension Take 10 mLs by mouth 4 times daily 1/10/23   BINA Degroot   metoclopramide (REGLAN) 10 MG/10ML SOLN Take 10 mLs by mouth 3 times daily (before meals) 1/10/23   BINA Degroot   pantoprazole (PROTONIX) 40 MG tablet Take 40 mg by mouth 2 times daily    Historical Provider, MD   PNEUMOCOCCAL 20-ALPHONSO CONJ VACC IM Inject into the muscle    Historical Provider, MD   Continuous Blood Gluc Transmit (DEXCOM G6 TRANSMITTER) MISC 1 each by Does not apply route once a week 12/30/22   Cirilo Gallego MD   Continuous Blood Gluc Sensor (DEXCOM G6 SENSOR) MISC 1 each by Does not apply route once a week 12/30/22   Cirilo Gallego MD   blood glucose monitor strips Test 4 times a day & as needed for symptoms of irregular blood glucose. Dispense sufficient amount for indicated testing frequency plus additional to accommodate PRN testing needs. Dispense brand that correlates with his glucometer and that's covered by his insurance. Needs delivered to him. 12/30/22   Cirilo Gallego MD   Nutritional Supplements (GLUCOSE MANAGEMENT) TABS Take 1 tablet by mouth daily as needed (Low sugar reaction) 12/20/22   BINA Degroot   aspirin EC 81 MG EC tablet Take 1 tablet by mouth daily 12/16/22   BINA Degroot   Continuous Blood Gluc  ([address]) [address] 1 each by Does not apply route once a week 11/18/22   BINA Degroot   Misc.  Devices (STEP N REST WALKER) MISC 1 each by Does not apply route continuous Seated, wheeled walker 11/18/22   BINA Degroot   ondansetron (ZOFRAN ODT) 4 MG disintegrating tablet Take 1 tablet by mouth every 8 hours as needed for Nausea or Vomiting 11/18/22   BINA Degroot   albuterol sulfate HFA (VENTOLIN HFA) 108 (90 Base) MCG/ACT inhaler Inhale 2 puffs into the lungs 4 times daily as needed for Wheezing 11/15/22   BINA Bae   metoprolol tartrate (LOPRESSOR) 25 MG tablet Take 1 tablet by mouth 2 times daily 11/14/22   BINA Bae   Blood Glucose Monitoring Suppl (ONE TOUCH ULTRA 2) w/Device KIT 1 kit by Does not apply route daily 3/18/22   Luly Pae, DO   Lancets MISC 1 each by Does not apply route 2 times daily 3/18/22   Luly Pae, DO   Alcohol Swabs 70 % PADS Use as needed with blood glucose checks 2/15/22   Asher Chaparro MD   Insulin Pen Needle ([employer] PEN NEEDLES 29G) 29G X 12MM MISC 1 each by Does not apply route daily 2/15/22   Asher Chaparro MD   clonazePAM (KLONOPIN) 2 MG tablet Take 4 mg by mouth daily as needed for Anxiety.      Historical Provider, MD       Current medications:    Current Facility-Administered Medications   Medication Dose Route Frequency Provider Last Rate Last Admin    [MAR Hold] dextrose 10 % infusion   IntraVENous Continuous PRN Mandy Ngo MD        [facility] Hold] pantoprazole (PROTONIX) 40 mg in sodium chloride (PF) 0.9 % 10 mL injection  40 mg IntraVENous Daily Jayson Evans MD   40 mg at 02/28/23 1122    [MAR Hold] sertraline (ZOLOFT) tablet 50 mg  50 mg Oral Daily Franc Benavides MD   50 mg at 02/28/23 1326    [MAR Hold] fluconazole (DIFLUCAN) in 0.9 % sodium chloride IVPB 400 mg  400 mg IntraVENous Q24H Franc Benavides  mL/hr at 02/28/23 1326 400 mg at 02/28/23 1326    [MAR Hold] magic (miracle) mouthwash  5 mL Swish & Spit 4x Daily PRN Franc Benavides MD   5 mL at 02/28/23 1326    [MAR Hold] insulin lispro (HUMALOG) injection vial 0-4 Units  0-4 Units SubCUTAneous TID WC Cortland Dubin, MD        [facility] Hold] insulin lispro (HUMALOG) injection vial 0-4 Units  0-4 Units SubCUTAneous Nightly Cortland Dubin, MD        [facility] Hold] dextrose bolus 10% 125 mL  125 mL IntraVENous PRN Cortland Dubin, MD   Stopped at 03/01/23 0643    Or    [MAR Hold] dextrose bolus 10% 250 mL  250 mL IntraVENous PRN Selena Harris MD        [facility] Hold] dextrose 10 % infusion   IntraVENous Continuous PRN Selena Harris MD        [facility] Hold] glucose chewable tablet 16 g  4 tablet Oral PRN Odell Ranellone, DO        [MAR Hold] glucagon (rDNA) injection 1 mg  1 mg SubCUTAneous PRN Odell Ranellone, DO        [facility] Hold] potassium chloride 10 mEq/100 mL IVPB (Peripheral Line)  10 mEq IntraVENous PRN Ac Reddy  mL/hr at 02/28/23 0449 10 mEq at 02/28/23 0449    [MAR Hold] magnesium sulfate 1000 mg in dextrose 5% 100 mL IVPB  1,000 mg IntraVENous PRN Ac Reddy MD       [facility] Hold] sodium phosphate 10 mmol in sodium chloride 0.9 % 250 mL IVPB  10 mmol IntraVENous PRN Ac Reddy MD        Or   [facility] Mckayla [facility] Hold] sodium phosphate 15 mmol in dextrose 5 % 250 mL IVPB  15 mmol IntraVENous PRN Ac Reddy MD   Stopped at 02/28/23 0730    Or    [MAR Hold] sodium phosphate 20 mmol in dextrose 5 % 500 mL IVPB  20 mmol IntraVENous PRN Ac Reddy MD        [facility] Hold] sodium chloride flush 0.9 % injection 5-40 mL  5-40 mL IntraVENous 2 times per day Ac Reddy MD   10 mL at 02/28/23 2034    [MAR Hold] sodium chloride flush 0.9 % injection 5-40 mL  5-40 mL IntraVENous PRN Ac Reddy MD        [facility] Hold] ondansetron (ZOFRAN-ODT) disintegrating tablet 4 mg  4 mg Oral Q8H PRN Ac Reddy MD        Or   [facility] Yadavrle [facility] Hold] ondansetron (ZOFRAN) injection 4 mg  4 mg IntraVENous Q6H PRN Ac Reddy MD   4 mg at 02/28/23 0816    [MAR Hold] polyethylene glycol (GLYCOLAX) packet 17 g  17 g Oral Daily PRN Ac Reddy MD        [facility] Hold] acetaminophen (TYLENOL) tablet 650 mg  650 mg Oral Q6H PRN Ac Rdedy MD   650 mg at 02/27/23 2114    Or    [MAR Hold] acetaminophen (TYLENOL) suppository 650 mg  650 mg Rectal Q6H PRN Ac Reddy MD        [Held by provider] heparin (porcine) injection 5,000 Units  5,000 Units SubCUTAneous 3 times per day Enrique Hong MD   5,000 Units at 02/28/23 1327    [MAR Hold] cefTRIAXone (ROCEPHIN) 1,000 mg in sterile water 10 mL IV syringe  1,000 mg IntraVENous Q24H Hiral Fleming MD   1,000 mg at 02/28/23 2031    [MAR Hold] metronidazole (FLAGYL) 500 mg in 0.9% NaCl 100 mL IVPB premix  500 mg IntraVENous Marcus Blue MD   Stopped at 03/01/23 0214       Allergies:     Allergies   Allergen Reactions    Azithromycin Swelling    Acetaminophen-Codeine Other (See Comments) and Nausea And Vomiting    Codeine Itching    Hydrocodone-Acetaminophen Rash    Ibuprofen Other (See Comments)     Stomach cramps    Morphine Other (See Comments)     Gives headaches    Tramadol Itching     vomitting       Problem List:    Patient Active Problem List   Diagnosis Code    Colitis K52.9    Uncontrolled type 1 diabetes mellitus with hyperglycemia ([facility]) E10.65    Melena K92.1    Transaminitis R74.01    Fibromyalgia M79.7    Spinal stenosis M48.00    Abnormal stress test R94.39    Vitamin D deficiency E55.9    Acute blood loss anemia D62    Hematemesis with nausea K92.0    Ulcer of esophagus without bleeding K22.10    Candida esophagitis ([facility]) B37.81    Neuropathy associated with endocrine disorder ([facility]) E34.9, G63    Osteoarthritis of knee M17.9    Other chest pain R07.89    Shortness of breath R06.02    Major depressive disorder in partial remission ([facility]) F32.4    Lung nodule < 6cm on CT [id number]    Closed comminuted intertrochanteric fracture of femur, initial encounter [facility] S72.143A    NSTEMI (non-ST elevated myocardial infarction) ([facility]) I21.4    Nausea & vomiting R11.2    Constipation K59.00    Weight loss R63.4    Tobacco abuse Z72.0    Marijuana use F12.90    Acute kidney injury ([facility]) N17.9    Stage 3a chronic kidney disease ([facility]) N18.31    Odynophagia R13.10    Dysphagia R13.10    Diabetic acidosis without coma ([facility]) E11.10    Severe malnutrition ([facility] E43    Hepatitis C antibody test positive R76.8    Elevated LFTs R79.89    Syncope and collapse R55    LIBBY (acute kidney injury) ([facility] N17.9    Anemia D64.9    Hepatitis C virus infection without hepatic coma B19.20    Chronic pain of left lower extremity M79.605, G89.29    Severe comorbid illness R69    Hyperglycemia R73.9    DKA (diabetic ketoacidosis) ([facility] E11.10       Past Medical History:        Diagnosis Date    Diabetes mellitus ([facility]     Obstructive lung disease ([facility]     8/2022    Panic attack        Past Surgical History:        Procedure Laterality Date    APPENDECTOMY      FEMUR FRACTURE SURGERY Left 09/30/2022    Cephalomedullary Nail    HAND SURGERY      JOINT REPLACEMENT      TIBIA FRACTURE SURGERY Left 09/30/2022    LEFT FEMUR CEPHALOMEDULLARY NAIL performed by Sabine Emanuel DO at [address] [place] [address] 02/15/2022    EGD BIOPSY performed by Chay Rodriguez MD at [address] [place] [address] N/A 11/10/2022    EGD BIOPSY performed by Carlen Curling, MD at [address] [place] [address] History:    Social History     Tobacco Use    Smoking status: Every Day     Packs/day: 0.25     Years: 41.00     Pack years: 10.25     Types: Cigarettes    Smokeless tobacco: Never   Substance Use Topics    Alcohol use: Not Currently                                Ready to quit: Not Answered  Counseling given: Not Answered      Vital Signs (Current):   Vitals:    03/01/23 0400 03/01/23 0410 03/01/23 0745 03/01/23 0828   BP: (!) 140/97  (!) 155/90 (!) 158/96   Pulse: 68 63 51 (!) 49   Resp: 13  12 16   Temp: 98.4 °F (36.9 °C)  98 °F (36.7 °C) 97 °F (36.1 °C)   TempSrc: Oral  Oral Temporal   SpO2: 95%  98% 97%   Weight:       Height:                                                  BP Readings from Last 3 Encounters:   03/01/23 (!) 158/96   02/07/23 (!) 138/100   01/20/23 130/80       NPO Status: BMI:   Wt Readings from Last 3 Encounters:   02/28/23 130 lb (59 kg)   02/07/23 118 lb (53.5 kg)   01/20/23 118 lb (53.5 kg)     Body mass index is 19.77 kg/m². CBC:   Lab Results   Component Value Date/Time    WBC 10.0 03/01/2023 01:43 AM    RBC 3.60 03/01/2023 01:43 AM    HGB 9.5 03/01/2023 01:43 AM    HCT 30.5 03/01/2023 01:43 AM    MCV 84.7 03/01/2023 01:43 AM    RDW 15.9 03/01/2023 01:43 AM     03/01/2023 01:43 AM     HB 8.6    CMP:   Lab Results   Component Value Date/Time     03/01/2023 04:59 AM    K 3.8 03/01/2023 04:59 AM     03/01/2023 04:59 AM    CO2 27 03/01/2023 04:59 AM    BUN 13 03/01/2023 04:59 AM    CREATININE 1.21 03/01/2023 04:59 AM    GFRAA >60 10/01/2022 03:48 AM    LABGLOM >60 03/01/2023 04:59 AM    GLUCOSE 59 03/01/2023 04:59 AM    PROT 7.6 02/27/2023 02:03 PM    CALCIUM 8.5 03/01/2023 04:59 AM    BILITOT 0.8 02/27/2023 02:03 PM    ALKPHOS 107 02/27/2023 02:03 PM    AST 18 02/27/2023 02:03 PM    ALT 21 02/27/2023 02:03 PM       POC Tests:   Recent Labs     03/01/23  0903   POCGLU 148*       Coags:   Lab Results   Component Value Date/Time    PROTIME 12.6 01/18/2023 02:20 AM    INR 1.0 01/18/2023 02:20 AM    APTT 21.9 01/18/2023 02:20 AM       HCG (If Applicable): No results found for: PREGTESTUR, PREGSERUM, HCG, HCGQUANT     ABGs: No results found for: PHART, PO2ART, QPJ1TIQ, QJC3HNZ, BEART, K2JBMVZT     Type & Screen (If Applicable):  No results found for: LABABO, LABRH    Drug/Infectious Status (If Applicable):  Lab Results   Component Value Date/Time    HEPCAB REACTIVE 04/06/2022 01:49 PM       COVID-19 Screening (If Applicable):   Lab Results   Component Value Date/Time    COVID19 Not Detected 02/27/2023 02:19 PM       TTE 11/2022  Small LV cavity. Mildly increased LV wall thickness  Estimated LV EF 55 %. No obvious segmental wall motion abnormalities. Normal right ventricular size and function. Left atrium is normal in size.  Right atrium is normal in size.  Normal aortic valve structure and function without stenosis or  regurgitation. Normal aortic root dimension. Thickened anterior mitral valve leaflet. Mild mitral regurgitation. Myxomatous thickening of the tricuspid valve. Moderate tricuspid  regurgitation. Estimated right ventricular systolic pressure is 32 mmHg. IVC Increased diameter and impaired or no inspiratory variation suggestive  of elevated RA filling pressure  No significant pericardial effusion is seen.     EKG 2/27/2023  Normal sinus rhythm  Biatrial enlargement  Left axis deviation  Pulmonary disease pattern  Abnormal ECG  When compared with ECG of 04-NOV-2022 20:46,  QRS axis Shifted left    Anesthesia Evaluation  Patient summary reviewed and Nursing notes reviewed no history of anesthetic complications:   Airway: Mallampati: II  TM distance: >3 FB   Neck ROM: full  Mouth opening: > = 3 FB   Dental:    (+) edentulous      Pulmonary:normal exam  breath sounds clear to auscultation  (+) shortness of breath: no interval change,      (-) pneumonia, COPD, asthma, recent URI, sleep apnea, rhonchi, wheezes, rales, stridor, not a current smoker and no decreased breath sounds                           Cardiovascular:  Exercise tolerance: good (>4 METS),   (+) past MI: no interval change,     (-) pacemaker, hypertension, valvular problems/murmurs, CAD, CABG/stent, dysrhythmias,  angina,  CHF, orthopnea, PND,  LOMELI, murmur, weak pulses,  friction rub, systolic click, carotid bruit,  JVD, peripheral edema, no pulmonary hypertension and no hyperlipidemia    ECG reviewed  Rhythm: regular  Rate: normal           Beta Blocker:  Not on Beta Blocker         Neuro/Psych:   (+) neuromuscular disease:, psychiatric history: stable with treatmentdepression/anxiety    (-) seizures, TIA, CVA and headaches           GI/Hepatic/Renal:   (+) PUD, hepatitis: C,      (-) hiatal hernia, GERD, liver disease, no renal disease, bowel prep and no morbid obesity Endo/Other:    (+) DiabetesType II DM, , no malignancy/cancer. (-) hypothyroidism, hyperthyroidism, blood dyscrasia, arthritis, no electrolyte abnormalities, no malignancy/cancer               Abdominal:             Vascular:     - PVD, DVT and PE. Other Findings: L Teeth left            Anesthesia Plan      MAC     ASA 3       Induction: intravenous. MIPS: Postoperative opioids intended and Prophylactic antiemetics administered. Anesthetic plan and risks discussed with patient. Plan discussed with CRNA.                     Wally Salamanca MD   3/1/2023

## 2023-03-01 NOTE — PROGRESS NOTES
Pt states he is experiencing severe pain in chest, arms, legs, and abdomen. Pt is unable to swallow oral Tylenol offered by writer because he states he hasn't been able to swallow anything in over five days. Notified on-call teaching service for internal med of pain, new orders received. Meds administered per order.

## 2023-03-01 NOTE — PROGRESS NOTES
Pt gone for EGD. Dropped off 3 sample bottles of one touch Verio IQ test strips and dexcom information.

## 2023-03-01 NOTE — CARE COORDINATION
Transitional planning:  Haylie Patel at [facility] care back at [phone]. Can accept pt if he does not go home with an IV. Call back above number after OR today. 29 TAY Beltrán from [place] and Isha from [facility] declined this pt because out of network with insurance.

## 2023-03-02 VITALS
BODY MASS INDEX: 20.05 KG/M2 | OXYGEN SATURATION: 96 % | SYSTOLIC BLOOD PRESSURE: 158 MMHG | WEIGHT: 132.28 LBS | DIASTOLIC BLOOD PRESSURE: 89 MMHG | HEIGHT: 68 IN | HEART RATE: 63 BPM | RESPIRATION RATE: 16 BRPM | TEMPERATURE: 97.9 F

## 2023-03-02 LAB
ABSOLUTE EOS #: 0.11 K/UL (ref 0–0.44)
ABSOLUTE EOS #: 0.14 K/UL (ref 0–0.44)
ABSOLUTE IMMATURE GRANULOCYTE: <0.03 K/UL (ref 0–0.3)
ABSOLUTE IMMATURE GRANULOCYTE: <0.03 K/UL (ref 0–0.3)
ABSOLUTE LYMPH #: 1.56 K/UL (ref 1.1–3.7)
ABSOLUTE LYMPH #: 2.06 K/UL (ref 1.1–3.7)
ABSOLUTE MONO #: 0.49 K/UL (ref 0.1–1.2)
ABSOLUTE MONO #: 0.51 K/UL (ref 0.1–1.2)
ANION GAP SERPL CALCULATED.3IONS-SCNC: 10 MMOL/L (ref 9–17)
ANION GAP SERPL CALCULATED.3IONS-SCNC: 15 MMOL/L (ref 9–17)
BASOPHILS # BLD: 1 % (ref 0–2)
BASOPHILS # BLD: 1 % (ref 0–2)
BASOPHILS ABSOLUTE: 0.04 K/UL (ref 0–0.2)
BASOPHILS ABSOLUTE: 0.04 K/UL (ref 0–0.2)
BUN SERPL-MCNC: 8 MG/DL (ref 6–20)
BUN SERPL-MCNC: 8 MG/DL (ref 6–20)
CALCIUM SERPL-MCNC: 8.5 MG/DL (ref 8.6–10.4)
CALCIUM SERPL-MCNC: 8.6 MG/DL (ref 8.6–10.4)
CHLORIDE SERPL-SCNC: 96 MMOL/L (ref 98–107)
CHLORIDE SERPL-SCNC: 98 MMOL/L (ref 98–107)
CO2 SERPL-SCNC: 23 MMOL/L (ref 20–31)
CO2 SERPL-SCNC: 26 MMOL/L (ref 20–31)
CREAT SERPL-MCNC: 1 MG/DL (ref 0.7–1.2)
CREAT SERPL-MCNC: 1.07 MG/DL (ref 0.7–1.2)
EOSINOPHILS RELATIVE PERCENT: 2 % (ref 1–4)
EOSINOPHILS RELATIVE PERCENT: 2 % (ref 1–4)
FOLATE SERPL-MCNC: 10.1 NG/ML
GFR SERPL CREATININE-BSD FRML MDRD: >60 ML/MIN/1.73M2
GFR SERPL CREATININE-BSD FRML MDRD: >60 ML/MIN/1.73M2
GLUCOSE BLD-MCNC: 238 MG/DL (ref 75–110)
GLUCOSE BLD-MCNC: 252 MG/DL (ref 75–110)
GLUCOSE BLD-MCNC: 283 MG/DL (ref 75–110)
GLUCOSE BLD-MCNC: 287 MG/DL (ref 75–110)
GLUCOSE BLD-MCNC: 321 MG/DL (ref 75–110)
GLUCOSE SERPL-MCNC: 159 MG/DL (ref 70–99)
GLUCOSE SERPL-MCNC: 257 MG/DL (ref 70–99)
HCT VFR BLD AUTO: 32.1 % (ref 40.7–50.3)
HCT VFR BLD AUTO: 33.8 % (ref 40.7–50.3)
HGB BLD-MCNC: 10.2 G/DL (ref 13–17)
HGB BLD-MCNC: 10.7 G/DL (ref 13–17)
IMMATURE GRANULOCYTES: 0 %
IMMATURE GRANULOCYTES: 0 %
LYMPHOCYTES # BLD: 23 % (ref 24–43)
LYMPHOCYTES # BLD: 32 % (ref 24–43)
MAGNESIUM SERPL-MCNC: 1.5 MG/DL (ref 1.6–2.6)
MAGNESIUM SERPL-MCNC: 2 MG/DL (ref 1.6–2.6)
MCH RBC QN AUTO: 26.8 PG (ref 25.2–33.5)
MCH RBC QN AUTO: 26.8 PG (ref 25.2–33.5)
MCHC RBC AUTO-ENTMCNC: 31.7 G/DL (ref 28.4–34.8)
MCHC RBC AUTO-ENTMCNC: 31.8 G/DL (ref 28.4–34.8)
MCV RBC AUTO: 84.5 FL (ref 82.6–102.9)
MCV RBC AUTO: 84.5 FL (ref 82.6–102.9)
MONOCYTES # BLD: 8 % (ref 3–12)
MONOCYTES # BLD: 8 % (ref 3–12)
NRBC AUTOMATED: 0 PER 100 WBC
NRBC AUTOMATED: 0 PER 100 WBC
PDW BLD-RTO: 15.7 % (ref 11.8–14.4)
PDW BLD-RTO: 15.9 % (ref 11.8–14.4)
PHOSPHATE SERPL-MCNC: 3.1 MG/DL (ref 2.5–4.5)
PLATELET # BLD AUTO: 228 K/UL (ref 138–453)
PLATELET # BLD AUTO: 237 K/UL (ref 138–453)
PMV BLD AUTO: 11.5 FL (ref 8.1–13.5)
PMV BLD AUTO: 11.9 FL (ref 8.1–13.5)
POTASSIUM SERPL-SCNC: 3.9 MMOL/L (ref 3.7–5.3)
POTASSIUM SERPL-SCNC: 4.3 MMOL/L (ref 3.7–5.3)
RBC # BLD: 3.8 M/UL (ref 4.21–5.77)
RBC # BLD: 4 M/UL (ref 4.21–5.77)
RBC # BLD: ABNORMAL 10*6/UL
RBC # BLD: ABNORMAL 10*6/UL
SEG NEUTROPHILS: 58 % (ref 36–65)
SEG NEUTROPHILS: 66 % (ref 36–65)
SEGMENTED NEUTROPHILS ABSOLUTE COUNT: 3.78 K/UL (ref 1.5–8.1)
SEGMENTED NEUTROPHILS ABSOLUTE COUNT: 4.39 K/UL (ref 1.5–8.1)
SODIUM SERPL-SCNC: 134 MMOL/L (ref 135–144)
SODIUM SERPL-SCNC: 134 MMOL/L (ref 135–144)
SURGICAL PATHOLOGY REPORT: NORMAL
VIT B12 SERPL-MCNC: 899 PG/ML (ref 232–1245)
WBC # BLD AUTO: 6.5 K/UL (ref 3.5–11.3)
WBC # BLD AUTO: 6.7 K/UL (ref 3.5–11.3)

## 2023-03-02 PROCEDURE — 99232 SBSQ HOSP IP/OBS MODERATE 35: CPT | Performed by: INTERNAL MEDICINE

## 2023-03-02 PROCEDURE — 2500000003 HC RX 250 WO HCPCS: Performed by: NURSE PRACTITIONER

## 2023-03-02 PROCEDURE — APPNB15 APP NON BILLABLE TIME 0-15 MINS: Performed by: NURSE PRACTITIONER

## 2023-03-02 PROCEDURE — 6360000002 HC RX W HCPCS: Performed by: NURSE PRACTITIONER

## 2023-03-02 PROCEDURE — 82607 VITAMIN B-12: CPT

## 2023-03-02 PROCEDURE — 2580000003 HC RX 258: Performed by: NURSE PRACTITIONER

## 2023-03-02 PROCEDURE — 85025 COMPLETE CBC W/AUTO DIFF WBC: CPT

## 2023-03-02 PROCEDURE — 6370000000 HC RX 637 (ALT 250 FOR IP)

## 2023-03-02 PROCEDURE — 6370000000 HC RX 637 (ALT 250 FOR IP): Performed by: STUDENT IN AN ORGANIZED HEALTH CARE EDUCATION/TRAINING PROGRAM

## 2023-03-02 PROCEDURE — 6360000002 HC RX W HCPCS

## 2023-03-02 PROCEDURE — 82746 ASSAY OF FOLIC ACID SERUM: CPT

## 2023-03-02 PROCEDURE — 99232 SBSQ HOSP IP/OBS MODERATE 35: CPT | Performed by: STUDENT IN AN ORGANIZED HEALTH CARE EDUCATION/TRAINING PROGRAM

## 2023-03-02 PROCEDURE — 36415 COLL VENOUS BLD VENIPUNCTURE: CPT

## 2023-03-02 PROCEDURE — 6370000000 HC RX 637 (ALT 250 FOR IP): Performed by: NURSE PRACTITIONER

## 2023-03-02 PROCEDURE — 2580000003 HC RX 258

## 2023-03-02 PROCEDURE — 83735 ASSAY OF MAGNESIUM: CPT

## 2023-03-02 PROCEDURE — 82947 ASSAY GLUCOSE BLOOD QUANT: CPT

## 2023-03-02 PROCEDURE — 80048 BASIC METABOLIC PNL TOTAL CA: CPT

## 2023-03-02 PROCEDURE — C9113 INJ PANTOPRAZOLE SODIUM, VIA: HCPCS

## 2023-03-02 PROCEDURE — 84100 ASSAY OF PHOSPHORUS: CPT

## 2023-03-02 RX ORDER — ATORVASTATIN CALCIUM 40 MG/1
40 TABLET, FILM COATED ORAL DAILY
Qty: 30 TABLET | Refills: 3 | Status: SHIPPED | OUTPATIENT
Start: 2023-03-02

## 2023-03-02 RX ORDER — ASPIRIN 81 MG/1
81 TABLET, CHEWABLE ORAL DAILY
Status: DISCONTINUED | OUTPATIENT
Start: 2023-03-02 | End: 2023-03-02 | Stop reason: HOSPADM

## 2023-03-02 RX ORDER — FLUCONAZOLE 200 MG/1
400 TABLET ORAL DAILY
Qty: 22 TABLET | Refills: 0 | Status: SHIPPED | OUTPATIENT
Start: 2023-03-02 | End: 2023-03-13

## 2023-03-02 RX ORDER — METRONIDAZOLE 500 MG/1
500 TABLET ORAL 3 TIMES DAILY
Qty: 21 TABLET | Refills: 0 | Status: SHIPPED | OUTPATIENT
Start: 2023-03-02 | End: 2023-03-09

## 2023-03-02 RX ORDER — MAGNESIUM SULFATE IN WATER 40 MG/ML
2000 INJECTION, SOLUTION INTRAVENOUS ONCE
Status: DISCONTINUED | OUTPATIENT
Start: 2023-03-02 | End: 2023-03-02 | Stop reason: HOSPADM

## 2023-03-02 RX ORDER — INSULIN GLARGINE 100 [IU]/ML
10 INJECTION, SOLUTION SUBCUTANEOUS 2 TIMES DAILY
Status: DISCONTINUED | OUTPATIENT
Start: 2023-03-02 | End: 2023-03-02 | Stop reason: HOSPADM

## 2023-03-02 RX ORDER — INSULIN LISPRO 100 [IU]/ML
0-4 INJECTION, SOLUTION INTRAVENOUS; SUBCUTANEOUS NIGHTLY
Status: DISCONTINUED | OUTPATIENT
Start: 2023-03-02 | End: 2023-03-02 | Stop reason: HOSPADM

## 2023-03-02 RX ORDER — ATORVASTATIN CALCIUM 40 MG/1
40 TABLET, FILM COATED ORAL DAILY
Status: DISCONTINUED | OUTPATIENT
Start: 2023-03-02 | End: 2023-03-02 | Stop reason: HOSPADM

## 2023-03-02 RX ORDER — AMLODIPINE BESYLATE 10 MG/1
10 TABLET ORAL DAILY
Status: DISCONTINUED | OUTPATIENT
Start: 2023-03-02 | End: 2023-03-02 | Stop reason: HOSPADM

## 2023-03-02 RX ORDER — INSULIN GLARGINE 100 [IU]/ML
15 INJECTION, SOLUTION SUBCUTANEOUS 2 TIMES DAILY
Status: DISCONTINUED | OUTPATIENT
Start: 2023-03-02 | End: 2023-03-02

## 2023-03-02 RX ORDER — INSULIN LISPRO 100 [IU]/ML
0-16 INJECTION, SOLUTION INTRAVENOUS; SUBCUTANEOUS
Status: DISCONTINUED | OUTPATIENT
Start: 2023-03-02 | End: 2023-03-02 | Stop reason: HOSPADM

## 2023-03-02 RX ORDER — INSULIN GLARGINE 100 [IU]/ML
5 INJECTION, SOLUTION SUBCUTANEOUS ONCE
Status: COMPLETED | OUTPATIENT
Start: 2023-03-02 | End: 2023-03-02

## 2023-03-02 RX ORDER — CIPROFLOXACIN 500 MG/1
500 TABLET, FILM COATED ORAL 2 TIMES DAILY
Qty: 14 TABLET | Refills: 0 | Status: SHIPPED | OUTPATIENT
Start: 2023-03-02 | End: 2023-03-09

## 2023-03-02 RX ADMIN — AMLODIPINE BESYLATE 10 MG: 10 TABLET ORAL at 09:17

## 2023-03-02 RX ADMIN — INSULIN LISPRO 8 UNITS: 100 INJECTION, SOLUTION INTRAVENOUS; SUBCUTANEOUS at 12:02

## 2023-03-02 RX ADMIN — ONDANSETRON 4 MG: 2 INJECTION INTRAMUSCULAR; INTRAVENOUS at 09:29

## 2023-03-02 RX ADMIN — DESMOPRESSIN ACETATE 40 MG: 0.2 TABLET ORAL at 09:17

## 2023-03-02 RX ADMIN — INSULIN GLARGINE 10 UNITS: 100 INJECTION, SOLUTION SUBCUTANEOUS at 09:15

## 2023-03-02 RX ADMIN — SODIUM CHLORIDE, PRESERVATIVE FREE 10 ML: 5 INJECTION INTRAVENOUS at 09:23

## 2023-03-02 RX ADMIN — ASPIRIN 81 MG: 81 TABLET, CHEWABLE ORAL at 09:17

## 2023-03-02 RX ADMIN — INSULIN GLARGINE 5 UNITS: 100 INJECTION, SOLUTION SUBCUTANEOUS at 12:02

## 2023-03-02 RX ADMIN — FLUCONAZOLE 400 MG: 2 INJECTION, SOLUTION INTRAVENOUS at 13:09

## 2023-03-02 RX ADMIN — SODIUM CHLORIDE, PRESERVATIVE FREE 40 MG: 5 INJECTION INTRAVENOUS at 09:17

## 2023-03-02 RX ADMIN — SERTRALINE 50 MG: 50 TABLET, FILM COATED ORAL at 09:17

## 2023-03-02 RX ADMIN — ALUMINUM HYDROXIDE, MAGNESIUM HYDROXIDE, AND SIMETHICONE 30 ML: 200; 200; 20 SUSPENSION ORAL at 00:24

## 2023-03-02 RX ADMIN — MAGNESIUM SULFATE HEPTAHYDRATE 1000 MG: 1 INJECTION, SOLUTION INTRAVENOUS at 03:37

## 2023-03-02 RX ADMIN — METRONIDAZOLE 500 MG: 500 INJECTION, SOLUTION INTRAVENOUS at 00:28

## 2023-03-02 RX ADMIN — INSULIN LISPRO 12 UNITS: 100 INJECTION, SOLUTION INTRAVENOUS; SUBCUTANEOUS at 09:15

## 2023-03-02 RX ADMIN — MAGNESIUM SULFATE HEPTAHYDRATE 1000 MG: 1 INJECTION, SOLUTION INTRAVENOUS at 02:29

## 2023-03-02 RX ADMIN — METRONIDAZOLE 500 MG: 500 INJECTION, SOLUTION INTRAVENOUS at 10:55

## 2023-03-02 ASSESSMENT — PAIN SCALES - GENERAL
PAINLEVEL_OUTOF10: 10
PAINLEVEL_OUTOF10: 8
PAINLEVEL_OUTOF10: 7

## 2023-03-02 ASSESSMENT — ENCOUNTER SYMPTOMS
CONSTIPATION: 0
BACK PAIN: 0
COLOR CHANGE: 0
NAUSEA: 0
DIARRHEA: 0
COUGH: 0
SHORTNESS OF BREATH: 0
EYE REDNESS: 0
RHINORRHEA: 0
VOMITING: 0
ABDOMINAL PAIN: 0
CHEST TIGHTNESS: 0
EYE DISCHARGE: 0

## 2023-03-02 ASSESSMENT — PAIN DESCRIPTION - LOCATION
LOCATION: CHEST
LOCATION: CHEST;LEG
LOCATION: CHEST

## 2023-03-02 ASSESSMENT — PAIN - FUNCTIONAL ASSESSMENT
PAIN_FUNCTIONAL_ASSESSMENT: ACTIVITIES ARE NOT PREVENTED
PAIN_FUNCTIONAL_ASSESSMENT: ACTIVITIES ARE NOT PREVENTED

## 2023-03-02 ASSESSMENT — PAIN DESCRIPTION - DESCRIPTORS
DESCRIPTORS: BURNING;DISCOMFORT
DESCRIPTORS: BURNING;DISCOMFORT

## 2023-03-02 ASSESSMENT — PAIN DESCRIPTION - ORIENTATION
ORIENTATION: MID;LEFT;LOWER
ORIENTATION: MID;LEFT;LOWER

## 2023-03-02 NOTE — PROGRESS NOTES
[facility]. [facility]   Gastroenterology Progress Note    Rita Ingram is a 47 y.o. male patient. Hospitalization Day:3      Chief consult reason:   Odynophagia    Subjective:  Patient seen and examined. No acute events overnight. Patient had endoscopy completed yesterday that identified circumferential ulcerative esophagitis in mid esophagus-biopsies pending. Portal hypertensive gastropathy also identified  Patient tolerating some soft foods  Requesting discharge today    3/1/2023 EGD per Dr. David Red  Findings[de-identified]   Esophagus: Circumferential large ulceration was seen in the mid esophagus. Biopsies were done. Stomach: Portal hypertensive gastropathy was seen in the body of the stomach. Normal stomach seen during retroflexion view   Duodenum: Normal.     Recommendations: Follow with the biopsy results. Biopsies sent for HSV and CMV stains. Protonix 40 mg BID. Repeat EGD in 2 months to document resolution of ulceration                                       Consider colonoscopy for further evaluation of colitis seen in the Ascending colon. Further recommendations as per inpatient GI team         Electronically signed by Keiry De La Cruz MD on 3/1/2023 at 9:44 AM  VITALS:  BP (!) 181/97   Pulse 60   Temp 97.6 °F (36.4 °C) (Oral)   Resp 15   Ht 5' 8\" (1.727 m)   Wt 132 lb 4.4 oz (60 kg)   SpO2 96%   BMI 20.11 kg/m²   TEMPERATURE:  Current - Temp: 97.6 °F (36.4 °C);  Max - Temp  Av.2 °F (36.8 °C)  Min: 97.6 °F (36.4 °C)  Max: 99.2 °F (37.3 °C)    Physical Assessment:  General appearance:  alert, cooperative and no distress  Mental Status:  oriented to person, place and time and normal affect  Lungs:  clear to auscultation bilaterally, normal effort  Heart:  regular rate and rhythm, no murmur  Abdomen:  soft, nontender, nondistended, normal bowel sounds, no masses, hepatomegaly, splenomegaly  Extremities:  no edema, redness, tenderness in the calves  Skin:  no gross lesions, rashes, induration    Data Review:    Labs and Imaging:     CBC:  Recent Labs     02/27/23  1403 03/01/23  0143 03/02/23  0202 03/02/23  0655   WBC 9.5 10.0 6.5 6.7   HGB 12.5* 9.5* 10.2* 10.7*   MCV 85.0 84.7 84.5 84.5   RDW 15.9* 15.9* 15.7* 15.9*    240 237 228       ANEMIA STUDIES:  No results for input(s): LABIRON, TIBC, FERRITIN, [id number], FOLATE, OCCULTBLD in the last 72 hours. BMP:  Recent Labs     03/01/23  2148 03/02/23  0202 03/02/23  0655   * 134* 134*   K 3.8 3.9 4.3   CL 98 98 96*   CO2 27 26 23   BUN 9 8 8   CREATININE 0.96 1.00 1.07   GLUCOSE 177* 159* 257*   CALCIUM 8.6 8.5* 8.6       LFTS:  Recent Labs     02/27/23  1403   ALKPHOS 107   ALT 21   AST 18   BILITOT 0.8   LABALBU 4.4       Amylase/Lipase and Ammonia:  Recent Labs     02/27/23  1403   LIPASE 42       Acute Hepatitis Panel:  Lab Results   Component Value Date/Time    HEPCAB REACTIVE 04/06/2022 01:49 PM       HCV Genotype:  Lab Results   Component Value Date/Time    HEPATITISCGENOTYPE Indeterminate 11/09/2022 11:51 AM       HCV Quantitative:  No results found for: HCVQNT    LIVER WORK UP:    AFP  Lab Results   Component Value Date/Time    AFP 4.0 11/09/2022 11:51 AM       Alpha 1 antitrypsin   Lab Results   Component Value Date/Time    A1A 122 11/09/2022 11:51 AM       MARIMAR  Lab Results   Component Value Date/Time    MARIMAR NEGATIVE 11/09/2022 11:51 AM       AMA  Lab Results   Component Value Date/Time    MITOAB 0.7 11/09/2022 11:51 AM       ASMA  Lab Results   Component Value Date/Time    SMOOTHMUSCAB 8 11/09/2022 11:51 AM       PT/INR  No results for input(s): PROTIME, INR in the last 72 hours. Cancer Markers:  CEA:  No results for input(s): CEA in the last 72 hours. Ca 125:  No results for input(s):  in the last 72 hours.   Ca 19-9:   Invalid input(s):   AFP: No results for input(s): AFP in the last 72 hours. Lactic acid:Invalid input(s): LACTIC ACID    Radiology Review:    No results found. Principal Problem:    DKA (diabetic ketoacidosis) ([facility])  Active Problems:    Nausea & vomiting    Stage 3a chronic kidney disease (HCC)    Odynophagia    Severe malnutrition (HCC)    Hyperglycemia    Colitis    Erosive esophagitis    Candida esophagitis ([facility]  Resolved Problems:    * No resolved hospital problems. *       GI Impression:    Odynophagia/epigastric pain-s/p EGD circumferential large ulceration in mid esophagus, portal hypertensive gastropathy in stomach    -Long discussion with patient today regarding significance of endoscopy findings, importance of compliance with DM management, medications/antifungal treatment, and follow-up in office. Expressed to patient that we are here to help, treat and support him but compliance is an issue. Discussed at length patient needs to follow full liquid diet for 1 to 2 weeks to allow the esophagus to heal properly, ulcerative esophagitis could lead to perforation and death. Patient verbalized understanding, intention to follow-up in the office to discuss biopsies ongoing management, appreciated information. Patient was very tearful states he wants to live to raise his children. Plan and Recommendations:    Recommend low acid DM diet  Keep head of bed elevated  Do not lay down for 30 minutes after eating  Protonix 40 mg twice daily  Patient will need repeat endoscopy in 2 months to reassess for healing of ulceration  Patient will need to follow-up with  in 2 weeks for ongoing management  No further recommendations at this time GI will sign off      This plan was formulated in collaboration with Dr. Marc Vigil MD    Thank you for allowing me to participate in the care of your patient. Please feel free to contact me with any questions or concerns.      [address] [facility]   Jewel Kenton Boone, APRN - CNP [phone]  3/2/2023  8:36 AM    Estimated time of 30 mins reviewing chart, assessing patient and formulating plan of care    This note was created with the assistance of a speech-recognition program.  Although the intention is to generate a document that actually reflects the content of the visit, no guarantees can be provided that every mistake has been identified and corrected by editing.

## 2023-03-02 NOTE — DISCHARGE INSTR - COC
Continuity of Care Form    Patient Name: Fawad Ricci   :  1970  MRN:  [id number]    Admit date:  2023  Discharge date:  ***    Code Status Order: Full Code   Advance Directives:     Admitting Physician:  Deepika Doherty MD  PCP: BINA Bella    Discharging Nurse: [facility] Unit/Room#: 7102/4788-96  Discharging Unit Phone Number: ***    Emergency Contact:   Extended Emergency Contact Information  Primary Emergency Contact: [facility], [facility]. Phone: [phone]  Relation: Brother/Sister  Secondary Emergency Contact: Jacinta Arroyo  [employer] Phone: [phone]  Relation: Child    Past Surgical History:  Past Surgical History:   Procedure Laterality Date    APPENDECTOMY      ESOPHAGOGASTRODUODENOSCOPY N/A 2023    biopsy    FEMUR FRACTURE SURGERY Left 2022    Cephalomedullary Nail    HAND SURGERY      JOINT REPLACEMENT      TIBIA FRACTURE SURGERY Left 2022    LEFT FEMUR CEPHALOMEDULLARY NAIL performed by Pratima Beasley DO at [address] [place] [address] N/A 02/15/2022    EGD BIOPSY performed by Juventino Marquez MD at [address] 11/10/2022    EGD BIOPSY performed by Kai Wang MD at [address] [place] [address] 3/1/2023    EGD BIOPSY performed by Issac Marie MD at [address] [place] [address] History:   Immunization History   Administered Date(s) Administered    COVID-19, PFIZER PURPLE top, DILUTE for use, (age 15 y+), 30mcg/0.3mL 2021, 2021    Influenza, FLUCELVAX, (age 10 mo+), MDCK, PF, 0.5mL 2023    Pneumococcal conjugate PCV20, PF (Prevnar 20) 2023    Tdap (Boostrix, Adacel) 2016       Active Problems:  Patient Active Problem List   Diagnosis Code    Colitis K52.9    Uncontrolled type 1 diabetes mellitus with hyperglycemia ([facility] [address] E10.65    Melena K92.1    Transaminitis R74.01    Fibromyalgia M79.7    Spinal stenosis M48.00    Abnormal stress test R94.39    Vitamin D deficiency E55.9    Acute blood loss anemia D62    Hematemesis with nausea K92.0    Erosive esophagitis K22.10    Candida esophagitis (HCC) B37.81    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, G63    Osteoarthritis of knee M17.9    Other chest pain R07.89    Shortness of breath R06.02    Major depressive disorder in partial remission (HCC) F32.4    Lung nodule < 6cm on CT [id number]    Closed comminuted intertrochanteric fracture of femur, [facility] S72.143A    NSTEMI (non-ST elevated myocardial infarction) ([facility]) I21.4    Nausea & vomiting R11.2    Constipation K59.00    Weight loss R63.4    Tobacco abuse Z72.0    Marijuana use F12.90    Acute kidney injury ([facility] N17.9    Stage 3a chronic kidney disease (HCC) N18.31    Odynophagia R13.10    Dysphagia R13.10    Diabetic acidosis without coma (HCC) E11.10    Severe malnutrition (HCC) E43    Hepatitis C antibody test positive R76.8    Elevated LFTs R79.89    Syncope and collapse R55    LIBBY (acute kidney injury) (HCC) N17.9    Anemia D64.9    Hepatitis C virus infection without hepatic coma B19.20    Chronic pain of left lower extremity M79.605, G89.29    Severe comorbid illness R69    Hyperglycemia R73.9    DKA (diabetic ketoacidosis) (HCC) E11.10       Isolation/Infection:   Isolation            No Isolation          Patient Infection Status       Infection Onset Added Last Indicated Last Indicated By Review Planned Expiration Resolved Resolved By    None active    Resolved    COVID-19 (Rule Out) 02/27/23 02/27/23 02/27/23 COVID-19, Rapid (Ordered)   02/27/23 Rule-Out Test Resulted    COVID-19 (Rule Out) 02/14/22 02/14/22 02/14/22 COVID-19, Rapid (Ordered)   02/14/22 Rule-Out Test Resulted    COVID-19 (Rule Out) 12/03/21 12/03/21 12/03/21 COVID-19, Rapid (Ordered)   12/03/21 Rule-Out Test Resulted            Nurse Assessment:  Last Vital Signs: BP (!) 162/100   Pulse 81   Temp 97.8 °F (36.6 °C) (Oral)   Resp 16 Ht 5' 8\" (1.727 m)   Wt 132 lb 4.4 oz (60 kg)   SpO2 94%   BMI 20.11 kg/m²     Last documented pain score (0-10 scale): Pain Level: 10  Last Weight:   Wt Readings from Last 1 Encounters:   03/02/23 132 lb 4.4 oz (60 kg)     Mental Status:  {IP PT MENTAL STATUS:21172}    IV Access:  { PAULETTE IV ACCESS:657612691}    Nursing Mobility/ADLs:  Walking   {CHP DME ASH:585711735}  Transfer  {CHP DME GEEO:993547882}  Bathing  {CHP DME YPOT:802420453}  Dressing  {CHP DME LFCQ:580202900}  Toileting  {CHP DME AUPD:790140034}  Feeding  {CHP DME NQFS:949202215}  Med Admin  {CHP DME YCRP:652745429}  Med Delivery   { PAULETTE MED Delivery:504609729}    Wound Care Documentation and Therapy:  Incision 09/30/22 Hip Left; Lower (Active)   Number of days: 153        Elimination:  Continence: Bowel: {YES / RM:57782}  Bladder: {YES / OB:03954}  Urinary Catheter: {Urinary Catheter:960579729}   Colostomy/Ileostomy/Ileal Conduit: {YES / II:72176}       Date of Last BM: ***    Intake/Output Summary (Last 24 hours) at 3/2/2023 1533  Last data filed at 3/2/2023 0730  Gross per 24 hour   Intake 818.17 ml   Output 1950 ml   Net -1131.83 ml     I/O last 3 completed shifts:   In: 1468.2 [I.V.:851.7; IV Piggyback:616.5]  Out: 9870 [Urine:4550]    Safety Concerns:     [address] Safety Concerns:778545534}    Impairments/Disabilities:      [address] Impairments/Disabilities:241250838}    Nutrition Therapy:  Current Nutrition Therapy:   [address] Diet List:870736544}    Routes of Feeding: {CHP DME Other Feedings:849020519}  Liquids: {Slp liquid thickness:12699}  Daily Fluid Restriction: {CHP DME Yes amt example:938712358}  Last Modified Barium Swallow with Video (Video Swallowing Test): {Done Not Done JVSQ:636723502}    Treatments at the Time of Hospital Discharge:   Respiratory Treatments: ***  Oxygen Therapy:  {Therapy; copd oxygen:75500}  Ventilator:    {MH CC Vent HROY:085908865}    Rehab Therapies: {THERAPEUTIC INTERVENTION:2371449759}  Weight Bearing Status/Restrictions: [address] Tom CC Weight Bearin}  Other Medical Equipment (for information only, NOT a DME order):  {EQUIPMENT:541364204}  Other Treatments: ***    Patient's personal belongings (please select all that are sent with patient):  {CHP DME Belongings:057412516}    RN SIGNATURE:  {Esignature:342739735}    CASE MANAGEMENT/SOCIAL WORK SECTION    Inpatient Status Date: ***    Readmission Risk Assessment Score:  Readmission Risk              Risk of Unplanned Readmission:  55           Discharging to Facility/ Agency   Name: [facility]  Address:  Phone:  Fax:        / signature: Electronically signed by Jose Miguel Kingsley RN on 3/2/23 at 3:33 PM EST    PHYSICIAN SECTION    Prognosis: {Prognosis:7429508131}    Condition at Discharge: [address] Tom Patient Condition:394430682}    Rehab Potential (if transferring to Rehab): {Prognosis:9336088305}    Recommended Labs or Other Treatments After Discharge: ***    Physician Certification: I certify the above information and transfer of Butch Burton  is necessary for the continuing treatment of the diagnosis listed and that he requires {Admit to Appropriate Level of Care:76323} for {GREATER/LESS:727460323} 30 days.      Update Admission H&P: {CHP DME Changes in EFRGD:326395246}    PHYSICIAN SIGNATURE:  {Esignature:470021666}

## 2023-03-02 NOTE — PLAN OF CARE
Problem: Discharge Planning  Goal: Discharge to home or other facility with appropriate resources  3/2/2023 1621 by Karen Plascencia RN  Outcome: Completed  3/2/2023 1621 by Karen Plascencia RN  Outcome: Progressing     Problem: Pain  Goal: Verbalizes/displays adequate comfort level or baseline comfort level  3/2/2023 1621 by Karen Plascencia RN  Outcome: Completed  3/2/2023 1621 by Karen Plascencia RN  Outcome: Progressing     Problem: Gastrointestinal - Adult  Goal: Minimal or absence of nausea and vomiting  3/2/2023 1621 by Karen Plascencia RN  Outcome: Completed  3/2/2023 1621 by Karen Plascencia RN  Outcome: Progressing  Goal: Maintains or returns to baseline bowel function  3/2/2023 1621 by Karen Plascencia RN  Outcome: Completed  3/2/2023 1621 by Karen Plascencia RN  Outcome: Progressing  Goal: Maintains adequate nutritional intake  3/2/2023 1621 by Karen Plascencia RN  Outcome: Completed  3/2/2023 1621 by Karen Plascencia RN  Outcome: Progressing     Problem: Metabolic/Fluid and Electrolytes - Adult  Goal: Electrolytes maintained within normal limits  3/2/2023 1621 by Karen Plascencia RN  Outcome: Completed  3/2/2023 1621 by Karen Plascencia RN  Outcome: Progressing  Goal: Glucose maintained within prescribed range  3/2/2023 1621 by Karen Plascencia RN  Outcome: Completed  3/2/2023 1621 by Karen Plascencia RN  Outcome: Progressing     Problem: Skin/Tissue Integrity  Goal: Absence of new skin breakdown  Description: 1. Monitor for areas of redness and/or skin breakdown  2. Assess vascular access sites hourly  3. Every 4-6 hours minimum:  Change oxygen saturation probe site  4. Every 4-6 hours:  If on nasal continuous positive airway pressure, respiratory therapy assess nares and determine need for appliance change or resting period.   3/2/2023 1621 by Karen Plascencia RN  Outcome: Completed  3/2/2023 1621 by Karen Plascencia RN  Outcome: Progressing     Problem: Safety - Adult  Goal: Free from fall injury  3/2/2023 1621 by Karen Plascencia RN  Outcome: Completed  3/2/2023 1621 by Paula Parekh RN  Outcome: Progressing     Problem: ABCDS Injury Assessment  Goal: Absence of physical injury  3/2/2023 1621 by Paula Parekh RN  Outcome: Completed  3/2/2023 1621 by Paula Parekh RN  Outcome: Progressing

## 2023-03-02 NOTE — PROGRESS NOTES
Ryan Tammy  Internal Medicine Teaching Residency Program  Inpatient Daily Progress Note  ______________________________________________________________________________    Patient: Santiago Hartman  YOB: 1970   [id number]    Acct: [de-identified]     Room: 97/6194-44  Admit date: 2/27/2023  Today's date: 03/02/23  Number of days in the hospital: 3    SUBJECTIVE   Admitting Diagnosis: DKA (diabetic ketoacidosis) ([facility] [address]  CC: Nausea and vomiting    Patient examined at bedside. Labs and charts reviewed. Vitals reviewed. Patient sitting comfortably on his bed. Patient reports improvement in his chest pain. Patient states that he gets odynophagia with solid food but does not feel for liquid diet. Patient denies any shortness of breath. Patient reports normal bowel movement. Patient denies any upper or lower GI bleed. Patient does report left hip pain. Review of Systems   Constitutional:  Negative for appetite change, chills, fatigue and fever. HENT:  Negative for postnasal drip, rhinorrhea and sneezing. Eyes:  Negative for discharge and redness. Respiratory:  Negative for cough, chest tightness and shortness of breath. Cardiovascular:  Negative for chest pain. Gastrointestinal:  Negative for abdominal pain, constipation, diarrhea, nausea and vomiting. Genitourinary:  Negative for dysuria and urgency. Musculoskeletal:  Positive for arthralgias. Negative for back pain. Skin:  Negative for color change and wound. Allergic/Immunologic: Negative for immunocompromised state. Neurological:  Negative for weakness, light-headedness and headaches. Hematological:  Does not bruise/bleed easily. Psychiatric/Behavioral:  The patient is not nervous/anxious.       BRIEF HISTORY     The patient is a pleasant 47 y.o. male presented with nausea and vomiting and was admitted to the hospital for management of DKA      Patient has a past medical history significant for uncontrolled type 1 diabetes mellitus complicated with nephropathy CKD stage III and neuropathy, COPD. Reported that he has not eaten any for many days because of the severe pain in his throat and mouth and unable to swallow. Patient also reported that he has had chest pain similar to MI and diffuse abdominal pain. Also he had left thigh pain and he has a recent surgery ORIF for femur fracture. Also has a history of lung nodule, transaminitis, hep C. Patient is also cachectic and ill-appearing. In ED, vitals were significant for tachycardia with heart rate of 103 and tachypnea with respiratory rate of 28. Labs were significant for elevated blood glucose 560, potassium of 4.8, sodium of 134 -corrected sodium 141-bicarb 16, and beta hydroxybutyrate above 7.43. VBG's were significant for pH of 7.319. Creatinine was elevated at 1.93 -patient's baseline 1.7. troponin 15/15. EKG No acute changes. OBJECTIVE     Vital Signs:  BP (!) 162/100   Pulse 81   Temp 97.8 °F (36.6 °C) (Oral)   Resp 16   Ht 5' 8\" (1.727 m)   Wt 132 lb 4.4 oz (60 kg)   SpO2 94%   BMI 20.11 kg/m²     Temp (24hrs), Av.1 °F (36.7 °C), Min:97.6 °F (36.4 °C), Max:98.7 °F (37.1 °C)    In: 1068.2   Out: 2150 [Urine:2150]    Physical Exam  Vitals and nursing note reviewed. Constitutional:       General: He is awake. Appearance: Normal appearance. HENT:      Head: Normocephalic and atraumatic. Nose: Nose normal.   Eyes:      General: No scleral icterus. Conjunctiva/sclera: Conjunctivae normal.   Cardiovascular:      Rate and Rhythm: Normal rate. Heart sounds: Normal heart sounds. No murmur heard. Pulmonary:      Effort: Pulmonary effort is normal. No respiratory distress. Breath sounds: Normal breath sounds. No stridor. Chest:      Chest wall: No tenderness. Abdominal:      General: There is no distension. Palpations: Abdomen is soft. There is no mass.       Tenderness: There is no abdominal tenderness. Musculoskeletal:      Right lower leg: No edema. Left lower leg: No edema. Skin:     General: Skin is warm. Coloration: Skin is not jaundiced. Neurological:      Mental Status: He is alert and oriented to person, place, and time. Psychiatric:         Mood and Affect: Mood normal.         Behavior: Behavior normal. Behavior is cooperative.      Medications:  Scheduled Medications:    insulin lispro  0-16 Units SubCUTAneous TID WC    insulin lispro  0-4 Units SubCUTAneous Nightly    magnesium sulfate  2,000 mg IntraVENous Once    atorvastatin  40 mg Oral Daily    amLODIPine  10 mg Oral Daily    aspirin  81 mg Oral Daily    insulin glargine  10 Units SubCUTAneous BID    diclofenac sodium  2 g Topical BID    pantoprazole (PROTONIX) 40 mg injection  40 mg IntraVENous Q12H    sertraline  50 mg Oral Daily    fluconazole  400 mg IntraVENous Q24H    sodium chloride flush  5-40 mL IntraVENous 2 times per day    heparin (porcine)  5,000 Units SubCUTAneous 3 times per day    cefTRIAXone (ROCEPHIN) IV  1,000 mg IntraVENous Q24H    metroNIDAZOLE  500 mg IntraVENous Q8H     Continuous Infusions:    dextrose      dextrose       PRN Medicationsdextrose, , Continuous PRN  magic (miracle) mouthwash, 5 mL, 4x Daily PRN  dextrose bolus, 125 mL, PRN   Or  dextrose bolus, 250 mL, PRN  dextrose, , Continuous PRN  glucose, 4 tablet, PRN  glucagon (rDNA), 1 mg, PRN  sodium chloride flush, 5-40 mL, PRN  ondansetron, 4 mg, Q8H PRN   Or  ondansetron, 4 mg, Q6H PRN  polyethylene glycol, 17 g, Daily PRN  acetaminophen, 650 mg, Q6H PRN   Or  acetaminophen, 650 mg, Q6H PRN        Diagnostic Labs:  CBC:   Recent Labs     03/01/23  0143 03/02/23  0202 03/02/23  0655   WBC 10.0 6.5 6.7   RBC 3.60* 3.80* 4.00*   HGB 9.5* 10.2* 10.7*   HCT 30.5* 32.1* 33.8*   MCV 84.7 84.5 84.5   RDW 15.9* 15.7* 15.9*    237 228     BMP:   Recent Labs     03/01/23  1830 03/01/23  2148 03/02/23  0202 03/02/23  0655    134* 134* 134*   K 3.9 3.8 3.9 4.3   CL 99 98 98 96*   CO2 28 27 26 23   PHOS 2.9 2.8 3.1  --    BUN 10 9 8 8   CREATININE 1.15 0.96 1.00 1.07     BNP: No results for input(s): BNP in the last 72 hours. PT/INR: No results for input(s): PROTIME, INR in the last 72 hours. APTT: No results for input(s): APTT in the last 72 hours. CARDIAC ENZYMES: No results for input(s): CKMB, CKMBINDEX, TROPONINI in the last 72 hours. Invalid input(s): CKTOTAL;3  FASTING LIPID PANEL:  Lab Results   Component Value Date    CHOL 164 11/05/2022    HDL 51 11/05/2022    TRIG 92 11/05/2022     LIVER PROFILE:   Recent Labs     02/27/23  1403   AST 18   ALT 21   BILITOT 0.8   ALKPHOS 107      MICROBIOLOGY:   Lab Results   Component Value Date/Time    CULTURE NO GROWTH 2 DAYS 02/28/2023 12:31 PM       Imaging:    CT ABDOMEN PELVIS W IV CONTRAST Additional Contrast? None    Result Date: 2/27/2023  Significant wall thickening noted throughout the ascending colon likely suggestive of colitis. Appendix is not definitely visualized. No obstructive uropathy. 2 mm nonobstructing stone of superior pole of left kidney. Axial hiatal hernia     XR CHEST PORTABLE    Result Date: 2/27/2023  No acute abnormality visualized. ASSESSMENT & PLAN     ASSESSMENT / PLAN:     Odynophagia  GI following, appreciate recommendations. Patient status post EGD, showing esophageal ulcer and mid esophagus, biopsy taken. Continue Protonix 40 Mg twice daily  GI recommends repeat EGD in 2 months  Currently on Diflucan for previous history of Candida esophagitis    Hypertension  Continue enalapril 10 Mg    Diabetes mellitus  Patient has history of type 1 diabetes mellitus  Continue on Lantus 10 units twice daily  Continue on high-dose sliding scale    DKA (diabetic ketoacidosis) ([address]  Resolved. Continue to monitor.     Stage 3a chronic kidney disease ([address]  Secondary to diabetes mellitus type 1  Continue to monitor serum creatinine    Colitis  CT abdomen done on admission showed colitis  Patient, as advised by GI, should get colonoscopy as an outpatient  Currently on Rocephin and Flagyl    Nonobstructive CAD  Cath done in May 2021, shows normal LV EF, normal coronary arteries  Aspirin on hold due to esophageal ulceration  Continue Lipitor 40 Mg    Diet: Adult dysphagia soft bite sized  GI ppx: Protonix 40 mg twice daily    Maritza Quarles M.D. Internal Medicine Resident PGY-1  [facility] [facility]. 1:47 PM 3/2/2023     Please note that part of this chart was generated using voice recognition dictation software. Although every effort was made to ensure the accuracy of this automated transcription, some errors in transcription may have occurred.

## 2023-03-02 NOTE — CARE COORDINATION
Attempted to call medical cab through [facility] but waited on hold over 10 minutes.  Call placed to black and white

## 2023-03-02 NOTE — PLAN OF CARE
[facility]. [facility]   Gastroenterology Progress Note    Tangela Márquez is a 47 y.o. male patient. Hospitalization Day:3      Chief consult reason:   Odynophagia    Subjective:  Patient seen and examined. No acute events overnight. Patient had endoscopy completed yesterday that identified circumferential ulcerative esophagitis in mid esophagus-biopsies pending. Portal hypertensive gastropathy also identified  Patient tolerating some soft foods  Requesting discharge today    3/1/2023 EGD per Dr. Paula Mccain  Findings[de-identified]   Esophagus: Circumferential large ulceration was seen in the mid esophagus. Biopsies were done. Stomach: Portal hypertensive gastropathy was seen in the body of the stomach. Normal stomach seen during retroflexion view   Duodenum: Normal.     Recommendations: Follow with the biopsy results. Biopsies sent for HSV and CMV stains. Protonix 40 mg BID. Repeat EGD in 2 months to document resolution of ulceration                                       Consider colonoscopy for further evaluation of colitis seen in the Ascending colon. Further recommendations as per inpatient GI team         Electronically signed by Raphael Kirk MD on 3/1/2023 at 9:44 AM  VITALS:  BP (!) 181/97   Pulse 60   Temp 97.6 °F (36.4 °C) (Oral)   Resp 15   Ht 5' 8\" (1.727 m)   Wt 132 lb 4.4 oz (60 kg)   SpO2 96%   BMI 20.11 kg/m²   TEMPERATURE:  Current - Temp: 97.6 °F (36.4 °C);  Max - Temp  Av.2 °F (36.8 °C)  Min: 97.6 °F (36.4 °C)  Max: 99.2 °F (37.3 °C)    Physical Assessment:  General appearance:  alert, cooperative and no distress  Mental Status:  oriented to person, place and time and normal affect  Lungs:  clear to auscultation bilaterally, normal effort  Heart:  regular rate and rhythm, no murmur  Abdomen:  soft, nontender, nondistended, normal bowel sounds, no masses, hepatomegaly, splenomegaly  Extremities:  no edema, redness, tenderness in the calves  Skin:  no gross lesions, rashes, induration    Data Review:    Labs and Imaging:     CBC:  Recent Labs     02/27/23  1403 03/01/23  0143 03/02/23  0202 03/02/23  0655   WBC 9.5 10.0 6.5 6.7   HGB 12.5* 9.5* 10.2* 10.7*   MCV 85.0 84.7 84.5 84.5   RDW 15.9* 15.9* 15.7* 15.9*    240 237 228       ANEMIA STUDIES:  No results for input(s): LABIRON, TIBC, FERRITIN, IRWRNLHF31, FOLATE, OCCULTBLD in the last 72 hours. BMP:  Recent Labs     03/01/23  2148 03/02/23  0202 03/02/23  0655   * 134* 134*   K 3.8 3.9 4.3   CL 98 98 96*   CO2 27 26 23   BUN 9 8 8   CREATININE 0.96 1.00 1.07   GLUCOSE 177* 159* 257*   CALCIUM 8.6 8.5* 8.6       LFTS:  Recent Labs     02/27/23  1403   ALKPHOS 107   ALT 21   AST 18   BILITOT 0.8   LABALBU 4.4       Amylase/Lipase and Ammonia:  Recent Labs     02/27/23  1403   LIPASE 42       Acute Hepatitis Panel:  Lab Results   Component Value Date/Time    HEPCAB REACTIVE 04/06/2022 01:49 PM       HCV Genotype:  Lab Results   Component Value Date/Time    HEPATITISCGENOTYPE Indeterminate 11/09/2022 11:51 AM       HCV Quantitative:  No results found for: HCVQNT    LIVER WORK UP:    AFP  Lab Results   Component Value Date/Time    AFP 4.0 11/09/2022 11:51 AM       Alpha 1 antitrypsin   Lab Results   Component Value Date/Time    A1A 122 11/09/2022 11:51 AM       MARIMAR  Lab Results   Component Value Date/Time    MARIMAR NEGATIVE 11/09/2022 11:51 AM       AMA  Lab Results   Component Value Date/Time    MITOAB 0.7 11/09/2022 11:51 AM       ASMA  Lab Results   Component Value Date/Time    SMOOTHMUSCAB 8 11/09/2022 11:51 AM       PT/INR  No results for input(s): PROTIME, INR in the last 72 hours. Cancer Markers:  CEA:  No results for input(s): CEA in the last 72 hours. Ca 125:  No results for input(s):  in the last 72 hours.   Ca 19-9:   Invalid input(s):   AFP: No results for input(s): AFP in the last 72 hours. Lactic acid:Invalid input(s): LACTIC ACID    Radiology Review:    No results found. Principal Problem:    DKA (diabetic ketoacidosis) ([facility])  Active Problems:    Nausea & vomiting    Stage 3a chronic kidney disease (HCC)    Odynophagia    Severe malnutrition (HCC)    Hyperglycemia    Colitis    Erosive esophagitis    Candida esophagitis ([facility] [address]  Resolved Problems:    * No resolved hospital problems. *       GI Impression:    Odynophagia/epigastric pain-s/p EGD circumferential large ulceration in mid esophagus, portal hypertensive gastropathy in stomach    -Long discussion with patient today regarding significance of endoscopy findings, importance of compliance with DM management, medications/antifungal treatment, and follow-up in office. Expressed to patient that we are here to help, treat and support him but compliance is an issue. Discussed at length patient needs to follow full liquid diet for 1 to 2 weeks to allow the esophagus to heal properly, ulcerative esophagitis could lead to perforation and death. Patient verbalized understanding, intention to follow-up in the office to discuss biopsies ongoing management, appreciated information. Patient was very tearful states he wants to live to raise his children. Plan and Recommendations:    Recommend low acid DM diet  Keep head of bed elevated  Do not lay down for 30 minutes after eating  Protonix 40 mg twice daily  Patient will need repeat endoscopy in 2 months to reassess for healing of ulceration  Patient will need to follow-up with  in 2 weeks for ongoing management  No further recommendations at this time GI will sign off      This plan was formulated in collaboration with Dr. Marc Vigil MD    Thank you for allowing me to participate in the care of your patient. Please feel free to contact me with any questions or concerns.      [address] [facility] [facility]   Jewel Kenton Boone, APRN - CNP [phone]  3/2/2023  8:36 AM    Estimated time of 30 mins reviewing chart, assessing patient and formulating plan of care    This note was created with the assistance of a speech-recognition program.  Although the intention is to generate a document that actually reflects the content of the visit, no guarantees can be provided that every mistake has been identified and corrected by editing.

## 2023-03-02 NOTE — CARE COORDINATION
Discharge [address] [facility] Case Management Department  Written by: Alma Delia Vargas RN    Patient Name: Shreyas Sprague  Attending Provider: Mushtaq Houser MD  Admit Date: 2023  1:38 PM  MRN: [id number]  Account: [de-identified]                     : 1970  Discharge Date: 3/2/23      Disposition: home    Met with patient to discuss transitional planning. Plan is home independently with children. Patient declines need for home care at this time. Patient has rollator walker. Patient will need medical cab ride home.  Patient agreeable to plan    Alma Delia Vargas RN

## 2023-03-02 NOTE — PROGRESS NOTES
CLINICAL PHARMACY NOTE: MEDS TO BEDS    Total # of Prescriptions Filled: 5   The following medications were delivered to the patient:  Fluconazole  Diclofenac  Atorvastatin  Ciprofloxacin  metronidazole    Additional Documentation:

## 2023-03-03 ENCOUNTER — TELEPHONE (OUTPATIENT)
Dept: PRIMARY CARE CLINIC | Age: 53
End: 2023-03-03

## 2023-03-03 ENCOUNTER — CARE COORDINATION (OUTPATIENT)
Dept: CARE COORDINATION | Age: 53
End: 2023-03-03

## 2023-03-03 RX ORDER — LIDOCAINE HYDROCHLORIDE 20 MG/ML
5 SOLUTION OROPHARYNGEAL 4 TIMES DAILY
COMMUNITY
Start: 2023-02-24

## 2023-03-03 ASSESSMENT — ENCOUNTER SYMPTOMS: DYSPNEA ASSOCIATED WITH: EXERTION

## 2023-03-03 NOTE — CARE COORDINATION
Care Transitions Initial Follow Up Call    Outreach made within 2 business days of discharge: Yes    Patient: Reather Riedel Patient : 1970   MRN: [id number]  Reason for Admission: There are no discharge diagnoses documented for the most recent discharge. Discharge Date: 3/2/23       Spoke with: patient    Discharge department/facility: [facility] Interactive Patient Contact:  Was patient able to fill all prescriptions: Yes  Was patient instructed to bring all medications to the follow-up visit: Yes  Is patient taking all medications as directed in the discharge summary? Yes  Does patient understand their discharge instructions: Yes  Does patient have questions or concerns that need addressed prior to 7-14 day follow up office visit: no    Scheduled appointment with PCP within 7-14 days    Follow Up  Future Appointments   Date Time Provider Jerilyn Salvador   2023  1:00 PM [facility], [address] [place] [address]   2023  1:00 PM Carmine Leos, [facility]       David Qureshi RN    Reviewed all his medications including new and discontinued meds. He is taking all of them including Magic Mouthwash ingredients. Admitted for DKA, also had EGD which showed ulcerative esophagitis, portal hypertensive gastropathy. Was told to follow up with Dr. Jose Oliva but was originally seeing Dr. Barbara Bhagat. Supposed to repeat EGD in 2 months. Added protonix twice jack, Cipro and Flagyl and Voltaren gel for his leg pain. Reglan, metoprolol and ASA were stopped. He stated will call to schedule PCP appt, GI appt. Also discussed cardiology appt, stated will call next week for that. He declined assistance with making appts. DM- reminded he can get a new transmitter for Dex Com, [facility] told him would be available next week.  Instructed him to check blood sugars with correct glucometer strips he received from diabetic educator at the hospital, needs to return incorrect strips given to him at [facility] and exchange for correct strips, he voiced understanding. Hasn't checked his blood sugar yet today. Just very tired, didn't sleep well in hospital. Stated will stay on bland soft diet for 2 months until gets his repeat EGD, discussed foods to avoid, encouraged to eat small amounts at first.    Will follow up again next week to check on appts, symptoms.

## 2023-03-03 NOTE — TELEPHONE ENCOUNTER
Care Transitions Initial Follow Up Call    Outreach made within 2 business days of discharge: Yes    Patient: Elroy Ceron Patient : 1970   MRN: [id number]  Reason for Admission: There are no discharge diagnoses documented for the most recent discharge.  Discharge Date: 3/2/23       Spoke with: Elroy    Discharge department/facility: [facility] Interactive Patient Contact:  Was patient able to fill all prescriptions: Yes  Was patient instructed to bring all medications to the follow-up visit: Yes  Is patient taking all medications as directed in the discharge summary? Yes  Does patient understand their discharge instructions: Yes  Does patient have questions or concerns that need addressed prior to 7-14 day follow up office visit: no    Scheduled appointment with PCP within 7-14 days    Follow Up  Future Appointments   Date Time Provider Department Center   3/8/2023  1:15 PM BINA Castaneda [facility]   2023  1:00 PM DO NICKI Dougherty [facility]   2023  1:00 PM BINA Castaneda [facility]       Dayana Gutierrez MA

## 2023-03-04 LAB
MICROORGANISM SPEC CULT: NORMAL
MICROORGANISM SPEC CULT: NORMAL
SERVICE CMNT-IMP: NORMAL
SPECIMEN DESCRIPTION: NORMAL

## 2023-03-05 LAB
MICROORGANISM SPEC CULT: NORMAL
MICROORGANISM SPEC CULT: NORMAL
SERVICE CMNT-IMP: NORMAL
SERVICE CMNT-IMP: NORMAL
SPECIMEN DESCRIPTION: NORMAL
SPECIMEN DESCRIPTION: NORMAL

## 2023-03-06 LAB
MICROORGANISM SPEC CULT: NORMAL
SERVICE CMNT-IMP: NORMAL
SPECIMEN DESCRIPTION: NORMAL

## 2023-03-08 ENCOUNTER — OFFICE VISIT (OUTPATIENT)
Dept: PRIMARY CARE CLINIC | Age: 53
End: 2023-03-08

## 2023-03-08 ENCOUNTER — TELEPHONE (OUTPATIENT)
Dept: PRIMARY CARE CLINIC | Age: 53
End: 2023-03-08

## 2023-03-08 VITALS
SYSTOLIC BLOOD PRESSURE: 124 MMHG | HEART RATE: 104 BPM | BODY MASS INDEX: 17.16 KG/M2 | OXYGEN SATURATION: 98 % | HEIGHT: 68 IN | WEIGHT: 113.2 LBS | DIASTOLIC BLOOD PRESSURE: 80 MMHG

## 2023-03-08 DIAGNOSIS — K22.10 EROSIVE ESOPHAGITIS: ICD-10-CM

## 2023-03-08 DIAGNOSIS — R47.02 DYSPHASIA: ICD-10-CM

## 2023-03-08 DIAGNOSIS — E10.65 UNCONTROLLED TYPE 1 DIABETES MELLITUS WITH HYPERGLYCEMIA (HCC): ICD-10-CM

## 2023-03-08 DIAGNOSIS — Z09 HOSPITAL DISCHARGE FOLLOW-UP: Primary | ICD-10-CM

## 2023-03-08 DIAGNOSIS — S72.143A CLOSED COMMINUTED INTERTROCHANTERIC FRACTURE OF FEMUR, INITIAL ENCOUNTER (HCC): ICD-10-CM

## 2023-03-08 DIAGNOSIS — K52.9 COLITIS: ICD-10-CM

## 2023-03-08 DIAGNOSIS — I21.4 NSTEMI (NON-ST ELEVATED MYOCARDIAL INFARCTION) (HCC): ICD-10-CM

## 2023-03-08 DIAGNOSIS — N17.9 ACUTE KIDNEY INJURY (HCC): ICD-10-CM

## 2023-03-08 DIAGNOSIS — N18.31 STAGE 3A CHRONIC KIDNEY DISEASE (HCC): ICD-10-CM

## 2023-03-08 RX ORDER — PANTOPRAZOLE SODIUM 40 MG/1
40 TABLET, DELAYED RELEASE ORAL 2 TIMES DAILY
Qty: 60 TABLET | Refills: 5 | Status: SHIPPED | OUTPATIENT
Start: 2023-03-08

## 2023-03-08 RX ORDER — ATORVASTATIN CALCIUM 40 MG/1
40 TABLET, FILM COATED ORAL DAILY
Qty: 30 TABLET | Refills: 3 | Status: CANCELLED | OUTPATIENT
Start: 2023-03-08

## 2023-03-08 RX ORDER — SUCRALFATE ORAL 1 G/10ML
1 SUSPENSION ORAL 4 TIMES DAILY
Qty: 1200 ML | Refills: 3 | Status: SHIPPED | OUTPATIENT
Start: 2023-03-08

## 2023-03-08 NOTE — DISCHARGE SUMMARY
[address]     Department of Internal Medicine - Staff Internal Medicine Teaching Service    INPATIENT DISCHARGE SUMMARY      Patient Identification:  Sarah Cast is a 47 y.o. male. :  1970  MRN: [id number]     Acct: [de-identified]   PCP: BINA Dumont  Admit Date:  2023  Discharge date and time: 3/2/2023  5:02 PM   Attending Provider: No att. providers found                                     [address] Problem Lists:  Principal Problem:    DKA (diabetic ketoacidosis) ([facility]  Active Problems:    Nausea & vomiting    Stage 3a chronic kidney disease ([facility]    Odynophagia    Severe malnutrition (HCC)    Hyperglycemia    Colitis    Erosive esophagitis    Candida esophagitis ([facility]  Resolved Problems:    * No resolved hospital problems. *      HOSPITAL STAY     Brief Inpatient course:     Patient, 47years old male, was admitted to the hospital for management of diabetic ketoacidosis. He is unable to eat for several days because of severe pain in his throat and mouth. Patient also reported that he had chest pain similar to myocardial infarctions that he had before. Patient also reported diffuse abdominal pain. Gastroenterology was consulted, patient underwent EGD which showed esophageal ulcer and mild esophagitis, biopsies were taken. Patient was continued on Protonix, GI recommended repeat EGD in 2 months. Patient was out of DKA, he was continued on Lantus 10 units twice daily with high-dose sliding scale. His CT abdomen was also done which showed colitis, gastroenterology advised to get colonoscopy as an outpatient, he was maintained on Rocephin and Flagyl. At time of discharge, patient was hemodynamically and medically stable.       Procedures/ Significant Interventions:    EGD    Consults:     Consults:     Final Specialist Recommendations/Findings:   IP CONSULT TO INTERNAL MEDICINE  IP CONSULT TO DIABETES EDUCATOR  IP CONSULT TO GI Any Hospital Acquired Infections: none    Discharge Functional Status:  stable    DISCHARGE PLAN     Disposition: home    Patient Instructions:   Discharge Medication List as of 3/2/2023  4:28 PM        START taking these medications    Details   ciprofloxacin (CIPRO) 500 MG tablet Take 1 tablet by mouth 2 times daily for 7 days, Disp-14 tablet, R-0Normal      diclofenac sodium (VOLTAREN) 1 % GEL Apply 2 g topically 2 times daily, Topical, 2 TIMES DAILY Starting Thu 3/2/2023, Disp-2 g, R-1, Normal      metroNIDAZOLE (FLAGYL) 500 MG tablet Take 1 tablet by mouth 3 times daily for 7 days, Disp-21 tablet, R-0Normal      fluconazole (DIFLUCAN) 200 MG tablet Take 2 tablets by mouth daily for 11 days, Disp-22 tablet, R-0Normal           CONTINUE these medications which have CHANGED    Details   atorvastatin (LIPITOR) 40 MG tablet Take 1 tablet by mouth daily, Disp-30 tablet, R-3Normal           CONTINUE these medications which have NOT CHANGED    Details   diphenhydrAMINE (BENYLIN) 12.5 MG/5ML liquid take 5 milliliters by mouth four times a day if needed for allergies, Disp-240 mL, R-1Normal      aluminum hydroxide-magnesium carbonate (ACID GONE)  MG/15ML suspension Take 5 mLs by mouth 4 times daily (with meals and nightly), Disp-240 mL, R-1Normal      OLANZapine (ZYPREXA) 2.5 MG tablet take 1 tablet by mouth at bedtimeHistorical Med      amLODIPine (NORVASC) 10 MG tablet take 1 tablet by mouth once dailyHistorical Med      TRUEPLUS GLUCOSE 4 g chewable tablet chew and swallow as directed for LOW BLOOD SUGAR REACTION, DAWHistorical Med      insulin lispro, 1 Unit Dial, (HUMALOG/ADMELOG) 100 UNIT/ML SOPN INJECT 0-16 UNITS INTO THE SKIN THREE TIMES A DAY WITH MEALSHistorical Med      PARoxetine (PAXIL) 20 MG tablet take 1 tablet by mouth at bedtimeHistorical Med      traZODone (DESYREL) 300 MG tablet Historical Med      !!  Continuous Blood Gluc Transmit (DEXCOM G6 TRANSMITTER) MISC 1 each by Does not apply route every 10 days, Disp-1 each, R-4Normal      insulin aspart (NOVOLOG FLEXPEN) 100 UNIT/ML injection pen Inject 5 Units into the skin 3 times daily (before meals) Patient has temporarily reduced his dose to 1-2 units with the small snack he has each day., Disp-5 Adjustable Dose Pre-filled Pen Syringe, R-3Normal      insulin glargine (LANTUS) 100 UNIT/ML injection vial Inject 15 Units into the skin 2 times daily, Disp-10 mL, R-3Normal      sucralfate (CARAFATE) 1 GM/10ML suspension Take 10 mLs by mouth 4 times daily, Disp-1200 mL, R-3Normal      pantoprazole (PROTONIX) 40 MG tablet Take 40 mg by mouth 2 times dailyHistorical Med      !! Continuous Blood Gluc Transmit (DEXCOM G6 TRANSMITTER) MISC 1 each by Does not apply route once a week, Disp-1 each, R-0Normal      Continuous Blood Gluc Sensor (DEXCOM G6 SENSOR) MISC 1 each by Does not apply route once a week, Disp-4 each, R-0Normal      Nutritional Supplements (GLUCOSE MANAGEMENT) TABS Take 1 tablet by mouth daily as needed (Low sugar reaction), Disp-30 tablet, R-0Normal      Continuous Blood Gluc  (DEXCOM G6 ) JANEL 1 each by Does not apply route once a week, Disp-1 each, R-4Normal      Misc.  Devices (STEP N REST WALKER) MISC CONTINUOUS Starting Fri 11/18/2022, Disp-1 each, R-0, PrintSeated, wheeled walker      ondansetron (ZOFRAN ODT) 4 MG disintegrating tablet Take 1 tablet by mouth every 8 hours as needed for Nausea or Vomiting, Disp-12 tablet, R-0Normal      albuterol sulfate HFA (VENTOLIN HFA) 108 (90 Base) MCG/ACT inhaler Inhale 2 puffs into the lungs 4 times daily as needed for Wheezing, Disp-54 g, R-1Normal      Blood Glucose Monitoring Suppl (ONE TOUCH ULTRA 2) w/Device KIT DAILY Starting Fri 3/18/2022, Disp-1 kit, R-0, Print      Lancets MISC 2 TIMES DAILY Starting Fri 3/18/2022, Disp-300 each, R-1, Print      Alcohol Swabs 70 % PADS Disp-100 each, R-0, NormalUse as needed with blood glucose checks      Insulin Pen Needle Charla Alcala PEN NEEDLES 29G) 29G X 12MM MISC DAILY Starting Tue 2/15/2022, Disp-100 each, R-3, Normal      clonazePAM (KLONOPIN) 2 MG tablet Take 4 mg by mouth daily as needed for Anxiety. Historical Med       !! - Potential duplicate medications found. Please discuss with provider. STOP taking these medications       metoclopramide (REGLAN) 10 MG/10ML SOLN Comments:   Reason for Stopping:         PNEUMOCOCCAL 20-ALPHONSO CONJ VACC IM Comments:   Reason for Stopping:         blood glucose monitor strips Comments:   Reason for Stopping:         aspirin EC 81 MG EC tablet Comments:   Reason for Stopping:         metoprolol tartrate (LOPRESSOR) 25 MG tablet Comments:   Reason for Stopping:               Activity: activity as tolerated    Diet: diabetic diet    Follow-up:    [facility] DIABETES EDUCATION  [address] [place] [id number] [phone]  Call  Follow up education on diabetes self management    Daisy Garcia MD  [facility], [place] [facility]  [address] [address]  [phone]    Schedule an appointment as soon as possible for a visit      Jamison Ravi, [address] [place] [address] 36. [phone]    Schedule an appointment as soon as possible for a visit in 1 week(s)  BPand glucose control      Patient Instructions: You were admitted to the hospital for nausea, severe pain while swallowing, uncontrolled hyperglycemia. During this admission you were started on insulin drip and underwent EGD which showed a large ulcer in your esophagus as well as inflammation in your stomach. You are being discharged to home on oral antibiotics for esophagitis as well as colitis (inflammation of the colon). - Please take a full liquid, low acid, carb controlled diet  - Keep head of bed elevated  - Do not lay down for 30 minutes after eating  - Please take your antibiotics as prescribed. - Please take your insulin, both long-acting as well as short-acting as prescribed.   Please monitor blood sugar at home. If your blood sugar is low, then cut down on your Lantus/long-acting insulin to 10 units twice daily.  - Your blood pressure has been elevated during this admission, please continue to take your Norvasc daily. Your medication called Lopressor has been discontinued due to low heart rate, please follow-up with your PCP regarding monitoring and better control of blood pressure. - Please follow-up with gastroenterology outpatient to get a repeat EGD in 2 months to check for complete resolution and healing of esophagitis as well as screening colonoscopy for colitis. - Aspirin has been discontinued due to increased risk of ulceration. Please do not take aspirin, Motrin, Aleve, ibuprofen, naproxen, Advil for pain relief as this will worsen your esophageal ulceration.  - Please return to the ER for any worsening symptoms.     Follow up labs: None  Follow up imaging: None    Note that over 30 minutes was spent in preparing discharge papers, discussing discharge with patient, medication review, etc.      Electronically signed by Abran Vang MD on 3/8/2023 at 6:32 PM    Abran Vang MD  Internal Medicine Resident, PGY- [facility].  6:32 PM

## 2023-03-08 NOTE — TELEPHONE ENCOUNTER
Please advise who is able to accept patient insurance then please place referral for same reasons documented in home health referral to [facility].

## 2023-03-08 NOTE — PROGRESS NOTES
Post-Discharge Transitional Care  Follow Up      Bar Burnette   YOB: 1970    Date of Office Visit:  3/8/2023  Date of Hospital Admission: 2/27/23  Date of Hospital Discharge: 3/2/23  Risk of hospital readmission (high >=14%. Medium >=10%) :Readmission Risk Score: 21.1      Care management risk score Rising risk (score 2-5) and Complex Care (Scores >=6): No Risk Score On File     Non face to face  following discharge, date last encounter closed (first attempt may have been earlier): 03/03/2023    Call initiated 2 business days of discharge: Yes    ASSESSMENT/PLAN:   Hospital discharge follow-up  -     [facility] MED LIST  -     [address], [address]  -     sucralfate (CARAFATE) 1 GM/10ML suspension; Take 10 mLs by mouth 4 times daily, Disp-1200 mL, R-3Normal  -     aluminum hydroxide-magnesium carbonate (ACID GONE)  MG/15ML suspension; Take 5 mLs by mouth 4 times daily (with meals and nightly), Disp-240 mL, R-1Normal  -     pantoprazole (PROTONIX) 40 MG tablet; Take 1 tablet by mouth 2 times daily, Disp-60 tablet, Shani Estrada MD, Gastroenterology, [place]  Erosive esophagitis  -     sucralfate (CARAFATE) 1 GM/10ML suspension; Take 10 mLs by mouth 4 times daily, Disp-1200 mL, R-3Normal  -     aluminum hydroxide-magnesium carbonate (ACID GONE)  MG/15ML suspension; Take 5 mLs by mouth 4 times daily (with meals and nightly), Disp-240 mL, R-1Normal  -     pantoprazole (PROTONIX) 40 MG tablet;  Take 1 tablet by mouth 2 times daily, Disp-60 tablet, Shani Estrada MD, Gastroenterology, [place]  Closed comminuted intertrochanteric fracture of femur, initial encounter [facility])  -     [address]  Acute kidney injury [facility])  -     [address] - [place]  Stage 3a chronic kidney disease ([facility]  -     [address]  NSTEMI (non-ST elevated myocardial infarction) ([facility]  -     [address]  Uncontrolled type 1 diabetes mellitus with hyperglycemia ([facility]  -     Gregg Baer MD, Gastroenterology, [facility] Making: high complexity  Return in about 4 weeks (around 4/5/2023) for recheck. .           Subjective:   HPI:  Follow up of Hospital problems/diagnosis(es): DKA and dysphasia. Needs doc note for missing work. Not taking any meds other than metronidazole, fluconazole, ciprofloxacin, atorvastatin. May need refills. Has been having issues with getting refills from pharm. Has not been taking BP meds since Feb 2 with hosp discharge. Eating Boost, chocolate, mashed potatoes. Pain if eats anything very solid such as hamburger. Acid reflux throughout day. Ocassional dysphagia with liquids, katina acidic beverages such as OJ. SOB with exertion, including to 5ft of walking. Using medical transportation. Is having difficulty finding transportation to get home. Needs referral to cardiology. Is scheduled for GI, but states the GI doc he was referred to for EGD is not within the practice he went to previously. Having incontinence after using restroom, urinary retention. Waking to use restroom 4x/night. Current smoker: 1pak/day. States trying to quit. Had tried Chantix before and made his mood down. States would make him suicidal if he tried Chantix again. Inpatient course: Discharge summary reviewed- see chart. Interval history/Current status: Patient is noncompliant with his medications at this time. Patient will be educated that he needs to take Protonix twice daily to prevent his esophagitis. Patient needs follow-up with gastro and cardiology.   15 units in the morning and 10 at night lantus. Has cut short acting down to 3 units with meals. Using glucose meter. Sugars running from around 210-50. Patient educated he needs to be using Dexcom to check his sugars multiple times daily and taking insulin as needed for high sugars. Was seeing Med 1 nurse every week no longer has home health. Patient Active Problem List   Diagnosis    Colitis    Uncontrolled type 1 diabetes mellitus with hyperglycemia (HCC)    Melena    Transaminitis    Fibromyalgia    Spinal stenosis    Abnormal stress test    Vitamin D deficiency    Acute blood loss anemia    Hematemesis with nausea    Erosive esophagitis    Candida esophagitis (HCC)    Neuropathy associated with endocrine disorder (HCC)    Osteoarthritis of knee    Other chest pain    Shortness of breath    Major depressive disorder in partial remission ([facility])    Lung nodule < 6cm on CT    Closed comminuted intertrochanteric fracture of femur, initial encounter ([facility]    NSTEMI (non-ST elevated myocardial infarction) ([facility])    Nausea & vomiting    Constipation    Weight loss    Tobacco abuse    Marijuana use    Acute kidney injury ([facility]    Stage 3a chronic kidney disease ([facility]    Odynophagia    Dysphagia    Diabetic acidosis without coma ([facility])    Severe malnutrition ([facility])    Hepatitis C antibody test positive    Elevated LFTs    Syncope and collapse    LIBBY (acute kidney injury) ([facility]    Anemia    Hepatitis C virus infection without hepatic coma    Chronic pain of left lower extremity    Severe comorbid illness    Hyperglycemia    DKA (diabetic ketoacidosis) ([facility]       Medications listed as ordered at the time of discharge from hospital     Medication List            Accurate as of March 8, 2023  1:51 PM. If you have any questions, ask your nurse or doctor.                 CHANGE how you take these medications      aluminum hydroxide-magnesium carbonate  MG/15ML suspension  Commonly known as: Acid Gone  Take 5 mLs by mouth 4 times daily (with meals and nightly)  What changed: additional instructions            CONTINUE taking these medications      albuterol sulfate  (90 Base) MCG/ACT inhaler  Commonly known as: Ventolin HFA  Inhale 2 puffs into the lungs 4 times daily as needed for Wheezing     Alcohol Swabs 70 % Pads  Use as needed with blood glucose checks     amLODIPine 10 MG tablet  Commonly known as: NORVASC     atorvastatin 40 MG tablet  Commonly known as: LIPITOR  Take 1 tablet by mouth daily     ciprofloxacin 500 MG tablet  Commonly known as: CIPRO  Take 1 tablet by mouth 2 times daily for 7 days     Dexcom G6  Chanda  1 each by Does not apply route once a week     Dexcom G6 Sensor Misc  1 each by Does not apply route once a week     * Dexcom G6 Transmitter Misc  1 each by Does not apply route once a week     * Dexcom G6 Transmitter Misc  1 each by Does not apply route every 10 days     diclofenac sodium 1 % Gel  Commonly known as: VOLTAREN  Apply 2 g topically 2 times daily     fluconazole 200 MG tablet  Commonly known as: DIFLUCAN  Take 2 tablets by mouth daily for 11 days     Glucose Management Tabs  Take 1 tablet by mouth daily as needed (Low sugar reaction)     insulin glargine 100 UNIT/ML injection vial  Commonly known as: LANTUS  Inject 15 Units into the skin 2 times daily     insulin lispro (1 Unit Dial) 100 UNIT/ML Sopn  Commonly known as: HUMALOG/ADMELOG     [employer] Pen [place] 29G 29G X 12MM Misc  Generic drug: Insulin Pen Needle  1 each by Does not apply route daily     Lancets Misc  1 each by Does not apply route 2 times daily     lidocaine viscous hcl 2 % Soln solution  Commonly known as: XYLOCAINE     metroNIDAZOLE 500 MG tablet  Commonly known as: FLAGYL  Take 1 tablet by mouth 3 times daily for 7 days     NovoLOG FlexPen 100 UNIT/ML injection pen  Generic drug: insulin aspart  Inject 5 Units into the skin 3 times daily (before meals) Patient has temporarily reduced his dose to 1-2 units with the small snack he has each day. OLANZapine 2.5 MG tablet  Commonly known as: ZYPREXA     ondansetron 4 MG disintegrating tablet  Commonly known as: Zofran ODT  Take 1 tablet by mouth every 8 hours as needed for Nausea or Vomiting     ONE TOUCH ULTRA 2 w/Device Kit  1 kit by Does not apply route daily     pantoprazole 40 MG tablet  Commonly known as: PROTONIX  Take 1 tablet by mouth 2 times daily     PARoxetine 20 MG tablet  Commonly known as: PAXIL     Step N Rest Walker Misc  1 each by Does not apply route continuous Seated, wheeled walker     sucralfate 1 GM/10ML suspension  Commonly known as: Carafate  Take 10 mLs by mouth 4 times daily     traZODone 300 MG tablet  Commonly known as: DESYREL     TRUEplus Glucose 4 g chewable tablet  Generic drug: glucose           * This list has 2 medication(s) that are the same as other medications prescribed for you. Read the directions carefully, and ask your doctor or other care provider to review them with you.                    Where to Get Your Medications        These medications were sent to [address], [address], [address] [place] [address]      Phone: [phone]   aluminum hydroxide-magnesium carbonate  MG/15ML suspension  pantoprazole 40 MG tablet  sucralfate 1 GM/10ML suspension           Medications marked \"taking\" at this time  Outpatient Medications Marked as Taking for the 3/8/23 encounter (Office Visit) with BINA Garcia   Medication Sig Dispense Refill    sucralfate (CARAFATE) 1 GM/10ML suspension Take 10 mLs by mouth 4 times daily 1200 mL 3    aluminum hydroxide-magnesium carbonate (ACID GONE)  MG/15ML suspension Take 5 mLs by mouth 4 times daily (with meals and nightly) 240 mL 1    pantoprazole (PROTONIX) 40 MG tablet Take 1 tablet by mouth 2 times daily 60 tablet 5    lidocaine viscous hcl (XYLOCAINE) 2 % SOLN solution Take 5 mLs by mouth 4 times daily 5 ml before meals or as needed for pain Mixes with Benadryl and Maalox for Magic Mouthwash      ciprofloxacin (CIPRO) 500 MG tablet Take 1 tablet by mouth 2 times daily for 7 days 14 tablet 0    diclofenac sodium (VOLTAREN) 1 % GEL Apply 2 g topically 2 times daily 2 g 1    metroNIDAZOLE (FLAGYL) 500 MG tablet Take 1 tablet by mouth 3 times daily for 7 days 21 tablet 0    fluconazole (DIFLUCAN) 200 MG tablet Take 2 tablets by mouth daily for 11 days 22 tablet 0    atorvastatin (LIPITOR) 40 MG tablet Take 1 tablet by mouth daily 30 tablet 3    OLANZapine (ZYPREXA) 2.5 MG tablet take 1 tablet by mouth at bedtime      amLODIPine (NORVASC) 10 MG tablet take 1 tablet by mouth once daily      TRUEPLUS GLUCOSE 4 g chewable tablet       insulin lispro, 1 Unit Dial, (HUMALOG/ADMELOG) 100 UNIT/ML SOPN       PARoxetine (PAXIL) 20 MG tablet take 1 tablet by mouth at bedtime      traZODone (DESYREL) 300 MG tablet       Continuous Blood Gluc Transmit (DEXCOM G6 TRANSMITTER) MISC 1 each by Does not apply route every 10 days 1 each 4    insulin aspart (NOVOLOG FLEXPEN) 100 UNIT/ML injection pen Inject 5 Units into the skin 3 times daily (before meals) Patient has temporarily reduced his dose to 1-2 units with the small snack he has each day. 5 Adjustable Dose Pre-filled Pen Syringe 3    insulin glargine (LANTUS) 100 UNIT/ML injection vial Inject 15 Units into the skin 2 times daily 10 mL 3    Continuous Blood Gluc Transmit (DEXCOM G6 TRANSMITTER) MISC 1 each by Does not apply route once a week 1 each 0    Continuous Blood Gluc Sensor (DEXCOM G6 SENSOR) MISC 1 each by Does not apply route once a week 4 each 0    Nutritional Supplements (GLUCOSE MANAGEMENT) TABS Take 1 tablet by mouth daily as needed (Low sugar reaction) 30 tablet 0    Continuous Blood Gluc  (DEXCOM G6 ) JANEL 1 each by Does not apply route once a week 1 each 4    Misc.  Devices (STEP N REST WALKER) MISC 1 each by Does not apply route continuous Seated, wheeled walker 1 each 0    ondansetron (ZOFRAN ODT) 4 MG disintegrating tablet Take 1 tablet by mouth every 8 hours as needed for Nausea or Vomiting 12 tablet 0    albuterol sulfate HFA (VENTOLIN HFA) 108 (90 Base) MCG/ACT inhaler Inhale 2 puffs into the lungs 4 times daily as needed for Wheezing 54 g 1    Blood Glucose Monitoring Suppl (ONE TOUCH ULTRA 2) w/Device KIT 1 kit by Does not apply route daily 1 kit 0    Lancets MISC 1 each by Does not apply route 2 times daily 300 each 1    Alcohol Swabs 70 % PADS Use as needed with blood glucose checks 100 each 0    Insulin Pen Needle ([employer] PEN NEEDLES 29G) 29G X 12MM MISC 1 each by Does not apply route daily 100 each 3        Medications patient taking as of now reconciled against medications ordered at time of hospital discharge: Yes    A comprehensive review of systems was negative except for what was noted in the HPI. Objective:    /80   Pulse (!) 104   Ht 5' 8\" (1.727 m)   Wt 113 lb 3.2 oz (51.3 kg)   SpO2 98%   BMI 17.21 kg/m²   Physical Exam  Constitutional:       General: He is not in acute distress. Appearance: He is ill-appearing. He is not toxic-appearing. HENT:      Head: Normocephalic and atraumatic. Right Ear: There is no impacted cerumen. Nose: No congestion or rhinorrhea. Cardiovascular:      Rate and Rhythm: Normal rate and regular rhythm. Pulses: Normal pulses. Heart sounds: No murmur heard. No friction rub. No gallop. Pulmonary:      Effort: No respiratory distress. Breath sounds: Wheezing and rales present. Abdominal:      General: There is no distension. Palpations: There is no mass. Tenderness: There is abdominal tenderness. There is no guarding. Musculoskeletal:         General: No swelling, tenderness or deformity. Skin:     Findings: No rash. Psychiatric:         Mood and Affect: Mood normal.         Behavior: Behavior normal.         Thought Content:  Thought content normal. Judgment: Judgment normal.            An electronic signature was used to authenticate this note.   --BINA Waite

## 2023-03-08 NOTE — TELEPHONE ENCOUNTER
[facility] called states they received fax today for home health referral- they do not accept patients insurance     Please advise

## 2023-03-09 ENCOUNTER — CARE COORDINATION (OUTPATIENT)
Dept: CARE COORDINATION | Age: 53
End: 2023-03-09

## 2023-03-09 ENCOUNTER — TELEPHONE (OUTPATIENT)
Dept: PRIMARY CARE CLINIC | Age: 53
End: 2023-03-09

## 2023-03-09 DIAGNOSIS — R29.6 FREQUENT FALLS: ICD-10-CM

## 2023-03-09 DIAGNOSIS — R53.1 WEAKNESS: ICD-10-CM

## 2023-03-09 DIAGNOSIS — J44.9 CHRONIC OBSTRUCTIVE PULMONARY DISEASE, UNSPECIFIED COPD TYPE (HCC): Primary | ICD-10-CM

## 2023-03-09 DIAGNOSIS — E46 MALNUTRITION, UNSPECIFIED TYPE (HCC): ICD-10-CM

## 2023-03-09 DIAGNOSIS — G89.18 POST-OP PAIN: ICD-10-CM

## 2023-03-09 NOTE — CARE COORDINATION
Ambulatory Care Coordination Note  3/10/2023    Patient Current Location:  Home: [place] [id number]     ACM contacted the patient by telephone. Verified name and  with patient as identifiers. Summary:  He saw PCP yesterday. He said that office is scheduling his GI and cardiology appts. He got Boost delivery but is chocolate and he wants vanilla, stated not supposed to have chocolate. Not really eating anything else so thinks should get 6 cans a day, notified him insurance probably would not cover that, order was written for 2 cans a day. He is taking Carafate and pantoprazole. Stated feels a little better when swallowing but not able to eat much. PCP ordered home health from [address] [place] [address], visits haven't started yet. Thinks he needs to see a different orthopedic surgeon- doesn't think the bone is healed right. Was told is scar tissue on the bone but he thinks the bone is sticking out too much. Still can't walk very far, feels like going to fall. Writer spoke with Guadalupe at [facility] about his supplements. Was prescribed one Boost Glucose Control per day for a month with no refills. Each is only 190 calories so she suggested ordering more per day, insurance usually covers up to 2000 calories per day. Office staff member will schedule with Dr. Jose Valdes and [facility]. Writer called [facility], was told by intake that they need diagnoses for why home health is needed (there are 9 listed on order), they stated didn't have a face to face note but then found after searching for 5 minutes, she stated didn't have a med list, she stated order needed to be written by an MD, can't accept from a P.A., and also included on order must be the start of care date. CC Plan:   -Messaged PCP about ordering more Boost- he ordered for 3/day with refills.   -Writer will call again next week to review medications again, discuss about supplements, review appts made by PCP staff, discuss blood sugars. Diabetes Assessment    Medic Alert ID: No  Meal Planning: Avoidance of concentrated sweets   How often do you test your blood sugar?: Meals   Do you have barriers with adherence to non-pharmacologic self-management interventions? (Nutrition/Exercise/Self-Monitoring): Yes   Have you ever had to go to the ED for symptoms of low blood sugar?: No       Do you have hyperglycemia symptoms?: No   Do you have hypoglycemia symptoms?: No         and   General Assessment    Do you have any symptoms that are causing concern?: Yes  Progression since Onset: Gradually Improving  Reported Symptoms: Pain, Weakness (Comment: pain with swallowing)             Offered patient enrollment in the Remote Patient Monitoring (RPM) program for in-home monitoring: Patient declined. Lab Results       None            Care Coordination Interventions    Referral from Primary Care Provider: No  Suggested Interventions and Community Resources  [facility]: Completed (Comment: New Concepts)  Smoking Cessation: Declined  Specialty Services Referral: Completed (Comment: [address])  Transportation Support: Completed          Goals Addressed                   This Visit's Progress     Conditions and Symptoms   Improving     I will schedule office visits, as directed by my provider. I will keep my appointment or reschedule if I have to cancel. I will notify my provider of any barriers to my plan of care. I will follow my Zone Management tool to seek urgent or emergent care. I will notify my provider of any symptoms that indicate a worsening of my condition.     Barriers: lack of support, overwhelmed by complexity of regimen, and lack of education  Plan for overcoming my barriers: ACM calls, CHN to follow, assist with appts, education  Confidence: 6/10  Anticipated Goal Completion Date: 2/20/23                Future Appointments   Date Time Provider Jerilyn Salvador   4/11/2023  1:00  Corporate  Jonathan Lares   4/21/2023  1:00 PM BINA Edward PC Lety Otto

## 2023-03-09 NOTE — TELEPHONE ENCOUNTER
[address] called - states there are no orders associated with the referral that was faxed over. They need to know what type of care patient is needing in order to make a determination on sending someone out to him. PT/OT - what type of monitoring is needed for patient? Please advise and sent new order to [facility].

## 2023-03-10 ENCOUNTER — TELEPHONE (OUTPATIENT)
Dept: PRIMARY CARE CLINIC | Age: 53
End: 2023-03-10

## 2023-03-10 ENCOUNTER — TELEPHONE (OUTPATIENT)
Dept: GASTROENTEROLOGY | Age: 53
End: 2023-03-10

## 2023-03-10 NOTE — TELEPHONE ENCOUNTER
Okay for DX: COPD, Post op pain, Generalized weakness, Diabetes, Ulerative esophagitis, Malnutrition, Frequent falls.

## 2023-03-10 NOTE — TELEPHONE ENCOUNTER
Please schedule GI follow up with Dr. Sindi Pham and Cardiology appointment with the cardiologist he saw in hospital for NSTEMI. Advise patient of the scheduled dates once completed.

## 2023-03-10 NOTE — TELEPHONE ENCOUNTER
[facility] do not accept patient insurance for home care     Alexa Peres only allows 2 weeks Per Silvano Welch MA     Called [facility] - referral faxed with information     Ana Maria nguyen

## 2023-03-13 ENCOUNTER — TELEPHONE (OUTPATIENT)
Dept: PRIMARY CARE CLINIC | Age: 53
End: 2023-03-13

## 2023-03-13 ENCOUNTER — CARE COORDINATION (OUTPATIENT)
Dept: CARE COORDINATION | Age: 53
End: 2023-03-13

## 2023-03-13 DIAGNOSIS — I21.4 NSTEMI (NON-ST ELEVATED MYOCARDIAL INFARCTION) (HCC): Primary | ICD-10-CM

## 2023-03-13 NOTE — CARE COORDINATION
Ambulatory Care Coordination Note  3/13/2023       ACM: Hallei Matson, RN    Writer called [address], they contacted patient, start of care date is 3/14/23. Office staff scheduled appts:  -GI appt 3/27/23 at 2:45 with Dr. Jamaica Sanabria at [facility] office.  -Cardiology appt 4/4/23 at 3:30 pm with TCC at [facility] office. Spoke with patient, he prefers just getting a letter with appts, too hard to write down information. Letter will be sent today. He stated he has transportation available from insurance, he will call for transportation, just needs to give 2 days notice. Also included phone number for [facility] DM education office so he can schedule appt. DM- still doesn't have transmitter for Dex Com. He called [employer], they didn't have in stock. Writer called [employer], they will get in stock hopefully today so he can get it tomorrow. Nutrition- he was drinking Boost but has none left. Able to eat food like mashed potatoes and shredded chicken. Writer spoke with Guadalupe at [facility], she doesn't see a new order for 90 bottles but she will go ahead and submit for insurance approval, office will fax the new order to them. Slowly getting easier to swallow but can't drink or eat a lot at one time. CC Plan:   -Follow up next week to check on home health, blood sugars, review appts again, symptoms. Lab Results       None            Care Coordination Interventions    Referral from Primary Care Provider: No  Suggested Interventions and Community Resources  Behavorial Health: Completed (Comment: New Concepts)  Smoking Cessation: Declined  Specialty Services Referral: Completed (Comment: [address])  Transportation Support: Completed          Goals Addressed                   This Visit's Progress     Conditions and Symptoms   Improving     I will schedule office visits, as directed by my provider. I will keep my appointment or reschedule if I have to cancel.   I will notify my provider of any barriers to my plan of care.  I will follow my Zone Management tool to seek urgent or emergent care. I will notify my provider of any symptoms that indicate a worsening of my condition.     Barriers: lack of support, overwhelmed by complexity of regimen, and lack of education  Plan for overcoming my barriers: ACM calls, CHN to follow, assist with appts, education  Confidence: 6/10  Anticipated Goal Completion Date: 2/20/23                Future Appointments   Date Time Provider Jerilyn Salvador   3/27/2023  2:45 PM Tuyet Russ MD [facility]   4/11/2023  1:00 PM Swapnil Lan DO ORTHO [address]   4/21/2023  1:00 PM Darnell Castillo, [address] [place] [address]

## 2023-03-13 NOTE — LETTER
March 13, 2023          Wanda Anguiano,    These are your upcoming appointments that were scheduled for you. Please call ahead of time to schedule transportation for these appointments:    Monday March 27 at 2:45 pm with gastroenterologist Dr. Elizabeth Barron    [address] [place] [address], [facility] [address], [address] [facility] Phone [phone]    Tuesday April 4 at 3:30 pm with cardiologist at [facility] [address] [facility], [address] [facility], [address] [place] [address] Phone [phone]    You should also call the [facility] Diabetes Education office at [address] and schedule an appointment with them.  Their office phone is [phone]          Sindy Mg RN  Ambulatory Care Manager  [facility]  [phone]

## 2023-03-13 NOTE — TELEPHONE ENCOUNTER
[facility] calling. Not able to take oders from a PA. They are seeing pt on Thurs for PT, OT and nursing.  Will ask Dr. Armida Sewell to follow pt.    [phone]

## 2023-03-15 ENCOUNTER — CARE COORDINATION (OUTPATIENT)
Dept: CARE COORDINATION | Age: 53
End: 2023-03-15

## 2023-03-15 NOTE — CARE COORDINATION
Patient called asking about Boost. Order was just faxed to [facility] 3/13. Gave him [facility] phone number to call to check on it. He got nursing visit yesterday, thinks will be getting PT also but only every other week. He thinks he wants more intensive therapy and may need to go to outpatient therapy somewhere. Encouraged him to call ortho office and ask but then would not be eligible for nursing visits at home. Reminded him of upcoming ortho appt. Checking blood sugars with glucometer, was 97 and 150 today but he feels like his sugars are high, he can't tell any more what's going on with his body. Hasn't gotten Dex Com transmitter from [address] [place] [address] yet, they are supposed to deliver it but as of 3/13 they still didn't have any in stock yet. Will follow up next week.     Future Appointments   Date Time Provider Jerilyn Salvador   3/27/2023  2:45 PM Lady Nic MD [facility]   4/11/2023  1:00 PM Emily Whitman DO [address] [address]   4/21/2023  1:00 PM BINA Hoskins

## 2023-03-16 ENCOUNTER — CARE COORDINATION (OUTPATIENT)
Dept: CARE COORDINATION | Age: 53
End: 2023-03-16

## 2023-03-16 NOTE — CARE COORDINATION
Clover Rodríguez called stating hasn't gotten the Boost yet. [facility] told him they are waiting for doctor to clarify the order of how many a day he can drink. Reminded him he was previously ordered one a day but he was drinking three a day so wasn't due to get refill. Writer spoke with Guadalupe at [facility] [employer], she just ordered shipment of 3 cartons of Boost a day, is waiting for provider to sign new CMN she sent to the office but she went ahead and ordered shipment.

## 2023-03-20 ENCOUNTER — CARE COORDINATION (OUTPATIENT)
Dept: CARE COORDINATION | Age: 53
End: 2023-03-20

## 2023-03-20 ENCOUNTER — TELEPHONE (OUTPATIENT)
Dept: PRIMARY CARE CLINIC | Age: 53
End: 2023-03-20

## 2023-03-20 NOTE — TELEPHONE ENCOUNTER
Miranda Clark from [facility]. Asking for a verbal to do a Rt hand xray. Pt fell yesterday and they think he may of fx'd his hand and pt refuses to go to the er. Will have Mobile xray Junior Reid come out and do an xray.      [address] [facility]

## 2023-03-20 NOTE — CARE COORDINATION
4/11/2023  1:00 PM Bronson Hawk DO [address] [address]   4/21/2023  1:00 PM BINA Trejo [facility] [address] [facility]

## 2023-03-21 NOTE — TELEPHONE ENCOUNTER
Spoke with Yulia Overall let her know he did have a right hand fracture back in January that he was seen at [facility] clinic for.  Gave verbal for right hand xray

## 2023-03-21 NOTE — TELEPHONE ENCOUNTER
Okay for order. I have signed this, however patient did have fracture recently in hand I believe which was seen at [facility].

## 2023-03-27 DIAGNOSIS — E11.10 DIABETIC KETOACIDOSIS WITHOUT COMA ASSOCIATED WITH TYPE 2 DIABETES MELLITUS (HCC): Primary | ICD-10-CM

## 2023-03-27 NOTE — TELEPHONE ENCOUNTER
Patient has the Dexcom 6, having issues with it. He was advised by the pharmacy to call dexcom but he said it is to complicated. Wanting to switch to the freestyle kraig. Advised if they just covered the Dexcom it might not be covered. Please try to send script. Pended below.

## 2023-03-27 NOTE — TELEPHONE ENCOUNTER
He is also asking for an order for a lift chair. He is having trouble getting up from sitting and having a lot of falls due to his hands being broken. [address] [address] for order?

## 2023-03-31 ENCOUNTER — CARE COORDINATION (OUTPATIENT)
Dept: CARE COORDINATION | Age: 53
End: 2023-03-31

## 2023-03-31 NOTE — CARE COORDINATION
Ambulatory Care Coordination Note  3/31/2023    Patient Current Location:  Home: [place] [facility] [address]     ACM contacted the patient by telephone. Verified name and  with patient as identifiers. SUMMARY:   DM-He finally got Dex Com transmitter from [employer]. Blood sugars high 300-500s mostly, he thinks because of of his hand being broken. He is taking lantus 15 units twice a day. Also taking Novolog sliding scale- he takes whenever blood sugar is high ( if over 200 takes 2-3 units, over 300 takes 3 units, over 400 takes 4 units) until blood sugars less than 200. Prescribed to take 3 times daily before meals but is taking a lot more often than that. GI-Still not eating or drinking much, he is drinking  mostly Boost which he is out of because he is drinking a lot more than 3 a day, can't get a refill until 4/10. He plans to buy some himself until then. Reminded need to get the Carb Control kind. Has been out of Carafate for over a week but he can't get from pharmacy. Writer called [employer]- he is not due to get until tomorrow, was too early before. Writer reviewed with him the correct dosage since he is using too much. He stated is taking the Protonix and the Magic Mouthwash ingredients still but Carafate was helping the most for swallowing. Now having chest discomfort again since has been out of Carafate. He can't get out of chair unless his kids pull him up, wants a lift chair. If his kids don't help him he always ends of falling forward and he lands on his hands. Right hand very swollen, can't use pinky finger at all, he thinks is broken again, wants to get an x ray. [address] is supposed to arrange to get hand x ray at home. Per Rina Linda at [address], they arranged to have done but patient wouldn't let them do since he thought they were rude and treated him bad. He thinks will probably need hand surgery. He was supposed  to have a friend take him to get X ray done but he hasn't yet.   Sees

## 2023-04-04 ENCOUNTER — HOSPITAL ENCOUNTER (EMERGENCY)
Age: 53
Discharge: HOME | End: 2023-04-04

## 2023-04-13 ENCOUNTER — OFFICE VISIT (OUTPATIENT)
Dept: ORTHOPEDIC SURGERY | Age: 53
End: 2023-04-13

## 2023-04-13 VITALS — HEIGHT: 68 IN | BODY MASS INDEX: 17.13 KG/M2 | WEIGHT: 113 LBS

## 2023-04-13 DIAGNOSIS — M70.62 GREATER TROCHANTERIC BURSITIS OF LEFT HIP: ICD-10-CM

## 2023-04-13 DIAGNOSIS — M79.641 RIGHT HAND PAIN: ICD-10-CM

## 2023-04-13 DIAGNOSIS — S72.142D CLOSED DISPLACED INTERTROCHANTERIC FRACTURE OF LEFT FEMUR WITH ROUTINE HEALING, SUBSEQUENT ENCOUNTER: ICD-10-CM

## 2023-04-13 DIAGNOSIS — S62.336A CLOSED DISPLACED FRACTURE OF NECK OF FIFTH METACARPAL BONE OF RIGHT HAND, INITIAL ENCOUNTER: Primary | ICD-10-CM

## 2023-04-13 RX ORDER — BUPIVACAINE HYDROCHLORIDE 2.5 MG/ML
2 INJECTION, SOLUTION INFILTRATION; PERINEURAL ONCE
Status: SHIPPED | OUTPATIENT
Start: 2023-04-13

## 2023-04-13 RX ORDER — METHOCARBAMOL 750 MG/1
750 TABLET, FILM COATED ORAL NIGHTLY
Qty: 10 TABLET | Refills: 0 | Status: SHIPPED | OUTPATIENT
Start: 2023-04-13 | End: 2023-04-23

## 2023-04-13 RX ORDER — METHYLPREDNISOLONE ACETATE 80 MG/ML
80 INJECTION, SUSPENSION INTRA-ARTICULAR; INTRALESIONAL; INTRAMUSCULAR; SOFT TISSUE ONCE
Status: SHIPPED | OUTPATIENT
Start: 2023-04-13

## 2023-04-13 RX ORDER — LIDOCAINE HYDROCHLORIDE 10 MG/ML
2 INJECTION, SOLUTION INFILTRATION; PERINEURAL ONCE
Status: SHIPPED | OUTPATIENT
Start: 2023-04-13

## 2023-04-18 ENCOUNTER — CARE COORDINATION (OUTPATIENT)
Dept: CARE COORDINATION | Age: 53
End: 2023-04-18

## 2023-04-18 PROBLEM — M79.641 RIGHT HAND PAIN: Status: ACTIVE | Noted: 2023-04-18

## 2023-04-18 PROBLEM — S72.142A CLOSED DISPLACED INTERTROCHANTERIC FRACTURE OF LEFT FEMUR (HCC): Status: ACTIVE | Noted: 2022-09-29

## 2023-04-18 PROBLEM — S62.336A CLOSED DISPLACED FRACTURE OF NECK OF RIGHT FIFTH METACARPAL BONE: Status: ACTIVE | Noted: 2023-04-18

## 2023-04-18 NOTE — CARE COORDINATION
Attempted to call patient for care management, unable to leave a message. He has PCP appt this week, need to address order for lift chair. Will call again next week to see if he contacted his insurance to request a . He was discharged from [address] [place] [address] due to noncompliance.     Future Appointments   Date Time Provider Jerilyn Salvador   4/21/2023  1:00 PM Luis F Redmond PC [facility]   5/1/2023  1:30 PM Barbara Hughes [facility]   8/8/2023 10:00 AM Margaretann Krabbe, MD [facility]

## 2023-04-21 ENCOUNTER — TELEPHONE (OUTPATIENT)
Dept: PRIMARY CARE CLINIC | Age: 53
End: 2023-04-21

## 2023-04-21 NOTE — TELEPHONE ENCOUNTER
Lift chair order was faxed to [facility] on 4/4/23. Spoke with the [facility] office and they stated they don't bill those to medicare anymore, so pt would have to pay out of pocket. [address] [place] [address] and they stated the same thing, also stated that they didn't think anybody bills medicare for lift chairs anymore.

## 2023-04-24 ENCOUNTER — CARE COORDINATION (OUTPATIENT)
Dept: CARE COORDINATION | Age: 53
End: 2023-04-24

## 2023-04-24 NOTE — CARE COORDINATION
He missed PCP appt last week. PCP office faxed order for lift chair on 4/4 but [address] does not bill insurance, would be out of pocket expense there as well as [address]. Writer faxed a waiver referral to [employer] for help with case management, getting DME. [facility]iology again and requested progress note from 4/4 appt, left VM messages at [facility] to ask about billing insurance for lift chairs. [employer] does not carry lift chairs. He saw ortho last week, referred to outpatient PT at [facility]. Writer attempted to call patient, phone is disconnected. Will attempt to contact again next week.     Future Appointments   Date Time Provider Jerilyn Salvador   5/1/2023  1:30 PM MD ZANDRA Cali V CHUN [facility]   8/8/2023 10:00 AM MD SAMRA Ortega

## 2023-04-25 ENCOUNTER — CARE COORDINATION (OUTPATIENT)
Dept: CARE COORDINATION | Age: 53
End: 2023-04-25

## 2023-04-25 NOTE — CARE COORDINATION
Krystal Divdarron from [place] called, she tried to call patient, not able to reach as seems phone disconnected. She spoke with his EC, brother Nicole Gresham who stated he doesn't live near [facility], doesn't have any information about him, doesn't have any other phone number for him. Krystal Richmond will attempt to contact [facility] a few more times. Writer also called, still recording to \"please check the number and dial again\".     Future Appointments   Date Time Provider Jerilyn Salvador   5/1/2023  1:30 PM Rojas Vega MD [facility]   8/8/2023 10:00 AM MD SAMRA Osullivan

## 2023-05-02 ENCOUNTER — CARE COORDINATION (OUTPATIENT)
Dept: CARE COORDINATION | Age: 53
End: 2023-05-02

## 2023-05-02 ASSESSMENT — ENCOUNTER SYMPTOMS: DYSPNEA ASSOCIATED WITH: EXERTION

## 2023-05-02 NOTE — CARE COORDINATION
Ambulatory Care Coordination Note  2023    Patient Current Location:  Home: [facility] [address]     ACM contacted the patient by telephone. Verified name and  with patient as identifiers. Provided introduction to self, and explanation of the ACM role. Challenges to be reviewed by the provider   Additional needs identified to be addressed with provider: No  none               Method of communication with provider: none. ACM: Val Ballard RN    His phone was turned off, just paid bill so is back on. Reviewed that he missed PCP and GI appts. He knew about PCP but not about GI appt. He will call to reschedule both appts. Instructed to keep a large caledar to write on to keep appts straight. He stated has insurance cab he will use for appts. DM- checking blood sugars 3-4 times a day, mostly in 200s. He wants to eventually go back to seeing an endocrinologist after other issues resolved. Eating a lot of rice lately because easy to eat, he didn't realize is a starch and would raise blood sugar. GI- diet is still mostly Boost and chicken and dumplings. Unable to tolerate hamburger. Doesn't want to eat potatoes and pasta which would raise his blood sugar. Weight up to 125. Used to take med for Hep C, wants to talk to GI about restarting something. Ortho- he is walking about 2 miles every other day. Still a lot of pain when walking since left femur pinning 2022. He injured right hand from falls, can't control pinky finger, sticks out. Can open and close rest of fingers. Was referred to PT for leg and hand, hasn't scheduled yet. He doesn't know when he has to follow up with cardiology. Writer has already requested progress note from that office but still don't have in chart. He is moving to a new rental house this week, didn't know new address yet. Rent will be higher but a better neighborhood for his kids. He wants to eventually get a part time job.   Needs to follow up with [facility]

## 2023-05-03 DIAGNOSIS — M70.62 GREATER TROCHANTERIC BURSITIS OF LEFT HIP: ICD-10-CM

## 2023-05-03 RX ORDER — METHOCARBAMOL 750 MG/1
TABLET, FILM COATED ORAL
Qty: 10 TABLET | Refills: 0 | OUTPATIENT
Start: 2023-05-03

## 2023-05-03 RX ORDER — INSULIN GLARGINE 100 [IU]/ML
INJECTION, SOLUTION SUBCUTANEOUS
Qty: 9 ML | Refills: 3 | Status: SHIPPED | OUTPATIENT
Start: 2023-05-03

## 2023-05-04 NOTE — CARE COORDINATION
Called [facility], again requested cardiology progress note. Spoke with Aranza Yeager at [address] her he now has phone service back, encouraged she call him to [facility] for services. She called him but he was not able to talk to her, stated he will call her back. She will be out of office until next Thursday 5/11. Will call again next week to review appts, discuss about going to PT (possibly [employer] since moving close to [place]). [] [facility]) - [facility] &  Therapy  [address]  [phone]  F: [phone] [x] [address]  [facility]   [address]  P: [phone]  F: [phone] [] [address]  Therapy  [address]  P: [phone]  F: [phone] [] [address]  P: [phone]  F: [phone] [] [address]  [facility]   [address]   [place]: [phone]  F: [phone]      Physical Therapy Daily Treatment Note    Date:  2021  Patient Name:  Raquel Rodriguez  \"Danielito\"  :  1980  MRN: [id number]  Physician: [id number]: [facility]: [facility], [facility]  Medical Diagnosis:    N39.3 (ICD-10-CM) - Stress incontinence   N94.10 (ICD-10-CM) - Dyspareunia in female   Rehab Codes: R27.8, M62.81, N39.3, N94.10, R29.3, N39.46, R29.3  Onset Date: 21                             Next 's appt. : 21 iud & 21 urology    Visit# / total visits: 2     Cancels/No Shows: 1/0    Subjective:    Pain:  [] Yes  [x] No  Location:  N/A  Pain Rating: (0-10 scale) 0/10  Pain altered Tx:  [x] No  [] Yes  Action:    Comments: Pt slightly rushed this date d/t it being her daughters first birthday. Also distracted by her phone throughout session. Pt does report a noticeable improvement since eval and through completion of her HEP. Noting that prior to coming to PT she would have daily episodes of urge incontinence. But since being more mindful of voiding habits and coordination of pelvic bracing pt states she has only had 1-2 episodes of leakage since last session. Pt also denies pelvic pain with intercourse since last session. Pt has completed bladder diary, but forgot to bring to this appt. Will bring to next session for review. Consent: Patient verbally consented to external manual work to assess strength/function of her pelvic floor with no red flags present 7/27/2021. Patient was appropriately draped and only areas that were being treated were exposed. Therapist provided detailed explanation of treatment prior to initiation of session. Patient verbalized and demonstrated understanding and provided verbal consent. Consent was checked and received prior to initiating different treatment techniques and checked frequently throughout session.      Objective:  Modalities:   Precautions:  Exercises: Bolded completed 07/27/21 [employer] ELHB: [id number]  Exercise Reps/ Time Weight/ Level Comments   Pelvic model explanation          Urge suppression  Reviewed 7/27/21    Mirror for coordination     FOCUS ON RELAXATION- filling up the entire abdomen + PF cavity allowing PF to natural bulge       goal is to be at 8 sec count with stream  transitions when urges arises - move to standing, firm pressure etc     minimize just in case peeing  kegel with effort, transitions  keep ribs over pelvis  heel raises or toe curls with lower urges  5 quick flicks with lower urgency     sit<>stand   tighten PFM prior to standing then relax once in stranding  tighten prior to sitting and then relax once sitting     exhale with effort and lifting pelvic floor contraction  elevate hips during kegels                            1 min guided meditation HEP       Diaphragmatic breathing with PF lengthening 3'       Happy Baby Stretch 1min       TrA palpation & bracing  Reviewed       TrA + bent knee fall out 10x ea       Piriformis stretch 1min ea      hooklying hip add sets 10x 5\" Ball squeeze + pelvic floor contraction- added 7/27/21   Hooklying hip abd 10x Lime  Added 7/27/21   Bridges 10x       Endurance holds 10x5\"   With external manual palpation for feedback             Sidelying         Clams 10x  lime            Quadruped:       cat/camel 10x ea 5\" hold Added 7/27/21- cued to coordinate with diaphragmatic breathing   Other:          Specific Instructions for next treatment: review bladder diary, assess via Biofeedback using internal sensor, progress PREs core strength, may suggest pessary long term if conservative management fails         Treatment Charges: Mins Units   []  Modalities     [x]  Ther Exercise 27 2   []  Manual Therapy     [x]  Ther Activities 12 1   []  Aquatics     []  Vasocompression     []  Other     Total Treatment time 39 3       Assessment: [x] Progressing toward goals. Time spent at start of session with review of pt's symptoms/symptom management as well as diaphragmatic breathing and coordination with pelvic bracing. Pt was distracted during treatment, but demonstrates good carryover of instructions and puts fourth good effort with exercises. Manually palpated externally during endurance holds for feedback regarding pelvic floor function. Pt mostly elicits good contraction with cues to contract pelvic floor with coordination during diaphragmatic breathing + TA contraction, however, at times PTA palpates pt to bear down. With cuing pt is able to correct. Encouraged pt to use mirror at home for visual guidance and to ensure proper technique. Pt also requires cuing to regroup during exercises as she begins with good pelvic bracing coordination, but looses coordination after a few reps. Again, good correction when cued. Discussed with pt using internal sensor for biofeedback in future visits for visual cuing to aid in coordination of pelvic floor muscle activity. Pt agreeable to try, however not this date as she was anxious to get back to her daughter and states she is on her menses. [] No change. [] Other:  [x] Patient would continue to benefit from skilled physical therapy services in order to: strengthen PF, core, and hips and to improve postural awareness/body mechanics. Problems:   [x]? ? ? Pain: pelvic pain   [x]? ? ? Strength: core, PF + B hip weakness   [x]? ? ? Function: 45.8% impairment on SILVIO, 11 point impairment on PPI  [x]? ? Other: poor (lordotic) posture, discomfort during sex; stress/urge incontinence, reduced B SLS ability, B pes planus; reduced B SLR & B SLS + t-sign; SHAWN       STG: (to be met in 6 treatments)  1. Able to isolate PF musculature  2. ? Strength: PF endurance 5 seconds or greater    3. ? Function:no reports of leaking with cough, sneeze and laugh  4. Independent with Home Exercise Programs  5. Pt to demo ability to engage and maintain TA contraction to indicate improved core stability  6. Pt to maintain B SLS for 15sec or greater without significant compensations to reduce load transfer and improve SL stability & endurance. 7. Pt will report ability to have partner achieve deeper penetration with 50% reduction of pain.       LTG: (to be met in 12 treatments)  1. Pt will report 0/10 average pelvic/hip pain for improved QOL  2. Pt to achieve PF strength of 4/5 & endurance for >8 seconds for optimal PF function   3. Able to perform all advanced ADL's without leaking  4. Bilateral hip/core strength to 4/5 or greater for improve hip stability during activity   5. Pt to demo ability to hold forearm plank for >25 seconds to indicate improved core stability  6. Pt will report a little bit or not all all on the pelvic pain inventory for level of intimacy & 2-3 point reduction in SILVIO to promote positive urogenital function.      7. Pt to maintain B SLS for 30sec or greater without significant compensations to reduce load transfer and improve SL stability & endurance. 8. Pt will report ability to have partner achieve deeper penetration with 90% reduction of pain.       Patient goals: strengthen pelvic floor, stop UI    Pt.  Education:  [x] Yes  [] No  [x] Reviewed Prior HEP/Ed  Method of Education:   [x] Verbal- encouraged using mirror at home for visual feedback during PFM activity   [x] Demo    [x] Written- Updated [employer] access code to include new exercises added today  Comprehension of Education:  [x] Verbalizes understanding. [x] Demonstrates understanding. [x] Needs review of new exercises, and for proper coordination during pelvic bracing. [x] Demonstrates/verbalizes HEP/Ed previously given. Plan: [x] Continue current frequency toward long and short term goals.     [x] Specific Instructions for subsequent treatments: see above      Time In: 1:30pm               Time Out: 2:09pm    Electronically signed by:  Shakir Bradford PTA

## 2023-05-05 DIAGNOSIS — E10.65 UNCONTROLLED TYPE 1 DIABETES MELLITUS WITH HYPERGLYCEMIA (HCC): ICD-10-CM

## 2023-05-08 RX ORDER — PROCHLORPERAZINE 25 MG/1
SUPPOSITORY RECTAL
Qty: 3 EACH | Refills: 3 | Status: SHIPPED | OUTPATIENT
Start: 2023-05-08

## 2023-05-08 NOTE — TELEPHONE ENCOUNTER
LAST VISIT:   3/8/2023     Future Appointments   Date Time Provider Jerilyn Salvador   8/8/2023 10:00 AM Meek Johnson MD [facility]

## 2023-05-12 ENCOUNTER — CARE COORDINATION (OUTPATIENT)
Dept: CARE COORDINATION | Age: 53
End: 2023-05-12

## 2023-05-12 NOTE — CARE COORDINATION
Left VM message asking patient to return call for care management follow up, check if spoke with rep from [place] waiver program for service eval. Will call again in a week.     Future Appointments   Date Time Provider Jerilyn Salvador   5/24/2023  1:00 PM [facility] [address] [facility]   8/8/2023 10:00 AM MD SAMRA Cifuentes

## 2023-05-17 RX ORDER — ALBUTEROL SULFATE 90 UG/1
AEROSOL, METERED RESPIRATORY (INHALATION)
Qty: 54 G | Refills: 1 | Status: SHIPPED | OUTPATIENT
Start: 2023-05-17

## 2023-05-17 NOTE — TELEPHONE ENCOUNTER
LAST VISIT:   3/8/2023     Future Appointments   Date Time Provider Jerilyn Salvador   5/24/2023  1:00 PM Luis F Cobb  [facility]   8/8/2023 10:00 AM MD SAMRA Cardoso

## 2023-05-18 ENCOUNTER — CARE COORDINATION (OUTPATIENT)
Dept: CARE COORDINATION | Age: 53
End: 2023-05-18

## 2023-05-18 ASSESSMENT — ENCOUNTER SYMPTOMS: DYSPNEA ASSOCIATED WITH: EXERTION

## 2023-05-18 NOTE — CARE COORDINATION
Ambulatory Care Coordination Note  2023    Patient Current Location: [facility]     ACM contacted the patient by telephone. Verified name and  with patient as identifiers. Provided introduction to self, and explanation of the ACM role. Challenges to be reviewed by the provider   Additional needs identified to be addressed with provider: No  none               Method of communication with provider: none. ACM: Марина Zavala RN    He has PCP appt next week. He tried to reschedule GI appt but their office was closed, he stated will call tomorrow. Still pain with swallowing food. He was sent Glucerna which he doesn't like instead of Boost because no Boost in stock. Still relies on that for most of his nutrition since not eating much. DM- blood sugars low probably from not eating much. He is using Dex Com, has all components and all working. Highest about 180 the past week, had a couple lows in 76s. Has glucose tablets he can take. COPD- symptoms stable. Able to do all activities. Currently walking outside and able to talk while walking. Continues to smoke. He didn't pursue going to outpatient therapy. Right now he's working some jobs because he needs money. Leg still hurts but he's already doing a lot of walking himself. No vehicle so walks everywhere. Denied any needs currently. He has transportation available, was given phone number for his insurance case management, encouraged he call them. Graduated from care management. Strongly emphasized need to follow up with all physicians, find a way to keep his appts organized so he doesn't forget them. Stated has all his medications right now. Diabetes Assessment    Medic Alert ID: No  Meal Planning: Avoidance of concentrated sweets   How often do you test your blood sugar?: Meals   Do you have barriers with adherence to non-pharmacologic self-management interventions?  (Nutrition/Exercise/Self-Monitoring): Yes   Have you ever had to go to the ED for

## 2023-06-06 ENCOUNTER — OFFICE VISIT (OUTPATIENT)
Dept: PRIMARY CARE CLINIC | Age: 53
End: 2023-06-06
Payer: MEDICAID

## 2023-06-06 VITALS
HEIGHT: 68 IN | DIASTOLIC BLOOD PRESSURE: 78 MMHG | HEART RATE: 69 BPM | SYSTOLIC BLOOD PRESSURE: 118 MMHG | OXYGEN SATURATION: 96 % | WEIGHT: 123.8 LBS | BODY MASS INDEX: 18.76 KG/M2

## 2023-06-06 DIAGNOSIS — B37.81 CANDIDA ESOPHAGITIS (HCC): ICD-10-CM

## 2023-06-06 DIAGNOSIS — S72.002D CLOSED FRACTURE OF LEFT HIP WITH ROUTINE HEALING, SUBSEQUENT ENCOUNTER: ICD-10-CM

## 2023-06-06 DIAGNOSIS — E43 SEVERE MALNUTRITION (HCC): ICD-10-CM

## 2023-06-06 DIAGNOSIS — N18.31 STAGE 3A CHRONIC KIDNEY DISEASE (HCC): ICD-10-CM

## 2023-06-06 DIAGNOSIS — S62.91XA CLOSED FRACTURE OF RIGHT HAND, INITIAL ENCOUNTER: ICD-10-CM

## 2023-06-06 DIAGNOSIS — S72.142S CLOSED DISPLACED INTERTROCHANTERIC FRACTURE OF LEFT FEMUR, SEQUELA: ICD-10-CM

## 2023-06-06 DIAGNOSIS — E10.65 UNCONTROLLED TYPE 1 DIABETES MELLITUS WITH HYPERGLYCEMIA (HCC): Primary | ICD-10-CM

## 2023-06-06 LAB
CREATININE URINE POCT: 50
MICROALBUMIN/CREAT 24H UR: 150 MG/G{CREAT}
MICROALBUMIN/CREAT UR-RTO: >300

## 2023-06-06 PROCEDURE — 3052F HG A1C>EQUAL 8.0%<EQUAL 9.0%: CPT | Performed by: PHYSICIAN ASSISTANT

## 2023-06-06 PROCEDURE — 82044 UR ALBUMIN SEMIQUANTITATIVE: CPT | Performed by: PHYSICIAN ASSISTANT

## 2023-06-06 PROCEDURE — 99215 OFFICE O/P EST HI 40 MIN: CPT | Performed by: PHYSICIAN ASSISTANT

## 2023-06-06 RX ORDER — CLONAZEPAM 2 MG/1
TABLET ORAL
COMMUNITY
Start: 2023-04-28

## 2023-06-06 ASSESSMENT — ENCOUNTER SYMPTOMS
NAUSEA: 0
DIARRHEA: 0
SHORTNESS OF BREATH: 0
COUGH: 0
WHEEZING: 0
VOMITING: 0

## 2023-06-06 NOTE — PROGRESS NOTES
walker 1 each 0    Blood Glucose Monitoring Suppl (ONE TOUCH ULTRA 2) w/Device KIT 1 kit by Does not apply route daily 1 kit 0    Lancets MISC 1 each by Does not apply route 2 times daily 300 each 1    Alcohol Swabs 70 % PADS Use as needed with blood glucose checks 100 each 0    Insulin Pen Needle ([employer] PEN NEEDLES 29G) 29G X 12MM MISC 1 each by Does not apply route daily 100 each 3    OLANZapine (ZYPREXA) 2.5 MG tablet take 1 tablet by mouth at bedtime       Current Facility-Administered Medications   Medication Dose Route Frequency Provider Last Rate Last Admin    methylPREDNISolone acetate (DEPO-MEDROL) injection 80 mg  80 mg Intra-artICUlar Once Cornelius Ganong, DO        lidocaine 1 % injection 2 mL  2 mL Intra-artICUlar Once Cornelius Ganong, DO        bupivacaine (MARCAINE) 0.25 % injection 5 mg  2 mL Intra-artICUlar Once Cornelius Ganong, DO         Allergies   Allergen Reactions    Azithromycin Swelling    Tramadol Swelling     vomitting    Acetaminophen-Codeine Itching and Other (See Comments)    Codeine Itching    Hydrocodone-Acetaminophen Rash    Ibuprofen Other (See Comments)     Stomach cramps    Morphine Other (See Comments)     Gives headaches       Health Maintenance   Topic Date Due    HIV screen  Never done    Diabetic retinal exam  Never done    Hepatitis B vaccine (1 of 3 - Risk 3-dose series) Never done    COVID-19 Vaccine (3 - Booster for Pfizer series) 07/19/2021    Shingles vaccine (1 of 2) Never done    Diabetic foot exam  04/06/2023    Lipids  11/05/2023    A1C test (Diabetic or Prediabetic)  01/20/2024    Depression Monitoring  02/07/2024    GFR test (Diabetes, CKD 3-4, OR last GFR 15-59)  03/02/2024    Diabetic Alb to Cr ratio (uACR) test  06/06/2024    Colorectal Cancer Screen  04/25/2025    DTaP/Tdap/Td vaccine (2 - Td or Tdap) 01/25/2026    Flu vaccine  Completed    Pneumococcal 0-64 years Vaccine  Completed    Hepatitis A vaccine  Aged Out    Hib vaccine  Aged Out    Meningococcal (ACWY) vaccine

## 2023-06-30 DIAGNOSIS — R47.02 DYSPHASIA: ICD-10-CM

## 2023-06-30 DIAGNOSIS — K22.10 EROSIVE ESOPHAGITIS: ICD-10-CM

## 2023-06-30 RX ORDER — SUCRALFATE ORAL 1 G/10ML
SUSPENSION ORAL
Qty: 414 ML | Refills: 3 | Status: SHIPPED | OUTPATIENT
Start: 2023-06-30

## 2023-08-11 ENCOUNTER — TELEPHONE (OUTPATIENT)
Dept: PRIMARY CARE CLINIC | Age: 53
End: 2023-08-11

## 2023-08-11 NOTE — TELEPHONE ENCOUNTER
[facility] calling. Asking you to addend the last office note. In order to get approval for the [facility] bed and wheelchair, it has to state that the wheelchair is being used in the home to r/o use of a cane and walker and that it will help pt with his daily task. Have to R/o use of standard bed, stating pt needs to change positions frequently and needs 30 degree elevation and a standard bed cannot provide that.     Fax to [facility] @ [phone]

## 2023-08-18 ENCOUNTER — TELEPHONE (OUTPATIENT)
Dept: PRIMARY CARE CLINIC | Age: 53
End: 2023-08-18

## 2023-08-18 NOTE — TELEPHONE ENCOUNTER
What dose is patient currently on on, and what is the thoughts of the patient's Psychiatrist. I will leave all psych medication decisions up to them. If they don't want patient to be on both then I agree with them.

## 2023-08-18 NOTE — TELEPHONE ENCOUNTER
Pt called back with fax # [phone]. States that the letter needs DX, Prognosis. Also that these 2 meds will not interfere with any of the other meds he is currently on.

## 2023-08-18 NOTE — TELEPHONE ENCOUNTER
Tried calling Dr. Trey England office, no office hours on Fri. Pt states that he cannot sleep without the Trazodone. Doesn't care about the Klonipin.

## 2023-08-18 NOTE — TELEPHONE ENCOUNTER
Please call psychiatrist to see what dose they are on as well as why they are taking it. I cannot write no interference since the can both cause increased fall risk and Trazodone can increase GI bleeding risk. He will have to have benefit risk discussion with psych doctor. It it ultimately up to them to decide which meds patient takes for his mental health. I have not had to write this before, so I would like to have us contact the psychiatrist to see why they want this.

## 2023-08-18 NOTE — TELEPHONE ENCOUNTER
Pt calling. States that his psyc Dr. Bear Diaz something in writing stating  that it is ok for pt to be on Trazodone and Kloniopin. Will have fax # when we call back, or may have to  note.

## 2023-08-20 DIAGNOSIS — K22.10 EROSIVE ESOPHAGITIS: ICD-10-CM

## 2023-08-20 DIAGNOSIS — R47.02 DYSPHASIA: ICD-10-CM

## 2023-08-21 RX ORDER — PANTOPRAZOLE SODIUM 40 MG/1
TABLET, DELAYED RELEASE ORAL
Qty: 60 TABLET | Refills: 5 | Status: SHIPPED | OUTPATIENT
Start: 2023-08-21

## 2023-08-24 ENCOUNTER — TELEPHONE (OUTPATIENT)
Dept: PRIMARY CARE CLINIC | Age: 53
End: 2023-08-24

## 2023-08-24 NOTE — TELEPHONE ENCOUNTER
Pt called and stated that he needs a prescription for wheelchair leg extensions. The ones on the wheelchair are too short and hurt his legs after he has been in the chair for awhile. The company is [employer] and their phone number is [phone]. The fax number is [phone]. Please advise.

## 2023-09-05 ENCOUNTER — TELEPHONE (OUTPATIENT)
Dept: CARE COORDINATION | Age: 53
End: 2023-09-05

## 2023-09-06 ENCOUNTER — TELEPHONE (OUTPATIENT)
Dept: PRIMARY CARE CLINIC | Age: 53
End: 2023-09-06

## 2023-09-06 NOTE — TELEPHONE ENCOUNTER
----- Message from Jim Queen sent at 9/5/2023  9:36 AM EDT -----  Subject: Message to Provider    QUESTIONS  Information for Provider? Pt is calling in states he needs script for   wheelchair leg extensions re-faxed to [address] States they   have not received anything form the office yet. [address]   phone number is [phone]. The fax number is [phone]. Please   advise.   ---------------------------------------------------------------------------  --------------  Yesy Lopez LING  [phone]; OK to leave message on voicemail  ---------------------------------------------------------------------------  --------------  SCRIPT ANSWERS  Relationship to Patient?  Self

## 2023-10-09 ENCOUNTER — TELEPHONE (OUTPATIENT)
Dept: PRIMARY CARE CLINIC | Age: 53
End: 2023-10-09

## 2023-10-09 DIAGNOSIS — R47.02 DYSPHASIA: ICD-10-CM

## 2023-10-09 DIAGNOSIS — K22.10 EROSIVE ESOPHAGITIS: ICD-10-CM

## 2023-10-09 DIAGNOSIS — E10.65 UNCONTROLLED TYPE 1 DIABETES MELLITUS WITH HYPERGLYCEMIA (HCC): Primary | ICD-10-CM

## 2023-10-09 RX ORDER — INSULIN GLARGINE 100 [IU]/ML
INJECTION, SOLUTION SUBCUTANEOUS
Qty: 10 ML | Refills: 3 | Status: SHIPPED | OUTPATIENT
Start: 2023-10-09

## 2023-10-09 RX ORDER — SUCRALFATE ORAL 1 G/10ML
SUSPENSION ORAL
Qty: 414 ML | Refills: 3 | Status: SHIPPED | OUTPATIENT
Start: 2023-10-09

## 2023-10-09 RX ORDER — INSULIN GLARGINE 100 [IU]/ML
INJECTION, SOLUTION SUBCUTANEOUS
Qty: 9 ML | Refills: 3 | OUTPATIENT
Start: 2023-10-09

## 2023-10-09 NOTE — TELEPHONE ENCOUNTER
Pt asking for his lantus to be sent in. States that he takes 25 Units am and 15 Units at HS. Uses [facility] [address] listed. Did not pend rx due to it showing 15 units.

## 2023-10-09 NOTE — TELEPHONE ENCOUNTER
Patient has multiple doses of insulin listed in chart. Please see how patient is taking his insulin and ensure that this is correct on request and in his chart.

## 2023-10-10 NOTE — TELEPHONE ENCOUNTER
Patient takes 25 units of Lantus in morning and 15 units of Lantus at night. He uses the pen. Also does Sliding scale of Novolog, 5 units before each meal (three times a day) and more if his sugars are high. Yesterday his pharmacy did not have the Lantus Solostar or Lantus vials. He is out of Lantus. They told him they would have the Lantus Solostar pen today. Please send script for pen to [facility]. He does not have a ride to his appointment on 10/11/23. It will be rescheduled.

## 2023-10-25 ENCOUNTER — OFFICE VISIT (OUTPATIENT)
Dept: PRIMARY CARE CLINIC | Age: 53
End: 2023-10-25
Payer: MEDICAID

## 2023-10-25 VITALS
HEIGHT: 68 IN | HEART RATE: 73 BPM | BODY MASS INDEX: 20.07 KG/M2 | OXYGEN SATURATION: 96 % | DIASTOLIC BLOOD PRESSURE: 90 MMHG | WEIGHT: 132.4 LBS | SYSTOLIC BLOOD PRESSURE: 140 MMHG

## 2023-10-25 DIAGNOSIS — I21.4 NSTEMI (NON-ST ELEVATED MYOCARDIAL INFARCTION) (HCC): ICD-10-CM

## 2023-10-25 DIAGNOSIS — J44.9 CHRONIC OBSTRUCTIVE PULMONARY DISEASE, UNSPECIFIED COPD TYPE (HCC): ICD-10-CM

## 2023-10-25 DIAGNOSIS — I10 ESSENTIAL (PRIMARY) HYPERTENSION: ICD-10-CM

## 2023-10-25 DIAGNOSIS — M79.605 LEFT LEG PAIN: ICD-10-CM

## 2023-10-25 DIAGNOSIS — E43 SEVERE MALNUTRITION (HCC): ICD-10-CM

## 2023-10-25 DIAGNOSIS — R41.3 IMPAIRED MEMORY: ICD-10-CM

## 2023-10-25 DIAGNOSIS — K22.10 EROSIVE ESOPHAGITIS: ICD-10-CM

## 2023-10-25 DIAGNOSIS — R47.02 DYSPHASIA: ICD-10-CM

## 2023-10-25 DIAGNOSIS — E10.65 UNCONTROLLED TYPE 1 DIABETES MELLITUS WITH HYPERGLYCEMIA (HCC): ICD-10-CM

## 2023-10-25 DIAGNOSIS — N18.31 STAGE 3A CHRONIC KIDNEY DISEASE (HCC): ICD-10-CM

## 2023-10-25 DIAGNOSIS — E11.10 DIABETIC KETOACIDOSIS WITHOUT COMA ASSOCIATED WITH TYPE 2 DIABETES MELLITUS (HCC): ICD-10-CM

## 2023-10-25 DIAGNOSIS — Z23 NEED FOR VACCINATION: Primary | ICD-10-CM

## 2023-10-25 DIAGNOSIS — B37.81 CANDIDA ESOPHAGITIS (HCC): ICD-10-CM

## 2023-10-25 DIAGNOSIS — Z12.5 SCREENING FOR PROSTATE CANCER: ICD-10-CM

## 2023-10-25 DIAGNOSIS — Z59.819 HOUSING INSTABILITY: ICD-10-CM

## 2023-10-25 LAB — HBA1C MFR BLD: 8.1 %

## 2023-10-25 PROCEDURE — 3052F HG A1C>EQUAL 8.0%<EQUAL 9.0%: CPT | Performed by: PHYSICIAN ASSISTANT

## 2023-10-25 PROCEDURE — 3080F DIAST BP >= 90 MM HG: CPT | Performed by: PHYSICIAN ASSISTANT

## 2023-10-25 PROCEDURE — 99214 OFFICE O/P EST MOD 30 MIN: CPT | Performed by: PHYSICIAN ASSISTANT

## 2023-10-25 PROCEDURE — 90471 IMMUNIZATION ADMIN: CPT | Performed by: PHYSICIAN ASSISTANT

## 2023-10-25 PROCEDURE — 3077F SYST BP >= 140 MM HG: CPT | Performed by: PHYSICIAN ASSISTANT

## 2023-10-25 PROCEDURE — 90750 HZV VACC RECOMBINANT IM: CPT | Performed by: PHYSICIAN ASSISTANT

## 2023-10-25 PROCEDURE — 83036 HEMOGLOBIN GLYCOSYLATED A1C: CPT | Performed by: PHYSICIAN ASSISTANT

## 2023-10-25 RX ORDER — PANTOPRAZOLE SODIUM 40 MG/1
40 TABLET, DELAYED RELEASE ORAL 2 TIMES DAILY
Qty: 60 TABLET | Refills: 5 | Status: SHIPPED | OUTPATIENT
Start: 2023-10-25

## 2023-10-25 RX ORDER — PAROXETINE HYDROCHLORIDE 20 MG/1
20 TABLET, FILM COATED ORAL
COMMUNITY
Start: 2023-10-02 | End: 2023-10-25

## 2023-10-25 RX ORDER — ATORVASTATIN CALCIUM 40 MG/1
40 TABLET, FILM COATED ORAL DAILY
Qty: 30 TABLET | Refills: 3 | Status: SHIPPED | OUTPATIENT
Start: 2023-10-25

## 2023-10-25 RX ORDER — INSULIN ASPART 100 [IU]/ML
10 INJECTION, SOLUTION INTRAVENOUS; SUBCUTANEOUS
Qty: 5 ADJUSTABLE DOSE PRE-FILLED PEN SYRINGE | Refills: 3 | Status: SHIPPED | OUTPATIENT
Start: 2023-10-25

## 2023-10-25 RX ORDER — INSULIN GLARGINE 100 [IU]/ML
100 INJECTION, SOLUTION SUBCUTANEOUS DAILY
COMMUNITY
Start: 2023-10-11

## 2023-10-25 RX ORDER — LISINOPRIL 10 MG/1
10 TABLET ORAL DAILY
Qty: 30 TABLET | Refills: 0 | Status: SHIPPED | OUTPATIENT
Start: 2023-10-25

## 2023-10-25 SDOH — ECONOMIC STABILITY - HOUSING INSECURITY: HOUSING INSTABILITY UNSPECIFIED: Z59.819

## 2023-10-25 ASSESSMENT — ENCOUNTER SYMPTOMS
SORE THROAT: 0
COUGH: 0
CHEST TIGHTNESS: 0
SHORTNESS OF BREATH: 0
ABDOMINAL PAIN: 0
CONSTIPATION: 0
DIARRHEA: 0
ABDOMINAL DISTENTION: 0

## 2023-10-25 NOTE — PROGRESS NOTES
[address] PRIMARY CARE  [address] [address]  Dept: [phone]    Lars Hernandez is a 47 y.o. male Established patient, who presents today for his medical conditions/complaints as noted below. Chief Complaint   Patient presents with    Diabetes     F/u    Other     Patient states PT is not really helping , wants different treatment options, uses THC to help with the constant pain but that only helps so much. States LT leg is constantly sleep. Patient also needs hadicap placard renewed. Patient also states his memory is worse and having hard time remembering. HPI:     HPI: The patient is a pleasant 26-year-old male who presents today with concerns of diabetes follow-up. Patient has significant past medical history of NSTEMI, housing instability, esophagitis, chronic kidney disease, malnutrition, and major depressive disorder. He is taking lantus 25 in morning and 15 at night. Taking 5 units with each meal.     Patient admits to having hard time remembering things that he should easily remember. Patient also having troubles with hip pain in physical therapy however not helping at this time. Patient follows with Ortho.     Reviewed prior notes None  Reviewed previous Labs, Imaging, and Hospital Records    LDL Cholesterol (mg/dL)   Date Value   11/05/2022 95   04/06/2022 185 (H)       (goal LDL is <100)   AST (U/L)   Date Value   02/27/2023 18     ALT (U/L)   Date Value   02/27/2023 21     BUN (mg/dL)   Date Value   03/02/2023 8     Hemoglobin A1C (%)   Date Value   01/20/2023 8.0     TSH (uIU/mL)   Date Value   11/07/2022 1.41     BP Readings from Last 3 Encounters:   10/25/23 (!) 140/90   06/06/23 118/78   03/08/23 124/80          (goal 120/80)  Hemoglobin A1C   Date Value Ref Range Status   01/20/2023 8.0 % Final     Past Medical History:   Diagnosis Date    Diabetes mellitus ([address])     Obstructive lung disease ([address])     8/2022    Panic

## 2023-10-27 ENCOUNTER — TELEPHONE (OUTPATIENT)
Dept: PAIN MANAGEMENT | Age: 53
End: 2023-10-27

## 2023-10-27 NOTE — TELEPHONE ENCOUNTER
Called pt today to see if he wanted to come back to see dr Brenden Morrison after his last visit in 02/2023 we have another referral for him VM Box was full

## 2024-01-09 DIAGNOSIS — E10.65 UNCONTROLLED TYPE 1 DIABETES MELLITUS WITH HYPERGLYCEMIA (HCC): ICD-10-CM

## 2024-01-09 RX ORDER — PROCHLORPERAZINE 25 MG/1
SUPPOSITORY RECTAL
Qty: 3 EACH | Refills: 3 | Status: SHIPPED | OUTPATIENT
Start: 2024-01-09

## 2024-01-09 RX ORDER — ALBUTEROL SULFATE 90 UG/1
AEROSOL, METERED RESPIRATORY (INHALATION)
Qty: 54 G | Refills: 1 | Status: SHIPPED | OUTPATIENT
Start: 2024-01-09

## 2024-01-10 ENCOUNTER — TELEPHONE (OUTPATIENT)
Dept: PRIMARY CARE CLINIC | Age: 54
End: 2024-01-10

## 2024-01-10 NOTE — TELEPHONE ENCOUNTER
Pt called to see if we had received a fax from [facility] yet. We have not received anything. Verified the fax number with the patient to make sure they were given the correct number. Pt stated he will call again and ask the the Medical form be sent to the office.

## 2024-01-12 DIAGNOSIS — E10.65 UNCONTROLLED TYPE 1 DIABETES MELLITUS WITH HYPERGLYCEMIA (HCC): ICD-10-CM

## 2024-01-15 RX ORDER — PROCHLORPERAZINE 25 MG/1
SUPPOSITORY RECTAL
Qty: 1 EACH | Refills: 4 | Status: SHIPPED | OUTPATIENT
Start: 2024-01-15

## 2024-01-15 RX ORDER — PEN NEEDLE, DIABETIC 32GX 5/32"
NEEDLE, DISPOSABLE MISCELLANEOUS
Qty: 120 EACH | Refills: 3 | Status: SHIPPED | OUTPATIENT
Start: 2024-01-15

## 2024-01-15 NOTE — TELEPHONE ENCOUNTER
LAST VISIT:   10/25/2023     Future Appointments   Date Time Provider Department Center   1/25/2024 11:00 AM [facility]

## 2024-01-23 DIAGNOSIS — E10.65 UNCONTROLLED TYPE 1 DIABETES MELLITUS WITH HYPERGLYCEMIA (HCC): ICD-10-CM

## 2024-01-23 RX ORDER — INSULIN ASPART 100 [IU]/ML
10 INJECTION, SOLUTION INTRAVENOUS; SUBCUTANEOUS
Qty: 5 ADJUSTABLE DOSE PRE-FILLED PEN SYRINGE | Refills: 3 | Status: SHIPPED | OUTPATIENT
Start: 2024-01-23

## 2024-01-23 NOTE — TELEPHONE ENCOUNTER
LAST VISIT:   10/25/2023     Future Appointments   Date Time Provider Department Center   1/25/2024 11:00 AM Jourdan Paris PA [facility]       Pharmacy verified? Yes    [facility], [place] - [address] - P [phone] - F [phone]

## 2024-02-05 DIAGNOSIS — I10 ESSENTIAL (PRIMARY) HYPERTENSION: ICD-10-CM

## 2024-02-05 RX ORDER — LISINOPRIL 10 MG/1
10 TABLET ORAL DAILY
Qty: 30 TABLET | Refills: 0 | Status: SHIPPED | OUTPATIENT
Start: 2024-02-05

## 2024-04-05 DIAGNOSIS — E10.65 UNCONTROLLED TYPE 1 DIABETES MELLITUS WITH HYPERGLYCEMIA (HCC): ICD-10-CM

## 2024-04-05 DIAGNOSIS — K22.10 EROSIVE ESOPHAGITIS: ICD-10-CM

## 2024-04-05 DIAGNOSIS — R47.02 DYSPHASIA: ICD-10-CM

## 2024-04-08 RX ORDER — PEN NEEDLE, DIABETIC 32GX 5/32"
1 NEEDLE, DISPOSABLE MISCELLANEOUS 4 TIMES DAILY
Qty: 120 EACH | Refills: 3 | OUTPATIENT
Start: 2024-04-08

## 2024-04-08 RX ORDER — PROCHLORPERAZINE 25 MG/1
SUPPOSITORY RECTAL
Qty: 3 EACH | Refills: 3 | OUTPATIENT
Start: 2024-04-08

## 2024-04-08 RX ORDER — ATORVASTATIN CALCIUM 40 MG/1
40 TABLET, FILM COATED ORAL DAILY
Qty: 30 TABLET | Refills: 3 | OUTPATIENT
Start: 2024-04-08

## 2024-04-08 RX ORDER — PANTOPRAZOLE SODIUM 40 MG/1
40 TABLET, DELAYED RELEASE ORAL 2 TIMES DAILY
Qty: 60 TABLET | Refills: 5 | OUTPATIENT
Start: 2024-04-08

## 2024-04-10 DIAGNOSIS — E10.65 UNCONTROLLED TYPE 1 DIABETES MELLITUS WITH HYPERGLYCEMIA (HCC): ICD-10-CM

## 2024-04-10 DIAGNOSIS — K22.10 EROSIVE ESOPHAGITIS: ICD-10-CM

## 2024-04-10 DIAGNOSIS — R47.02 DYSPHASIA: ICD-10-CM

## 2024-04-10 RX ORDER — PROCHLORPERAZINE 25 MG/1
SUPPOSITORY RECTAL
Qty: 3 EACH | Refills: 3 | OUTPATIENT
Start: 2024-04-10

## 2024-04-10 RX ORDER — PANTOPRAZOLE SODIUM 40 MG/1
40 TABLET, DELAYED RELEASE ORAL 2 TIMES DAILY
Qty: 60 TABLET | Refills: 5 | OUTPATIENT
Start: 2024-04-10

## 2024-04-10 RX ORDER — ATORVASTATIN CALCIUM 40 MG/1
40 TABLET, FILM COATED ORAL DAILY
Qty: 30 TABLET | Refills: 3 | OUTPATIENT
Start: 2024-04-10

## 2024-04-11 NOTE — TELEPHONE ENCOUNTER
Patient has no working phone number.  Letter sent to schedule an appointment with Jourdan.  Pharmacy also notified patient needs appt.

## 2024-04-29 DIAGNOSIS — E10.65 UNCONTROLLED TYPE 1 DIABETES MELLITUS WITH HYPERGLYCEMIA (HCC): ICD-10-CM

## 2024-04-29 RX ORDER — PROCHLORPERAZINE 25 MG/1
1 SUPPOSITORY RECTAL WEEKLY
Qty: 1 EACH | Refills: 0 | Status: SHIPPED | OUTPATIENT
Start: 2024-04-29

## 2024-04-29 NOTE — TELEPHONE ENCOUNTER
----- Message from Yakelin Holden sent at 4/29/2024  9:52 AM EDT -----  Subject: Refill Request    QUESTIONS  Name of Medication? Continuous Blood Gluc Transmit (DEXCOM G6 TRANSMITTER)   MISC  Patient-reported dosage and instructions? G6  How many days do you have left? 0  Preferred Pharmacy? [facility]  Pharmacy phone number (if available)? [phone]  ---------------------------------------------------------------------------  --------------  CALL BACK INFO  What is the best way for the office to contact you? OK to leave message on   voicemail  Preferred Call Back Phone Number? [phone]  ---------------------------------------------------------------------------  --------------  SCRIPT ANSWERS  Relationship to Patient? Self

## 2024-04-30 ENCOUNTER — TELEPHONE (OUTPATIENT)
Dept: PRIMARY CARE CLINIC | Age: 54
End: 2024-04-30

## 2024-04-30 DIAGNOSIS — E10.65 UNCONTROLLED TYPE 1 DIABETES MELLITUS WITH HYPERGLYCEMIA (HCC): ICD-10-CM

## 2024-04-30 DIAGNOSIS — K22.10 EROSIVE ESOPHAGITIS: ICD-10-CM

## 2024-04-30 DIAGNOSIS — R47.02 DYSPHASIA: ICD-10-CM

## 2024-04-30 RX ORDER — PANTOPRAZOLE SODIUM 40 MG/1
40 TABLET, DELAYED RELEASE ORAL 2 TIMES DAILY
Qty: 60 TABLET | Refills: 5 | Status: SHIPPED | OUTPATIENT
Start: 2024-04-30

## 2024-04-30 RX ORDER — ATORVASTATIN CALCIUM 40 MG/1
40 TABLET, FILM COATED ORAL DAILY
Qty: 30 TABLET | Refills: 3 | Status: SHIPPED | OUTPATIENT
Start: 2024-04-30

## 2024-04-30 RX ORDER — PROCHLORPERAZINE 25 MG/1
1 SUPPOSITORY RECTAL
Qty: 3 EACH | Refills: 3 | Status: SHIPPED | OUTPATIENT
Start: 2024-04-30

## 2024-04-30 NOTE — TELEPHONE ENCOUNTER
----- Message from Yakelin Holden sent at 4/29/2024  9:53 AM EDT -----  Subject: Message to Provider    QUESTIONS  Information for Provider? Patient called and would like to have someone   from the office call him regarding supplies for Boost. Please call  ---------------------------------------------------------------------------  --------------  CALL BACK INFO  [phone]; OK to leave message on voicemail  ---------------------------------------------------------------------------  --------------  SCRIPT ANSWERS  Relationship to Patient? Self

## 2024-05-14 ENCOUNTER — OFFICE VISIT (OUTPATIENT)
Dept: PRIMARY CARE CLINIC | Age: 54
End: 2024-05-14
Payer: MEDICAID

## 2024-05-14 VITALS
SYSTOLIC BLOOD PRESSURE: 120 MMHG | OXYGEN SATURATION: 98 % | HEART RATE: 58 BPM | WEIGHT: 136.2 LBS | DIASTOLIC BLOOD PRESSURE: 76 MMHG | HEIGHT: 68 IN | BODY MASS INDEX: 20.64 KG/M2

## 2024-05-14 DIAGNOSIS — E43 SEVERE MALNUTRITION (HCC): ICD-10-CM

## 2024-05-14 DIAGNOSIS — Z87.891 PERSONAL HISTORY OF TOBACCO USE: ICD-10-CM

## 2024-05-14 DIAGNOSIS — N18.31 STAGE 3A CHRONIC KIDNEY DISEASE (HCC): ICD-10-CM

## 2024-05-14 DIAGNOSIS — M79.605 LEFT LEG PAIN: ICD-10-CM

## 2024-05-14 DIAGNOSIS — I21.4 NSTEMI (NON-ST ELEVATED MYOCARDIAL INFARCTION) (HCC): Primary | ICD-10-CM

## 2024-05-14 DIAGNOSIS — M79.641 RIGHT HAND PAIN: ICD-10-CM

## 2024-05-14 DIAGNOSIS — G47.00 INSOMNIA, UNSPECIFIED TYPE: ICD-10-CM

## 2024-05-14 DIAGNOSIS — F33.41 RECURRENT MAJOR DEPRESSIVE DISORDER, IN PARTIAL REMISSION (HCC): ICD-10-CM

## 2024-05-14 DIAGNOSIS — B18.2 CHRONIC HEPATITIS C WITHOUT HEPATIC COMA (HCC): ICD-10-CM

## 2024-05-14 DIAGNOSIS — Z12.5 SCREENING FOR PROSTATE CANCER: ICD-10-CM

## 2024-05-14 DIAGNOSIS — E10.65 UNCONTROLLED TYPE 1 DIABETES MELLITUS WITH HYPERGLYCEMIA (HCC): ICD-10-CM

## 2024-05-14 DIAGNOSIS — B37.81 CANDIDA ESOPHAGITIS (HCC): ICD-10-CM

## 2024-05-14 LAB — HBA1C MFR BLD: 8.2 %

## 2024-05-14 PROCEDURE — G0296 VISIT TO DETERM LDCT ELIG: HCPCS | Performed by: PHYSICIAN ASSISTANT

## 2024-05-14 PROCEDURE — 99214 OFFICE O/P EST MOD 30 MIN: CPT | Performed by: PHYSICIAN ASSISTANT

## 2024-05-14 PROCEDURE — 3052F HG A1C>EQUAL 8.0%<EQUAL 9.0%: CPT | Performed by: PHYSICIAN ASSISTANT

## 2024-05-14 PROCEDURE — 83036 HEMOGLOBIN GLYCOSYLATED A1C: CPT | Performed by: PHYSICIAN ASSISTANT

## 2024-05-14 RX ORDER — BLOOD-GLUCOSE METER
1 EACH MISCELLANEOUS DAILY
Qty: 1 KIT | Refills: 0 | Status: SHIPPED | OUTPATIENT
Start: 2024-05-14 | End: 2024-05-14

## 2024-05-14 RX ORDER — TRAZODONE HYDROCHLORIDE 300 MG/1
300 TABLET ORAL NIGHTLY
Qty: 90 TABLET | Refills: 3 | Status: SHIPPED | OUTPATIENT
Start: 2024-05-14

## 2024-05-14 RX ORDER — QUETIAPINE FUMARATE 50 MG/1
50 TABLET, FILM COATED ORAL 2 TIMES DAILY
Qty: 60 TABLET | Refills: 3 | Status: CANCELLED | OUTPATIENT
Start: 2024-05-14

## 2024-05-14 RX ORDER — BLOOD-GLUCOSE METER
1 EACH MISCELLANEOUS DAILY
Qty: 1 KIT | Refills: 0 | Status: SHIPPED | OUTPATIENT
Start: 2024-05-14

## 2024-05-14 RX ORDER — LANCETS 30 GAUGE
1 EACH MISCELLANEOUS 3 TIMES DAILY
Qty: 200 EACH | Refills: 0 | Status: SHIPPED | OUTPATIENT
Start: 2024-05-14

## 2024-05-14 RX ORDER — GLUCOSAMINE HCL/CHONDROITIN SU 500-400 MG
CAPSULE ORAL
Qty: 200 STRIP | Refills: 0 | Status: SHIPPED | OUTPATIENT
Start: 2024-05-14 | End: 2024-05-14

## 2024-05-14 RX ORDER — GLUCOSAMINE HCL/CHONDROITIN SU 500-400 MG
CAPSULE ORAL
Qty: 200 STRIP | Refills: 0 | Status: SHIPPED | OUTPATIENT
Start: 2024-05-14

## 2024-05-14 RX ORDER — INSULIN GLARGINE 100 [IU]/ML
INJECTION, SOLUTION SUBCUTANEOUS
Qty: 10 ML | Refills: 3 | Status: SHIPPED
Start: 2024-05-14

## 2024-05-14 SDOH — ECONOMIC STABILITY: FOOD INSECURITY: WITHIN THE PAST 12 MONTHS, YOU WORRIED THAT YOUR FOOD WOULD RUN OUT BEFORE YOU GOT MONEY TO BUY MORE.: NEVER TRUE

## 2024-05-14 SDOH — ECONOMIC STABILITY: HOUSING INSECURITY
IN THE LAST 12 MONTHS, WAS THERE A TIME WHEN YOU DID NOT HAVE A STEADY PLACE TO SLEEP OR SLEPT IN A SHELTER (INCLUDING NOW)?: NO

## 2024-05-14 SDOH — ECONOMIC STABILITY: FOOD INSECURITY: WITHIN THE PAST 12 MONTHS, THE FOOD YOU BOUGHT JUST DIDN'T LAST AND YOU DIDN'T HAVE MONEY TO GET MORE.: NEVER TRUE

## 2024-05-14 SDOH — ECONOMIC STABILITY: INCOME INSECURITY: HOW HARD IS IT FOR YOU TO PAY FOR THE VERY BASICS LIKE FOOD, HOUSING, MEDICAL CARE, AND HEATING?: NOT VERY HARD

## 2024-05-14 ASSESSMENT — PATIENT HEALTH QUESTIONNAIRE - PHQ9
5. POOR APPETITE OR OVEREATING: NOT AT ALL
2. FEELING DOWN, DEPRESSED OR HOPELESS: NEARLY EVERY DAY
10. IF YOU CHECKED OFF ANY PROBLEMS, HOW DIFFICULT HAVE THESE PROBLEMS MADE IT FOR YOU TO DO YOUR WORK, TAKE CARE OF THINGS AT HOME, OR GET ALONG WITH OTHER PEOPLE: NOT DIFFICULT AT ALL
SUM OF ALL RESPONSES TO PHQ QUESTIONS 1-9: 9
SUM OF ALL RESPONSES TO PHQ QUESTIONS 1-9: 9
SUM OF ALL RESPONSES TO PHQ9 QUESTIONS 1 & 2: 3
6. FEELING BAD ABOUT YOURSELF - OR THAT YOU ARE A FAILURE OR HAVE LET YOURSELF OR YOUR FAMILY DOWN: NOT AT ALL
1. LITTLE INTEREST OR PLEASURE IN DOING THINGS: NOT AT ALL
4. FEELING TIRED OR HAVING LITTLE ENERGY: NEARLY EVERY DAY
9. THOUGHTS THAT YOU WOULD BE BETTER OFF DEAD, OR OF HURTING YOURSELF: NOT AT ALL
8. MOVING OR SPEAKING SO SLOWLY THAT OTHER PEOPLE COULD HAVE NOTICED. OR THE OPPOSITE, BEING SO FIGETY OR RESTLESS THAT YOU HAVE BEEN MOVING AROUND A LOT MORE THAN USUAL: NOT AT ALL
3. TROUBLE FALLING OR STAYING ASLEEP: NEARLY EVERY DAY
SUM OF ALL RESPONSES TO PHQ QUESTIONS 1-9: 9
SUM OF ALL RESPONSES TO PHQ QUESTIONS 1-9: 9
7. TROUBLE CONCENTRATING ON THINGS, SUCH AS READING THE NEWSPAPER OR WATCHING TELEVISION: NOT AT ALL

## 2024-05-14 ASSESSMENT — ENCOUNTER SYMPTOMS
ABDOMINAL DISTENTION: 0
SORE THROAT: 0
CHEST TIGHTNESS: 0
CONSTIPATION: 0
TROUBLE SWALLOWING: 1
COUGH: 0
SHORTNESS OF BREATH: 0
DIARRHEA: 0

## 2024-05-14 NOTE — PROGRESS NOTES
tablet 5    glucose (TRUEPLUS GLUCOSE) 4 g chewable tablet Take 1 tablet by mouth as needed for Low blood sugar 50 tablet 0    Continuous Blood Gluc Transmit (DEXCOM G6 TRANSMITTER) MISC 1 each by Does not apply route once a week 1 each 0    lisinopril (PRINIVIL;ZESTRIL) 10 MG tablet take 1 tablet by mouth once daily 30 tablet 0    insulin aspart (NOVOLOG FLEXPEN) 100 UNIT/ML injection pen Inject 10 Units into the skin 3 times daily (before meals) 5 Adjustable Dose Pre-filled Pen Syringe 3    Insulin Pen Needle (BD PEN NEEDLE ADEOLA 2ND GEN) 32G X 4 MM MISC use 1 PEN NEEDLE to inject MEDICATION subcutaneously four times a day 120 each 3    Continuous Blood Gluc  (DEXCOM G6 ) JANEL use as directed 1 each 4    VENTOLIN  (90 Base) MCG/ACT inhaler inhale 2 puffs by mouth and INTO THE LUNGS four times a day if needed FOR wheezing 54 g 1    Handicap Placard MISC by Does not apply route EXP: 10- 1 each 0    diclofenac sodium (VOLTAREN) 1 % GEL Apply 2 g topically 2 times daily 2 g 1    Continuous Blood Gluc Transmit (DEXCOM G6 TRANSMITTER) MISC 1 each by Does not apply route every 10 days 1 each 4    Misc. Devices (STEP N REST WALKER) MISC 1 each by Does not apply route continuous Seated, wheeled walker 1 each 0    Lancets MISC 1 each by Does not apply route 2 times daily 300 each 1    Alcohol Swabs 70 % PADS Use as needed with blood glucose checks 100 each 0    Insulin Pen Needle ([employer] PEN NEEDLES 29G) 29G X 12MM MISC 1 each by Does not apply route daily 100 each 3     Current Facility-Administered Medications   Medication Dose Route Frequency Provider Last Rate Last Admin    methylPREDNISolone acetate (DEPO-MEDROL) injection 80 mg  80 mg Intra-artICUlar Once Snyder, Trev, DO        lidocaine 1 % injection 2 mL  2 mL Intra-artICUlar Once Snyder, Trev, DO        bupivacaine (MARCAINE) 0.25 % injection 5 mg  2 mL Intra-artICUlar Once Snyder, Trev, DO         Allergies   Allergen Reactions

## 2024-05-23 ENCOUNTER — TELEPHONE (OUTPATIENT)
Dept: GASTROENTEROLOGY | Age: 54
End: 2024-05-23

## 2024-06-03 DIAGNOSIS — M48.07 SPINAL STENOSIS OF LUMBOSACRAL REGION: ICD-10-CM

## 2024-06-03 DIAGNOSIS — E43 SEVERE MALNUTRITION (HCC): Primary | ICD-10-CM

## 2024-06-24 RX ORDER — ALBUTEROL SULFATE 90 UG/1
AEROSOL, METERED RESPIRATORY (INHALATION)
Qty: 25.5 G | Refills: 4 | Status: SHIPPED | OUTPATIENT
Start: 2024-06-24

## 2024-07-26 DIAGNOSIS — E10.65 UNCONTROLLED TYPE 1 DIABETES MELLITUS WITH HYPERGLYCEMIA (HCC): ICD-10-CM

## 2024-07-26 RX ORDER — CALCIUM CITRATE/VITAMIN D3 200MG-6.25
TABLET ORAL
Qty: 200 STRIP | Refills: 0 | Status: SHIPPED | OUTPATIENT
Start: 2024-07-26

## 2024-07-26 RX ORDER — GLUCOSAM/CHON-MSM1/C/MANG/BOSW 500-416.6
TABLET ORAL
Qty: 200 EACH | Refills: 0 | Status: SHIPPED | OUTPATIENT
Start: 2024-07-26

## 2024-08-10 DIAGNOSIS — E10.65 UNCONTROLLED TYPE 1 DIABETES MELLITUS WITH HYPERGLYCEMIA (HCC): ICD-10-CM

## 2024-08-12 RX ORDER — PROCHLORPERAZINE 25 MG/1
SUPPOSITORY RECTAL
Qty: 3 EACH | Refills: 3 | Status: SHIPPED | OUTPATIENT
Start: 2024-08-12

## 2024-08-12 RX ORDER — ATORVASTATIN CALCIUM 40 MG/1
40 TABLET, FILM COATED ORAL DAILY
Qty: 30 TABLET | Refills: 3 | Status: SHIPPED | OUTPATIENT
Start: 2024-08-12

## 2024-08-12 NOTE — TELEPHONE ENCOUNTER
LAST VISIT:   5/14/2024     Future Appointments   Date Time Provider Department Center   8/15/2024  1:00 PM Jourdan Paris PA [facility]   8/15/2024  1:30 PM Meagan Perez MD [facility]

## 2024-08-23 RX ORDER — INSULIN GLARGINE 100 [IU]/ML
INJECTION, SOLUTION SUBCUTANEOUS
Qty: 60 ML | Refills: 0 | OUTPATIENT
Start: 2024-08-23

## 2024-09-05 DIAGNOSIS — E10.65 UNCONTROLLED TYPE 1 DIABETES MELLITUS WITH HYPERGLYCEMIA (HCC): ICD-10-CM

## 2024-09-12 RX ORDER — INSULIN ASPART 100 [IU]/ML
10 INJECTION, SOLUTION INTRAVENOUS; SUBCUTANEOUS
Qty: 15 ML | Refills: 3 | Status: SHIPPED | OUTPATIENT
Start: 2024-09-12

## 2024-09-19 DIAGNOSIS — E10.65 UNCONTROLLED TYPE 1 DIABETES MELLITUS WITH HYPERGLYCEMIA (HCC): ICD-10-CM

## 2024-09-19 RX ORDER — PEN NEEDLE, DIABETIC 32GX 5/32"
1 NEEDLE, DISPOSABLE MISCELLANEOUS 4 TIMES DAILY
Qty: 120 EACH | Refills: 3 | Status: SHIPPED | OUTPATIENT
Start: 2024-09-19

## 2024-09-19 RX ORDER — INSULIN GLARGINE 100 [IU]/ML
INJECTION, SOLUTION SUBCUTANEOUS
Qty: 10 ML | Refills: 3 | Status: SHIPPED | OUTPATIENT
Start: 2024-09-19

## 2024-09-19 RX ORDER — GLUCOSAMINE HCL/CHONDROITIN SU 500-400 MG
CAPSULE ORAL
Qty: 100 EACH | Refills: 12 | Status: SHIPPED | OUTPATIENT
Start: 2024-09-19

## 2024-09-25 ENCOUNTER — TELEPHONE (OUTPATIENT)
Dept: PRIMARY CARE CLINIC | Age: 54
End: 2024-09-25

## 2024-09-25 NOTE — TELEPHONE ENCOUNTER
Pharmacy (incoming call) called requesting clarification on prescription on  patients LANTUS, did it increase in dosage to 25 at night when it was 15 and he did use pens not vials.     Please advise.

## 2024-10-02 ENCOUNTER — TELEPHONE (OUTPATIENT)
Dept: PRIMARY CARE CLINIC | Age: 54
End: 2024-10-02

## 2024-10-02 DIAGNOSIS — E10.65 UNCONTROLLED TYPE 1 DIABETES MELLITUS WITH HYPERGLYCEMIA (HCC): ICD-10-CM

## 2024-10-02 NOTE — TELEPHONE ENCOUNTER
Unsure how patient is taking this currently. Please ask and advise patient that office visit is needed.

## 2024-10-02 NOTE — TELEPHONE ENCOUNTER
The pharmacy called asking if the patients prescription is supposed to be for a pen or vial and if his dosage for pm has increased to 25 mg from 15 mg?     Please clarify and change prescription if appropriate.

## 2024-10-03 RX ORDER — INSULIN GLARGINE 100 [IU]/ML
INJECTION, SOLUTION SUBCUTANEOUS
Qty: 4 ADJUSTABLE DOSE PRE-FILLED PEN SYRINGE | Refills: 3 | Status: SHIPPED | OUTPATIENT
Start: 2024-10-03

## 2024-10-03 NOTE — TELEPHONE ENCOUNTER
Lucero from RX "[employer] [facility]" states that patient only uses Lantus pens     25 units in the morning and 12 units at night     She states she spoke with patient and he is out of insulin and they have giving him an emergency supply     Please advise      [phone]

## 2024-10-03 NOTE — TELEPHONE ENCOUNTER
Phone not working for patient. Call placed to patients pharmacy, pharmacy will notify patient to contact our office to schedule an appointment, pharmacy advised patient may not receive further refills for any medications unless seen in our office.

## 2024-10-09 ENCOUNTER — TELEPHONE (OUTPATIENT)
Dept: PRIMARY CARE CLINIC | Age: 54
End: 2024-10-09

## 2024-10-09 DIAGNOSIS — R47.02 DYSPHASIA: ICD-10-CM

## 2024-10-09 DIAGNOSIS — K22.10 EROSIVE ESOPHAGITIS: ICD-10-CM

## 2024-10-09 DIAGNOSIS — E10.65 UNCONTROLLED TYPE 1 DIABETES MELLITUS WITH HYPERGLYCEMIA (HCC): ICD-10-CM

## 2024-10-09 RX ORDER — CALCIUM CITRATE/VITAMIN D3 200MG-6.25
TABLET ORAL
Qty: 200 STRIP | Refills: 0 | Status: SHIPPED | OUTPATIENT
Start: 2024-10-09

## 2024-10-09 RX ORDER — PANTOPRAZOLE SODIUM 40 MG/1
40 TABLET, DELAYED RELEASE ORAL 2 TIMES DAILY
Qty: 60 TABLET | Refills: 5 | Status: SHIPPED | OUTPATIENT
Start: 2024-10-09

## 2024-10-09 RX ORDER — PROCHLORPERAZINE 25 MG/1
1 SUPPOSITORY RECTAL
Qty: 1 EACH | Refills: 4 | Status: SHIPPED | OUTPATIENT
Start: 2024-10-09

## 2024-10-09 RX ORDER — LANCETS 30 GAUGE
1 EACH MISCELLANEOUS 3 TIMES DAILY
Qty: 200 EACH | Refills: 3 | Status: SHIPPED | OUTPATIENT
Start: 2024-10-09

## 2024-10-09 NOTE — TELEPHONE ENCOUNTER
LAST VISIT:   5/14/2024     No future appointments.    Pharmacy verified? Yes     [facility] - [place], [place] - [address] - P [phone] - F [phone]  [address] [address]  Phone: [phone] Fax: [phone]

## 2024-12-07 DIAGNOSIS — E10.65 UNCONTROLLED TYPE 1 DIABETES MELLITUS WITH HYPERGLYCEMIA (HCC): ICD-10-CM

## 2024-12-09 DIAGNOSIS — E10.65 UNCONTROLLED TYPE 1 DIABETES MELLITUS WITH HYPERGLYCEMIA (HCC): ICD-10-CM

## 2024-12-09 RX ORDER — ATORVASTATIN CALCIUM 40 MG/1
40 TABLET, FILM COATED ORAL DAILY
Qty: 90 TABLET | Refills: 3 | Status: SHIPPED | OUTPATIENT
Start: 2024-12-09

## 2024-12-09 RX ORDER — PROCHLORPERAZINE 25 MG/1
SUPPOSITORY RECTAL
Qty: 3 EACH | Refills: 3 | Status: SHIPPED | OUTPATIENT
Start: 2024-12-09

## 2024-12-23 DIAGNOSIS — E10.65 UNCONTROLLED TYPE 1 DIABETES MELLITUS WITH HYPERGLYCEMIA (HCC): ICD-10-CM

## 2024-12-23 RX ORDER — DEXTROSE 4 G
TABLET,CHEWABLE ORAL
Qty: 50 TABLET | Refills: 0 | Status: SHIPPED | OUTPATIENT
Start: 2024-12-23

## 2024-12-23 RX ORDER — CALCIUM CITRATE/VITAMIN D3 200MG-6.25
TABLET ORAL
Qty: 200 STRIP | Refills: 0 | Status: SHIPPED | OUTPATIENT
Start: 2024-12-23

## 2025-01-07 DIAGNOSIS — E10.65 UNCONTROLLED TYPE 1 DIABETES MELLITUS WITH HYPERGLYCEMIA (HCC): ICD-10-CM

## 2025-01-07 RX ORDER — PEN NEEDLE, DIABETIC 32GX 5/32"
NEEDLE, DISPOSABLE MISCELLANEOUS
Qty: 120 EACH | Refills: 3 | Status: SHIPPED | OUTPATIENT
Start: 2025-01-07

## 2025-01-13 DIAGNOSIS — E10.65 UNCONTROLLED TYPE 1 DIABETES MELLITUS WITH HYPERGLYCEMIA (HCC): ICD-10-CM

## 2025-01-13 RX ORDER — INSULIN GLARGINE 100 [IU]/ML
INJECTION, SOLUTION SUBCUTANEOUS
Qty: 12 ML | Refills: 3 | OUTPATIENT
Start: 2025-01-13

## 2025-01-21 DIAGNOSIS — E10.65 UNCONTROLLED TYPE 1 DIABETES MELLITUS WITH HYPERGLYCEMIA (HCC): ICD-10-CM

## 2025-01-21 RX ORDER — INSULIN GLARGINE 100 [IU]/ML
INJECTION, SOLUTION SUBCUTANEOUS
Qty: 4 ADJUSTABLE DOSE PRE-FILLED PEN SYRINGE | Refills: 3 | OUTPATIENT
Start: 2025-01-21

## 2025-02-18 DIAGNOSIS — E10.65 UNCONTROLLED TYPE 1 DIABETES MELLITUS WITH HYPERGLYCEMIA (HCC): ICD-10-CM

## 2025-02-18 RX ORDER — CALCIUM CITRATE/VITAMIN D3 200MG-6.25
TABLET ORAL
Qty: 200 STRIP | Refills: 0 | Status: SHIPPED | OUTPATIENT
Start: 2025-02-18

## 2025-02-18 RX ORDER — CALCIUM CITRATE/VITAMIN D3 200MG-6.25
TABLET ORAL
Qty: 200 STRIP | Refills: 0 | OUTPATIENT
Start: 2025-02-18

## 2025-02-18 RX ORDER — GLUCOSAM/CHON-MSM1/C/MANG/BOSW 500-416.6
1 TABLET ORAL 3 TIMES DAILY
Qty: 200 EACH | Refills: 0 | Status: SHIPPED | OUTPATIENT
Start: 2025-02-18

## 2025-02-19 DIAGNOSIS — E10.65 UNCONTROLLED TYPE 1 DIABETES MELLITUS WITH HYPERGLYCEMIA (HCC): ICD-10-CM

## 2025-02-19 RX ORDER — INSULIN GLARGINE 100 [IU]/ML
INJECTION, SOLUTION SUBCUTANEOUS
Qty: 4 ADJUSTABLE DOSE PRE-FILLED PEN SYRINGE | Refills: 0 | Status: SHIPPED | OUTPATIENT
Start: 2025-02-19

## 2025-03-05 DIAGNOSIS — E10.65 UNCONTROLLED TYPE 1 DIABETES MELLITUS WITH HYPERGLYCEMIA (HCC): ICD-10-CM

## 2025-03-05 RX ORDER — INSULIN ASPART 100 [IU]/ML
10 INJECTION, SOLUTION INTRAVENOUS; SUBCUTANEOUS
Qty: 15 ML | Refills: 3 | OUTPATIENT
Start: 2025-03-05

## 2025-04-05 DIAGNOSIS — K22.10 EROSIVE ESOPHAGITIS: ICD-10-CM

## 2025-04-05 DIAGNOSIS — E10.65 UNCONTROLLED TYPE 1 DIABETES MELLITUS WITH HYPERGLYCEMIA (HCC): ICD-10-CM

## 2025-04-05 DIAGNOSIS — R47.02 DYSPHASIA: ICD-10-CM

## 2025-04-07 RX ORDER — PROCHLORPERAZINE 25 MG/1
SUPPOSITORY RECTAL
Qty: 3 EACH | Refills: 3 | Status: SHIPPED | OUTPATIENT
Start: 2025-04-07

## 2025-04-07 RX ORDER — PANTOPRAZOLE SODIUM 40 MG/1
40 TABLET, DELAYED RELEASE ORAL 2 TIMES DAILY
Qty: 60 TABLET | Refills: 5 | Status: SHIPPED | OUTPATIENT
Start: 2025-04-07

## 2025-04-07 RX ORDER — ALBUTEROL SULFATE 90 UG/1
INHALANT RESPIRATORY (INHALATION)
Qty: 25.5 G | Refills: 4 | Status: SHIPPED | OUTPATIENT
Start: 2025-04-07

## 2025-04-21 DIAGNOSIS — E10.65 UNCONTROLLED TYPE 1 DIABETES MELLITUS WITH HYPERGLYCEMIA (HCC): ICD-10-CM

## 2025-04-21 RX ORDER — INSULIN GLARGINE 100 [IU]/ML
INJECTION, SOLUTION SUBCUTANEOUS
Qty: 4 ADJUSTABLE DOSE PRE-FILLED PEN SYRINGE | Refills: 0 | Status: SHIPPED | OUTPATIENT
Start: 2025-04-21

## 2025-04-27 DIAGNOSIS — E10.65 UNCONTROLLED TYPE 1 DIABETES MELLITUS WITH HYPERGLYCEMIA (HCC): ICD-10-CM

## 2025-04-28 ENCOUNTER — TELEPHONE (OUTPATIENT)
Dept: PRIMARY CARE CLINIC | Age: 55
End: 2025-04-28

## 2025-04-28 DIAGNOSIS — E10.65 UNCONTROLLED TYPE 1 DIABETES MELLITUS WITH HYPERGLYCEMIA (HCC): Primary | ICD-10-CM

## 2025-04-28 RX ORDER — PEN NEEDLE, DIABETIC 32GX 5/32"
NEEDLE, DISPOSABLE MISCELLANEOUS
Qty: 120 EACH | Refills: 3 | Status: SHIPPED | OUTPATIENT
Start: 2025-04-28

## 2025-04-28 RX ORDER — BLOOD-GLUCOSE METER
1 KIT MISCELLANEOUS DAILY
Qty: 1 KIT | Refills: 0 | Status: SHIPPED | OUTPATIENT
Start: 2025-04-28

## 2025-04-28 RX ORDER — ACYCLOVIR 400 MG/1
TABLET ORAL
Qty: 3 EACH | Refills: 0 | Status: SHIPPED | COMMUNITY
Start: 2025-04-28

## 2025-04-28 NOTE — TELEPHONE ENCOUNTER
Patient is requesting a new order for a true Metrix glucometer.    Patient is also requesting samples of the dexcom sensors since his malfunctioned. Okay for samples?    Pharmacy confirmed. Please advise.

## 2025-04-29 NOTE — TELEPHONE ENCOUNTER
Call placed, discussed message with patient who verbalized understanding. Samples ready for pickup.

## 2025-05-01 ENCOUNTER — TELEPHONE (OUTPATIENT)
Dept: PRIMARY CARE CLINIC | Age: 55
End: 2025-05-01

## 2025-05-01 NOTE — TELEPHONE ENCOUNTER
Called patient.  He scheduled with Jourdan 5/5/25.  This is okay with patient as he has to give two day notice for transport.  He states he feels somewhat better with liquid IV.  Advised to go to ED if worsening or feels dehydrated.  Voices understanding.

## 2025-05-01 NOTE — TELEPHONE ENCOUNTER
Patient is asking if he can be prescribed something for nausea.     He states he thinks he's been a bit dehydrated and has been dealing with nausea since yesterday.     Patient did start drinking Liquid IV to help with his hydration but he's having a hard time \"choking it down.\"    Please advise.  Pharmacy Verified.

## 2025-06-10 DIAGNOSIS — G47.00 INSOMNIA, UNSPECIFIED TYPE: ICD-10-CM

## 2025-06-10 DIAGNOSIS — E10.65 UNCONTROLLED TYPE 1 DIABETES MELLITUS WITH HYPERGLYCEMIA (HCC): ICD-10-CM

## 2025-06-11 RX ORDER — TRAZODONE HYDROCHLORIDE 150 MG/1
300 TABLET ORAL NIGHTLY
Qty: 180 TABLET | Refills: 0 | OUTPATIENT
Start: 2025-06-11

## 2025-06-11 RX ORDER — INSULIN GLARGINE 100 [IU]/ML
INJECTION, SOLUTION SUBCUTANEOUS
Qty: 4 ADJUSTABLE DOSE PRE-FILLED PEN SYRINGE | Refills: 0 | OUTPATIENT
Start: 2025-06-11

## 2025-08-31 ENCOUNTER — APPOINTMENT (OUTPATIENT)
Dept: CT IMAGING | Age: 55
End: 2025-08-31
Payer: MEDICAID

## 2025-08-31 ENCOUNTER — HOSPITAL ENCOUNTER (EMERGENCY)
Age: 55
Discharge: HOME | End: 2025-08-31
Attending: EMERGENCY MEDICINE
Payer: MEDICAID

## 2025-08-31 VITALS
RESPIRATION RATE: 16 BRPM | SYSTOLIC BLOOD PRESSURE: 211 MMHG | HEART RATE: 69 BPM | TEMPERATURE: 98 F | DIASTOLIC BLOOD PRESSURE: 114 MMHG | BODY MASS INDEX: 20.53 KG/M2 | OXYGEN SATURATION: 98 % | WEIGHT: 135 LBS

## 2025-08-31 DIAGNOSIS — S06.0XAA CONCUSSION WITH UNKNOWN LOSS OF CONSCIOUSNESS STATUS, INITIAL ENCOUNTER: Primary | ICD-10-CM

## 2025-08-31 LAB
ALBUMIN SERPL-MCNC: 3.9 G/DL (ref 3.5–5.2)
ALBUMIN/GLOB SERPL: 1.4 {RATIO} (ref 1–2.5)
ALP SERPL-CCNC: 90 U/L (ref 40–129)
ALT SERPL-CCNC: 18 U/L (ref 10–50)
ANION GAP SERPL CALCULATED.3IONS-SCNC: 12 MMOL/L (ref 9–16)
AST SERPL-CCNC: 33 U/L (ref 10–50)
BASOPHILS # BLD: 0.06 K/UL (ref 0–0.2)
BASOPHILS NFR BLD: 1 % (ref 0–2)
BILIRUB SERPL-MCNC: 0.4 MG/DL (ref 0–1.2)
BUN SERPL-MCNC: 21 MG/DL (ref 6–20)
CALCIUM SERPL-MCNC: 8.6 MG/DL (ref 8.6–10.4)
CHLORIDE SERPL-SCNC: 103 MMOL/L (ref 98–107)
CO2 SERPL-SCNC: 22 MMOL/L (ref 20–31)
CREAT SERPL-MCNC: 1.8 MG/DL (ref 0.7–1.2)
EOSINOPHIL # BLD: 0.34 K/UL (ref 0–0.44)
EOSINOPHILS RELATIVE PERCENT: 4 % (ref 1–4)
ERYTHROCYTE [DISTWIDTH] IN BLOOD BY AUTOMATED COUNT: 14.6 % (ref 11.8–14.4)
GFR, ESTIMATED: 44 ML/MIN/1.73M2
GLUCOSE SERPL-MCNC: 253 MG/DL (ref 74–99)
HCT VFR BLD AUTO: 34.9 % (ref 40.7–50.3)
HGB BLD-MCNC: 11.1 G/DL (ref 13–17)
IMM GRANULOCYTES # BLD AUTO: 0.03 K/UL (ref 0–0.3)
IMM GRANULOCYTES NFR BLD: 0 %
LYMPHOCYTES NFR BLD: 2.39 K/UL (ref 1.1–3.7)
LYMPHOCYTES RELATIVE PERCENT: 28 % (ref 24–43)
MCH RBC QN AUTO: 27.6 PG (ref 25.2–33.5)
MCHC RBC AUTO-ENTMCNC: 31.8 G/DL (ref 28.4–34.8)
MCV RBC AUTO: 86.8 FL (ref 82.6–102.9)
MONOCYTES NFR BLD: 0.59 K/UL (ref 0.1–1.2)
MONOCYTES NFR BLD: 7 % (ref 3–12)
NEUTROPHILS NFR BLD: 60 % (ref 36–65)
NEUTS SEG NFR BLD: 5.22 K/UL (ref 1.5–8.1)
NRBC BLD-RTO: 0 PER 100 WBC
PLATELET # BLD AUTO: 247 K/UL (ref 138–453)
PMV BLD AUTO: 11.4 FL (ref 8.1–13.5)
POTASSIUM SERPL-SCNC: 4.4 MMOL/L (ref 3.7–5.3)
PROT SERPL-MCNC: 6.6 G/DL (ref 6.6–8.7)
RBC # BLD AUTO: 4.02 M/UL (ref 4.21–5.77)
RBC # BLD: ABNORMAL 10*6/UL
SODIUM SERPL-SCNC: 137 MMOL/L (ref 136–145)
WBC OTHER # BLD: 8.6 K/UL (ref 3.5–11.3)

## 2025-08-31 PROCEDURE — 90471 IMMUNIZATION ADMIN: CPT

## 2025-08-31 PROCEDURE — 85025 COMPLETE CBC W/AUTO DIFF WBC: CPT

## 2025-08-31 PROCEDURE — 90715 TDAP VACCINE 7 YRS/> IM: CPT

## 2025-08-31 PROCEDURE — 72128 CT CHEST SPINE W/O DYE: CPT

## 2025-08-31 PROCEDURE — 96374 THER/PROPH/DIAG INJ IV PUSH: CPT

## 2025-08-31 PROCEDURE — 80053 COMPREHEN METABOLIC PANEL: CPT

## 2025-08-31 PROCEDURE — 72125 CT NECK SPINE W/O DYE: CPT

## 2025-08-31 PROCEDURE — 96375 TX/PRO/DX INJ NEW DRUG ADDON: CPT

## 2025-08-31 PROCEDURE — 72131 CT LUMBAR SPINE W/O DYE: CPT

## 2025-08-31 PROCEDURE — 6360000002 HC RX W HCPCS

## 2025-08-31 PROCEDURE — 70450 CT HEAD/BRAIN W/O DYE: CPT

## 2025-08-31 PROCEDURE — 99284 EMERGENCY DEPT VISIT MOD MDM: CPT

## 2025-08-31 RX ORDER — FENTANYL CITRATE 50 UG/ML
50 INJECTION, SOLUTION INTRAMUSCULAR; INTRAVENOUS ONCE
Status: COMPLETED | OUTPATIENT
Start: 2025-08-31 | End: 2025-08-31

## 2025-08-31 RX ORDER — ONDANSETRON 4 MG/1
4 TABLET, FILM COATED ORAL 3 TIMES DAILY PRN
Qty: 15 TABLET | Refills: 0 | Status: SHIPPED | OUTPATIENT
Start: 2025-08-31

## 2025-08-31 RX ORDER — ONDANSETRON 2 MG/ML
4 INJECTION INTRAMUSCULAR; INTRAVENOUS ONCE
Status: DISCONTINUED | OUTPATIENT
Start: 2025-08-31 | End: 2025-09-01 | Stop reason: HOSPADM

## 2025-08-31 RX ORDER — ONDANSETRON 2 MG/ML
4 INJECTION INTRAMUSCULAR; INTRAVENOUS ONCE
Status: COMPLETED | OUTPATIENT
Start: 2025-08-31 | End: 2025-08-31

## 2025-08-31 RX ADMIN — FENTANYL CITRATE 50 MCG: 50 INJECTION INTRAMUSCULAR; INTRAVENOUS at 20:15

## 2025-08-31 RX ADMIN — TETANUS TOXOID, REDUCED DIPHTHERIA TOXOID AND ACELLULAR PERTUSSIS VACCINE, ADSORBED 0.5 ML: 5; 2.5; 8; 8; 2.5 SUSPENSION INTRAMUSCULAR at 22:07

## 2025-08-31 RX ADMIN — ONDANSETRON 4 MG: 2 INJECTION, SOLUTION INTRAMUSCULAR; INTRAVENOUS at 20:16

## 2025-08-31 ASSESSMENT — PAIN SCALES - GENERAL
PAINLEVEL_OUTOF10: 8
PAINLEVEL_OUTOF10: 8

## 2025-08-31 ASSESSMENT — PAIN DESCRIPTION - LOCATION
LOCATION: HEAD
LOCATION: HEAD

## 2025-08-31 ASSESSMENT — PAIN - FUNCTIONAL ASSESSMENT
PAIN_FUNCTIONAL_ASSESSMENT: ACTIVITIES ARE NOT PREVENTED
PAIN_FUNCTIONAL_ASSESSMENT: 0-10
PAIN_FUNCTIONAL_ASSESSMENT: 0-10

## 2025-09-02 ASSESSMENT — ENCOUNTER SYMPTOMS: VOMITING: 1

## 2025-09-03 DIAGNOSIS — E10.65 UNCONTROLLED TYPE 1 DIABETES MELLITUS WITH HYPERGLYCEMIA (HCC): ICD-10-CM

## 2025-09-03 RX ORDER — PROCHLORPERAZINE 25 MG/1
SUPPOSITORY RECTAL
Qty: 3 EACH | Refills: 3 | OUTPATIENT
Start: 2025-09-03

## (undated) DEVICE — 1010 S-DRAPE TOWEL DRAPE 10/BX: Brand: STERI-DRAPE™

## (undated) DEVICE — 4.0MM SHORT PILOT DRILL WITH AO CONNECTOR: Brand: TRIGEN

## (undated) DEVICE — BANDAGE COBAN 6 IN WND 6INX5YD FOAM

## (undated) DEVICE — FORCEPS BX L240CM WRK CHN 2.8MM STD CAP W/ NDL MIC MESH

## (undated) DEVICE — DRAPE,U/ SHT,SPLIT,PLAS,STERIL: Brand: MEDLINE

## (undated) DEVICE — SUTURE VCRL SZ 2-0 L18IN ABSRB UD CT-1 L36MM 1/2 CIR J839D

## (undated) DEVICE — DEFENDO AIR WATER SUCTION AND BIOPSY VALVE KIT FOR  OLYMPUS: Brand: DEFENDO AIR/WATER/SUCTION AND BIOPSY VALVE

## (undated) DEVICE — APPLICATOR MEDICATED 26 CC SOLUTION HI LT ORNG CHLORAPREP

## (undated) DEVICE — DRESSING TRNSPAR W5XL4.5IN FLM SHT SEMIPERMEABLE WIND

## (undated) DEVICE — DRAPE,REIN 53X77,STERILE: Brand: MEDLINE

## (undated) DEVICE — BITEBLOCK 54FR W/ DENT RIM BLOX

## (undated) DEVICE — BLADE CLIPPER GEN PURP NS

## (undated) DEVICE — GOWN,SIRUS,NONRNF,SETINSLV,XL,20/CS: Brand: MEDLINE

## (undated) DEVICE — THE STERILE LIGHT HANDLE COVER IS USED WITH STERIS SURGICAL LIGHTING AND VISUALIZATION SYSTEMS.

## (undated) DEVICE — DRESSING,GAUZE,XEROFORM,CURAD,1"X8",ST: Brand: CURAD

## (undated) DEVICE — Device

## (undated) DEVICE — GLOVE ORANGE PI 8   MSG9080

## (undated) DEVICE — INTERTAN LAG SCREW DRILL: Brand: TRIGEN

## (undated) DEVICE — SUTURE VCRL SZ 0 L18IN ABSRB UD L36MM CT-1 1/2 CIR J840D

## (undated) DEVICE — ENDO KIT W/SYRINGE: Brand: MEDLINE INDUSTRIES, INC.

## (undated) DEVICE — C-ARMOR C-ARM EQUIPMENT COVERS CLEAR STERILE UNIVERSAL FIT 12 PER CASE: Brand: C-ARMOR

## (undated) DEVICE — GARMENT,MEDLINE,DVT,INT,CALF,MED, GEN2: Brand: MEDLINE

## (undated) DEVICE — SOLUTION SCRB 4OZ 4% CHG H2O AIDED FOR PREOPERATIVE SKIN

## (undated) DEVICE — SVMMC ORTH SPL DRP PK

## (undated) DEVICE — GUIDE PIN 3.2MM X 343MM: Brand: TRIGEN

## (undated) DEVICE — GLOVE ORANGE PI 8 1/2   MSG9085

## (undated) DEVICE — SURGICAL SUCTION CONNECTING TUBE WITH MALE CONNECTOR AND SUCTION CLAMP, 2 FT. LONG (.6 M), 5 MM I.D.: Brand: CONMED

## (undated) DEVICE — GLOVE ORANGE PI 7   MSG9070

## (undated) DEVICE — INTENDED FOR TISSUE SEPARATION, AND OTHER PROCEDURES THAT REQUIRE A SHARP SURGICAL BLADE TO PUNCTURE OR CUT.: Brand: BARD-PARKER ® CARBON RIB-BACK BLADES

## (undated) DEVICE — CYSTO/BLADDER IRRIGATION SET, REGULATING CLAMP

## (undated) DEVICE — GAUZE,SPONGE,FLUFF,6"X6.75",STRL,5/TRAY: Brand: MEDLINE

## (undated) DEVICE — DRESSING FOAM W4XL4IN AG SIL FACE BORD IONIC ANTIMIC ADH

## (undated) DEVICE — SUTURE NONABSORBABLE MONOFILAMENT 3-0 PS-1 18 IN BLK ETHILON 1663H

## (undated) DEVICE — 3.0MM X 1000MM BALL TIP GUIDE ROD: Brand: TRIGEN

## (undated) DEVICE — ELECTRODE PT RET AD L9FT HI MOIST COND ADH HYDRGEL CORDED

## (undated) DEVICE — GLOVE SURG SZ 65 THK91MIL LTX FREE SYN POLYISOPRENE

## (undated) DEVICE — GLOVE ORANGE PI 7 1/2   MSG9075

## (undated) DEVICE — INTENDED TO SUPPORT AND MAINTAIN THE POSITION OF AN ANESTHETIZED PATIENT DURING SURGERY: Brand: MD TRACTION ROPE

## (undated) DEVICE — STOCKINETTE,IMPERVIOUS,12X48,STERILE: Brand: MEDLINE